# Patient Record
Sex: FEMALE | Race: WHITE | NOT HISPANIC OR LATINO | URBAN - METROPOLITAN AREA
[De-identification: names, ages, dates, MRNs, and addresses within clinical notes are randomized per-mention and may not be internally consistent; named-entity substitution may affect disease eponyms.]

---

## 2018-01-03 ENCOUNTER — EMERGENCY (EMERGENCY)
Facility: HOSPITAL | Age: 73
LOS: 0 days | Discharge: HOME | End: 2018-01-03
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
## 2018-01-03 DIAGNOSIS — Z87.891 PERSONAL HISTORY OF NICOTINE DEPENDENCE: ICD-10-CM
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
## 2018-01-03 DIAGNOSIS — S90.111A CONTUSION OF RIGHT GREAT TOE WITHOUT DAMAGE TO NAIL, INITIAL ENCOUNTER: ICD-10-CM
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
## 2018-01-03 DIAGNOSIS — I10 ESSENTIAL (PRIMARY) HYPERTENSION: ICD-10-CM

## 2018-01-03 DIAGNOSIS — M79.674 PAIN IN RIGHT TOE(S): ICD-10-CM
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
## 2018-09-06 ENCOUNTER — OUTPATIENT (OUTPATIENT)
Dept: OUTPATIENT SERVICES | Facility: HOSPITAL | Age: 73
LOS: 1 days | Discharge: HOME | End: 2018-09-06

## 2018-09-07 DIAGNOSIS — L97.512 NON-PRESSURE CHRONIC ULCER OF OTHER PART OF RIGHT FOOT WITH FAT LAYER EXPOSED: ICD-10-CM

## 2018-09-09 LAB
CULTURE RESULTS: SIGNIFICANT CHANGE UP
SPECIMEN SOURCE: SIGNIFICANT CHANGE UP

## 2018-09-28 ENCOUNTER — OUTPATIENT (OUTPATIENT)
Dept: OUTPATIENT SERVICES | Facility: HOSPITAL | Age: 73
LOS: 1 days | Discharge: HOME | End: 2018-09-28

## 2018-09-28 DIAGNOSIS — Z00.00 ENCOUNTER FOR GENERAL ADULT MEDICAL EXAMINATION WITHOUT ABNORMAL FINDINGS: ICD-10-CM

## 2018-09-28 DIAGNOSIS — I48.91 UNSPECIFIED ATRIAL FIBRILLATION: ICD-10-CM

## 2018-09-28 DIAGNOSIS — R68.89 OTHER GENERAL SYMPTOMS AND SIGNS: ICD-10-CM

## 2019-01-09 ENCOUNTER — EMERGENCY (EMERGENCY)
Facility: HOSPITAL | Age: 74
LOS: 0 days | Discharge: HOME | End: 2019-01-09
Attending: EMERGENCY MEDICINE | Admitting: EMERGENCY MEDICINE

## 2019-01-09 VITALS
OXYGEN SATURATION: 99 % | DIASTOLIC BLOOD PRESSURE: 83 MMHG | HEART RATE: 61 BPM | TEMPERATURE: 98 F | RESPIRATION RATE: 18 BRPM | SYSTOLIC BLOOD PRESSURE: 184 MMHG

## 2019-01-09 VITALS
DIASTOLIC BLOOD PRESSURE: 86 MMHG | RESPIRATION RATE: 19 BRPM | OXYGEN SATURATION: 99 % | SYSTOLIC BLOOD PRESSURE: 216 MMHG | TEMPERATURE: 98 F | HEART RATE: 75 BPM

## 2019-01-09 DIAGNOSIS — R51 HEADACHE: ICD-10-CM

## 2019-01-09 DIAGNOSIS — Z95.5 PRESENCE OF CORONARY ANGIOPLASTY IMPLANT AND GRAFT: ICD-10-CM

## 2019-01-09 DIAGNOSIS — I10 ESSENTIAL (PRIMARY) HYPERTENSION: ICD-10-CM

## 2019-01-09 RX ORDER — OXYCODONE AND ACETAMINOPHEN 5; 325 MG/1; MG/1
1 TABLET ORAL ONCE
Qty: 0 | Refills: 0 | Status: DISCONTINUED | OUTPATIENT
Start: 2019-01-09 | End: 2019-01-09

## 2019-01-09 RX ORDER — METOCLOPRAMIDE HCL 10 MG
10 TABLET ORAL ONCE
Qty: 0 | Refills: 0 | Status: COMPLETED | OUTPATIENT
Start: 2019-01-09 | End: 2019-01-09

## 2019-01-09 RX ADMIN — OXYCODONE AND ACETAMINOPHEN 1 TABLET(S): 5; 325 TABLET ORAL at 18:34

## 2019-01-09 NOTE — ED PROVIDER NOTE - OBJECTIVE STATEMENT
74 y/o female with hx Thyroid disease, HTN, L FSA presents to the ED c/o "I have a terrible right sided headache that started at 2 am with nausea." no hx trauma/ dizziness/ weakness/ slurred speech/ CP/ SOB

## 2019-01-09 NOTE — ED PROVIDER NOTE - CROS ED CARDIOVAS ALL NEG
Can we call Wilma Lorenzana's daughter and let her know that     I have had the opportunity to review your recent pulmonary function testing and it shows severe asthma, but also sever diffusion impairment - I would recommend we consider a referral to pulmonology    Lavelle Aj MD     negative...

## 2019-01-09 NOTE — ED ADULT NURSE NOTE - OBJECTIVE STATEMENT
pt complaining of headache since 2am. pt denies blurry vision, chest pain. pt hypertensive on arrival, states her Blood pressure meds were recently changed and since she had intermittent headaches. pt given percocet in er, pt reports full relief of symptoms after pain medications, pt a&o x3. gait steady.

## 2019-01-09 NOTE — ED PROVIDER NOTE - PROGRESS NOTE DETAILS
ATTENDING NOTE:   74 y/o F with PMH of HTN, Graves' Disease, blockage in b/l LE with recent RLE stent placement (2 stents placed), presents s/o right sided HA that started today. Pt woke up today with an intermittent HA, took a nap around 12pm and woke up around 2:30/3pm today and HA became more consistent and worsened so came to ED. Reports she had mild right sided neck pain last night that had resolved last night. No neck pain today. No fevers. No numbness/tingling. On exam: Pt AAOx3. EOMI. PERRLA. NCAT. S1S2. CTAB. Abdomen soft NTND. FROM x all extremities. No focal neuro deficits. pt taken to ct, percocet given prior Patient feeling much better, does not want to wait to get blood work. Will follow-up with Dr. Aaron. Explained reasons to return to ED. ATTENDING NOTE:   72 y/o F with PMH of HTN, Graves' Disease, blockage in b/l LE with recent RLE stent placement (2 stents placed), presents s/o right sided HA that started today. Pt woke up today with an intermittent HA, took a nap around 12pm and woke up around 2:30/3pm today and HA became more consistent and worsened so came to ED. Reports she had mild right sided neck pain last night that had resolved last night. No neck pain today. No fevers. No numbness/tingling. On exam: Pt AAOx3. EOMI. PERRLA. NCAT. S1S2. CTAB. Abdomen soft NTND. FROM x all extremities.  cn2-12 intact, motro 5/5 X4, sensation grossly intact, normal gait.

## 2019-01-09 NOTE — ED PROVIDER NOTE - MEDICAL DECISION MAKING DETAILS
ct head negative, Patient feeling much better, does not want to wait to get blood work. Will follow-up with Dr. Aaron.

## 2019-02-03 ENCOUNTER — INPATIENT (INPATIENT)
Facility: HOSPITAL | Age: 74
LOS: 2 days | Discharge: HOME | End: 2019-02-06
Attending: INTERNAL MEDICINE | Admitting: INTERNAL MEDICINE

## 2019-02-03 VITALS
OXYGEN SATURATION: 96 % | TEMPERATURE: 96 F | RESPIRATION RATE: 19 BRPM | HEART RATE: 65 BPM | DIASTOLIC BLOOD PRESSURE: 75 MMHG | SYSTOLIC BLOOD PRESSURE: 180 MMHG

## 2019-02-03 DIAGNOSIS — Z90.49 ACQUIRED ABSENCE OF OTHER SPECIFIED PARTS OF DIGESTIVE TRACT: Chronic | ICD-10-CM

## 2019-02-03 DIAGNOSIS — Z90.710 ACQUIRED ABSENCE OF BOTH CERVIX AND UTERUS: Chronic | ICD-10-CM

## 2019-02-03 LAB
ALBUMIN SERPL ELPH-MCNC: 3.8 G/DL — SIGNIFICANT CHANGE UP (ref 3.5–5.2)
ALBUMIN SERPL ELPH-MCNC: 3.8 G/DL — SIGNIFICANT CHANGE UP (ref 3.5–5.2)
ALP SERPL-CCNC: 399 U/L — HIGH (ref 30–115)
ALP SERPL-CCNC: 412 U/L — HIGH (ref 30–115)
ALT FLD-CCNC: 315 U/L — HIGH (ref 0–41)
ALT FLD-CCNC: 320 U/L — HIGH (ref 0–41)
ANION GAP SERPL CALC-SCNC: 17 MMOL/L — HIGH (ref 7–14)
APAP SERPL-MCNC: 7 UG/ML — LOW (ref 10–30)
APPEARANCE UR: ABNORMAL
APPEARANCE UR: ABNORMAL
APTT BLD: 33.6 SEC — SIGNIFICANT CHANGE UP (ref 27–39.2)
AST SERPL-CCNC: 159 U/L — HIGH (ref 0–41)
AST SERPL-CCNC: 165 U/L — HIGH (ref 0–41)
BACTERIA # UR AUTO: ABNORMAL /HPF
BACTERIA # UR AUTO: ABNORMAL /HPF
BASOPHILS # BLD AUTO: 0.02 K/UL — SIGNIFICANT CHANGE UP (ref 0–0.2)
BASOPHILS NFR BLD AUTO: 0.2 % — SIGNIFICANT CHANGE UP (ref 0–1)
BILIRUB DIRECT SERPL-MCNC: 2.9 MG/DL — HIGH (ref 0–0.2)
BILIRUB INDIRECT FLD-MCNC: 2.3 MG/DL — HIGH (ref 0.2–1.2)
BILIRUB SERPL-MCNC: 5.2 MG/DL — HIGH (ref 0.2–1.2)
BILIRUB SERPL-MCNC: 5.2 MG/DL — HIGH (ref 0.2–1.2)
BILIRUB UR-MCNC: ABNORMAL
BILIRUB UR-MCNC: ABNORMAL
BUN SERPL-MCNC: 16 MG/DL — SIGNIFICANT CHANGE UP (ref 10–20)
CA-I SERPL-SCNC: 1.16 MMOL/L — SIGNIFICANT CHANGE UP (ref 1.12–1.3)
CALCIUM SERPL-MCNC: 8.9 MG/DL — SIGNIFICANT CHANGE UP (ref 8.5–10.1)
CHLORIDE SERPL-SCNC: 95 MMOL/L — LOW (ref 98–110)
CO2 SERPL-SCNC: 22 MMOL/L — SIGNIFICANT CHANGE UP (ref 17–32)
COLOR SPEC: ABNORMAL
COLOR SPEC: ABNORMAL
CREAT SERPL-MCNC: 0.8 MG/DL — SIGNIFICANT CHANGE UP (ref 0.7–1.5)
DIFF PNL FLD: NEGATIVE — SIGNIFICANT CHANGE UP
DIFF PNL FLD: NEGATIVE — SIGNIFICANT CHANGE UP
EOSINOPHIL # BLD AUTO: 0.01 K/UL — SIGNIFICANT CHANGE UP (ref 0–0.7)
EOSINOPHIL NFR BLD AUTO: 0.1 % — SIGNIFICANT CHANGE UP (ref 0–8)
EPI CELLS # UR: ABNORMAL /HPF
EPI CELLS # UR: ABNORMAL /HPF
GAS PNL BLDV: 137 MMOL/L — SIGNIFICANT CHANGE UP (ref 136–145)
GAS PNL BLDV: SIGNIFICANT CHANGE UP
GLUCOSE SERPL-MCNC: 163 MG/DL — HIGH (ref 70–99)
GLUCOSE UR QL: NEGATIVE MG/DL — SIGNIFICANT CHANGE UP
GLUCOSE UR QL: NEGATIVE MG/DL — SIGNIFICANT CHANGE UP
HCO3 BLDV-SCNC: 24 MMOL/L — SIGNIFICANT CHANGE UP (ref 22–29)
HCT VFR BLD CALC: 35.9 % — LOW (ref 37–47)
HCT VFR BLDA CALC: 11.3 % — LOW (ref 34–44)
HGB BLD CALC-MCNC: 3.7 G/DL — CRITICAL LOW (ref 14–18)
HGB BLD-MCNC: 11.6 G/DL — LOW (ref 12–16)
IMM GRANULOCYTES NFR BLD AUTO: 0.6 % — HIGH (ref 0.1–0.3)
INR BLD: 1.11 RATIO — SIGNIFICANT CHANGE UP (ref 0.65–1.3)
KETONES UR-MCNC: ABNORMAL
KETONES UR-MCNC: NEGATIVE — SIGNIFICANT CHANGE UP
LACTATE BLDV-MCNC: 1.1 MMOL/L — SIGNIFICANT CHANGE UP (ref 0.5–1.6)
LEUKOCYTE ESTERASE UR-ACNC: ABNORMAL
LEUKOCYTE ESTERASE UR-ACNC: NEGATIVE — SIGNIFICANT CHANGE UP
LYMPHOCYTES # BLD AUTO: 0.7 K/UL — LOW (ref 1.2–3.4)
LYMPHOCYTES # BLD AUTO: 7 % — LOW (ref 20.5–51.1)
MAGNESIUM SERPL-MCNC: 2.2 MG/DL — SIGNIFICANT CHANGE UP (ref 1.8–2.4)
MCHC RBC-ENTMCNC: 28.2 PG — SIGNIFICANT CHANGE UP (ref 27–31)
MCHC RBC-ENTMCNC: 32.3 G/DL — SIGNIFICANT CHANGE UP (ref 32–37)
MCV RBC AUTO: 87.3 FL — SIGNIFICANT CHANGE UP (ref 81–99)
MONOCYTES # BLD AUTO: 0.62 K/UL — HIGH (ref 0.1–0.6)
MONOCYTES NFR BLD AUTO: 6.2 % — SIGNIFICANT CHANGE UP (ref 1.7–9.3)
NEUTROPHILS # BLD AUTO: 8.59 K/UL — HIGH (ref 1.4–6.5)
NEUTROPHILS NFR BLD AUTO: 85.9 % — HIGH (ref 42.2–75.2)
NITRITE UR-MCNC: NEGATIVE — SIGNIFICANT CHANGE UP
NITRITE UR-MCNC: POSITIVE
NRBC # BLD: 0 /100 WBCS — SIGNIFICANT CHANGE UP (ref 0–0)
PCO2 BLDV: 37 MMHG — LOW (ref 41–51)
PH BLDV: 7.43 — SIGNIFICANT CHANGE UP (ref 7.26–7.43)
PH UR: 6 — SIGNIFICANT CHANGE UP (ref 5–8)
PH UR: 6 — SIGNIFICANT CHANGE UP (ref 5–8)
PLATELET # BLD AUTO: 191 K/UL — SIGNIFICANT CHANGE UP (ref 130–400)
PO2 BLDV: 52 MMHG — HIGH (ref 20–40)
POTASSIUM BLDV-SCNC: 4 MMOL/L — SIGNIFICANT CHANGE UP (ref 3.3–5.6)
POTASSIUM SERPL-MCNC: 4.8 MMOL/L — SIGNIFICANT CHANGE UP (ref 3.5–5)
POTASSIUM SERPL-SCNC: 4.8 MMOL/L — SIGNIFICANT CHANGE UP (ref 3.5–5)
PROT SERPL-MCNC: 6.6 G/DL — SIGNIFICANT CHANGE UP (ref 6–8)
PROT SERPL-MCNC: 6.7 G/DL — SIGNIFICANT CHANGE UP (ref 6–8)
PROT UR-MCNC: 30 MG/DL
PROT UR-MCNC: NEGATIVE MG/DL — SIGNIFICANT CHANGE UP
PROTHROM AB SERPL-ACNC: 12.7 SEC — SIGNIFICANT CHANGE UP (ref 9.95–12.87)
RBC # BLD: 4.11 M/UL — LOW (ref 4.2–5.4)
RBC # FLD: 14.9 % — HIGH (ref 11.5–14.5)
SALICYLATES SERPL-MCNC: <0.3 MG/DL — LOW (ref 4–30)
SAO2 % BLDV: 91 % — SIGNIFICANT CHANGE UP
SODIUM SERPL-SCNC: 134 MMOL/L — LOW (ref 135–146)
SP GR SPEC: 1.01 — SIGNIFICANT CHANGE UP (ref 1.01–1.03)
SP GR SPEC: 1.02 — SIGNIFICANT CHANGE UP (ref 1.01–1.03)
TROPONIN T SERPL-MCNC: <0.01 NG/ML — SIGNIFICANT CHANGE UP
UROBILINOGEN FLD QL: 1 MG/DL (ref 0.2–0.2)
UROBILINOGEN FLD QL: 2 MG/DL (ref 0.2–0.2)
WBC # BLD: 10 K/UL — SIGNIFICANT CHANGE UP (ref 4.8–10.8)
WBC # FLD AUTO: 10 K/UL — SIGNIFICANT CHANGE UP (ref 4.8–10.8)
WBC UR QL: ABNORMAL /HPF

## 2019-02-03 RX ORDER — LEVOTHYROXINE SODIUM 125 MCG
125 TABLET ORAL DAILY
Qty: 0 | Refills: 0 | Status: DISCONTINUED | OUTPATIENT
Start: 2019-02-03 | End: 2019-02-06

## 2019-02-03 RX ORDER — SODIUM CHLORIDE 9 MG/ML
1000 INJECTION INTRAMUSCULAR; INTRAVENOUS; SUBCUTANEOUS
Qty: 0 | Refills: 0 | Status: DISCONTINUED | OUTPATIENT
Start: 2019-02-03 | End: 2019-02-04

## 2019-02-03 RX ORDER — ONDANSETRON 8 MG/1
4 TABLET, FILM COATED ORAL ONCE
Qty: 0 | Refills: 0 | Status: COMPLETED | OUTPATIENT
Start: 2019-02-03 | End: 2019-02-03

## 2019-02-03 RX ORDER — AMLODIPINE BESYLATE 2.5 MG/1
5 TABLET ORAL ONCE
Qty: 0 | Refills: 0 | Status: DISCONTINUED | OUTPATIENT
Start: 2019-02-03 | End: 2019-02-03

## 2019-02-03 RX ORDER — ACETYLCYSTEINE 200 MG/ML
16 VIAL (ML) MISCELLANEOUS ONCE
Qty: 0 | Refills: 0 | Status: DISCONTINUED | OUTPATIENT
Start: 2019-02-03 | End: 2019-02-04

## 2019-02-03 RX ORDER — SODIUM CHLORIDE 9 MG/ML
1000 INJECTION, SOLUTION INTRAVENOUS ONCE
Qty: 0 | Refills: 0 | Status: COMPLETED | OUTPATIENT
Start: 2019-02-03 | End: 2019-02-03

## 2019-02-03 RX ORDER — CLOPIDOGREL BISULFATE 75 MG/1
75 TABLET, FILM COATED ORAL DAILY
Qty: 0 | Refills: 0 | Status: DISCONTINUED | OUTPATIENT
Start: 2019-02-03 | End: 2019-02-04

## 2019-02-03 RX ORDER — CHLORHEXIDINE GLUCONATE 213 G/1000ML
1 SOLUTION TOPICAL
Qty: 0 | Refills: 0 | Status: DISCONTINUED | OUTPATIENT
Start: 2019-02-03 | End: 2019-02-06

## 2019-02-03 RX ORDER — ACETYLCYSTEINE 200 MG/ML
5 VIAL (ML) MISCELLANEOUS ONCE
Qty: 0 | Refills: 0 | Status: COMPLETED | OUTPATIENT
Start: 2019-02-03 | End: 2019-02-04

## 2019-02-03 RX ORDER — ATENOLOL 25 MG/1
1 TABLET ORAL
Qty: 0 | Refills: 0 | COMMUNITY

## 2019-02-03 RX ORDER — ENOXAPARIN SODIUM 100 MG/ML
40 INJECTION SUBCUTANEOUS DAILY
Qty: 0 | Refills: 0 | Status: DISCONTINUED | OUTPATIENT
Start: 2019-02-03 | End: 2019-02-06

## 2019-02-03 RX ORDER — INFLUENZA VIRUS VACCINE 15; 15; 15; 15 UG/.5ML; UG/.5ML; UG/.5ML; UG/.5ML
0.5 SUSPENSION INTRAMUSCULAR ONCE
Qty: 0 | Refills: 0 | Status: DISCONTINUED | OUTPATIENT
Start: 2019-02-03 | End: 2019-02-06

## 2019-02-03 RX ORDER — IBUPROFEN 200 MG
400 TABLET ORAL ONCE
Qty: 0 | Refills: 0 | Status: COMPLETED | OUTPATIENT
Start: 2019-02-03 | End: 2019-02-03

## 2019-02-03 RX ORDER — ACETYLCYSTEINE 200 MG/ML
11 VIAL (ML) MISCELLANEOUS ONCE
Qty: 0 | Refills: 0 | Status: DISCONTINUED | OUTPATIENT
Start: 2019-02-03 | End: 2019-02-04

## 2019-02-03 RX ORDER — METOPROLOL TARTRATE 50 MG
25 TABLET ORAL DAILY
Qty: 0 | Refills: 0 | Status: DISCONTINUED | OUTPATIENT
Start: 2019-02-03 | End: 2019-02-06

## 2019-02-03 RX ORDER — PIPERACILLIN AND TAZOBACTAM 4; .5 G/20ML; G/20ML
4.5 INJECTION, POWDER, LYOPHILIZED, FOR SOLUTION INTRAVENOUS ONCE
Qty: 0 | Refills: 0 | Status: COMPLETED | OUTPATIENT
Start: 2019-02-03 | End: 2019-02-03

## 2019-02-03 RX ORDER — FUROSEMIDE 40 MG
0 TABLET ORAL
Qty: 0 | Refills: 0 | COMMUNITY

## 2019-02-03 RX ADMIN — ONDANSETRON 4 MILLIGRAM(S): 8 TABLET, FILM COATED ORAL at 18:00

## 2019-02-03 RX ADMIN — Medication 400 MILLIGRAM(S): at 19:47

## 2019-02-03 RX ADMIN — PIPERACILLIN AND TAZOBACTAM 200 GRAM(S): 4; .5 INJECTION, POWDER, LYOPHILIZED, FOR SOLUTION INTRAVENOUS at 20:46

## 2019-02-03 RX ADMIN — SODIUM CHLORIDE 2000 MILLILITER(S): 9 INJECTION, SOLUTION INTRAVENOUS at 18:00

## 2019-02-03 NOTE — H&P ADULT - PMH
Arterial insufficiency of lower extremity  left leg stent placed  High cholesterol    HTN (hypertension)    Hypothyroid    Leg swelling Arterial insufficiency of lower extremity  left leg stent placed  High cholesterol    HTN (hypertension)    Hypothyroid    Leg swelling    Peripheral arterial disease

## 2019-02-03 NOTE — H&P ADULT - NSHPPHYSICALEXAM_GEN_ALL_CORE
T(F): 97.4 (02-03-19 @ 19:35), Max: 97.4 (02-03-19 @ 19:35)  HR: 62 (02-03-19 @ 19:35) (62 - 65)  BP: 163/72 (02-03-19 @ 19:35) (161/71 - 180/75)  RR: 18 (02-03-19 @ 19:35) (18 - 19)  SpO2: 95% (02-03-19 @ 19:35) (95% - 96%)    PHYSICAL EXAM:  GEN: No acute distress  HEENT:   LUNGS: Clear to auscultation bilaterally   HEART: S1/S2 present. RRR.   ABD: Soft, non-tender, non-distended. Bowel sounds present  EXT:  NEURO: AAOX3 T(F): 97.4 (02-03-19 @ 19:35), Max: 97.4 (02-03-19 @ 19:35)  HR: 62 (02-03-19 @ 19:35) (62 - 65)  BP: 163/72 (02-03-19 @ 19:35) (161/71 - 180/75)  RR: 18 (02-03-19 @ 19:35) (18 - 19)  SpO2: 95% (02-03-19 @ 19:35) (95% - 96%)    PHYSICAL EXAM:  GEN: No acute distress  HEENT: wnl  LUNGS: Clear to auscultation bilaterally   HEART: S1/S2 present. RRR.   ABD: Soft,  non-distended. mild right upper quadrant tenderness to palpation  EXT: no edema  NEURO: AAOX3

## 2019-02-03 NOTE — ED PROVIDER NOTE - MEDICAL DECISION MAKING DETAILS
73yF p/w 2d of flu-like illness, with myalgias, generalized weakness, chest/abd pain, n/v x 2, poor PO intake. Pt dehydrated on initial assessment and w/o abd tenderness.  Pt afebrile x2 during ED stay and hemodynamically stable, mildly pale and dehydrated but otherwise well appearing. Labs w/o leukocytosis but with hyperbilirubinemia to 5 and transaminitis  .  RUQ US ordered, pt given zosyn for ?cholangitis and admitted to medicine for further evaluation. 73yF p/w 2d of flu-like illness, with myalgias, generalized weakness, chest/abd pain, n/v x 2, poor PO intake. Pt dehydrated on initial assessment and w/o abd tenderness.  Pt afebrile x2 during ED stay and hemodynamically stable, mildly pale and dehydrated but otherwise well appearing. Labs w/o leukocytosis but with hyperbilirubinemia to 5 and transaminitis  .  RUQ US ordered, pt given zosyn for ?cholangitis (though no RUQ tenderness on repeat exams, afebrile here in the ED, no leukocytosis or altered mental status) and admitted to medicine for further evaluation.

## 2019-02-03 NOTE — H&P ADULT - ATTENDING COMMENTS
73 F with PMH listed, comes with 1 week h/o feeling weak, tired, headaches, hot/cold, symptoms gradually progressive for last 3 days has been taking 8-10 tabs of tylenol daily for her vague symptoms, yesterday, felt nauseous  , dry heaves, chills, felt cold, did not check temp, a/w right upper quadrant mild dull pain, no CP/SOB/AMS/palpitations, travel/sick contact, denies alcohol intake in 41 yrs, s/p cholecystectomy at age of 21 yrs,  feels tired. no other symptoms, no dysuria,     Denies CP, SOB, N/V/D/C/AP, cough, F, chills, dizziness, new focal weakness, HA, vision changes, dysuria, or urinary symptoms, blood in stool.    ROS: all systems unremarkable except as above.     Gen: NAD, AA0x3  HEENT: PERRLA, EOM, no LAD  CV: nl S1 S2  Resp: decreased BS b/l  GI: NT/ND/S +BS, obese  MS: no c/c/e  Neuro: nonfocal      1- deranged liver function   elevated ALP -399, alt>ast, TB 5.1  CBD dilatation- 1.7 cm, hepatocellular disease vs steatosis on US abdomen  INR normal  findings consistent with mixed pattern hepatocellular plus obstructive   r/o cbd obstruction/stone-   Cont NAC  MRCP  Might need ERCP   check hepatitis panel  no fever or leucocytosis at this time- s/p zosyn one dose in ED,   if febrile , start abx      2- hypothyroidism  h/o graves s/p radiation therapy  currently on synthroid    3- HTN- Uncontrolled  added Procardia    4- DLD- hold statin for now    5- PAD s/p stent in [place] oct 2018  on statin , plavix at home, not compliant with baby aspirin as it makes her nauseus , not taking it for many months  awaiting call back from pt's vascular ?Dr. Stanley      dvt ppx- lovenox 40 q24  dash diet  oob-chair  dispo- home 73 F with PMH listed, comes with 1 week h/o feeling weak, tired, headaches, hot/cold, symptoms gradually progressive for last 3 days has been taking 8-10 tabs of tylenol daily for her vague symptoms, yesterday, felt nauseous  , dry heaves, chills, felt cold, did not check temp, a/w right upper quadrant mild dull pain, no CP/SOB/AMS/palpitations, travel/sick contact, denies alcohol intake in 40 yrs, s/p cholecystectomy at age of 21 yrs,  feels tired. no other symptoms, no dysuria,     Denies CP, SOB, D/C, cough, dizziness, new focal weakness, HA, vision changes, dysuria, or urinary symptoms, blood in stool.    ROS: all systems unremarkable except as above.     Gen: NAD, AA0x3, mild scleral icterus  HEENT: PERRLA, EOM, no LAD  CV: nl S1 S2  Resp: decreased BS b/l  GI: NT/ND/S +BS, obese  MS: no c/c/e  Neuro: nonfocal      1- deranged liver function   elevated ALP -399, alt>ast, TB 5.1  CBD dilatation- 1.7 cm, hepatocellular disease vs steatosis on US abdomen  INR normal  findings consistent with mixed pattern hepatocellular plus obstructive   r/o cbd obstruction/stone-   Cont NAC  MRCP  Might need ERCP   check hepatitis panel  no fever or leucocytosis at this time- s/p zosyn one dose in ED,   if febrile , start abx      2- hypothyroidism  h/o graves s/p radiation therapy  currently on synthroid    3- HTN- Uncontrolled  added Procardia    4- DLD- hold statin for now    5- PAD s/p stent in [place] oct 2018  on statin , plavix at home, not compliant with baby aspirin as it makes her nauseus , not taking it for many months  awaiting call back from pt's vascular ?Dr. Stanley      dvt ppx- lovenox 40 q24  dash diet  oob-chair  dispo- home

## 2019-02-03 NOTE — H&P ADULT - NSHPLABSRESULTS_GEN_ALL_CORE
11.6   10. )-----------( 191      ( 2019 17:30 )             35.9       -    134<L>  |  95<L>  |  16  ----------------------------<  163<H>  4.8   |  22  |  0.8    Ca    8.9      2019 17:30  Mg     2.2         TPro  6.7  /  Alb  3.8  /  TBili  5.2<H>  /  DBili  x   /  AST  159<H>  /  ALT  315<H>  /  AlkPhos  399<H>                Urinalysis Basic - ( 2019 21:25 )    Color: Orange / Appearance: Cloudy / S.015 / pH: x  Gluc: x / Ketone: Negative  / Bili: Moderate / Urobili: 1.0 mg/dL   Blood: x / Protein: Negative mg/dL / Nitrite: Negative   Leuk Esterase: Negative / RBC: x / WBC x   Sq Epi: x / Non Sq Epi: x / Bacteria: x        PT/INR - ( 2019 18:25 )   PT: 12.70 sec;   INR: 1.11 ratio         PTT - ( 2019 18:25 )  PTT:33.6 sec    Lactate Trend      CARDIAC MARKERS ( 2019 17:30 )  x     / <0.01 ng/mL / x     / x     / x 11.6   10. )-----------( 191      ( 2019 17:30 )             35.9       -    134<L>  |  95<L>  |  16  ----------------------------<  163<H>  4.8   |  22  |  0.8    Ca    8.9      2019 17:30  Mg     2.2         TPro  6.7  /  Alb  3.8  /  TBili  5.2<H>  /  DBili  x   /  AST  159<H>  /  ALT  315<H>  /  AlkPhos  399<H>                Urinalysis Basic - ( 2019 21:25 )    Color: Orange / Appearance: Cloudy / S.015 / pH: x  Gluc: x / Ketone: Negative  / Bili: Moderate / Urobili: 1.0 mg/dL   Blood: x / Protein: Negative mg/dL / Nitrite: Negative   Leuk Esterase: Negative / RBC: x / WBC x   Sq Epi: x / Non Sq Epi: x / Bacteria: x        PT/INR - ( 2019 18:25 )   PT: 12.70 sec;   INR: 1.11 ratio         PTT - ( 2019 18:25 )  PTT:33.6 sec    Lactate Trend      CARDIAC MARKERS ( 2019 17:30 )  x     / <0.01 ng/mL / x     / x     / x    US abdomen- Intra and extrahepatic biliary ductal dilatation with CBD measuring up to   1.7 cm. Further evaluation with MRCP can be obtained.    Increased hepatic echogenicity and heterogeneous echotexture compatible   with hepatic steatosis versus underlying hepatocellular disease

## 2019-02-03 NOTE — H&P ADULT - HISTORY OF PRESENT ILLNESS
73 F with PMH listed, comes with 1 week h/o feeling weak, tired, headaches, symptoms gradually progressive for last 3 days has been taking 8-10 tabs of tylenol daily for her vague symptoms, yesterday, felt nauseous  , dry heaves, chills, felt cold, did not check temp, a/w right upper quadrant mild dull pain, no CP/SOB/AMS/palpitations, travel/sick contact, denies alcohol intake in 40 yrs, s/p cholecystectomy at age of 21 yrs,  feels tired. no other symptoms, no dysuria,   in ED, US bad- cbd 1.7 cm- dilated, mild intrahepatic dilatation , elevated ast alt alp, received antibiotics, tox screen was not sent.

## 2019-02-03 NOTE — ED ADULT NURSE NOTE - PMH
Arterial insufficiency of lower extremity  left leg stent placed  High cholesterol    HTN (hypertension)    Hypothyroid    Leg swelling

## 2019-02-03 NOTE — H&P ADULT - ASSESSMENT
73 F with PMH listed, comes with 1 week h/o feeling weak, tired, headaches, symptoms gradually progressive for last 3 days has been taking 8-10 tabs of tylenol daily for her vague symptoms, yesterday, felt nauseous  , dry heaves, chills, felt cold, did not check temp, a/w right upper quadrant mild dull pain, no CP/SOB/AMS/palpitations, travel/sick contact, denies alcohol intake in 40 yrs, s/p cholecystectomy at age of 21 yrs,  feels tired. no other symptoms, no dysuria,       1- deranged liver function   elevated ALP -399, alt>ast, TB 5.1  CBD dilatation- 1.7 cm  INR normal  findings consistent with mixed pattern hepatocellular plus obstructive   r/o cbd obstruction/stone- GI follow up for mrcp?     h/o tylenol 8-10 tabs a day for 3 days- tox consult  tylenol level added on to previous bmp  will d/w toxicology if she needs NAC  avoid statin/tylenol or hepatotoxic meds    2- hypothyroidism  h/o graves s/p radiation therapy  currently on synthroid    3- HTN- on toprol xl  bp elevated- 160 sbp  will give one dose amlodipine 5 mg    4- DLD- hold statin for now    5- PAD s/p stent in [place] oct 2018  on statin , plavix at home, not compliant with baby aspirin as it makes her nauseus , not taking it for many months  for now- c/w plavix , hold statin  dvt ppx- lovenox 40 q24  dash diet  oob-chair  dispo- home 73 F with PMH listed, comes with 1 week h/o feeling weak, tired, headaches, symptoms gradually progressive for last 3 days has been taking 8-10 tabs of tylenol daily for her vague symptoms, yesterday, felt nauseous  , dry heaves, chills, felt cold, did not check temp, a/w right upper quadrant mild dull pain, no CP/SOB/AMS/palpitations, travel/sick contact, denies alcohol intake in 40 yrs, s/p cholecystectomy at age of 21 yrs,  feels tired. no other symptoms, no dysuria,       1- deranged liver function   elevated ALP -399, alt>ast, TB 5.1  CBD dilatation- 1.7 cm, hepatocellular disease vs steatosis on US abdomen  INR normal  findings consistent with mixed pattern hepatocellular plus obstructive   r/o cbd obstruction/stone- GI follow up for mrcp?     h/o tylenol 8-10 tabs a day for 3 days- tox consult  tylenol level added on to previous bmp  will d/w toxicology if she needs NAC  avoid statin/tylenol or hepatotoxic meds  check hepatitis panel  no fever or leucocytosis at this time- s/p zosyn one dose in ED,   if febrile , start abx    2- hypothyroidism  h/o graves s/p radiation therapy  currently on synthroid    3- HTN- on toprol xl  bp elevated- 160 sbp  will give one dose amlodipine 5 mg    4- DLD- hold statin for now    5- PAD s/p stent in [place] oct 2018  on statin , plavix at home, not compliant with baby aspirin as it makes her nauseus , not taking it for many months  for now- c/w plavix , hold statin  takes lasix (clarify dose in am ) for LE edema, hold it for now, poor po intake and ni edema currently  dvt ppx- lovenox 40 q24  dash diet  oob-chair  dispo- home 73 F with PMH listed, comes with 1 week h/o feeling weak, tired, headaches, symptoms gradually progressive for last 3 days has been taking 8-10 tabs of tylenol daily for her vague symptoms, yesterday, felt nauseous  , dry heaves, chills, felt cold, did not check temp, a/w right upper quadrant mild dull pain, no CP/SOB/AMS/palpitations, travel/sick contact, denies alcohol intake in 40 yrs, s/p cholecystectomy at age of 21 yrs,  feels tired. no other symptoms, no dysuria,       1- deranged liver function   elevated ALP -399, alt>ast, TB 5.1  CBD dilatation- 1.7 cm, hepatocellular disease vs steatosis on US abdomen  INR normal  findings consistent with mixed pattern hepatocellular plus obstructive   r/o cbd obstruction/stone- GI follow up for mrcp?     h/o tylenol 8-10 tabs a day for 3 days- tox consult  tylenol level added on to previous bmp  will d/w toxicology if she needs NAC  avoid statin/tylenol or hepatotoxic meds  check hepatitis panel  no fever or leucocytosis at this time- s/p zosyn one dose in ED,   if febrile , start abx  NS ay 75 for now    2- hypothyroidism  h/o graves s/p radiation therapy  currently on synthroid    3- HTN- on toprol xl  bp elevated- 160 sbp  will give one dose amlodipine 5 mg    4- DLD- hold statin for now    5- PAD s/p stent in [place] oct 2018  on statin , plavix at home, not compliant with baby aspirin as it makes her nauseus , not taking it for many months  for now- c/w plavix , hold statin  takes lasix (clarify dose in am ) for LE edema, hold it for now, poor po intake and ni edema currently  dvt ppx- lovenox 40 q24  dash diet  oob-chair  dispo- home 73 F with PMH listed, comes with 1 week h/o feeling weak, tired, headaches, symptoms gradually progressive for last 3 days has been taking 8-10 tabs of tylenol daily for her vague symptoms, yesterday, felt nauseous  , dry heaves, chills, felt cold, did not check temp, a/w right upper quadrant mild dull pain, no CP/SOB/AMS/palpitations, travel/sick contact, denies alcohol intake in 40 yrs, s/p cholecystectomy at age of 21 yrs,  feels tired. no other symptoms, no dysuria,       1- deranged liver function   elevated ALP -399, alt>ast, TB 5.1  CBD dilatation- 1.7 cm, hepatocellular disease vs steatosis on US abdomen  INR normal  findings consistent with mixed pattern hepatocellular plus obstructive   r/o cbd obstruction/stone- GI follow up for mrcp?     h/o tylenol 8-10 tabs a day for 3 days, took 4-5 tabs before coming to ED today  - tox consult  tylenol level added on to previous bmp  will d/w toxicology if she needs NAC  avoid statin/tylenol or hepatotoxic meds  check hepatitis panel  no fever or leucocytosis at this time- s/p zosyn one dose in ED,   if febrile , start abx  NS ay 75 for now    2- hypothyroidism  h/o graves s/p radiation therapy  currently on synthroid    3- HTN- on toprol xl  bp elevated- 160 sbp  will give one dose amlodipine 5 mg    4- DLD- hold statin for now    5- PAD s/p stent in [place] oct 2018  on statin , plavix at home, not compliant with baby aspirin as it makes her nauseus , not taking it for many months  for now- c/w plavix , hold statin  takes lasix (clarify dose in am ) for LE edema, hold it for now, poor po intake and ni edema currently  dvt ppx- lovenox 40 q24  dash diet  oob-chair  dispo- home 73 F with PMH listed, comes with 1 week h/o feeling weak, tired, headaches, symptoms gradually progressive for last 3 days has been taking 8-10 tabs of tylenol daily for her vague symptoms, yesterday, felt nauseous  , dry heaves, chills, felt cold, did not check temp, a/w right upper quadrant mild dull pain, no CP/SOB/AMS/palpitations, travel/sick contact, denies alcohol intake in 40 yrs, s/p cholecystectomy at age of 21 yrs,  feels tired. no other symptoms, no dysuria,       1- deranged liver function   elevated ALP -399, alt>ast, TB 5.1  CBD dilatation- 1.7 cm, hepatocellular disease vs steatosis on US abdomen  INR normal  findings consistent with mixed pattern hepatocellular plus obstructive   r/o cbd obstruction/stone- GI follow up for mrcp?     h/o tylenol 8-10 tabs a day for 3 days, took 4-5 tabs before coming to ED today  - tox consult  tylenol level added on to previous bmp  will d/w toxicology if she needs NAC  avoid statin/tylenol or hepatotoxic meds  check hepatitis panel  no fever or leucocytosis at this time- s/p zosyn one dose in ED,   if febrile , start abx  NS ay 75 for now    2- hypothyroidism  h/o graves s/p radiation therapy  currently on synthroid    3- HTN- on toprol xl  bp elevated- 160 sbp  will give one dose amlodipine 5 mg    4- DLD- hold statin for now    5- PAD s/p stent in [place] oct 2018  on statin , plavix at home, not compliant with baby aspirin as it makes her nauseus , not taking it for many months  for now- c/w plavix , hold statin  takes lasix for LE edema, hold it for now, poor po intake and ni edema currently  dvt ppx- lovenox 40 q24  dash diet  oob-chair  dispo- home 73 F with PMH listed, comes with 1 week h/o feeling weak, tired, headaches, symptoms gradually progressive for last 3 days has been taking 8-10 tabs of tylenol daily for her vague symptoms, yesterday, felt nauseous  , dry heaves, chills, felt cold, did not check temp, a/w right upper quadrant mild dull pain, no CP/SOB/AMS/palpitations, travel/sick contact, denies alcohol intake in 40 yrs, s/p cholecystectomy at age of 21 yrs,  feels tired. no other symptoms, no dysuria,       1- deranged liver function   elevated ALP -399, alt>ast, TB 5.1  CBD dilatation- 1.7 cm, hepatocellular disease vs steatosis on US abdomen  INR normal  findings consistent with mixed pattern hepatocellular plus obstructive   r/o cbd obstruction/stone- GI follow up for mrcp?     h/o tylenol 8-10 tabs a day for 3 days, took 4-5 tabs before coming to ED today  - tox consult  tylenol level added on to previous bmp  will d/w toxicology if she needs NAC  avoid statin/tylenol or hepatotoxic meds  check hepatitis panel  no fever or leucocytosis at this time- s/p zosyn one dose in ED,   if febrile , start abx  NS ay 75 for now    2- hypothyroidism  h/o graves s/p radiation therapy  currently on synthroid    3- HTN- on toprol xl  bp - 130s sbp  avoid hypotension    4- DLD- hold statin for now    5- PAD s/p stent in [place] oct 2018  on statin , plavix at home, not compliant with baby aspirin as it makes her nauseus , not taking it for many months  for now- c/w plavix , hold statin  takes lasix for LE edema, hold it for now, poor po intake and ni edema currently  dvt ppx- lovenox 40 q24  dash diet  oob-chair  dispo- home 73 F with PMH listed, comes with 1 week h/o feeling weak, tired, headaches, symptoms gradually progressive for last 3 days has been taking 8-10 tabs of tylenol daily for her vague symptoms, yesterday, felt nauseous  , dry heaves, chills, felt cold, did not check temp, a/w right upper quadrant mild dull pain, no CP/SOB/AMS/palpitations, travel/sick contact, denies alcohol intake in 40 yrs, s/p cholecystectomy at age of 21 yrs,  feels tired. no other symptoms, no dysuria,       1- deranged liver function   elevated ALP -399, alt>ast, TB 5.1  CBD dilatation- 1.7 cm, hepatocellular disease vs steatosis on US abdomen  INR normal  findings consistent with mixed pattern hepatocellular plus obstructive   r/o cbd obstruction/stone- GI follow up for mrcp?     h/o tylenol 8-10 tabs a day for 3 days, took 4-5 tabs before coming to ED today  - tox consult  tylenol level added on to previous bmp  will d/w toxicology if she needs NAC  avoid statin/tylenol or hepatotoxic meds  check hepatitis panel  no fever or leucocytosis at this time- s/p zosyn one dose in ED,   if febrile , start abx  NS ay 75 for now    2- hypothyroidism  h/o graves s/p radiation therapy  currently on synthroid    3- HTN- on toprol xl  bp - 130s sbp  avoid hypotension    4- DLD- hold statin for now    5- PAD s/p stent in [place] oct 2018  on statin , plavix at home, not compliant with baby aspirin as it makes her nauseus , not taking it for many months  for now- c/w plavix , hold statin  takes lasix for LE edema, hold it for now, poor po intake and ni edema currently  dvt ppx- lovenox 40 q24  dash diet  oob-chair  dispo- home      addendum  waiting for toxicology call back   will start NAC to cover for any possibility of tylenol toxicity although cbd dilatation is against it

## 2019-02-03 NOTE — ED ADULT NURSE NOTE - NSIMPLEMENTINTERV_GEN_ALL_ED
Implemented All Universal Safety Interventions:  [place] to call system. Call bell, personal items and telephone within reach. Instruct patient to call for assistance. Room bathroom lighting operational. Non-slip footwear when patient is off stretcher. Physically safe environment: no spills, clutter or unnecessary equipment. Stretcher in lowest position, wheels locked, appropriate side rails in place.

## 2019-02-04 LAB
ALBUMIN SERPL ELPH-MCNC: 3.4 G/DL — LOW (ref 3.5–5.2)
ALP SERPL-CCNC: 322 U/L — HIGH (ref 30–115)
ALT FLD-CCNC: 240 U/L — HIGH (ref 0–41)
ANION GAP SERPL CALC-SCNC: 18 MMOL/L — HIGH (ref 7–14)
AST SERPL-CCNC: 80 U/L — HIGH (ref 0–41)
BILIRUB DIRECT SERPL-MCNC: 4 MG/DL — HIGH (ref 0–0.2)
BILIRUB INDIRECT FLD-MCNC: 0.3 MG/DL — SIGNIFICANT CHANGE UP (ref 0.2–1.2)
BILIRUB SERPL-MCNC: 4.3 MG/DL — HIGH (ref 0.2–1.2)
BUN SERPL-MCNC: 17 MG/DL — SIGNIFICANT CHANGE UP (ref 10–20)
CALCIUM SERPL-MCNC: 8.7 MG/DL — SIGNIFICANT CHANGE UP (ref 8.5–10.1)
CHLORIDE SERPL-SCNC: 99 MMOL/L — SIGNIFICANT CHANGE UP (ref 98–110)
CO2 SERPL-SCNC: 24 MMOL/L — SIGNIFICANT CHANGE UP (ref 17–32)
CREAT SERPL-MCNC: 0.9 MG/DL — SIGNIFICANT CHANGE UP (ref 0.7–1.5)
GLUCOSE SERPL-MCNC: 191 MG/DL — HIGH (ref 70–99)
HAV IGM SER-ACNC: SIGNIFICANT CHANGE UP
HBV CORE IGM SER-ACNC: SIGNIFICANT CHANGE UP
HBV SURFACE AG SER-ACNC: SIGNIFICANT CHANGE UP
HCT VFR BLD CALC: 32.9 % — LOW (ref 37–47)
HCV AB S/CO SERPL IA: 0.07 S/CO — SIGNIFICANT CHANGE UP
HCV AB S/CO SERPL IA: 0.08 S/CO — SIGNIFICANT CHANGE UP
HCV AB SERPL-IMP: SIGNIFICANT CHANGE UP
HCV AB SERPL-IMP: SIGNIFICANT CHANGE UP
HGB BLD-MCNC: 10.7 G/DL — LOW (ref 12–16)
MCHC RBC-ENTMCNC: 28.5 PG — SIGNIFICANT CHANGE UP (ref 27–31)
MCHC RBC-ENTMCNC: 32.5 G/DL — SIGNIFICANT CHANGE UP (ref 32–37)
MCV RBC AUTO: 87.5 FL — SIGNIFICANT CHANGE UP (ref 81–99)
NRBC # BLD: 0 /100 WBCS — SIGNIFICANT CHANGE UP (ref 0–0)
PLATELET # BLD AUTO: 151 K/UL — SIGNIFICANT CHANGE UP (ref 130–400)
POTASSIUM SERPL-MCNC: 3.9 MMOL/L — SIGNIFICANT CHANGE UP (ref 3.5–5)
POTASSIUM SERPL-SCNC: 3.9 MMOL/L — SIGNIFICANT CHANGE UP (ref 3.5–5)
PROT SERPL-MCNC: 5.8 G/DL — LOW (ref 6–8)
RBC # BLD: 3.76 M/UL — LOW (ref 4.2–5.4)
RBC # FLD: 14.7 % — HIGH (ref 11.5–14.5)
SODIUM SERPL-SCNC: 141 MMOL/L — SIGNIFICANT CHANGE UP (ref 135–146)
WBC # BLD: 6.67 K/UL — SIGNIFICANT CHANGE UP (ref 4.8–10.8)
WBC # FLD AUTO: 6.67 K/UL — SIGNIFICANT CHANGE UP (ref 4.8–10.8)

## 2019-02-04 RX ORDER — ASPIRIN/CALCIUM CARB/MAGNESIUM 324 MG
325 TABLET ORAL ONCE
Qty: 0 | Refills: 0 | Status: COMPLETED | OUTPATIENT
Start: 2019-02-04 | End: 2019-02-04

## 2019-02-04 RX ORDER — IBUPROFEN 200 MG
400 TABLET ORAL ONCE
Qty: 0 | Refills: 0 | Status: COMPLETED | OUTPATIENT
Start: 2019-02-04 | End: 2019-02-04

## 2019-02-04 RX ORDER — ACETYLCYSTEINE 200 MG/ML
10 VIAL (ML) MISCELLANEOUS ONCE
Qty: 0 | Refills: 0 | Status: COMPLETED | OUTPATIENT
Start: 2019-02-04 | End: 2019-02-04

## 2019-02-04 RX ORDER — NIFEDIPINE 30 MG
30 TABLET, EXTENDED RELEASE 24 HR ORAL DAILY
Qty: 0 | Refills: 0 | Status: DISCONTINUED | OUTPATIENT
Start: 2019-02-04 | End: 2019-02-06

## 2019-02-04 RX ORDER — ASPIRIN/CALCIUM CARB/MAGNESIUM 324 MG
325 TABLET ORAL DAILY
Qty: 0 | Refills: 0 | Status: DISCONTINUED | OUTPATIENT
Start: 2019-02-05 | End: 2019-02-06

## 2019-02-04 RX ORDER — ASPIRIN/CALCIUM CARB/MAGNESIUM 324 MG
325 TABLET ORAL DAILY
Qty: 0 | Refills: 0 | Status: DISCONTINUED | OUTPATIENT
Start: 2019-02-04 | End: 2019-02-04

## 2019-02-04 RX ORDER — ACETYLCYSTEINE 200 MG/ML
15 VIAL (ML) MISCELLANEOUS ONCE
Qty: 0 | Refills: 0 | Status: COMPLETED | OUTPATIENT
Start: 2019-02-04 | End: 2019-02-04

## 2019-02-04 RX ADMIN — ENOXAPARIN SODIUM 40 MILLIGRAM(S): 100 INJECTION SUBCUTANEOUS at 11:18

## 2019-02-04 RX ADMIN — Medication 1 MILLIGRAM(S): at 18:45

## 2019-02-04 RX ADMIN — Medication 325 GRAM(S): at 01:35

## 2019-02-04 RX ADMIN — Medication 125 MICROGRAM(S): at 05:23

## 2019-02-04 RX ADMIN — Medication 131.25 GRAM(S): at 02:41

## 2019-02-04 RX ADMIN — Medication 30 MILLIGRAM(S): at 13:56

## 2019-02-04 RX ADMIN — Medication 25 MILLIGRAM(S): at 05:23

## 2019-02-04 RX ADMIN — Medication 1 MILLIGRAM(S): at 21:08

## 2019-02-04 RX ADMIN — Medication 65.63 GRAM(S): at 13:58

## 2019-02-04 RX ADMIN — Medication 400 MILLIGRAM(S): at 11:37

## 2019-02-04 RX ADMIN — Medication 325 MILLIGRAM(S): at 20:00

## 2019-02-04 NOTE — CONSULT NOTE ADULT - ASSESSMENT
Patient is a pleasant 74 y/o female with PMHx of PAD ( has stents in LE placed in October) , HTN, Hypothyroid, HLD, that presented to [facility] with weakness and fatigue. She notes for the last two weeks she hasn't been feeling herself. She notes generally feeling as though she was coming down with a virus. She than notes ear pain which she started her husbands Antibiotic drops ( not sure exactly what name, is a prescription). Patient notes some abdominal pain. Pain located in RUQ as well as LUQ dull, achy, associated with nausea and non bloody emesis. Patient had initial elevations in LFT and Toxicology was called when presented in the ER as she had significant Tylenol use. She was started on NAC given her clinical picture  Reassuring is that she has preserved synthetic function of the liver. For CBD dilation she is without clinical picture of Cholangitis.     Abnormal Liver function studies ( Mixed picture)/ CBD dilation/ Tylenol use ( on NAC as advised by Toxicology )  - Clinically well appearing,  Mentating well, and with preserved synthetic function as well as renal function ( no need to contact transplant center)  - Continue IV hydration, finish NAC as per protcol when window is Met, Acetaminophen Level 7  - Daily INR, CBC, CMP- If INR goes up, poor mentation, or worsening function notify GI immediately as well as transplant center- I doubt this will occur)  - No need for ABX does not appear infectious  - MRCP MRI of abdomen for CBD dilation evaluation ( on Plavix)  - Would order Hepatology serologies, CLD workup ( AMA, ESR, TIBC, Ferritin, Fe , Anti smooth muscle AB, Ceruloplasmin, EBC, VZV, etc )  - Will follow closely

## 2019-02-04 NOTE — PROGRESS NOTE ADULT - ASSESSMENT
73 yoF presents with generalized weakness and RUQ abd pain 2/2 tylenol overdose vs. choledocholithiasis from 1.7 cm CBD stone    # Abdominal pain  -patient refusing nac because it "feels poisonous" she has been advised extensively to continue it and aware of the threat of irreversible liver damage  -seen by GI, LFTs elevated, MRI/MRCP ordered / possible ERCP thurs/fri  -switched plavix to full dose aspirin in anticipation for possible ERCP after speaking with Dr. Stanley / on plavix for LE stents for PVD    # HTN  -BP x 24 hours: BP:  (138/62 - 189/79)  - on metoprolol succinate ER 25 qd / started nifedipine XL 30 mg today    # Hypothyroid  -synthroid 125    # DVT PPX lovenox 40

## 2019-02-04 NOTE — PROGRESS NOTE ADULT - SUBJECTIVE AND OBJECTIVE BOX
SUBJECTIVE:    Patient is a 73y old Female who presents with a chief complaint of generalized weakness, deranged liver function tests (2019 09:11)      HPI:  73 F with PMH listed, comes with 1 week h/o feeling weak, tired, headaches, symptoms gradually progressive for last 3 days has been taking 8-10 tabs of tylenol daily for her vague symptoms, yesterday, felt nauseous  , dry heaves, chills, felt cold, did not check temp, a/w right upper quadrant mild dull pain, no CP/SOB/AMS/palpitations, travel/sick contact, denies alcohol intake in 40 yrs, s/p cholecystectomy at age of 21 yrs,  feels tired. no other symptoms, no dysuria,   in ED, US bad- cbd 1.7 cm- dilated, mild intrahepatic dilatation , elevated ast alt alp, received antibiotics, tox screen was not sent. (2019 22:22)      Currently admitted to medicine with the primary diagnosis of Hyperbilirubinemia     Patient has still been having some upper abdominal pain bilaterally.  She is alert and oriented and feels the pain is improving.  She has mild scleral icterus.    Besides the pertinent positives and negatives described above, the ROS was within normal limits.    PAST MEDICAL & SURGICAL HISTORY  Peripheral arterial disease  High cholesterol  HTN (hypertension)  Hypothyroid  Leg swelling  Arterial insufficiency of lower extremity: left leg stent placed  H/O: hysterectomy  History of cholecystectomy    SOCIAL HISTORY:    ALLERGIES:  No Known Allergies    MEDICATIONS:  STANDING MEDICATIONS  aspirin enteric coated 325 milliGRAM(s) Oral once  chlorhexidine 4% Liquid 1 Application(s) Topical <User Schedule>  enoxaparin Injectable 40 milliGRAM(s) SubCutaneous daily  influenza   Vaccine 0.5 milliLiter(s) IntraMuscular once  levothyroxine 125 MICROGram(s) Oral daily  metoprolol succinate ER 25 milliGRAM(s) Oral daily  NIFEdipine XL 30 milliGRAM(s) Oral daily    PRN MEDICATIONS    VITALS:   T(F): 96.8  HR: 60  BP: 153/63  RR: 18  SpO2: 96%    LABS:                        10.7   6.67  )-----------( 151      ( 2019 07:03 )             32.9     02-04    141  |  99  |  17  ----------------------------<  191<H>  3.9   |  24  |  0.9    Ca    8.7      2019 07:03  Mg     2.2         TPro  5.8<L>  /  Alb  3.4<L>  /  TBili  4.3<H>  /  DBili  4.0<H>  /  AST  80<H>  /  ALT  240<H>  /  AlkPhos  322<H>      PT/INR - ( 2019 18:25 )   PT: 12.70 sec;   INR: 1.11 ratio         PTT - ( 2019 18:25 )  PTT:33.6 sec  Urinalysis Basic - ( 2019 21:25 )    Color: Orange / Appearance: Cloudy / S.015 / pH: x  Gluc: x / Ketone: Negative  / Bili: Moderate / Urobili: 1.0 mg/dL   Blood: x / Protein: Negative mg/dL / Nitrite: Negative   Leuk Esterase: Negative / RBC: x / WBC x   Sq Epi: x / Non Sq Epi: Few /HPF / Bacteria: Few /HPF        Troponin T, Serum: <0.01 ng/mL (19 @ 17:30)      CARDIAC MARKERS ( 2019 17:30 )  x     / <0.01 ng/mL / x     / x     / x          RADIOLOGY:    PHYSICAL EXAM:  GEN: No acute distress  LUNGS: Clear to auscultation bilaterally   HEART: Regular  ABD: mild tenderness bilaterally in both upper quadrants  EXT: NC/NC/NE/2+PP/CORTEZ/Skin Intact.   NEURO: AAOX3    Intravenous access:   NG tube:   Babcock Catheter: SUBJECTIVE:    Patient is a 73y old Female who presents with a chief complaint of generalized weakness, deranged liver function tests (2019 09:11)      HPI:  73 F with PMH listed, comes with 1 week h/o feeling weak, tired, headaches, symptoms gradually progressive for last 3 days has been taking 8-10 tabs of tylenol daily for her vague symptoms, yesterday, felt nauseous  , dry heaves, chills, felt cold, did not check temp, a/w right upper quadrant mild dull pain, no CP/SOB/AMS/palpitations, travel/sick contact, denies alcohol intake in 40 yrs, s/p cholecystectomy at age of 21 yrs,  feels tired. no other symptoms, no dysuria,   in ED, US bad- cbd 1.7 cm- dilated, mild intrahepatic dilatation , elevated ast alt alp, received antibiotics, tox screen was not sent. (2019 22:22)      Currently admitted to medicine with the primary diagnosis of Hyperbilirubinemia     Patient has still been having some upper abdominal pain bilaterally.  She is alert and oriented and feels the pain is improving.  She has mild scleral icterus.    Besides the pertinent positives and negatives described above, the ROS was within normal limits.    PAST MEDICAL & SURGICAL HISTORY  Peripheral arterial disease  High cholesterol  HTN (hypertension)  Hypothyroid  Leg swelling  Arterial insufficiency of lower extremity: left leg stent placed  H/O: hysterectomy  History of cholecystectomy    SOCIAL HISTORY:    ALLERGIES:  No Known Allergies    MEDICATIONS:  STANDING MEDICATIONS  aspirin enteric coated 325 milliGRAM(s) Oral once  chlorhexidine 4% Liquid 1 Application(s) Topical <User Schedule>  enoxaparin Injectable 40 milliGRAM(s) SubCutaneous daily  influenza   Vaccine 0.5 milliLiter(s) IntraMuscular once  levothyroxine 125 MICROGram(s) Oral daily  metoprolol succinate ER 25 milliGRAM(s) Oral daily  NIFEdipine XL 30 milliGRAM(s) Oral daily    PRN MEDICATIONS    VITALS:   T(F): 96.8  HR: 60  BP: 153/63  RR: 18  SpO2: 96%    LABS:                        10.7   6.67  )-----------( 151      ( 2019 07:03 )             32.9     02-04    141  |  99  |  17  ----------------------------<  191<H>  3.9   |  24  |  0.9    Ca    8.7      2019 07:03  Mg     2.2         TPro  5.8<L>  /  Alb  3.4<L>  /  TBili  4.3<H>  /  DBili  4.0<H>  /  AST  80<H>  /  ALT  240<H>  /  AlkPhos  322<H>      PT/INR - ( 2019 18:25 )   PT: 12.70 sec;   INR: 1.11 ratio         PTT - ( 2019 18:25 )  PTT:33.6 sec  Urinalysis Basic - ( 2019 21:25 )    Color: Orange / Appearance: Cloudy / S.015 / pH: x  Gluc: x / Ketone: Negative  / Bili: Moderate / Urobili: 1.0 mg/dL   Blood: x / Protein: Negative mg/dL / Nitrite: Negative   Leuk Esterase: Negative / RBC: x / WBC x   Sq Epi: x / Non Sq Epi: Few /HPF / Bacteria: Few /HPF        Troponin T, Serum: <0.01 ng/mL (19 @ 17:30)      CARDIAC MARKERS ( 2019 17:30 )  x     / <0.01 ng/mL / x     / x     / x          RADIOLOGY:    PHYSICAL EXAM:  GEN: No acute distress  LUNGS: Clear to auscultation bilaterally   HEART: Regular  ABD: mild tenderness bilaterally in both upper quadrants  EXT: NC/NC/NE/2+PP/CORTEZ/Skin Intact.   NEURO: AAOX3    Intravenous access: yes  NG tube: no  Babcock Catheter: no

## 2019-02-04 NOTE — CONSULT NOTE ADULT - SUBJECTIVE AND OBJECTIVE BOX
Patient is a pleasant 74 y/o female with PMHx of PAD ( has stents in LE placed in October) , HTN, Hypothyroid, HLD, that presented to [facility] with weakness and fatigue. She notes for the last two weeks she hasn't been feeling herself. She notes generally feeling as though she was coming down with a virus. She than notes ear pain which she started her husbands Antibiotic drops ( not sure exactly what name, is a prescription). Patient notes some abdominal pain. Pain located in RUQ as well as LUQ dull, achy, associated with nausea and non bloody emesis. She also notes discoloration of skin as well as generalized itching.  Patient denies any current fever, chills, headache, dizziness, change in vision/hearing/speech, cough, SOB, chest pain, palpitations,  swelling of lower extremities, itchiness, or change in skin color.    GI History :  Cholecystectomy 20 years prior  Denies ETOH use even social drinking  Denies new medications, new supplements, or herbal use  - Did take ABX ear drops-  - Did take significant Tylenol dosages for a few days-   Denies History of Hepatitis, Fatty Liver, or Pancreatic issues  -Routine Scopes done >10 years ago with Dr Hagan - has not followed since  Denies weight loss, change in appetite, or change in bowel habits  -Patient independent at home      Vital Signs:  T(F): 96.9  HR: 60  BP: 177/76  RR: 18  SpO2: 96%  Physical Exam  Gen: NAD  HEENT: NC/AT, Mucosal Membranes, Sclera Icteric   Cardio: S1/S2 No S3/S4, Regular  Resp: CTA B/L  Abdomen: Soft, ND/NT  Neuro: AAOx3, negative asterixis   Extremities: FROM x 4                          10.7   6.67  )-----------( 151      ( 04 Feb 2019 07:03 )             32.9        PT/INR - ( 03 Feb 2019 18:25 )   PT: 12.70 sec;   INR: 1.11 ratio         PTT - ( 03 Feb 2019 18:25 )  PTT:33.6 sec     04 Feb 2019 07:03    141    |  99     |  17     ----------------------------<  191    3.9     |  24     |  0.9      Ca    8.7        04 Feb 2019 07:03  Mg     2.2       03 Feb 2019 17:30    TPro  5.8    /  Alb  3.4    /  TBili  4.3    /  DBili  4.0    /  AST  80     /  ALT  240    /  AlkPhos  322    / Amylase x      /Lipase x      04 Feb 2019 07:03    Abdominal U/S:  IMPRESSION:    Intra and extrahepatic biliary ductal dilatation with CBD measuring up to   1.7 cm. Further evaluation with MRCP can be obtained.    Increased hepatic echogenicity and heterogeneous echotexture compatible   with hepatic steatosis versus underlying hepatocellular disease.

## 2019-02-04 NOTE — CHART NOTE - NSCHARTNOTEFT_GEN_A_CORE
Received call back from , Dr. Mcnair, patient's case reviewed; advised to proceed with NAC administration in light of presumed Tylenol toxicity, 2 hours prior to completion of NAC administration, at hour 19, will need to repeat CMP, coags, VBG with lactate, and tylenol level.

## 2019-02-04 NOTE — CHART NOTE - NSCHARTNOTEFT_GEN_A_CORE
Pt on Plavix for hx of peripheral stents placed in October 2018 (unknown vessels and what kind of stents). Asked to evaluate if Plavix can be discontinued  Pt did not have peripheral stents placed in this institution and determination to discontinue Plavix is difficult to make for us here. Please, refer to pt's Vascular Surgeon or a hospital she had stents placed in    SPECTRA [id number]

## 2019-02-04 NOTE — PHARMACOTHERAPY INTERVENTION NOTE - COMMENTS
Spoke with Dr. Singh (ext: [id number]) regarding acetylcysteine order. Recommended changing dose of bag one of acetylcysteine to 15 grams (instead of 16 grams as originally ordered), since max 15 grams is recommended by Lexicomp. And additionally, recommended a change of bag three of acetylcysteine to 10 grams (instead of 11 grams as originally ordered), since max 10 grams is recommended by Lexicomp. Prescriber and pharmacist aware of weight.

## 2019-02-05 LAB
ALBUMIN SERPL ELPH-MCNC: 3.2 G/DL — LOW (ref 3.5–5.2)
ALP SERPL-CCNC: 293 U/L — HIGH (ref 30–115)
ALT FLD-CCNC: 160 U/L — HIGH (ref 0–41)
ANION GAP SERPL CALC-SCNC: 14 MMOL/L — SIGNIFICANT CHANGE UP (ref 7–14)
AST SERPL-CCNC: 48 U/L — HIGH (ref 0–41)
BASOPHILS # BLD AUTO: 0.02 K/UL — SIGNIFICANT CHANGE UP (ref 0–0.2)
BASOPHILS NFR BLD AUTO: 0.3 % — SIGNIFICANT CHANGE UP (ref 0–1)
BILIRUB SERPL-MCNC: 1.5 MG/DL — HIGH (ref 0.2–1.2)
BUN SERPL-MCNC: 13 MG/DL — SIGNIFICANT CHANGE UP (ref 10–20)
CALCIUM SERPL-MCNC: 8.5 MG/DL — SIGNIFICANT CHANGE UP (ref 8.5–10.1)
CHLORIDE SERPL-SCNC: 101 MMOL/L — SIGNIFICANT CHANGE UP (ref 98–110)
CO2 SERPL-SCNC: 26 MMOL/L — SIGNIFICANT CHANGE UP (ref 17–32)
CREAT SERPL-MCNC: 0.8 MG/DL — SIGNIFICANT CHANGE UP (ref 0.7–1.5)
EOSINOPHIL # BLD AUTO: 0.09 K/UL — SIGNIFICANT CHANGE UP (ref 0–0.7)
EOSINOPHIL NFR BLD AUTO: 1.5 % — SIGNIFICANT CHANGE UP (ref 0–8)
ERYTHROCYTE [SEDIMENTATION RATE] IN BLOOD: 106 MM/HR — HIGH (ref 0–20)
GLUCOSE SERPL-MCNC: 138 MG/DL — HIGH (ref 70–99)
HCT VFR BLD CALC: 32.7 % — LOW (ref 37–47)
HGB BLD-MCNC: 10.3 G/DL — LOW (ref 12–16)
IMM GRANULOCYTES NFR BLD AUTO: 1.3 % — HIGH (ref 0.1–0.3)
IRON SATN MFR SERPL: 20 % — SIGNIFICANT CHANGE UP (ref 15–50)
IRON SATN MFR SERPL: 43 UG/DL — SIGNIFICANT CHANGE UP (ref 35–150)
LYMPHOCYTES # BLD AUTO: 1 K/UL — LOW (ref 1.2–3.4)
LYMPHOCYTES # BLD AUTO: 16.2 % — LOW (ref 20.5–51.1)
MAGNESIUM SERPL-MCNC: 2.2 MG/DL — SIGNIFICANT CHANGE UP (ref 1.8–2.4)
MCHC RBC-ENTMCNC: 28 PG — SIGNIFICANT CHANGE UP (ref 27–31)
MCHC RBC-ENTMCNC: 31.5 G/DL — LOW (ref 32–37)
MCV RBC AUTO: 88.9 FL — SIGNIFICANT CHANGE UP (ref 81–99)
MONOCYTES # BLD AUTO: 0.47 K/UL — SIGNIFICANT CHANGE UP (ref 0.1–0.6)
MONOCYTES NFR BLD AUTO: 7.6 % — SIGNIFICANT CHANGE UP (ref 1.7–9.3)
NEUTROPHILS # BLD AUTO: 4.5 K/UL — SIGNIFICANT CHANGE UP (ref 1.4–6.5)
NEUTROPHILS NFR BLD AUTO: 73.1 % — SIGNIFICANT CHANGE UP (ref 42.2–75.2)
NRBC # BLD: 0 /100 WBCS — SIGNIFICANT CHANGE UP (ref 0–0)
PLATELET # BLD AUTO: 166 K/UL — SIGNIFICANT CHANGE UP (ref 130–400)
POTASSIUM SERPL-MCNC: 4.3 MMOL/L — SIGNIFICANT CHANGE UP (ref 3.5–5)
POTASSIUM SERPL-SCNC: 4.3 MMOL/L — SIGNIFICANT CHANGE UP (ref 3.5–5)
PROT SERPL-MCNC: 5.6 G/DL — LOW (ref 6–8)
RBC # BLD: 3.68 M/UL — LOW (ref 4.2–5.4)
RBC # FLD: 14.5 % — SIGNIFICANT CHANGE UP (ref 11.5–14.5)
SODIUM SERPL-SCNC: 141 MMOL/L — SIGNIFICANT CHANGE UP (ref 135–146)
TIBC SERPL-MCNC: 213 UG/DL — LOW (ref 220–430)
UIBC SERPL-MCNC: 170 UG/DL — SIGNIFICANT CHANGE UP (ref 110–370)
WBC # BLD: 6.16 K/UL — SIGNIFICANT CHANGE UP (ref 4.8–10.8)
WBC # FLD AUTO: 6.16 K/UL — SIGNIFICANT CHANGE UP (ref 4.8–10.8)

## 2019-02-05 RX ORDER — FUROSEMIDE 40 MG
40 TABLET ORAL DAILY
Qty: 0 | Refills: 0 | Status: DISCONTINUED | OUTPATIENT
Start: 2019-02-05 | End: 2019-02-06

## 2019-02-05 RX ADMIN — Medication 30 MILLIGRAM(S): at 05:46

## 2019-02-05 RX ADMIN — Medication 25 MILLIGRAM(S): at 05:46

## 2019-02-05 RX ADMIN — Medication 325 MILLIGRAM(S): at 11:18

## 2019-02-05 RX ADMIN — Medication 40 MILLIGRAM(S): at 11:18

## 2019-02-05 RX ADMIN — ENOXAPARIN SODIUM 40 MILLIGRAM(S): 100 INJECTION SUBCUTANEOUS at 11:18

## 2019-02-05 RX ADMIN — Medication 125 MICROGRAM(S): at 05:46

## 2019-02-05 NOTE — PROGRESS NOTE ADULT - SUBJECTIVE AND OBJECTIVE BOX
SUBJECTIVE:    Patient is a 73y old Female who presents with a chief complaint of generalized weakness, deranged liver function tests (2019 13:43)      HPI:  73 F with PMH listed, comes with 1 week h/o feeling weak, tired, headaches, symptoms gradually progressive for last 3 days has been taking 8-10 tabs of tylenol daily for her vague symptoms, yesterday, felt nauseous  , dry heaves, chills, felt cold, did not check temp, a/w right upper quadrant mild dull pain, no CP/SOB/AMS/palpitations, travel/sick contact, denies alcohol intake in 40 yrs, s/p cholecystectomy at age of 21 yrs,  feels tired. no other symptoms, no dysuria,   in ED, US bad- cbd 1.7 cm- dilated, mild intrahepatic dilatation , elevated ast alt alp, received antibiotics, tox screen was not sent. (2019 22:22)      Currently admitted to medicine with the primary diagnosis of Hyperbilirubinemia     Patient felt that abdominal pain has improved.  Nontender on exam.    Besides the pertinent positives and negatives described above, the ROS was within normal limits.    PAST MEDICAL & SURGICAL HISTORY  Peripheral arterial disease  High cholesterol  HTN (hypertension)  Hypothyroid  Leg swelling  Arterial insufficiency of lower extremity: left leg stent placed  H/O: hysterectomy  History of cholecystectomy    SOCIAL HISTORY:    ALLERGIES:  No Known Allergies    MEDICATIONS:  STANDING MEDICATIONS  aspirin enteric coated 325 milliGRAM(s) Oral daily  chlorhexidine 4% Liquid 1 Application(s) Topical <User Schedule>  enoxaparin Injectable 40 milliGRAM(s) SubCutaneous daily  furosemide    Tablet 40 milliGRAM(s) Oral daily  influenza   Vaccine 0.5 milliLiter(s) IntraMuscular once  levothyroxine 125 MICROGram(s) Oral daily  metoprolol succinate ER 25 milliGRAM(s) Oral daily  NIFEdipine XL 30 milliGRAM(s) Oral daily    PRN MEDICATIONS    VITALS:   T(F): 98.8  HR: 69  BP: 134/62  RR: 18  SpO2: 93%    LABS:                        10.3   6.16  )-----------( 166      ( 2019 06:47 )             32.7     02-05    141  |  101  |  13  ----------------------------<  138<H>  4.3   |  26  |  0.8    Ca    8.5      2019 06:47  Mg     2.2         TPro  5.6<L>  /  Alb  3.2<L>  /  TBili  1.5<H>  /  DBili  x   /  AST  48<H>  /  ALT  160<H>  /  AlkPhos  293<H>      PT/INR - ( 2019 18:25 )   PT: 12.70 sec;   INR: 1.11 ratio         PTT - ( 2019 18:25 )  PTT:33.6 sec  Urinalysis Basic - ( 2019 21:25 )    Color: Orange / Appearance: Cloudy / S.015 / pH: x  Gluc: x / Ketone: Negative  / Bili: Moderate / Urobili: 1.0 mg/dL   Blood: x / Protein: Negative mg/dL / Nitrite: Negative   Leuk Esterase: Negative / RBC: x / WBC x   Sq Epi: x / Non Sq Epi: Few /HPF / Bacteria: Few /HPF            CARDIAC MARKERS ( 2019 17:30 )  x     / <0.01 ng/mL / x     / x     / x          RADIOLOGY:    PHYSICAL EXAM:  GEN: No acute distress  LUNGS: Clear to auscultation bilaterally   HEART: Regular  ABD: Soft, non-tender, non-distended.  EXT: NC/NC/NE/2+PP/CORTEZ/Skin Intact.   NEURO: AAOX3    Intravenous access: yes  NG tube: no  Babcock Catheter: no

## 2019-02-05 NOTE — PROGRESS NOTE ADULT - ASSESSMENT
73 yoF presents with generalized weakness and RUQ abd pain 2/2 tylenol overdose vs. choledocholithiasis from 1.7 cm CBD stone    # Abdominal pain/Transaminitis/Choledocholiathiasis  -refused NAC yesterday despite councelling  -d/w Dr. Stanley - ok to hold Plavix  -doing much better  -d/w GI PA - attending to f/u later today and will let us know about inpt vs outpt ERCP    # HTN  -BP x 24 hours: BP:  (138/62 - 189/79)  - on metoprolol succinate ER 25 qd / started nifedipine XL 30 mg   -restart Lasix    # Hypothyroid  -synthroid 125    # DVT PPX lovenox 40  Dispo - depends on GI recs

## 2019-02-05 NOTE — PROGRESS NOTE ADULT - ASSESSMENT
73 yoF presents with generalized weakness and RUQ abd pain 2/2 tylenol overdose vs. choledocholithiasis from 1.7 cm CBD stone    # Abdominal pain  -abdominal MRI done, shows ductal dilatation intrahepatic and extrahepatic as well as 0.7 cm polyploid filling defect / possible ERCP thurs/fri  -on full dose aspirin in anticipation for possible ERCP / on plavix for LE stents for PVD    # HTN  -BP x 24 hours: BP:  (134/62 - 175/77)  - on metoprolol succinate ER 25 qd / nifedipine XL 30 mg    # Hypothyroid  -synthroid 125    # DVT PPX lovenox 40

## 2019-02-05 NOTE — PROGRESS NOTE ADULT - SUBJECTIVE AND OBJECTIVE BOX
Patient is a pleasant 74 y/o female with PMHx of PAD ( has stents in LE placed in October) , HTN, Hypothyroid, HLD, that presented to [facility] with weakness and fatigue. She notes for the last two weeks she hasn't been feeling herself. She notes generally feeling as though she was coming down with a virus. Patient was admitted and NAC was started. Acetaminophen level was noted to be 7 and NAC refused by patient. Patient with clinical improvement in LFT. Hepatology serologies thus far unrevealing. MRI done which noted a nonobstructive Choledocholith.       Vital Signs:  T(F): 97.2   HR: 67   BP: 151/69   RR: 18   SpO2: 93%  Physical Exam  Gen: NAD  HEENT: NC/AT, Mucosal Membranes, Sclera Icteric   Cardio: S1/S2 No S3/S4, Regular  Resp: CTA B/L  Abdomen: Soft, ND/NT  Neuro: AAOx3, negative asterixis   Extremities: FROM x 4                                     10.4   6.05  )-----------( 196      ( 06 Feb 2019 04:24 )             33.1   02-06    140  |  97<L>  |  14  ----------------------------<  139<H>  3.9   |  26  |  0.7    Ca    8.3<L>      06 Feb 2019 04:24  Mg     2.1     02-06    TPro  5.8<L>  /  Alb  3.2<L>  /  TBili  1.1  /  DBili  x   /  AST  31  /  ALT  119<H>  /  AlkPhos  269<H>  02-06      Abdominal U/S:  IMPRESSION:    Intra and extrahepatic biliary ductal dilatation with CBD measuring up to   1.7 cm. Further evaluation with MRCP can be obtained.    Increased hepatic echogenicity and heterogeneous echotexture compatible   with hepatic steatosis versus underlying hepatocellular disease.

## 2019-02-05 NOTE — PROGRESS NOTE ADULT - ASSESSMENT
Patient is a pleasant 74 y/o female with PMHx of PAD ( has stents in LE placed in October) , HTN, Hypothyroid, HLD, that presented to [facility] with weakness and fatigue. She notes for the last two weeks she hasn't been feeling herself. She notes generally feeling as though she was coming down with a virus. She than notes ear pain which she started her husbands Antibiotic drops ( not sure exactly what name, is a prescription). Patient notes some abdominal pain. Pain located in RUQ as well as LUQ dull, achy, associated with nausea and non bloody emesis. Patient had initial elevations in LFT and Toxicology was called when presented in the ER as she had significant Tylenol use. She was started on NAC given her clinical picture but NAC stopped.   Reassuring is that she has preserved synthetic function of the liver. For CBD dilation she is without clinical picture of Cholangitis. MRI noted non obstructive Choledocholith. Discussed with Dr. Leo and patient if stable 2/6 can be discharged home and ERCP can be done as an outpatient at a latter time.    Abnormal Liver function studies ( Mixed picture)/ CBD dilation/ Tylenol use ( on NAC as advised by Toxicology )/ CBD filling defect  - Clinically well appearing,  Mentating well, and with preserved synthetic function as well as renal function ( no need to contact transplant center)  - Daily INR, CBC, CMP  - No need for ABX does not appear infectious  - Can restart Plavix for LE stent  - will follow up on 2/6 to give a date for follow up as outpatient

## 2019-02-05 NOTE — PROGRESS NOTE ADULT - SUBJECTIVE AND OBJECTIVE BOX
SUBJECTIVE:    Patient is a 73y old Female who presents with a chief complaint of generalized weakness, deranged liver function tests (2019 09:11)      HPI:  73 F with PMH listed, comes with 1 week h/o feeling weak, tired, headaches, symptoms gradually progressive for last 3 days has been taking 8-10 tabs of tylenol daily for her vague symptoms, yesterday, felt nauseous  , dry heaves, chills, felt cold, did not check temp, a/w right upper quadrant mild dull pain, no CP/SOB/AMS/palpitations, travel/sick contact, denies alcohol intake in 40 yrs, s/p cholecystectomy at age of 21 yrs,  feels tired. no other symptoms, no dysuria,   in ED, US bad- cbd 1.7 cm- dilated, mild intrahepatic dilatation , elevated ast alt alp, received antibiotics, tox screen was not sent. (2019 22:22)      Currently admitted to medicine with the primary diagnosis of Hyperbilirubinemia     S: today feels much better, no AP, good appetite    Besides the pertinent positives and negatives described above, the ROS was within normal limits.    PAST MEDICAL & SURGICAL HISTORY  Peripheral arterial disease  High cholesterol  HTN (hypertension)  Hypothyroid  Leg swelling  Arterial insufficiency of lower extremity: left leg stent placed  H/O: hysterectomy  History of cholecystectomy    SOCIAL HISTORY:    ALLERGIES:  No Known Allergies      PHYSICAL EXAM:  GEN: No acute distress  LUNGS: Clear to auscultation bilaterally   HEART: Regular  ABD: mild tenderness bilaterally in both upper quadrants  EXT: NC/NC/NE/2+PP/CORTEZ/Skin Intact.   NEURO: AAOX3      ROS: Denies CP, SOB, AP  All other systems reviewed and are within normal limits except for the complaints above.    MEDICATIONS  (STANDING):  aspirin enteric coated 325 milliGRAM(s) Oral daily  chlorhexidine 4% Liquid 1 Application(s) Topical <User Schedule>  enoxaparin Injectable 40 milliGRAM(s) SubCutaneous daily  furosemide    Tablet 40 milliGRAM(s) Oral daily  influenza   Vaccine 0.5 milliLiter(s) IntraMuscular once  levothyroxine 125 MICROGram(s) Oral daily  metoprolol succinate ER 25 milliGRAM(s) Oral daily  NIFEdipine XL 30 milliGRAM(s) Oral daily    MEDICATIONS  (PRN):    Home Medications:  Aspirin Enteric Coated 81 mg oral delayed release tablet: 1 tab(s) orally once a day (2019 23:30)  clopidogrel 75 mg oral tablet: 1 tab(s) orally once a day (2019 23:30)  furosemide 40 mg oral tablet: 1 tab(s) orally once a day (2019 23:30)  levothyroxine 125 mcg (0.125 mg) oral capsule: 1 cap(s) orally once a day (2019 23:30)  Metoprolol Succinate ER 25 mg oral tablet, extended release: 1 tab(s) orally once a day (2019 23:30)  pravastatin 40 mg oral tablet: 1 tab(s) orally once a day (2019 23:30)    Vital Signs Last 24 Hrs  T(C): 37.5 (2019 06:15), Max: 37.5 (2019 06:15)  T(F): 99.5 (2019 06:15), Max: 99.5 (2019 06:15)  HR: 71 (2019 11:21) (60 - 74)  BP: 135/60 (2019 11:21) (135/60 - 175/77)  BP(mean): --  RR: 18 (2019 06:15) (18 - 18)  SpO2: 93% (2019 11:21) (93% - 93%)  CAPILLARY BLOOD GLUCOSE        LABS:                        10.3   6.16  )-----------( 166      ( 2019 06:47 )             32.7     02-05    141  |  101  |  13  ----------------------------<  138<H>  4.3   |  26  |  0.8    Ca    8.5      2019 06:47  Mg     2.2     02-05    TPro  5.6<L>  /  Alb  3.2<L>  /  TBili  1.5<H>  /  DBili  x   /  AST  48<H>  /  ALT  160<H>  /  AlkPhos  293<H>  02-05    LIVER FUNCTIONS - ( 2019 06:47 )  Alb: 3.2 g/dL / Pro: 5.6 g/dL / ALK PHOS: 293 U/L / ALT: 160 U/L / AST: 48 U/L / GGT: x           CARDIAC MARKERS ( 2019 17:30 )  x     / <0.01 ng/mL / x     / x     / x          PT/INR - ( 2019 18:25 )   PT: 12.70 sec;   INR: 1.11 ratio         PTT - ( 2019 18:25 )  PTT:33.6 sec  Urinalysis Basic - ( 2019 21:25 )    Color: Orange / Appearance: Cloudy / S.015 / pH: x  Gluc: x / Ketone: Negative  / Bili: Moderate / Urobili: 1.0 mg/dL   Blood: x / Protein: Negative mg/dL / Nitrite: Negative   Leuk Esterase: Negative / RBC: x / WBC x   Sq Epi: x / Non Sq Epi: Few /HPF / Bacteria: Few /HPF              Consultant(s) Notes Reviewed:  [x ] YES  [ ] NO  Care Discussed with Consultants/Other Providers/ Housestaff [ x] YES  [ ] NO  Radiology personally reviewed.

## 2019-02-06 ENCOUNTER — TRANSCRIPTION ENCOUNTER (OUTPATIENT)
Age: 74
End: 2019-02-06

## 2019-02-06 VITALS — WEIGHT: 240.08 LBS | HEIGHT: 66 IN

## 2019-02-06 LAB
ALBUMIN SERPL ELPH-MCNC: 3.2 G/DL — LOW (ref 3.5–5.2)
ALP SERPL-CCNC: 269 U/L — HIGH (ref 30–115)
ALT FLD-CCNC: 119 U/L — HIGH (ref 0–41)
ANION GAP SERPL CALC-SCNC: 17 MMOL/L — HIGH (ref 7–14)
AST SERPL-CCNC: 31 U/L — SIGNIFICANT CHANGE UP (ref 0–41)
BASOPHILS # BLD AUTO: 0.04 K/UL — SIGNIFICANT CHANGE UP (ref 0–0.2)
BASOPHILS NFR BLD AUTO: 0.7 % — SIGNIFICANT CHANGE UP (ref 0–1)
BILIRUB SERPL-MCNC: 1.1 MG/DL — SIGNIFICANT CHANGE UP (ref 0.2–1.2)
BUN SERPL-MCNC: 14 MG/DL — SIGNIFICANT CHANGE UP (ref 10–20)
CALCIUM SERPL-MCNC: 8.3 MG/DL — LOW (ref 8.5–10.1)
CERULOPLASMIN SERPL-MCNC: 40 MG/DL — SIGNIFICANT CHANGE UP (ref 20–60)
CHLORIDE SERPL-SCNC: 97 MMOL/L — LOW (ref 98–110)
CO2 SERPL-SCNC: 26 MMOL/L — SIGNIFICANT CHANGE UP (ref 17–32)
CREAT SERPL-MCNC: 0.7 MG/DL — SIGNIFICANT CHANGE UP (ref 0.7–1.5)
EBV EA AB SER IA-ACNC: <5 U/ML — SIGNIFICANT CHANGE UP
EBV EA AB TITR SER IF: POSITIVE
EBV EA IGG SER-ACNC: NEGATIVE — SIGNIFICANT CHANGE UP
EBV NA IGG SER IA-ACNC: 537 U/ML — HIGH
EBV PATRN SPEC IB-IMP: SIGNIFICANT CHANGE UP
EBV VCA IGG AVIDITY SER QL IA: POSITIVE
EBV VCA IGM SER IA-ACNC: 278 U/ML — HIGH
EBV VCA IGM SER IA-ACNC: <10 U/ML — SIGNIFICANT CHANGE UP
EBV VCA IGM TITR FLD: NEGATIVE — SIGNIFICANT CHANGE UP
EOSINOPHIL # BLD AUTO: 0.2 K/UL — SIGNIFICANT CHANGE UP (ref 0–0.7)
EOSINOPHIL NFR BLD AUTO: 3.3 % — SIGNIFICANT CHANGE UP (ref 0–8)
FERRITIN SERPL-MCNC: 376 NG/ML — HIGH (ref 15–150)
GLUCOSE SERPL-MCNC: 139 MG/DL — HIGH (ref 70–99)
HCT VFR BLD CALC: 33.1 % — LOW (ref 37–47)
HGB BLD-MCNC: 10.4 G/DL — LOW (ref 12–16)
IMM GRANULOCYTES NFR BLD AUTO: 2 % — HIGH (ref 0.1–0.3)
LYMPHOCYTES # BLD AUTO: 1.43 K/UL — SIGNIFICANT CHANGE UP (ref 1.2–3.4)
LYMPHOCYTES # BLD AUTO: 23.6 % — SIGNIFICANT CHANGE UP (ref 20.5–51.1)
MAGNESIUM SERPL-MCNC: 2.1 MG/DL — SIGNIFICANT CHANGE UP (ref 1.8–2.4)
MCHC RBC-ENTMCNC: 27.9 PG — SIGNIFICANT CHANGE UP (ref 27–31)
MCHC RBC-ENTMCNC: 31.4 G/DL — LOW (ref 32–37)
MCV RBC AUTO: 88.7 FL — SIGNIFICANT CHANGE UP (ref 81–99)
MITOCHONDRIA AB SER-ACNC: SIGNIFICANT CHANGE UP
MONOCYTES # BLD AUTO: 0.61 K/UL — HIGH (ref 0.1–0.6)
MONOCYTES NFR BLD AUTO: 10.1 % — HIGH (ref 1.7–9.3)
NEUTROPHILS # BLD AUTO: 3.65 K/UL — SIGNIFICANT CHANGE UP (ref 1.4–6.5)
NEUTROPHILS NFR BLD AUTO: 60.3 % — SIGNIFICANT CHANGE UP (ref 42.2–75.2)
NRBC # BLD: 0 /100 WBCS — SIGNIFICANT CHANGE UP (ref 0–0)
PLATELET # BLD AUTO: 196 K/UL — SIGNIFICANT CHANGE UP (ref 130–400)
POTASSIUM SERPL-MCNC: 3.9 MMOL/L — SIGNIFICANT CHANGE UP (ref 3.5–5)
POTASSIUM SERPL-SCNC: 3.9 MMOL/L — SIGNIFICANT CHANGE UP (ref 3.5–5)
PROT SERPL-MCNC: 5.8 G/DL — LOW (ref 6–8)
RBC # BLD: 3.73 M/UL — LOW (ref 4.2–5.4)
RBC # FLD: 14.2 % — SIGNIFICANT CHANGE UP (ref 11.5–14.5)
SMOOTH MUSCLE AB SER-ACNC: SIGNIFICANT CHANGE UP
SODIUM SERPL-SCNC: 140 MMOL/L — SIGNIFICANT CHANGE UP (ref 135–146)
WBC # BLD: 6.05 K/UL — SIGNIFICANT CHANGE UP (ref 4.8–10.8)
WBC # FLD AUTO: 6.05 K/UL — SIGNIFICANT CHANGE UP (ref 4.8–10.8)

## 2019-02-06 RX ORDER — NIFEDIPINE 30 MG
1 TABLET, EXTENDED RELEASE 24 HR ORAL
Qty: 30 | Refills: 0
Start: 2019-02-06 | End: 2019-03-07

## 2019-02-06 RX ORDER — METOPROLOL TARTRATE 50 MG
1 TABLET ORAL
Qty: 0 | Refills: 0 | DISCHARGE
Start: 2019-02-06

## 2019-02-06 RX ORDER — METOPROLOL TARTRATE 50 MG
1 TABLET ORAL
Qty: 0 | Refills: 0 | COMMUNITY

## 2019-02-06 RX ADMIN — Medication 30 MILLIGRAM(S): at 06:02

## 2019-02-06 RX ADMIN — Medication 125 MICROGRAM(S): at 06:02

## 2019-02-06 RX ADMIN — Medication 40 MILLIGRAM(S): at 06:02

## 2019-02-06 RX ADMIN — Medication 25 MILLIGRAM(S): at 06:02

## 2019-02-06 RX ADMIN — ENOXAPARIN SODIUM 40 MILLIGRAM(S): 100 INJECTION SUBCUTANEOUS at 11:49

## 2019-02-06 RX ADMIN — Medication 325 MILLIGRAM(S): at 11:49

## 2019-02-06 NOTE — DISCHARGE NOTE ADULT - CARE PLAN
Principal Discharge DX:	Choledocholithiasis with obstruction  Goal:	prevent complications  Assessment and plan of treatment:	You've been diagnosed with choledocholithiasis which is responsible for your elevated liver enzymes and bilirubin, as well as your pain.  Please follow up with GI for your ERCP  Secondary Diagnosis:	HTN (hypertension)  Goal:	prevent complications  Assessment and plan of treatment:	Please continue on the nifedipine as well as your home medications.  Secondary Diagnosis:	Acetaminophen overdose  Goal:	prevent complications  Assessment and plan of treatment:	You were treated with NAC in case liver damage occurred because of tylenol.  Your liver enzymes normalized, so no further treatment is necessary. Principal Discharge DX:	Choledocholithiasis  Goal:	prevent complications  Assessment and plan of treatment:	You've been diagnosed with non-obstructing choledocholithiasis which is responsible for your elevated liver enzymes and bilirubin, as well as your pain.  Please follow up with GI for your ERCP  Secondary Diagnosis:	HTN (hypertension)  Goal:	prevent complications  Assessment and plan of treatment:	Please continue on the nifedipine as well as your home medications.  Secondary Diagnosis:	Acetaminophen overdose  Goal:	prevent complications  Assessment and plan of treatment:	You were treated with NAC in case liver damage occurred because of tylenol.  Your liver enzymes normalized, so no further treatment is necessary.

## 2019-02-06 NOTE — PROGRESS NOTE ADULT - SUBJECTIVE AND OBJECTIVE BOX
Chief Complaint:  Patient is a 73y old  Female who presents with a chief complaint of generalized weakness, deranged liver function tests (05 Feb 2019 15:47)      Interval Events:   no acute events overnight, patient denies any abdominal pain or fever and is tolerating diet.    Allergies:  No Known Allergies    Hospital Medications:  aspirin enteric coated 325 milliGRAM(s) Oral daily  chlorhexidine 4% Liquid 1 Application(s) Topical <User Schedule>  enoxaparin Injectable 40 milliGRAM(s) SubCutaneous daily  furosemide    Tablet 40 milliGRAM(s) Oral daily  influenza   Vaccine 0.5 milliLiter(s) IntraMuscular once  levothyroxine 125 MICROGram(s) Oral daily  metoprolol succinate ER 25 milliGRAM(s) Oral daily  NIFEdipine XL 30 milliGRAM(s) Oral daily      PMHX/PSHX:  Dyslipidemia  Peripheral arterial disease  High cholesterol  HTN (hypertension)  Hypothyroid  Leg swelling  Arterial insufficiency of lower extremity  H/O: hysterectomy  History of cholecystectomy      Family history:  Family history of breast cancer (Sibling)      ROS:   Eyes:  Good vision, no reported pain  ENT:  No sore throat, pain, runny nose, dysphagia  CV:  No pain, palpitations, hypo/hypertension  Resp:  No dyspnea, cough, tachypnea, wheezing  GI:  No pain, No nausea, No vomiting, No diarrhea, No constipation, No weight loss, No fever, No pruritis, No rectal bleeding, No tarry stools, No dysphagia,  :  No pain, bleeding, incontinence, nocturia  Muscle:  No pain, weakness  Neuro:  No weakness, tingling, memory problems  Psych:  No fatigue, insomnia, mood problems, depression  Endocrine:  No polyuria, polydipsia, cold/heat intolerance  Heme:  No petechiae, ecchymosis, easy bruisability  Skin:  No rash, tattoos, scars, edema      PHYSICAL EXAM:   Vital Signs:  Vital Signs Last 24 Hrs  T(C): 36.2 (06 Feb 2019 06:13), Max: 37.1 (05 Feb 2019 14:03)  T(F): 97.2 (06 Feb 2019 06:13), Max: 98.8 (05 Feb 2019 14:03)  HR: 67 (06 Feb 2019 06:13) (67 - 73)  BP: 151/69 (06 Feb 2019 06:13) (134/62 - 151/69)  BP(mean): --  RR: 18 (06 Feb 2019 06:13) (18 - 18)  SpO2: --  Daily Height in cm: 167.64 (05 Feb 2019 13:43)    Daily     GENERAL:  Appears stated age, well-groomed, well-nourished, no distress  HEENT:  NC/AT,  conjunctivae clear and pink, no thyromegaly, nodules, adenopathy, no JVD, sclera -anicteric  CHEST:  Full & symmetric excursion, no increased effort, breath sounds clear  HEART:  Regular rhythm, S1, S2, no murmur/rub/S3/S4, no abdominal bruit, no edema  ABDOMEN:  Soft, non-tender, non-distended, normoactive bowel sounds,  no masses ,no hepato-splenomegaly,   EXTEREMITIES:  no cyanosis,clubbing or edema  SKIN:  No rash/erythema/ecchymoses/petechiae/wounds/abscess/warm/dry  NEURO:  Alert, oriented, no asterixis, no tremor, no encephalopathy    LABS:                        10.4   6.05  )-----------( 196      ( 06 Feb 2019 04:24 )             33.1     02-06    140  |  97<L>  |  14  ----------------------------<  139<H>  3.9   |  26  |  0.7    Ca    8.3<L>      06 Feb 2019 04:24  Mg     2.1     02-06    TPro  5.8<L>  /  Alb  3.2<L>  /  TBili  1.1  /  DBili  x   /  AST  31  /  ALT  119<H>  /  AlkPhos  269<H>  02-06    LIVER FUNCTIONS - ( 06 Feb 2019 04:24 )  Alb: 3.2 g/dL / Pro: 5.8 g/dL / ALK PHOS: 269 U/L / ALT: 119 U/L / AST: 31 U/L / GGT: x                   Imaging:

## 2019-02-06 NOTE — DISCHARGE NOTE ADULT - HOSPITAL COURSE
73 F with PMH listed, comes with 1 week h/o feeling weak, tired, headaches, symptoms gradually progressive for last 3 days has been taking 8-10 tabs of tylenol daily for her vague symptoms, yesterday, felt nauseous  , dry heaves, chills, felt cold, did not check temp, a/w right upper quadrant mild dull pain, no CP/SOB/AMS/palpitations, travel/sick contact, denies alcohol intake in 40 yrs, s/p cholecystectomy at age of 21 yrs,  feels tired. no other symptoms, no dysuria,   in ED, US bad- cbd 1.7 cm- dilated, mild intrahepatic dilatation , elevated ast alt alp, received antibiotics, tox screen was not sent.    During her course here she had an an MRCP done showing obstruction of the CBD and intrahepatic as well as extrahepatic dilatation.  Her LFTs and bilirubin have been trending down, and her abdominal pain is gone.  She has been treated with NAC for possible tylenol overdose which could have been causing the pain, but she later on refused to take it. 73 F with PMH listed, comes with 1 week h/o feeling weak, tired, headaches, symptoms gradually progressive for last 3 days has been taking 8-10 tabs of tylenol daily for her vague symptoms, yesterday, felt nauseous  , dry heaves, chills, felt cold, did not check temp, a/w right upper quadrant mild dull pain, no CP/SOB/AMS/palpitations, travel/sick contact, denies alcohol intake in 40 yrs, s/p cholecystectomy at age of 21 yrs,  feels tired. no other symptoms, no dysuria,   in ED, US bad- cbd 1.7 cm- dilated, mild intrahepatic dilatation , elevated ast alt alp, received antibiotics, tox screen was not sent.    During her course here she had an an MRCP done showing obstruction of the CBD and intrahepatic as well as extrahepatic dilatation.  Her LFTs and bilirubin have been trending down, and her abdominal pain is gone.  She has been treated with NAC for possible tylenol overdose which could have been causing the pain, but she later on refused to take it.  She will be following up with Dr. Torres for an ERCP.

## 2019-02-06 NOTE — DISCHARGE NOTE ADULT - PATIENT PORTAL LINK FT
You can access the [facility] Patient Portal, offered by [facility], by registering with the following website: [email or web address]

## 2019-02-06 NOTE — PROGRESS NOTE ADULT - ASSESSMENT
72 y/o female with PMHx of PAD ( has stents in LE placed in October) , HTN, Hypothyroid, HLD, that presented to [facility] with weakness and fatigue. Patient had initial elevations in LFT and Toxicology was called when presented in the ER as she had significant Tylenol use. She was started on NAC given her clinical picture . Reassuring is that she had preserved synthetic function of the liver. For CBD dilation , there was no evidence of obstruction or Cholangitis. MRCP noted non obstructive Choledocholith.   Non obstructing choledocholithiasis:  advance diet as tolerated to low fat diet  monitor LFTs  will arrange for ERCP as outpatient  patient is scheduled for follow up with Dr Torres in the office on [address] [facility], phone no [phone] on Feb 13 th at 8:45 am.  patient can be discharged from GI point of view. 74 y/o female with PMHx of PAD ( has stents in LE placed in October) , HTN, Hypothyroid, HLD, that presented to [facility] with weakness and fatigue. Patient had initial elevations in LFT and Toxicology was called when presented in the ER as she had significant Tylenol use. She was started on NAC given her clinical picture . Reassuring is that she had preserved synthetic function of the liver. For CBD dilation , there was no evidence of obstruction or Cholangitis. MRCP noted non obstructive Choledocholith.   Non obstructing choledocholithiasis:  advance diet as tolerated to low fat diet  monitor LFTs  will arrange for ERCP as outpatient  patient is scheduled for follow up with Dr Torres in the office on [address] [facility], phone no [phone] on Feb 13 th at 8:45 am.  patient can be discharged from GI point of view.    Patient left without being seen by attending

## 2019-02-06 NOTE — DISCHARGE NOTE ADULT - CARE PROVIDER_API CALL
Franklin Torres)  Gastroenterology; Internal Medicine  [address] [place] [address]  Phone: [phone]  Fax: [phone]  Follow Up Time:

## 2019-02-06 NOTE — PROGRESS NOTE ADULT - SUBJECTIVE AND OBJECTIVE BOX
Pt seen and examined independently. No new complaints. Feeling much better. Cleared by GI for outpt follow up.    Gen:NAD, AAOx3  CV: S1 S2  Resp: Decreased BS b/l  GI: NT/ND/S +BS  MS: neg c/c/e  Neuro: nonfocal    Discharge instructions discussed and patient knows when to seek immediate medical attention.  Patient has proper follow up.  All results discussed and patient aware they may require further work up.  Stressed importance of proper follow up.  Medications prescribed and changes discussed.  All questions and concerns from patient and family addressed. Understanding of instructions verbalized.    Time spent in completing discharge process and coordinating care 45 minutes.

## 2019-02-06 NOTE — PROGRESS NOTE ADULT - REASON FOR ADMISSION
generalized weakness, deranged liver function tests

## 2019-02-06 NOTE — DISCHARGE NOTE ADULT - ADDITIONAL INSTRUCTIONS
Please follow up with Dr. Torres for your appointment on Feb 13th at 8:45 am, the contact information is below. Please follow up with Dr Torres in the office on [address] [facility], phone no [phone] on Feb 13 th at 8:45 am.  Please continue taking the nifedipine XL 30mg once a day at home.

## 2019-02-06 NOTE — DISCHARGE NOTE ADULT - PLAN OF CARE
prevent complications You've been diagnosed with choledocholithiasis which is responsible for your elevated liver enzymes and bilirubin, as well as your pain.  Please follow up with GI for your ERCP Please continue on the nifedipine as well as your home medications. You were treated with NAC in case liver damage occurred because of tylenol.  Your liver enzymes normalized, so no further treatment is necessary. You've been diagnosed with non-obstructing choledocholithiasis which is responsible for your elevated liver enzymes and bilirubin, as well as your pain.  Please follow up with GI for your ERCP

## 2019-02-06 NOTE — DISCHARGE NOTE ADULT - MEDICATION SUMMARY - MEDICATIONS TO TAKE
I will START or STAY ON the medications listed below when I get home from the hospital:    Aspirin Enteric Coated 81 mg oral delayed release tablet  -- 1 tab(s) by mouth once a day  -- Indication: For PVD    pravastatin 40 mg oral tablet  -- 1 tab(s) by mouth once a day  -- Indication: For HLD    clopidogrel 75 mg oral tablet  -- 1 tab(s) by mouth once a day  -- Indication: For PVD    metoprolol succinate 25 mg oral tablet, extended release  -- 1 tab(s) by mouth once a day  -- Indication: For HTN (hypertension)    furosemide 40 mg oral tablet  -- 1 tab(s) by mouth once a day  -- Indication: For swelling    levothyroxine 125 mcg (0.125 mg) oral capsule  -- 1 cap(s) by mouth once a day  -- Indication: For Hypothyroid I will START or STAY ON the medications listed below when I get home from the hospital:    Aspirin Enteric Coated 81 mg oral delayed release tablet  -- 1 tab(s) by mouth once a day  -- Indication: For PVD    pravastatin 40 mg oral tablet  -- 1 tab(s) by mouth once a day  -- Indication: For HLD    clopidogrel 75 mg oral tablet  -- 1 tab(s) by mouth once a day  -- Indication: For PVD    metoprolol succinate 25 mg oral tablet, extended release  -- 1 tab(s) by mouth once a day  -- Indication: For HTN (hypertension)    furosemide 40 mg oral tablet  -- 1 tab(s) by mouth once a day  -- Indication: For edema    levothyroxine 125 mcg (0.125 mg) oral capsule  -- 1 cap(s) by mouth once a day  -- Indication: For Hypothyroid

## 2019-02-10 LAB
CULTURE RESULTS: SIGNIFICANT CHANGE UP
SPECIMEN SOURCE: SIGNIFICANT CHANGE UP

## 2019-02-14 DIAGNOSIS — Z90.49 ACQUIRED ABSENCE OF OTHER SPECIFIED PARTS OF DIGESTIVE TRACT: ICD-10-CM

## 2019-02-14 DIAGNOSIS — I10 ESSENTIAL (PRIMARY) HYPERTENSION: ICD-10-CM

## 2019-02-14 DIAGNOSIS — Z87.891 PERSONAL HISTORY OF NICOTINE DEPENDENCE: ICD-10-CM

## 2019-02-14 DIAGNOSIS — T39.1X1A POISONING BY 4-AMINOPHENOL DERIVATIVES, ACCIDENTAL (UNINTENTIONAL), INITIAL ENCOUNTER: ICD-10-CM

## 2019-02-14 DIAGNOSIS — R74.0 NONSPECIFIC ELEVATION OF LEVELS OF TRANSAMINASE AND LACTIC ACID DEHYDROGENASE [LDH]: ICD-10-CM

## 2019-02-14 DIAGNOSIS — Z90.710 ACQUIRED ABSENCE OF BOTH CERVIX AND UTERUS: ICD-10-CM

## 2019-02-14 DIAGNOSIS — Z95.820 PERIPHERAL VASCULAR ANGIOPLASTY STATUS WITH IMPLANTS AND GRAFTS: ICD-10-CM

## 2019-02-14 DIAGNOSIS — K80.50 CALCULUS OF BILE DUCT WITHOUT CHOLANGITIS OR CHOLECYSTITIS WITHOUT OBSTRUCTION: ICD-10-CM

## 2019-02-14 DIAGNOSIS — E78.5 HYPERLIPIDEMIA, UNSPECIFIED: ICD-10-CM

## 2019-02-14 DIAGNOSIS — E03.9 HYPOTHYROIDISM, UNSPECIFIED: ICD-10-CM

## 2019-02-14 DIAGNOSIS — I73.9 PERIPHERAL VASCULAR DISEASE, UNSPECIFIED: ICD-10-CM

## 2019-02-14 DIAGNOSIS — R17 UNSPECIFIED JAUNDICE: ICD-10-CM

## 2019-02-14 DIAGNOSIS — E80.7 DISORDER OF BILIRUBIN METABOLISM, UNSPECIFIED: ICD-10-CM

## 2019-03-25 PROBLEM — I73.9 PERIPHERAL VASCULAR DISEASE, UNSPECIFIED: Chronic | Status: ACTIVE | Noted: 2019-02-03

## 2019-03-25 PROBLEM — E78.00 PURE HYPERCHOLESTEROLEMIA, UNSPECIFIED: Chronic | Status: ACTIVE | Noted: 2019-02-03

## 2019-03-25 PROBLEM — M79.89 OTHER SPECIFIED SOFT TISSUE DISORDERS: Chronic | Status: ACTIVE | Noted: 2019-02-03

## 2019-03-25 PROBLEM — I10 ESSENTIAL (PRIMARY) HYPERTENSION: Chronic | Status: ACTIVE | Noted: 2019-02-03

## 2019-03-25 PROBLEM — E03.9 HYPOTHYROIDISM, UNSPECIFIED: Chronic | Status: ACTIVE | Noted: 2019-02-03

## 2019-03-29 ENCOUNTER — OUTPATIENT (OUTPATIENT)
Dept: OUTPATIENT SERVICES | Facility: HOSPITAL | Age: 74
LOS: 1 days | Discharge: HOME | End: 2019-03-29

## 2019-03-29 DIAGNOSIS — Z90.49 ACQUIRED ABSENCE OF OTHER SPECIFIED PARTS OF DIGESTIVE TRACT: Chronic | ICD-10-CM

## 2019-03-29 DIAGNOSIS — R94.5 ABNORMAL RESULTS OF LIVER FUNCTION STUDIES: ICD-10-CM

## 2019-03-29 DIAGNOSIS — R93.2 ABNORMAL FINDINGS ON DIAGNOSTIC IMAGING OF LIVER AND BILIARY TRACT: ICD-10-CM

## 2019-03-29 DIAGNOSIS — Z90.710 ACQUIRED ABSENCE OF BOTH CERVIX AND UTERUS: Chronic | ICD-10-CM

## 2019-04-12 ENCOUNTER — TRANSCRIPTION ENCOUNTER (OUTPATIENT)
Age: 74
End: 2019-04-12

## 2019-04-12 ENCOUNTER — OUTPATIENT (OUTPATIENT)
Dept: OUTPATIENT SERVICES | Facility: HOSPITAL | Age: 74
LOS: 1 days | Discharge: HOME | End: 2019-04-12
Payer: MEDICARE

## 2019-04-12 ENCOUNTER — RESULT REVIEW (OUTPATIENT)
Age: 74
End: 2019-04-12

## 2019-04-12 VITALS
DIASTOLIC BLOOD PRESSURE: 74 MMHG | OXYGEN SATURATION: 97 % | HEART RATE: 74 BPM | RESPIRATION RATE: 18 BRPM | SYSTOLIC BLOOD PRESSURE: 168 MMHG

## 2019-04-12 VITALS
SYSTOLIC BLOOD PRESSURE: 183 MMHG | HEART RATE: 73 BPM | RESPIRATION RATE: 18 BRPM | TEMPERATURE: 98 F | WEIGHT: 240.08 LBS | DIASTOLIC BLOOD PRESSURE: 74 MMHG | HEIGHT: 66 IN

## 2019-04-12 DIAGNOSIS — Z90.710 ACQUIRED ABSENCE OF BOTH CERVIX AND UTERUS: Chronic | ICD-10-CM

## 2019-04-12 DIAGNOSIS — Z90.49 ACQUIRED ABSENCE OF OTHER SPECIFIED PARTS OF DIGESTIVE TRACT: Chronic | ICD-10-CM

## 2019-04-12 PROCEDURE — 88305 TISSUE EXAM BY PATHOLOGIST: CPT | Mod: 26

## 2019-04-12 RX ORDER — INDOMETHACIN 50 MG
100 CAPSULE ORAL ONCE
Qty: 0 | Refills: 0 | Status: COMPLETED | OUTPATIENT
Start: 2019-04-12 | End: 2019-04-12

## 2019-04-12 RX ADMIN — Medication 100 MILLIGRAM(S): at 11:35

## 2019-04-12 RX ADMIN — Medication 100 MILLIGRAM(S): at 11:36

## 2019-04-12 NOTE — ASU DISCHARGE PLAN (ADULT/PEDIATRIC) - CALL YOUR DOCTOR IF YOU HAVE ANY OF THE FOLLOWING:
Inability to tolerate liquids or foods/Fever greater than (need to indicate Fahrenheit or Celsius)/Bleeding that does not stop/Nausea and vomiting that does not stop

## 2019-04-12 NOTE — H&P PST ADULT - ASSESSMENT
72 yo F with PMH mentioned here for EGD, ERCP and EUS for pancreatic and CBD dilation and CBD stone.   Pt off ASA and plavix for 7 days.  Risks and benefits discussed with the patient.   Will proceed with the scheduled cases.

## 2019-04-12 NOTE — H&P PST ADULT - HISTORY OF PRESENT ILLNESS
74 yo F with PMH mentioned here for EGD, ERCP and EUS for pancreatic and CBD dilation and CBD stone.   Pt off ASA and plavix for 7 days.

## 2019-04-12 NOTE — CHART NOTE - NSCHARTNOTEFT_GEN_A_CORE
PACU ANESTHESIA ADMISSION NOTE      Procedure: ERCP  Post op diagnosis:      ____  Intubated  TV:______       Rate: ______      FiO2: ______    _x___  Patent Airway    _x___  Full return of protective reflexes    _x___  Full recovery from anesthesia / back to baseline status    Vitals:            T:                BP :  168/71              R:   18           Sat: 95              P:91      Mental Status:  _x___ Awake   _____ Alert   _____ Drowsy   _____ Sedated    Nausea/Vomiting:  _x___  NO       ______Yes,   See Post - Op Orders         Pain Scale (0-10):  __0___    Treatment: _x___ None    ____ See Post - Op/PCA Orders    Post - Operative Fluids:   __x__ Oral   ____ See Post - Op Orders    Plan: Discharge:   _x___Home       _____Floor     _____Critical Care    _____  Other:_________________    Comments:  No anesthesia issues or complications noted.  Discharge when criteria met.

## 2019-04-12 NOTE — ASU PATIENT PROFILE, ADULT - PMH
Arterial insufficiency of lower extremity  left leg stent placed  High cholesterol    HTN (hypertension)    Hypothyroid    Leg swelling    Peripheral arterial disease

## 2019-04-15 LAB — SURGICAL PATHOLOGY STUDY: SIGNIFICANT CHANGE UP

## 2019-04-21 DIAGNOSIS — I10 ESSENTIAL (PRIMARY) HYPERTENSION: ICD-10-CM

## 2019-04-21 DIAGNOSIS — Z90.49 ACQUIRED ABSENCE OF OTHER SPECIFIED PARTS OF DIGESTIVE TRACT: ICD-10-CM

## 2019-04-21 DIAGNOSIS — I73.9 PERIPHERAL VASCULAR DISEASE, UNSPECIFIED: ICD-10-CM

## 2019-04-21 DIAGNOSIS — Z90.710 ACQUIRED ABSENCE OF BOTH CERVIX AND UTERUS: ICD-10-CM

## 2019-04-21 DIAGNOSIS — M79.89 OTHER SPECIFIED SOFT TISSUE DISORDERS: ICD-10-CM

## 2019-04-21 DIAGNOSIS — K26.7 CHRONIC DUODENAL ULCER WITHOUT HEMORRHAGE OR PERFORATION: ICD-10-CM

## 2019-04-21 DIAGNOSIS — D13.2 BENIGN NEOPLASM OF DUODENUM: ICD-10-CM

## 2019-04-21 DIAGNOSIS — E78.00 PURE HYPERCHOLESTEROLEMIA, UNSPECIFIED: ICD-10-CM

## 2019-04-21 DIAGNOSIS — K80.51 CALCULUS OF BILE DUCT WITHOUT CHOLANGITIS OR CHOLECYSTITIS WITH OBSTRUCTION: ICD-10-CM

## 2019-04-21 DIAGNOSIS — E03.9 HYPOTHYROIDISM, UNSPECIFIED: ICD-10-CM

## 2019-05-02 PROBLEM — Z00.00 ENCOUNTER FOR PREVENTIVE HEALTH EXAMINATION: Status: ACTIVE | Noted: 2019-05-02

## 2019-05-06 ENCOUNTER — APPOINTMENT (OUTPATIENT)
Dept: SURGERY | Facility: CLINIC | Age: 74
End: 2019-05-06
Payer: MEDICARE

## 2019-05-06 VITALS
TEMPERATURE: 98.6 F | HEART RATE: 60 BPM | DIASTOLIC BLOOD PRESSURE: 78 MMHG | SYSTOLIC BLOOD PRESSURE: 140 MMHG | BODY MASS INDEX: 38.73 KG/M2 | HEIGHT: 66 IN | WEIGHT: 241 LBS

## 2019-05-06 DIAGNOSIS — Z86.39 PERSONAL HISTORY OF OTHER ENDOCRINE, NUTRITIONAL AND METABOLIC DISEASE: ICD-10-CM

## 2019-05-06 PROCEDURE — 99204 OFFICE O/P NEW MOD 45 MIN: CPT

## 2019-05-09 NOTE — HISTORY OF PRESENT ILLNESS
[de-identified] : 72 yo female patient with multiple medical problem. Referred to us by Dr. Torres with TVA of the major papilla, no invasive component and multiple small false adenomas in the duodenum. denies any history of polyposis of FAP. she developed obstructive jaundice , ERCP / sphincterotomy and stone extraction. she is here with her family for recommendations.

## 2019-05-09 NOTE — ASSESSMENT
[FreeTextEntry1] : 72 yo female patient with multiple medical problem. Referred to us by Dr. Torres with TVA of the major papilla, no invasive component and multiple small false adenomas in the duodenum. denies any history of polyposis of FAP. she developed obstructive jaundice , ERCP / sphincterotomy and stone extraction. she is here with her family for recommendations.\par \par Assessment and Plan:\par \par Adenomatous polyp of the major papilla. Dr. Torres to attempt to resect endoscopically.\par Small flat adenomas of the duodenum to undergo endoscopic resection.\par \par No surgical intervention at this point. If endoscopic resection is not possible, incomplete resection or invasive component is found, pancreaticoduodenectomy or pancreas preserving duodenectomy can be performed as a definitive procedure. She agreed with the plan. \par \par All the questions were answered to their satisfaction and I encouraged the patient to call my office at anytime if they had any questions. Plan of care fully discussed with the patient.

## 2019-05-09 NOTE — REASON FOR VISIT
[Initial Consultation] : an initial consultation for [FreeTextEntry2] : adenomatous polyps of duodenum, ampullary [Family Member] : family member

## 2019-09-05 ENCOUNTER — OUTPATIENT (OUTPATIENT)
Dept: OUTPATIENT SERVICES | Facility: HOSPITAL | Age: 74
LOS: 1 days | Discharge: HOME | End: 2019-09-05

## 2019-09-05 DIAGNOSIS — I10 ESSENTIAL (PRIMARY) HYPERTENSION: ICD-10-CM

## 2019-09-05 DIAGNOSIS — Z01.810 ENCOUNTER FOR PREPROCEDURAL CARDIOVASCULAR EXAMINATION: ICD-10-CM

## 2019-09-05 DIAGNOSIS — Z90.710 ACQUIRED ABSENCE OF BOTH CERVIX AND UTERUS: Chronic | ICD-10-CM

## 2019-09-05 DIAGNOSIS — Z90.49 ACQUIRED ABSENCE OF OTHER SPECIFIED PARTS OF DIGESTIVE TRACT: Chronic | ICD-10-CM

## 2019-09-18 ENCOUNTER — APPOINTMENT (OUTPATIENT)
Dept: GASTROENTEROLOGY | Facility: CLINIC | Age: 74
End: 2019-09-18
Payer: MEDICARE

## 2019-09-18 VITALS
WEIGHT: 231 LBS | DIASTOLIC BLOOD PRESSURE: 90 MMHG | SYSTOLIC BLOOD PRESSURE: 160 MMHG | HEART RATE: 80 BPM | BODY MASS INDEX: 37.12 KG/M2 | HEIGHT: 66 IN

## 2019-09-18 PROCEDURE — 99214 OFFICE O/P EST MOD 30 MIN: CPT

## 2019-09-18 NOTE — HISTORY OF PRESENT ILLNESS
[de-identified] : Patient is a 73-year-old female with past medical history of hypothyroid and was initially seen by the advanced GI team in the hospital for abnormal liver function studies. Patient had ERCP done with stone extraction and procedure was notable for a circumferential adenoma around the papilla. Patient had seen surgery for evaluation who recommended endoscopic attempt at removal first. Patient missed followup as  became ill and she could no longer keep followup this with his current state. Patient presents today to restart process and evaluate the area. Patient overall has been feeling well with no complaints of abdominal pain, jaundice, nausea, vomiting, weight loss, or change in bowel habits. Patient presents with family today.

## 2019-09-18 NOTE — ASSESSMENT
[FreeTextEntry1] : Patient is a 73-year-old female with past medical history of hypothyroid and was initially seen by the advanced GI team in the hospital for abnormal liver function studies. Patient had ERCP done with stone extraction and procedure was notable for a circumferential adenoma around the papilla. Patient had seen surgery for evaluation who recommended endoscopic attempt at removal first. Patient missed followup as  became ill and she could no longer keep followup this with his current state. Patient presents today to restart process and evaluate the area. Patient overall has been feeling well with no complaints of abdominal pain, jaundice, nausea, vomiting, weight loss, or change in bowel habits. Patient presents with family today.\par \par Discussed importance of follow up as has tubular adenoma with high grade dysplasia. Concern exists that if not removed can lead to spread and conversion to malignancy. \par \par Ampullary Adenoma\par - Patient seen by Dr. Marcus 5/19\par - Plan ERCP with ampullectomy 3 hours, will need hospital stay\par - Pre admission testing and clearance\par - Vascular clearance as will need to hold Plavix \par - Discussed procedure in length with patient and family present . Explained planned endoscopic resection at length, all risk and benefits and alternatives were discussed. Opportunities were given for all questions to be answered.

## 2019-09-18 NOTE — PHYSICAL EXAM
[General Appearance - Alert] : alert [General Appearance - In No Acute Distress] : in no acute distress [Sclera] : the sclera and conjunctiva were normal [PERRL With Normal Accommodation] : pupils were equal in size, round, and reactive to light [Outer Ear] : the ears and nose were normal in appearance [Extraocular Movements] : extraocular movements were intact [Oropharynx] : the oropharynx was normal [Neck Appearance] : the appearance of the neck was normal [Neck Cervical Mass (___cm)] : no neck mass was observed [Jugular Venous Distention Increased] : there was no jugular-venous distention [Thyroid Diffuse Enlargement] : the thyroid was not enlarged [Thyroid Nodule] : there were no palpable thyroid nodules [Auscultation Breath Sounds / Voice Sounds] : lungs were clear to auscultation bilaterally [Heart Rate And Rhythm] : heart rate was normal and rhythm regular [Heart Sounds] : normal S1 and S2 [Heart Sounds Gallop] : no gallops [Murmurs] : no murmurs [Heart Sounds Pericardial Friction Rub] : no pericardial rub [Bowel Sounds] : normal bowel sounds [Abdomen Soft] : soft [Abdomen Tenderness] : non-tender [] : no hepato-splenomegaly [Abdomen Mass (___ Cm)] : no abdominal mass palpated [Involuntary Movements] : no involuntary movements were seen [Nail Clubbing] : no clubbing  or cyanosis of the fingernails [Oriented To Time, Place, And Person] : oriented to person, place, and time [No Focal Deficits] : no focal deficits [Affect] : the affect was normal [Mood] : the mood was normal

## 2019-09-18 NOTE — REASON FOR VISIT
[Follow-Up: _____] : a [unfilled] follow-up visit [FreeTextEntry1] : patient hx of TA polyps c/o abdominal pain

## 2019-10-31 ENCOUNTER — OUTPATIENT (OUTPATIENT)
Dept: OUTPATIENT SERVICES | Facility: HOSPITAL | Age: 74
LOS: 1 days | Discharge: HOME | End: 2019-10-31
Payer: MEDICARE

## 2019-10-31 DIAGNOSIS — Z90.49 ACQUIRED ABSENCE OF OTHER SPECIFIED PARTS OF DIGESTIVE TRACT: Chronic | ICD-10-CM

## 2019-10-31 DIAGNOSIS — Z90.710 ACQUIRED ABSENCE OF BOTH CERVIX AND UTERUS: Chronic | ICD-10-CM

## 2019-10-31 DIAGNOSIS — R06.9 UNSPECIFIED ABNORMALITIES OF BREATHING: ICD-10-CM

## 2019-10-31 DIAGNOSIS — Z01.810 ENCOUNTER FOR PREPROCEDURAL CARDIOVASCULAR EXAMINATION: ICD-10-CM

## 2019-10-31 PROCEDURE — 78452 HT MUSCLE IMAGE SPECT MULT: CPT | Mod: 26

## 2019-11-06 ENCOUNTER — FORM ENCOUNTER (OUTPATIENT)
Age: 74
End: 2019-11-06

## 2019-11-07 ENCOUNTER — OUTPATIENT (OUTPATIENT)
Dept: OUTPATIENT SERVICES | Facility: HOSPITAL | Age: 74
LOS: 1 days | Discharge: HOME | End: 2019-11-07
Payer: MEDICARE

## 2019-11-07 VITALS
SYSTOLIC BLOOD PRESSURE: 122 MMHG | DIASTOLIC BLOOD PRESSURE: 78 MMHG | HEIGHT: 65 IN | RESPIRATION RATE: 16 BRPM | TEMPERATURE: 97 F | WEIGHT: 240.08 LBS | OXYGEN SATURATION: 98 % | HEART RATE: 56 BPM

## 2019-11-07 DIAGNOSIS — Z90.710 ACQUIRED ABSENCE OF BOTH CERVIX AND UTERUS: Chronic | ICD-10-CM

## 2019-11-07 DIAGNOSIS — D13.5 BENIGN NEOPLASM OF EXTRAHEPATIC BILE DUCTS: ICD-10-CM

## 2019-11-07 DIAGNOSIS — Z90.49 ACQUIRED ABSENCE OF OTHER SPECIFIED PARTS OF DIGESTIVE TRACT: Chronic | ICD-10-CM

## 2019-11-07 DIAGNOSIS — D13.2 BENIGN NEOPLASM OF DUODENUM: ICD-10-CM

## 2019-11-07 DIAGNOSIS — Z01.818 ENCOUNTER FOR OTHER PREPROCEDURAL EXAMINATION: ICD-10-CM

## 2019-11-07 DIAGNOSIS — Z98.890 OTHER SPECIFIED POSTPROCEDURAL STATES: Chronic | ICD-10-CM

## 2019-11-07 DIAGNOSIS — K80.10 CALCULUS OF GALLBLADDER WITH CHRONIC CHOLECYSTITIS WITHOUT OBSTRUCTION: ICD-10-CM

## 2019-11-07 LAB
ALBUMIN SERPL ELPH-MCNC: 4.3 G/DL — SIGNIFICANT CHANGE UP (ref 3.5–5.2)
ALP SERPL-CCNC: 110 U/L — SIGNIFICANT CHANGE UP (ref 30–115)
ALT FLD-CCNC: 14 U/L — SIGNIFICANT CHANGE UP (ref 0–41)
ANION GAP SERPL CALC-SCNC: 14 MMOL/L — SIGNIFICANT CHANGE UP (ref 7–14)
APTT BLD: 32.5 SEC — SIGNIFICANT CHANGE UP (ref 27–39.2)
AST SERPL-CCNC: 14 U/L — SIGNIFICANT CHANGE UP (ref 0–41)
BASOPHILS # BLD AUTO: 0.06 K/UL — SIGNIFICANT CHANGE UP (ref 0–0.2)
BASOPHILS NFR BLD AUTO: 0.6 % — SIGNIFICANT CHANGE UP (ref 0–1)
BILIRUB SERPL-MCNC: 0.2 MG/DL — SIGNIFICANT CHANGE UP (ref 0.2–1.2)
BUN SERPL-MCNC: 25 MG/DL — HIGH (ref 10–20)
CALCIUM SERPL-MCNC: 8.7 MG/DL — SIGNIFICANT CHANGE UP (ref 8.5–10.1)
CHLORIDE SERPL-SCNC: 102 MMOL/L — SIGNIFICANT CHANGE UP (ref 98–110)
CO2 SERPL-SCNC: 23 MMOL/L — SIGNIFICANT CHANGE UP (ref 17–32)
CREAT SERPL-MCNC: 1 MG/DL — SIGNIFICANT CHANGE UP (ref 0.7–1.5)
EOSINOPHIL # BLD AUTO: 0.33 K/UL — SIGNIFICANT CHANGE UP (ref 0–0.7)
EOSINOPHIL NFR BLD AUTO: 3.1 % — SIGNIFICANT CHANGE UP (ref 0–8)
GLUCOSE SERPL-MCNC: 160 MG/DL — HIGH (ref 70–99)
HCT VFR BLD CALC: 32 % — LOW (ref 37–47)
HGB BLD-MCNC: 9.7 G/DL — LOW (ref 12–16)
IMM GRANULOCYTES NFR BLD AUTO: 0.8 % — HIGH (ref 0.1–0.3)
INR BLD: 0.98 RATIO — SIGNIFICANT CHANGE UP (ref 0.65–1.3)
LYMPHOCYTES # BLD AUTO: 2.19 K/UL — SIGNIFICANT CHANGE UP (ref 1.2–3.4)
LYMPHOCYTES # BLD AUTO: 20.8 % — SIGNIFICANT CHANGE UP (ref 20.5–51.1)
MCHC RBC-ENTMCNC: 26.8 PG — LOW (ref 27–31)
MCHC RBC-ENTMCNC: 30.3 G/DL — LOW (ref 32–37)
MCV RBC AUTO: 88.4 FL — SIGNIFICANT CHANGE UP (ref 81–99)
MONOCYTES # BLD AUTO: 0.69 K/UL — HIGH (ref 0.1–0.6)
MONOCYTES NFR BLD AUTO: 6.6 % — SIGNIFICANT CHANGE UP (ref 1.7–9.3)
NEUTROPHILS # BLD AUTO: 7.17 K/UL — HIGH (ref 1.4–6.5)
NEUTROPHILS NFR BLD AUTO: 68.1 % — SIGNIFICANT CHANGE UP (ref 42.2–75.2)
NRBC # BLD: 0 /100 WBCS — SIGNIFICANT CHANGE UP (ref 0–0)
PLATELET # BLD AUTO: 320 K/UL — SIGNIFICANT CHANGE UP (ref 130–400)
POTASSIUM SERPL-MCNC: 4.8 MMOL/L — SIGNIFICANT CHANGE UP (ref 3.5–5)
POTASSIUM SERPL-SCNC: 4.8 MMOL/L — SIGNIFICANT CHANGE UP (ref 3.5–5)
PROT SERPL-MCNC: 6.7 G/DL — SIGNIFICANT CHANGE UP (ref 6–8)
PROTHROM AB SERPL-ACNC: 11.3 SEC — SIGNIFICANT CHANGE UP (ref 9.95–12.87)
RBC # BLD: 3.62 M/UL — LOW (ref 4.2–5.4)
RBC # FLD: 14.8 % — HIGH (ref 11.5–14.5)
SODIUM SERPL-SCNC: 139 MMOL/L — SIGNIFICANT CHANGE UP (ref 135–146)
WBC # BLD: 10.52 K/UL — SIGNIFICANT CHANGE UP (ref 4.8–10.8)
WBC # FLD AUTO: 10.52 K/UL — SIGNIFICANT CHANGE UP (ref 4.8–10.8)

## 2019-11-07 PROCEDURE — 71046 X-RAY EXAM CHEST 2 VIEWS: CPT | Mod: 26

## 2019-11-07 PROCEDURE — 93010 ELECTROCARDIOGRAM REPORT: CPT

## 2019-11-07 NOTE — H&P PST ADULT - NSICDXFAMILYHX_GEN_ALL_CORE_FT
FAMILY HISTORY:  FH: lung cancer, FATHER    Sibling  Still living? Yes, Estimated age: Age Unknown  Family history of breast cancer, Age at diagnosis: Age Unknown

## 2019-11-07 NOTE — H&P PST ADULT - NSICDXPASTSURGICALHX_GEN_ALL_CORE_FT
PAST SURGICAL HISTORY:  H/O: hysterectomy     History of cholecystectomy PAST SURGICAL HISTORY:  H/O: hysterectomy     History of cholecystectomy     S/P angiogram of extremity

## 2019-11-07 NOTE — H&P PST ADULT - NSICDXPASTMEDICALHX_GEN_ALL_CORE_FT
PAST MEDICAL HISTORY:  Arterial insufficiency of lower extremity left leg stent placed    High cholesterol     HTN (hypertension)     Hypothyroid     Leg swelling     Peripheral arterial disease PAST MEDICAL HISTORY:  Arterial insufficiency of lower extremity left leg stent placed    H/O Graves' disease     High cholesterol     HTN (hypertension)     Hypothyroid     Leg swelling     Peripheral arterial disease

## 2019-11-07 NOTE — H&P PST ADULT - HISTORY OF PRESENT ILLNESS
73 Y/O FEMALE PRESENTS TO PAST WITH C/O               PT NOW FOR SCHEDULED PROCEDURE. PT DENIES ANY CP SOB PALP COUGH DYSURIA FEVER URI. PT ABLE TO LIZA 1-2 FOS W/O SOB 73 Y/O FEMALE PRESENTS TO PAST WITH C/O LIVER MASS   PT NOW FOR SCHEDULED PROCEDURE. PT DENIES ANY CP SOB PALP COUGH DYSURIA FEVER URI. PT ABLE TO LIZA 1-2 FOS W/O SOB

## 2019-11-15 ENCOUNTER — RESULT REVIEW (OUTPATIENT)
Age: 74
End: 2019-11-15

## 2019-11-15 ENCOUNTER — TRANSCRIPTION ENCOUNTER (OUTPATIENT)
Age: 74
End: 2019-11-15

## 2019-11-15 ENCOUNTER — INPATIENT (INPATIENT)
Facility: HOSPITAL | Age: 74
LOS: 2 days | Discharge: HOME | End: 2019-11-18
Attending: INTERNAL MEDICINE | Admitting: INTERNAL MEDICINE
Payer: MEDICARE

## 2019-11-15 VITALS
SYSTOLIC BLOOD PRESSURE: 162 MMHG | HEIGHT: 65 IN | RESPIRATION RATE: 18 BRPM | DIASTOLIC BLOOD PRESSURE: 71 MMHG | TEMPERATURE: 98 F | WEIGHT: 240.08 LBS | HEART RATE: 48 BPM

## 2019-11-15 DIAGNOSIS — K83.1 OBSTRUCTION OF BILE DUCT: ICD-10-CM

## 2019-11-15 DIAGNOSIS — E03.9 HYPOTHYROIDISM, UNSPECIFIED: ICD-10-CM

## 2019-11-15 DIAGNOSIS — F17.210 NICOTINE DEPENDENCE, CIGARETTES, UNCOMPLICATED: ICD-10-CM

## 2019-11-15 DIAGNOSIS — D13.5 BENIGN NEOPLASM OF EXTRAHEPATIC BILE DUCTS: ICD-10-CM

## 2019-11-15 DIAGNOSIS — I73.9 PERIPHERAL VASCULAR DISEASE, UNSPECIFIED: ICD-10-CM

## 2019-11-15 DIAGNOSIS — E05.00 THYROTOXICOSIS WITH DIFFUSE GOITER WITHOUT THYROTOXIC CRISIS OR STORM: ICD-10-CM

## 2019-11-15 DIAGNOSIS — Z95.828 PRESENCE OF OTHER VASCULAR IMPLANTS AND GRAFTS: ICD-10-CM

## 2019-11-15 DIAGNOSIS — D62 ACUTE POSTHEMORRHAGIC ANEMIA: ICD-10-CM

## 2019-11-15 DIAGNOSIS — Z90.49 ACQUIRED ABSENCE OF OTHER SPECIFIED PARTS OF DIGESTIVE TRACT: Chronic | ICD-10-CM

## 2019-11-15 DIAGNOSIS — Z79.02 LONG TERM (CURRENT) USE OF ANTITHROMBOTICS/ANTIPLATELETS: ICD-10-CM

## 2019-11-15 DIAGNOSIS — S61.412A LACERATION WITHOUT FOREIGN BODY OF LEFT HAND, INITIAL ENCOUNTER: ICD-10-CM
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
## 2019-11-15 DIAGNOSIS — Z90.710 ACQUIRED ABSENCE OF BOTH CERVIX AND UTERUS: Chronic | ICD-10-CM

## 2019-11-15 DIAGNOSIS — Z90.49 ACQUIRED ABSENCE OF OTHER SPECIFIED PARTS OF DIGESTIVE TRACT: ICD-10-CM

## 2019-11-15 DIAGNOSIS — Z98.890 OTHER SPECIFIED POSTPROCEDURAL STATES: Chronic | ICD-10-CM
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
## 2019-11-15 DIAGNOSIS — I10 ESSENTIAL (PRIMARY) HYPERTENSION: ICD-10-CM

## 2019-11-15 DIAGNOSIS — Z79.82 LONG TERM (CURRENT) USE OF ASPIRIN: ICD-10-CM
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
## 2019-11-15 DIAGNOSIS — E66.01 MORBID (SEVERE) OBESITY DUE TO EXCESS CALORIES: ICD-10-CM

## 2019-11-15 PROCEDURE — 12002 RPR S/N/AX/GEN/TRNK2.6-7.5CM: CPT

## 2019-11-15 PROCEDURE — 43273 ENDOSCOPIC PANCREATOSCOPY: CPT | Mod: XU

## 2019-11-15 PROCEDURE — 43250 EGD CAUTERY TUMOR POLYP: CPT | Mod: XU

## 2019-11-15 PROCEDURE — 88305 TISSUE EXAM BY PATHOLOGIST: CPT | Mod: 26

## 2019-11-15 PROCEDURE — 88309 TISSUE EXAM BY PATHOLOGIST: CPT | Mod: 26

## 2019-11-15 PROCEDURE — 74328 X-RAY BILE DUCT ENDOSCOPY: CPT | Mod: 26

## 2019-11-15 PROCEDURE — 43262 ENDO CHOLANGIOPANCREATOGRAPH: CPT | Mod: XU

## 2019-11-15 PROCEDURE — 43254 EGD ENDO MUCOSAL RESECTION: CPT | Mod: XU

## 2019-11-15 PROCEDURE — 43274 ERCP DUCT STENT PLACEMENT: CPT | Mod: XU

## 2019-11-15 PROCEDURE — 88112 CYTOPATH CELL ENHANCE TECH: CPT | Mod: 26

## 2019-11-15 PROCEDURE — 99222 1ST HOSP IP/OBS MODERATE 55: CPT | Mod: 25

## 2019-11-15 RX ORDER — CEFAZOLIN SODIUM 1 G
1000 VIAL (EA) INJECTION ONCE
Refills: 0 | Status: COMPLETED | OUTPATIENT
Start: 2019-11-15 | End: 2019-11-15

## 2019-11-15 RX ORDER — ASPIRIN/CALCIUM CARB/MAGNESIUM 324 MG
1 TABLET ORAL
Qty: 0 | Refills: 0 | DISCHARGE

## 2019-11-15 RX ORDER — CEFAZOLIN SODIUM 1 G
1000 VIAL (EA) INJECTION EVERY 8 HOURS
Refills: 0 | Status: DISCONTINUED | OUTPATIENT
Start: 2019-11-16 | End: 2019-11-18

## 2019-11-15 RX ORDER — FUROSEMIDE 40 MG
40 TABLET ORAL DAILY
Refills: 0 | Status: DISCONTINUED | OUTPATIENT
Start: 2019-11-15 | End: 2019-11-18

## 2019-11-15 RX ORDER — ATORVASTATIN CALCIUM 80 MG/1
40 TABLET, FILM COATED ORAL AT BEDTIME
Refills: 0 | Status: DISCONTINUED | OUTPATIENT
Start: 2019-11-15 | End: 2019-11-18

## 2019-11-15 RX ORDER — LIDOCAINE HCL 20 MG/ML
20 VIAL (ML) INJECTION ONCE
Refills: 0 | Status: DISCONTINUED | OUTPATIENT
Start: 2019-11-15 | End: 2019-11-18

## 2019-11-15 RX ORDER — ONDANSETRON 8 MG/1
4 TABLET, FILM COATED ORAL EVERY 6 HOURS
Refills: 0 | Status: DISCONTINUED | OUTPATIENT
Start: 2019-11-15 | End: 2019-11-18

## 2019-11-15 RX ORDER — SODIUM CHLORIDE 9 MG/ML
1000 INJECTION INTRAMUSCULAR; INTRAVENOUS; SUBCUTANEOUS
Refills: 0 | Status: DISCONTINUED | OUTPATIENT
Start: 2019-11-15 | End: 2019-11-17

## 2019-11-15 RX ORDER — METOPROLOL TARTRATE 50 MG
50 TABLET ORAL DAILY
Refills: 0 | Status: DISCONTINUED | OUTPATIENT
Start: 2019-11-15 | End: 2019-11-18

## 2019-11-15 RX ORDER — PANTOPRAZOLE SODIUM 20 MG/1
8 TABLET, DELAYED RELEASE ORAL
Qty: 80 | Refills: 0 | Status: DISCONTINUED | OUTPATIENT
Start: 2019-11-15 | End: 2019-11-18

## 2019-11-15 RX ORDER — HETASTARCH 6 G/100ML
500 INJECTION, SOLUTION INTRAVENOUS ONCE
Refills: 0 | Status: COMPLETED | OUTPATIENT
Start: 2019-11-15 | End: 2019-11-15

## 2019-11-15 RX ORDER — CEFAZOLIN SODIUM 1 G
VIAL (EA) INJECTION
Refills: 0 | Status: DISCONTINUED | OUTPATIENT
Start: 2019-11-15 | End: 2019-11-18

## 2019-11-15 RX ORDER — INDOMETHACIN 50 MG
100 CAPSULE ORAL ONCE
Refills: 0 | Status: COMPLETED | OUTPATIENT
Start: 2019-11-15 | End: 2019-11-15

## 2019-11-15 RX ORDER — LEVOTHYROXINE SODIUM 125 MCG
125 TABLET ORAL DAILY
Refills: 0 | Status: DISCONTINUED | OUTPATIENT
Start: 2019-11-15 | End: 2019-11-18

## 2019-11-15 RX ADMIN — Medication 100 MILLIGRAM(S): at 22:23

## 2019-11-15 RX ADMIN — Medication 100 MILLIGRAM(S): at 20:26

## 2019-11-15 RX ADMIN — PANTOPRAZOLE SODIUM 10 MG/HR: 20 TABLET, DELAYED RELEASE ORAL at 22:23

## 2019-11-15 RX ADMIN — HETASTARCH 1000 MILLILITER(S): 6 INJECTION, SOLUTION INTRAVENOUS at 16:38

## 2019-11-15 RX ADMIN — ATORVASTATIN CALCIUM 40 MILLIGRAM(S): 80 TABLET, FILM COATED ORAL at 22:23

## 2019-11-15 NOTE — PROCEDURE NOTE - GENERAL PROCEDURE DETAILS
Pt SP EMR of an NG-LST type of the duodenum involving the MP. ERCP with stenting of the CBD was done.

## 2019-11-15 NOTE — CONSULT NOTE ADULT - ATTENDING COMMENTS
Agree with above.    DW resident  Irrigation and repair of laceration at bedside  Hand elevation for edema control  NSAID PRN pain, if not otherwise contraindicated  Follow up as outpatient for suture removal

## 2019-11-15 NOTE — CONSULT NOTE ADULT - ASSESSMENT
Patient is a 73 y/o with PMHx of Hypothyroid, HTN, PAD, Graves disease that presented for ambulatory ampullectomy as found on past ERCP to have Adenoma around the papilla ( Pathology in our system ). Patient is a 73 y/o with PMHx of Hypothyroid, HTN, PAD, Graves disease that presented for ambulatory ampullectomy as found on past ERCP to have Adenoma around the papilla ( Pathology in our system ). Patient tolerated procedure well. Adnitted for post- op care.    ERCP with ampullectomy of Adenoma  - Pantoprazole GTT  - Labs ordered for tonight  - NPO  - Ancef ordered Q8 hours  - Avoid AC- compression stocking preferred   - Zofran prn Nausea  - GI will follow

## 2019-11-15 NOTE — CONSULT NOTE ADULT - SUBJECTIVE AND OBJECTIVE BOX
JAKY RODRIGUEZ [id number]  74y Female    HPI:  75 Yo F PAD, HTN, papillary adenoma with HD, D3 adenoma here for ampullectomy. Patient was prone during the case, when patient was made supine laceration to left hand was appreciated, hand surgery consult placed for repair      PAST MEDICAL & SURGICAL HISTORY:  H/O Graves' disease  Peripheral arterial disease  High cholesterol  HTN (hypertension)  Hypothyroid  Leg swelling  Arterial insufficiency of lower extremity: left leg stent placed  S/P angiogram of extremity  H/O: hysterectomy  History of cholecystectomy        MEDICATIONS  (STANDING):  ceFAZolin   IVPB      ceFAZolin   IVPB 1000 milliGRAM(s) IV Intermittent once  indomethacin Suppository 100 milliGRAM(s) Rectal once  lidocaine 1% (Preservative-free) Injectable 20 milliLiter(s) Local Injection once  pantoprazole Infusion 8 mG/Hr (10 mL/Hr) IV Continuous <Continuous>    MEDICATIONS  (PRN):  ondansetron Injectable 4 milliGRAM(s) IV Push every 6 hours PRN Nausea and/or Vomiting      Allergies    codeine (Stomach Upset; Vomiting; Nausea)    Intolerances        REVIEW OF SYSTEMS    [X] A ten-point review of systems was otherwise negative except as noted.  [ ] Due to altered mental status/intubation, subjective information were not able to be obtained from the patient. History was obtained, to the extent possible, from review of the chart and collateral sources of information.      Vital Signs Last 24 Hrs  T(C): 36.6 (15 Nov 2019 15:03), Max: 36.6 (15 Nov 2019 11:48)  T(F): 97.9 (15 Nov 2019 11:52), Max: 97.9 (15 Nov 2019 11:48)  HR: 62 (15 Nov 2019 19:56) (48 - 66)  BP: 165/69 (15 Nov 2019 19:56) (144/78 - 168/72)  BP(mean): --  RR: 17 (15 Nov 2019 19:56) (16 - 18)  SpO2: 94% (15 Nov 2019 19:56) (94% - 98%)    PHYSICAL EXAM:  GENERAL: NAD, well-appearing  CHEST/LUNG: Clear to auscultation bilaterally  HEART: Regular rate and rhythm  ABDOMEN: Soft, Nontender, Nondistended;   EXTREMITIES:  No clubbing, cyanosis, or edema, left hand laceration, approximately 4cm in diameter with subq exposed, no bone/muscle/tendons visible      LABS:  None            RADIOLOGY & ADDITIONAL STUDIES:  None

## 2019-11-15 NOTE — PROCEDURE NOTE - ADDITIONAL PROCEDURE DETAILS
Rec:  NPO  IV PPI  IV Abx  CBC QD  Monitor for bleeding  Call GI on call for emergencies or questions

## 2019-11-15 NOTE — CONSULT NOTE ADULT - SUBJECTIVE AND OBJECTIVE BOX
Patient is a 73 y/o with PMHx of Hypothyroid, HTN, PAD, Graves disease that presented for ambulatory ampullectomy as found on past ERCP to have Adenoma around the papilla ( Pathology in our system ).         PAST MEDICAL & SURGICAL HISTORY:  H/O Graves' disease  Peripheral arterial disease  High cholesterol  HTN (hypertension)  Hypothyroid  Leg swelling  Arterial insufficiency of lower extremity: left leg stent placed  S/P angiogram of extremity  H/O: hysterectomy  History of cholecystectomy      Family Hx:  Father: Non Contributory   Mother: Non Contributory      MEDICATIONS  (STANDING):  pantoprazole Infusion 8 mG/Hr (10 mL/Hr) IV Continuous <Continuous>    MEDICATIONS  (PRN):  ondansetron Injectable 4 milliGRAM(s) IV Push every 6 hours PRN Nausea and/or Vomiting      Allergies  codeine (Stomach Upset; Vomiting; Nausea) Patient is a 75 y/o with PMHx of Hypothyroid, HTN, PAD, Graves disease that presented for ambulatory ampullectomy as found on past ERCP to have Adenoma around the papilla ( Pathology in our system ). Patient tolerated procedure well with no complaints after.         PAST MEDICAL & SURGICAL HISTORY:  H/O Graves' disease  Peripheral arterial disease  High cholesterol  HTN (hypertension)  Hypothyroid  Leg swelling  Arterial insufficiency of lower extremity: left leg stent placed  S/P angiogram of extremity  H/O: hysterectomy  History of cholecystectomy      Family Hx:  Father: Non Contributory   Mother: Non Contributory      MEDICATIONS  (STANDING):  pantoprazole Infusion 8 mG/Hr (10 mL/Hr) IV Continuous <Continuous>    MEDICATIONS  (PRN):  ondansetron Injectable 4 milliGRAM(s) IV Push every 6 hours PRN Nausea and/or Vomiting      Allergies  codeine (Stomach Upset; Vomiting; Nausea)      Physical Exam  Gen: NAD  HEENT: NC/AT, Mucosal Membranes  Cardio: S1/S2 No S3/S4, Regular  Resp: CTA B/L  Abdomen: Soft, ND/NT  Neuro: AAOx3, Cranial Nerve II-XII intact   Extremities: FROM x 4

## 2019-11-15 NOTE — H&P ADULT - ASSESSMENT
Patient is a 75 y/o with PMHx of Hypothyroid, HTN, PAD, Graves disease that presented for ambulatory ampullectomy as found on past ERCP to have Adenoma around the papilla ( Pathology in our system ). Patient tolerated procedure well. Adnitted for post- op care.    ERCP with ampullectomy of Adenoma  - Pantoprazole GTT  - Labs ordered for tonight  - NPO  - Ancef ordered Q8 hours  - Avoid AC- compression stocking preferred   - Zofran prn Nausea  - GI will follow Patient is a 73 y/o with PMHx of Hypothyroid, HTN, PAD, Graves disease that presented for ambulatory ampullectomy as found on past ERCP to have Adenoma around the papilla ( Pathology in our system ). Patient tolerated procedure well. Adnitted for post- op care.    ERCP with ampullectomy of Adenoma  -F/U with the biopsy results  - Will start Pantoprazole Drip  - Keep NPO for 24 hours  - Ancef ordered Q8 hours (Post op Prophylaxis)  - Zofran prn Nausea  - GI consult    Hypothyroidism  -On levothyroxine    HTN  -On atenolol    PAD  -Clopidogrel was held for the procedure  -Vascular surgery consult   -Vascular procedure done just a month ago    Diet - NPO  Activity - OOB To chair  DVT - Sequential  GI - PPI Drip  FULL CODE Patient is a 73 y/o with PMHx of Hypothyroid, HTN, PAD, Graves disease that presented for ambulatory ampullectomy as found on past ERCP to have Adenoma around the papilla ( Pathology in our system ). Patient tolerated procedure well. Adnitted for post- op care.    ERCP with ampullectomy of Adenoma  -F/U with the biopsy results  - Will start Pantoprazole Drip  - Keep NPO for 24 hours  - Ancef ordered Q8 hours (Post op Prophylaxis)  - Zofran prn Nausea  - GI consult    Hypothyroidism  -On levothyroxine    HTN  -On Metoprolol    PAD  -Clopidogrel was held for the procedure  -Vascular surgery consult   -Vascular procedure done just a month ago    Diet - NPO  Activity - OOB To chair  DVT - Sequential  GI - PPI Drip  FULL CODE

## 2019-11-15 NOTE — H&P ADULT - NSICDXPASTMEDICALHX_GEN_ALL_CORE_FT
PAST MEDICAL HISTORY:  Arterial insufficiency of lower extremity left leg stent placed    H/O Graves' disease     High cholesterol     HTN (hypertension)     Hypothyroid     Leg swelling     Peripheral arterial disease

## 2019-11-15 NOTE — H&P ADULT - HISTORY OF PRESENT ILLNESS
75 Y/O with PMH of Hypothyroid, HTN, PAD, Graves disease that presented for ambulatory ampullectomy as found on past ERCP to have Adenoma around the papilla ( Pathology in our system ). Patient tolerated procedure well. Admitted for post- op care.    ERCP with ampullectomy of Adenoma  - Pantoprazole GTT  - Labs ordered for tonight  - NPO  - Ancef ordered Q8 hours  - Avoid AC- compression stocking preferred   - Zofran prn Nausea  - GI will follow 75 Y/O with PMH of Hypothyroid (After surgery for the Grave's disease), HTN, PAD that presented for ambulatory ampullectomy as found on past ERCP to have Adenoma around the papilla ( Pathology in our system ). Patient tolerated procedure well. Admitted for post- op care.    ERCP with ampullectomy of Adenoma  - Pantoprazole GTT  - Labs ordered for tonight  - NPO  - Ancef ordered Q8 hours  - Avoid AC- compression stocking preferred   - Zofran prn Nausea  - GI will follow 73 Y/O with PMH of Hypothyroid (After surgery for the Grave's disease), HTN, PAD that presented for ambulatory ampullectomy as found on past ERCP to have Adenoma around the papilla ( Pathology in our system ). Patient tolerated procedure well. Admitted for post- op care.    GI is following the case. On my examination she is resting comfortably with no active complains.

## 2019-11-15 NOTE — H&P ADULT - NSHPPHYSICALEXAM_GEN_ALL_CORE
Gen: NAD  HEENT: NC/AT, Mucosal Membranes  Cardio: S1/S2 No S3/S4, Regular  Resp: CTA B/L  Abdomen: Soft, ND/NT  Neuro: AAOx3, Cranial Nerve II-XII intact   Extremities: 1+ B/L Lower extremity edema

## 2019-11-15 NOTE — CONSULT NOTE ADULT - ASSESSMENT
74F with laceration to left hand s/p ercp in prone position, occured during turning    Plan:  Washout and repair of hand laceration  No need for admission for hand surgery point of view - patient may be discharged when clear from medical standpoint  Must call Dr. Ndiaye office to f/u in outpatient setting for suture removal    Patient is s/p washout and repair, dressed with bacitracin, xeroform and gauze, repaired with 4-0 prolene sutures

## 2019-11-15 NOTE — H&P ADULT - NSICDXPASTSURGICALHX_GEN_ALL_CORE_FT
PAST SURGICAL HISTORY:  H/O: hysterectomy     History of cholecystectomy     S/P angiogram of extremity

## 2019-11-16 LAB
ANION GAP SERPL CALC-SCNC: 14 MMOL/L — SIGNIFICANT CHANGE UP (ref 7–14)
APTT BLD: 26.3 SEC — LOW (ref 27–39.2)
BASOPHILS # BLD AUTO: 0.02 K/UL — SIGNIFICANT CHANGE UP (ref 0–0.2)
BASOPHILS NFR BLD AUTO: 0.2 % — SIGNIFICANT CHANGE UP (ref 0–1)
BLD GP AB SCN SERPL QL: SIGNIFICANT CHANGE UP
BUN SERPL-MCNC: 18 MG/DL — SIGNIFICANT CHANGE UP (ref 10–20)
CALCIUM SERPL-MCNC: 7.7 MG/DL — LOW (ref 8.5–10.1)
CHLORIDE SERPL-SCNC: 106 MMOL/L — SIGNIFICANT CHANGE UP (ref 98–110)
CO2 SERPL-SCNC: 20 MMOL/L — SIGNIFICANT CHANGE UP (ref 17–32)
CREAT SERPL-MCNC: 0.8 MG/DL — SIGNIFICANT CHANGE UP (ref 0.7–1.5)
EOSINOPHIL # BLD AUTO: 0.09 K/UL — SIGNIFICANT CHANGE UP (ref 0–0.7)
EOSINOPHIL NFR BLD AUTO: 0.9 % — SIGNIFICANT CHANGE UP (ref 0–8)
GLUCOSE SERPL-MCNC: 175 MG/DL — HIGH (ref 70–99)
HCT VFR BLD CALC: 27 % — LOW (ref 37–47)
HCT VFR BLD CALC: 27.2 % — LOW (ref 37–47)
HGB BLD-MCNC: 8.1 G/DL — LOW (ref 12–16)
HGB BLD-MCNC: 8.2 G/DL — LOW (ref 12–16)
IMM GRANULOCYTES NFR BLD AUTO: 0.4 % — HIGH (ref 0.1–0.3)
INR BLD: 1.17 RATIO — SIGNIFICANT CHANGE UP (ref 0.65–1.3)
LYMPHOCYTES # BLD AUTO: 0.84 K/UL — LOW (ref 1.2–3.4)
LYMPHOCYTES # BLD AUTO: 8.4 % — LOW (ref 20.5–51.1)
MAGNESIUM SERPL-MCNC: 1.9 MG/DL — SIGNIFICANT CHANGE UP (ref 1.8–2.4)
MCHC RBC-ENTMCNC: 26.3 PG — LOW (ref 27–31)
MCHC RBC-ENTMCNC: 26.5 PG — LOW (ref 27–31)
MCHC RBC-ENTMCNC: 29.8 G/DL — LOW (ref 32–37)
MCHC RBC-ENTMCNC: 30.4 G/DL — LOW (ref 32–37)
MCV RBC AUTO: 87.1 FL — SIGNIFICANT CHANGE UP (ref 81–99)
MCV RBC AUTO: 88.3 FL — SIGNIFICANT CHANGE UP (ref 81–99)
MONOCYTES # BLD AUTO: 0.52 K/UL — SIGNIFICANT CHANGE UP (ref 0.1–0.6)
MONOCYTES NFR BLD AUTO: 5.2 % — SIGNIFICANT CHANGE UP (ref 1.7–9.3)
NEUTROPHILS # BLD AUTO: 8.44 K/UL — HIGH (ref 1.4–6.5)
NEUTROPHILS NFR BLD AUTO: 84.9 % — HIGH (ref 42.2–75.2)
NRBC # BLD: 0 /100 WBCS — SIGNIFICANT CHANGE UP (ref 0–0)
NRBC # BLD: 0 /100 WBCS — SIGNIFICANT CHANGE UP (ref 0–0)
PLATELET # BLD AUTO: 225 K/UL — SIGNIFICANT CHANGE UP (ref 130–400)
PLATELET # BLD AUTO: 249 K/UL — SIGNIFICANT CHANGE UP (ref 130–400)
POTASSIUM SERPL-MCNC: 4.3 MMOL/L — SIGNIFICANT CHANGE UP (ref 3.5–5)
POTASSIUM SERPL-SCNC: 4.3 MMOL/L — SIGNIFICANT CHANGE UP (ref 3.5–5)
PROTHROM AB SERPL-ACNC: 13.4 SEC — HIGH (ref 9.95–12.87)
RBC # BLD: 3.08 M/UL — LOW (ref 4.2–5.4)
RBC # BLD: 3.1 M/UL — LOW (ref 4.2–5.4)
RBC # FLD: 14.8 % — HIGH (ref 11.5–14.5)
RBC # FLD: 14.9 % — HIGH (ref 11.5–14.5)
SODIUM SERPL-SCNC: 140 MMOL/L — SIGNIFICANT CHANGE UP (ref 135–146)
WBC # BLD: 10.5 K/UL — SIGNIFICANT CHANGE UP (ref 4.8–10.8)
WBC # BLD: 9.95 K/UL — SIGNIFICANT CHANGE UP (ref 4.8–10.8)
WBC # FLD AUTO: 10.5 K/UL — SIGNIFICANT CHANGE UP (ref 4.8–10.8)
WBC # FLD AUTO: 9.95 K/UL — SIGNIFICANT CHANGE UP (ref 4.8–10.8)

## 2019-11-16 PROCEDURE — 99233 SBSQ HOSP IP/OBS HIGH 50: CPT

## 2019-11-16 RX ORDER — NICOTINE POLACRILEX 2 MG
1 GUM BUCCAL DAILY
Refills: 0 | Status: DISCONTINUED | OUTPATIENT
Start: 2019-11-16 | End: 2019-11-18

## 2019-11-16 RX ORDER — NICOTINE POLACRILEX 2 MG
1 GUM BUCCAL DAILY
Refills: 0 | Status: DISCONTINUED | OUTPATIENT
Start: 2019-11-16 | End: 2019-11-16

## 2019-11-16 RX ORDER — LANOLIN ALCOHOL/MO/W.PET/CERES
5 CREAM (GRAM) TOPICAL AT BEDTIME
Refills: 0 | Status: DISCONTINUED | OUTPATIENT
Start: 2019-11-16 | End: 2019-11-18

## 2019-11-16 RX ADMIN — Medication 1 PATCH: at 11:16

## 2019-11-16 RX ADMIN — ATORVASTATIN CALCIUM 40 MILLIGRAM(S): 80 TABLET, FILM COATED ORAL at 21:21

## 2019-11-16 RX ADMIN — Medication 40 MILLIGRAM(S): at 06:01

## 2019-11-16 RX ADMIN — PANTOPRAZOLE SODIUM 10 MG/HR: 20 TABLET, DELAYED RELEASE ORAL at 08:54

## 2019-11-16 RX ADMIN — Medication 5 MILLIGRAM(S): at 21:21

## 2019-11-16 RX ADMIN — Medication 100 MILLIGRAM(S): at 21:21

## 2019-11-16 RX ADMIN — Medication 100 MILLIGRAM(S): at 06:01

## 2019-11-16 RX ADMIN — SODIUM CHLORIDE 60 MILLILITER(S): 9 INJECTION INTRAMUSCULAR; INTRAVENOUS; SUBCUTANEOUS at 08:55

## 2019-11-16 RX ADMIN — Medication 1 PATCH: at 21:15

## 2019-11-16 RX ADMIN — Medication 100 MILLIGRAM(S): at 14:13

## 2019-11-16 RX ADMIN — Medication 50 MILLIGRAM(S): at 06:01

## 2019-11-16 RX ADMIN — PANTOPRAZOLE SODIUM 10 MG/HR: 20 TABLET, DELAYED RELEASE ORAL at 23:02

## 2019-11-16 RX ADMIN — Medication 125 MICROGRAM(S): at 06:01

## 2019-11-16 NOTE — PROVIDER CONTACT NOTE (OTHER) - SITUATION
spoke with MD to inform that pt stated she tastes blood. After inspection, there appears to be blood patches on roof of mouth and by patients right back of mouth

## 2019-11-16 NOTE — CONSULT NOTE ADULT - ASSESSMENT
75 Y/O with PMH of Hypothyroidism, HTN, PAD s/p stent 1 month ago admitted status post elective ERCP with ampullectomy of Adenoma.     -f/u CBC  -f/u with GI  -please restart ASA and Plavix (if not, at least restart ASA) as soon as possible after pt is cleared by GI team  -d/w Dr. Huynh 73 Y/O with PMH of Hypothyroidism, HTN, PAD s/p stent 1 month ago admitted status post elective ERCP with ampullectomy of Adenoma.     -f/u CBC  -f/u with GI  -please restart ASA and Plavix (if not, at least restart ASA) as soon as possible after pt is cleared by GI team

## 2019-11-16 NOTE — PROGRESS NOTE ADULT - SUBJECTIVE AND OBJECTIVE BOX
SUBJECTIVE:    Patient is a 74y old Female who presents with a chief complaint of Ampullectomy (15 Nov 2019 20:46)    Overnight Events: Patient is stable, no acute events overnight.  She has done ERCP with EMR of the adenoma in duodenum yesterday, During the procedure she had laceration of the left hand s/p suture by hand surgery.  She is complaining of dry mouth, no abdominal pain, nausea or vomiting.  VS are stable.    PAST MEDICAL & SURGICAL HISTORY  H/O Graves' disease  Peripheral arterial disease  High cholesterol  HTN (hypertension)  Hypothyroid  Leg swelling  Arterial insufficiency of lower extremity: left leg stent placed  S/P angiogram of extremity  H/O: hysterectomy  History of cholecystectomy    SOCIAL HISTORY:  Negative for smoking/alcohol/drug use.     ALLERGIES:  codeine (Stomach Upset; Vomiting; Nausea)    MEDICATIONS:  STANDING MEDICATIONS  atorvastatin 40 milliGRAM(s) Oral at bedtime  ceFAZolin   IVPB      ceFAZolin   IVPB 1000 milliGRAM(s) IV Intermittent every 8 hours  furosemide    Tablet 40 milliGRAM(s) Oral daily  levothyroxine 125 MICROGram(s) Oral daily  lidocaine 1% (Preservative-free) Injectable 20 milliLiter(s) Local Injection once  melatonin 5 milliGRAM(s) Oral at bedtime  metoprolol succinate ER 50 milliGRAM(s) Oral daily  nicotine -  14 mG/24Hr(s) Patch 1 patch Transdermal daily  nicotine - 21 mG/24Hr(s) Patch 1 patch Transdermal daily  pantoprazole Infusion 8 mG/Hr IV Continuous <Continuous>  sodium chloride 0.9%. 1000 milliLiter(s) IV Continuous <Continuous>    PRN MEDICATIONS  ondansetron Injectable 4 milliGRAM(s) IV Push every 6 hours PRN    VITALS:   T(F): 96.5, Max: 98.3 (11-15-19 @ 20:26)  HR: 54 (48 - 69)  BP: 145/65 (143/70 - 183/74)  RR: 18 (16 - 19)  SpO2: 94% (93% - 98%)    LABS:                        8.2    10.50 )-----------( 225      ( 16 Nov 2019 00:10 )             27.0     11-16    140  |  106  |  18  ----------------------------<  175<H>  4.3   |  20  |  0.8    Ca    7.7<L>      16 Nov 2019 00:10  Mg     1.9     11-16      PT/INR - ( 16 Nov 2019 00:10 )   PT: 13.40 sec;   INR: 1.17 ratio         PTT - ( 16 Nov 2019 00:10 )  PTT:26.3 sec                  11-15-19 @ 07:01  -  11-16-19 @ 07:00  --------------------------------------------------------  IN: 350 mL / OUT: 0 mL / NET: 350 mL        PHYSICAL EXAM:  GEN: NAD, comfortable  LUNGS: CTAB, no w/r/r  HEART: RRR, s1 and s2 appreciated, no m/r/g  ABD: soft, NT/ND, +BS  EXT: no edema, PP b/l  NEURO: AAOX3

## 2019-11-16 NOTE — PROGRESS NOTE ADULT - ASSESSMENT
Patient is a 75 y/o with PMHx of Hypothyroid, HTN, PAD, Graves disease that presented for ambulatory ampullectomy as found on past ERCP to have Adenoma around the papilla ( Pathology in our system ). Patient tolerated procedure well.     ERCP with ampullectomy of Adenoma 11/15  -patient is asymptomatic this am   REC:   - Pantoprazole GTT  -mild drop in hgb , no evidence of active bleed , recheck CBC , if hgb is stable , start on clear liquid diet   - Ancef ordered Q8 hours  - Avoid AC- compression stocking preferred   - Zofran prn Nausea  - GI will follow

## 2019-11-16 NOTE — PROGRESS NOTE ADULT - SUBJECTIVE AND OBJECTIVE BOX
Gastroenterology progress note:     Patient is a 74y old  Female who presents with a chief complaint of Ampullectomy (16 Nov 2019 10:48)       Admitted on: 11-15-19    We are following the patient for: post ampullectomy      Interval History: patient denies any abdominal pain, no nausea or vomiting, no bowel movement since yesterday , no fresh blood per rectum , no melena , no hematemesis     Patient's medical problems are stable    Prior records reviewed (Y/N):  History obtained from someone other than patient (Y/N):      PAST MEDICAL & SURGICAL HISTORY:  H/O Graves' disease  Peripheral arterial disease  High cholesterol  HTN (hypertension)  Hypothyroid  Leg swelling  Arterial insufficiency of lower extremity: left leg stent placed  S/P angiogram of extremity  H/O: hysterectomy  History of cholecystectomy      MEDICATIONS  (STANDING):  atorvastatin 40 milliGRAM(s) Oral at bedtime  ceFAZolin   IVPB      ceFAZolin   IVPB 1000 milliGRAM(s) IV Intermittent every 8 hours  furosemide    Tablet 40 milliGRAM(s) Oral daily  levothyroxine 125 MICROGram(s) Oral daily  lidocaine 1% (Preservative-free) Injectable 20 milliLiter(s) Local Injection once  melatonin 5 milliGRAM(s) Oral at bedtime  metoprolol succinate ER 50 milliGRAM(s) Oral daily  nicotine - 21 mG/24Hr(s) Patch 1 patch Transdermal daily  pantoprazole Infusion 8 mG/Hr (10 mL/Hr) IV Continuous <Continuous>  sodium chloride 0.9%. 1000 milliLiter(s) (60 mL/Hr) IV Continuous <Continuous>    MEDICATIONS  (PRN):  ondansetron Injectable 4 milliGRAM(s) IV Push every 6 hours PRN Nausea and/or Vomiting      Allergies  codeine (Stomach Upset; Vomiting; Nausea)      Review of Systems:   Cardiovascular:  No Chest Pain, No Palpitations  Respiratory:  No Cough, No Dyspnea  Gastrointestinal:  As described in HPI    Physical Examination:  T(C): 35.8 (11-16-19 @ 07:30), Max: 36.8 (11-15-19 @ 20:26)  HR: 54 (11-16-19 @ 07:30) (48 - 69)  BP: 145/65 (11-16-19 @ 07:30) (143/70 - 183/74)  RR: 18 (11-16-19 @ 07:30) (16 - 19)  SpO2: 94% (11-15-19 @ 20:26) (93% - 98%)  Weight (kg): 108.9 (11-15-19 @ 15:03)    11-15-19 @ 07:01  -  11-16-19 @ 07:00  --------------------------------------------------------  IN: 350 mL / OUT: 0 mL / NET: 350 mL      Constitutional: No acute distress.  Respiratory:  No signs of respiratory distress. Lung sounds are clear bilaterally.  Cardiovascular:  S1 S2, Regular rate and rhythm.  Abdominal: Abdomen is soft, symmetric, and non-tender without distention. There are no visible lesions or scars. Bowel sounds are present and normoactive in all four quadrants. No masses, hepatomegaly, or splenomegaly are noted.   Skin: No rashes, No Jaundice.        Data: (reviewed by attending)                        8.2    10.50 )-----------( 225      ( 16 Nov 2019 00:10 )             27.0     Hgb trend:  8.2  11-16-19 @ 00:10        11-16    140  |  106  |  18  ----------------------------<  175<H>  4.3   |  20  |  0.8    Ca    7.7<L>      16 Nov 2019 00:10  Mg     1.9     11-16      Liver panel trend:  TBili 0.2   /   AST 14   /   ALT 14   /   AlkP 110   /   Tptn 6.7   /   Alb 4.3    /   DBili --      11-07      PT/INR - ( 16 Nov 2019 00:10 )   PT: 13.40 sec;   INR: 1.17 ratio         PTT - ( 16 Nov 2019 00:10 )  PTT:26.3 sec       Radiology: (reviewed by attending)

## 2019-11-16 NOTE — PROVIDER CONTACT NOTE (MEDICATION) - SITUATION
spoke with MD to inform that pt is requesting something to help her sleep since she hasn't slept all night.

## 2019-11-16 NOTE — PROGRESS NOTE ADULT - ASSESSMENT
Patient is a 75 y/o with PMHx of Hypothyroid, HTN, PAD, Graves disease that presented for ambulatory ampullectomy. Patient has done ERCP in 4/2019 that showed tubular adenoma around the papilla and adenoma of D3 part of duodenum. She was admitted for post procedure monitoring and care.    ERCP with ampullectomy of Adenoma  - s/p EMR of an NG-LST type of the duodenum involving the MP  - f/u biopsy results  - c/w ppi drip  - NPO 24 hrs post procedure  - Zofran prn Nausea  - GI is following    # Laceration of left hand  - s/p suture by hand surgery  -Must call Dr. Ndiaye office to f/u in outpatient setting for suture removal    Hypothyroidism  -On levothyroxine    HTN  -On Metoprolol    PAD  -Clopidogrel was held for the procedure  -Vascular surgery consult     Diet - NPO  Activity - OOB To chair  DVT - Sequential  GI - PPI Drip  FULL CODE

## 2019-11-16 NOTE — PROVIDER CONTACT NOTE (OTHER) - BACKGROUND
Family stated that pt had issues taking dentures out yesterday and this taste of blood started yesterday

## 2019-11-16 NOTE — CONSULT NOTE ADULT - SUBJECTIVE AND OBJECTIVE BOX
Patient is a 74y old  Female who presents with a chief complaint of Ampullectomy (16 Nov 2019 11:46)    HPI:  75 Y/O with PMH of Hypothyroid (After surgery for the Grave's disease), HTN, PAD that presented for ambulatory ampullectomy as found on past ERCP to have Adenoma around the papilla ( Pathology in our system ). Patient tolerated procedure well. Admitted for post- op care.    GI is following the case. On my examination she is resting comfortably with no active complains. (15 Nov 2019 20:46)        ROS:  All other systems reviewed and are negative    PAST MEDICAL & SURGICAL HISTORY  H/O Graves' disease  Peripheral arterial disease  High cholesterol  HTN (hypertension)  Hypothyroid  Leg swelling  Arterial insufficiency of lower extremity: left leg stent placed  S/P angiogram of extremity  H/O: hysterectomy  History of cholecystectomy      FAMILY HISTORY:  FAMILY HISTORY:  FH: lung cancer: FATHER  Family history of breast cancer (Sibling)      SOCIAL HISTORY:  []smoker  []Alcohol  []Drug    ALLERGIES:  codeine (Stomach Upset; Vomiting; Nausea)      MEDICATIONS:  MEDICATIONS  (STANDING):  atorvastatin 40 milliGRAM(s) Oral at bedtime  ceFAZolin   IVPB      ceFAZolin   IVPB 1000 milliGRAM(s) IV Intermittent every 8 hours  furosemide    Tablet 40 milliGRAM(s) Oral daily  levothyroxine 125 MICROGram(s) Oral daily  lidocaine 1% (Preservative-free) Injectable 20 milliLiter(s) Local Injection once  melatonin 5 milliGRAM(s) Oral at bedtime  metoprolol succinate ER 50 milliGRAM(s) Oral daily  nicotine - 21 mG/24Hr(s) Patch 1 patch Transdermal daily  pantoprazole Infusion 8 mG/Hr (10 mL/Hr) IV Continuous <Continuous>  sodium chloride 0.9%. 1000 milliLiter(s) (60 mL/Hr) IV Continuous <Continuous>    MEDICATIONS  (PRN):  ondansetron Injectable 4 milliGRAM(s) IV Push every 6 hours PRN Nausea and/or Vomiting      HOME MEDICATIONS:  Home Medications:  clopidogrel 75 mg oral tablet: 1 tab(s) orally once a day (15 Nov 2019 11:46)  furosemide 40 mg oral tablet: 1 tab(s) orally once a day (15 Nov 2019 11:46)  levothyroxine 125 mcg (0.125 mg) oral capsule: 1 cap(s) orally once a day (15 Nov 2019 11:46)  Metoprolol Succinate ER 50 mg oral tablet, extended release: 1 tab(s) orally once a day (15 Nov 2019 11:46)  pravastatin 40 mg oral tablet: 1 tab(s) orally once a day (15 Nov 2019 11:46)      VITALS:   T(F): 96.5 (11-16 @ 07:30), Max: 98.3 (11-15 @ 20:26)  HR: 54 (11-16 @ 07:30) (48 - 69)  BP: 145/65 (11-16 @ 07:30) (143/70 - 183/74)  BP(mean): --  RR: 18 (11-16 @ 07:30) (16 - 19)  SpO2: 94% (11-15 @ 20:26) (93% - 98%)    I&O's Summary    15 Nov 2019 07:01  -  16 Nov 2019 07:00  --------------------------------------------------------  IN: 350 mL / OUT: 0 mL / NET: 350 mL        PHYSICAL EXAM:  GEN: Alert and oriented X 3, No acute distress  HEENT: no pallor  NECK: Supple, no JVD  LUNGS: Clear to auscultation bilaterally  CARDIOVASCULAR: S1/S2 regular, no murmurs or rubs  ABD: Soft, BS+  EXT: No Lower extremity edema/cyanosis  NEURO: AAOx3    LABS:                        8.1    9.95  )-----------( 249      ( 16 Nov 2019 12:44 )             27.2     11-16    140  |  106  |  18  ----------------------------<  175<H>  4.3   |  20  |  0.8    Ca    7.7<L>      16 Nov 2019 00:10  Mg     1.9     11-16      PT/INR - ( 16 Nov 2019 00:10 )   PT: 13.40 sec;   INR: 1.17 ratio         PTT - ( 16 Nov 2019 00:10 )  PTT:26.3 sec          Troponin trend:        Hemoglobin A1C   Thyroid      RADIOLOGY:      -STRESS TEST:  < from: NM Nuclear Stress Pharmacologic Multiple (10.31.19 @ 10:56) >  Impression:  1. IV Adenosine Dual Isotope Study which was negative with respect to   symptoms and EKG changes.  2. Myocardial perfusion imaging reveals no fixed no reperfusion defects  3. Gated imaging reveals normal wall motion thickening and ejection   fraction  Recommendation:  Medical therapy.    Risk factor modification    < end of copied text > Patient is a 74y old  Female who presents with a chief complaint of Ampullectomy (16 Nov 2019 11:46)    HPI:  73 Y/O with PMH of Hypothyroid (After surgery for the Grave's disease), HTN, PAD that presented for ambulatory ampullectomy as found on past ERCP to have Adenoma around the papilla ( Pathology in our system ). Patient tolerated procedure well. Admitted for post- op care.    GI is following the case. On my examination she is resting comfortably with no active complains. (15 Nov 2019 20:46)    Cardiology fellow addendum:   pt has h/o LE angiogram and stent in [facility] on month ago and is on ASA, plavix. Pt is currently s/p ERCP with ampullectomy of Adenoma.     ROS:  All other systems reviewed and are negative    PAST MEDICAL & SURGICAL HISTORY  H/O Graves' disease  Peripheral arterial disease  High cholesterol  HTN (hypertension)  Hypothyroid  Leg swelling  Arterial insufficiency of lower extremity: left leg stent placed  S/P angiogram of extremity  H/O: hysterectomy  History of cholecystectomy      FAMILY HISTORY:  FAMILY HISTORY:  FH: lung cancer: FATHER  Family history of breast cancer (Sibling)      SOCIAL HISTORY:  []smoker  []Alcohol  []Drug    ALLERGIES:  codeine (Stomach Upset; Vomiting; Nausea)      MEDICATIONS:  MEDICATIONS  (STANDING):  atorvastatin 40 milliGRAM(s) Oral at bedtime  ceFAZolin   IVPB      ceFAZolin   IVPB 1000 milliGRAM(s) IV Intermittent every 8 hours  furosemide    Tablet 40 milliGRAM(s) Oral daily  levothyroxine 125 MICROGram(s) Oral daily  lidocaine 1% (Preservative-free) Injectable 20 milliLiter(s) Local Injection once  melatonin 5 milliGRAM(s) Oral at bedtime  metoprolol succinate ER 50 milliGRAM(s) Oral daily  nicotine - 21 mG/24Hr(s) Patch 1 patch Transdermal daily  pantoprazole Infusion 8 mG/Hr (10 mL/Hr) IV Continuous <Continuous>  sodium chloride 0.9%. 1000 milliLiter(s) (60 mL/Hr) IV Continuous <Continuous>    MEDICATIONS  (PRN):  ondansetron Injectable 4 milliGRAM(s) IV Push every 6 hours PRN Nausea and/or Vomiting      HOME MEDICATIONS:  Home Medications:  clopidogrel 75 mg oral tablet: 1 tab(s) orally once a day (15 Nov 2019 11:46)  furosemide 40 mg oral tablet: 1 tab(s) orally once a day (15 Nov 2019 11:46)  levothyroxine 125 mcg (0.125 mg) oral capsule: 1 cap(s) orally once a day (15 Nov 2019 11:46)  Metoprolol Succinate ER 50 mg oral tablet, extended release: 1 tab(s) orally once a day (15 Nov 2019 11:46)  pravastatin 40 mg oral tablet: 1 tab(s) orally once a day (15 Nov 2019 11:46)      VITALS:   T(F): 96.5 (11-16 @ 07:30), Max: 98.3 (11-15 @ 20:26)  HR: 54 (11-16 @ 07:30) (48 - 69)  BP: 145/65 (11-16 @ 07:30) (143/70 - 183/74)  BP(mean): --  RR: 18 (11-16 @ 07:30) (16 - 19)  SpO2: 94% (11-15 @ 20:26) (93% - 98%)    I&O's Summary    15 Nov 2019 07:01  -  16 Nov 2019 07:00  --------------------------------------------------------  IN: 350 mL / OUT: 0 mL / NET: 350 mL        PHYSICAL EXAM:  GEN: Alert and oriented X 3, No acute distress  HEENT: no pallor  NECK: Supple, no JVD  LUNGS: Clear to auscultation bilaterally  CARDIOVASCULAR: S1/S2 regular, no murmurs or rubs  ABD: Soft, BS+  EXT: No Lower extremity edema/cyanosis  NEURO: AAOx3    LABS:                        8.1    9.95  )-----------( 249      ( 16 Nov 2019 12:44 )             27.2     11-16    140  |  106  |  18  ----------------------------<  175<H>  4.3   |  20  |  0.8    Ca    7.7<L>      16 Nov 2019 00:10  Mg     1.9     11-16      PT/INR - ( 16 Nov 2019 00:10 )   PT: 13.40 sec;   INR: 1.17 ratio         PTT - ( 16 Nov 2019 00:10 )  PTT:26.3 sec          Troponin trend:        Hemoglobin A1C   Thyroid      RADIOLOGY:      -STRESS TEST:  < from: NM Nuclear Stress Pharmacologic Multiple (10.31.19 @ 10:56) >  Impression:  1. IV Adenosine Dual Isotope Study which was negative with respect to   symptoms and EKG changes.  2. Myocardial perfusion imaging reveals no fixed no reperfusion defects  3. Gated imaging reveals normal wall motion thickening and ejection   fraction  Recommendation:  Medical therapy.    Risk factor modification    < end of copied text > Patient is a 74y old  Female who presents with a chief complaint of Ampullectomy (16 Nov 2019 11:46)    HPI:  73 Y/O with PMH of Hypothyroid (After surgery for the Grave's disease), HTN, PAD that presented for ambulatory ampullectomy as found on past ERCP to have Adenoma around the papilla ( Pathology in our system ). Patient tolerated procedure well. Admitted for post- op care.    GI is following the case. On my examination she is resting comfortably with no active complains. (15 Nov 2019 20:46)    Cardiology fellow addendum:   pt has h/o LE angiogram and stent in [facility] one month ago and is on ASA, plavix. Pt is currently s/p ERCP with ampullectomy of Adenoma.     ROS:  All other systems reviewed and are negative    PAST MEDICAL & SURGICAL HISTORY  H/O Graves' disease  Peripheral arterial disease  High cholesterol  HTN (hypertension)  Hypothyroid  Leg swelling  Arterial insufficiency of lower extremity: left leg stent placed  S/P angiogram of extremity  H/O: hysterectomy  History of cholecystectomy      FAMILY HISTORY:  FAMILY HISTORY:  FH: lung cancer: FATHER  Family history of breast cancer (Sibling)      SOCIAL HISTORY:  []smoker  []Alcohol  []Drug    ALLERGIES:  codeine (Stomach Upset; Vomiting; Nausea)      MEDICATIONS:  MEDICATIONS  (STANDING):  atorvastatin 40 milliGRAM(s) Oral at bedtime  ceFAZolin   IVPB      ceFAZolin   IVPB 1000 milliGRAM(s) IV Intermittent every 8 hours  furosemide    Tablet 40 milliGRAM(s) Oral daily  levothyroxine 125 MICROGram(s) Oral daily  lidocaine 1% (Preservative-free) Injectable 20 milliLiter(s) Local Injection once  melatonin 5 milliGRAM(s) Oral at bedtime  metoprolol succinate ER 50 milliGRAM(s) Oral daily  nicotine - 21 mG/24Hr(s) Patch 1 patch Transdermal daily  pantoprazole Infusion 8 mG/Hr (10 mL/Hr) IV Continuous <Continuous>  sodium chloride 0.9%. 1000 milliLiter(s) (60 mL/Hr) IV Continuous <Continuous>    MEDICATIONS  (PRN):  ondansetron Injectable 4 milliGRAM(s) IV Push every 6 hours PRN Nausea and/or Vomiting      HOME MEDICATIONS:  Home Medications:  clopidogrel 75 mg oral tablet: 1 tab(s) orally once a day (15 Nov 2019 11:46)  furosemide 40 mg oral tablet: 1 tab(s) orally once a day (15 Nov 2019 11:46)  levothyroxine 125 mcg (0.125 mg) oral capsule: 1 cap(s) orally once a day (15 Nov 2019 11:46)  Metoprolol Succinate ER 50 mg oral tablet, extended release: 1 tab(s) orally once a day (15 Nov 2019 11:46)  pravastatin 40 mg oral tablet: 1 tab(s) orally once a day (15 Nov 2019 11:46)      VITALS:   T(F): 96.5 (11-16 @ 07:30), Max: 98.3 (11-15 @ 20:26)  HR: 54 (11-16 @ 07:30) (48 - 69)  BP: 145/65 (11-16 @ 07:30) (143/70 - 183/74)  BP(mean): --  RR: 18 (11-16 @ 07:30) (16 - 19)  SpO2: 94% (11-15 @ 20:26) (93% - 98%)    I&O's Summary    15 Nov 2019 07:01  -  16 Nov 2019 07:00  --------------------------------------------------------  IN: 350 mL / OUT: 0 mL / NET: 350 mL        PHYSICAL EXAM:  GEN: Alert and oriented X 3, No acute distress  HEENT: no pallor  NECK: Supple, no JVD  LUNGS: Clear to auscultation bilaterally  CARDIOVASCULAR: S1/S2 regular, no murmurs or rubs  ABD: Soft, BS+  EXT: No Lower extremity edema/cyanosis  NEURO: AAOx3    LABS:                        8.1    9.95  )-----------( 249      ( 16 Nov 2019 12:44 )             27.2     11-16    140  |  106  |  18  ----------------------------<  175<H>  4.3   |  20  |  0.8    Ca    7.7<L>      16 Nov 2019 00:10  Mg     1.9     11-16      PT/INR - ( 16 Nov 2019 00:10 )   PT: 13.40 sec;   INR: 1.17 ratio         PTT - ( 16 Nov 2019 00:10 )  PTT:26.3 sec          Troponin trend:        Hemoglobin A1C   Thyroid      RADIOLOGY:      -STRESS TEST:  < from: NM Nuclear Stress Pharmacologic Multiple (10.31.19 @ 10:56) >  Impression:  1. IV Adenosine Dual Isotope Study which was negative with respect to   symptoms and EKG changes.  2. Myocardial perfusion imaging reveals no fixed no reperfusion defects  3. Gated imaging reveals normal wall motion thickening and ejection   fraction  Recommendation:  Medical therapy.    Risk factor modification    < end of copied text >

## 2019-11-16 NOTE — PROVIDER CONTACT NOTE (MEDICATION) - ACTION/TREATMENT ORDERED:
MD stated that he isnt going to order something since it will be ineffective due to rounds approaching and the environment will be loud. MD will place order for tonight.

## 2019-11-16 NOTE — PROGRESS NOTE ADULT - ASSESSMENT
75yo with Past Medical History Hypothyroidism, HTN, and peripheral arterial disease admitted status post elective ERCP with endoscopic mucosal resection.    Status post ERCP with EMR: patient was admitted to the medical service as per GI recommendation.  GI was agreeable to restarting a clear liquid diet this morning.  Awaiting input from attending  Her hemoglobin decreased to 8.2 post-procedure.  Please monitor for further bleeding and repeat CBC in AM.  Continue Protonix gtt.  Hypothroidism: continue Synthroid 125mcg PO q24  Hypertension: BP remains stable on Toprol XL 50mg PO q24  Smoking cessation: pt was started on Nicotine Patch 21mg q24  DVT prophylaxis    #Progress Note Handoff    Pending:  Consults: GI follow-up  Tests: CBC in AM    Future Disposition: Home

## 2019-11-16 NOTE — PROGRESS NOTE ADULT - SUBJECTIVE AND OBJECTIVE BOX
JAKY RODRIGUEZ [id number]    Hospitalist Note  73yo with Past Medical History Hypothyroidism, HTN, and peripheral arterial disease admitted status post elective ERCP with endoscopic mucosal resection.  GI recommended close observation post procedure.  The patient underwent previous ERCP in April 2019 and was found to have an adenoma adjacent to the papilla.    Overnight events/Updates: Her procedural course was complicated by a 4cm laceration to the left hand.  No hematochezia or abdominal pain reported by the patient.    Vital Signs Last 24 Hrs  T(C): 35.8 (16 Nov 2019 07:30), Max: 36.8 (15 Nov 2019 20:26)  T(F): 96.5 (16 Nov 2019 07:30), Max: 98.3 (15 Nov 2019 20:26)  HR: 54 (16 Nov 2019 07:30) (48 - 69)  BP: 145/65 (16 Nov 2019 07:30) (143/70 - 183/74)  BP(mean): --  RR: 18 (16 Nov 2019 07:30) (16 - 19)  SpO2: 94% (15 Nov 2019 20:26) (93% - 98%)    Physical Examination:  General: AAO x 3  HEENT: PERRLA, EOMI  CV= S1 & S2 appreciated  Lungs= CTA BL  Abdominal Examination= + BS, Soft, NT. mild distension  Extremity Examination= No C/C/E; dressing to the left hand    ROS: No chest pain, no shortness of breath.  All other systems reviewed and are within normal limits except for the complaints in the HPI.    MEDICATIONS  (STANDING):  atorvastatin 40 milliGRAM(s) Oral at bedtime  ceFAZolin   IVPB      ceFAZolin   IVPB 1000 milliGRAM(s) IV Intermittent every 8 hours  furosemide    Tablet 40 milliGRAM(s) Oral daily  levothyroxine 125 MICROGram(s) Oral daily  lidocaine 1% (Preservative-free) Injectable 20 milliLiter(s) Local Injection once  melatonin 5 milliGRAM(s) Oral at bedtime  metoprolol succinate ER 50 milliGRAM(s) Oral daily  nicotine - 21 mG/24Hr(s) Patch 1 patch Transdermal daily  pantoprazole Infusion 8 mG/Hr (10 mL/Hr) IV Continuous <Continuous>  sodium chloride 0.9%. 1000 milliLiter(s) (60 mL/Hr) IV Continuous <Continuous>    MEDICATIONS  (PRN):  ondansetron Injectable 4 milliGRAM(s) IV Push every 6 hours PRN Nausea and/or Vomiting                            8.2    10.50 )-----------( 225      ( 16 Nov 2019 00:10 )             27.0     11-16    140  |  106  |  18  ----------------------------<  175<H>  4.3   |  20  |  0.8    Ca    7.7<L>      16 Nov 2019 00:10  Mg     1.9     11-16        Case discussed with housestaff & family  SURAJ Cantrell [id number]

## 2019-11-17 LAB
ALBUMIN SERPL ELPH-MCNC: 3.3 G/DL — LOW (ref 3.5–5.2)
ALP SERPL-CCNC: 154 U/L — HIGH (ref 30–115)
ALT FLD-CCNC: 72 U/L — HIGH (ref 0–41)
ANION GAP SERPL CALC-SCNC: 13 MMOL/L — SIGNIFICANT CHANGE UP (ref 7–14)
AST SERPL-CCNC: 62 U/L — HIGH (ref 0–41)
BASOPHILS # BLD AUTO: 0.01 K/UL — SIGNIFICANT CHANGE UP (ref 0–0.2)
BASOPHILS NFR BLD AUTO: 0.1 % — SIGNIFICANT CHANGE UP (ref 0–1)
BILIRUB SERPL-MCNC: 1 MG/DL — SIGNIFICANT CHANGE UP (ref 0.2–1.2)
BUN SERPL-MCNC: 19 MG/DL — SIGNIFICANT CHANGE UP (ref 10–20)
CALCIUM SERPL-MCNC: 7.5 MG/DL — LOW (ref 8.5–10.1)
CHLORIDE SERPL-SCNC: 104 MMOL/L — SIGNIFICANT CHANGE UP (ref 98–110)
CO2 SERPL-SCNC: 22 MMOL/L — SIGNIFICANT CHANGE UP (ref 17–32)
CREAT SERPL-MCNC: 0.9 MG/DL — SIGNIFICANT CHANGE UP (ref 0.7–1.5)
EOSINOPHIL # BLD AUTO: 0.18 K/UL — SIGNIFICANT CHANGE UP (ref 0–0.7)
EOSINOPHIL NFR BLD AUTO: 2.3 % — SIGNIFICANT CHANGE UP (ref 0–8)
GLUCOSE SERPL-MCNC: 124 MG/DL — HIGH (ref 70–99)
HCT VFR BLD CALC: 25.6 % — LOW (ref 37–47)
HGB BLD-MCNC: 7.7 G/DL — LOW (ref 12–16)
IMM GRANULOCYTES NFR BLD AUTO: 0.6 % — HIGH (ref 0.1–0.3)
LYMPHOCYTES # BLD AUTO: 1.02 K/UL — LOW (ref 1.2–3.4)
LYMPHOCYTES # BLD AUTO: 12.8 % — LOW (ref 20.5–51.1)
MCHC RBC-ENTMCNC: 26.7 PG — LOW (ref 27–31)
MCHC RBC-ENTMCNC: 30.1 G/DL — LOW (ref 32–37)
MCV RBC AUTO: 88.9 FL — SIGNIFICANT CHANGE UP (ref 81–99)
MONOCYTES # BLD AUTO: 0.54 K/UL — SIGNIFICANT CHANGE UP (ref 0.1–0.6)
MONOCYTES NFR BLD AUTO: 6.8 % — SIGNIFICANT CHANGE UP (ref 1.7–9.3)
NEUTROPHILS # BLD AUTO: 6.15 K/UL — SIGNIFICANT CHANGE UP (ref 1.4–6.5)
NEUTROPHILS NFR BLD AUTO: 77.4 % — HIGH (ref 42.2–75.2)
NRBC # BLD: 0 /100 WBCS — SIGNIFICANT CHANGE UP (ref 0–0)
PLATELET # BLD AUTO: 211 K/UL — SIGNIFICANT CHANGE UP (ref 130–400)
POTASSIUM SERPL-MCNC: 4.2 MMOL/L — SIGNIFICANT CHANGE UP (ref 3.5–5)
POTASSIUM SERPL-SCNC: 4.2 MMOL/L — SIGNIFICANT CHANGE UP (ref 3.5–5)
PROT SERPL-MCNC: 5.2 G/DL — LOW (ref 6–8)
RBC # BLD: 2.88 M/UL — LOW (ref 4.2–5.4)
RBC # FLD: 15 % — HIGH (ref 11.5–14.5)
SODIUM SERPL-SCNC: 139 MMOL/L — SIGNIFICANT CHANGE UP (ref 135–146)
WBC # BLD: 7.95 K/UL — SIGNIFICANT CHANGE UP (ref 4.8–10.8)
WBC # FLD AUTO: 7.95 K/UL — SIGNIFICANT CHANGE UP (ref 4.8–10.8)

## 2019-11-17 PROCEDURE — 99233 SBSQ HOSP IP/OBS HIGH 50: CPT

## 2019-11-17 RX ORDER — TRAMADOL HYDROCHLORIDE 50 MG/1
25 TABLET ORAL ONCE
Refills: 0 | Status: DISCONTINUED | OUTPATIENT
Start: 2019-11-17 | End: 2019-11-17

## 2019-11-17 RX ADMIN — PANTOPRAZOLE SODIUM 10 MG/HR: 20 TABLET, DELAYED RELEASE ORAL at 11:42

## 2019-11-17 RX ADMIN — Medication 100 MILLIGRAM(S): at 13:53

## 2019-11-17 RX ADMIN — TRAMADOL HYDROCHLORIDE 25 MILLIGRAM(S): 50 TABLET ORAL at 10:35

## 2019-11-17 RX ADMIN — Medication 50 MILLIGRAM(S): at 05:59

## 2019-11-17 RX ADMIN — Medication 40 MILLIGRAM(S): at 05:59

## 2019-11-17 RX ADMIN — Medication 1 PATCH: at 19:41

## 2019-11-17 RX ADMIN — Medication 100 MILLIGRAM(S): at 05:59

## 2019-11-17 RX ADMIN — ATORVASTATIN CALCIUM 40 MILLIGRAM(S): 80 TABLET, FILM COATED ORAL at 21:19

## 2019-11-17 RX ADMIN — Medication 1 PATCH: at 11:42

## 2019-11-17 RX ADMIN — Medication 1 PATCH: at 11:44

## 2019-11-17 RX ADMIN — Medication 125 MICROGRAM(S): at 05:59

## 2019-11-17 RX ADMIN — Medication 5 MILLIGRAM(S): at 21:19

## 2019-11-17 RX ADMIN — Medication 100 MILLIGRAM(S): at 21:15

## 2019-11-17 RX ADMIN — PANTOPRAZOLE SODIUM 10 MG/HR: 20 TABLET, DELAYED RELEASE ORAL at 21:45

## 2019-11-17 RX ADMIN — TRAMADOL HYDROCHLORIDE 25 MILLIGRAM(S): 50 TABLET ORAL at 10:19

## 2019-11-17 NOTE — PROGRESS NOTE ADULT - SUBJECTIVE AND OBJECTIVE BOX
Gastroenterology progress note:     Patient is a 74y old  Female who presents with a chief complaint of Ampullectomy (17 Nov 2019 11:05)       Admitted on: 11-15-19    We are following the patient for: ampullectomy      Interval History: no acute event overnight , patient is tolerating clear liquid diet , had mild abdominal pain this morning that improved with passing gas, no nausea  or vomiting     Patient's medical problems are stable    Prior records reviewed (Y/N):  History obtained from someone other than patient (Y/N):      PAST MEDICAL & SURGICAL HISTORY:  H/O Graves' disease  Peripheral arterial disease  High cholesterol  HTN (hypertension)  Hypothyroid  Leg swelling  Arterial insufficiency of lower extremity: left leg stent placed  S/P angiogram of extremity  H/O: hysterectomy  History of cholecystectomy      MEDICATIONS  (STANDING):  atorvastatin 40 milliGRAM(s) Oral at bedtime  ceFAZolin   IVPB      ceFAZolin   IVPB 1000 milliGRAM(s) IV Intermittent every 8 hours  furosemide    Tablet 40 milliGRAM(s) Oral daily  levothyroxine 125 MICROGram(s) Oral daily  lidocaine 1% (Preservative-free) Injectable 20 milliLiter(s) Local Injection once  melatonin 5 milliGRAM(s) Oral at bedtime  metoprolol succinate ER 50 milliGRAM(s) Oral daily  nicotine - 21 mG/24Hr(s) Patch 1 patch Transdermal daily  pantoprazole Infusion 8 mG/Hr (10 mL/Hr) IV Continuous <Continuous>    MEDICATIONS  (PRN):  ondansetron Injectable 4 milliGRAM(s) IV Push every 6 hours PRN Nausea and/or Vomiting      Allergies  codeine (Stomach Upset; Vomiting; Nausea)      Review of Systems:   Cardiovascular:  No Chest Pain, No Palpitations  Respiratory:  No Cough, No Dyspnea  Gastrointestinal:  As described in HPI    Physical Examination:  T(C): 36.8 (11-17-19 @ 07:30), Max: 36.8 (11-17-19 @ 07:30)  HR: 62 (11-17-19 @ 07:30) (56 - 62)  BP: 138/92 (11-17-19 @ 07:30) (121/56 - 138/92)  RR: 18 (11-17-19 @ 07:30) (15 - 19)  SpO2: --      11-16-19 @ 07:01  -  11-17-19 @ 07:00  --------------------------------------------------------  IN: 0 mL / OUT: 250 mL / NET: -250 mL      Constitutional: No acute distress.  Respiratory:  No signs of respiratory distress. Lung sounds are clear bilaterally.  Cardiovascular:  S1 S2, Regular rate and rhythm.  Abdominal: Abdomen is soft, symmetric, and non-tender without distention.  Bowel sounds are present and normoactive in all four quadrants. No masses, hepatomegaly, or splenomegaly are noted.   Skin: No rashes, No Jaundice.        Data: (reviewed by attending)                        7.7    7.95  )-----------( 211      ( 17 Nov 2019 06:51 )             25.6     Hgb trend:  7.7  11-17-19 @ 06:51  8.1  11-16-19 @ 12:44  8.2  11-16-19 @ 00:10        11-17    139  |  104  |  19  ----------------------------<  124<H>  4.2   |  22  |  0.9    Ca    7.5<L>      17 Nov 2019 06:51  Mg     1.9     11-16    TPro  5.2<L>  /  Alb  3.3<L>  /  TBili  1.0  /  DBili  x   /  AST  62<H>  /  ALT  72<H>  /  AlkPhos  154<H>  11-17    Liver panel trend:  TBili 1.0   /   AST 62   /   ALT 72   /   AlkP 154   /   Tptn 5.2   /   Alb 3.3    /   DBili --      11-17      PT/INR - ( 16 Nov 2019 00:10 )   PT: 13.40 sec;   INR: 1.17 ratio         PTT - ( 16 Nov 2019 00:10 )  PTT:26.3 sec       Radiology: (reviewed by attending)

## 2019-11-17 NOTE — PROGRESS NOTE ADULT - ASSESSMENT
Patient is a 75 y/o with PMHx of Hypothyroid, HTN, PAD, Graves disease that presented for ambulatory ampullectomy as found on past ERCP to have Adenoma around the papilla ( Pathology in our system ). Patient tolerated procedure well.     ERCP with ampullectomy of Adenoma 11/15  -patient is asymptomatic now  , resting comfortable   -hgb stable , no melena, no fresh blood   REC:   - Pantoprazole BID   - Ancef ordered Q8 hours  -on clear liquid diet for now   -if recurrent abdominal pain , please obtain KUB   - GI will follow

## 2019-11-17 NOTE — PROGRESS NOTE ADULT - ASSESSMENT
73yo with Past Medical History Hypothyroidism, HTN, and peripheral arterial disease admitted status post elective ERCP with endoscopic mucosal resection.    Status post ERCP with EMR: GI recommendations from 11/16/19 were reviewed.  Cardiology input appreciated; please restart Plavix once cleared by gastroenterology.  The patient has been tolerating a clear liquid diet.  Discontinue IV fluids.  Continue IV Ancef for post-procedural prophylactic antibiotics.  Acute blood loss anemia was noted post-procedure.  Hemoglobin decreased to 7.7; please order type & screen and repeat CBC in AM.  Continue Protonix gtt.  Hypothroidism: continue Synthroid 125mcg PO q24  Hypertension: BP remains stable on Toprol XL 50mg PO q24  Smoking cessation: continue Nicotine Patch 21mg q24  DVT prophylaxis    #Progress Note Handoff    Pending:  Consults: GI follow-up  Tests: CBC in AM    Future Disposition: Home

## 2019-11-17 NOTE — PROGRESS NOTE ADULT - SUBJECTIVE AND OBJECTIVE BOX
JAKY RODRIGUEZ [id number]    Hospitalist Note  73yo with Past Medical History Hypothyroidism, HTN, and peripheral arterial disease admitted status post elective ERCP with endoscopic mucosal resection.  GI recommended close observation post procedure.  The patient underwent previous ERCP in April 2019 and was found to have an adenoma adjacent to the papilla.    Overnight events/Updates: No acute events overnight.  The patient has been tolerating a clear liquid diet though she complains of mild upper abdominal discomfort.    Vital Signs Last 24 Hrs  T(C): 36.8 (17 Nov 2019 07:30), Max: 36.8 (17 Nov 2019 07:30)  T(F): 98.3 (17 Nov 2019 07:30), Max: 98.3 (17 Nov 2019 07:30)  HR: 62 (17 Nov 2019 07:30) (56 - 62)  BP: 138/92 (17 Nov 2019 07:30) (110/53 - 138/92)  BP(mean): --  RR: 18 (17 Nov 2019 07:30) (15 - 19)  SpO2: --    Physical Examination:  General: AAO x 3   HEENT: PERRLA, EOMI  CV= S1 & S2 appreciated  Lungs=CTA BL  Abdominal Examination= + BS, mild abdominal discomfort with deep palpation, non-distended  Extremity Examination= Trace lower extremity edema    ROS: No chest pain, no shortness of breath.  All other systems reviewed and are within normal limits except for the complaints in the HPI.    MEDICATIONS  (STANDING):  atorvastatin 40 milliGRAM(s) Oral at bedtime  ceFAZolin   IVPB      ceFAZolin   IVPB 1000 milliGRAM(s) IV Intermittent every 8 hours  furosemide    Tablet 40 milliGRAM(s) Oral daily  levothyroxine 125 MICROGram(s) Oral daily  lidocaine 1% (Preservative-free) Injectable 20 milliLiter(s) Local Injection once  melatonin 5 milliGRAM(s) Oral at bedtime  metoprolol succinate ER 50 milliGRAM(s) Oral daily  nicotine - 21 mG/24Hr(s) Patch 1 patch Transdermal daily  pantoprazole Infusion 8 mG/Hr (10 mL/Hr) IV Continuous <Continuous>    MEDICATIONS  (PRN):  ondansetron Injectable 4 milliGRAM(s) IV Push every 6 hours PRN Nausea and/or Vomiting                            7.7    7.95  )-----------( 211      ( 17 Nov 2019 06:51 )             25.6     11-17    139  |  104  |  19  ----------------------------<  124<H>  4.2   |  22  |  0.9    Ca    7.5<L>      17 Nov 2019 06:51  Mg     1.9     11-16    TPro  5.2<L>  /  Alb  3.3<L>  /  TBili  1.0  /  DBili  x   /  AST  62<H>  /  ALT  72<H>  /  AlkPhos  154<H>  11-17      Case discussed with housestaff & family  SURAJ Cantrell [id number]

## 2019-11-18 ENCOUNTER — TRANSCRIPTION ENCOUNTER (OUTPATIENT)
Age: 74
End: 2019-11-18

## 2019-11-18 VITALS
RESPIRATION RATE: 18 BRPM | HEART RATE: 61 BPM | SYSTOLIC BLOOD PRESSURE: 133 MMHG | TEMPERATURE: 97 F | DIASTOLIC BLOOD PRESSURE: 60 MMHG

## 2019-11-18 PROBLEM — Z86.39 PERSONAL HISTORY OF OTHER ENDOCRINE, NUTRITIONAL AND METABOLIC DISEASE: Chronic | Status: ACTIVE | Noted: 2019-11-07

## 2019-11-18 LAB
ALBUMIN SERPL ELPH-MCNC: 3.4 G/DL — LOW (ref 3.5–5.2)
ALP SERPL-CCNC: 157 U/L — HIGH (ref 30–115)
ALT FLD-CCNC: 46 U/L — HIGH (ref 0–41)
ANION GAP SERPL CALC-SCNC: 16 MMOL/L — HIGH (ref 7–14)
AST SERPL-CCNC: 33 U/L — SIGNIFICANT CHANGE UP (ref 0–41)
BASOPHILS # BLD AUTO: 0.03 K/UL — SIGNIFICANT CHANGE UP (ref 0–0.2)
BASOPHILS NFR BLD AUTO: 0.4 % — SIGNIFICANT CHANGE UP (ref 0–1)
BILIRUB DIRECT SERPL-MCNC: 0.4 MG/DL — HIGH (ref 0–0.2)
BILIRUB INDIRECT FLD-MCNC: 0.3 MG/DL — SIGNIFICANT CHANGE UP (ref 0.2–1.2)
BILIRUB SERPL-MCNC: 0.7 MG/DL — SIGNIFICANT CHANGE UP (ref 0.2–1.2)
BUN SERPL-MCNC: 14 MG/DL — SIGNIFICANT CHANGE UP (ref 10–20)
CALCIUM SERPL-MCNC: 7.8 MG/DL — LOW (ref 8.5–10.1)
CHLORIDE SERPL-SCNC: 102 MMOL/L — SIGNIFICANT CHANGE UP (ref 98–110)
CO2 SERPL-SCNC: 21 MMOL/L — SIGNIFICANT CHANGE UP (ref 17–32)
CREAT SERPL-MCNC: 0.8 MG/DL — SIGNIFICANT CHANGE UP (ref 0.7–1.5)
EOSINOPHIL # BLD AUTO: 0.18 K/UL — SIGNIFICANT CHANGE UP (ref 0–0.7)
EOSINOPHIL NFR BLD AUTO: 2.7 % — SIGNIFICANT CHANGE UP (ref 0–8)
GLUCOSE SERPL-MCNC: 126 MG/DL — HIGH (ref 70–99)
HCT VFR BLD CALC: 28 % — LOW (ref 37–47)
HGB BLD-MCNC: 8.4 G/DL — LOW (ref 12–16)
IMM GRANULOCYTES NFR BLD AUTO: 0.7 % — HIGH (ref 0.1–0.3)
LYMPHOCYTES # BLD AUTO: 0.93 K/UL — LOW (ref 1.2–3.4)
LYMPHOCYTES # BLD AUTO: 13.8 % — LOW (ref 20.5–51.1)
MAGNESIUM SERPL-MCNC: 2 MG/DL — SIGNIFICANT CHANGE UP (ref 1.8–2.4)
MCHC RBC-ENTMCNC: 26.1 PG — LOW (ref 27–31)
MCHC RBC-ENTMCNC: 30 G/DL — LOW (ref 32–37)
MCV RBC AUTO: 87 FL — SIGNIFICANT CHANGE UP (ref 81–99)
MONOCYTES # BLD AUTO: 0.52 K/UL — SIGNIFICANT CHANGE UP (ref 0.1–0.6)
MONOCYTES NFR BLD AUTO: 7.7 % — SIGNIFICANT CHANGE UP (ref 1.7–9.3)
NEUTROPHILS # BLD AUTO: 5.01 K/UL — SIGNIFICANT CHANGE UP (ref 1.4–6.5)
NEUTROPHILS NFR BLD AUTO: 74.7 % — SIGNIFICANT CHANGE UP (ref 42.2–75.2)
NON-GYNECOLOGICAL CYTOLOGY STUDY: SIGNIFICANT CHANGE UP
NRBC # BLD: 0 /100 WBCS — SIGNIFICANT CHANGE UP (ref 0–0)
PLATELET # BLD AUTO: 232 K/UL — SIGNIFICANT CHANGE UP (ref 130–400)
POTASSIUM SERPL-MCNC: 3.7 MMOL/L — SIGNIFICANT CHANGE UP (ref 3.5–5)
POTASSIUM SERPL-SCNC: 3.7 MMOL/L — SIGNIFICANT CHANGE UP (ref 3.5–5)
PROT SERPL-MCNC: 5.6 G/DL — LOW (ref 6–8)
RBC # BLD: 3.22 M/UL — LOW (ref 4.2–5.4)
RBC # FLD: 14.7 % — HIGH (ref 11.5–14.5)
SODIUM SERPL-SCNC: 139 MMOL/L — SIGNIFICANT CHANGE UP (ref 135–146)
WBC # BLD: 6.72 K/UL — SIGNIFICANT CHANGE UP (ref 4.8–10.8)
WBC # FLD AUTO: 6.72 K/UL — SIGNIFICANT CHANGE UP (ref 4.8–10.8)

## 2019-11-18 PROCEDURE — 99239 HOSP IP/OBS DSCHRG MGMT >30: CPT

## 2019-11-18 RX ORDER — PANTOPRAZOLE SODIUM 20 MG/1
1 TABLET, DELAYED RELEASE ORAL
Qty: 28 | Refills: 0
Start: 2019-11-18 | End: 2019-12-01

## 2019-11-18 RX ORDER — PANTOPRAZOLE SODIUM 20 MG/1
40 TABLET, DELAYED RELEASE ORAL
Refills: 0 | Status: DISCONTINUED | OUTPATIENT
Start: 2019-11-18 | End: 2019-11-18

## 2019-11-18 RX ORDER — CLOPIDOGREL BISULFATE 75 MG/1
1 TABLET, FILM COATED ORAL
Qty: 0 | Refills: 0 | DISCHARGE

## 2019-11-18 RX ORDER — CEPHALEXIN 500 MG
1 CAPSULE ORAL
Qty: 6 | Refills: 0
Start: 2019-11-18 | End: 2019-11-20

## 2019-11-18 RX ADMIN — Medication 1 PATCH: at 12:42

## 2019-11-18 RX ADMIN — Medication 125 MICROGRAM(S): at 05:30

## 2019-11-18 RX ADMIN — Medication 1 PATCH: at 12:43

## 2019-11-18 RX ADMIN — Medication 100 MILLIGRAM(S): at 13:13

## 2019-11-18 RX ADMIN — Medication 50 MILLIGRAM(S): at 05:30

## 2019-11-18 RX ADMIN — Medication 100 MILLIGRAM(S): at 05:29

## 2019-11-18 RX ADMIN — Medication 40 MILLIGRAM(S): at 05:30

## 2019-11-18 NOTE — PROGRESS NOTE ADULT - SUBJECTIVE AND OBJECTIVE BOX
Patient is a 73 y/o with PMHx of Hypothyroid, HTN, PAD, Graves disease that presented for ambulatory ampullectomy as found on past ERCP to have Adenoma around the papilla ( Pathology in our system ). Patient tolerated procedure well. She was hemodynamically stable over the weekend had some brief abdominal pain but has been doing well since. Patient without bloody or black stools, abdominal pain, fever, chills, or evidence of infection. Tolerated clear diet well.         PAST MEDICAL & SURGICAL HISTORY:  H/O Graves' disease  Peripheral arterial disease  High cholesterol  HTN (hypertension)  Hypothyroid  Leg swelling  Arterial insufficiency of lower extremity: left leg stent placed  S/P angiogram of extremity  H/O: hysterectomy  History of cholecystectomy      Family Hx:  Father: Non Contributory   Mother: Non Contributory      MEDICATIONS  (STANDING):  pantoprazole Infusion 8 mG/Hr (10 mL/Hr) IV Continuous <Continuous>    MEDICATIONS  (PRN):  ondansetron Injectable 4 milliGRAM(s) IV Push every 6 hours PRN Nausea and/or Vomiting      Allergies  codeine (Stomach Upset; Vomiting; Nausea)      Physical Exam  Gen: NAD  HEENT: NC/AT, Mucosal Membranes  Cardio: S1/S2 No S3/S4, Regular  Resp: CTA B/L  Abdomen: Soft, ND/NT  Neuro: AAOx3, Cranial Nerve II-XII intact   Extremities: FROM x 4

## 2019-11-18 NOTE — DISCHARGE NOTE PROVIDER - CARE PROVIDERS DIRECT ADDRESSES
[email or web address]

## 2019-11-18 NOTE — DISCHARGE NOTE NURSING/CASE MANAGEMENT/SOCIAL WORK - PATIENT PORTAL LINK FT
You can access the FollowMyHealth Patient Portal offered by [facility] by registering at the following website: [email or web address]. By joining [facility]’s FollowMyHealth portal, you will also be able to view your health information using other applications (apps) compatible with our system.

## 2019-11-18 NOTE — PROGRESS NOTE ADULT - SUBJECTIVE AND OBJECTIVE BOX
SUBJECTIVE:    Patient is a 74y old Female who presents with a chief complaint of Ampullectomy (18 Nov 2019 12:44)      HPI:  73 Y/O with PMH of Hypothyroid (After surgery for the Grave's disease), HTN, PAD that presented for ambulatory ampullectomy as found on past ERCP to have Adenoma around the papilla ( Pathology in our system ). Patient tolerated procedure well. Admitted for post- op care.    GI is following the case. On my examination she is resting comfortably with no active complains. (15 Nov 2019 20:46)    no complaints today, no abdominal pain    Besides the pertinent positives and negatives described above, the ROS was within normal limits.    PAST MEDICAL & SURGICAL HISTORY  H/O Graves' disease  Peripheral arterial disease  High cholesterol  HTN (hypertension)  Hypothyroid  Leg swelling  Arterial insufficiency of lower extremity: left leg stent placed  S/P angiogram of extremity  H/O: hysterectomy  History of cholecystectomy    SOCIAL HISTORY:    ALLERGIES:  codeine (Stomach Upset; Vomiting; Nausea)    MEDICATIONS:  STANDING MEDICATIONS  atorvastatin 40 milliGRAM(s) Oral at bedtime  ceFAZolin   IVPB      ceFAZolin   IVPB 1000 milliGRAM(s) IV Intermittent every 8 hours  furosemide    Tablet 40 milliGRAM(s) Oral daily  levothyroxine 125 MICROGram(s) Oral daily  lidocaine 1% (Preservative-free) Injectable 20 milliLiter(s) Local Injection once  melatonin 5 milliGRAM(s) Oral at bedtime  metoprolol succinate ER 50 milliGRAM(s) Oral daily  nicotine - 21 mG/24Hr(s) Patch 1 patch Transdermal daily  pantoprazole    Tablet 40 milliGRAM(s) Oral two times a day    PRN MEDICATIONS  ondansetron Injectable 4 milliGRAM(s) IV Push every 6 hours PRN    VITALS:   T(F): 97.2  HR: 81  BP: 137/67  RR: 18  SpO2: --    LABS:                        8.4    6.72  )-----------( 232      ( 18 Nov 2019 05:29 )             28.0     11-18    139  |  102  |  14  ----------------------------<  126<H>  3.7   |  21  |  0.8    Ca    7.8<L>      18 Nov 2019 05:29  Mg     2.0     11-18    TPro  5.6<L>  /  Alb  3.4<L>  /  TBili  0.7  /  DBili  0.4<H>  /  AST  33  /  ALT  46<H>  /  AlkPhos  157<H>  11-18                  RADIOLOGY:    PHYSICAL EXAM:  GEN: No acute distress  LUNGS: Clear to auscultation bilaterally   HEART: Regular  ABD: Soft, non-tender, non-distended.  EXT: NC/NC/right lower leg larger than left, stable as per patient from bypass, nontender/CORTEZ/Skin Intact.   NEURO: AAOX3    Intravenous access: yes  NG tube: no  Babcock Catheter: no

## 2019-11-18 NOTE — PROGRESS NOTE ADULT - SUBJECTIVE AND OBJECTIVE BOX
JAKY RODRIGUEZ [id number]    Hospitalist Note  73yo with Past Medical History Hypothyroidism, HTN, and peripheral arterial disease admitted status post elective ERCP with endoscopic mucosal resection.  GI recommended close observation post procedure.  The patient underwent previous ERCP in April 2019 and was found to have an adenoma adjacent to the papilla.    Overnight events/Updates: no new complaints.  She has been tolerating a diet without discomfort.    Vital Signs Last 24 Hrs  T(C): 36.2 (18 Nov 2019 07:46), Max: 36.4 (18 Nov 2019 00:00)  T(F): 97.2 (18 Nov 2019 07:46), Max: 97.5 (18 Nov 2019 00:00)  HR: 81 (18 Nov 2019 07:46) (58 - 81)  BP: 137/67 (18 Nov 2019 07:46) (132/68 - 150/62)  BP(mean): --  RR: 18 (18 Nov 2019 07:46) (17 - 18)  SpO2: --    Physical Examination:  General: AAO x 3  HEENT: PERRLA, EOMI  CV= S1 & S2 appreciated  Lungs= CTA BL  Abdominal Examination= + BS, Obese, Soft, NT/ND  Extremity Examination= No C/C/E    ROS: No chest pain, no shortness of breath.  All other systems reviewed and are within normal limits except for the complaints in the HPI.    MEDICATIONS  (STANDING):  atorvastatin 40 milliGRAM(s) Oral at bedtime  ceFAZolin   IVPB      ceFAZolin   IVPB 1000 milliGRAM(s) IV Intermittent every 8 hours  furosemide    Tablet 40 milliGRAM(s) Oral daily  levothyroxine 125 MICROGram(s) Oral daily  lidocaine 1% (Preservative-free) Injectable 20 milliLiter(s) Local Injection once  melatonin 5 milliGRAM(s) Oral at bedtime  metoprolol succinate ER 50 milliGRAM(s) Oral daily  nicotine - 21 mG/24Hr(s) Patch 1 patch Transdermal daily  pantoprazole    Tablet 40 milliGRAM(s) Oral two times a day    MEDICATIONS  (PRN):  ondansetron Injectable 4 milliGRAM(s) IV Push every 6 hours PRN Nausea and/or Vomiting                            8.4    6.72  )-----------( 232      ( 18 Nov 2019 05:29 )             28.0     11-18    139  |  102  |  14  ----------------------------<  126<H>  3.7   |  21  |  0.8    Ca    7.8<L>      18 Nov 2019 05:29  Mg     2.0     11-18    TPro  5.6<L>  /  Alb  3.4<L>  /  TBili  0.7  /  DBili  0.4<H>  /  AST  33  /  ALT  46<H>  /  AlkPhos  157<H>  11-18      Case discussed with housestaff & family  SURAJ Cantrell [id number]

## 2019-11-18 NOTE — PROGRESS NOTE ADULT - ASSESSMENT
73yo with Past Medical History Hypothyroidism, HTN, and peripheral arterial disease admitted status post elective ERCP with endoscopic mucosal resection.    Status post ERCP with EMR: Please restart Plavix once cleared by gastroenterology.  Continue clear liquid diet.  Off IV fluids.  Continue IV Ancef for post-procedural prophylactic antibiotics.  Acute blood loss anemia has stabilized without the need for transfusion.  Her hemoglobin improved to 8.4.  Follow-up with gastroenterology team to assist with further management  Hypothroidism: continue Synthroid 125mcg PO q24  Hypertension: BP remains stable on Toprol XL 50mg PO q24  Smoking cessation: continue Nicotine Patch 21mg q24  DVT prophylaxis    #Progress Note Handoff    Pending:  Consults: GI follow-up  Tests: CBC in AM    Future Disposition: Home

## 2019-11-18 NOTE — DISCHARGE NOTE PROVIDER - CARE PROVIDER_API CALL
Franklin Torres)  Gastroenterology; Internal Medicine  [address] [place] [address]  Phone: [phone]  Fax: [phone]  Established Patient  Follow Up Time: 1-3 days Franklin Torres)  Gastroenterology; Internal Medicine  [address] [place] [address]  Phone: [phone]  Fax: [phone]  Established Patient  Follow Up Time: 1-3 days    Lucie Chou)  [facility]  [address] [place] [address], [address]  [place] [address]  Phone: [phone]  Fax: [phone]  Follow Up Time: 1-3 days

## 2019-11-18 NOTE — PROGRESS NOTE ADULT - ASSESSMENT
75 y/o male with PMHx of Hypothyroid, HTN, PAD, Graves disease that presented for ambulatory ampullectomy. Patient had done ERCP in 4/2019 that showed tubular adenoma around the papilla and adenoma of D3 part of duodenum. She was admitted for post procedure monitoring and care due to the extent of the surgery.    ERCP with ampullectomy of Adenoma  - f/u biopsy results  - protonix 40 bid as per GI  - Zofran prn Nausea  - GI note mentioning possible discharge today, advanced diet to full liquids, d/w GI outpatient abx in place of the Ancef, as well as whether she is cleared for antiplatelet as she takes aspirin and plavix at home, GI will f/u after attending sees patient    # Laceration of left hand  - s/p suture by hand surgery 11/15/19  -Must call Dr. Ndiaye office to f/u in outpatient setting for suture removal    Hypothyroidism  -On levothyroxine    HTN  -On Metoprolol    PAD  -aspirin and plavix was held for the procedure  -Dr. Stanley recommending restarting once cleared by GI, awaiting GI recs    Diet - NPO  Activity - OOB To chair  DVT - Sequential  GI - PPI Drip  FULL CODE 73 y/o male with PMHx of Hypothyroid, HTN, PAD, Graves disease that presented for ambulatory ampullectomy. Patient had done ERCP in 4/2019 that showed tubular adenoma around the papilla and adenoma of D3 part of duodenum. She was admitted for post procedure monitoring and care due to the extent of the surgery.    ERCP with ampullectomy of Adenoma  - f/u biopsy results  - protonix 40 bid as per GI  - Zofran prn Nausea  - GI note mentioning possible discharge today, advanced diet to full liquids, will go home on Keflex 500 bid for 3 days, holding aspirin and plavix until she sees GI as outpatient    # Laceration of left hand  - s/p suture by hand surgery 11/15/19  -Must call Dr. Ndiaye office to f/u in outpatient setting for suture removal    Hypothyroidism  -On levothyroxine    HTN  -On Metoprolol    PAD  -aspirin and plavix was held for the procedure can restart once cleared by GI as outpatient    Diet - full liquids  Activity - OOB To chair  DVT - Sequential  GI - protonix 40 bid  FULL CODE

## 2019-11-18 NOTE — DISCHARGE NOTE PROVIDER - NSDCMRMEDTOKEN_GEN_ALL_CORE_FT
clopidogrel 75 mg oral tablet: 1 tab(s) orally once a day  furosemide 40 mg oral tablet: 1 tab(s) orally once a day  levothyroxine 125 mcg (0.125 mg) oral capsule: 1 cap(s) orally once a day  Metoprolol Succinate ER 50 mg oral tablet, extended release: 1 tab(s) orally once a day  pravastatin 40 mg oral tablet: 1 tab(s) orally once a day furosemide 40 mg oral tablet: 1 tab(s) orally once a day  Keflex 500 mg oral capsule: 1 cap(s) orally 2 times a day   levothyroxine 125 mcg (0.125 mg) oral capsule: 1 cap(s) orally once a day  Metoprolol Succinate ER 50 mg oral tablet, extended release: 1 tab(s) orally once a day  pantoprazole 40 mg oral delayed release tablet: 1 tab(s) orally 2 times a day  pravastatin 40 mg oral tablet: 1 tab(s) orally once a day

## 2019-11-18 NOTE — DISCHARGE NOTE PROVIDER - NSDCCPCAREPLAN_GEN_ALL_CORE_FT
PRINCIPAL DISCHARGE DIAGNOSIS  Diagnosis: Ampullary adenoma  Assessment and Plan of Treatment: You had a procedure done to remove an ampullary adenoma and the gastroenterologist recommended a few days of observation afterward.  Your post op course went well, please continue on your full liquid diet and continue to hold your aspirin and plavix until you see Dr. Torres.  Please continue the three days of Keflex that has been sent to your pharmacy for prophylaxis. PRINCIPAL DISCHARGE DIAGNOSIS  Diagnosis: Ampullary adenoma  Assessment and Plan of Treatment: You had a procedure done to remove an ampullary adenoma and the gastroenterologist recommended a few days of observation afterward.  Your post op course went well, please continue on your full liquid diet and continue to hold your aspirin and plavix until you see Dr. Torres.  Please continue the three days of Keflex that has been sent to your pharmacy for prophylaxis.  Please also continue with the pantoprazole twice a day. PRINCIPAL DISCHARGE DIAGNOSIS  Diagnosis: Ampullary adenoma  Assessment and Plan of Treatment: You had a procedure done to remove an ampullary adenoma and the gastroenterologist recommended a few days of observation afterward.  Your post op course went well, please continue on your full liquid diet and continue to hold your aspirin and plavix until you see Dr. Torres.  Please continue the three days of Keflex that has been sent to your pharmacy for prophylaxis.  Please also continue with the pantoprazole twice a day.      SECONDARY DISCHARGE DIAGNOSES  Diagnosis: Hand laceration  Assessment and Plan of Treatment: Please follow up with Dr. Chou as an outpatient for removal of your sutures.

## 2019-11-18 NOTE — PROGRESS NOTE ADULT - ASSESSMENT
Patient is a 75 y/o with PMHx of Hypothyroid, HTN, PAD, Graves disease that presented for ambulatory ampullectomy as found on past ERCP to have Adenoma around the papilla ( Pathology in our system ). Patient tolerated procedure well. She was hemodynamically stable over the weekend had some brief abdominal pain but has been doing well since. Patient without bloody or black stools, abdominal pain, fever, chills, or evidence of infection. Tolerated clear diet well.     ERCP with ampullectomy of Adenoma  - Pantoprazole BID  - Ancef ordered Q8 hours  - Avoid AC- compression stocking preferred   - Tolerating clears   - Anticipate discharge today   - GI will follow

## 2019-11-18 NOTE — DISCHARGE NOTE PROVIDER - NSDCFUSCHEDAPPT_GEN_ALL_CORE_FT
JAKY RODRIGUEZ ; 11/25/2019 ; [facility] [address] [facility] JAKY RODRIGUEZ ; 11/25/2019 ; [facility] JAKY RODRIGUEZ ; 11/25/2019 ; [facility] JAKY RODRIGUEZ ; 11/25/2019 ; [facility] [address] [facility]

## 2019-11-18 NOTE — DISCHARGE NOTE PROVIDER - PROVIDER TOKENS
PROVIDER:[TOKEN:[[id number]],FOLLOWUP:[1-3 days],ESTABLISHEDPATIENT:[T]] PROVIDER:[TOKEN:[[id number]],FOLLOWUP:[1-3 days],ESTABLISHEDPATIENT:[T]],PROVIDER:[TOKEN:[[id number]],FOLLOWUP:[1-3 days]]

## 2019-11-18 NOTE — DISCHARGE NOTE PROVIDER - HOSPITAL COURSE
75 Y/O with PMH of Hypothyroid (After surgery for the Grave's disease), HTN, PAD that presented for ambulatory ampullectomy as found on past ERCP to have Adenoma around the papilla ( Pathology in our system ). Patient tolerated procedure well. Admitted for post- op care due to extent of procedure.  Post op was uneventful, patient not complaining of pain and had minimal bleeding.  Aspirin and plavix were recommended to be discontinued        GI is following the case. On my examination she is resting comfortably with no active complains. 75 Y/O with PMH of Hypothyroid (After surgery for the Grave's disease), HTN, PAD that presented for ambulatory ampullectomy as found on past ERCP to have Adenoma around the papilla ( Pathology in our system ). Patient tolerated procedure well. Admitted for post- op care due to extent of procedure.  Post op was uneventful, patient not complaining of pain and had minimal bleeding.  Aspirin and plavix were discontinued during her stay here due to potential for bleeding.  She will be discharged on Keflex for ppx, will be on full liquid diet for about three days and will restart aspirin and plavix after seeing GI in their office.  She has been cleared for discharge by general medicine and GI.

## 2019-11-19 LAB — SURGICAL PATHOLOGY STUDY: SIGNIFICANT CHANGE UP

## 2019-11-22 ENCOUNTER — APPOINTMENT (OUTPATIENT)
Dept: GASTROENTEROLOGY | Facility: CLINIC | Age: 74
End: 2019-11-22
Payer: MEDICARE

## 2019-11-22 VITALS
BODY MASS INDEX: 37.77 KG/M2 | DIASTOLIC BLOOD PRESSURE: 70 MMHG | HEART RATE: 80 BPM | SYSTOLIC BLOOD PRESSURE: 170 MMHG | HEIGHT: 66 IN | WEIGHT: 235 LBS

## 2019-11-22 PROCEDURE — 99214 OFFICE O/P EST MOD 30 MIN: CPT

## 2019-11-22 NOTE — PHYSICAL EXAM
[General Appearance - Alert] : alert [General Appearance - In No Acute Distress] : in no acute distress [Sclera] : the sclera and conjunctiva were normal [PERRL With Normal Accommodation] : pupils were equal in size, round, and reactive to light [Extraocular Movements] : extraocular movements were intact [Outer Ear] : the ears and nose were normal in appearance [Oropharynx] : the oropharynx was normal [Neck Appearance] : the appearance of the neck was normal [Neck Cervical Mass (___cm)] : no neck mass was observed [Jugular Venous Distention Increased] : there was no jugular-venous distention [Thyroid Diffuse Enlargement] : the thyroid was not enlarged [Thyroid Nodule] : there were no palpable thyroid nodules [Auscultation Breath Sounds / Voice Sounds] : lungs were clear to auscultation bilaterally [Heart Rate And Rhythm] : heart rate was normal and rhythm regular [Heart Sounds] : normal S1 and S2 [Heart Sounds Gallop] : no gallops [Murmurs] : no murmurs [Heart Sounds Pericardial Friction Rub] : no pericardial rub [Bowel Sounds] : normal bowel sounds [Abdomen Soft] : soft [Abdomen Tenderness] : non-tender [] : no hepato-splenomegaly [Abdomen Mass (___ Cm)] : no abdominal mass palpated [Nail Clubbing] : no clubbing  or cyanosis of the fingernails [Involuntary Movements] : no involuntary movements were seen [No Focal Deficits] : no focal deficits [Oriented To Time, Place, And Person] : oriented to person, place, and time [Affect] : the affect was normal [Mood] : the mood was normal

## 2019-11-25 ENCOUNTER — APPOINTMENT (OUTPATIENT)
Dept: PLASTIC SURGERY | Facility: CLINIC | Age: 74
End: 2019-11-25
Payer: MEDICARE

## 2019-11-25 VITALS — HEIGHT: 65 IN | BODY MASS INDEX: 39.99 KG/M2 | WEIGHT: 240 LBS

## 2019-11-25 DIAGNOSIS — S61.412A LACERATION W/OUT FOREIGN BODY OF LEFT HAND, INITIAL ENCOUNTER: ICD-10-CM

## 2019-11-25 DIAGNOSIS — F17.210 NICOTINE DEPENDENCE, CIGARETTES, UNCOMPLICATED: ICD-10-CM

## 2019-11-25 PROCEDURE — 99202 OFFICE O/P NEW SF 15 MIN: CPT

## 2019-11-25 NOTE — ASSESSMENT
[FreeTextEntry1] : 73 yo RHD F with left dorsal hand laceration s/p repair. Healing well.\par No signs of infection\par \par -sutures removeed without issue\par -steristrips applied\par -keep clean and dry\par -hand elevation for edema control\par -continue smoking cessation\par -resume plavix per GI & vascular\par -call for fevers, chills, new redness\par -follow up in 2 weeks for laceration check

## 2019-11-25 NOTE — HISTORY OF PRESENT ILLNESS
[FreeTextEntry1] : 73 yo RHD F with PMHx HTN, HLD,  hypothyroidism, PAD, PVD s/p lower extremity stents on Plavix, who is here for post-hospitalization follow up after left  dorsal hand laceration repair.  She was undergoing endoscoopy/ERCP with Dr. Torres for papillary adenoma and stone extraction on 11/15.  Plavix was held 1 week prior to ERCP.  Upon transfer from procedure bed to recovery room bed, her hand suffered a laceration.  hand surgery consultation was requested for laceration evaluation.\par \par The superficial laceration was irrigated and repaired with simple sutures.  Sent home on PO antibiotics; completed as instructed. Denies fevers, chills, redness, significant pain\par \par She quit smoking 1 month ago.  On nicotine patch.\par \par 1PPD x 60 year

## 2019-11-25 NOTE — PHYSICAL EXAM
[de-identified] : EOMI [de-identified] : good inspiratory effort [de-identified] : well-appearing, NAD [de-identified] : multiple patches of ecchymosis in various stages of healing s/p IV placement attempts [de-identified] : right hand: normal finger cascade, atraumatic\par left hand: distal dorsal hand transverse laceration (5 cm) well-approximate, appropriate incisional tenderness; minimal edema; ROM intact extension/flexion each digit, no collection or drainage\par

## 2019-12-04 NOTE — HISTORY OF PRESENT ILLNESS
[de-identified] : Patient is a 73-year-old female with past medical history of hypothyroid and was initially seen by the advanced GI team in the hospital for abnormal liver function studies. Patient had ERCP done with stone extraction and procedure was notable for a circumferential adenoma around the papilla. Patient had seen surgery for evaluation who recommended endoscopic attempt at removal first. Patient missed followup as  became ill and she could no longer keep followup this with his current state. Patient presents today as she is status post ERCP with extensive ampullectomy and stent placement. Patient tolerated the procedure well and currently feels well without any evidence of bloody stools, nausea, vomiting, abdominal pain, fever or chills. Pathology was reassuring has a margins appeared clear.

## 2019-12-04 NOTE — ASSESSMENT
[FreeTextEntry1] : Patient is a 73-year-old female with past medical history of hypothyroid and was initially seen by the advanced GI team in the hospital for abnormal liver function studies. Patient had ERCP done with stone extraction and procedure was notable for a circumferential adenoma around the papilla. Patient had seen surgery for evaluation who recommended endoscopic attempt at removal first. Patient missed followup as  became ill and she could no longer keep followup this with his current state. Patient presents today as she is status post ERCP with extensive ampullectomy and stent placement. Patient tolerated the procedure well and currently feels well without any evidence of bloody stools, nausea, vomiting, abdominal pain, fever or chills. Pathology was reassuring has a margins appeared clear.\par \par Ampullary Adenoma\par - Follow up with Dr. Marcus\par - Hold DAPT until this Monday\par - Setup for ERCP in 2-3 months\par - Procedure and pathology discussed with patient and daughter

## 2019-12-09 ENCOUNTER — APPOINTMENT (OUTPATIENT)
Dept: PLASTIC SURGERY | Facility: CLINIC | Age: 74
End: 2019-12-09

## 2020-01-13 ENCOUNTER — APPOINTMENT (OUTPATIENT)
Dept: SURGERY | Facility: CLINIC | Age: 75
End: 2020-01-13
Payer: MEDICARE

## 2020-01-13 VITALS
WEIGHT: 239 LBS | SYSTOLIC BLOOD PRESSURE: 130 MMHG | TEMPERATURE: 98.3 F | DIASTOLIC BLOOD PRESSURE: 85 MMHG | HEART RATE: 73 BPM | HEIGHT: 65 IN | BODY MASS INDEX: 39.82 KG/M2

## 2020-01-13 PROCEDURE — 99214 OFFICE O/P EST MOD 30 MIN: CPT

## 2020-01-20 NOTE — ASSESSMENT
[FreeTextEntry1] : 74 yo female patient with multiple medical problem. Referred to us by Dr. Torres with TVA of the major papilla, no invasive component and multiple small false adenomas in the duodenum. denies any history of polyposis of FAP. \par s/p ERCP and stone extraction in April 2019.\par \par She underwent endoscopic ampullectomy and cholangioscopy in December 2019. No complication. High grade dysplasia. She is here today for followup visit\par \par Assessment and Plan:\par \par Adenomatous polyp of the major papilla. Dr. Torres performed endoscopic ampullectomy.\par No evidence of invasive carcinoma, high-grade dysplasia, focal, superficial.TVA.\par \par Close surveillance recommended and repeat endoscopy in 2-3 months as per Dr. Torres.\par \par All the questions were answered to their satisfaction and I encouraged the patient to call my office at anytime if she had any questions. Plan of care fully discussed with the patient.

## 2020-01-20 NOTE — HISTORY OF PRESENT ILLNESS
[de-identified] : 74 yo female patient with multiple medical problem. Referred to us by Dr. Torres with TVA of the major papilla, no invasive component and multiple small false adenomas in the duodenum. denies any history of polyposis of FAP. \par s/p ERCP and stone extraction in April 2019.\par \par She underwent endoscopic ampullectomy and cholangioscopy in December 2019. No complication. High grade dysplasia. She is here today for followup visit

## 2020-01-20 NOTE — PHYSICAL EXAM
[Normal] : supple, no neck mass and thyroid not enlarged [Normal] : oriented to person, place and time, with appropriate affect [Normal Neck Lymph Nodes] : normal neck lymph nodes  [Normal Supraclavicular Lymph Nodes] : normal supraclavicular lymph nodes

## 2020-02-13 ENCOUNTER — OUTPATIENT (OUTPATIENT)
Dept: OUTPATIENT SERVICES | Facility: HOSPITAL | Age: 75
LOS: 1 days | Discharge: HOME | End: 2020-02-13
Payer: MEDICARE

## 2020-02-13 ENCOUNTER — TRANSCRIPTION ENCOUNTER (OUTPATIENT)
Age: 75
End: 2020-02-13

## 2020-02-13 ENCOUNTER — RESULT REVIEW (OUTPATIENT)
Age: 75
End: 2020-02-13

## 2020-02-13 VITALS — HEART RATE: 56 BPM | SYSTOLIC BLOOD PRESSURE: 178 MMHG | RESPIRATION RATE: 18 BRPM | DIASTOLIC BLOOD PRESSURE: 82 MMHG

## 2020-02-13 VITALS
HEIGHT: 65 IN | RESPIRATION RATE: 18 BRPM | TEMPERATURE: 98 F | HEART RATE: 62 BPM | DIASTOLIC BLOOD PRESSURE: 71 MMHG | WEIGHT: 235.01 LBS | SYSTOLIC BLOOD PRESSURE: 187 MMHG

## 2020-02-13 DIAGNOSIS — Z98.890 OTHER SPECIFIED POSTPROCEDURAL STATES: Chronic | ICD-10-CM

## 2020-02-13 DIAGNOSIS — Z90.49 ACQUIRED ABSENCE OF OTHER SPECIFIED PARTS OF DIGESTIVE TRACT: Chronic | ICD-10-CM

## 2020-02-13 DIAGNOSIS — Z90.710 ACQUIRED ABSENCE OF BOTH CERVIX AND UTERUS: Chronic | ICD-10-CM

## 2020-02-13 PROCEDURE — 43247 EGD REMOVE FOREIGN BODY: CPT

## 2020-02-13 PROCEDURE — 43239 EGD BIOPSY SINGLE/MULTIPLE: CPT | Mod: 59

## 2020-02-13 PROCEDURE — 88305 TISSUE EXAM BY PATHOLOGIST: CPT | Mod: 26

## 2020-02-13 RX ORDER — FENTANYL CITRATE 50 UG/ML
25 INJECTION INTRAVENOUS
Refills: 0 | Status: DISCONTINUED | OUTPATIENT
Start: 2020-02-13 | End: 2020-02-13

## 2020-02-13 RX ORDER — SODIUM CHLORIDE 9 MG/ML
1000 INJECTION INTRAMUSCULAR; INTRAVENOUS; SUBCUTANEOUS
Refills: 0 | Status: DISCONTINUED | OUTPATIENT
Start: 2020-02-13 | End: 2020-03-03

## 2020-02-13 RX ORDER — ONDANSETRON 8 MG/1
4 TABLET, FILM COATED ORAL ONCE
Refills: 0 | Status: DISCONTINUED | OUTPATIENT
Start: 2020-02-13 | End: 2020-03-03

## 2020-02-13 NOTE — H&P ADULT - HISTORY OF PRESENT ILLNESS
Patient presents for ERCP with stent removal and biopsies. Patient had prior ERCP with ampullectomy in the past.

## 2020-02-13 NOTE — ASU PATIENT PROFILE, ADULT - TEACHING/LEARNING LEARNING PREFERENCES
Concern for delay in diagnosis and treatment/Concern for worsening infectious process
individual instruction

## 2020-02-13 NOTE — PRE-ANESTHESIA EVALUATION ADULT - NSANTHOSAYNRD_GEN_A_CORE
suspect ROSA/No. ROSA screening performed.  STOP BANG Legend: 0-2 = LOW Risk; 3-4 = INTERMEDIATE Risk; 5-8 = HIGH Risk

## 2020-02-19 LAB — SURGICAL PATHOLOGY STUDY: SIGNIFICANT CHANGE UP

## 2020-02-21 ENCOUNTER — APPOINTMENT (OUTPATIENT)
Dept: GASTROENTEROLOGY | Facility: CLINIC | Age: 75
End: 2020-02-21
Payer: MEDICARE

## 2020-02-21 VITALS — DIASTOLIC BLOOD PRESSURE: 94 MMHG | SYSTOLIC BLOOD PRESSURE: 160 MMHG

## 2020-02-21 VITALS — WEIGHT: 234 LBS | BODY MASS INDEX: 38.99 KG/M2 | HEIGHT: 65 IN

## 2020-02-21 DIAGNOSIS — K83.8 OTHER SPECIFIED DISEASES OF BILIARY TRACT: ICD-10-CM

## 2020-02-21 DIAGNOSIS — D13.2 BENIGN NEOPLASM OF DUODENUM: ICD-10-CM

## 2020-02-21 DIAGNOSIS — Z88.5 ALLERGY STATUS TO NARCOTIC AGENT: ICD-10-CM

## 2020-02-21 DIAGNOSIS — K29.80 DUODENITIS WITHOUT BLEEDING: ICD-10-CM

## 2020-02-21 DIAGNOSIS — Z79.82 LONG TERM (CURRENT) USE OF ASPIRIN: ICD-10-CM

## 2020-02-21 PROCEDURE — 99214 OFFICE O/P EST MOD 30 MIN: CPT

## 2020-02-21 NOTE — PHYSICAL EXAM
[General Appearance - Alert] : alert [General Appearance - In No Acute Distress] : in no acute distress [Sclera] : the sclera and conjunctiva were normal [PERRL With Normal Accommodation] : pupils were equal in size, round, and reactive to light [Extraocular Movements] : extraocular movements were intact [Outer Ear] : the ears and nose were normal in appearance [Oropharynx] : the oropharynx was normal [Neck Appearance] : the appearance of the neck was normal [Neck Cervical Mass (___cm)] : no neck mass was observed [Jugular Venous Distention Increased] : there was no jugular-venous distention [Thyroid Diffuse Enlargement] : the thyroid was not enlarged [Thyroid Nodule] : there were no palpable thyroid nodules [Auscultation Breath Sounds / Voice Sounds] : lungs were clear to auscultation bilaterally [Murmurs] : no murmurs [Heart Sounds Gallop] : no gallops [Heart Rate And Rhythm] : heart rate was normal and rhythm regular [Heart Sounds] : normal S1 and S2 [Bowel Sounds] : normal bowel sounds [Heart Sounds Pericardial Friction Rub] : no pericardial rub [Abdomen Soft] : soft [Abdomen Tenderness] : non-tender [Abdomen Mass (___ Cm)] : no abdominal mass palpated [] : no hepato-splenomegaly [Involuntary Movements] : no involuntary movements were seen [Nail Clubbing] : no clubbing  or cyanosis of the fingernails [No Focal Deficits] : no focal deficits [Oriented To Time, Place, And Person] : oriented to person, place, and time [Affect] : the affect was normal [Mood] : the mood was normal

## 2020-02-23 DIAGNOSIS — K80.51 CALCULUS OF BILE DUCT W/OUT CHOLANGITIS OR CHOLECYSTITIS WITH OBSTRUCTION: ICD-10-CM

## 2020-02-24 ENCOUNTER — APPOINTMENT (OUTPATIENT)
Dept: SURGERY | Facility: CLINIC | Age: 75
End: 2020-02-24

## 2020-02-26 ENCOUNTER — APPOINTMENT (OUTPATIENT)
Dept: SURGERY | Facility: CLINIC | Age: 75
End: 2020-02-26
Payer: MEDICARE

## 2020-02-26 VITALS
TEMPERATURE: 97.6 F | SYSTOLIC BLOOD PRESSURE: 134 MMHG | WEIGHT: 234 LBS | BODY MASS INDEX: 38.99 KG/M2 | HEIGHT: 65 IN | HEART RATE: 66 BPM | DIASTOLIC BLOOD PRESSURE: 82 MMHG

## 2020-02-26 DIAGNOSIS — K80.10 CALCULUS OF GALLBLADDER WITH CHRONIC CHOLECYSTITIS W/OUT OBSTRUCTION: ICD-10-CM

## 2020-02-26 PROCEDURE — 99214 OFFICE O/P EST MOD 30 MIN: CPT

## 2020-02-27 NOTE — HISTORY OF PRESENT ILLNESS
[de-identified] : Patient is a 74-year-old female with past medical history of hypothyroid and was initially seen by the advanced GI team in the hospital for abnormal liver function studies. Patient had ERCP done with stone extraction and procedure was notable for a circumferential adenoma around the papilla. Patient had ampullectomy done and repat ERCP Feb 13th 2020. 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

## 2020-02-27 NOTE — ASSESSMENT
[FreeTextEntry1] : Patient is a 74-year-old female with past medical history of hypothyroid and was initially seen by the advanced GI team in the hospital for abnormal liver function studies. Patient had ERCP done with stone extraction and procedure was notable for a circumferential adenoma around the papilla. Patient had ampullectomy done and repeat ERCP Feb 13th 2020. 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 Will have her seen with Dr. Marcus and case to be reviewed to see if we should pursue ampullectomy versus surgical resection given pathology. No evidence of bleeding so Plavix can be resumed . \par \par Ampullary Adenoma\par - Follow up with Dr. Marcus\par - Resume DAPT for now \par - Will review case to see if surgical resection versus additional endoscopic intervention more optimal

## 2020-02-29 NOTE — ASSESSMENT
[FreeTextEntry1] : 74 yo female patient with multiple medical problem. Referred to us by Dr. Torres with TVA of the major papilla, no invasive component and multiple small false adenomas in the duodenum. denies any history of polyposis of FAP.  s/p ERCP and stone extraction in April 2019.\par \par She underwent endoscopic ampullectomy and cholangioscopy in December 2019. No complication. High grade dysplasia. She underwent repeat endoscopy in February 2020, biopsy showed high-grade dysplasia with no invasive component. She is here today for followup visit\par \par Assessment and Plan:\par \par Adenomatous polyp of the major papilla. Dr. Torres performed endoscopic ampullectomy.\par No evidence of invasive carcinoma, high-grade dysplasia, focal, superficial.TVA.\par \par Close surveillance recommended and repeat endoscopy in 2-3 months as per Dr. Torres.\par Explained to the patient and family different surgical options including pancreas-preserving duodenectomy and pancreaticoduodenectomy.\par \par They prefer the option of observation and close surveillance. They understand the risks of developing invasive carcinoma and metastatic disease from it.\par \par All the questions were answered to their satisfaction and I encouraged the patient to call my office at anytime if she had any questions. Plan of care fully discussed with the patient.

## 2020-02-29 NOTE — HISTORY OF PRESENT ILLNESS
[de-identified] : 72 yo female patient with multiple medical problem. Referred to us by Dr. Torres with TVA of the major papilla, no invasive component and multiple small false adenomas in the duodenum. denies any history of polyposis of FAP.  s/p ERCP and stone extraction in April 2019.\par \par She underwent endoscopic ampullectomy and cholangioscopy in December 2019. No complication. High grade dysplasia. She underwent repeat endoscopy in February 2020, biopsy showed high-grade dysplasia with no invasive component. She is here today for followup visit

## 2020-07-15 ENCOUNTER — APPOINTMENT (OUTPATIENT)
Dept: GASTROENTEROLOGY | Facility: CLINIC | Age: 75
End: 2020-07-15
Payer: MEDICARE

## 2020-07-15 DIAGNOSIS — D13.5 BENIGN NEOPLASM OF EXTRAHEPATIC BILE DUCTS: ICD-10-CM

## 2020-07-15 PROCEDURE — 99214 OFFICE O/P EST MOD 30 MIN: CPT

## 2020-07-15 NOTE — ASSESSMENT
[FreeTextEntry1] : Patient is a 74-year-old female with past medical history of hypothyroid and was initially seen by the advanced GI team in the hospital for abnormal liver function studies. Patient had ERCP done with stone extraction and procedure was notable for a circumferential adenoma around the papilla. Patient had ampullectomy done and repeat ERCP Feb 13th 2020. 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 Patient seen by Dr Marcus and Endoscopic management preferred by patient despite that it might not be definitive. Plan Endoscopic resetcion. \par \par Ampullary Adenoma\par - Plan ERCP with ampullectomy and stent placement- September \par - Tumor markers\par - Hold any blood thinners prior

## 2020-07-15 NOTE — HISTORY OF PRESENT ILLNESS
[Medical Office: ([facility])___] : at the medical office located in  [Verbal consent obtained from patient] : the patient, [unfilled] [Home] : at home, [unfilled] , at the time of the visit. [de-identified] : Patient is a 74-year-old female with past medical history of hypothyroid and was initially seen by the advanced GI team in the hospital for abnormal liver function studies. Patient had ERCP done with stone extraction and procedure was notable for a circumferential adenoma around the papilla. Patient had ampullectomy done and repeat ERCP Feb 13th 2020. 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 Patient seen by Dr Marcus and Endoscopic management preferred by patient despite that it might not be definitive.

## 2020-07-15 NOTE — PHYSICAL EXAM
[General Appearance - Alert] : alert [General Appearance - In No Acute Distress] : in no acute distress [Sclera] : the sclera and conjunctiva were normal [PERRL With Normal Accommodation] : pupils were equal in size, round, and reactive to light [Extraocular Movements] : extraocular movements were intact [Outer Ear] : the ears and nose were normal in appearance [Oropharynx] : the oropharynx was normal [Neck Appearance] : the appearance of the neck was normal [Jugular Venous Distention Increased] : there was no jugular-venous distention [Thyroid Diffuse Enlargement] : the thyroid was not enlarged [Neck Cervical Mass (___cm)] : no neck mass was observed [Thyroid Nodule] : there were no palpable thyroid nodules [Auscultation Breath Sounds / Voice Sounds] : lungs were clear to auscultation bilaterally [Heart Sounds] : normal S1 and S2 [Heart Rate And Rhythm] : heart rate was normal and rhythm regular [Heart Sounds Pericardial Friction Rub] : no pericardial rub [Murmurs] : no murmurs [Heart Sounds Gallop] : no gallops [Bowel Sounds] : normal bowel sounds [Abdomen Tenderness] : non-tender [Abdomen Soft] : soft [] : no hepato-splenomegaly [Abdomen Mass (___ Cm)] : no abdominal mass palpated [Nail Clubbing] : no clubbing  or cyanosis of the fingernails [Involuntary Movements] : no involuntary movements were seen [No Focal Deficits] : no focal deficits [Mood] : the mood was normal [Affect] : the affect was normal [Oriented To Time, Place, And Person] : oriented to person, place, and time

## 2021-06-17 ENCOUNTER — OUTPATIENT (OUTPATIENT)
Dept: OUTPATIENT SERVICES | Facility: HOSPITAL | Age: 76
LOS: 1 days | Discharge: HOME | End: 2021-06-17
Payer: MEDICARE

## 2021-06-17 DIAGNOSIS — Z98.890 OTHER SPECIFIED POSTPROCEDURAL STATES: Chronic | ICD-10-CM

## 2021-06-17 DIAGNOSIS — Z90.49 ACQUIRED ABSENCE OF OTHER SPECIFIED PARTS OF DIGESTIVE TRACT: Chronic | ICD-10-CM

## 2021-06-17 DIAGNOSIS — R52 PAIN, UNSPECIFIED: ICD-10-CM

## 2021-06-17 DIAGNOSIS — Z90.710 ACQUIRED ABSENCE OF BOTH CERVIX AND UTERUS: Chronic | ICD-10-CM

## 2021-06-17 PROCEDURE — 71046 X-RAY EXAM CHEST 2 VIEWS: CPT | Mod: 26

## 2021-06-17 PROCEDURE — 72072 X-RAY EXAM THORAC SPINE 3VWS: CPT | Mod: 26

## 2021-09-23 ENCOUNTER — EMERGENCY (EMERGENCY)
Facility: HOSPITAL | Age: 76
LOS: 0 days | Discharge: HOME | End: 2021-09-23
Admitting: EMERGENCY MEDICINE

## 2021-09-23 ENCOUNTER — INPATIENT (INPATIENT)
Facility: HOSPITAL | Age: 76
LOS: 0 days | Discharge: HOME | End: 2021-09-24
Attending: NUCLEAR MEDICINE | Admitting: NUCLEAR MEDICINE
Payer: MEDICARE

## 2021-09-23 VITALS
HEIGHT: 65 IN | OXYGEN SATURATION: 99 % | TEMPERATURE: 98 F | HEART RATE: 95 BPM | DIASTOLIC BLOOD PRESSURE: 60 MMHG | SYSTOLIC BLOOD PRESSURE: 134 MMHG | RESPIRATION RATE: 18 BRPM

## 2021-09-23 DIAGNOSIS — Z90.49 ACQUIRED ABSENCE OF OTHER SPECIFIED PARTS OF DIGESTIVE TRACT: Chronic | ICD-10-CM

## 2021-09-23 DIAGNOSIS — Z79.82 LONG TERM (CURRENT) USE OF ASPIRIN: ICD-10-CM

## 2021-09-23 DIAGNOSIS — Z98.890 OTHER SPECIFIED POSTPROCEDURAL STATES: Chronic | ICD-10-CM

## 2021-09-23 DIAGNOSIS — Z20.822 CONTACT WITH AND (SUSPECTED) EXPOSURE TO COVID-19: ICD-10-CM

## 2021-09-23 DIAGNOSIS — I10 ESSENTIAL (PRIMARY) HYPERTENSION: ICD-10-CM

## 2021-09-23 DIAGNOSIS — E05.00 THYROTOXICOSIS WITH DIFFUSE GOITER WITHOUT THYROTOXIC CRISIS OR STORM: ICD-10-CM

## 2021-09-23 DIAGNOSIS — T82.838A HEMORRHAGE DUE TO VASCULAR PROSTHETIC DEVICES, IMPLANTS AND GRAFTS, INITIAL ENCOUNTER: ICD-10-CM

## 2021-09-23 DIAGNOSIS — E03.9 HYPOTHYROIDISM, UNSPECIFIED: ICD-10-CM
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
## 2021-09-23 DIAGNOSIS — Z90.710 ACQUIRED ABSENCE OF BOTH CERVIX AND UTERUS: ICD-10-CM

## 2021-09-23 DIAGNOSIS — I73.9 PERIPHERAL VASCULAR DISEASE, UNSPECIFIED: ICD-10-CM

## 2021-09-23 DIAGNOSIS — Z90.710 ACQUIRED ABSENCE OF BOTH CERVIX AND UTERUS: Chronic | ICD-10-CM

## 2021-09-23 DIAGNOSIS — Z90.49 ACQUIRED ABSENCE OF OTHER SPECIFIED PARTS OF DIGESTIVE TRACT: ICD-10-CM

## 2021-09-23 DIAGNOSIS — R42 DIZZINESS AND GIDDINESS: ICD-10-CM

## 2021-09-23 DIAGNOSIS — Z79.02 LONG TERM (CURRENT) USE OF ANTITHROMBOTICS/ANTIPLATELETS: ICD-10-CM

## 2021-09-23 DIAGNOSIS — E78.00 PURE HYPERCHOLESTEROLEMIA, UNSPECIFIED: ICD-10-CM

## 2021-09-23 DIAGNOSIS — R00.1 BRADYCARDIA, UNSPECIFIED: ICD-10-CM
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
## 2021-09-23 DIAGNOSIS — Z88.6 ALLERGY STATUS TO ANALGESIC AGENT: ICD-10-CM

## 2021-09-23 DIAGNOSIS — I95.9 HYPOTENSION, UNSPECIFIED: ICD-10-CM

## 2021-09-23 LAB
ALBUMIN SERPL ELPH-MCNC: 3.4 G/DL — LOW (ref 3.5–5.2)
ALP SERPL-CCNC: 63 U/L — SIGNIFICANT CHANGE UP (ref 30–115)
ALT FLD-CCNC: 7 U/L — SIGNIFICANT CHANGE UP (ref 0–41)
ANION GAP SERPL CALC-SCNC: 12 MMOL/L — SIGNIFICANT CHANGE UP (ref 7–14)
APTT BLD: 47.5 SEC — HIGH (ref 27–39.2)
AST SERPL-CCNC: 10 U/L — SIGNIFICANT CHANGE UP (ref 0–41)
BASOPHILS # BLD AUTO: 0.04 K/UL — SIGNIFICANT CHANGE UP (ref 0–0.2)
BASOPHILS # BLD AUTO: 0.07 K/UL — SIGNIFICANT CHANGE UP (ref 0–0.2)
BASOPHILS NFR BLD AUTO: 0.3 % — SIGNIFICANT CHANGE UP (ref 0–1)
BASOPHILS NFR BLD AUTO: 0.6 % — SIGNIFICANT CHANGE UP (ref 0–1)
BILIRUB SERPL-MCNC: <0.2 MG/DL — SIGNIFICANT CHANGE UP (ref 0.2–1.2)
BLD GP AB SCN SERPL QL: SIGNIFICANT CHANGE UP
BUN SERPL-MCNC: 24 MG/DL — HIGH (ref 10–20)
CALCIUM SERPL-MCNC: 7.8 MG/DL — LOW (ref 8.5–10.1)
CHLORIDE SERPL-SCNC: 107 MMOL/L — SIGNIFICANT CHANGE UP (ref 98–110)
CO2 SERPL-SCNC: 20 MMOL/L — SIGNIFICANT CHANGE UP (ref 17–32)
CREAT SERPL-MCNC: 1 MG/DL — SIGNIFICANT CHANGE UP (ref 0.7–1.5)
EOSINOPHIL # BLD AUTO: 0.03 K/UL — SIGNIFICANT CHANGE UP (ref 0–0.7)
EOSINOPHIL # BLD AUTO: 0.3 K/UL — SIGNIFICANT CHANGE UP (ref 0–0.7)
EOSINOPHIL NFR BLD AUTO: 0.2 % — SIGNIFICANT CHANGE UP (ref 0–8)
EOSINOPHIL NFR BLD AUTO: 2.5 % — SIGNIFICANT CHANGE UP (ref 0–8)
GLUCOSE SERPL-MCNC: 187 MG/DL — HIGH (ref 70–99)
HCT VFR BLD CALC: 29.7 % — LOW (ref 37–47)
HCT VFR BLD CALC: 35.4 % — LOW (ref 37–47)
HGB BLD-MCNC: 11.1 G/DL — LOW (ref 12–16)
HGB BLD-MCNC: 9.2 G/DL — LOW (ref 12–16)
IMM GRANULOCYTES NFR BLD AUTO: 0.8 % — HIGH (ref 0.1–0.3)
IMM GRANULOCYTES NFR BLD AUTO: 0.9 % — HIGH (ref 0.1–0.3)
INR BLD: 1.07 RATIO — SIGNIFICANT CHANGE UP (ref 0.65–1.3)
LYMPHOCYTES # BLD AUTO: 1.15 K/UL — LOW (ref 1.2–3.4)
LYMPHOCYTES # BLD AUTO: 26.4 % — SIGNIFICANT CHANGE UP (ref 20.5–51.1)
LYMPHOCYTES # BLD AUTO: 3.21 K/UL — SIGNIFICANT CHANGE UP (ref 1.2–3.4)
LYMPHOCYTES # BLD AUTO: 8.6 % — LOW (ref 20.5–51.1)
MCHC RBC-ENTMCNC: 27.7 PG — SIGNIFICANT CHANGE UP (ref 27–31)
MCHC RBC-ENTMCNC: 28.1 PG — SIGNIFICANT CHANGE UP (ref 27–31)
MCHC RBC-ENTMCNC: 31 G/DL — LOW (ref 32–37)
MCHC RBC-ENTMCNC: 31.4 G/DL — LOW (ref 32–37)
MCV RBC AUTO: 89.5 FL — SIGNIFICANT CHANGE UP (ref 81–99)
MCV RBC AUTO: 89.6 FL — SIGNIFICANT CHANGE UP (ref 81–99)
MONOCYTES # BLD AUTO: 0.47 K/UL — SIGNIFICANT CHANGE UP (ref 0.1–0.6)
MONOCYTES # BLD AUTO: 0.75 K/UL — HIGH (ref 0.1–0.6)
MONOCYTES NFR BLD AUTO: 3.5 % — SIGNIFICANT CHANGE UP (ref 1.7–9.3)
MONOCYTES NFR BLD AUTO: 6.2 % — SIGNIFICANT CHANGE UP (ref 1.7–9.3)
NEUTROPHILS # BLD AUTO: 11.51 K/UL — HIGH (ref 1.4–6.5)
NEUTROPHILS # BLD AUTO: 7.71 K/UL — HIGH (ref 1.4–6.5)
NEUTROPHILS NFR BLD AUTO: 63.4 % — SIGNIFICANT CHANGE UP (ref 42.2–75.2)
NEUTROPHILS NFR BLD AUTO: 86.6 % — HIGH (ref 42.2–75.2)
NRBC # BLD: 0 /100 WBCS — SIGNIFICANT CHANGE UP (ref 0–0)
NRBC # BLD: 0 /100 WBCS — SIGNIFICANT CHANGE UP (ref 0–0)
PLATELET # BLD AUTO: 195 K/UL — SIGNIFICANT CHANGE UP (ref 130–400)
PLATELET # BLD AUTO: 261 K/UL — SIGNIFICANT CHANGE UP (ref 130–400)
POTASSIUM SERPL-MCNC: 4.2 MMOL/L — SIGNIFICANT CHANGE UP (ref 3.5–5)
POTASSIUM SERPL-SCNC: 4.2 MMOL/L — SIGNIFICANT CHANGE UP (ref 3.5–5)
PROT SERPL-MCNC: 5.3 G/DL — LOW (ref 6–8)
PROTHROM AB SERPL-ACNC: 12.3 SEC — SIGNIFICANT CHANGE UP (ref 9.95–12.87)
RBC # BLD: 3.32 M/UL — LOW (ref 4.2–5.4)
RBC # BLD: 3.95 M/UL — LOW (ref 4.2–5.4)
RBC # FLD: 14.2 % — SIGNIFICANT CHANGE UP (ref 11.5–14.5)
RBC # FLD: 14.4 % — SIGNIFICANT CHANGE UP (ref 11.5–14.5)
SARS-COV-2 RNA SPEC QL NAA+PROBE: SIGNIFICANT CHANGE UP
SODIUM SERPL-SCNC: 139 MMOL/L — SIGNIFICANT CHANGE UP (ref 135–146)
TROPONIN T SERPL-MCNC: <0.01 NG/ML — SIGNIFICANT CHANGE UP
WBC # BLD: 12.15 K/UL — HIGH (ref 4.8–10.8)
WBC # BLD: 13.3 K/UL — HIGH (ref 4.8–10.8)
WBC # FLD AUTO: 12.15 K/UL — HIGH (ref 4.8–10.8)
WBC # FLD AUTO: 13.3 K/UL — HIGH (ref 4.8–10.8)

## 2021-09-23 PROCEDURE — 93010 ELECTROCARDIOGRAM REPORT: CPT

## 2021-09-23 PROCEDURE — 99285 EMERGENCY DEPT VISIT HI MDM: CPT | Mod: GC

## 2021-09-23 RX ORDER — IPRATROPIUM/ALBUTEROL SULFATE 18-103MCG
3 AEROSOL WITH ADAPTER (GRAM) INHALATION EVERY 6 HOURS
Refills: 0 | Status: DISCONTINUED | OUTPATIENT
Start: 2021-09-23 | End: 2021-09-24

## 2021-09-23 RX ORDER — FUROSEMIDE 40 MG
1 TABLET ORAL
Qty: 0 | Refills: 0 | DISCHARGE

## 2021-09-23 RX ORDER — ACETAMINOPHEN 500 MG
650 TABLET ORAL EVERY 6 HOURS
Refills: 0 | Status: DISCONTINUED | OUTPATIENT
Start: 2021-09-23 | End: 2021-09-24

## 2021-09-23 RX ORDER — INFLUENZA VIRUS VACCINE 15; 15; 15; 15 UG/.5ML; UG/.5ML; UG/.5ML; UG/.5ML
0.5 SUSPENSION INTRAMUSCULAR ONCE
Refills: 0 | Status: DISCONTINUED | OUTPATIENT
Start: 2021-09-23 | End: 2021-09-24

## 2021-09-23 RX ORDER — CEPHALEXIN 500 MG
500 CAPSULE ORAL THREE TIMES A DAY
Refills: 0 | Status: DISCONTINUED | OUTPATIENT
Start: 2021-09-23 | End: 2021-09-24

## 2021-09-23 RX ORDER — LEVOTHYROXINE SODIUM 125 MCG
125 TABLET ORAL DAILY
Refills: 0 | Status: DISCONTINUED | OUTPATIENT
Start: 2021-09-23 | End: 2021-09-24

## 2021-09-23 RX ORDER — LANOLIN ALCOHOL/MO/W.PET/CERES
3 CREAM (GRAM) TOPICAL AT BEDTIME
Refills: 0 | Status: DISCONTINUED | OUTPATIENT
Start: 2021-09-23 | End: 2021-09-24

## 2021-09-23 RX ORDER — SODIUM CHLORIDE 9 MG/ML
1000 INJECTION, SOLUTION INTRAVENOUS ONCE
Refills: 0 | Status: DISCONTINUED | OUTPATIENT
Start: 2021-09-23 | End: 2021-09-23

## 2021-09-23 RX ORDER — ATORVASTATIN CALCIUM 80 MG/1
10 TABLET, FILM COATED ORAL AT BEDTIME
Refills: 0 | Status: DISCONTINUED | OUTPATIENT
Start: 2021-09-23 | End: 2021-09-24

## 2021-09-23 RX ORDER — HYDRALAZINE HCL 50 MG
25 TABLET ORAL ONCE
Refills: 0 | Status: DISCONTINUED | OUTPATIENT
Start: 2021-09-23 | End: 2021-09-24

## 2021-09-23 RX ORDER — CEPHALEXIN 500 MG
1 CAPSULE ORAL
Qty: 0 | Refills: 0 | DISCHARGE
Start: 2021-09-23 | End: 2021-09-25

## 2021-09-23 RX ORDER — ONDANSETRON 8 MG/1
4 TABLET, FILM COATED ORAL EVERY 8 HOURS
Refills: 0 | Status: DISCONTINUED | OUTPATIENT
Start: 2021-09-23 | End: 2021-09-24

## 2021-09-23 RX ORDER — CHLORHEXIDINE GLUCONATE 213 G/1000ML
1 SOLUTION TOPICAL
Refills: 0 | Status: DISCONTINUED | OUTPATIENT
Start: 2021-09-23 | End: 2021-09-24

## 2021-09-23 RX ADMIN — ATORVASTATIN CALCIUM 10 MILLIGRAM(S): 80 TABLET, FILM COATED ORAL at 21:51

## 2021-09-23 RX ADMIN — Medication 125 MILLIGRAM(S): at 21:51

## 2021-09-23 NOTE — ED PROVIDER NOTE - NS ED ROS FT
Constitutional: No fevers, chills, or malaise.  HEENT: No headache, visual changes  Cardiac:  No chest pain, SOB  Respiratory:  No cough, respiratory distress, or hemoptysis.  GI:  No nausea, vomiting, diarrhea, or abdominal pain.  :  No dysuria, frequency, or urgency.  MS:  No myalgia, muscle weakness, joint pain or back pain.  Neuro: Reports light-headedness. No LOC, paralysis, or N/T.  Skin:  No skin rash.   Endocrine: No polyuria, polyphagia, or polydipsia.

## 2021-09-23 NOTE — H&P ADULT - NSHPLABSRESULTS_GEN_ALL_CORE
9.2    12.15 )-----------( 261      ( 23 Sep 2021 15:20 )             29.7       09-23    139  |  107  |  24<H>  ----------------------------<  187<H>  4.2   |  20  |  1.0    Ca    7.8<L>      23 Sep 2021 15:20    TPro  5.3<L>  /  Alb  3.4<L>  /  TBili  <0.2  /  DBili  x   /  AST  10  /  ALT  7   /  AlkPhos  63  09-23        PT/INR - ( 23 Sep 2021 15:20 )   PT: 12.30 sec;   INR: 1.07 ratio         PTT - ( 23 Sep 2021 15:20 )  PTT:47.5 sec    Lactate Trend    CARDIAC MARKERS ( 23 Sep 2021 16:01 )  x     / <0.01 ng/mL / x     / x     / x        CAPILLARY BLOOD GLUCOSE

## 2021-09-23 NOTE — H&P ADULT - NSHPSOURCEINFORD_GEN_ALL_CORE
Chart(s)/Patient return to ED if symptoms worsen, persist or questions arise/need for outpatient follow-up

## 2021-09-23 NOTE — ED PROVIDER NOTE - PHYSICAL EXAMINATION
GENERAL: NAD. LLE dressing noted to be soaked in blood  SKIN: warm, dry  HEAD: Normocephalic; atraumatic.  EYES: PERRLA, EOMI  CARD: S1, S2 normal; no murmurs, gallops, or rubs. Regular rate and rhythm. DP pulses 2+/4. Cap refill <2 seconds.   RESP: LCTAB; No wheezes, rales, rhonchi, or stridor.  EXT: Pinpoint area of bleeding noted in posterior aspect of left medial malleolus.   NEURO: Alert, oriented, grossly unremarkable  PSYCH: Cooperative, appropriate. GENERAL: NAD. LLE dressing noted to be soaked in blood  SKIN: warm, dry  HEAD: Normocephalic; atraumatic.  EYES: PERRLA, EOMI  CARD: S1, S2 normal; no murmurs, gallops, or rubs. Regular rate and rhythm. DP pulses 2+/4. Cap refill <2 seconds.   RESP: LCTAB; No wheezes, rales, rhonchi, or stridor.  EXT: Pinpoint area of pulstatile bleeding noted in posterior aspect of left medial malleolus.   NEURO: Alert, oriented, grossly unremarkable  PSYCH: Cooperative, appropriate.

## 2021-09-23 NOTE — ED ADULT NURSE NOTE - NSICDXPASTSURGICALHX_GEN_ALL_CORE_FT
PAST SURGICAL HISTORY:  H/O: hysterectomy     History of cholecystectomy     S/P angiogram of extremity     S/P ERCP

## 2021-09-23 NOTE — ED ADULT NURSE NOTE - NSFALLRSKASSESSDT_ED_ALL_ED
Last office visit: 07/20/20(if over 1 year, schedule appointment)    Appointment scheduled with:  Dr. Ashleigh Cisse on 3/22/21    Requested medication:   escitalopram 10 MG Oral Tab    Pharmacy:    [facility] [address] 23-Sep-2021 15:23

## 2021-09-23 NOTE — CONSULT NOTE ADULT - SUBJECTIVE AND OBJECTIVE BOX
HISTORY OF PRESENT ILLNESS:   76 Y/O with PMH of Hypothyroid, HTN, PAD presented for RLE bleeding. Patient underwent RLE stenting this afternoon for PAD. Afterwards, patient noted significant bleeding from access site as she put weight on her foot shortly after. Patient reports feeling weak and lightheaded during this eval in the am. Otherwise, denied sob, cp or leg pain/numbness.    PAST MEDICAL & SURGICAL HISTORY  Arterial insufficiency of lower extremity  left leg stent placed    Leg swelling    Hypothyroid    HTN (hypertension)    High cholesterol    Peripheral arterial disease    H/O Graves&#x27; disease    History of cholecystectomy    H/O: hysterectomy    S/P angiogram of extremity    S/P ERCP        FAMILY HISTORY:  FAMILY HISTORY:  Family history of breast cancer (Sibling)    FH: lung cancer  FATHER        SOCIAL HISTORY:  []smoker  []Alcohol  []Drug    ROS:  Negative except as mentioned in HPI    ALLERGIES:  codeine (Stomach Upset; Vomiting; Nausea)      MEDICATIONS:  MEDICATIONS  (STANDING):    MEDICATIONS  (PRN):      HOME MEDICATIONS:  Home Medications:  aspirin 81 mg oral tablet, chewable: 1 tab(s) orally once a day (13 Feb 2020 13:11)  furosemide 40 mg oral tablet: 1 tab(s) orally once a day (13 Feb 2020 13:11)  levothyroxine 125 mcg (0.125 mg) oral capsule: 1 cap(s) orally once a day (13 Feb 2020 13:11)  Metoprolol Succinate ER 50 mg oral tablet, extended release: 1 tab(s) orally once a day (13 Feb 2020 13:11)  Plavix 75 mg oral tablet: 1 tab(s) orally once a day (13 Feb 2020 13:11)  pravastatin 40 mg oral tablet: 1 tab(s) orally once a day (13 Feb 2020 13:11)      VITALS:   T(F): 97.6 (09-23 @ 14:59), Max: 97.6 (09-23 @ 14:59)  HR: 45 (09-23 @ 16:42) (45 - 95)  BP: 92/52 (09-23 @ 16:42) (92/52 - 134/60)  BP(mean): 60 (09-23 @ 15:17) (60 - 60)  RR: 18 (09-23 @ 16:42) (17 - 18)  SpO2: 100% (09-23 @ 16:42) (99% - 100%)    I&O's Summary      PHYSICAL EXAM:  GEN: Not in acute distress, appears pale  HEENT: NCAT, PERRL, EOMI  LUNGS: Clear to auscultation bilaterally   CARDIOVASCULAR: RRR, S1/S2 present  ABD: Soft, non-tender, non-distended  EXT: bilateral LE swelling, RLE ankle wrapped in gauze CDI.  SKIN: Intact  NEURO: AAOx3    LABS:                        9.2    12.15 )-----------( 261      ( 23 Sep 2021 15:20 )             29.7     09-23    139  |  107  |  24<H>  ----------------------------<  187<H>  4.2   |  20  |  1.0    Ca    7.8<L>      23 Sep 2021 15:20    TPro  5.3<L>  /  Alb  3.4<L>  /  TBili  <0.2  /  DBili  x   /  AST  10  /  ALT  7   /  AlkPhos  63  09-23    PT/INR - ( 23 Sep 2021 15:20 )   PT: 12.30 sec;   INR: 1.07 ratio         PTT - ( 23 Sep 2021 15:20 )  PTT:47.5 sec  Troponin T, Serum: <0.01 ng/mL (09-23-21 @ 16:01)    Troponin <0.01, CKMB --, CK --/ 09-23-21 @ 16:01    EKG: sinus bob

## 2021-09-23 NOTE — ED ADULT TRIAGE NOTE - CHIEF COMPLAINT QUOTE
pt had stent placement on lower left leg today at 1030am, pt having uncontrolled bleeding on plavix, pt was bob was on scene administering NS 20g r hand

## 2021-09-23 NOTE — H&P ADULT - DOES THIS PATIENT HAVE A HISTORY OF OR HAS BEEN DX WITH HEART FAILURE?
NABIL MEDICATION REFILL REQUEST FOR NORCO 10/325 MG.    DIRECTIONS: TAKE 1 TAB PO Q 8 HRS SCHEDULED.   unknown

## 2021-09-23 NOTE — ED PROVIDER NOTE - PROGRESS NOTE DETAILS
Nathan: Bleeding controlled with epi and V pad, pt hypotensive and bradycardic, will transfuse 2 units, Dr Stanley aware and would like patient admitted to CCU. Nathan: Bleeding controlled with epi and V pad, pt hypotensive and bradycardic, will transfuse 2 units, Dr Stanley aware and would like patient admitted to CCU..

## 2021-09-23 NOTE — ED ADULT NURSE REASSESSMENT NOTE - NS ED NURSE REASSESS COMMENT FT1
Pt received from previous shift RN. Assessed alert & oriented x4. First unit of blood completed & 2nd unit started. O2 & cardiac monitoring maintained. Safety & comfort measures maintained.

## 2021-09-23 NOTE — H&P ADULT - ASSESSMENT
76 yo female w/ PMH of hypothyroidism, HTN, PAD s/p stent placed this morning with Dr. Stanley presents to ED for LLE bleeding.    IMPRESSION   Symptomatic Anemia 2/2 bleeding from stent access site   Sinus Bradycardia   h/o HTN, HLD, PAD  h/o Graves     PLAN:    CNS: Avoid sedatives     HEENT: Oral care    PULMONARY:  HOB @ 45 degrees. Aspiration Precautions     CARDIOVASCULAR: holding atenolol for bradycardia, hold asa and plavix for now     GI: GI prophylaxis.  Feeding.  Bowel regimen     RENAL:  Follow up lytes.  Correct as needed    INFECTIOUS DISEASE: Get cultures if spikes fever     HEMATOLOGICAL:  DVT prophylaxis on hold. 2 units pRBC ordered -> f/u repeat CBC, active T+S, 2 large bore IVs     ENDOCRINE:  Follow up FS.  Insulin protocol if needed. cont synthroid 125 mcg     MUSCULOSKELETAL: Activity as tolerated          76 yo female w/ PMH of hypothyroidism, HTN, PAD s/p stent placed this morning with Dr. Stanley presents to ED for LLE bleeding.    IMPRESSION   Symptomatic Anemia 2/2 bleeding from Left SFA stent access site   Sinus Bradycardia   Current smoker - COPD   h/o HTN, HLD, PAD  h/o Graves s/p ablation   h/o bilateral DVTs     PLAN:    CNS: Avoid sedatives     HEENT: Oral care    PULMONARY:  HOB @ 45 degrees. Aspiration Precautions, duonebs prn, solumedrol 125mg IV push x1    CARDIOVASCULAR: holding atenolol for bradycardia, hold asa and plavix for now     GI: GI prophylaxis.  Feeding.  Bowel regimen     RENAL:  Follow up lytes.  Correct as needed    INFECTIOUS DISEASE: Get cultures if spikes fever, cephalexin 500mg po TID for 3 days ppx post stent placement     HEMATOLOGICAL:  DVT prophylaxis on hold. 2 units pRBC ordered -> f/u repeat CBC, active T+S, 2 large bore IVs     ENDOCRINE:  Follow up FS.  Insulin protocol if needed. cont synthroid 125 mcg     MUSCULOSKELETAL: Activity as tolerated          74 yo female w/ PMH of hypothyroidism, HTN, PAD s/p stent placed this morning with Dr. Stanley presents to ED for LLE bleeding.    IMPRESSION   Symptomatic Anemia 2/2 bleeding from access site   Sinus Bradycardia   Current smoker - COPD   h/o HTN, HLD, PAD  h/o Graves s/p ablation   h/o bilateral DVTs     PLAN:    CNS: Avoid sedatives     HEENT: Oral care    PULMONARY:  HOB @ 45 degrees. Aspiration Precautions, duonebs prn, solumedrol 125mg IV push x1    CARDIOVASCULAR: holding atenolol for bradycardia, hold asa and plavix for now     GI: GI prophylaxis.  Feeding.  Bowel regimen     RENAL:  Follow up lytes.  Correct as needed    INFECTIOUS DISEASE: Get cultures if spikes fever, cephalexin 500mg po TID for 3 days ppx post stent placement     HEMATOLOGICAL:  DVT prophylaxis on hold. 2 units pRBC ordered -> f/u repeat CBC, active T+S, 2 large bore IVs     ENDOCRINE:  Follow up FS.  Insulin protocol if needed. cont synthroid 125 mcg     MUSCULOSKELETAL: Activity as tolerated

## 2021-09-23 NOTE — ED PROVIDER NOTE - ATTENDING CONTRIBUTION TO CARE
75 F to ED with bleeding wound from L ankle cath site  pt had procedure by Dr. Stanley at 1p today and since then persistent bleeding from puncture site.   significant bleeding at home as per daughter and pt with syncope x 2.  Exam as noted, pale conj, ctab, rrr, abd soft, arterial bleeding from puncture site to medial L ankle

## 2021-09-23 NOTE — CONSULT NOTE ADULT - ASSESSMENT
74 Y/O with PMH of Hypothyroid, HTN, PAD presented for RLE bleeding.    LE bleeding  - Will Monitor CBC q8h.  - transfuse 2u stat.  - Monitor on tele.  - Pain control prn with tylenol.  - Hold all AVNB.  - Restart DAPT in the am after discussion with attending cardiologist.

## 2021-09-23 NOTE — H&P ADULT - NSHPPHYSICALEXAM_GEN_ALL_CORE
Vital Signs Last 24 Hrs  T(C): 36.4 (23 Sep 2021 14:59), Max: 36.4 (23 Sep 2021 14:59)  T(F): 97.6 (23 Sep 2021 14:59), Max: 97.6 (23 Sep 2021 14:59)  HR: 45 (23 Sep 2021 16:42) (45 - 95)  BP: 92/52 (23 Sep 2021 16:42) (92/52 - 134/60)  BP(mean): 60 (23 Sep 2021 15:17) (60 - 60)  RR: 18 (23 Sep 2021 16:42) (17 - 18)  SpO2: 100% (23 Sep 2021 16:42) (99% - 100%)    GENERAL: NAD, lying in bed comfortably  HEAD:  Atraumatic, Normocephalic  EYES: EOMI, conjunctiva and sclera clear  ENT: Moist mucous membranes  NECK: Supple, No JVD  CHEST/LUNG: Clear to auscultation bilaterally; Unlabored respirations  HEART: bradycardic rate and regular rhythm; No murmurs, rubs, or gallops  ABDOMEN: Bowel sounds present; Soft, Nontender, Nondistended.   EXTREMITIES: No clubbing, cyanosis, or edema  NERVOUS SYSTEM:  Alert & Oriented X3, speech clear. No deficits   SKIN: pale skin, No rashes or lesions Vital Signs Last 24 Hrs  T(C): 36.4 (23 Sep 2021 14:59), Max: 36.4 (23 Sep 2021 14:59)  T(F): 97.6 (23 Sep 2021 14:59), Max: 97.6 (23 Sep 2021 14:59)  HR: 45 (23 Sep 2021 16:42) (45 - 95)  BP: 92/52 (23 Sep 2021 16:42) (92/52 - 134/60)  BP(mean): 60 (23 Sep 2021 15:17) (60 - 60)  RR: 18 (23 Sep 2021 16:42) (17 - 18)  SpO2: 100% (23 Sep 2021 16:42) (99% - 100%)    GENERAL: NAD, lying in bed comfortably  HEAD:  Atraumatic, Normocephalic  EYES: EOMI, conjunctiva and sclera clear  ENT: Moist mucous membranes  NECK: Supple, No JVD  CHEST/LUNG: Bilateral expiratory wheeze; Unlabored respirations  HEART: bradycardic rate and regular rhythm; No murmurs, rubs, or gallops  ABDOMEN: Bowel sounds present; Soft, Nontender, Nondistended.   EXTREMITIES: No clubbing, cyanosis, or edema; 1+ peripheral LE pulses   NERVOUS SYSTEM:  Alert & Oriented X3, speech clear. No deficits   SKIN: bilateral lower extremity lymphedema

## 2021-09-23 NOTE — ED PROVIDER NOTE - CARE PLAN
1 Principal Discharge DX:	Bleeding from wound   Principal Discharge DX:	Bleeding from wound  Secondary Diagnosis:	Hypotension  Secondary Diagnosis:	Bradycardia  Secondary Diagnosis:	Light-headed

## 2021-09-23 NOTE — ED PROVIDER NOTE - OBJECTIVE STATEMENT
76 yo female w/ PMH of hypothyroidism, HTN, PAD s/p stent this morning presents for LLE bleeding.  Finished procedure at 13:15, went home and at 13:30 noticed bleeding from leg. Associated light-headedness. Denies chest pain, SOB. 74 yo female w/ PMH of hypothyroidism, HTN, PAD s/p stent this morning presents for LLE bleeding.  Finished procedure at 13:15, went home and at 13:30 noticed bleeding from leg, "buckets of blood" from site at medial malleolus. Associated light-headedness. Denies chest pain, SOB.

## 2021-09-23 NOTE — CONSULT NOTE ADULT - ATTENDING COMMENTS
Patient seen in ED on Sep 23 at 1730. S/P PTA earlier today presenting with bleeding from left pedal access site  Stable will monitor overnight

## 2021-09-23 NOTE — H&P ADULT - NSHPSOCIALHISTORY_GEN_ALL_CORE
former smoker  denies alcohol and drug use current smoker 1 ppd for 50 years   denies alcohol and drug use

## 2021-09-23 NOTE — H&P ADULT - HISTORY OF PRESENT ILLNESS
76 yo female w/ PMH of hypothyroidism, HTN, PAD s/p stent placed this morning with Dr. Stanley presents to ED for LLE bleeding.  Patient finished procedure at 13:15, went home and at 13:30 noticed bleeding from leg, "buckets of blood" from site at medial malleolus. Associated light-headedness. Denies chest pain, SOB.  74 yo female w/ PMH of hypothyroidism, HTN, PAD s/p stent placed this morning with Dr. Stanley presents to ED for LLE bleeding. Patient finished procedure at 13:15, went home and at 13:30 noticed bleeding from her leg at the medial malleolus as she put weight on her foot. Patient described the bleeding as "buckets of blood" and had associated light-headedness. Denies fevers, chest pain, SOB. nausea, vomiting, diarrhea, dysuria.     In ED vitals: /60, HR 95, RR 18, SpO2 99% on RA. T 97.6. Patient got 1L IVF in ED and 2 units of RBC ordered     76 yo female w/ PMH of hypothyroidism, HTN, PAD s/p stent placed this morning with Dr. Stanley presents to ED for LLE bleeding. Patient finished procedure at 13:15, went home and at 13:30 noticed bleeding from her leg at the medial malleolus as she put weight on her foot. Patient described the bleeding as "buckets of blood" and had associated light-headedness and nausea. Patient put manual pressure on access site but no improvement with bleeding. Of note, patient took 4 ASA 81 and extra dose of plavix today. Denies fevers, chest pain, SOB, vomiting, diarrhea, dysuria.     In ED vitals: /60, HR 48, RR 18, SpO2 99% on RA. T 97.6. Patient got 1L IVF in ED and 2 units of RBC ordered

## 2021-09-24 ENCOUNTER — TRANSCRIPTION ENCOUNTER (OUTPATIENT)
Age: 76
End: 2021-09-24

## 2021-09-24 VITALS — DIASTOLIC BLOOD PRESSURE: 78 MMHG | SYSTOLIC BLOOD PRESSURE: 155 MMHG | HEART RATE: 64 BPM

## 2021-09-24 LAB
A1C WITH ESTIMATED AVERAGE GLUCOSE RESULT: 6.4 % — HIGH (ref 4–5.6)
ALBUMIN SERPL ELPH-MCNC: 3.6 G/DL — SIGNIFICANT CHANGE UP (ref 3.5–5.2)
ALP SERPL-CCNC: 66 U/L — SIGNIFICANT CHANGE UP (ref 30–115)
ALT FLD-CCNC: 7 U/L — SIGNIFICANT CHANGE UP (ref 0–41)
ANION GAP SERPL CALC-SCNC: 11 MMOL/L — SIGNIFICANT CHANGE UP (ref 7–14)
AST SERPL-CCNC: 11 U/L — SIGNIFICANT CHANGE UP (ref 0–41)
BASOPHILS # BLD AUTO: 0.04 K/UL — SIGNIFICANT CHANGE UP (ref 0–0.2)
BASOPHILS NFR BLD AUTO: 0.3 % — SIGNIFICANT CHANGE UP (ref 0–1)
BILIRUB SERPL-MCNC: 0.5 MG/DL — SIGNIFICANT CHANGE UP (ref 0.2–1.2)
BUN SERPL-MCNC: 22 MG/DL — HIGH (ref 10–20)
CALCIUM SERPL-MCNC: 8 MG/DL — LOW (ref 8.5–10.1)
CHLORIDE SERPL-SCNC: 107 MMOL/L — SIGNIFICANT CHANGE UP (ref 98–110)
CHOLEST SERPL-MCNC: 140 MG/DL — SIGNIFICANT CHANGE UP
CO2 SERPL-SCNC: 20 MMOL/L — SIGNIFICANT CHANGE UP (ref 17–32)
COVID-19 SPIKE DOMAIN AB INTERP: POSITIVE
COVID-19 SPIKE DOMAIN ANTIBODY RESULT: >250 U/ML — HIGH
CREAT SERPL-MCNC: 0.8 MG/DL — SIGNIFICANT CHANGE UP (ref 0.7–1.5)
EOSINOPHIL # BLD AUTO: 0.08 K/UL — SIGNIFICANT CHANGE UP (ref 0–0.7)
EOSINOPHIL NFR BLD AUTO: 0.7 % — SIGNIFICANT CHANGE UP (ref 0–8)
ESTIMATED AVERAGE GLUCOSE: 137 MG/DL — HIGH (ref 68–114)
GLUCOSE SERPL-MCNC: 138 MG/DL — HIGH (ref 70–99)
HCT VFR BLD CALC: 30.7 % — LOW (ref 37–47)
HDLC SERPL-MCNC: 34 MG/DL — LOW
HGB BLD-MCNC: 9.9 G/DL — LOW (ref 12–16)
IMM GRANULOCYTES NFR BLD AUTO: 0.7 % — HIGH (ref 0.1–0.3)
LIPID PNL WITH DIRECT LDL SERPL: 63 MG/DL — SIGNIFICANT CHANGE UP
LYMPHOCYTES # BLD AUTO: 1.38 K/UL — SIGNIFICANT CHANGE UP (ref 1.2–3.4)
LYMPHOCYTES # BLD AUTO: 12 % — LOW (ref 20.5–51.1)
MAGNESIUM SERPL-MCNC: 2.2 MG/DL — SIGNIFICANT CHANGE UP (ref 1.8–2.4)
MCHC RBC-ENTMCNC: 28.4 PG — SIGNIFICANT CHANGE UP (ref 27–31)
MCHC RBC-ENTMCNC: 32.2 G/DL — SIGNIFICANT CHANGE UP (ref 32–37)
MCV RBC AUTO: 88.2 FL — SIGNIFICANT CHANGE UP (ref 81–99)
MONOCYTES # BLD AUTO: 0.83 K/UL — HIGH (ref 0.1–0.6)
MONOCYTES NFR BLD AUTO: 7.2 % — SIGNIFICANT CHANGE UP (ref 1.7–9.3)
NEUTROPHILS # BLD AUTO: 9.1 K/UL — HIGH (ref 1.4–6.5)
NEUTROPHILS NFR BLD AUTO: 79.1 % — HIGH (ref 42.2–75.2)
NON HDL CHOLESTEROL: 106 MG/DL — SIGNIFICANT CHANGE UP
NRBC # BLD: 0 /100 WBCS — SIGNIFICANT CHANGE UP (ref 0–0)
PLATELET # BLD AUTO: 191 K/UL — SIGNIFICANT CHANGE UP (ref 130–400)
POTASSIUM SERPL-MCNC: 4.7 MMOL/L — SIGNIFICANT CHANGE UP (ref 3.5–5)
POTASSIUM SERPL-SCNC: 4.7 MMOL/L — SIGNIFICANT CHANGE UP (ref 3.5–5)
PROT SERPL-MCNC: 5.3 G/DL — LOW (ref 6–8)
RBC # BLD: 3.48 M/UL — LOW (ref 4.2–5.4)
RBC # FLD: 14.9 % — HIGH (ref 11.5–14.5)
SARS-COV-2 IGG+IGM SERPL QL IA: >250 U/ML — HIGH
SARS-COV-2 IGG+IGM SERPL QL IA: POSITIVE
SODIUM SERPL-SCNC: 138 MMOL/L — SIGNIFICANT CHANGE UP (ref 135–146)
TRIGL SERPL-MCNC: 228 MG/DL — HIGH
TSH SERPL-MCNC: 4.03 UIU/ML — SIGNIFICANT CHANGE UP (ref 0.27–4.2)
WBC # BLD: 11.51 K/UL — HIGH (ref 4.8–10.8)
WBC # FLD AUTO: 11.51 K/UL — HIGH (ref 4.8–10.8)

## 2021-09-24 PROCEDURE — 99222 1ST HOSP IP/OBS MODERATE 55: CPT

## 2021-09-24 PROCEDURE — 93010 ELECTROCARDIOGRAM REPORT: CPT

## 2021-09-24 RX ORDER — ASPIRIN/CALCIUM CARB/MAGNESIUM 324 MG
81 TABLET ORAL DAILY
Refills: 0 | Status: DISCONTINUED | OUTPATIENT
Start: 2021-09-24 | End: 2021-09-24

## 2021-09-24 RX ORDER — ATENOLOL 25 MG/1
1 TABLET ORAL
Qty: 0 | Refills: 0 | DISCHARGE

## 2021-09-24 RX ORDER — ATENOLOL 25 MG/1
1 TABLET ORAL
Qty: 30 | Refills: 0
Start: 2021-09-24 | End: 2021-10-23

## 2021-09-24 RX ORDER — AMLODIPINE BESYLATE 2.5 MG/1
5 TABLET ORAL ONCE
Refills: 0 | Status: COMPLETED | OUTPATIENT
Start: 2021-09-24 | End: 2021-09-24

## 2021-09-24 RX ORDER — LISINOPRIL 2.5 MG/1
1 TABLET ORAL
Qty: 30 | Refills: 0
Start: 2021-09-24 | End: 2021-10-23

## 2021-09-24 RX ORDER — CLOPIDOGREL BISULFATE 75 MG/1
75 TABLET, FILM COATED ORAL DAILY
Refills: 0 | Status: DISCONTINUED | OUTPATIENT
Start: 2021-09-24 | End: 2021-09-24

## 2021-09-24 RX ADMIN — Medication 500 MILLIGRAM(S): at 05:35

## 2021-09-24 RX ADMIN — AMLODIPINE BESYLATE 5 MILLIGRAM(S): 2.5 TABLET ORAL at 08:39

## 2021-09-24 RX ADMIN — Medication 125 MICROGRAM(S): at 05:35

## 2021-09-24 RX ADMIN — CLOPIDOGREL BISULFATE 75 MILLIGRAM(S): 75 TABLET, FILM COATED ORAL at 11:04

## 2021-09-24 RX ADMIN — Medication 81 MILLIGRAM(S): at 11:04

## 2021-09-24 RX ADMIN — Medication 650 MILLIGRAM(S): at 07:35

## 2021-09-24 RX ADMIN — AMLODIPINE BESYLATE 5 MILLIGRAM(S): 2.5 TABLET ORAL at 12:45

## 2021-09-24 RX ADMIN — Medication 650 MILLIGRAM(S): at 09:39

## 2021-09-24 RX ADMIN — CHLORHEXIDINE GLUCONATE 1 APPLICATION(S): 213 SOLUTION TOPICAL at 05:34

## 2021-09-24 NOTE — PROGRESS NOTE ADULT - SUBJECTIVE AND OBJECTIVE BOX
SUBJECTIVE:    Patient is a 75y old Female who presents with a chief complaint of Symptomatic Anemia (23 Sep 2021 17:15)    Currently admitted to medicine with the primary diagnosis of Bleeding from wound       Today is hospital day 1d. This morning she is resting comfortably in bed and reports no new issues or overnight events.     PAST MEDICAL & SURGICAL HISTORY  Arterial insufficiency of lower extremity  left leg stent placed    Leg swelling    Hypothyroid    HTN (hypertension)    High cholesterol    Peripheral arterial disease    H/O Graves&#x27; disease    History of cholecystectomy    H/O: hysterectomy    S/P angiogram of extremity    S/P ERCP      SOCIAL HISTORY:  Negative for smoking/alcohol/drug use.     ALLERGIES:  codeine (Stomach Upset; Vomiting; Nausea)    MEDICATIONS:  STANDING MEDICATIONS  atorvastatin 10 milliGRAM(s) Oral at bedtime  cephalexin 500 milliGRAM(s) Oral three times a day  chlorhexidine 4% Liquid 1 Application(s) Topical <User Schedule>  influenza   Vaccine 0.5 milliLiter(s) IntraMuscular once  levothyroxine 125 MICROGram(s) Oral daily    PRN MEDICATIONS  acetaminophen   Tablet .. 650 milliGRAM(s) Oral every 6 hours PRN  albuterol/ipratropium for Nebulization 3 milliLiter(s) Nebulizer every 6 hours PRN  aluminum hydroxide/magnesium hydroxide/simethicone Suspension 30 milliLiter(s) Oral every 4 hours PRN  melatonin 3 milliGRAM(s) Oral at bedtime PRN  ondansetron Injectable 4 milliGRAM(s) IV Push every 8 hours PRN    VITALS:   T(F): 97.1  HR: 64  BP: 173/65  RR: 20  SpO2: 99%    LABS:                        11.1   13.30 )-----------( 195      ( 23 Sep 2021 21:09 )             35.4     09-24    138  |  107  |  22<H>  ----------------------------<  138<H>  4.7   |  20  |  0.8    Ca    8.0<L>      24 Sep 2021 04:30  Mg     2.2     09-24    TPro  5.3<L>  /  Alb  3.6  /  TBili  0.5  /  DBili  x   /  AST  11  /  ALT  7   /  AlkPhos  66  09-24    PT/INR - ( 23 Sep 2021 15:20 )   PT: 12.30 sec;   INR: 1.07 ratio         PTT - ( 23 Sep 2021 15:20 )  PTT:47.5 sec      Troponin T, Serum: <0.01 ng/mL (09-23-21 @ 16:01)      CARDIAC MARKERS ( 23 Sep 2021 16:01 )  x     / <0.01 ng/mL / x     / x     / x          RADIOLOGY:    PHYSICAL EXAM:  GEN: No acute distress  LUNGS: Clear to auscultation bilaterally   HEART: S1/S2 present. RRR.   ABD: Soft, non-tender, non-distended. Bowel sounds present  EXT: NC/NC/NE/2+PP/CORTEZ  NEURO: AAOX3     SUBJECTIVE:    Patient is a 75y old Female who presents with a chief complaint of Symptomatic Anemia (23 Sep 2021 17:15)    Currently admitted to medicine with the primary diagnosis of Bleeding from access site       Today is hospital day 1d. This morning she is resting comfortably in bed and reports no new issues or overnight events.     PAST MEDICAL & SURGICAL HISTORY  Arterial insufficiency of lower extremity  left leg stent placed    Leg swelling    Hypothyroid    HTN (hypertension)    High cholesterol    Peripheral arterial disease    H/O Graves&#x27; disease    History of cholecystectomy    H/O: hysterectomy    S/P angiogram of extremity    S/P ERCP      SOCIAL HISTORY:  Negative for smoking/alcohol/drug use.     ALLERGIES:  codeine (Stomach Upset; Vomiting; Nausea)    MEDICATIONS:  STANDING MEDICATIONS  atorvastatin 10 milliGRAM(s) Oral at bedtime  cephalexin 500 milliGRAM(s) Oral three times a day  chlorhexidine 4% Liquid 1 Application(s) Topical <User Schedule>  influenza   Vaccine 0.5 milliLiter(s) IntraMuscular once  levothyroxine 125 MICROGram(s) Oral daily    PRN MEDICATIONS  acetaminophen   Tablet .. 650 milliGRAM(s) Oral every 6 hours PRN  albuterol/ipratropium for Nebulization 3 milliLiter(s) Nebulizer every 6 hours PRN  aluminum hydroxide/magnesium hydroxide/simethicone Suspension 30 milliLiter(s) Oral every 4 hours PRN  melatonin 3 milliGRAM(s) Oral at bedtime PRN  ondansetron Injectable 4 milliGRAM(s) IV Push every 8 hours PRN    VITALS:   T(F): 97.1  HR: 64  BP: 173/65  RR: 20  SpO2: 99%    LABS:                        11.1   13.30 )-----------( 195      ( 23 Sep 2021 21:09 )             35.4     09-24    138  |  107  |  22<H>  ----------------------------<  138<H>  4.7   |  20  |  0.8    Ca    8.0<L>      24 Sep 2021 04:30  Mg     2.2     09-24    TPro  5.3<L>  /  Alb  3.6  /  TBili  0.5  /  DBili  x   /  AST  11  /  ALT  7   /  AlkPhos  66  09-24    PT/INR - ( 23 Sep 2021 15:20 )   PT: 12.30 sec;   INR: 1.07 ratio         PTT - ( 23 Sep 2021 15:20 )  PTT:47.5 sec      Troponin T, Serum: <0.01 ng/mL (09-23-21 @ 16:01)      CARDIAC MARKERS ( 23 Sep 2021 16:01 )  x     / <0.01 ng/mL / x     / x     / x        PHYSICAL EXAM:  GEN: No acute distress  LUNGS: Clear to auscultation bilaterally   HEART: S1/S2 present. RRR.   ABD: Soft, non-tender, non-distended. Bowel sounds present  EXT: No edema  NEURO: AAOX3     SUBJECTIVE:    Patient is a 75y old Female who presents with a chief complaint of Symptomatic Anemia (23 Sep 2021 17:15)    Currently admitted to medicine with the primary diagnosis of Bleeding from access site       Today is hospital day 1d. This morning she is resting comfortably in bed and reports no new issues or overnight events.     PAST MEDICAL & SURGICAL HISTORY  Arterial insufficiency of lower extremity    left leg stent placed    Leg swelling    Hypothyroid    HTN (hypertension)    High cholesterol    Peripheral arterial disease    H/O Graves&#x27; disease    History of cholecystectomy    H/O: hysterectomy    S/P angiogram of extremity    S/P ERCP      SOCIAL HISTORY:  Negative for smoking/alcohol/drug use.     ALLERGIES:  codeine (Stomach Upset; Vomiting; Nausea)    MEDICATIONS:  STANDING MEDICATIONS  atorvastatin 10 milliGRAM(s) Oral at bedtime  cephalexin 500 milliGRAM(s) Oral three times a day  chlorhexidine 4% Liquid 1 Application(s) Topical <User Schedule>  influenza   Vaccine 0.5 milliLiter(s) IntraMuscular once  levothyroxine 125 MICROGram(s) Oral daily    PRN MEDICATIONS  acetaminophen   Tablet .. 650 milliGRAM(s) Oral every 6 hours PRN  albuterol/ipratropium for Nebulization 3 milliLiter(s) Nebulizer every 6 hours PRN  aluminum hydroxide/magnesium hydroxide/simethicone Suspension 30 milliLiter(s) Oral every 4 hours PRN  melatonin 3 milliGRAM(s) Oral at bedtime PRN  ondansetron Injectable 4 milliGRAM(s) IV Push every 8 hours PRN    VITALS:   T(F): 97.1  HR: 64  BP: 173/65  RR: 20  SpO2: 99%    LABS:                        11.1   13.30 )-----------( 195      ( 23 Sep 2021 21:09 )             35.4     09-24    138  |  107  |  22<H>  ----------------------------<  138<H>  4.7   |  20  |  0.8    Ca    8.0<L>      24 Sep 2021 04:30  Mg     2.2     09-24    TPro  5.3<L>  /  Alb  3.6  /  TBili  0.5  /  DBili  x   /  AST  11  /  ALT  7   /  AlkPhos  66  09-24    PT/INR - ( 23 Sep 2021 15:20 )   PT: 12.30 sec;   INR: 1.07 ratio         PTT - ( 23 Sep 2021 15:20 )  PTT:47.5 sec      Troponin T, Serum: <0.01 ng/mL (09-23-21 @ 16:01)      CARDIAC MARKERS ( 23 Sep 2021 16:01 )  x     / <0.01 ng/mL / x     / x     / x        PHYSICAL EXAM:  GEN: No acute distress  LUNGS: Clear to auscultation bilaterally   HEART: S1/S2 present. RRR.   ABD: Soft, non-tender, non-distended. Bowel sounds present  EXT: No edema  NEURO: AAOX3

## 2021-09-24 NOTE — DISCHARGE NOTE PROVIDER - NSDCCPCAREPLAN_GEN_ALL_CORE_FT
PRINCIPAL DISCHARGE DIAGNOSIS  Diagnosis: PAD (peripheral artery disease)  Assessment and Plan of Treatment:       SECONDARY DISCHARGE DIAGNOSES  Diagnosis: Hypertension  Assessment and Plan of Treatment:       Diagnosis: Hypothyroid  Assessment and Plan of Treatment:

## 2021-09-24 NOTE — PROGRESS NOTE ADULT - ASSESSMENT
IMPRESSION:  - PAD s/p PTA to lower extremity - complicated by bleeding from pedal access site  - Symptomatic Anemia/acute blood loss s/p 2PRBC 9/23/21  - HTN, DL  - Hypothyroidism    PLAN:    CNS:  - Avoid sedatives    HEENT:   - Oral care    PULMONARY:    - HOB @ 45 degrees    CARDIOVASCULAR:  - CCU monitoring  - s/p PTA lower extremity 9/23/21  - s/p 2PRBC 9/23/21  - Resume aspirin and plavix if Hg stable  - continue statin therapy    GI:  - GI prophylaxis  - DASH diet     RENAL:    - Accurate I&O  - Monitor BUN/Cr  - Follow-up lytes.  Correct as needed to keep Mg>2.2 and K>4    INFECTIOUS DISEASE:   - Monitor vital signs off ABx  - Follow up cultures    HEMATOLOGICAL:    - DVT prophylaxis  - Monitor CBC, transfuse if needed to keep Hg>8    ENDOCRINE:    - Follow up FS.  Insulin protocol if needed    MUSCULOSKELETAL:  - Increase activity as tolerated    DISPOSITION:  - Monitor in CCU         IMPRESSION:  - PAD s/p PTA to lower extremity - complicated by bleeding from pedal access site  - Symptomatic Anemia/acute blood loss s/p 2PRBC 9/23/21  - HTN, DL  - Hypothyroidism    PLAN:    CNS:  - Avoid sedatives    HEENT:   - Oral care    PULMONARY:    - HOB @ 45 degrees    CARDIOVASCULAR:  - CCU monitoring  - s/p PTA lower extremity 9/23/21  - s/p 2PRBC 9/23/21  - Resume aspirin and plavix if Hg stable  - continue statin therapy  - Hold atenolol for now  - Start amlodipine 5mg daily  - f/u interventional cardiology     GI:  - GI prophylaxis  - DASH diet     RENAL:    - Accurate I&O  - Monitor BUN/Cr  - Follow-up lytes.  Correct as needed to keep Mg>2.2 and K>4    INFECTIOUS DISEASE:   - Continue cephalexin ppx    HEMATOLOGICAL:    - DVT prophylaxis  - Monitor CBC, transfuse if needed to keep Hg>8    ENDOCRINE:    - Follow up FS.  Insulin protocol if needed    MUSCULOSKELETAL:  - Increase activity as tolerated    DISPOSITION:  - Monitor in CCU  - Possible discharge to follow-up with Dr. Stanley/Marlo in the office in 2 weeks         IMPRESSION:  - PAD s/p PTA to lower extremity - complicated by bleeding from pedal access site  - Symptomatic Anemia/acute blood loss s/p 2PRBC 9/23/21  - HTN, DL  - Hypothyroidism    PLAN:    CNS:  - Avoid sedatives    HEENT:   - Oral care    PULMONARY:    - HOB @ 45 degrees    CARDIOVASCULAR:  - CCU monitoring  - s/p PTA lower extremity 9/23/21  - s/p 2PRBC 9/23/21  - Resume aspirin and plavix  - continue statin therapy  - Hold atenolol for now - resume at 25mg daily upon discharge  - Start lisinopril 10mg daily  - f/u interventional cardiology     GI:  - GI prophylaxis  - DASH diet     RENAL:    - Accurate I&O  - Monitor BUN/Cr  - Follow-up lytes.  Correct as needed to keep Mg>2.2 and K>4    INFECTIOUS DISEASE:   - Continue cephalexin ppx    HEMATOLOGICAL:    - DVT prophylaxis  - Monitor CBC, transfuse if needed to keep Hg>8    ENDOCRINE:    - Follow up FS.  Insulin protocol if needed    MUSCULOSKELETAL:  - Increase activity as tolerated    DISPOSITION:  - Monitor in CCU  - Discharge home today on atenolol 25mg daily and lisinopril 10mg daily and the rest of home meds  - F/U with Dr. Stanley and Marlo in the office on monday 9/27/21

## 2021-09-24 NOTE — PROGRESS NOTE ADULT - ATTENDING COMMENTS
- PAD s/p PTA to lower extremity - complicated by bleeding from pedal access site  - Symptomatic Anemia/acute blood loss s/p 2PRBC 9/23/21  - HTN, DL  - Hypothyroidism      Bleeding controlled   DC home   continue same home meds   follow up with outpatient cardiologist.

## 2021-09-24 NOTE — DISCHARGE NOTE PROVIDER - HOSPITAL COURSE
76 yo female w/ PMH of hypothyroidism, HTN, PAD s/p stent placed yesterday (09/23) with Dr. Stanley presents to ED for LLE bleeding. Patient finished procedure at 13:15, went home and at 13:30 noticed bleeding from her left leg at the medial malleolus as she put weight on her foot. Patient described the bleeding as "buckets of blood" and had associated light-headedness and nausea. Patient put manual pressure on access site but no improvement with bleeding. Of note, patient took 4 ASA 81 and extra dose of plavix.    In ED vitals: /60, HR 48, RR 18, SpO2 99% on RA. T 97.6. Patient got 1L IVF in ED and 2 units of RBC were given.  Bleeding stopped from the LLE and pt was admitted in CCU for monitoring. Pt had an uneventful night and Hb was stable overnight with no more active bleeding from the site. Pt is now alberto stable and is ready to go home.   74 yo female w/ PMH of hypothyroidism, HTN, PAD s/p stent placed yesterday (09/23) with Dr. Stanley presents to ED for LLE bleeding. Patient finished procedure at 13:15, went home and at 13:30 noticed bleeding from her left leg at the medial malleolus as she put weight on her foot. Patient described the bleeding as "buckets of blood" and had associated light-headedness and nausea. Patient put manual pressure on access site but no improvement with bleeding. Of note, patient took 4 ASA 81 and extra dose of plavix.    In ED vitals: /60, HR 48, RR 18, SpO2 99% on RA. T 97.6. Patient got 1L IVF in ED and 2 units of RBC were given.  Bleeding stopped from the LLE and pt was admitted in CCU for monitoring. Pt had an uneventful night and Hb was stable overnight with no more active bleeding from the site. Pt is now alberto stable and is ready to go home.

## 2021-09-24 NOTE — DISCHARGE NOTE NURSING/CASE MANAGEMENT/SOCIAL WORK - PATIENT PORTAL LINK FT
You can access the FollowMyHealth Patient Portal offered by [facility] by registering at the following website: [email or web address]. By joining [facility]’s FollowMyHealth portal, you will also be able to view your health information using other applications (apps) compatible with our system.

## 2021-09-24 NOTE — DISCHARGE NOTE PROVIDER - CARE PROVIDER_API CALL
Reinaldo Stanley)  Cardiovascular Disease; Interventional Cardiology  [address] [place] [address]  Phone: [phone]  Fax: [phone]  Follow Up Time: 1 week    [facility] [place] [address]  Phone: [phone]  Fax: [phone]  Follow Up Time:

## 2021-09-24 NOTE — PROGRESS NOTE ADULT - SUBJECTIVE AND OBJECTIVE BOX
SUBJ: Patient seen and examined. No events overnight.       MEDICATIONS  (STANDING):  aspirin  chewable 81 milliGRAM(s) Oral daily  atorvastatin 10 milliGRAM(s) Oral at bedtime  cephalexin 500 milliGRAM(s) Oral three times a day  chlorhexidine 4% Liquid 1 Application(s) Topical <User Schedule>  clopidogrel Tablet 75 milliGRAM(s) Oral daily  influenza   Vaccine 0.5 milliLiter(s) IntraMuscular once  levothyroxine 125 MICROGram(s) Oral daily    MEDICATIONS  (PRN):  acetaminophen   Tablet .. 650 milliGRAM(s) Oral every 6 hours PRN Temp greater or equal to 38.5C (101.3F), Mild Pain (1 - 3)  albuterol/ipratropium for Nebulization 3 milliLiter(s) Nebulizer every 6 hours PRN Shortness of Breath and/or Wheezing  aluminum hydroxide/magnesium hydroxide/simethicone Suspension 30 milliLiter(s) Oral every 4 hours PRN Dyspepsia  melatonin 3 milliGRAM(s) Oral at bedtime PRN Insomnia  ondansetron Injectable 4 milliGRAM(s) IV Push every 8 hours PRN Nausea and/or Vomiting            Vital Signs Last 24 Hrs  T(C): 36.7 (24 Sep 2021 08:00), Max: 36.7 (24 Sep 2021 08:00)  T(F): 98.1 (24 Sep 2021 08:00), Max: 98.1 (24 Sep 2021 08:00)  HR: 60 (24 Sep 2021 09:00) (45 - 95)  BP: 158/62 (24 Sep 2021 09:00) (92/52 - 195/64)  BP(mean): 97 (24 Sep 2021 09:00) (60 - 125)  RR: 19 (24 Sep 2021 09:00) (16 - 24)  SpO2: 98% (24 Sep 2021 09:00) (96% - 100%)     REVIEW OF SYSTEMS:  CONSTITUTIONAL: No fever  NECK: No pain or stiffness  CARDIOVASCULAR: patient denies chest pain, shortness of breath or palpitations .  Repiratory: No cough or wheezing.  NEUROLOGICAL: No focal deficits to report.  GI: no BRBPR, no N,V,diarrhea.    PSYCHIATRY: normal mood and affect  HEENT: no nasal discharge, no ecchymosis        PHYSICAL EXAM:  GENERAL: NAD  HEAD:  Atraumatic, Normocephalic  NECK: Supple, No JVD  NERVOUS SYSTEM:  Alert & Oriented X3, Good concentration  CHEST/LUNG: Clear to anterior auscultation bilaterally  HEART: Regular rate and rhythm  ABDOMEN: Soft, Nontender, Nondistended;   EXTREMITIES:  No  edema      LABS:                        9.9    11.51 )-----------( 191      ( 24 Sep 2021 04:30 )             30.7     09-24    138  |  107  |  22<H>  ----------------------------<  138<H>  4.7   |  20  |  0.8    Ca    8.0<L>      24 Sep 2021 04:30  Mg     2.2     09-24    TPro  5.3<L>  /  Alb  3.6  /  TBili  0.5  /  DBili  x   /  AST  11  /  ALT  7   /  AlkPhos  66  09-24    CARDIAC MARKERS ( 23 Sep 2021 16:01 )  x     / <0.01 ng/mL / x     / x     / x          PT/INR - ( 23 Sep 2021 15:20 )   PT: 12.30 sec;   INR: 1.07 ratio         PTT - ( 23 Sep 2021 15:20 )  PTT:47.5 sec    I&O's Summary    23 Sep 2021 07:01  -  24 Sep 2021 07:00  --------------------------------------------------------  IN: 200 mL / OUT: 500 mL / NET: -300 mL      BNP  RADIOLOGY & ADDITIONAL STUDIES:    IMPRESSION AND PLAN:    PAD s/p PTA with acute blood loss from pedal access   - Stable  - Resume DAPT  - May D/C home today  - Will f/u outpt

## 2021-09-24 NOTE — DISCHARGE NOTE PROVIDER - NSDCMRMEDTOKEN_GEN_ALL_CORE_FT
aspirin 81 mg oral tablet, chewable: 1 tab(s) orally once a day  atenolol 25 mg oral tablet: 1 tab(s) orally once a day   cephalexin 500 mg oral tablet: 1 tab(s) orally 3 times a day  furosemide 20 mg oral tablet: 1 tab(s) orally once a day  levothyroxine 125 mcg (0.125 mg) oral capsule: 1 cap(s) orally once a day  lisinopril 10 mg oral tablet: 1 tab(s) orally once a day   Plavix 75 mg oral tablet: 1 tab(s) orally once a day  pravastatin 40 mg oral tablet: 1 tab(s) orally once a day

## 2021-09-28 NOTE — PATIENT PROFILE ADULT - NSTRANSFERBELONGINGSDISPO_GEN_A_NUR
Phone lore returned , it was in April of 2021 and is due for cortisone injections of her knees. gabby't given for Tuesday 10-5-21 at 11:30am.   not applicable

## 2021-10-07 DIAGNOSIS — Z79.890 HORMONE REPLACEMENT THERAPY: ICD-10-CM

## 2021-10-07 DIAGNOSIS — D62 ACUTE POSTHEMORRHAGIC ANEMIA: ICD-10-CM

## 2021-10-07 DIAGNOSIS — I10 ESSENTIAL (PRIMARY) HYPERTENSION: ICD-10-CM

## 2021-10-07 DIAGNOSIS — I73.9 PERIPHERAL VASCULAR DISEASE, UNSPECIFIED: ICD-10-CM

## 2021-10-07 DIAGNOSIS — Z79.82 LONG TERM (CURRENT) USE OF ASPIRIN: ICD-10-CM

## 2021-10-07 DIAGNOSIS — Z88.5 ALLERGY STATUS TO NARCOTIC AGENT: ICD-10-CM

## 2021-10-07 DIAGNOSIS — Z79.02 LONG TERM (CURRENT) USE OF ANTITHROMBOTICS/ANTIPLATELETS: ICD-10-CM

## 2021-10-07 DIAGNOSIS — Z95.828 PRESENCE OF OTHER VASCULAR IMPLANTS AND GRAFTS: ICD-10-CM

## 2021-10-07 DIAGNOSIS — Z90.49 ACQUIRED ABSENCE OF OTHER SPECIFIED PARTS OF DIGESTIVE TRACT: ICD-10-CM
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
## 2021-10-07 DIAGNOSIS — I97.620 POSTPROCEDURAL HEMORRHAGE OF A CIRCULATORY SYSTEM ORGAN OR STRUCTURE FOLLOWING OTHER PROCEDURE: ICD-10-CM

## 2021-10-07 DIAGNOSIS — E05.00 THYROTOXICOSIS WITH DIFFUSE GOITER WITHOUT THYROTOXIC CRISIS OR STORM: ICD-10-CM

## 2022-05-20 NOTE — PATIENT PROFILE ADULT - HARM RISK FACTORS
Samples Given: 3 boxes of Cosentyx samples provided- 6 auto injector. Recommended a modified loading dose since she is having trouble recapturing clearance. Recommended injection today, then every 2 weeks for 2 more 300mg doses, then q 4 weeks thereafter. Patient expressed understanding. Patient was very appreciative of the office visit today and expressed her gratitude and satisfaction. Detail Level: Zone yes

## 2022-07-07 NOTE — ED ADULT NURSE NOTE - NS ED NURSE REPORT GIVEN DT
Implemented All Universal Safety Interventions:  [place] to call system. Call bell, personal items and telephone within reach. Instruct patient to call for assistance. Room bathroom lighting operational. Non-slip footwear when patient is off stretcher. Physically safe environment: no spills, clutter or unnecessary equipment. Stretcher in lowest position, wheels locked, appropriate side rails in place. 23-Sep-2021 21:43

## 2022-08-08 NOTE — H&P PST ADULT - WEIGHT IN LBS
Depression Screening Follow-up Plan: Patient's depression screening was positive with a PHQ-2 score of   Their PHQ-9 score was 0  Patient assessed for underlying major depression  They have no active suicidal ideations  Brief counseling provided and recommend additional follow-up/re-evaluation next office visit  Continue present medications   Recheck 6m - earlier if worse 240

## 2023-01-17 ENCOUNTER — INPATIENT (INPATIENT)
Facility: HOSPITAL | Age: 78
LOS: 6 days | Discharge: HOME | End: 2023-01-24
Attending: INTERNAL MEDICINE | Admitting: INTERNAL MEDICINE
Payer: MEDICARE

## 2023-01-17 VITALS
HEART RATE: 82 BPM | SYSTOLIC BLOOD PRESSURE: 209 MMHG | RESPIRATION RATE: 74 BRPM | TEMPERATURE: 98 F | OXYGEN SATURATION: 98 % | DIASTOLIC BLOOD PRESSURE: 91 MMHG | WEIGHT: 240.08 LBS

## 2023-01-17 DIAGNOSIS — Z90.710 ACQUIRED ABSENCE OF BOTH CERVIX AND UTERUS: Chronic | ICD-10-CM

## 2023-01-17 DIAGNOSIS — Z98.890 OTHER SPECIFIED POSTPROCEDURAL STATES: Chronic | ICD-10-CM

## 2023-01-17 DIAGNOSIS — Z90.49 ACQUIRED ABSENCE OF OTHER SPECIFIED PARTS OF DIGESTIVE TRACT: Chronic | ICD-10-CM

## 2023-01-17 LAB
ALBUMIN SERPL ELPH-MCNC: 4.2 G/DL — SIGNIFICANT CHANGE UP (ref 3.5–5.2)
ALBUMIN SERPL ELPH-MCNC: 4.4 G/DL — SIGNIFICANT CHANGE UP (ref 3.5–5.2)
ALP SERPL-CCNC: 288 U/L — HIGH (ref 30–115)
ALP SERPL-CCNC: 315 U/L — HIGH (ref 30–115)
ALT FLD-CCNC: 266 U/L — HIGH (ref 0–41)
ALT FLD-CCNC: 292 U/L — HIGH (ref 0–41)
ANION GAP SERPL CALC-SCNC: 11 MMOL/L — SIGNIFICANT CHANGE UP (ref 7–14)
AST SERPL-CCNC: 181 U/L — HIGH (ref 0–41)
AST SERPL-CCNC: 277 U/L — HIGH (ref 0–41)
BASE EXCESS BLDV CALC-SCNC: -1.9 MMOL/L — SIGNIFICANT CHANGE UP (ref -2–3)
BASOPHILS # BLD AUTO: 0.05 K/UL — SIGNIFICANT CHANGE UP (ref 0–0.2)
BASOPHILS NFR BLD AUTO: 0.5 % — SIGNIFICANT CHANGE UP (ref 0–1)
BILIRUB DIRECT SERPL-MCNC: 0.4 MG/DL — HIGH (ref 0–0.3)
BILIRUB INDIRECT FLD-MCNC: 0.6 MG/DL — SIGNIFICANT CHANGE UP (ref 0.2–1.2)
BILIRUB SERPL-MCNC: 1 MG/DL — SIGNIFICANT CHANGE UP (ref 0.2–1.2)
BILIRUB SERPL-MCNC: 1.7 MG/DL — HIGH (ref 0.2–1.2)
BUN SERPL-MCNC: 18 MG/DL — SIGNIFICANT CHANGE UP (ref 10–20)
CA-I SERPL-SCNC: 1.21 MMOL/L — SIGNIFICANT CHANGE UP (ref 1.15–1.33)
CALCIUM SERPL-MCNC: 9 MG/DL — SIGNIFICANT CHANGE UP (ref 8.4–10.5)
CHLORIDE SERPL-SCNC: 102 MMOL/L — SIGNIFICANT CHANGE UP (ref 98–110)
CO2 SERPL-SCNC: 22 MMOL/L — SIGNIFICANT CHANGE UP (ref 17–32)
CREAT SERPL-MCNC: 0.9 MG/DL — SIGNIFICANT CHANGE UP (ref 0.7–1.5)
EGFR: 66 ML/MIN/1.73M2 — SIGNIFICANT CHANGE UP
EOSINOPHIL # BLD AUTO: 0.17 K/UL — SIGNIFICANT CHANGE UP (ref 0–0.7)
EOSINOPHIL NFR BLD AUTO: 1.6 % — SIGNIFICANT CHANGE UP (ref 0–8)
FLUAV AG NPH QL: SIGNIFICANT CHANGE UP
FLUBV AG NPH QL: SIGNIFICANT CHANGE UP
GAS PNL BLDV: 135 MMOL/L — LOW (ref 136–145)
GAS PNL BLDV: SIGNIFICANT CHANGE UP
GAS PNL BLDV: SIGNIFICANT CHANGE UP
GLUCOSE BLDC GLUCOMTR-MCNC: 256 MG/DL — HIGH (ref 70–99)
GLUCOSE SERPL-MCNC: 152 MG/DL — HIGH (ref 70–99)
HCO3 BLDV-SCNC: 25 MMOL/L — SIGNIFICANT CHANGE UP (ref 22–29)
HCT VFR BLD CALC: 35.2 % — LOW (ref 37–47)
HCT VFR BLDA CALC: 34 % — LOW (ref 39–51)
HGB BLD CALC-MCNC: 11.4 G/DL — LOW (ref 12.6–17.4)
HGB BLD-MCNC: 11.1 G/DL — LOW (ref 12–16)
IMM GRANULOCYTES NFR BLD AUTO: 0.7 % — HIGH (ref 0.1–0.3)
LACTATE BLDV-MCNC: 1.3 MMOL/L — SIGNIFICANT CHANGE UP (ref 0.5–2)
LIDOCAIN IGE QN: 19 U/L — SIGNIFICANT CHANGE UP (ref 7–60)
LYMPHOCYTES # BLD AUTO: 0.64 K/UL — LOW (ref 1.2–3.4)
LYMPHOCYTES # BLD AUTO: 6.2 % — LOW (ref 20.5–51.1)
MCHC RBC-ENTMCNC: 27.8 PG — SIGNIFICANT CHANGE UP (ref 27–31)
MCHC RBC-ENTMCNC: 31.5 G/DL — LOW (ref 32–37)
MCV RBC AUTO: 88.2 FL — SIGNIFICANT CHANGE UP (ref 81–99)
MONOCYTES # BLD AUTO: 0.68 K/UL — HIGH (ref 0.1–0.6)
MONOCYTES NFR BLD AUTO: 6.5 % — SIGNIFICANT CHANGE UP (ref 1.7–9.3)
NEUTROPHILS # BLD AUTO: 8.78 K/UL — HIGH (ref 1.4–6.5)
NEUTROPHILS NFR BLD AUTO: 84.5 % — HIGH (ref 42.2–75.2)
NRBC # BLD: 0 /100 WBCS — SIGNIFICANT CHANGE UP (ref 0–0)
NT-PROBNP SERPL-SCNC: 871 PG/ML — HIGH (ref 0–300)
PCO2 BLDV: 49 MMHG — HIGH (ref 39–42)
PH BLDV: 7.31 — LOW (ref 7.32–7.43)
PLATELET # BLD AUTO: 274 K/UL — SIGNIFICANT CHANGE UP (ref 130–400)
PO2 BLDV: 35 MMHG — SIGNIFICANT CHANGE UP
POTASSIUM BLDV-SCNC: 4.7 MMOL/L — SIGNIFICANT CHANGE UP (ref 3.5–5.1)
POTASSIUM SERPL-MCNC: 5 MMOL/L — SIGNIFICANT CHANGE UP (ref 3.5–5)
POTASSIUM SERPL-SCNC: 5 MMOL/L — SIGNIFICANT CHANGE UP (ref 3.5–5)
PROT SERPL-MCNC: 6.9 G/DL — SIGNIFICANT CHANGE UP (ref 6–8)
PROT SERPL-MCNC: 6.9 G/DL — SIGNIFICANT CHANGE UP (ref 6–8)
RBC # BLD: 3.99 M/UL — LOW (ref 4.2–5.4)
RBC # FLD: 14.2 % — SIGNIFICANT CHANGE UP (ref 11.5–14.5)
RSV RNA NPH QL NAA+NON-PROBE: SIGNIFICANT CHANGE UP
SAO2 % BLDV: 53.9 % — SIGNIFICANT CHANGE UP
SARS-COV-2 RNA SPEC QL NAA+PROBE: SIGNIFICANT CHANGE UP
SODIUM SERPL-SCNC: 135 MMOL/L — SIGNIFICANT CHANGE UP (ref 135–146)
TROPONIN T SERPL-MCNC: <0.01 NG/ML — SIGNIFICANT CHANGE UP
TROPONIN T SERPL-MCNC: <0.01 NG/ML — SIGNIFICANT CHANGE UP
WBC # BLD: 10.39 K/UL — SIGNIFICANT CHANGE UP (ref 4.8–10.8)
WBC # FLD AUTO: 10.39 K/UL — SIGNIFICANT CHANGE UP (ref 4.8–10.8)

## 2023-01-17 PROCEDURE — 71045 X-RAY EXAM CHEST 1 VIEW: CPT | Mod: 26

## 2023-01-17 PROCEDURE — 99223 1ST HOSP IP/OBS HIGH 75: CPT

## 2023-01-17 PROCEDURE — 93010 ELECTROCARDIOGRAM REPORT: CPT

## 2023-01-17 PROCEDURE — 99285 EMERGENCY DEPT VISIT HI MDM: CPT | Mod: GC

## 2023-01-17 PROCEDURE — 76705 ECHO EXAM OF ABDOMEN: CPT | Mod: 26

## 2023-01-17 RX ORDER — IPRATROPIUM/ALBUTEROL SULFATE 18-103MCG
3 AEROSOL WITH ADAPTER (GRAM) INHALATION
Refills: 0 | Status: COMPLETED | OUTPATIENT
Start: 2023-01-17 | End: 2023-01-17

## 2023-01-17 RX ORDER — CHLORHEXIDINE GLUCONATE 213 G/1000ML
1 SOLUTION TOPICAL
Refills: 0 | Status: DISCONTINUED | OUTPATIENT
Start: 2023-01-17 | End: 2023-01-24

## 2023-01-17 RX ORDER — ATENOLOL 25 MG/1
25 TABLET ORAL DAILY
Refills: 0 | Status: DISCONTINUED | OUTPATIENT
Start: 2023-01-18 | End: 2023-01-24

## 2023-01-17 RX ORDER — LISINOPRIL 2.5 MG/1
20 TABLET ORAL DAILY
Refills: 0 | Status: DISCONTINUED | OUTPATIENT
Start: 2023-01-17 | End: 2023-01-24

## 2023-01-17 RX ORDER — ENOXAPARIN SODIUM 100 MG/ML
40 INJECTION SUBCUTANEOUS EVERY 24 HOURS
Refills: 0 | Status: DISCONTINUED | OUTPATIENT
Start: 2023-01-17 | End: 2023-01-24

## 2023-01-17 RX ORDER — LISINOPRIL 2.5 MG/1
10 TABLET ORAL ONCE
Refills: 0 | Status: COMPLETED | OUTPATIENT
Start: 2023-01-17 | End: 2023-01-17

## 2023-01-17 RX ORDER — CLOPIDOGREL BISULFATE 75 MG/1
75 TABLET, FILM COATED ORAL DAILY
Refills: 0 | Status: DISCONTINUED | OUTPATIENT
Start: 2023-01-18 | End: 2023-01-18

## 2023-01-17 RX ORDER — LEVOTHYROXINE SODIUM 125 MCG
150 TABLET ORAL DAILY
Refills: 0 | Status: DISCONTINUED | OUTPATIENT
Start: 2023-01-18 | End: 2023-01-24

## 2023-01-17 RX ORDER — METHOCARBAMOL 500 MG/1
500 TABLET, FILM COATED ORAL ONCE
Refills: 0 | Status: COMPLETED | OUTPATIENT
Start: 2023-01-17 | End: 2023-01-17

## 2023-01-17 RX ORDER — FUROSEMIDE 40 MG
60 TABLET ORAL DAILY
Refills: 0 | Status: DISCONTINUED | OUTPATIENT
Start: 2023-01-17 | End: 2023-01-19

## 2023-01-17 RX ORDER — ASPIRIN/CALCIUM CARB/MAGNESIUM 324 MG
81 TABLET ORAL DAILY
Refills: 0 | Status: DISCONTINUED | OUTPATIENT
Start: 2023-01-17 | End: 2023-01-24

## 2023-01-17 RX ORDER — LEVOTHYROXINE SODIUM 125 MCG
1 TABLET ORAL
Qty: 0 | Refills: 0 | DISCHARGE

## 2023-01-17 RX ORDER — PANTOPRAZOLE SODIUM 20 MG/1
40 TABLET, DELAYED RELEASE ORAL
Refills: 0 | Status: DISCONTINUED | OUTPATIENT
Start: 2023-01-17 | End: 2023-01-24

## 2023-01-17 RX ADMIN — Medication 60 MILLIGRAM(S): at 15:15

## 2023-01-17 RX ADMIN — Medication 125 MILLIGRAM(S): at 13:45

## 2023-01-17 RX ADMIN — Medication 3 MILLILITER(S): at 14:00

## 2023-01-17 RX ADMIN — METHOCARBAMOL 500 MILLIGRAM(S): 500 TABLET, FILM COATED ORAL at 22:13

## 2023-01-17 RX ADMIN — LISINOPRIL 10 MILLIGRAM(S): 2.5 TABLET ORAL at 20:34

## 2023-01-17 RX ADMIN — Medication 3 MILLILITER(S): at 13:40

## 2023-01-17 RX ADMIN — Medication 3 MILLILITER(S): at 13:50

## 2023-01-17 NOTE — ED ADULT TRIAGE NOTE - NS ED TRIAGE EKG FT
Emergency Department TeleTriage Encounter Note      CHIEF COMPLAINT    Chief Complaint   Patient presents with    Fall     Pt states she has hx of seizures and had one Sunday and Monday. Monday she fell with seizure and dr wanted pt checked at er       VITAL SIGNS   Initial Vitals [08/17/22 1403]   BP Pulse Resp Temp SpO2   (!) 215/105 88 18 99.1 °F (37.3 °C) 97 %      MAP       --            ALLERGIES    Review of patient's allergies indicates:   Allergen Reactions    Dilantin [phenytoin sodium extended]        PROVIDER TRIAGE NOTE  Takes tegretol, reports taking as ordered.  Cozaar and hctz for bp.  Neg for cp, sob, bv, leg swelling.        ORDERS  Labs Reviewed - No data to display    ED Orders (720h ago, onward)    None            Virtual Visit Note: The provider triage portion of this emergency department evaluation and documentation was performed via [employer], a HIPAA-compliant telemedicine application, in concert with a tele-presenter in the room. A face to face patient evaluation with one of my colleagues will occur once the patient is placed in an emergency department room.      DISCLAIMER: This note was prepared with [employer] voice recognition transcription software. Garbled syntax, mangled pronouns, and other bizarre constructions may be attributed to that software system.   dr Slater

## 2023-01-17 NOTE — H&P ADULT - NSHPPHYSICALEXAM_GEN_ALL_CORE
GENERAL: NAD, well-developed, AAOx3  HEENT:  Atraumatic, Normocephalic., conjunctiva and sclera clear, No JVD  PULMONARY: Bilateral crackles, decreased bibasilar breath sounds, no wheezing.   CARDIOVASCULAR: Regular rate and rhythm; No murmurs, rubs, or gallops  GASTROINTESTINAL: Soft, Nontender, Nondistended; Bowel sounds present  MUSCULOSKELETAL:  2+ Peripheral Pulses, No clubbing, cyanosis. 2+ pitting edema bilateral LE.   NEUROLOGY: non-focal  SKIN: No rashes or lesions

## 2023-01-17 NOTE — H&P ADULT - NSHPREVIEWOFSYSTEMS_GEN_ALL_CORE
Review of Systems:  •	CONSTITUTIONAL - No fever  •	SKIN - No rash  •	HEMATOLOGIC - No abnormal bleeding or bruising  •	RESPIRATORY - improved shortness of breath,+ cough  •	CARDIAC -No chest pain  •	GI - No abdominal pain  •                 - No dysuria   •	ENDO - No polydypsia, No polyuria, No heat/cold intolerance  •	MUSCULOSKELETAL - No joint paint, + swelling, No back pain  All other systems negative, unless specified in HPI

## 2023-01-17 NOTE — H&P ADULT - ASSESSMENT
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      #Acute Hypoxemic Respiratory Failure 2/2 Suspected New onset CHF exacerbation/ URI   - Afebrile, no leukocytosis, 2-3L NC 98%, reports cough  - Chest Xray with bilateral opacities/ congestion, LE edema   - Covid/ flu/ rsv negative   -   - EKG NSR   - trop x1 neg   - Previous Echo: None  - Last NM stress test 2019: EF55, no deflects identified   - Follows with Dr Stanley. Patient reports prior normal echos  Plan  - c/w lasix 60mg IV qd  - trend trop x3  - Echo  - Strict I+Os, fluid restrict, keep K >4, Mg >2   - Cardiology consult Dr. Stanley   - Monitor on telemetry     #Hypertensive Urgency   #Hx of HTN  - /91 on admission repeat 180/89 without intervention  - compliant with home lisinopril 10mg and atenolol 25mg.   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      #Acute Hypoxemic Respiratory Failure 2/2 Suspected New onset CHF exacerbation/ URI   - Afebrile, no leukocytosis, 2-3L NC 98%, reports cough  - Chest Xray with bilateral opacities/ congestion, LE edema   - Covid/ flu/ rsv negative   -   - EKG NSR   - trop x1 neg   - Previous Echo: None  - Last NM stress test 2019: EF55, no deflects identified   - Follows with Dr Stanley. Patient reports prior normal echos  Plan  - c/w lasix 60mg IV qd  - trend trop x3  - Echo  - Strict I+Os, fluid restrict, keep K >4, Mg >2   - Cardiology consult Dr. Stanley   - Monitor on telemetry     #Hypertensive Urgency   #Hx of HTN  - /91 on admission repeat 180/89 without intervention in ED  - compliant with home lisinopril 10mg and atenolol 25mg.   - increase lisinopril to 20mg qd, c/w atenolol 25mg.   - lisinopril 10mg dose stat now  - monitor BP, Keep BP above 160/80 first 24hrs.     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  - hold starting statin due to transaminitis     #Misc:  - GI ppx: PPI   - DVT ppx: lovenox  - Diet: dash  - Dispo: admit to telemetry

## 2023-01-17 NOTE — ED PROVIDER NOTE - OBJECTIVE STATEMENT
77-year-old female with PMH of HTN, peripheral artery disease s/p stent, hypothyroidism who presents with shortness of breath and cough x1 to 2 weeks.  Worsening shortness of breath.  Took Augmentin for 6 days with mild improvement now worsening.  Worse with exertion.  Patient is a former smoker but has not been diagnosed with COPD. Per EMS, pt hypertensive.  Patient denies fevers, chills, chest pain, nausea, vomiting, diaphoresis, abdominal pain, diarrhea, headache, focal weakness, hx of DVT or PE.

## 2023-01-17 NOTE — ED PROVIDER NOTE - ATTENDING CONTRIBUTION TO CARE
I personally evaluated the patient. I reviewed the Resident’s or Physician Assistant’s note (as assigned above), and agree with the findings and plan except as documented in my note. 77-year-old female past medical history significant for hypertension, hypothyroidism, PAD, duodenal adenoma, complaining of shortness of breath and cough for 1-2 weeks.  No improvement with oral antibiotics at home.  Patient hypertensive on arrival.  Lungs with rales bilaterally.  POCUS with bilateral B-lines.  Chest x-ray with pulmonary vascular congestion.  Patient with CHF exacerbation.  Given Lasix IV.  EKG with no ischemia, normal sinus rhythm.  Patient states hypoxemia improved with 2 L nasal cannula.  Patient admitted to medicine service.

## 2023-01-17 NOTE — H&P ADULT - ATTENDING COMMENTS
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 Few stones noted within the common bile duct, largest measuring 2 cm.  > F/u GI   >Please  consider  transaminitis w/u if initial w/u is unremarkable and transaminitis worsens   >  Please send Hepatitis A IgM, Hepatitis B core IgM, core Ab total, surface Ag, surface Ab, HCV antibody, HCV RNA, Anti HEV, DONNY, AMA, anti-Smooth Muscle Ab type 1, Anti liver-kidney microsomal Ab, Anti-soluble liver Ag, immunoglobulin panel  >Please send CMV PCR, EBV PCR, HSV IgM  >Check serum + urine drug screen   >Daily LFTs, INR, daily MELD score  Hypertensive Urgency Improved  > continue lisinopril 20mg  and atenolol   > monitor BP consider adding  HCTZ  if BP not at goal   Hx PAD s/p Stents  - asa and Plavix   Hx HLD - f/u lipid Panel 77yr old with PMH of HTN, HLD, PAD s/p stents in LLE on aspirin and Plavix, 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      VITAL SIGNS: AFebrile, vital signs stable  CONSTITUTIONAL: Well-developed; well-nourished; in no acute distress. Obese   SKIN: Skin exam is warm and dry, no acute rash.  HEAD: Normocephalic; atraumatic.  EYES: Pupils  reactive to light,   ENT: No nasal discharge; airway clear. Moist mucus membranes.  NECK: Supple; non tender. No rigidity  CARD: Rregular rate and rhythm. Normal S1, S2; no murmurs, gallops, or rubs.  RESP: mild wheezing and fine crackle in lower lobes   auscultation bilaterally.   ABD: Abdomen soft; non-tender; non-distended  EXT: Normal ROM. No clubbing, 2 +edema.   NEURO: Alert and oriented x 3. No focal deficits.  PSYCH: cooperative, appropriate.     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 Few stones noted within the common bile duct, largest measuring 2 cm.  >Patient  has Hx of billary stents that were  removed by Dr Ohara   >S/p cholecystectomy   > F/u GI   >Please  consider  transaminitis w/u if initial w/u is unremarkable and transaminitis worsens   >Daily LFTs, INR, daily MELD score  Hypertensive Urgency Improved  > continue lisinopril 20mg  and atenolol   > monitor BP consider adding  HCTZ  if BP not at goal   Hx PAD s/p Stents  - asa and Plavix   Hx HLD - f/u lipid Panel    Seen on 01/18/23

## 2023-01-17 NOTE — ED PROVIDER NOTE - CARE PLAN
1 Principal Discharge DX:	Acute exacerbation of CHF (congestive heart failure)  Secondary Diagnosis:	Elevated LFTs

## 2023-01-17 NOTE — ED PROVIDER NOTE - PHYSICAL EXAMINATION
CONSTITUTIONAL: in mild acute distress, afebrile  SKIN: Warm, dry  EYES: No conjunctival injection. EOMI.  ENT: No nasal discharge; oropharynx nonerythematous; airway clear  NECK: Supple; non tender, No JVD  CARD:  Regular rate and rhythm  RESP: cardiac wheezing, bilateral B lines; mild SOB, not severely tachypneic  ABD: Soft NTND; No guarding or rebound tenderness  EXT: Normal ROM.  No clubbing or cyanosis.  2+ pitting edema bilaterally No calf tenderness  NEURO: A&O x3, grossly unremarkable, no focal deficits  PSYCH: Cooperative, appropriate  *Chaperone was used during the encounter

## 2023-01-17 NOTE — H&P ADULT - HISTORY OF PRESENT ILLNESS
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 Patient reports that family members at home were ill with URI and strep throat, denies covid/ flu/rsv. Also reports associated shortness of breath, denies pain with deep breaths, denies chest pain/ palpitations, She was prescribed Augmentin and took for 6 days with no improvement. Ambulates with cane, sleeps in chair with elevated HOB normally. LE edema she reports is at her baseline due to her PAD. Follows with Dr. Stanley and reports prior normal echo's and stress test. Denies fevers, weight loss, diaphoresis, sore throat N/V/D, abdominal pain. Currently reports back pain resolved.     ED vitals: T98.3, /91, HR 82, RR 28, Placed on 2L NC 98%. CBC with no leukocytosis, Hgb 11.1 (bl 10), CMP with T-bili 1.7, Alk phos 357, , , trop x1 neg, . VBG wnl  - EKG: NSR  - Chest Xray with bilateral opacities/ congestion  - Covid/ flu/ rsv negative   - s/p solumedrol 125mg IV push and duoneb in the ED     Admit to telemetry for URI/ suspected New onset CHF/ Hypertensive Urgency

## 2023-01-17 NOTE — ED PROVIDER NOTE - CLINICAL SUMMARY MEDICAL DECISION MAKING FREE TEXT BOX
77-year-old female past medical history significant for hypertension, hypothyroidism, PAD, duodenal adenoma, complaining of shortness of breath and cough for 1-2 weeks.  No improvement with oral antibiotics at home.  Patient hypertensive on arrival.  Lungs with rales bilaterally.  POCUS with bilateral B-lines.  Chest x-ray with pulmonary vascular congestion.  Patient with CHF exacerbation.  Given Lasix IV.  EKG with no ischemia, normal sinus rhythm.  Patient states hypoxemia improved with 2 L nasal cannula.  Patient admitted to medicine service.

## 2023-01-18 LAB
A1C WITH ESTIMATED AVERAGE GLUCOSE RESULT: 6.5 % — HIGH (ref 4–5.6)
ALBUMIN SERPL ELPH-MCNC: 4.3 G/DL — SIGNIFICANT CHANGE UP (ref 3.5–5.2)
ALP SERPL-CCNC: 251 U/L — HIGH (ref 30–115)
ALT FLD-CCNC: 214 U/L — HIGH (ref 0–41)
ANION GAP SERPL CALC-SCNC: 11 MMOL/L — SIGNIFICANT CHANGE UP (ref 7–14)
AST SERPL-CCNC: 98 U/L — HIGH (ref 0–41)
BASOPHILS # BLD AUTO: 0.03 K/UL — SIGNIFICANT CHANGE UP (ref 0–0.2)
BASOPHILS NFR BLD AUTO: 0.3 % — SIGNIFICANT CHANGE UP (ref 0–1)
BILIRUB SERPL-MCNC: 0.7 MG/DL — SIGNIFICANT CHANGE UP (ref 0.2–1.2)
BUN SERPL-MCNC: 30 MG/DL — HIGH (ref 10–20)
CALCIUM SERPL-MCNC: 8.7 MG/DL — SIGNIFICANT CHANGE UP (ref 8.4–10.4)
CHLORIDE SERPL-SCNC: 98 MMOL/L — SIGNIFICANT CHANGE UP (ref 98–110)
CHOLEST SERPL-MCNC: 210 MG/DL — HIGH
CO2 SERPL-SCNC: 27 MMOL/L — SIGNIFICANT CHANGE UP (ref 17–32)
CREAT SERPL-MCNC: 1.1 MG/DL — SIGNIFICANT CHANGE UP (ref 0.7–1.5)
EGFR: 52 ML/MIN/1.73M2 — LOW
EOSINOPHIL # BLD AUTO: 0 K/UL — SIGNIFICANT CHANGE UP (ref 0–0.7)
EOSINOPHIL NFR BLD AUTO: 0 % — SIGNIFICANT CHANGE UP (ref 0–8)
ESTIMATED AVERAGE GLUCOSE: 140 MG/DL — HIGH (ref 68–114)
FERRITIN SERPL-MCNC: 103 NG/ML — SIGNIFICANT CHANGE UP (ref 15–150)
FOLATE SERPL-MCNC: 6 NG/ML — SIGNIFICANT CHANGE UP
GLUCOSE SERPL-MCNC: 180 MG/DL — HIGH (ref 70–99)
HCT VFR BLD CALC: 31.4 % — LOW (ref 37–47)
HDLC SERPL-MCNC: 62 MG/DL — SIGNIFICANT CHANGE UP
HGB BLD-MCNC: 9.9 G/DL — LOW (ref 12–16)
IMM GRANULOCYTES NFR BLD AUTO: 0.8 % — HIGH (ref 0.1–0.3)
IRON SATN MFR SERPL: 18 % — SIGNIFICANT CHANGE UP (ref 15–50)
IRON SATN MFR SERPL: 56 UG/DL — SIGNIFICANT CHANGE UP (ref 35–150)
LIPID PNL WITH DIRECT LDL SERPL: 126 MG/DL — HIGH
LYMPHOCYTES # BLD AUTO: 1.02 K/UL — LOW (ref 1.2–3.4)
LYMPHOCYTES # BLD AUTO: 9.3 % — LOW (ref 20.5–51.1)
MAGNESIUM SERPL-MCNC: 2.1 MG/DL — SIGNIFICANT CHANGE UP (ref 1.8–2.4)
MCHC RBC-ENTMCNC: 27.2 PG — SIGNIFICANT CHANGE UP (ref 27–31)
MCHC RBC-ENTMCNC: 31.5 G/DL — LOW (ref 32–37)
MCV RBC AUTO: 86.3 FL — SIGNIFICANT CHANGE UP (ref 81–99)
MONOCYTES # BLD AUTO: 0.83 K/UL — HIGH (ref 0.1–0.6)
MONOCYTES NFR BLD AUTO: 7.6 % — SIGNIFICANT CHANGE UP (ref 1.7–9.3)
NEUTROPHILS # BLD AUTO: 8.94 K/UL — HIGH (ref 1.4–6.5)
NEUTROPHILS NFR BLD AUTO: 82 % — HIGH (ref 42.2–75.2)
NON HDL CHOLESTEROL: 148 MG/DL — HIGH
NRBC # BLD: 0 /100 WBCS — SIGNIFICANT CHANGE UP (ref 0–0)
PLATELET # BLD AUTO: 291 K/UL — SIGNIFICANT CHANGE UP (ref 130–400)
POTASSIUM SERPL-MCNC: 4.7 MMOL/L — SIGNIFICANT CHANGE UP (ref 3.5–5)
POTASSIUM SERPL-SCNC: 4.7 MMOL/L — SIGNIFICANT CHANGE UP (ref 3.5–5)
PROT SERPL-MCNC: 6.8 G/DL — SIGNIFICANT CHANGE UP (ref 6–8)
RBC # BLD: 3.64 M/UL — LOW (ref 4.2–5.4)
RBC # FLD: 14.1 % — SIGNIFICANT CHANGE UP (ref 11.5–14.5)
SODIUM SERPL-SCNC: 136 MMOL/L — SIGNIFICANT CHANGE UP (ref 135–146)
TIBC SERPL-MCNC: 311 UG/DL — SIGNIFICANT CHANGE UP (ref 220–430)
TRIGL SERPL-MCNC: 110 MG/DL — SIGNIFICANT CHANGE UP
TROPONIN T SERPL-MCNC: <0.01 NG/ML — SIGNIFICANT CHANGE UP
TSH SERPL-MCNC: 0.46 UIU/ML — SIGNIFICANT CHANGE UP (ref 0.27–4.2)
UIBC SERPL-MCNC: 255 UG/DL — SIGNIFICANT CHANGE UP (ref 110–370)
VIT B12 SERPL-MCNC: 549 PG/ML — SIGNIFICANT CHANGE UP (ref 232–1245)
WBC # BLD: 10.91 K/UL — HIGH (ref 4.8–10.8)
WBC # FLD AUTO: 10.91 K/UL — HIGH (ref 4.8–10.8)

## 2023-01-18 PROCEDURE — 99232 SBSQ HOSP IP/OBS MODERATE 35: CPT

## 2023-01-18 PROCEDURE — 74177 CT ABD & PELVIS W/CONTRAST: CPT | Mod: 26

## 2023-01-18 PROCEDURE — 99223 1ST HOSP IP/OBS HIGH 75: CPT

## 2023-01-18 RX ORDER — NIFEDIPINE 30 MG
60 TABLET, EXTENDED RELEASE 24 HR ORAL DAILY
Refills: 0 | Status: DISCONTINUED | OUTPATIENT
Start: 2023-01-18 | End: 2023-01-24

## 2023-01-18 RX ORDER — ALBUTEROL 90 UG/1
2.5 AEROSOL, METERED ORAL ONCE
Refills: 0 | Status: COMPLETED | OUTPATIENT
Start: 2023-01-18 | End: 2023-01-18

## 2023-01-18 RX ORDER — DIATRIZOATE MEGLUMINE 180 MG/ML
30 INJECTION, SOLUTION INTRAVESICAL ONCE
Refills: 0 | Status: COMPLETED | OUTPATIENT
Start: 2023-01-18 | End: 2023-01-18

## 2023-01-18 RX ORDER — METHOCARBAMOL 500 MG/1
500 TABLET, FILM COATED ORAL DAILY
Refills: 0 | Status: DISCONTINUED | OUTPATIENT
Start: 2023-01-18 | End: 2023-01-24

## 2023-01-18 RX ADMIN — DIATRIZOATE MEGLUMINE 30 MILLILITER(S): 180 INJECTION, SOLUTION INTRAVESICAL at 20:13

## 2023-01-18 RX ADMIN — ENOXAPARIN SODIUM 40 MILLIGRAM(S): 100 INJECTION SUBCUTANEOUS at 05:34

## 2023-01-18 RX ADMIN — Medication 150 MICROGRAM(S): at 05:35

## 2023-01-18 RX ADMIN — ATENOLOL 25 MILLIGRAM(S): 25 TABLET ORAL at 05:35

## 2023-01-18 RX ADMIN — METHOCARBAMOL 500 MILLIGRAM(S): 500 TABLET, FILM COATED ORAL at 11:17

## 2023-01-18 RX ADMIN — Medication 81 MILLIGRAM(S): at 11:16

## 2023-01-18 RX ADMIN — LISINOPRIL 20 MILLIGRAM(S): 2.5 TABLET ORAL at 05:34

## 2023-01-18 RX ADMIN — PANTOPRAZOLE SODIUM 40 MILLIGRAM(S): 20 TABLET, DELAYED RELEASE ORAL at 05:34

## 2023-01-18 RX ADMIN — Medication 60 MILLIGRAM(S): at 05:34

## 2023-01-18 RX ADMIN — CHLORHEXIDINE GLUCONATE 1 APPLICATION(S): 213 SOLUTION TOPICAL at 05:40

## 2023-01-18 RX ADMIN — CLOPIDOGREL BISULFATE 75 MILLIGRAM(S): 75 TABLET, FILM COATED ORAL at 11:16

## 2023-01-18 RX ADMIN — ALBUTEROL 2.5 MILLIGRAM(S): 90 AEROSOL, METERED ORAL at 11:37

## 2023-01-18 RX ADMIN — Medication 60 MILLIGRAM(S): at 12:01

## 2023-01-18 NOTE — PROGRESS NOTE ADULT - ASSESSMENT
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      #Acute Hypoxemic Respiratory Failure 2/2 Suspected New onset CHF exacerbation  - c/w lasix 60mg IV qd  - Echo pending  - if truly new hf, would need ischemic w/u     #ampullary adenoma  - ct abd/ pelvis  - then possible MRCP vs ERCP    #Hypertensive Urgency   #Hx of HTN  - better controlled    #Transaminitis   - improving    #PAD s/p multiple stents   - c/w aspirin and plavix     #HLD  - not on statin ?  - hold starting statin due to transaminitis     pending echo and ct abd

## 2023-01-18 NOTE — PROGRESS NOTE ADULT - SUBJECTIVE AND OBJECTIVE BOX
Pt seen and examined. Pt feels much better, some abd pain but breathing resolved    T(F): , Max: 98.4 (01-18-23 @ 15:24)  HR: 75 (01-18-23 @ 15:24) (47 - 75)  BP: 180/79 (01-18-23 @ 15:24)  RR: 18 (01-18-23 @ 15:24)  SpO2: 97% (01-18-23 @ 15:24)  IN: 0 mL / OUT: 1300 mL / NET: -1300 mL      General: No apparent distress  Cardiovascular: S1, S2  Gastrointestinal: Soft, Non-tender, Non-distended  Respiratory: Good air entry bilaterally  Musculoskeletal: Moves all extremities  Lymphatic: No edema  Neurologic: No gross motor deficit  Dermatologic: Skin dry                          9.9    10.91 )-----------( 291      ( 18 Jan 2023 08:01 )             31.4     01-18    136  |  98  |  30<H>  ----------------------------<  180<H>  4.7   |  27  |  1.1    Ca    8.7      18 Jan 2023 08:01  Mg     2.1     01-18    TPro  6.8  /  Alb  4.3  /  TBili  0.7  /  DBili  x   /  AST  98<H>  /  ALT  214<H>  /  AlkPhos  251<H>  01-18

## 2023-01-18 NOTE — CONSULT NOTE ADULT - ASSESSMENT
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 Patient presented to the ER with complain of SOB presents for x1 week history of chills, worsening cough x1 day, low back pain, fatigue. Patient noted to have elevated LFTs, US showed dilated duct with choledocholithiasis.         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

## 2023-01-19 LAB
ALBUMIN SERPL ELPH-MCNC: 3.8 G/DL — SIGNIFICANT CHANGE UP (ref 3.5–5.2)
ALP SERPL-CCNC: 255 U/L — HIGH (ref 30–115)
ALT FLD-CCNC: 198 U/L — HIGH (ref 0–41)
ANION GAP SERPL CALC-SCNC: 12 MMOL/L — SIGNIFICANT CHANGE UP (ref 7–14)
AST SERPL-CCNC: 102 U/L — HIGH (ref 0–41)
BASOPHILS # BLD AUTO: 0.04 K/UL — SIGNIFICANT CHANGE UP (ref 0–0.2)
BASOPHILS NFR BLD AUTO: 0.4 % — SIGNIFICANT CHANGE UP (ref 0–1)
BILIRUB SERPL-MCNC: 0.7 MG/DL — SIGNIFICANT CHANGE UP (ref 0.2–1.2)
BUN SERPL-MCNC: 35 MG/DL — HIGH (ref 10–20)
CALCIUM SERPL-MCNC: 8.5 MG/DL — SIGNIFICANT CHANGE UP (ref 8.4–10.5)
CHLORIDE SERPL-SCNC: 103 MMOL/L — SIGNIFICANT CHANGE UP (ref 98–110)
CO2 SERPL-SCNC: 25 MMOL/L — SIGNIFICANT CHANGE UP (ref 17–32)
CREAT SERPL-MCNC: 1.1 MG/DL — SIGNIFICANT CHANGE UP (ref 0.7–1.5)
EGFR: 52 ML/MIN/1.73M2 — LOW
EOSINOPHIL # BLD AUTO: 0.08 K/UL — SIGNIFICANT CHANGE UP (ref 0–0.7)
EOSINOPHIL NFR BLD AUTO: 0.8 % — SIGNIFICANT CHANGE UP (ref 0–8)
GLUCOSE BLDC GLUCOMTR-MCNC: 146 MG/DL — HIGH (ref 70–99)
GLUCOSE BLDC GLUCOMTR-MCNC: 157 MG/DL — HIGH (ref 70–99)
GLUCOSE BLDC GLUCOMTR-MCNC: 174 MG/DL — HIGH (ref 70–99)
GLUCOSE BLDC GLUCOMTR-MCNC: 203 MG/DL — HIGH (ref 70–99)
GLUCOSE SERPL-MCNC: 165 MG/DL — HIGH (ref 70–99)
HCT VFR BLD CALC: 32.2 % — LOW (ref 37–47)
HGB BLD-MCNC: 10.1 G/DL — LOW (ref 12–16)
IMM GRANULOCYTES NFR BLD AUTO: 0.4 % — HIGH (ref 0.1–0.3)
LYMPHOCYTES # BLD AUTO: 0.92 K/UL — LOW (ref 1.2–3.4)
LYMPHOCYTES # BLD AUTO: 9.4 % — LOW (ref 20.5–51.1)
MAGNESIUM SERPL-MCNC: 2.2 MG/DL — SIGNIFICANT CHANGE UP (ref 1.8–2.4)
MCHC RBC-ENTMCNC: 27.2 PG — SIGNIFICANT CHANGE UP (ref 27–31)
MCHC RBC-ENTMCNC: 31.4 G/DL — LOW (ref 32–37)
MCV RBC AUTO: 86.8 FL — SIGNIFICANT CHANGE UP (ref 81–99)
MONOCYTES # BLD AUTO: 0.75 K/UL — HIGH (ref 0.1–0.6)
MONOCYTES NFR BLD AUTO: 7.7 % — SIGNIFICANT CHANGE UP (ref 1.7–9.3)
NEUTROPHILS # BLD AUTO: 7.95 K/UL — HIGH (ref 1.4–6.5)
NEUTROPHILS NFR BLD AUTO: 81.3 % — HIGH (ref 42.2–75.2)
NRBC # BLD: 0 /100 WBCS — SIGNIFICANT CHANGE UP (ref 0–0)
PLATELET # BLD AUTO: 290 K/UL — SIGNIFICANT CHANGE UP (ref 130–400)
POTASSIUM SERPL-MCNC: 4.8 MMOL/L — SIGNIFICANT CHANGE UP (ref 3.5–5)
POTASSIUM SERPL-SCNC: 4.8 MMOL/L — SIGNIFICANT CHANGE UP (ref 3.5–5)
PROT SERPL-MCNC: 6.1 G/DL — SIGNIFICANT CHANGE UP (ref 6–8)
RBC # BLD: 3.71 M/UL — LOW (ref 4.2–5.4)
RBC # FLD: 14.2 % — SIGNIFICANT CHANGE UP (ref 11.5–14.5)
SODIUM SERPL-SCNC: 140 MMOL/L — SIGNIFICANT CHANGE UP (ref 135–146)
WBC # BLD: 9.78 K/UL — SIGNIFICANT CHANGE UP (ref 4.8–10.8)
WBC # FLD AUTO: 9.78 K/UL — SIGNIFICANT CHANGE UP (ref 4.8–10.8)

## 2023-01-19 PROCEDURE — 99233 SBSQ HOSP IP/OBS HIGH 50: CPT

## 2023-01-19 PROCEDURE — 99232 SBSQ HOSP IP/OBS MODERATE 35: CPT

## 2023-01-19 RX ORDER — FUROSEMIDE 40 MG
40 TABLET ORAL DAILY
Refills: 0 | Status: DISCONTINUED | OUTPATIENT
Start: 2023-01-19 | End: 2023-01-20

## 2023-01-19 RX ADMIN — METHOCARBAMOL 500 MILLIGRAM(S): 500 TABLET, FILM COATED ORAL at 11:07

## 2023-01-19 RX ADMIN — Medication 81 MILLIGRAM(S): at 11:07

## 2023-01-19 RX ADMIN — LISINOPRIL 20 MILLIGRAM(S): 2.5 TABLET ORAL at 06:12

## 2023-01-19 RX ADMIN — Medication 60 MILLIGRAM(S): at 06:12

## 2023-01-19 RX ADMIN — PANTOPRAZOLE SODIUM 40 MILLIGRAM(S): 20 TABLET, DELAYED RELEASE ORAL at 06:12

## 2023-01-19 RX ADMIN — ENOXAPARIN SODIUM 40 MILLIGRAM(S): 100 INJECTION SUBCUTANEOUS at 06:12

## 2023-01-19 RX ADMIN — Medication 150 MICROGRAM(S): at 06:11

## 2023-01-19 RX ADMIN — ATENOLOL 25 MILLIGRAM(S): 25 TABLET ORAL at 06:11

## 2023-01-19 NOTE — PROGRESS NOTE ADULT - SUBJECTIVE AND OBJECTIVE BOX
Pt seen and examined. Pt feels much better. Anxious to get her echo done    T(F): , Max: 98.2 (01-18-23 @ 23:40)  HR: 62 (01-19-23 @ 16:10) (60 - 68)  BP: 156/67 (01-19-23 @ 16:10)  RR: 18 (01-19-23 @ 16:10)  SpO2: 98% (01-19-23 @ 16:10)  IN: 0 mL / OUT: 3900 mL / NET: -3900 mL      General: No apparent distress  Cardiovascular: S1, S2  Gastrointestinal: Soft, Non-tender, Non-distended  Respiratory: Good air entry bilaterally  Musculoskeletal: Moves all extremities  Lymphatic: No edema  Neurologic: No gross motor deficit  Dermatologic: Skin dry                          10.1   9.78  )-----------( 290      ( 19 Jan 2023 05:34 )             32.2     01-19    140  |  103  |  35<H>  ----------------------------<  165<H>  4.8   |  25  |  1.1    Ca    8.5      19 Jan 2023 05:34  Mg     2.2     01-19    TPro  6.1  /  Alb  3.8  /  TBili  0.7  /  DBili  x   /  AST  102<H>  /  ALT  198<H>  /  AlkPhos  255<H>  01-19

## 2023-01-19 NOTE — PROGRESS NOTE ADULT - SUBJECTIVE AND OBJECTIVE BOX
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 Patient presented to the ER with complain of SOB presents for x1 week history of chills, worsening cough x1 day, low back pain, fatigue. Patient noted to have elevated LFTs, US showed dilated duct with choledocholithiasis.       Gastroenterology progress note:     Patient is a 77y old  Female who presents with a chief complaint of shortness of breath (18 Jan 2023 23:25)       Admitted on: 01-17-23    We are following the patient for: Abnormal LFTs, Choledocholithiasis, Hx of large ampullary and duodenal adenoma s/p EMR and Ampullectomy       Interval History: Patient breathing is improving. Denied any nausea or vomiting.       PAST MEDICAL & SURGICAL HISTORY:  Arterial insufficiency of lower extremity  left leg stent placed      Leg swelling      Hypothyroid      HTN (hypertension)      High cholesterol      Peripheral arterial disease      H/O Graves&#x27; disease      History of cholecystectomy      H/O: hysterectomy      S/P angiogram of extremity      S/P ERCP          MEDICATIONS  (STANDING):  aspirin  chewable 81 milliGRAM(s) Oral daily  atenolol  Tablet 25 milliGRAM(s) Oral daily  chlorhexidine 2% Cloths 1 Application(s) Topical <User Schedule>  enoxaparin Injectable 40 milliGRAM(s) SubCutaneous every 24 hours  furosemide   Injectable 60 milliGRAM(s) IV Push daily  levothyroxine 150 MICROGram(s) Oral daily  lisinopril 20 milliGRAM(s) Oral daily  methocarbamol 500 milliGRAM(s) Oral daily  NIFEdipine XL 60 milliGRAM(s) Oral daily  pantoprazole    Tablet 40 milliGRAM(s) Oral before breakfast    MEDICATIONS  (PRN):      Allergies  codeine (Stomach Upset; Vomiting; Nausea)      Review of Systems:   Cardiovascular:  No Chest Pain, No Palpitations  Respiratory:  No Cough, No Dyspnea  Gastrointestinal:  As described in HPI  Skin:  No Skin Lesions, No Jaundice  Neuro:  No Syncope, No Dizziness    Physical Examination:  T(C): 36.3 (01-19-23 @ 08:23), Max: 36.9 (01-18-23 @ 15:24)  HR: 60 (01-19-23 @ 08:23) (60 - 75)  BP: 152/65 (01-19-23 @ 08:23) (152/65 - 190/79)  RR: 18 (01-19-23 @ 08:23) (18 - 18)  SpO2: 95% (01-19-23 @ 08:23) (94% - 97%)      01-18-23 @ 07:01  -  01-19-23 @ 07:00  --------------------------------------------------------  IN: 0 mL / OUT: 3900 mL / NET: -3900 mL        GENERAL: AAOx3, no acute distress.  HEAD:  Atraumatic, Normocephalic  EYES: conjunctiva and sclera clear  NECK: Supple, no JVD or thyromegaly  CHEST/LUNG: Clear to auscultation bilaterally; No wheeze, rhonchi, or rales  HEART: Regular rate and rhythm; normal S1, S2, No murmurs.  ABDOMEN: Soft, nontender, nondistended; Bowel sounds present  NEUROLOGY: No asterixis or tremor.   SKIN: Intact, no jaundice     Data:                        10.1   9.78  )-----------( 290      ( 19 Jan 2023 05:34 )             32.2     Hgb trend:  10.1  01-19-23 @ 05:34  9.9  01-18-23 @ 08:01  11.1  01-17-23 @ 13:45        01-19    140  |  103  |  35<H>  ----------------------------<  165<H>  4.8   |  25  |  1.1    Ca    8.5      19 Jan 2023 05:34  Mg     2.2     01-19    TPro  6.1  /  Alb  3.8  /  TBili  0.7  /  DBili  x   /  AST  102<H>  /  ALT  198<H>  /  AlkPhos  255<H>  01-19    Liver panel trend:  TBili 0.7   /      /      /   AlkP 255   /   Tptn 6.1   /   Alb 3.8    /   DBili --      01-19  TBili 0.7   /   AST 98   /      /   AlkP 251   /   Tptn 6.8   /   Alb 4.3    /   DBili --      01-18  TBili 1.0   /      /      /   AlkP 288   /   Tptn 6.9   /   Alb 4.4    /   DBili 0.4      01-17  TBili 1.7   /      /      /   AlkP 315   /   Tptn 6.9   /   Alb 4.2    /   DBili --      01-17             Radiology:  CT Abdomen and Pelvis w/ Oral Cont and w/ IV Cont:   ACC: [id number] EXAM:  CT ABDOMEN AND PELVIS OC IC                          PROCEDURE DATE:  01/18/2023          INTERPRETATION:  CLINICAL STATEMENT: Transaminitis with dilated common   bile duct    TECHNIQUE: Contiguous axial CT images were obtainedfrom the lower chest   to the pubic symphysis following administration of 100cc Optiray 320   intravenous contrast.  Oral contrast was administered.  Reformatted   images in the coronal and sagittal planes were acquired.    COMPARISON CT: None.    OTHER STUDIES USED FOR CORRELATION: None.      FINDINGS:    LOWER CHEST: Trace bilateral effusions with bibasilar atelectasis.    HEPATOBILIARY: Postcholecystectomy. Diffuse biliary ductal dilatation   with common bile duct measuring up to 2.3 cm. Multiple filling defects   within common bile duct, likely reflecting choledocholithiasis.    SPLEEN: Unremarkable.    PANCREAS: Unremarkable.    ADRENAL GLANDS: Unremarkable.    KIDNEYS: Symmetric renal enhancement without hydronephrosis bilaterally.   Bilateral subcentimeter hypodensities too small to further characterize.    ABDOMINOPELVIC NODES: Nonspecific bilateral inguinal lymph nodes.    PELVIC ORGANS: Post hysterectomy.    PERITONEUM/MESENTERY/BOWEL: No bowel obstruction, pneumoperitoneum or   ascites.    BONES/SOFT TISSUES: No acute osseous abnormality. Mild degenerative   changes of the spine.    OTHER: Atherosclerosis abdominal aorta and branches      IMPRESSION:    Diffuse biliary ductal dilatation with common bile duct measuring up to  2.3 cm. Multiple filling defects within common bile duct, likely   reflecting choledocholithiasis.    Trace bilateral effusions.    --- End of Report ---            ELLIOT LANDAU MD; Attending Radiologist  This document has been electronically signed. Jan 19 2023  8:54AM (01-18-23 @ 22:56)    US Abdomen Upper Quadrant Right:   ACC: [id number] EXAM:  US ABDOMEN RT UPR QUADRANT                          PROCEDURE DATE:  01/17/2023          INTERPRETATION:  CLINICAL INFORMATION: Elevated liver function enzymes.    COMPARISON: Prior right upper quadrant ultrasound 2/3/2019.    TECHNIQUE: Sonography of the right upper quadrant.    FINDINGS:  Liver: Incompletely imaged; imaged portions are within normal limits.  Bile ducts: Normal caliber. Common bile duct measures 17 mm, no   significant change since prior ultrasound. Few stones are noted within   the common bile duct with the largest measuring 2 cm.  Gallbladder: Status post cholecystectomy.  Pancreas: Obscured.  Right kidney: Measures 9.4 cm. No hydronephrosis. A 1.3 cm right renal   lower pole cyst is noted.  Ascites:None.  IVC: Visualized portions are within normal limits.      IMPRESSION:  1. Status post prior cholecystectomy with dilated common bile duct   measuring 17 mm, overall unchanged since reference exam of February 2019.  2. Few stones noted within thecommon bile duct, largest measuring 2 cm.    --- End of Report ---          KAYLENE HE MD; Resident Radiologist  This document has been electronically signed.  LINDSEY WAKEFIELD MD; Attending Radiologist  This document has been electronically signed. Jan 17 2023  7:14PM (01-17-23 @ 17:50)

## 2023-01-19 NOTE — PROGRESS NOTE ADULT - ASSESSMENT
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      #Acute Hypoxemic Respiratory Failure 2/2 Suspected New onset CHF exacerbation  - lasix switched to PO  - Echo significantly delayed and still pending  - if truly new hf, would need ischemic w/u, cardio still not seen, recalled    #ampullary adenoma  - ct abd/ pelvis  - then possible MRCP vs ERCP    #Hypertensive Urgency   #Hx of HTN  - controlled    #Transaminitis   - improving    #PAD s/p multiple stents   - c/w aspirin and plavix     #HLD  - not on statin ?  - hold statin due to transaminitis     pending echo since the 17th and cardio c/s

## 2023-01-19 NOTE — PATIENT PROFILE ADULT - PUBLIC BENEFITS
Break coverage RN: Pt A&Ox4, respirations even and unlabored, sating 100% on RA. Pt offers no complaints at this time, NAD noted. Medication given per eMAR. Pending bed assignment. bed in lowest position, side rails up, call bell in hand, safety maintained. no

## 2023-01-19 NOTE — PATIENT PROFILE ADULT - FALL HARM RISK - HARM RISK INTERVENTIONS

## 2023-01-19 NOTE — PATIENT PROFILE ADULT - FUNCTIONAL ASSESSMENT - BASIC MOBILITY 6.
2-calculated by average/Not able to assess (calculate score using [facility] averaging method)

## 2023-01-19 NOTE — PROGRESS NOTE ADULT - ASSESSMENT
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 Patient presented to the ER with complain of SOB presents for x1 week history of chills, worsening cough x1 day, low back pain, fatigue. Patient noted to have elevated LFTs, US showed dilated duct with choledocholithiasis.       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 Patient presented to the ER with complain of SOB presents for x1 week history of chills, worsening cough x1 day, low back pain, fatigue. Patient noted to have elevated LFTs, US showed dilated duct with choledocholithiasis.       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

## 2023-01-19 NOTE — PROGRESS NOTE ADULT - ATTENDING COMMENTS
Patient seen and examined during the GI advanced endoscopy rounds with GI PA and Fellow, case discussed and reviewed during rounds and with primary team, assessment and plan above. Patient was advanced cardiac disease, CHF, not a candidate for the procedure, need thorough cardiac evaluation to assist risk for endoscopic intervention, may benefit from ERCP with ablation of ampullary tumor to minimize the burden of disease however not completely curative, gold standard is Whipple surgery which the patient clearly would not tolerate due to her advanced comorbidities, will discuss with primary team, no evidence of biliary obstruction or cholangitis at this time

## 2023-01-20 LAB
ALBUMIN SERPL ELPH-MCNC: 4.1 G/DL — SIGNIFICANT CHANGE UP (ref 3.5–5.2)
ALP SERPL-CCNC: 348 U/L — HIGH (ref 30–115)
ALT FLD-CCNC: 291 U/L — HIGH (ref 0–41)
ANION GAP SERPL CALC-SCNC: 14 MMOL/L — SIGNIFICANT CHANGE UP (ref 7–14)
AST SERPL-CCNC: 171 U/L — HIGH (ref 0–41)
BASOPHILS # BLD AUTO: 0.04 K/UL — SIGNIFICANT CHANGE UP (ref 0–0.2)
BASOPHILS NFR BLD AUTO: 0.5 % — SIGNIFICANT CHANGE UP (ref 0–1)
BILIRUB SERPL-MCNC: 2.3 MG/DL — HIGH (ref 0.2–1.2)
BUN SERPL-MCNC: 32 MG/DL — HIGH (ref 10–20)
CALCIUM SERPL-MCNC: 8.7 MG/DL — SIGNIFICANT CHANGE UP (ref 8.4–10.5)
CHLORIDE SERPL-SCNC: 96 MMOL/L — LOW (ref 98–110)
CO2 SERPL-SCNC: 24 MMOL/L — SIGNIFICANT CHANGE UP (ref 17–32)
CREAT SERPL-MCNC: 1.1 MG/DL — SIGNIFICANT CHANGE UP (ref 0.7–1.5)
EGFR: 52 ML/MIN/1.73M2 — LOW
EOSINOPHIL # BLD AUTO: 0.06 K/UL — SIGNIFICANT CHANGE UP (ref 0–0.7)
EOSINOPHIL NFR BLD AUTO: 0.7 % — SIGNIFICANT CHANGE UP (ref 0–8)
GLUCOSE BLDC GLUCOMTR-MCNC: 153 MG/DL — HIGH (ref 70–99)
GLUCOSE BLDC GLUCOMTR-MCNC: 160 MG/DL — HIGH (ref 70–99)
GLUCOSE BLDC GLUCOMTR-MCNC: 191 MG/DL — HIGH (ref 70–99)
GLUCOSE SERPL-MCNC: 139 MG/DL — HIGH (ref 70–99)
HCT VFR BLD CALC: 34.6 % — LOW (ref 37–47)
HGB BLD-MCNC: 11.2 G/DL — LOW (ref 12–16)
IMM GRANULOCYTES NFR BLD AUTO: 0.6 % — HIGH (ref 0.1–0.3)
LYMPHOCYTES # BLD AUTO: 1.06 K/UL — LOW (ref 1.2–3.4)
LYMPHOCYTES # BLD AUTO: 12.9 % — LOW (ref 20.5–51.1)
MAGNESIUM SERPL-MCNC: 2.3 MG/DL — SIGNIFICANT CHANGE UP (ref 1.8–2.4)
MCHC RBC-ENTMCNC: 27.3 PG — SIGNIFICANT CHANGE UP (ref 27–31)
MCHC RBC-ENTMCNC: 32.4 G/DL — SIGNIFICANT CHANGE UP (ref 32–37)
MCV RBC AUTO: 84.2 FL — SIGNIFICANT CHANGE UP (ref 81–99)
MONOCYTES # BLD AUTO: 0.87 K/UL — HIGH (ref 0.1–0.6)
MONOCYTES NFR BLD AUTO: 10.6 % — HIGH (ref 1.7–9.3)
NEUTROPHILS # BLD AUTO: 6.11 K/UL — SIGNIFICANT CHANGE UP (ref 1.4–6.5)
NEUTROPHILS NFR BLD AUTO: 74.7 % — SIGNIFICANT CHANGE UP (ref 42.2–75.2)
NRBC # BLD: 0 /100 WBCS — SIGNIFICANT CHANGE UP (ref 0–0)
PLATELET # BLD AUTO: 261 K/UL — SIGNIFICANT CHANGE UP (ref 130–400)
POTASSIUM SERPL-MCNC: 4.3 MMOL/L — SIGNIFICANT CHANGE UP (ref 3.5–5)
POTASSIUM SERPL-SCNC: 4.3 MMOL/L — SIGNIFICANT CHANGE UP (ref 3.5–5)
PROT SERPL-MCNC: 6.8 G/DL — SIGNIFICANT CHANGE UP (ref 6–8)
RBC # BLD: 4.11 M/UL — LOW (ref 4.2–5.4)
RBC # FLD: 14.6 % — HIGH (ref 11.5–14.5)
SODIUM SERPL-SCNC: 134 MMOL/L — LOW (ref 135–146)
WBC # BLD: 8.19 K/UL — SIGNIFICANT CHANGE UP (ref 4.8–10.8)
WBC # FLD AUTO: 8.19 K/UL — SIGNIFICANT CHANGE UP (ref 4.8–10.8)

## 2023-01-20 PROCEDURE — 71045 X-RAY EXAM CHEST 1 VIEW: CPT | Mod: 26

## 2023-01-20 PROCEDURE — 99233 SBSQ HOSP IP/OBS HIGH 50: CPT

## 2023-01-20 RX ORDER — FUROSEMIDE 40 MG
20 TABLET ORAL
Refills: 0 | Status: DISCONTINUED | OUTPATIENT
Start: 2023-01-20 | End: 2023-01-23

## 2023-01-20 RX ORDER — FUROSEMIDE 40 MG
40 TABLET ORAL
Refills: 0 | Status: DISCONTINUED | OUTPATIENT
Start: 2023-01-20 | End: 2023-01-23

## 2023-01-20 RX ADMIN — Medication 81 MILLIGRAM(S): at 12:34

## 2023-01-20 RX ADMIN — Medication 40 MILLIGRAM(S): at 13:07

## 2023-01-20 RX ADMIN — Medication 20 MILLIGRAM(S): at 18:05

## 2023-01-20 RX ADMIN — PANTOPRAZOLE SODIUM 40 MILLIGRAM(S): 20 TABLET, DELAYED RELEASE ORAL at 06:05

## 2023-01-20 RX ADMIN — ATENOLOL 25 MILLIGRAM(S): 25 TABLET ORAL at 05:37

## 2023-01-20 RX ADMIN — METHOCARBAMOL 500 MILLIGRAM(S): 500 TABLET, FILM COATED ORAL at 12:34

## 2023-01-20 RX ADMIN — CHLORHEXIDINE GLUCONATE 1 APPLICATION(S): 213 SOLUTION TOPICAL at 05:41

## 2023-01-20 RX ADMIN — Medication 150 MICROGRAM(S): at 05:36

## 2023-01-20 RX ADMIN — Medication 40 MILLIGRAM(S): at 05:37

## 2023-01-20 RX ADMIN — Medication 60 MILLIGRAM(S): at 05:36

## 2023-01-20 RX ADMIN — LISINOPRIL 20 MILLIGRAM(S): 2.5 TABLET ORAL at 05:36

## 2023-01-20 NOTE — PROGRESS NOTE ADULT - SUBJECTIVE AND OBJECTIVE BOX
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 Patient reports that family members at home were ill with URI and strep throat, denies covid/ flu/rsv. Also reports associated shortness of breath, denies pain with deep breaths, denies chest pain/ palpitations, She was prescribed Augmentin and took for 6 days with no improvement. Ambulates with cane, sleeps in chair with elevated HOB normally. LE edema she reports is at her baseline due to her PAD. Follows with Dr. Stanley and reports prior normal echo's and stress test. Denies fevers, weight loss, diaphoresis, sore throat N/V/D, abdominal pain. Currently reports back pain resolved.     ED vitals: T98.3, /91, HR 82, RR 28, Placed on 2L NC 98%. CBC with no leukocytosis, Hgb 11.1 (bl 10), CMP with T-bili 1.7, Alk phos 357, , , trop x1 neg, . VBG wnl  - EKG: NSR  - Chest Xray with bilateral opacities/ congestion  - Covid/ flu/ rsv negative   - s/p solumedrol 125mg IV push and duoneb in the ED     Admit to telemetry for URI/ suspected New onset CHF/ Hypertensive Urgency  (17 Jan 2023 17:55)      SUBJECTIVE  Patient is a 77y old Female who presents with a chief complaint of shortness of breath (20 Jan 2023 08:05)  Currently admitted to medicine with the primary diagnosis of Acute exacerbation of CHF (congestive heart failure)        INTERVAL HPI AND OVERNIGHT EVENTS:  Patient was examined and seen at bedside. This morning she is resting comfortably in bed and reports no issues or overnight events.    OBJECTIVE  PAST MEDICAL & SURGICAL HISTORY  Arterial insufficiency of lower extremity  left leg stent placed    Leg swelling    Hypothyroid    HTN (hypertension)    High cholesterol    Peripheral arterial disease    H/O Graves&#x27; disease    History of cholecystectomy    H/O: hysterectomy    S/P angiogram of extremity    S/P ERCP      ALLERGIES:  codeine (Stomach Upset; Vomiting; Nausea)    MEDICATIONS:  STANDING MEDICATIONS  aspirin  chewable 81 milliGRAM(s) Oral daily  atenolol  Tablet 25 milliGRAM(s) Oral daily  chlorhexidine 2% Cloths 1 Application(s) Topical <User Schedule>  enoxaparin Injectable 40 milliGRAM(s) SubCutaneous every 24 hours  furosemide    Tablet 40 milliGRAM(s) Oral daily  levothyroxine 150 MICROGram(s) Oral daily  lisinopril 20 milliGRAM(s) Oral daily  methocarbamol 500 milliGRAM(s) Oral daily  NIFEdipine XL 60 milliGRAM(s) Oral daily  pantoprazole    Tablet 40 milliGRAM(s) Oral before breakfast    PRN MEDICATIONS      VITAL SIGNS: Last 24 Hours  T(C): 35.9 (20 Jan 2023 05:00), Max: 37.2 (19 Jan 2023 23:00)  T(F): 96.6 (20 Jan 2023 05:00), Max: 99 (19 Jan 2023 23:00)  HR: 63 (20 Jan 2023 05:00) (62 - 68)  BP: 121/74 (20 Jan 2023 05:00) (121/74 - 167/78)  BP(mean): --  RR: 18 (20 Jan 2023 05:00) (17 - 18)  SpO2: 95% (19 Jan 2023 23:00) (95% - 98%)    LABS:                        11.2   8.19  )-----------( 261      ( 20 Jan 2023 08:10 )             34.6     01-20    134<L>  |  96<L>  |  32<H>  ----------------------------<  139<H>  4.3   |  24  |  1.1    Ca    8.7      20 Jan 2023 08:10  Mg     2.3     01-20    TPro  6.8  /  Alb  4.1  /  TBili  2.3<H>  /  DBili  x   /  AST  171<H>  /  ALT  291<H>  /  AlkPhos  348<H>  01-20    PHYSICAL EXAM:  General: No apparent distress  Cardiovascular: S1, S2  Gastrointestinal: Soft, Non-tender, Non-distended  Respiratory: Good air entry bilaterally  Musculoskeletal: Moves all extremities  Lymphatic: No edema  Neurologic: No gross motor deficit  Dermatologic: Skin dry      ASSESSMENT & PLAN  77yr old with PMH of HTN, HLD, PAD s/p stents in LLE on aspirin and plavix, 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      #Acute Hypoxemic Respiratory Failure 2/2 Suspected New onset CHF exacerbation  - Afebrile, no leukocytosis, 2-3L NC 98%, reports cough  - Chest Xray with bilateral opacities/ congestion, LE edema   - Covid/ flu/ rsv negative   - , trop x1 neg   - EKG NSR   - Last NM stress test 2019: EF55, no deflects identified   - Follows with Dr Stanley. Patient reports prior normal echos    Plan  - c/w lasix  - f/u TTE (will need ischemic w/u to define cardiomyopathy if EF low)  - Strict I+Os, fluid restrict, keep K >4, Mg >2   - Cardiology following    #Hypertensive Urgency   *compliant with home lisinopril 10mg and atenolol 25mg.   - /91 on admission repeat 180/89 without intervention in ED  - increased lisinopril to 20mg qd, c/w atenolol 25mg.   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 Few stones noted within the common bile duct, largest measuring 2 cm.  - per GI: Due to new onset heart failure patient will need cardiac clearance for ERCP ; For recurrent of the adenoma will plan for hybrid Argon plasma coagulation to reduce the burden of the dysplasia and the tumor  - avoid hepatoxic agents   - Cardio clearance for ERCP; needs to be off plavix    #PAD s/p multiple stents   - c/w aspirin ; plavix currently held      #HLD  - not on statin currently?  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 Patient reports that family members at home were ill with URI and strep throat, denies covid/ flu/rsv. Also reports associated shortness of breath, denies pain with deep breaths, denies chest pain/ palpitations, She was prescribed Augmentin and took for 6 days with no improvement. Ambulates with cane, sleeps in chair with elevated HOB normally. LE edema she reports is at her baseline due to her PAD. Follows with Dr. Stanley and reports prior normal echo's and stress test. Denies fevers, weight loss, diaphoresis, sore throat N/V/D, abdominal pain. Currently reports back pain resolved.     ED vitals: T98.3, /91, HR 82, RR 28, Placed on 2L NC 98%. CBC with no leukocytosis, Hgb 11.1 (bl 10), CMP with T-bili 1.7, Alk phos 357, , , trop x1 neg, . VBG wnl  - EKG: NSR  - Chest Xray with bilateral opacities/ congestion  - Covid/ flu/ rsv negative   - s/p solumedrol 125mg IV push and duoneb in the ED     Admit to telemetry for URI/ suspected New onset CHF/ Hypertensive Urgency  (17 Jan 2023 17:55)      SUBJECTIVE  Patient is a 77y old Female who presents with a chief complaint of shortness of breath (20 Jan 2023 08:05)  Currently admitted to medicine with the primary diagnosis of Acute exacerbation of CHF (congestive heart failure)        INTERVAL HPI AND OVERNIGHT EVENTS:  Patient was examined and seen at bedside. This morning she is resting comfortably in bed and reports no issues or overnight events.    OBJECTIVE  PAST MEDICAL & SURGICAL HISTORY  Arterial insufficiency of lower extremity  left leg stent placed    Leg swelling    Hypothyroid    HTN (hypertension)    High cholesterol    Peripheral arterial disease    H/O Graves&#x27; disease    History of cholecystectomy    H/O: hysterectomy    S/P angiogram of extremity    S/P ERCP      ALLERGIES:  codeine (Stomach Upset; Vomiting; Nausea)    MEDICATIONS:  STANDING MEDICATIONS  aspirin  chewable 81 milliGRAM(s) Oral daily  atenolol  Tablet 25 milliGRAM(s) Oral daily  chlorhexidine 2% Cloths 1 Application(s) Topical <User Schedule>  enoxaparin Injectable 40 milliGRAM(s) SubCutaneous every 24 hours  furosemide    Tablet 40 milliGRAM(s) Oral daily  levothyroxine 150 MICROGram(s) Oral daily  lisinopril 20 milliGRAM(s) Oral daily  methocarbamol 500 milliGRAM(s) Oral daily  NIFEdipine XL 60 milliGRAM(s) Oral daily  pantoprazole    Tablet 40 milliGRAM(s) Oral before breakfast    PRN MEDICATIONS      VITAL SIGNS: Last 24 Hours  T(C): 35.9 (20 Jan 2023 05:00), Max: 37.2 (19 Jan 2023 23:00)  T(F): 96.6 (20 Jan 2023 05:00), Max: 99 (19 Jan 2023 23:00)  HR: 63 (20 Jan 2023 05:00) (62 - 68)  BP: 121/74 (20 Jan 2023 05:00) (121/74 - 167/78)  BP(mean): --  RR: 18 (20 Jan 2023 05:00) (17 - 18)  SpO2: 95% (19 Jan 2023 23:00) (95% - 98%)    LABS:                        11.2   8.19  )-----------( 261      ( 20 Jan 2023 08:10 )             34.6     01-20    134<L>  |  96<L>  |  32<H>  ----------------------------<  139<H>  4.3   |  24  |  1.1    Ca    8.7      20 Jan 2023 08:10  Mg     2.3     01-20    TPro  6.8  /  Alb  4.1  /  TBili  2.3<H>  /  DBili  x   /  AST  171<H>  /  ALT  291<H>  /  AlkPhos  348<H>  01-20    PHYSICAL EXAM:  General: No apparent distress  Cardiovascular: S1, S2  Gastrointestinal: Soft, Non-tender, Non-distended  Respiratory: Good air entry bilaterally  Musculoskeletal: Moves all extremities  Lymphatic: No edema  Neurologic: No gross motor deficit  Dermatologic: Skin dry      ASSESSMENT & PLAN  77yr old with PMH of HTN, HLD, PAD s/p stents in LLE on aspirin and plavix, 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      #Acute Hypoxemic Respiratory Failure 2/2 Suspected New onset CHF exacerbation  - Afebrile, no leukocytosis, 2-3L NC 98%, reports cough  - Chest Xray with bilateral opacities/ congestion, LE edema   - Covid/ flu/ rsv negative   - , trop x1 neg   - EKG NSR   - Last NM stress test 2019: EF55, no deflects identified   - Follows with Dr Stanley. Patient reports prior normal echos    Plan  - c/w lasix  - f/u TTE (will need ischemic w/u to define cardiomyopathy if EF low)  - Strict I+Os, fluid restrict, keep K >4, Mg >2   - Cardiology following    #Hypertensive Urgency   *compliant with home lisinopril 10mg and atenolol 25mg.   - /91 on admission repeat 180/89 without intervention in ED  - increased lisinopril to 20mg qd, c/w atenolol 25mg.   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 Few stones noted within the common bile duct, largest measuring 2 cm.  - per GI: Due to new onset heart failure patient will need cardiac clearance for ERCP ; For recurrent of the adenoma will plan for hybrid Argon plasma coagulation to reduce the burden of the dysplasia and the tumor  - avoid hepatoxic agents   - Cardio clearance for ERCP; needs to be off plavix. Monitor LFTS    #PAD s/p multiple stents   - c/w aspirin ; plavix currently held      #HLD  - not on statin currently?  - hold starting statin due to transaminitis     #Misc:  - GI ppx: PPI   - DVT ppx: lovenox  - Diet: dash  - Dispo: admit to telemetry , cardioclearance , ERCP

## 2023-01-20 NOTE — CONSULT NOTE ADULT - ASSESSMENT
HIGGINS  PAD s/p LLE stent  HTN  HLD    -TTE  -will need ischemic w/u to define cardiomyopathy if EF low  -c/e atenolol, lisinopril, nifedipine  -c/w lasix  -c/w ASA, plavix   HIGGINS  PAD s/p LLE stent  HTN  HLD    -TTE  -will need ischemic w/u to define cardiomyopathy if EF low  -c/e atenolol, lisinopril, nifedipine  -c/w lasix  -c/w ASA, plavix    * Patient-based characteristics (Functional capacity)  Patient is able to achieve more than 4 MET (walk 4 blocks, climb 2 flights of stairs, etc...)          Y [X] / N []    High-risk patient features:  - Recent (<30 days) or active MI          Y [] / N [X]  - Unstable or severe angina          Y [] / N [X]  - Decompensated heart failure, or worsening or new-onset heart failure          Y [x] / N []  - Severe valvular disease          Y [] / N [X]  - Significant arrhythmia (Tachy- or Bradyarrhythmia)          Y [] / N [X]    * Surgery/Procedure-based characteristics (Type of surgery)  - Low-risk procedure (outpatient procedure, elective, endoscopy, etc...)          Y [x] / N []  - Elevated or Moderate-risk procedure (Inpatient)          Y [] / N []  - High-risk procedure (urgent/emergent procedure, Intrathoracic, vascular, etc...)          Y [] / N []    * Revised Cardiac Risk Index (RCRI)  1- History of ischemic heart disease          Y [] / N [X]  2- History of congestive heart failure          Y [x] / N []  3- History of stroke/TIA          Y [] / N [X]  4- History of insulin-dependent diabetes          Y [] / N [X]  5- Chronic kidney disease (Cr >2mg/dL)          Y [] / N [X]  6- Undergoing suprainguinal vascular, intraperitoneal, or intrathoracic surgery          Y [] / N [X]    Class II risk (One factor) --> 6% risk (30-day risk of death, MI, or cardiac arrest)    * IMPRESSION & RECOMMENDATIONS:  -Moderate-risk patient for a Low -risk surgery/procedure  - This consult serves only as a ivett-operative cardiac risk stratification and evaluation to predict 30-days cardiac complications risk and mortality. The decision to proceed with the surgery/procedure is made by the performing physician and the patient    HIGGINS  PAD s/p LLE stent  HTN  HLD    -TTE  -will need ischemic w/u to define cardiomyopathy if EF low  -c/w atenolol, lisinopril, nifedipine  -c/w lasix  -holding plavix, for ERCP    * Patient-based characteristics (Functional capacity)  Patient is able to achieve more than 4 MET (walk 4 blocks, climb 2 flights of stairs, etc...)          Y [X] / N []    High-risk patient features:  - Recent (<30 days) or active MI          Y [] / N [X]  - Unstable or severe angina          Y [] / N [X]  - Decompensated heart failure, or worsening or new-onset heart failure          Y [x] / N []  - Severe valvular disease          Y [] / N [X]  - Significant arrhythmia (Tachy- or Bradyarrhythmia)          Y [] / N [X]    * Surgery/Procedure-based characteristics (Type of surgery)  - Low-risk procedure (outpatient procedure, elective, endoscopy, etc...)          Y [x] / N []  - Elevated or Moderate-risk procedure (Inpatient)          Y [] / N []  - High-risk procedure (urgent/emergent procedure, Intrathoracic, vascular, etc...)          Y [] / N []    * Revised Cardiac Risk Index (RCRI)  1- History of ischemic heart disease          Y [] / N [X]  2- History of congestive heart failure          Y [x] / N []  3- History of stroke/TIA          Y [] / N [X]  4- History of insulin-dependent diabetes          Y [] / N [X]  5- Chronic kidney disease (Cr >2mg/dL)          Y [] / N [X]  6- Undergoing suprainguinal vascular, intraperitoneal, or intrathoracic surgery          Y [] / N [X]    Class II risk (One factor) --> 6% risk (30-day risk of death, MI, or cardiac arrest)    * IMPRESSION & RECOMMENDATIONS:  -Moderate-risk patient for a Low -risk surgery/procedure  - This consult serves only as a ivett-operative cardiac risk stratification and evaluation to predict 30-days cardiac complications risk and mortality. The decision to proceed with the surgery/procedure is made by the performing physician and the patient    HIGGINS  PAD s/p LLE stent  HTN  HLD    -TTE  -will need ischemic w/u to define cardiomyopathy if EF low  -c/w atenolol, lisinopril, nifedipine  -c/w lasix and keep negative balance   -holding plavix, for ERCP but continue ASA   -DVT prophylaxis     * Patient-based characteristics (Functional capacity)  Patient is able to achieve more than 4 MET (walk 4 blocks, climb 2 flights of stairs, etc...)          Y [X] / N []    High-risk patient features:  - Recent (<30 days) or active MI          Y [] / N [X]  - Unstable or severe angina          Y [] / N [X]  - Decompensated heart failure, or worsening or new-onset heart failure          Y [x] / N []  - Severe valvular disease          Y [] / N [X]  - Significant arrhythmia (Tachy- or Bradyarrhythmia)          Y [] / N [X]    * Surgery/Procedure-based characteristics (Type of surgery)  - Low-risk procedure (outpatient procedure, elective, endoscopy, etc...)          Y [x] / N []  - Elevated or Moderate-risk procedure (Inpatient)          Y [] / N []  - High-risk procedure (urgent/emergent procedure, Intrathoracic, vascular, etc...)          Y [] / N []    * Revised Cardiac Risk Index (RCRI)  1- History of ischemic heart disease          Y [] / N [X]  2- History of congestive heart failure          Y [x] / N []  3- History of stroke/TIA          Y [] / N [X]  4- History of insulin-dependent diabetes          Y [] / N [X]  5- Chronic kidney disease (Cr >2mg/dL)          Y [] / N [X]  6- Undergoing suprainguinal vascular, intraperitoneal, or intrathoracic surgery          Y [] / N [X]    Class II risk (One factor) --> 6% risk (30-day risk of death, MI, or cardiac arrest)    * IMPRESSION & RECOMMENDATIONS:  -Moderate-risk patient for a Low -risk surgery/procedure  - This consult serves only as a ivett-operative cardiac risk stratification and evaluation to predict 30-days cardiac complications risk and mortality. The decision to proceed with the surgery/procedure is made by the performing physician and the patient

## 2023-01-20 NOTE — CONSULT NOTE ADULT - SUBJECTIVE AND OBJECTIVE BOX
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 Patient reports that family members at home were ill with URI and strep throat, denies covid/ flu/rsv. Also reports associated shortness of breath, denies pain with deep breaths, denies chest pain/ palpitations, She was prescribed Augmentin and took for 6 days with no improvement. Ambulates with cane, sleeps in chair with elevated HOB normally. LE edema she reports is at her baseline due to her PAD. Follows with Dr. Stanley and reports prior normal echo's and stress test. Denies fevers, weight loss, diaphoresis, sore throat N/V/D, abdominal pain. Currently reports back pain resolved.     ED vitals: T98.3, /91, HR 82, RR 28, Placed on 2L NC 98%. CBC with no leukocytosis, Hgb 11.1 (bl 10), CMP with T-bili 1.7, Alk phos 357, , , trop x1 neg, . VBG wnl  - EKG: NSR  - Chest Xray with bilateral opacities/ congestion  - Covid/ flu/ rsv negative   - s/p solumedrol 125mg IV push and duoneb in the ED     Admit to telemetry for URI/ suspected New onset CHF/ Hypertensive Urgency  (17 Jan 2023 17:55)      PAST MEDICAL & SURGICAL HISTORY  Arterial insufficiency of lower extremity  left leg stent placed    Leg swelling    Hypothyroid    HTN (hypertension)    High cholesterol    Peripheral arterial disease    H/O Graves&#x27; disease    History of cholecystectomy    H/O: hysterectomy    S/P angiogram of extremity    S/P ERCP      FAMILY HISTORY:  FAMILY HISTORY:  Family history of breast cancer (Sibling)    FH: lung cancer  FATHER    [ ] no pertinent family history of premature cardiovascular disease in first degree relatives.  Mother:   Father:   Siblings:     SOCIAL HISTORY:  []smoker  []Alcohol  []Drug    ALLERGIES:  codeine (Stomach Upset; Vomiting; Nausea)    MEDICATIONS:  MEDICATIONS  (STANDING):  aspirin  chewable 81 milliGRAM(s) Oral daily  atenolol  Tablet 25 milliGRAM(s) Oral daily  chlorhexidine 2% Cloths 1 Application(s) Topical <User Schedule>  enoxaparin Injectable 40 milliGRAM(s) SubCutaneous every 24 hours  furosemide    Tablet 40 milliGRAM(s) Oral daily  levothyroxine 150 MICROGram(s) Oral daily  lisinopril 20 milliGRAM(s) Oral daily  methocarbamol 500 milliGRAM(s) Oral daily  NIFEdipine XL 60 milliGRAM(s) Oral daily  pantoprazole    Tablet 40 milliGRAM(s) Oral before breakfast    HOME MEDICATIONS:  Home Medications:  aspirin 81 mg oral tablet, chewable: 1 tab(s) orally once a day (23 Sep 2021 18:34)  furosemide 20 mg oral tablet: 1 tab(s) orally once a day (23 Sep 2021 18:34)  levothyroxine 150 mcg (0.15 mg) oral tablet: 1 tab(s) orally once a day (17 Jan 2023 18:53)  Plavix 75 mg oral tablet: 1 tab(s) orally once a day (23 Sep 2021 18:34)    VITALS:   T(F): 96.6 (01-20 @ 05:00), Max: 99 (01-19 @ 23:00)  HR: 63 (01-20 @ 05:00) (47 - 82)  BP: 121/74 (01-20 @ 05:00) (121/74 - 209/91)  BP(mean): --  RR: 18 (01-20 @ 05:00) (17 - 28)  SpO2: 95% (01-19 @ 23:00) (92% - 98%)    I&O's Summary      REVIEW OF SYSTEMS:    Negative except as mentioned in HPI    CONSTITUTIONAL: No weakness, fevers or chills  EYES: No visual changes  ENT: No vertigo or throat pain   NECK: No pain or stiffness  RESPIRATORY: No cough, wheezing, hemoptysis; No shortness of breath  CARDIOVASCULAR: No chest pain or palpitations  GASTROINTESTINAL: No abdominal or epigastric pain. No nausea, vomiting, or hematemesis; No diarrhea or constipation. No melena or hematochezia.  GENITOURINARY: No dysuria, frequency or hematuria  NEUROLOGICAL: No numbness or weakness  SKIN: No itching, no rashes  MSK: FROM x4    PHYSICAL EXAM:  NEURO: Awake , alert and oriented  GEN: Not in acute distress  NECK: No JVD  LUNGS: course bilaterally   CARDIOVASCULAR: S1/S2 present, RRR , no murmurs or rubs, no carotid bruits,  + PP bilaterally  ABD: Soft, non-tender, non-distended, +BS  EXT: No YANN  SKIN: Intact    LABS:                        10.1   9.78  )-----------( 290      ( 19 Jan 2023 05:34 )             32.2     01-19    140  |  103  |  35<H>  ----------------------------<  165<H>  4.8   |  25  |  1.1    Ca    8.5      19 Jan 2023 05:34  Mg     2.2     01-19    TPro  6.1  /  Alb  3.8  /  TBili  0.7  /  DBili  x   /  AST  102<H>  /  ALT  198<H>  /  AlkPhos  255<H>  01-19 01-18 Chol 210<H> LDL -- HDL 62 Trig 110    RADIOLOGY:  -CXR:  < from: Xray Chest 1 View- PORTABLE-Urgent (01.17.23 @ 14:44) >  Impression:    Bilateral opacifications,worsening. Follow-up as needed.    < end of copied text >    -TTE:  -CCTA:  -STRESS TEST:  -CATHETERIZATION:    ECG:  < from: 12 Lead ECG (01.17.23 @ 13:13) >  Ventricular Rate 76 BPM    Atrial Rate 76 BPM    P-R Interval 198 ms    QRS Duration 78 ms    Q-T Interval 368 ms    QTC Calculation(Bazett) 414 ms    P Axis 75 degrees    R Axis 48 degrees    T Axis 82 degrees    Diagnosis Line Normal sinus rhythm  Normal ECG      < end of copied text >       TELEMETRY EVENTS: sinus  
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 Patient reports that family members at home were ill with URI and strep throat, denies covid/ flu/rsv. Also reports associated shortness of breath, denies pain with deep breaths, denies chest pain/ palpitations, She was prescribed Augmentin and took for 6 days with no improvement. Ambulates with cane, sleeps in chair with elevated HOB normally. LE edema she reports is at her baseline due to her PAD. Follows with Dr. Stanley and reports prior normal echo's and stress test. Denies fevers, weight loss, diaphoresis, sore throat N/V/D, abdominal pain. Currently reports back pain resolved.     ED vitals: T98.3, /91, HR 82, RR 28, Placed on 2L NC 98%. CBC with no leukocytosis, Hgb 11.1 (bl 10), CMP with T-bili 1.7, Alk phos 357, , , trop x1 neg, . VBG wnl  - EKG: NSR  - Chest Xray with bilateral opacities/ congestion  - Covid/ flu/ rsv negative   - s/p solumedrol 125mg IV push and duoneb in the ED     Admit to telemetry for URI/ suspected New onset CHF/ Hypertensive Urgency  (17 Jan 2023 17:55)      PAST MEDICAL & SURGICAL HISTORY:  Arterial insufficiency of lower extremity  left leg stent placed      Leg swelling      Hypothyroid      HTN (hypertension)      High cholesterol      Peripheral arterial disease      H/O Graves&#x27; disease      History of cholecystectomy      H/O: hysterectomy      S/P angiogram of extremity      S/P ERCP        FAMILY HISTORY:  Family history of breast cancer (Sibling)    FH: lung cancer  FATHER        Social History:  Tobacco:  Alcohol:  Drugs:    Home Medications:  aspirin 81 mg oral tablet, chewable: 1 tab(s) orally once a day (23 Sep 2021 18:34)  furosemide 20 mg oral tablet: 1 tab(s) orally once a day (23 Sep 2021 18:34)  levothyroxine 150 mcg (0.15 mg) oral tablet: 1 tab(s) orally once a day (17 Jan 2023 18:53)  Plavix 75 mg oral tablet: 1 tab(s) orally once a day (23 Sep 2021 18:34)        MEDICATIONS  (STANDING):  aspirin  chewable 81 milliGRAM(s) Oral daily  atenolol  Tablet 25 milliGRAM(s) Oral daily  chlorhexidine 2% Cloths 1 Application(s) Topical <User Schedule>  clopidogrel Tablet 75 milliGRAM(s) Oral daily  enoxaparin Injectable 40 milliGRAM(s) SubCutaneous every 24 hours  furosemide   Injectable 60 milliGRAM(s) IV Push daily  levothyroxine 150 MICROGram(s) Oral daily  lisinopril 20 milliGRAM(s) Oral daily  pantoprazole    Tablet 40 milliGRAM(s) Oral before breakfast    MEDICATIONS  (PRN):      Allergies  codeine (Stomach Upset; Vomiting; Nausea)      Review of Systems:   Constitutional:  No Fever, No Chills  ENT/Mouth:  No Hearing Changes,  No Difficulty Swallowing  Eyes:  No Eye Pain, No Vision Changes  Cardiovascular:  No Chest Pain, No Palpitations  Respiratory:  No Cough, No Dyspnea  Gastrointestinal:  As described in HPI  Musculoskeletal:  No Joint Swelling, No Back Pain  Skin:  No Skin Lesions, No Jaundice  Neuro:  No Syncope, No Dizziness  Heme/Lymph:  No Bruising, No Bleeding.          Physical Examination:  T(C): 36.4 (01-18-23 @ 07:59), Max: 36.8 (01-17-23 @ 13:10)  HR: 47 (01-18-23 @ 07:59) (47 - 82)  BP: 194/76 (01-18-23 @ 07:59) (180/89 - 209/91)  RR: 18 (01-18-23 @ 07:59) (18 - 28)  SpO2: 98% (01-18-23 @ 07:59) (92% - 98%)    Weight (kg): 108.9 (01-17-23 @ 13:10)    01-18-23 @ 07:01 - 01-18-23 @ 09:28  --------------------------------------------------------  IN: 0 mL / OUT: 1300 mL / NET: -1300 mL          GENERAL: AAOx3, no acute distress.  HEAD:  Atraumatic, Normocephalic  EYES: conjunctiva and sclera clear  NECK: Supple, no JVD or thyromegaly  CHEST/LUNG: Clear to auscultation bilaterally; No wheeze, rhonchi, or rales  HEART: Regular rate and rhythm; normal S1, S2, No murmurs.  ABDOMEN: Soft, nontender, nondistended; Bowel sounds present  NEUROLOGY: No asterixis or tremor.   SKIN: Intact, no jaundice        Data:                        9.9    10.91 )-----------( 291      ( 18 Jan 2023 08:01 )             31.4     Hgb Trend:  9.9  01-18-23 @ 08:01  11.1  01-17-23 @ 13:45        01-17    135  |  102  |  18  ----------------------------<  152<H>  5.0   |  22  |  0.9    Ca    9.0      17 Jan 2023 13:45    TPro  6.9  /  Alb  4.4  /  TBili  1.0  /  DBili  0.4<H>  /  AST  181<H>  /  ALT  266<H>  /  AlkPhos  288<H>  01-17    Liver panel trend:  TBili 1.0   /      /      /   AlkP 288   /   Tptn 6.9   /   Alb 4.4    /   DBili 0.4      01-17  TBili 1.7   /      /      /   AlkP 315   /   Tptn 6.9   /   Alb 4.2    /   DBili --      01-17              Radiology:    US Abdomen Upper Quadrant Right:   ACC: [id number] EXAM:  US ABDOMEN RT UPR QUADRANT                          PROCEDURE DATE:  01/17/2023          INTERPRETATION:  CLINICAL INFORMATION: Elevated liver function enzymes.    COMPARISON: Prior right upper quadrant ultrasound 2/3/2019.    TECHNIQUE: Sonography of the right upper quadrant.    FINDINGS:  Liver: Incompletely imaged; imaged portions are within normal limits.  Bile ducts: Normal caliber. Common bile duct measures 17 mm, no   significant change since prior ultrasound. Few stones are noted within   the common bile duct with the largest measuring 2 cm.  Gallbladder: Status post cholecystectomy.  Pancreas: Obscured.  Right kidney: Measures 9.4 cm. No hydronephrosis. A 1.3 cm right renal   lower pole cyst is noted.  Ascites:None.  IVC: Visualized portions are within normal limits.      IMPRESSION:  1. Status post prior cholecystectomy with dilated common bile duct   measuring 17 mm, overall unchanged since reference exam of February 2019.  2. Few stones noted within thecommon bile duct, largest measuring 2 cm.    --- End of Report ---          KAYLENE HE MD; Resident Radiologist  This document has been electronically signed.  LINDSEY WAKEFIELD MD; Attending Radiologist  This document has been electronically signed. Jan 17 2023  7:14PM (01-17-23 @ 17:50)

## 2023-01-20 NOTE — PROGRESS NOTE ADULT - SUBJECTIVE AND OBJECTIVE BOX
JAKY RODRIGUEZ  77y  [facility]      Patient is a 77y old  Female who presents with a chief complaint of shortness of breath (20 Jan 2023 10:20)      INTERVAL HPI/OVERNIGHT EVENTS:        REVIEW OF SYSTEMS:        FAMILY HISTORY:  Family history of breast cancer (Sibling)    FH: lung cancer  FATHER      T(C): 36.1 (01-20-23 @ 14:04), Max: 37.2 (01-19-23 @ 23:00)  HR: 56 (01-20-23 @ 14:04) (56 - 68)  BP: 136/64 (01-20-23 @ 14:04) (121/74 - 169/72)  RR: 18 (01-20-23 @ 14:04) (17 - 18)  SpO2: 95% (01-19-23 @ 23:00) (95% - 98%)  Wt(kg): --Vital Signs Last 24 Hrs  T(C): 36.1 (20 Jan 2023 14:04), Max: 37.2 (19 Jan 2023 23:00)  T(F): 97 (20 Jan 2023 14:04), Max: 99 (19 Jan 2023 23:00)  HR: 56 (20 Jan 2023 14:04) (56 - 68)  BP: 136/64 (20 Jan 2023 14:04) (121/74 - 169/72)  BP(mean): --  RR: 18 (20 Jan 2023 14:04) (17 - 18)  SpO2: 95% (19 Jan 2023 23:00) (95% - 98%)    Parameters below as of 19 Jan 2023 23:00  Patient On (Oxygen Delivery Method): nasal cannula  O2 Flow (L/min): 2      PHYSICAL EXAM:  GENERAL: NAD, well-groomed, well-developed  HEAD:  Atraumatic, Normocephalic  EYES: EOMI, PERRLA, conjunctiva and sclera clear  ENMT: No tonsillar erythema, exudates, or enlargement; Moist mucous membranes, Good dentition, No lesions  NECK: Supple, No JVD, Normal thyroid  NERVOUS SYSTEM:  Alert & Oriented X3, Good concentration; Motor Strength 5/5 B/L upper and lower extremities; DTRs 2+ intact and symmetric  PULM: Clear to auscultation bilaterally  CARDIAC: Regular rate and rhythm; No murmurs, rubs, or gallops  GI: Soft, Nontender, Nondistended; Bowel sounds present  EXTREMITIES:  2+ Peripheral Pulses, No clubbing, cyanosis, or edema  LYMPH: No lymphadenopathy noted  SKIN: No rashes or lesions    Consultant(s) Notes Reviewed:  [x ] YES  [ ] NO  Care Discussed with Consultants/Other Providers [ x] YES  [ ] NO    LABS:                            11.2   8.19  )-----------( 261      ( 20 Jan 2023 08:10 )             34.6   01-20    134<L>  |  96<L>  |  32<H>  ----------------------------<  139<H>  4.3   |  24  |  1.1    Ca    8.7      20 Jan 2023 08:10  Mg     2.3     01-20    TPro  6.8  /  Alb  4.1  /  TBili  2.3<H>  /  DBili  x   /  AST  171<H>  /  ALT  291<H>  /  AlkPhos  348<H>  01-20            aspirin  chewable 81 milliGRAM(s) Oral daily  atenolol  Tablet 25 milliGRAM(s) Oral daily  chlorhexidine 2% Cloths 1 Application(s) Topical <User Schedule>  enoxaparin Injectable 40 milliGRAM(s) SubCutaneous every 24 hours  furosemide   Injectable 40 milliGRAM(s) IV Push two times a day  levothyroxine 150 MICROGram(s) Oral daily  lisinopril 20 milliGRAM(s) Oral daily  methocarbamol 500 milliGRAM(s) Oral daily  NIFEdipine XL 60 milliGRAM(s) Oral daily  pantoprazole    Tablet 40 milliGRAM(s) Oral before breakfast      77yr old with PMH of HTN, HLD, PAD s/p stents in LLE on aspirin and plavix, 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      1/ Acute Hypoxemic Respiratory Failure secondary to acute unspecified CHF still on oxygen   - Telemetry  - Cont lasix 20mg IV bid (keep on IV lasix until pt is off oxygen)   - Cyclic cardiac enzymes negative*3   - Echocardio:pending     #ampullary adenoma  - ct abd/ pelvis  - then possible MRCP vs ERCP    #Hypertensive Urgency   #Hx of HTN  - controlled    #Transaminitis   - improving    #PAD s/p multiple stents   - c/w aspirin and plavix     #HLD  - not on statin ?  - hold statin due to transaminitis     pending echo since the 17th and cardio c/s          Case Discussed with House Staff   39  minutes spent on total encounter; more than 50% of the visit was spent counseling and/or coordinating care by the attending physician.   Spectra [phone]     JAKY RODRIGUEZ  77y  [facility]      Patient is a 77y old  Female who presents with a chief complaint of shortness of breath (20 Jan 2023 10:20)      INTERVAL HPI/OVERNIGHT EVENTS:  Patient looks comfortable but still sob and on oxygen   cxr still showing chf but improving  agreeable with diuresis until she is off oxygen     :        FAMILY HISTORY:  Family history of breast cancer (Sibling)    FH: lung cancer  FATHER      T(C): 36.1 (01-20-23 @ 14:04), Max: 37.2 (01-19-23 @ 23:00)  HR: 56 (01-20-23 @ 14:04) (56 - 68)  BP: 136/64 (01-20-23 @ 14:04) (121/74 - 169/72)  RR: 18 (01-20-23 @ 14:04) (17 - 18)  SpO2: 95% (01-19-23 @ 23:00) (95% - 98%)  Wt(kg): --Vital Signs Last 24 Hrs  T(C): 36.1 (20 Jan 2023 14:04), Max: 37.2 (19 Jan 2023 23:00)  T(F): 97 (20 Jan 2023 14:04), Max: 99 (19 Jan 2023 23:00)  HR: 56 (20 Jan 2023 14:04) (56 - 68)  BP: 136/64 (20 Jan 2023 14:04) (121/74 - 169/72)  BP(mean): --  RR: 18 (20 Jan 2023 14:04) (17 - 18)  SpO2: 95% (19 Jan 2023 23:00) (95% - 98%)    Parameters below as of 19 Jan 2023 23:00  Patient On (Oxygen Delivery Method): nasal cannula  O2 Flow (L/min): 2      PHYSICAL EXAM:  GENERAL: NAD, well-groomed, well-developed  NECK: Supple, No JVD, Normal thyroid  NERVOUS SYSTEM:  Alert & Oriented X3, Good concentration; Motor Strength 5/5 B/L upper and lower extremities; DTRs 2+ intact and symmetric  PULM:Diffuse crackles   CARDIAC: Regular rate and rhythm;   GI: Soft, Nontender, Nondistended; Bowel sounds present  EXTREMITIES: chronic changes, edema     Consultant(s) Notes Reviewed:  [x ] YES  [ ] NO  Care Discussed with Consultants/Other Providers [ x] YES  [ ] NO    LABS:                            11.2   8.19  )-----------( 261      ( 20 Jan 2023 08:10 )             34.6   01-20    134<L>  |  96<L>  |  32<H>  ----------------------------<  139<H>  4.3   |  24  |  1.1    Ca    8.7      20 Jan 2023 08:10  Mg     2.3     01-20    TPro  6.8  /  Alb  4.1  /  TBili  2.3<H>  /  DBili  x   /  AST  171<H>  /  ALT  291<H>  /  AlkPhos  348<H>  01-20            aspirin  chewable 81 milliGRAM(s) Oral daily  atenolol  Tablet 25 milliGRAM(s) Oral daily  chlorhexidine 2% Cloths 1 Application(s) Topical <User Schedule>  enoxaparin Injectable 40 milliGRAM(s) SubCutaneous every 24 hours  furosemide   Injectable 40 milliGRAM(s) IV Push two times a day  levothyroxine 150 MICROGram(s) Oral daily  lisinopril 20 milliGRAM(s) Oral daily  methocarbamol 500 milliGRAM(s) Oral daily  NIFEdipine XL 60 milliGRAM(s) Oral daily  pantoprazole    Tablet 40 milliGRAM(s) Oral before breakfast      77yr old with PMH of HTN, HLD, PAD s/p stents in LLE on aspirin and plavix, 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      1. Acute Hypoxemic Respiratory Failure secondary to acute unspecified CHF still on oxygen   - Telemetry  - Cont lasix 20mg IV bid (keep on IV lasix until pt is off oxygen)   - Cyclic cardiac enzymes negative*3   - Echocardio:pending   - Cardio consult:  a) Moderate-risk patient for a Low -risk surgery/procedure  b) This consult serves only as a ivett-operative cardiac risk stratification and evaluation to predict 30-days cardiac complications risk and mortality. The decision to proceed with the surgery/procedure is made by the performing physician and the patient       2. Biliary duct dilatation secondary to stone and ampullary adenoma. Likely choledocholithiasis  - Hold plavix   - ct abd/ pelvis:  a) Diffuse biliary ductal dilatation with common bile duct measuring up to 2.3 cm. Multiple filling defects within common bile duct, likely reflecting choledocholithiasis.Trace bilateral effusions.  - GI consult:  a)  CT showed CBD stone in addition to pleural effusion, no mets   b)  Plavix stopped today  c)  Due to new onset heart failure patient will need cardiac clearance for ERCP   d)  Will plan for ERCP for stone extraction   e) For recurrent of the adenoma will plan for hybrid Argon plasma coagulation to reduce the burden of the dysplasia and the tumor   f)  Patient will need to be off plavix for the procedure     3. Hypertensive Urgency likely due to congestion   #Hx of HTN  - controlled    4. Transaminitis   - improving    5. PAD s/p multiple stents   - c/w aspirin and plavix     6. HLD  - not on statin ?  - hold statin due to transaminitis           Case Discussed with House Staff   39  minutes spent on total encounter; more than 50% of the visit was spent counseling and/or coordinating care by the attending physician.   Spectra [phone]

## 2023-01-21 LAB
ALBUMIN SERPL ELPH-MCNC: 3.5 G/DL — SIGNIFICANT CHANGE UP (ref 3.5–5.2)
ALP SERPL-CCNC: 281 U/L — HIGH (ref 30–115)
ALT FLD-CCNC: 196 U/L — HIGH (ref 0–41)
ANION GAP SERPL CALC-SCNC: 13 MMOL/L — SIGNIFICANT CHANGE UP (ref 7–14)
APTT BLD: 31.9 SEC — SIGNIFICANT CHANGE UP (ref 27–39.2)
AST SERPL-CCNC: 82 U/L — HIGH (ref 0–41)
BASOPHILS # BLD AUTO: 0.03 K/UL — SIGNIFICANT CHANGE UP (ref 0–0.2)
BASOPHILS NFR BLD AUTO: 0.4 % — SIGNIFICANT CHANGE UP (ref 0–1)
BILIRUB SERPL-MCNC: 1.1 MG/DL — SIGNIFICANT CHANGE UP (ref 0.2–1.2)
BUN SERPL-MCNC: 42 MG/DL — HIGH (ref 10–20)
CALCIUM SERPL-MCNC: 8.1 MG/DL — LOW (ref 8.4–10.5)
CHLORIDE SERPL-SCNC: 100 MMOL/L — SIGNIFICANT CHANGE UP (ref 98–110)
CO2 SERPL-SCNC: 24 MMOL/L — SIGNIFICANT CHANGE UP (ref 17–32)
CREAT SERPL-MCNC: 1.2 MG/DL — SIGNIFICANT CHANGE UP (ref 0.7–1.5)
EGFR: 47 ML/MIN/1.73M2 — LOW
EOSINOPHIL # BLD AUTO: 0.17 K/UL — SIGNIFICANT CHANGE UP (ref 0–0.7)
EOSINOPHIL NFR BLD AUTO: 2.5 % — SIGNIFICANT CHANGE UP (ref 0–8)
GLUCOSE BLDC GLUCOMTR-MCNC: 153 MG/DL — HIGH (ref 70–99)
GLUCOSE BLDC GLUCOMTR-MCNC: 179 MG/DL — HIGH (ref 70–99)
GLUCOSE BLDC GLUCOMTR-MCNC: 179 MG/DL — HIGH (ref 70–99)
GLUCOSE BLDC GLUCOMTR-MCNC: 259 MG/DL — HIGH (ref 70–99)
GLUCOSE SERPL-MCNC: 141 MG/DL — HIGH (ref 70–99)
HCT VFR BLD CALC: 31.5 % — LOW (ref 37–47)
HGB BLD-MCNC: 10.1 G/DL — LOW (ref 12–16)
IMM GRANULOCYTES NFR BLD AUTO: 0.6 % — HIGH (ref 0.1–0.3)
INR BLD: 1.07 RATIO — SIGNIFICANT CHANGE UP (ref 0.65–1.3)
LYMPHOCYTES # BLD AUTO: 1.33 K/UL — SIGNIFICANT CHANGE UP (ref 1.2–3.4)
LYMPHOCYTES # BLD AUTO: 19.3 % — LOW (ref 20.5–51.1)
MAGNESIUM SERPL-MCNC: 2.4 MG/DL — SIGNIFICANT CHANGE UP (ref 1.8–2.4)
MCHC RBC-ENTMCNC: 26.9 PG — LOW (ref 27–31)
MCHC RBC-ENTMCNC: 32.1 G/DL — SIGNIFICANT CHANGE UP (ref 32–37)
MCV RBC AUTO: 84 FL — SIGNIFICANT CHANGE UP (ref 81–99)
MONOCYTES # BLD AUTO: 0.79 K/UL — HIGH (ref 0.1–0.6)
MONOCYTES NFR BLD AUTO: 11.5 % — HIGH (ref 1.7–9.3)
NEUTROPHILS # BLD AUTO: 4.53 K/UL — SIGNIFICANT CHANGE UP (ref 1.4–6.5)
NEUTROPHILS NFR BLD AUTO: 65.7 % — SIGNIFICANT CHANGE UP (ref 42.2–75.2)
NRBC # BLD: 0 /100 WBCS — SIGNIFICANT CHANGE UP (ref 0–0)
PLATELET # BLD AUTO: 225 K/UL — SIGNIFICANT CHANGE UP (ref 130–400)
POTASSIUM SERPL-MCNC: 3.7 MMOL/L — SIGNIFICANT CHANGE UP (ref 3.5–5)
POTASSIUM SERPL-SCNC: 3.7 MMOL/L — SIGNIFICANT CHANGE UP (ref 3.5–5)
PROT SERPL-MCNC: 5.9 G/DL — LOW (ref 6–8)
PROTHROM AB SERPL-ACNC: 12.2 SEC — SIGNIFICANT CHANGE UP (ref 9.95–12.87)
RBC # BLD: 3.75 M/UL — LOW (ref 4.2–5.4)
RBC # FLD: 14.6 % — HIGH (ref 11.5–14.5)
SODIUM SERPL-SCNC: 137 MMOL/L — SIGNIFICANT CHANGE UP (ref 135–146)
WBC # BLD: 6.89 K/UL — SIGNIFICANT CHANGE UP (ref 4.8–10.8)
WBC # FLD AUTO: 6.89 K/UL — SIGNIFICANT CHANGE UP (ref 4.8–10.8)

## 2023-01-21 PROCEDURE — 99233 SBSQ HOSP IP/OBS HIGH 50: CPT

## 2023-01-21 RX ADMIN — Medication 81 MILLIGRAM(S): at 11:17

## 2023-01-21 RX ADMIN — ENOXAPARIN SODIUM 40 MILLIGRAM(S): 100 INJECTION SUBCUTANEOUS at 06:34

## 2023-01-21 RX ADMIN — CHLORHEXIDINE GLUCONATE 1 APPLICATION(S): 213 SOLUTION TOPICAL at 06:41

## 2023-01-21 RX ADMIN — Medication 20 MILLIGRAM(S): at 06:35

## 2023-01-21 RX ADMIN — Medication 150 MICROGRAM(S): at 06:35

## 2023-01-21 RX ADMIN — METHOCARBAMOL 500 MILLIGRAM(S): 500 TABLET, FILM COATED ORAL at 11:17

## 2023-01-21 RX ADMIN — LISINOPRIL 20 MILLIGRAM(S): 2.5 TABLET ORAL at 06:34

## 2023-01-21 RX ADMIN — PANTOPRAZOLE SODIUM 40 MILLIGRAM(S): 20 TABLET, DELAYED RELEASE ORAL at 06:41

## 2023-01-21 RX ADMIN — ATENOLOL 25 MILLIGRAM(S): 25 TABLET ORAL at 07:36

## 2023-01-21 RX ADMIN — Medication 20 MILLIGRAM(S): at 13:49

## 2023-01-21 RX ADMIN — Medication 60 MILLIGRAM(S): at 06:35

## 2023-01-21 NOTE — PROGRESS NOTE ADULT - SUBJECTIVE AND OBJECTIVE BOX
Progress Note:  Provider Speciality                            Hospitalist      JAKY RODRIGUEZ MRN-[id number] 77y Female     CHIEF PRESENTING COMPLAINT:  Patient is a 77y old  Female who presents with a chief complaint of shortness of breath (20 Jan 2023 14:06)        SUBJECTIVE:  Patient was seen and examined at bedside. Reports improvement in  presenting complaint.   No significant overnight events reported.     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 Patient reports that family members at home were ill with URI and strep throat, denies covid/ flu/rsv. Also reports associated shortness of breath, denies pain with deep breaths, denies chest pain/ palpitations, She was prescribed Augmentin and took for 6 days with no improvement. Ambulates with cane, sleeps in chair with elevated HOB normally. LE edema she reports is at her baseline due to her PAD. Follows with Dr. Stanley and reports prior normal echo's and stress test. Denies fevers, weight loss, diaphoresis, sore throat N/V/D, abdominal pain. Currently reports back pain resolved.     ED vitals: T98.3, /91, HR 82, RR 28, Placed on 2L NC 98%. CBC with no leukocytosis, Hgb 11.1 (bl 10), CMP with T-bili 1.7, Alk phos 357, , , trop x1 neg, . VBG wnl  - EKG: NSR  - Chest Xray with bilateral opacities/ congestion  - Covid/ flu/ rsv negative   - s/p solumedrol 125mg IV push and duoneb in the ED     Admit to telemetry for URI/ suspected New onset CHF/ Hypertensive Urgency  (17 Jan 2023 17:55)        REVIEW OF SYSTEMS:  Patient denies  headache, fever, chills. Denies chest pain, shortness of breath, palpitation. Denies nausea, vomiting, abdominal pain or diarrhoea, Denies dysuria.   At least 10 systems were reviewed in ROS. All systems reviewed  are within normal limits except for the complaints as described in Subjective.    PAST MEDICAL & SURGICAL HISTORY:  PAST MEDICAL & SURGICAL HISTORY:  Arterial insufficiency of lower extremity  left leg stent placed      Leg swelling      Hypothyroid      HTN (hypertension)      High cholesterol      Peripheral arterial disease      H/O Graves&#x27; disease      History of cholecystectomy      H/O: hysterectomy      S/P angiogram of extremity      S/P ERCP              VITAL SIGNS:  Vital Signs Last 24 Hrs  T(C): 36.4 (21 Jan 2023 05:00), Max: 36.6 (20 Jan 2023 20:21)  T(F): 97.6 (21 Jan 2023 05:00), Max: 97.9 (20 Jan 2023 20:21)  HR: 58 (21 Jan 2023 05:00) (55 - 58)  BP: 128/71 (21 Jan 2023 05:00) (128/71 - 167/72)  BP(mean): --  RR: 18 (21 Jan 2023 05:00) (18 - 18)  SpO2: 94% (20 Jan 2023 18:13) (94% - 94%)    Parameters below as of 20 Jan 2023 18:13  Patient On (Oxygen Delivery Method): nasal cannula  O2 Flow (L/min): 1            PHYSICAL EXAMINATION:  Not in acute distress  General: No icterus  HEENT:   no JVD.  Heart: S1+S2 audible  Lungs: bilateral  moderate air entry, no wheezing, no crepitations.  Abdomen: Soft, non-tender, non-distended , no  rigidity or guarding.  CNS: Awake alert, CN  grossly intact.  Extremities:  No edema            CONSULTS:  Consultant(s) Notes Reviewed by me.   Care Discussed with Consultants/Other Providers where required.        MEDICATIONS:  MEDICATIONS  (STANDING):  aspirin  chewable 81 milliGRAM(s) Oral daily  atenolol  Tablet 25 milliGRAM(s) Oral daily  chlorhexidine 2% Cloths 1 Application(s) Topical <User Schedule>  enoxaparin Injectable 40 milliGRAM(s) SubCutaneous every 24 hours  furosemide   Injectable 40 milliGRAM(s) IV Push two times a day  furosemide   Injectable 20 milliGRAM(s) IV Push two times a day  levothyroxine 150 MICROGram(s) Oral daily  lisinopril 20 milliGRAM(s) Oral daily  methocarbamol 500 milliGRAM(s) Oral daily  NIFEdipine XL 60 milliGRAM(s) Oral daily  pantoprazole    Tablet 40 milliGRAM(s) Oral before breakfast    MEDICATIONS  (PRN):            ASSESSMENT:  77yr old with PMH of HTN, HLD, PAD s/p stents in LLE on aspirin and plavix, 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      Acute Hypoxemic Respiratory Failure , unclear etiology , possible volume overload  Possible biliary colic due to choledocholithiasis  PAD s/p LLE stent  HTN  HLD      TTE pennding  Continue lasix and keep negative balance   Continue atenolol, lisinopril, nifedipine  Holding plavix, for ERCP but continue ASA   Cardio consult-Moderate-risk patient for a Low -risk surgery/procedure  CT showed CBD stone in addition to pleural effusion, no mets   Plan for ERCP for stone extraction next week  Hypertensive Urgency -resolved  Transaminitis - improving  PAD s/p multiple stents - c/w aspirin. Holding plavix   Handoff: Pending ERCP next week  Total time spent to complete patient's bedside assessment, physical examination, review medical chart including labs & imaging, discuss medical plan of care with housestaff was more than 35 minutes

## 2023-01-21 NOTE — PROGRESS NOTE ADULT - SUBJECTIVE AND OBJECTIVE BOX
The patient is feeling better. Denies any chest pain, SOB or palpitations. CXR improved. She is restricting her fluids intake. She is still awaiting 2Decho (already ordered)       MEDICATIONS  (STANDING):  aspirin  chewable 81 milliGRAM(s) Oral daily  atenolol  Tablet 25 milliGRAM(s) Oral daily  chlorhexidine 2% Cloths 1 Application(s) Topical <User Schedule>  enoxaparin Injectable 40 milliGRAM(s) SubCutaneous every 24 hours  furosemide   Injectable 40 milliGRAM(s) IV Push two times a day  furosemide   Injectable 20 milliGRAM(s) IV Push two times a day  levothyroxine 150 MICROGram(s) Oral daily  lisinopril 20 milliGRAM(s) Oral daily  methocarbamol 500 milliGRAM(s) Oral daily  NIFEdipine XL 60 milliGRAM(s) Oral daily  pantoprazole    Tablet 40 milliGRAM(s) Oral before breakfast    MEDICATIONS  (PRN):            Vital Signs Last 24 Hrs  T(C): 36.4 (21 Jan 2023 14:36), Max: 36.6 (20 Jan 2023 20:21)  T(F): 97.6 (21 Jan 2023 14:36), Max: 97.9 (20 Jan 2023 20:21)  HR: 58 (21 Jan 2023 14:36) (55 - 58)  BP: 168/73 (21 Jan 2023 14:36) (128/71 - 168/73)  BP(mean): --  RR: 18 (21 Jan 2023 14:36) (18 - 18)  SpO2: 94% (20 Jan 2023 18:13) (94% - 94%)    Parameters below as of 20 Jan 2023 18:13  Patient On (Oxygen Delivery Method): nasal cannula  O2 Flow (L/min): 1       REVIEW OF SYSTEMS:  CONSTITUTIONAL: No fever, weight loss, or fatigue  CARDIOLOGY: PAtient denies chest pain, shortness of breath or syncopal episodes.   RESPIRATORY: denies shortness of breath, wheezeing.   NEUROLOGICAL: NO weakness, no focal deficits to report.  GI: no BRBPR, no N,V,diarrhea.    PSYCHIATRY: normal mood and affect  HEENT: no nasal discharge, no ecchymosis  SKIN: no ecchymosis, no breakdown  MUSCULOSKELETAL: Full range of motion x4.        PHYSICAL EXAM:  · CONSTITUTIONAL:	Well-developed, well nourished      ·RESPIRATORY:   airway patent; breath sounds equal; good air movement; respirations non-labored; clear to auscultation bilaterally  · CARDIOVASCULAR	regular rate and rhythm  + KRISHNA   · EXTREMITIES: No cyanosis, clubbing or edema  · VASCULAR: Absent AT/PT bilaterally   	  TELEMETRY: Sinus     TTE: Pending     LABS:                        10.1   6.89  )-----------( 225      ( 21 Jan 2023 06:28 )             31.5     01-21    137  |  100  |  42<H>  ----------------------------<  141<H>  3.7   |  24  |  1.2    Ca    8.1<L>      21 Jan 2023 06:28  Mg     2.4     01-21    TPro  5.9<L>  /  Alb  3.5  /  TBili  1.1  /  DBili  x   /  AST  82<H>  /  ALT  196<H>  /  AlkPhos  281<H>  01-21        PT/INR - ( 21 Jan 2023 06:28 )   PT: 12.20 sec;   INR: 1.07 ratio         PTT - ( 21 Jan 2023 06:28 )  PTT:31.9 sec    I&O's Summary    20 Jan 2023 07:01  -  21 Jan 2023 07:00  --------------------------------------------------------  IN: 960 mL / OUT: 300 mL / NET: 660 mL    21 Jan 2023 07:01  -  21 Jan 2023 15:20  --------------------------------------------------------  IN: 572 mL / OUT: 450 mL / NET: 122 mL      BNP  RADIOLOGY & ADDITIONAL STUDIES:    IMPRESSION AND PLAN:      Keep negative balance/Daily weight   IV diuretics  Monitor electrolytes and serum creatinine  GI and DVT prophylaxis   Will F/U

## 2023-01-22 LAB
ALBUMIN SERPL ELPH-MCNC: 4 G/DL — SIGNIFICANT CHANGE UP (ref 3.5–5.2)
ALP SERPL-CCNC: 347 U/L — HIGH (ref 30–115)
ALT FLD-CCNC: 197 U/L — HIGH (ref 0–41)
ANION GAP SERPL CALC-SCNC: 13 MMOL/L — SIGNIFICANT CHANGE UP (ref 7–14)
APTT BLD: 37.5 SEC — SIGNIFICANT CHANGE UP (ref 27–39.2)
AST SERPL-CCNC: 71 U/L — HIGH (ref 0–41)
BASOPHILS # BLD AUTO: 0.05 K/UL — SIGNIFICANT CHANGE UP (ref 0–0.2)
BASOPHILS NFR BLD AUTO: 0.5 % — SIGNIFICANT CHANGE UP (ref 0–1)
BILIRUB SERPL-MCNC: 1 MG/DL — SIGNIFICANT CHANGE UP (ref 0.2–1.2)
BUN SERPL-MCNC: 43 MG/DL — HIGH (ref 10–20)
CALCIUM SERPL-MCNC: 8.6 MG/DL — SIGNIFICANT CHANGE UP (ref 8.4–10.4)
CHLORIDE SERPL-SCNC: 97 MMOL/L — LOW (ref 98–110)
CO2 SERPL-SCNC: 25 MMOL/L — SIGNIFICANT CHANGE UP (ref 17–32)
CREAT SERPL-MCNC: 1.2 MG/DL — SIGNIFICANT CHANGE UP (ref 0.7–1.5)
EGFR: 47 ML/MIN/1.73M2 — LOW
EOSINOPHIL # BLD AUTO: 0.38 K/UL — SIGNIFICANT CHANGE UP (ref 0–0.7)
EOSINOPHIL NFR BLD AUTO: 3.9 % — SIGNIFICANT CHANGE UP (ref 0–8)
GLUCOSE BLDC GLUCOMTR-MCNC: 144 MG/DL — HIGH (ref 70–99)
GLUCOSE BLDC GLUCOMTR-MCNC: 155 MG/DL — HIGH (ref 70–99)
GLUCOSE BLDC GLUCOMTR-MCNC: 161 MG/DL — HIGH (ref 70–99)
GLUCOSE BLDC GLUCOMTR-MCNC: 164 MG/DL — HIGH (ref 70–99)
GLUCOSE SERPL-MCNC: 143 MG/DL — HIGH (ref 70–99)
HCT VFR BLD CALC: 36.9 % — LOW (ref 37–47)
HGB BLD-MCNC: 11.4 G/DL — LOW (ref 12–16)
IMM GRANULOCYTES NFR BLD AUTO: 0.6 % — HIGH (ref 0.1–0.3)
INR BLD: 0.93 RATIO — SIGNIFICANT CHANGE UP (ref 0.65–1.3)
LYMPHOCYTES # BLD AUTO: 1.66 K/UL — SIGNIFICANT CHANGE UP (ref 1.2–3.4)
LYMPHOCYTES # BLD AUTO: 17.2 % — LOW (ref 20.5–51.1)
MAGNESIUM SERPL-MCNC: 2.6 MG/DL — HIGH (ref 1.8–2.4)
MCHC RBC-ENTMCNC: 26.9 PG — LOW (ref 27–31)
MCHC RBC-ENTMCNC: 30.9 G/DL — LOW (ref 32–37)
MCV RBC AUTO: 87 FL — SIGNIFICANT CHANGE UP (ref 81–99)
MONOCYTES # BLD AUTO: 0.72 K/UL — HIGH (ref 0.1–0.6)
MONOCYTES NFR BLD AUTO: 7.5 % — SIGNIFICANT CHANGE UP (ref 1.7–9.3)
NEUTROPHILS # BLD AUTO: 6.79 K/UL — HIGH (ref 1.4–6.5)
NEUTROPHILS NFR BLD AUTO: 70.3 % — SIGNIFICANT CHANGE UP (ref 42.2–75.2)
NRBC # BLD: 0 /100 WBCS — SIGNIFICANT CHANGE UP (ref 0–0)
PLATELET # BLD AUTO: 259 K/UL — SIGNIFICANT CHANGE UP (ref 130–400)
POTASSIUM SERPL-MCNC: 4.4 MMOL/L — SIGNIFICANT CHANGE UP (ref 3.5–5)
POTASSIUM SERPL-SCNC: 4.4 MMOL/L — SIGNIFICANT CHANGE UP (ref 3.5–5)
PROT SERPL-MCNC: 7.1 G/DL — SIGNIFICANT CHANGE UP (ref 6–8)
PROTHROM AB SERPL-ACNC: 10.6 SEC — SIGNIFICANT CHANGE UP (ref 9.95–12.87)
RBC # BLD: 4.24 M/UL — SIGNIFICANT CHANGE UP (ref 4.2–5.4)
RBC # FLD: 14.5 % — SIGNIFICANT CHANGE UP (ref 11.5–14.5)
SODIUM SERPL-SCNC: 135 MMOL/L — SIGNIFICANT CHANGE UP (ref 135–146)
WBC # BLD: 9.66 K/UL — SIGNIFICANT CHANGE UP (ref 4.8–10.8)
WBC # FLD AUTO: 9.66 K/UL — SIGNIFICANT CHANGE UP (ref 4.8–10.8)

## 2023-01-22 PROCEDURE — 99233 SBSQ HOSP IP/OBS HIGH 50: CPT

## 2023-01-22 RX ADMIN — ENOXAPARIN SODIUM 40 MILLIGRAM(S): 100 INJECTION SUBCUTANEOUS at 05:27

## 2023-01-22 RX ADMIN — Medication 150 MICROGRAM(S): at 05:28

## 2023-01-22 RX ADMIN — LISINOPRIL 20 MILLIGRAM(S): 2.5 TABLET ORAL at 05:28

## 2023-01-22 RX ADMIN — PANTOPRAZOLE SODIUM 40 MILLIGRAM(S): 20 TABLET, DELAYED RELEASE ORAL at 05:28

## 2023-01-22 RX ADMIN — Medication 81 MILLIGRAM(S): at 11:16

## 2023-01-22 RX ADMIN — Medication 20 MILLIGRAM(S): at 05:27

## 2023-01-22 RX ADMIN — CHLORHEXIDINE GLUCONATE 1 APPLICATION(S): 213 SOLUTION TOPICAL at 05:28

## 2023-01-22 RX ADMIN — Medication 20 MILLIGRAM(S): at 16:41

## 2023-01-22 RX ADMIN — METHOCARBAMOL 500 MILLIGRAM(S): 500 TABLET, FILM COATED ORAL at 11:16

## 2023-01-22 RX ADMIN — Medication 60 MILLIGRAM(S): at 05:28

## 2023-01-22 RX ADMIN — ATENOLOL 25 MILLIGRAM(S): 25 TABLET ORAL at 05:27

## 2023-01-22 NOTE — PROGRESS NOTE ADULT - SUBJECTIVE AND OBJECTIVE BOX
Progress Note:  Provider Speciality                            Hospitalist      JAKY RODRIGUEZ [id number] 77y Female     CHIEF PRESENTING COMPLAINT:  Patient is a 77y old  Female who presents with a chief complaint of shortness of breath (20 Jan 2023 14:06)        SUBJECTIVE:  Patient was seen and examined at bedside. No new complaints described by patient in morning rounds.   No significant overnight events reported.     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 Patient reports that family members at home were ill with URI and strep throat, denies covid/ flu/rsv. Also reports associated shortness of breath, denies pain with deep breaths, denies chest pain/ palpitations, She was prescribed Augmentin and took for 6 days with no improvement. Ambulates with cane, sleeps in chair with elevated HOB normally. LE edema she reports is at her baseline due to her PAD. Follows with Dr. Stanley and reports prior normal echo's and stress test. Denies fevers, weight loss, diaphoresis, sore throat N/V/D, abdominal pain. Currently reports back pain resolved.     ED vitals: T98.3, /91, HR 82, RR 28, Placed on 2L NC 98%. CBC with no leukocytosis, Hgb 11.1 (bl 10), CMP with T-bili 1.7, Alk phos 357, , , trop x1 neg, . VBG wnl  - EKG: NSR  - Chest Xray with bilateral opacities/ congestion  - Covid/ flu/ rsv negative   - s/p solumedrol 125mg IV push and duoneb in the ED     Admit to telemetry for URI/ suspected New onset CHF/ Hypertensive Urgency  (17 Jan 2023 17:55)        REVIEW OF SYSTEMS:  Patient denies  headache, fever, chills. Denies chest pain, shortness of breath, palpitation. Denies nausea, vomiting, abdominal pain or diarrhoea, Denies dysuria.   At least 10 systems were reviewed in ROS. All systems reviewed  are within normal limits except for the complaints as described in Subjective.    PAST MEDICAL & SURGICAL HISTORY:  PAST MEDICAL & SURGICAL HISTORY:  Arterial insufficiency of lower extremity  left leg stent placed      Leg swelling      Hypothyroid      HTN (hypertension)      High cholesterol      Peripheral arterial disease      H/O Graves&#x27; disease      History of cholecystectomy      H/O: hysterectomy      S/P angiogram of extremity      S/P ERCP              VITAL SIGNS:  Vital Signs Last 24 Hrs  T(C): 36.7 (22 Jan 2023 05:24), Max: 36.7 (22 Jan 2023 05:24)  T(F): 98 (22 Jan 2023 05:24), Max: 98 (22 Jan 2023 05:24)  HR: 60 (22 Jan 2023 05:24) (55 - 60)  BP: 158/70 (22 Jan 2023 05:24) (154/68 - 168/73)  BP(mean): --  RR: 18 (22 Jan 2023 05:24) (18 - 18)  SpO2: 98% (22 Jan 2023 05:24) (96% - 98%)    Parameters below as of 22 Jan 2023 05:24  Patient On (Oxygen Delivery Method): nasal cannula,1L            PHYSICAL EXAMINATION:  Not in acute distress, obese  General: No icterus  HEENT:   no JVD.  Heart: S1+S2 audible  Lungs: bilateral  moderate air entry, no wheezing, no crepitations.  Abdomen: Soft, non-tender, non-distended , no  rigidity or guarding.  CNS: Awake alert, CN  grossly intact.  Extremities:  No edema            CONSULTS:  Consultant(s) Notes Reviewed by me.   Care Discussed with Consultants/Other Providers where required.        MEDICATIONS:  MEDICATIONS  (STANDING):  aspirin  chewable 81 milliGRAM(s) Oral daily  atenolol  Tablet 25 milliGRAM(s) Oral daily  chlorhexidine 2% Cloths 1 Application(s) Topical <User Schedule>  enoxaparin Injectable 40 milliGRAM(s) SubCutaneous every 24 hours  furosemide   Injectable 40 milliGRAM(s) IV Push two times a day  furosemide   Injectable 20 milliGRAM(s) IV Push two times a day  levothyroxine 150 MICROGram(s) Oral daily  lisinopril 20 milliGRAM(s) Oral daily  methocarbamol 500 milliGRAM(s) Oral daily  NIFEdipine XL 60 milliGRAM(s) Oral daily  pantoprazole    Tablet 40 milliGRAM(s) Oral before breakfast    MEDICATIONS  (PRN):            ASSESSMENT:  77yr old with PMH of HTN, HLD, PAD s/p stents in LLE on aspirin and plavix, 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      Acute Hypoxemic Respiratory Failure , unclear etiology , possible volume overload  Possible biliary colic due to choledocholithiasis  PAD s/p LLE stent  HTN  HLD      CT showed CBD stone in addition to pleural effusion, no mets   TTE pending -will need ischemic w/u to define cardiomyopathy if EF low  Continue lasix and keep negative balance   Continue atenolol, lisinopril, nifedipine  Holding plavix, for ERCP but continue ASA   Cardio consult-Moderate-risk patient for a Low -risk surgery/procedure pending TTE  Plan for ERCP for stone extraction early next week  Hypertensive Urgency -resolved  Transaminitis - improving  PAD s/p multiple stents - c/w aspirin. Holding Plavix   Handoff: Pending ERCP next week  Total time spent to complete patient's bedside assessment, physical examination, review medical chart including labs & imaging, discuss medical plan of care with housestaff was more than 35 minutes         Progress Note:  Provider Speciality                            Hospitalist      JAKY RODRIGUEZ MRN-[id number] 77y Female     CHIEF PRESENTING COMPLAINT:  Patient is a 77y old  Female who presents with a chief complaint of shortness of breath (20 Jan 2023 14:06)        SUBJECTIVE:  Patient was seen and examined at bedside. No new complaints described by patient in morning rounds.   No significant overnight events reported.     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 Patient reports that family members at home were ill with URI and strep throat, denies covid/ flu/rsv. Also reports associated shortness of breath, denies pain with deep breaths, denies chest pain/ palpitations, She was prescribed Augmentin and took for 6 days with no improvement. Ambulates with cane, sleeps in chair with elevated HOB normally. LE edema she reports is at her baseline due to her PAD. Follows with Dr. Stanley and reports prior normal echo's and stress test. Denies fevers, weight loss, diaphoresis, sore throat N/V/D, abdominal pain. Currently reports back pain resolved.     ED vitals: T98.3, /91, HR 82, RR 28, Placed on 2L NC 98%. CBC with no leukocytosis, Hgb 11.1 (bl 10), CMP with T-bili 1.7, Alk phos 357, , , trop x1 neg, . VBG wnl  - EKG: NSR  - Chest Xray with bilateral opacities/ congestion  - Covid/ flu/ rsv negative   - s/p solumedrol 125mg IV push and duoneb in the ED     Admit to telemetry for URI/ suspected New onset CHF/ Hypertensive Urgency  (17 Jan 2023 17:55)        REVIEW OF SYSTEMS:  Patient denies  headache, fever, chills. Denies chest pain, shortness of breath, palpitation. Denies nausea, vomiting, abdominal pain or diarrhoea, Denies dysuria.   At least 10 systems were reviewed in ROS. All systems reviewed  are within normal limits except for the complaints as described in Subjective.    PAST MEDICAL & SURGICAL HISTORY:  PAST MEDICAL & SURGICAL HISTORY:  Arterial insufficiency of lower extremity  left leg stent placed      Leg swelling      Hypothyroid      HTN (hypertension)      High cholesterol      Peripheral arterial disease      H/O Graves&#x27; disease      History of cholecystectomy      H/O: hysterectomy      S/P angiogram of extremity      S/P ERCP              VITAL SIGNS:  Vital Signs Last 24 Hrs  T(C): 36.7 (22 Jan 2023 05:24), Max: 36.7 (22 Jan 2023 05:24)  T(F): 98 (22 Jan 2023 05:24), Max: 98 (22 Jan 2023 05:24)  HR: 60 (22 Jan 2023 05:24) (55 - 60)  BP: 158/70 (22 Jan 2023 05:24) (154/68 - 168/73)  BP(mean): --  RR: 18 (22 Jan 2023 05:24) (18 - 18)  SpO2: 98% (22 Jan 2023 05:24) (96% - 98%)    Parameters below as of 22 Jan 2023 05:24  Patient On (Oxygen Delivery Method): nasal cannula,1L            PHYSICAL EXAMINATION:  Not in acute distress, obese  General: No icterus  HEENT:   no JVD.  Heart: S1+S2 audible  Lungs: bilateral  moderate air entry, no wheezing, no crepitations.  Abdomen: Soft, non-tender, non-distended , no  rigidity or guarding.  CNS: Awake alert, CN  grossly intact.  Extremities:  No edema            CONSULTS:  Consultant(s) Notes Reviewed by me.   Care Discussed with Consultants/Other Providers where required.        MEDICATIONS:  MEDICATIONS  (STANDING):  aspirin  chewable 81 milliGRAM(s) Oral daily  atenolol  Tablet 25 milliGRAM(s) Oral daily  chlorhexidine 2% Cloths 1 Application(s) Topical <User Schedule>  enoxaparin Injectable 40 milliGRAM(s) SubCutaneous every 24 hours  furosemide   Injectable 40 milliGRAM(s) IV Push two times a day  furosemide   Injectable 20 milliGRAM(s) IV Push two times a day  levothyroxine 150 MICROGram(s) Oral daily  lisinopril 20 milliGRAM(s) Oral daily  methocarbamol 500 milliGRAM(s) Oral daily  NIFEdipine XL 60 milliGRAM(s) Oral daily  pantoprazole    Tablet 40 milliGRAM(s) Oral before breakfast    MEDICATIONS  (PRN):            ASSESSMENT:  77yr old with PMH of HTN, HLD, PAD s/p stents in LLE on aspirin and plavix, 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      Acute Hypoxemic Respiratory Failure , unclear etiology , possible volume overload  Possible biliary colic due to choledocholithiasis  PAD s/p LLE stent  HTN  HLD      CT showed Diffuse biliary ductal dilatation with CBD 2.3 cm. Multiple filling defects within common bile duct, likely reflecting choledocholithiasis.  TTE pending -will need ischemic w/u to define cardiomyopathy if EF low  Continue lasix and keep negative balance   Continue atenolol, lisinopril, nifedipine  Holding plavix, for ERCP but continue ASA   Cardio consult-Moderate-risk patient for a Low -risk surgery/procedure pending TTE  Plan for ERCP for stone extraction early next week  Hypertensive Urgency -resolved  Transaminitis - improving  PAD s/p multiple stents - c/w aspirin. Holding Plavix   Handoff: Pending ERCP next week  Total time spent to complete patient's bedside assessment, physical examination, review medical chart including labs & imaging, discuss medical plan of care with housestaff was more than 35 minutes

## 2023-01-23 LAB
ALBUMIN SERPL ELPH-MCNC: 3.7 G/DL — SIGNIFICANT CHANGE UP (ref 3.5–5.2)
ALP SERPL-CCNC: 309 U/L — HIGH (ref 30–115)
ALT FLD-CCNC: 162 U/L — HIGH (ref 0–41)
ANION GAP SERPL CALC-SCNC: 15 MMOL/L — HIGH (ref 7–14)
APTT BLD: 32.6 SEC — SIGNIFICANT CHANGE UP (ref 27–39.2)
AST SERPL-CCNC: 55 U/L — HIGH (ref 0–41)
BASOPHILS # BLD AUTO: 0.04 K/UL — SIGNIFICANT CHANGE UP (ref 0–0.2)
BASOPHILS NFR BLD AUTO: 0.5 % — SIGNIFICANT CHANGE UP (ref 0–1)
BILIRUB SERPL-MCNC: 0.8 MG/DL — SIGNIFICANT CHANGE UP (ref 0.2–1.2)
BUN SERPL-MCNC: 43 MG/DL — HIGH (ref 10–20)
CALCIUM SERPL-MCNC: 8.9 MG/DL — SIGNIFICANT CHANGE UP (ref 8.4–10.5)
CHLORIDE SERPL-SCNC: 102 MMOL/L — SIGNIFICANT CHANGE UP (ref 98–110)
CO2 SERPL-SCNC: 23 MMOL/L — SIGNIFICANT CHANGE UP (ref 17–32)
CREAT SERPL-MCNC: 1.1 MG/DL — SIGNIFICANT CHANGE UP (ref 0.7–1.5)
EGFR: 52 ML/MIN/1.73M2 — LOW
EOSINOPHIL # BLD AUTO: 0.39 K/UL — SIGNIFICANT CHANGE UP (ref 0–0.7)
EOSINOPHIL NFR BLD AUTO: 5.2 % — SIGNIFICANT CHANGE UP (ref 0–8)
GLUCOSE BLDC GLUCOMTR-MCNC: 140 MG/DL — HIGH (ref 70–99)
GLUCOSE BLDC GLUCOMTR-MCNC: 151 MG/DL — HIGH (ref 70–99)
GLUCOSE BLDC GLUCOMTR-MCNC: 171 MG/DL — HIGH (ref 70–99)
GLUCOSE BLDC GLUCOMTR-MCNC: 177 MG/DL — HIGH (ref 70–99)
GLUCOSE SERPL-MCNC: 143 MG/DL — HIGH (ref 70–99)
HCT VFR BLD CALC: 31.8 % — LOW (ref 37–47)
HGB BLD-MCNC: 10.2 G/DL — LOW (ref 12–16)
IMM GRANULOCYTES NFR BLD AUTO: 0.5 % — HIGH (ref 0.1–0.3)
INR BLD: 0.92 RATIO — SIGNIFICANT CHANGE UP (ref 0.65–1.3)
LYMPHOCYTES # BLD AUTO: 1.72 K/UL — SIGNIFICANT CHANGE UP (ref 1.2–3.4)
LYMPHOCYTES # BLD AUTO: 23.1 % — SIGNIFICANT CHANGE UP (ref 20.5–51.1)
MAGNESIUM SERPL-MCNC: 2.5 MG/DL — HIGH (ref 1.8–2.4)
MCHC RBC-ENTMCNC: 27.6 PG — SIGNIFICANT CHANGE UP (ref 27–31)
MCHC RBC-ENTMCNC: 32.1 G/DL — SIGNIFICANT CHANGE UP (ref 32–37)
MCV RBC AUTO: 85.9 FL — SIGNIFICANT CHANGE UP (ref 81–99)
MONOCYTES # BLD AUTO: 0.55 K/UL — SIGNIFICANT CHANGE UP (ref 0.1–0.6)
MONOCYTES NFR BLD AUTO: 7.4 % — SIGNIFICANT CHANGE UP (ref 1.7–9.3)
NEUTROPHILS # BLD AUTO: 4.7 K/UL — SIGNIFICANT CHANGE UP (ref 1.4–6.5)
NEUTROPHILS NFR BLD AUTO: 63.3 % — SIGNIFICANT CHANGE UP (ref 42.2–75.2)
NRBC # BLD: 0 /100 WBCS — SIGNIFICANT CHANGE UP (ref 0–0)
PLATELET # BLD AUTO: 274 K/UL — SIGNIFICANT CHANGE UP (ref 130–400)
POTASSIUM SERPL-MCNC: 4.5 MMOL/L — SIGNIFICANT CHANGE UP (ref 3.5–5)
POTASSIUM SERPL-SCNC: 4.5 MMOL/L — SIGNIFICANT CHANGE UP (ref 3.5–5)
PROT SERPL-MCNC: 6.3 G/DL — SIGNIFICANT CHANGE UP (ref 6–8)
PROTHROM AB SERPL-ACNC: 10.5 SEC — SIGNIFICANT CHANGE UP (ref 9.95–12.87)
RBC # BLD: 3.7 M/UL — LOW (ref 4.2–5.4)
RBC # FLD: 14.4 % — SIGNIFICANT CHANGE UP (ref 11.5–14.5)
SODIUM SERPL-SCNC: 140 MMOL/L — SIGNIFICANT CHANGE UP (ref 135–146)
WBC # BLD: 7.44 K/UL — SIGNIFICANT CHANGE UP (ref 4.8–10.8)
WBC # FLD AUTO: 7.44 K/UL — SIGNIFICANT CHANGE UP (ref 4.8–10.8)

## 2023-01-23 PROCEDURE — 99233 SBSQ HOSP IP/OBS HIGH 50: CPT

## 2023-01-23 PROCEDURE — 93306 TTE W/DOPPLER COMPLETE: CPT | Mod: 26

## 2023-01-23 RX ORDER — FUROSEMIDE 40 MG
20 TABLET ORAL ONCE
Refills: 0 | Status: COMPLETED | OUTPATIENT
Start: 2023-01-23 | End: 2023-01-23

## 2023-01-23 RX ORDER — FUROSEMIDE 40 MG
40 TABLET ORAL DAILY
Refills: 0 | Status: DISCONTINUED | OUTPATIENT
Start: 2023-01-24 | End: 2023-01-24

## 2023-01-23 RX ADMIN — METHOCARBAMOL 500 MILLIGRAM(S): 500 TABLET, FILM COATED ORAL at 11:06

## 2023-01-23 RX ADMIN — ATENOLOL 25 MILLIGRAM(S): 25 TABLET ORAL at 05:43

## 2023-01-23 RX ADMIN — Medication 150 MICROGRAM(S): at 05:43

## 2023-01-23 RX ADMIN — Medication 20 MILLIGRAM(S): at 05:43

## 2023-01-23 RX ADMIN — Medication 60 MILLIGRAM(S): at 05:43

## 2023-01-23 RX ADMIN — CHLORHEXIDINE GLUCONATE 1 APPLICATION(S): 213 SOLUTION TOPICAL at 05:55

## 2023-01-23 RX ADMIN — Medication 20 MILLIGRAM(S): at 17:33

## 2023-01-23 RX ADMIN — PANTOPRAZOLE SODIUM 40 MILLIGRAM(S): 20 TABLET, DELAYED RELEASE ORAL at 05:43

## 2023-01-23 RX ADMIN — Medication 81 MILLIGRAM(S): at 11:06

## 2023-01-23 RX ADMIN — LISINOPRIL 20 MILLIGRAM(S): 2.5 TABLET ORAL at 05:43

## 2023-01-23 RX ADMIN — Medication 20 MILLIGRAM(S): at 13:02

## 2023-01-23 NOTE — PROGRESS NOTE ADULT - ASSESSMENT
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      # Acute Hypoxemic Respiratory Failure / acute on chronic diastolic CHF/ hypertensive urgency   - resolved now   - check pulse Ox on RA at rest and ambulation   - fluid restriction 1200 ml in 24 hours   - intake and output monitoring daily weight   - may change Lasix to po and monitoring   - pt was consulted by cardiology, recommendation noted   - pt needs OP follow up with pulmonary for sleep study   - maintain fluid balance  - pt was educated about fluid restriction and low sodium diet   - may d/c telemetry monitoring   - BP is better controlled now , c/w current medications     # Transaminitis/ choledocholithiasis  - LFTs are trending down   - pt was consulted by advanced GI, OP ERCP recommended   - continue  to hold Plavix  - avoid hepatotoxic medications     # PAD s/p LLE stent/ HTN/ HLD  - DASH Diet   - c/w ASA, continue to hold Plavix  - c/w BB, Nifedipine, ACE and Lasix     # Hypothyroid   - c/w Levothyroxine     # GI/DVT prophylaxis     #Progress Note Handoff  Pending (specify): start CHF protocol, change Lasix to po, continue to hold Plavix for ERCP as an OP,  check pulse Ox on RA at rest and ambulation, may d/c telemetry monitoring, anticipate discharge in 24 - 48 hours   Family discussion: I spoke with pt, she agreed with a plan of care   Disposition: Home__x _/SNF___/Other________/Unknown at this time________

## 2023-01-23 NOTE — CHART NOTE - NSCHARTNOTEFT_GEN_A_CORE
Patient seen by GI, ERCP w/ ampullary resection to be done outpatient. Patient is stable for d/c from GI's perspective.

## 2023-01-23 NOTE — PROGRESS NOTE ADULT - SUBJECTIVE AND OBJECTIVE BOX
Gastroenterology progress note:     Patient is a 77y old  Female who presents with a chief complaint of shortness of breath (22 Jan 2023 10:35)       Admitted on: 01-17-23    We are following the patient for choledocholithiasis + ampullary adenoma / TVA     Interval History: patient feels better. no pain, nausea or vomiting  tolerating diet   no fever     PAST MEDICAL & SURGICAL HISTORY:  Arterial insufficiency of lower extremity  left leg stent placed   Leg swelling  Hypothyroid  HTN (hypertension)  High cholesterol  Peripheral arterial disease  H/O Graves disease  History of cholecystectomy  H/O: hysterectomy  S/P angiogram of extremity  S/P ERCP    MEDICATIONS  (STANDING):  aspirin  chewable 81 milliGRAM(s) Oral daily  atenolol  Tablet 25 milliGRAM(s) Oral daily  chlorhexidine 2% Cloths 1 Application(s) Topical <User Schedule>  enoxaparin Injectable 40 milliGRAM(s) SubCutaneous every 24 hours  furosemide   Injectable 20 milliGRAM(s) IV Push two times a day  levothyroxine 150 MICROGram(s) Oral daily  lisinopril 20 milliGRAM(s) Oral daily  methocarbamol 500 milliGRAM(s) Oral daily  NIFEdipine XL 60 milliGRAM(s) Oral daily  pantoprazole    Tablet 40 milliGRAM(s) Oral before breakfast    MEDICATIONS  (PRN):      Allergies  codeine (Stomach Upset; Vomiting; Nausea)      Review of Systems:   General: no fever  HEENT: no hemoptysis  Cardiovascular:  No Chest Pain, No Palpitations  Respiratory:  No Cough, No Dyspnea  Gastrointestinal:  As described in HPI  Hematology: no bruising or hematoma   Neurology: no new motor deficit  Skin: no new rash    Physical Examination:  T(C): 36.7 (01-23-23 @ 07:00), Max: 36.7 (01-23-23 @ 07:00)  HR: 61 (01-23-23 @ 07:00) (56 - 61)  BP: 158/78 (01-23-23 @ 07:00) (143/65 - 158/78)  RR: 18 (01-23-23 @ 07:00) (18 - 19)  SpO2: 94% (01-23-23 @ 07:00) (94% - 94%)    Constitutional: No acute distress.  Head: normocephalic  Neck: supple   Eyes: EOMI  Respiratory:  No signs of respiratory distress. Lung sounds are clear bilaterally.  Cardiovascular:  S1 S2, Regular rate and rhythm.  Abdominal: Abdomen is soft, symmetric, and non-tender without distention.    Extremities: no pitting edema  Neurology: alert oriented *3, no asterixis   Skin: No rashes, No Jaundice.      Data: (reviewed by attending)                        10.2   7.44  )-----------( 274      ( 23 Jan 2023 06:17 )             31.8     Hgb trend:  10.2  01-23-23 @ 06:17  11.4  01-22-23 @ 06:38  10.1  01-21-23 @ 06:28        01-23    140  |  102  |  43<H>  ----------------------------<  143<H>  4.5   |  23  |  1.1    Ca    8.9      23 Jan 2023 06:17  Mg     2.5     01-23    TPro  6.3  /  Alb  3.7  /  TBili  0.8  /  DBili  x   /  AST  55<H>  /  ALT  162<H>  /  AlkPhos  309<H>  01-23    Liver panel trend:  TBili 0.8   /   AST 55   /      /   AlkP 309   /   Tptn 6.3   /   Alb 3.7    /   DBili --      01-23  TBili 1.0   /   AST 71   /      /   AlkP 347   /   Tptn 7.1   /   Alb 4.0    /   DBili --      01-22  TBili 1.1   /   AST 82   /      /   AlkP 281   /   Tptn 5.9   /   Alb 3.5    /   DBili --      01-21  TBili 2.3   /      /      /   AlkP 348   /   Tptn 6.8   /   Alb 4.1    /   DBili --      01-20    PT/INR - ( 23 Jan 2023 06:17 )   PT: 10.50 sec;   INR: 0.92 ratio         PTT - ( 23 Jan 2023 06:17 )  PTT:32.6 sec

## 2023-01-23 NOTE — PROGRESS NOTE ADULT - ASSESSMENT
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    #Acute Hypoxemic Respiratory Failure 2/2 Acute CHF Exacerbation  #Newly Diagnosed HFpEF  - ECHO 1/23: EF 60-65%, G1DD  - Cardio consult-Moderate-risk patient for a Low -risk surgery/procedure  - was on Lasix 20mg IV BID  - switching to Lasix 40mg PO starting tomorrow AM  - strict I&Os  - daily weight  - keep negative balance  - educated patient on fluid restriction, patient was drinking 1L of water daily at home  - f/u AM CXR  - f/u w/ Dr. Stanley outpatient (cardiology)    #Possible biliary colic due to choledocholithiasis  #Transaminitis - improving  #H/O Large ampullary and duodenal adenoma s/p endoscopic mucosal resection and ampullectomy s/p CBD stent removal (2020)  - CT showed Diffuse biliary ductal dilatation with CBD 2.3 cm. Multiple filling defects within common bile duct, likely reflecting choledocholithiasis  - LFT consistent w/ cholestatic pattern  - LFTs currently downtrending  - Abdominal exam is benign  - Tolerating regular diet, DASH/TLC  - f/u w/ advanced GI outpatient  - ERCP/Ampullary resection to be done outpatient as per advanced GI    #PAD s/p LLE stent  - on ASA and Plavix o/p  - Plavix held from 1/19 for possible ERCP  - continue to hold Plavix, continue w/ ASA, ERCP outpatient  - currently on Lovenox SubQ    #Hypertensive Urgency - resolved  #Suspected ROSA  #HTN  #HLD  - /91 on admission  - Continue atenolol, lisinopril, nifedipine  - High BMI, likely has ROSA  - follow up with Pulmonary o/p for eval of ROSA    #Hypothyroidism  - does not exhibit signs of hypothyroidism  - TSH (1/18) - 0.46  - currently on Levothyroxine 150mcg daily    #Misc:  - DVT ppx: Lovenox SubQ  - GI ppx: PPI  - Diet: DASH/TLC  - Activity: IAT  - Code Status: Full Code  - Dispo: home

## 2023-01-23 NOTE — PROGRESS NOTE ADULT - SUBJECTIVE AND OBJECTIVE BOX
SUBJECTIVE:    Patient is a 77y old Female who presents with a chief complaint of shortness of breath (23 Jan 2023 13:50)    Currently admitted to medicine with the primary diagnosis of: Acute CHF Exacerbation    Today is hospital day 6d.     PAST MEDICAL & SURGICAL HISTORY  Arterial insufficiency of lower extremity  left leg stent placed    Leg swelling    Hypothyroid    HTN (hypertension)    High cholesterol    Peripheral arterial disease    H/O Graves&#x27; disease    History of cholecystectomy    H/O: hysterectomy    S/P angiogram of extremity    S/P ERCP    ALLERGIES:  codeine (Stomach Upset; Vomiting; Nausea)    MEDICATIONS:  STANDING MEDICATIONS  aspirin  chewable 81 milliGRAM(s) Oral daily  atenolol  Tablet 25 milliGRAM(s) Oral daily  chlorhexidine 2% Cloths 1 Application(s) Topical <User Schedule>  enoxaparin Injectable 40 milliGRAM(s) SubCutaneous every 24 hours  furosemide   Injectable 20 milliGRAM(s) IV Push once  levothyroxine 150 MICROGram(s) Oral daily  lisinopril 20 milliGRAM(s) Oral daily  methocarbamol 500 milliGRAM(s) Oral daily  NIFEdipine XL 60 milliGRAM(s) Oral daily  pantoprazole    Tablet 40 milliGRAM(s) Oral before breakfast    PRN MEDICATIONS    VITALS:   ICU Vital Signs Last 24 Hrs  T(C): 36.2 (23 Jan 2023 14:00), Max: 36.7 (23 Jan 2023 07:00)  T(F): 97.1 (23 Jan 2023 14:00), Max: 98 (23 Jan 2023 07:00)  HR: 50 (23 Jan 2023 14:00) (50 - 61)  BP: 129/59 (23 Jan 2023 14:00) (129/59 - 158/78)  RR: 18 (23 Jan 2023 14:00) (18 - 19)  SpO2: 94% (23 Jan 2023 07:00) (94% - 94%)    O2 Parameters below as of 23 Jan 2023 07:00  Patient On (Oxygen Delivery Method): room air      LABS:                        10.2   7.44  )-----------( 274      ( 23 Jan 2023 06:17 )             31.8     01-23    140  |  102  |  43<H>  ----------------------------<  143<H>  4.5   |  23  |  1.1    Ca    8.9      23 Jan 2023 06:17  Mg     2.5     01-23    TPro  6.3  /  Alb  3.7  /  TBili  0.8  /  DBili  x   /  AST  55<H>  /  ALT  162<H>  /  AlkPhos  309<H>  01-23    PT/INR - ( 23 Jan 2023 06:17 )   PT: 10.50 sec;   INR: 0.92 ratio         PTT - ( 23 Jan 2023 06:17 )  PTT:32.6 sec      RADIOLOGY:    < from: Xray Chest 1 View- PORTABLE-Urgent (Xray Chest 1 View- PORTABLE-Urgent .) (01.20.23 @ 12:43) >    ACC: [id number] EXAM:  XR CHEST PORTABLE URGENT 1V   ORDERED BY: OMARI MONROE     PROCEDURE DATE:  01/20/2023      INTERPRETATION:  Clinical History / Reason for exam: Hypoxia    Comparison : Chest radiograph 1/17/2023.    Technique/Positioning: Frontal chest radiograph.    Findings:    Support devices: None.    Cardiac/mediastinum/hilum: Unchanged    Lung parenchyma/Pleura: Decreased, nearly resolved bilateral opacities.   No pneumothorax.    Skeleton/soft tissues: Unchanged    Impression:    Decreased, nearly resolved bilateral opacities.    --- End of Report ---      PHYSICAL EXAM:  GEN: ill appearing, in NAD, exhibiting body habitus  LUNGS: clear to ascultation b/l, no active wheezing, no rhonchi  HEART: regular rate and rhythm  ABD: soft, nontender, nondistended, +BS, no guarding  EXT: +2 LE edema b/l, chronic stasis dermatitis  NEURO: A&Ox3, no focal deficits

## 2023-01-23 NOTE — PROGRESS NOTE ADULT - SUBJECTIVE AND OBJECTIVE BOX
SUBJ: Patient seen and examined. No events overnight.       MEDICATIONS  (STANDING):  aspirin  chewable 81 milliGRAM(s) Oral daily  atenolol  Tablet 25 milliGRAM(s) Oral daily  chlorhexidine 2% Cloths 1 Application(s) Topical <User Schedule>  enoxaparin Injectable 40 milliGRAM(s) SubCutaneous every 24 hours  furosemide   Injectable 20 milliGRAM(s) IV Push once  levothyroxine 150 MICROGram(s) Oral daily  lisinopril 20 milliGRAM(s) Oral daily  methocarbamol 500 milliGRAM(s) Oral daily  NIFEdipine XL 60 milliGRAM(s) Oral daily  pantoprazole    Tablet 40 milliGRAM(s) Oral before breakfast    MEDICATIONS  (PRN):            Vital Signs Last 24 Hrs  T(C): 36.2 (23 Jan 2023 14:00), Max: 36.7 (23 Jan 2023 07:00)  T(F): 97.1 (23 Jan 2023 14:00), Max: 98 (23 Jan 2023 07:00)  HR: 50 (23 Jan 2023 14:00) (50 - 61)  BP: 129/59 (23 Jan 2023 14:00) (129/59 - 158/78)  BP(mean): --  RR: 18 (23 Jan 2023 14:00) (18 - 19)  SpO2: 94% (23 Jan 2023 07:00) (94% - 94%)    Parameters below as of 23 Jan 2023 07:00  Patient On (Oxygen Delivery Method): room air         REVIEW OF SYSTEMS:  CONSTITUTIONAL: No fever  NECK: No pain or stiffness  CARDIOVASCULAR: patient denies chest pain, shortness of breath or palpitations .  Repiratory: No cough or wheezing.  NEUROLOGICAL: No focal deficits to report.  GI: no BRBPR, no N,V,diarrhea.    PSYCHIATRY: normal mood and affect  HEENT: no nasal discharge, no ecchymosis  SKIN: no ecchymosis, no breakdown  MUSCULOSKELETAL: Full range of motion x4.      PHYSICAL EXAM:  GENERAL: NAD  HEAD:  Atraumatic, Normocephalic  NECK: Supple, No JVD  NERVOUS SYSTEM:  Alert & Oriented X3, Good concentration  CHEST/LUNG: Clear to anterior auscultation bilaterally  HEART: Regular rate and rhythm  ABDOMEN: Soft, Nontender, Nondistended;   EXTREMITIES:  No  edema      	  TELEMETRY:      LABS:                        10.2   7.44  )-----------( 274      ( 23 Jan 2023 06:17 )             31.8     01-23    140  |  102  |  43<H>  ----------------------------<  143<H>  4.5   |  23  |  1.1    Ca    8.9      23 Jan 2023 06:17  Mg     2.5     01-23    TPro  6.3  /  Alb  3.7  /  TBili  0.8  /  DBili  x   /  AST  55<H>  /  ALT  162<H>  /  AlkPhos  309<H>  01-23        PT/INR - ( 23 Jan 2023 06:17 )   PT: 10.50 sec;   INR: 0.92 ratio         PTT - ( 23 Jan 2023 06:17 )  PTT:32.6 sec    I&O's Summary    BNP  RADIOLOGY & ADDITIONAL STUDIES:    IMPRESSION AND PLAN:    PAD s/p LLE stent  HTN  HLD    -TTE reviewed showed normal LV function  -Patient at moderate risk of MACE for a low risk procedure   -c/w atenolol, lisinopril, nifedipine  -c/w lasix and keep negative balance   -holding plavix, for ERCP but continue ASA   -DVT prophylaxis

## 2023-01-23 NOTE — PROGRESS NOTE ADULT - ASSESSMENT
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 Patient presented to the ER with complain of SOB presents for x1 week history of chills, worsening cough x1 day, low back pain, fatigue. Patient noted to have elevated LFTs, US showed dilated duct with choledocholithiasis.       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  - Dilated CBD due to CBD stones and concern of obstruction   - Abnormal LFTs due to above mention    Off Plavix since 1/18  moderate risk per cardiology risk stratification     rec    - will need ERCP for stone extraction   - need also ampullary resection (?hybrid Argon plasma coagulation) to reduce the burden of the dysplasia and the tumor   - will discuss with attending timing of the procedures  - continue regular diet     -for questions text me on  teams, call [phone] on weekdays before 5pm or call GI service at [phone]  after 5 pm and on weekends  77yr old with PMH of HTN, HLD, PAD s/p stents in LLE on aspirin and plavix, 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 Patient presented to the ER with complain of SOB presents for x1 week history of chills, worsening cough x1 day, low back pain, fatigue. Patient noted to have elevated LFTs, US showed dilated duct with choledocholithiasis.       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  - Dilated CBD due to CBD stones and concern of obstruction   - Abnormal LFTs due to above mention    Off Plavix since 1/18  moderate risk per cardiology risk stratification     rec    - will need ERCP for stone extraction   - need also ampullary resection (?hybrid Argon plasma coagulation) to reduce the burden of the dysplasia and the tumor    - continue regular diet   -our GI team will arrange for OP follow for procedure schedule  -discussed with patient and medical team, patient agreeable to go home     -for questions text me on  teams, call [phone] on weekdays before 5pm or call GI service at [phone]  after 5 pm and on weekends

## 2023-01-23 NOTE — PROGRESS NOTE ADULT - SUBJECTIVE AND OBJECTIVE BOX
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 Patient reports that family members at home were ill with URI and strep throat, denies covid/ flu/rsv. Also reports associated shortness of breath, denies pain with deep breaths, denies chest pain/ palpitations, She was prescribed Augmentin and took for 6 days with no improvement. Ambulates with cane, sleeps in chair with elevated HOB normally. LE edema she reports is at her baseline due to her PAD. Follows with Dr. Stanley and reports prior normal echo's and stress test. Denies fevers, weight loss, diaphoresis, sore throat N/V/D, abdominal pain. Currently reports back pain resolved.  Admit to telemetry for URI/ suspected New onset CHF/ Hypertensive Urgency, was found to have transaminitis.   Today pt feels OK and denies any complaints except LE swelling which is chronic but got worse prir admission to the hospital.       PAST MEDICAL & SURGICAL HISTORY:  PAST MEDICAL & SURGICAL HISTORY:  Arterial insufficiency of lower extremity  left leg stent placed      Leg swelling      Hypothyroid      HTN (hypertension)      High cholesterol      Peripheral arterial disease      H/O Graves&#x27; disease      History of cholecystectomy      H/O: hysterectomy      S/P angiogram of extremity      S/P ERCP    VITAL SIGNS:  T(C): 36.2 (23 Jan 2023 14:00), Max: 36.7 (23 Jan 2023 07:00)  T(F): 97.1 (23 Jan 2023 14:00), Max: 98 (23 Jan 2023 07:00)  HR: 50 (23 Jan 2023 14:00) (50 - 61)  BP: 129/59 (23 Jan 2023 14:00) (129/59 - 158/78)  BP(mean): --  RR: 18 (23 Jan 2023 14:00) (18 - 19)  SpO2: 94% (23 Jan 2023 07:00) (94% - 94%)    Parameters below as of 23 Jan 2023 07:00  Patient On (Oxygen Delivery Method): room air    PHYSICAL EXAMINATION:  Not in acute distress, obese  General: No icterus  HEENT:   no JVD.  Heart: S1+S2 audible  Lungs: decreased BS at bases   Abdomen: Soft, non-tender, non-distended , no  rigidity or guarding.  CNS: Awake alert, CN  grossly intact.  Extremities: pitting edema on LE 2+, erythema noted on RLE     LABS:                           10.2   7.44  )-----------( 274      ( 23 Jan 2023 06:17 )             31.8   01-23    140  |  102  |  43<H>  ----------------------------<  143<H>  4.5   |  23  |  1.1    Ca    8.9      23 Jan 2023 06:17  Mg     2.5     01-23    TPro  6.3  /  Alb  3.7  /  TBili  0.8  /  DBili  x   /  AST  55<H>  /  ALT  162<H>  /  AlkPhos  309<H>  01-23    RADIOLOGY:  < from: Xray Chest 1 View- PORTABLE-Urgent (Xray Chest 1 View- PORTABLE-Urgent .) (01.20.23 @ 12:43) >    Impression:    Decreased, nearly resolved bilateral opacities.    < end of copied text >  < from: CT Abdomen and Pelvis w/ Oral Cont and w/ IV Cont (01.18.23 @ 22:56) >  IMPRESSION:    Diffuse biliary ductal dilatation with common bile duct measuring up to  2.3 cm. Multiple filling defects within common bile duct, likely   reflecting choledocholithiasis.    Trace bilateral effusions.    < end of copied text >  < from: US Abdomen Upper Quadrant Right (01.17.23 @ 17:50) >  IMPRESSION:  1. Status post prior cholecystectomy with dilated common bile duct   measuring 17 mm, overall unchanged since reference exam of February 2019.  2. Few stones noted within thecommon bile duct, largest measuring 2 cm.    < end of copied text >  < from: TTE Echo Complete w/o Contrast w/ Doppler (01.23.23 @ 08:46) >  Summary:   1. Left ventricular ejection fraction, by visual estimation, is 60 to   65%.   2. Normal global left ventricular systolic function.   3. Spectral Doppler shows impaired relaxation pattern of left   ventricular myocardial filling (Grade I diastolic dysfunction).   4. Mildly enlarged left atrium.        MEDICATIONS  (STANDING):  aspirin  chewable 81 milliGRAM(s) Oral daily  atenolol  Tablet 25 milliGRAM(s) Oral daily  chlorhexidine 2% Cloths 1 Application(s) Topical <User Schedule>  enoxaparin Injectable 40 milliGRAM(s) SubCutaneous every 24 hours  furosemide   Injectable 20 milliGRAM(s) IV Push once  levothyroxine 150 MICROGram(s) Oral daily  lisinopril 20 milliGRAM(s) Oral daily  methocarbamol 500 milliGRAM(s) Oral daily  NIFEdipine XL 60 milliGRAM(s) Oral daily  pantoprazole    Tablet 40 milliGRAM(s) Oral before breakfast

## 2023-01-24 ENCOUNTER — TRANSCRIPTION ENCOUNTER (OUTPATIENT)
Age: 78
End: 2023-01-24

## 2023-01-24 VITALS
SYSTOLIC BLOOD PRESSURE: 167 MMHG | DIASTOLIC BLOOD PRESSURE: 70 MMHG | TEMPERATURE: 98 F | HEART RATE: 582 BPM | RESPIRATION RATE: 18 BRPM

## 2023-01-24 LAB
ALBUMIN SERPL ELPH-MCNC: 3.5 G/DL — SIGNIFICANT CHANGE UP (ref 3.5–5.2)
ALP SERPL-CCNC: 317 U/L — HIGH (ref 30–115)
ALT FLD-CCNC: 134 U/L — HIGH (ref 0–41)
ANION GAP SERPL CALC-SCNC: 10 MMOL/L — SIGNIFICANT CHANGE UP (ref 7–14)
AST SERPL-CCNC: 55 U/L — HIGH (ref 0–41)
BASOPHILS # BLD AUTO: 0.04 K/UL — SIGNIFICANT CHANGE UP (ref 0–0.2)
BASOPHILS NFR BLD AUTO: 0.5 % — SIGNIFICANT CHANGE UP (ref 0–1)
BILIRUB SERPL-MCNC: 0.6 MG/DL — SIGNIFICANT CHANGE UP (ref 0.2–1.2)
BUN SERPL-MCNC: 40 MG/DL — HIGH (ref 10–20)
CALCIUM SERPL-MCNC: 8.3 MG/DL — LOW (ref 8.4–10.4)
CHLORIDE SERPL-SCNC: 105 MMOL/L — SIGNIFICANT CHANGE UP (ref 98–110)
CO2 SERPL-SCNC: 25 MMOL/L — SIGNIFICANT CHANGE UP (ref 17–32)
CREAT SERPL-MCNC: 1.1 MG/DL — SIGNIFICANT CHANGE UP (ref 0.7–1.5)
EGFR: 52 ML/MIN/1.73M2 — LOW
EOSINOPHIL # BLD AUTO: 0.38 K/UL — SIGNIFICANT CHANGE UP (ref 0–0.7)
EOSINOPHIL NFR BLD AUTO: 4.8 % — SIGNIFICANT CHANGE UP (ref 0–8)
GLUCOSE BLDC GLUCOMTR-MCNC: 143 MG/DL — HIGH (ref 70–99)
GLUCOSE BLDC GLUCOMTR-MCNC: 164 MG/DL — HIGH (ref 70–99)
GLUCOSE SERPL-MCNC: 147 MG/DL — HIGH (ref 70–99)
HCT VFR BLD CALC: 32.4 % — LOW (ref 37–47)
HGB BLD-MCNC: 10.1 G/DL — LOW (ref 12–16)
IMM GRANULOCYTES NFR BLD AUTO: 1.1 % — HIGH (ref 0.1–0.3)
LYMPHOCYTES # BLD AUTO: 1.72 K/UL — SIGNIFICANT CHANGE UP (ref 1.2–3.4)
LYMPHOCYTES # BLD AUTO: 21.5 % — SIGNIFICANT CHANGE UP (ref 20.5–51.1)
MAGNESIUM SERPL-MCNC: 2.3 MG/DL — SIGNIFICANT CHANGE UP (ref 1.8–2.4)
MCHC RBC-ENTMCNC: 27 PG — SIGNIFICANT CHANGE UP (ref 27–31)
MCHC RBC-ENTMCNC: 31.2 G/DL — LOW (ref 32–37)
MCV RBC AUTO: 86.6 FL — SIGNIFICANT CHANGE UP (ref 81–99)
MONOCYTES # BLD AUTO: 0.57 K/UL — SIGNIFICANT CHANGE UP (ref 0.1–0.6)
MONOCYTES NFR BLD AUTO: 7.1 % — SIGNIFICANT CHANGE UP (ref 1.7–9.3)
NEUTROPHILS # BLD AUTO: 5.19 K/UL — SIGNIFICANT CHANGE UP (ref 1.4–6.5)
NEUTROPHILS NFR BLD AUTO: 65 % — SIGNIFICANT CHANGE UP (ref 42.2–75.2)
NRBC # BLD: 0 /100 WBCS — SIGNIFICANT CHANGE UP (ref 0–0)
PLATELET # BLD AUTO: 279 K/UL — SIGNIFICANT CHANGE UP (ref 130–400)
POTASSIUM SERPL-MCNC: 4.4 MMOL/L — SIGNIFICANT CHANGE UP (ref 3.5–5)
POTASSIUM SERPL-SCNC: 4.4 MMOL/L — SIGNIFICANT CHANGE UP (ref 3.5–5)
PROT SERPL-MCNC: 6 G/DL — SIGNIFICANT CHANGE UP (ref 6–8)
RBC # BLD: 3.74 M/UL — LOW (ref 4.2–5.4)
RBC # FLD: 14.4 % — SIGNIFICANT CHANGE UP (ref 11.5–14.5)
SODIUM SERPL-SCNC: 140 MMOL/L — SIGNIFICANT CHANGE UP (ref 135–146)
WBC # BLD: 7.99 K/UL — SIGNIFICANT CHANGE UP (ref 4.8–10.8)
WBC # FLD AUTO: 7.99 K/UL — SIGNIFICANT CHANGE UP (ref 4.8–10.8)

## 2023-01-24 PROCEDURE — 99239 HOSP IP/OBS DSCHRG MGMT >30: CPT

## 2023-01-24 PROCEDURE — 71045 X-RAY EXAM CHEST 1 VIEW: CPT | Mod: 26

## 2023-01-24 RX ORDER — LISINOPRIL 2.5 MG/1
1 TABLET ORAL
Qty: 30 | Refills: 2
Start: 2023-01-24 | End: 2023-04-23

## 2023-01-24 RX ORDER — FUROSEMIDE 40 MG
1 TABLET ORAL
Qty: 0 | Refills: 0 | DISCHARGE

## 2023-01-24 RX ORDER — NIFEDIPINE 30 MG
1 TABLET, EXTENDED RELEASE 24 HR ORAL
Qty: 30 | Refills: 1
Start: 2023-01-24 | End: 2023-03-24

## 2023-01-24 RX ORDER — CLOPIDOGREL BISULFATE 75 MG/1
1 TABLET, FILM COATED ORAL
Qty: 0 | Refills: 0 | DISCHARGE

## 2023-01-24 RX ORDER — METHOCARBAMOL 500 MG/1
1 TABLET, FILM COATED ORAL
Qty: 10 | Refills: 0
Start: 2023-01-24 | End: 2023-02-02

## 2023-01-24 RX ORDER — FUROSEMIDE 40 MG
1 TABLET ORAL
Qty: 30 | Refills: 1
Start: 2023-01-24 | End: 2023-03-24

## 2023-01-24 RX ADMIN — METHOCARBAMOL 500 MILLIGRAM(S): 500 TABLET, FILM COATED ORAL at 11:58

## 2023-01-24 RX ADMIN — CHLORHEXIDINE GLUCONATE 1 APPLICATION(S): 213 SOLUTION TOPICAL at 05:25

## 2023-01-24 RX ADMIN — Medication 40 MILLIGRAM(S): at 05:25

## 2023-01-24 RX ADMIN — ENOXAPARIN SODIUM 40 MILLIGRAM(S): 100 INJECTION SUBCUTANEOUS at 05:25

## 2023-01-24 RX ADMIN — LISINOPRIL 20 MILLIGRAM(S): 2.5 TABLET ORAL at 05:26

## 2023-01-24 RX ADMIN — Medication 150 MICROGRAM(S): at 05:26

## 2023-01-24 RX ADMIN — Medication 81 MILLIGRAM(S): at 11:57

## 2023-01-24 RX ADMIN — Medication 60 MILLIGRAM(S): at 05:26

## 2023-01-24 RX ADMIN — PANTOPRAZOLE SODIUM 40 MILLIGRAM(S): 20 TABLET, DELAYED RELEASE ORAL at 05:26

## 2023-01-24 RX ADMIN — ATENOLOL 25 MILLIGRAM(S): 25 TABLET ORAL at 05:25

## 2023-01-24 NOTE — DISCHARGE NOTE PROVIDER - CARE PROVIDER_API CALL
Reinaldo Stanley)  Cardiovascular Disease; Interventional Cardiology  [address] [place] [address]  Phone: [phone]  Fax: [phone]  Established Patient  Follow Up Time: 1 week    Franklin Torres)  Gastroenterology; Internal Medicine  [address] [place] [id number]  Phone: [phone]  Fax: [phone]  Established Patient  Follow Up Time: 1 week    Sandeep Barahona)  Critical Care Medicine; Pulmonary Disease; Sleep Medicine  [address] [place] [address]  Phone: [phone]  Fax: [phone]  Established Patient  Follow Up Time: Routine

## 2023-01-24 NOTE — PROGRESS NOTE ADULT - SUBJECTIVE AND OBJECTIVE BOX
JAKY RODRIGUEZ  77y  Female      Patient is a 77y old  Female who presents with a chief complaint of shortness of breath (23 Jan 2023 15:13)      INTERVAL HPI/OVERNIGHT EVENTS:  No SOB, no abdominal pain.   Vital Signs Last 24 Hrs  T(C): 35.6 (24 Jan 2023 04:39), Max: 36.2 (23 Jan 2023 14:00)  T(F): 96.1 (24 Jan 2023 04:39), Max: 97.1 (23 Jan 2023 14:00)  HR: 58 (24 Jan 2023 04:39) (50 - 58)  BP: 164/67 (24 Jan 2023 04:39) (129/59 - 164/67)  BP(mean): --  RR: 18 (24 Jan 2023 04:39) (18 - 18)  SpO2: --          01-24-23 @ 07:01  -  01-24-23 @ 12:07  --------------------------------------------------------  IN: 299 mL / OUT: 0 mL / NET: 299 mL            Consultant(s) Notes Reviewed:  [x ] YES  [ ] NO          MEDICATIONS  (STANDING):  aspirin  chewable 81 milliGRAM(s) Oral daily  atenolol  Tablet 25 milliGRAM(s) Oral daily  chlorhexidine 2% Cloths 1 Application(s) Topical <User Schedule>  enoxaparin Injectable 40 milliGRAM(s) SubCutaneous every 24 hours  furosemide    Tablet 40 milliGRAM(s) Oral daily  levothyroxine 150 MICROGram(s) Oral daily  lisinopril 20 milliGRAM(s) Oral daily  methocarbamol 500 milliGRAM(s) Oral daily  NIFEdipine XL 60 milliGRAM(s) Oral daily  pantoprazole    Tablet 40 milliGRAM(s) Oral before breakfast    MEDICATIONS  (PRN):      LABS                          10.1   7.99  )-----------( 279      ( 24 Jan 2023 05:40 )             32.4     01-24    140  |  105  |  40<H>  ----------------------------<  147<H>  4.4   |  25  |  1.1    Ca    8.3<L>      24 Jan 2023 05:40  Mg     2.3     01-24    TPro  6.0  /  Alb  3.5  /  TBili  0.6  /  DBili  x   /  AST  55<H>  /  ALT  134<H>  /  AlkPhos  317<H>  01-24        PT/INR - ( 23 Jan 2023 06:17 )   PT: 10.50 sec;   INR: 0.92 ratio         PTT - ( 23 Jan 2023 06:17 )  PTT:32.6 sec  Lactate Trend        CAPILLARY BLOOD GLUCOSE      POCT Blood Glucose.: 164 mg/dL (24 Jan 2023 11:24)        RADIOLOGY & ADDITIONAL TESTS:    Imaging Personally Reviewed:  [ ] YES  [ ] NO    HEALTH ISSUES - PROBLEM Dx:          PHYSICAL EXAM:  GENERAL: NAD, well-developed.  HEAD:  Atraumatic, Normocephalic.  EYES: EOMI, PERRLA, conjunctiva and sclera clear.  NECK: Supple, No JVD.  CHEST/LUNG: Clear to auscultation bilaterally; No wheeze.  HEART: Regular rate and rhythm; S1 S2.   ABDOMEN: Soft, Nontender, Nondistended; Bowel sounds present.  EXTREMITIES:  leg edema 1+, mild chronic erythema and venous stasis changes.   PSYCH: AAOx3.  NEUROLOGY: non-focal.  SKIN: No rashes or lesions.

## 2023-01-24 NOTE — DISCHARGE NOTE PROVIDER - NSDCCPCAREPLAN_GEN_ALL_CORE_FT
PRINCIPAL DISCHARGE DIAGNOSIS  Diagnosis: Acute exacerbation of CHF (congestive heart failure)  Assessment and Plan of Treatment: Congestive heart failure (CHF) is a chronic condition in which the heart has trouble pumping blood. In some cases of heart failure, fluid may back up into your lungs or you may have swelling (edema) in your lower legs. There are many causes of heart failure including high blood pressure, coronary artery disease, abnormal heart valves, heart muscle disease, lung disease, diabetes, etc. Symptoms include shortness of breath with activity or when lying flat, cough, swelling of the legs, fatigue, or increased urination during the night.   Treatment is aimed at managing the symptoms of heart failure and may include lifestyle changes, medications, or surgical procedures. Take medicines only as directed by your health care provider and do not stop unless instructed to do so. Eat heart-healthy foods with low or no trans/saturated fats, cholesterol and salt. Weigh yourself every day for early recognition of fluid accumulation.  SEEK IMMEDIATE MEDICAL CARE IF YOU HAVE ANY OF THE FOLLOWING SYMPTOMS: shortness of breath, change in mental status, chest pain, lightheadedness/dizziness/fainting, or worsening of symptoms including not being able to conduct normal physical activity.        SECONDARY DISCHARGE DIAGNOSES  Diagnosis: Elevated LFTs  Assessment and Plan of Treatment:

## 2023-01-24 NOTE — DISCHARGE NOTE PROVIDER - REASON FOR ADMISSION
Patient called stating she is experiencing a rash on her face, she is questioning if this has to do with the facial she had on Monday.    Please call to discuss   shortness of breath

## 2023-01-24 NOTE — DISCHARGE NOTE PROVIDER - CARE PROVIDERS DIRECT ADDRESSES
,DirectAddress_Unknown,[email or web address],DirectAddress_Unknown

## 2023-01-24 NOTE — PROGRESS NOTE ADULT - PROVIDER SPECIALTY LIST ADULT
Cardiology
Hospitalist
Hospitalist
Internal Medicine
Cardiology
Hospitalist
Gastroenterology
Gastroenterology
Internal Medicine

## 2023-01-24 NOTE — DISCHARGE NOTE NURSING/CASE MANAGEMENT/SOCIAL WORK - NSDCPEFALRISK_GEN_ALL_CORE
For information on Fall & Injury Prevention, visit: [email or web address] OR  [email or web address] OR  https://www.cdc.gov/steadi/patient.html

## 2023-01-24 NOTE — DISCHARGE NOTE PROVIDER - NSDCCPGOAL_GEN_ALL_CORE_FT
To get better and follow your care plan as instructed. Please follow up with your cardiologist in regards to your hospital admission for CHF exacerbation.    Please follow up with the advance gastroenterology team regarding scheduling surgery outpatient.    Please schedule an appointment with the pulmonologist to evaluate for obstructive sleep apnea.

## 2023-01-24 NOTE — DISCHARGE NOTE NURSING/CASE MANAGEMENT/SOCIAL WORK - PATIENT PORTAL LINK FT
You can access the FollowMyHealth Patient Portal offered by [facility] by registering at the following website: [email or web address]. By joining [facility]’s FollowMyHealth portal, you will also be able to view your health information using other applications (apps) compatible with our system.

## 2023-01-24 NOTE — DISCHARGE NOTE PROVIDER - NSDCMRMEDTOKEN_GEN_ALL_CORE_FT
aspirin 81 mg oral tablet, chewable: 1 tab(s) orally once a day  atenolol 25 mg oral tablet: 1 tab(s) orally once a day   furosemide 40 mg oral tablet: 1 tab(s) orally once a day  levothyroxine 150 mcg (0.15 mg) oral tablet: 1 tab(s) orally once a day  lisinopril 20 mg oral tablet: 1 tab(s) orally once a day  methocarbamol 500 mg oral tablet: 1 tab(s) orally once a day  NIFEdipine 60 mg oral tablet, extended release: 1 tab(s) orally once a day  Plavix 75 mg oral tablet: 1 tab(s) orally once a day, to be held five days before ERCP)

## 2023-01-24 NOTE — PROGRESS NOTE ADULT - REASON FOR ADMISSION
shortness of breath
CHF Exacerbation
shortness of breath

## 2023-01-24 NOTE — DISCHARGE NOTE PROVIDER - PROVIDER TOKENS
PROVIDER:[TOKEN:[[id number]],FOLLOWUP:[1 week],ESTABLISHEDPATIENT:[T]],PROVIDER:[TOKEN:[[id number]],FOLLOWUP:[1 week],ESTABLISHEDPATIENT:[T]],PROVIDER:[TOKEN:[[id number]],FOLLOWUP:[Routine],ESTABLISHEDPATIENT:[T]]

## 2023-01-24 NOTE — PROGRESS NOTE ADULT - ASSESSMENT
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      Acute Hypoxemic Respiratory Failure: resolved.   Acute HFpEF:   Hypertensive Urgency:   SOB, leg edema. Pro-.  CXR showed mild bibasilar infiltrates.   Echo showed normal LVEF 60-65%, Grade I diastolic dysfunction.   s/p Lasx, IV, started on Lasix 40mg po daily.   Low sodium diet, fluid restriction 1.5 L/d    Transaminitis/ choledocholithiasis  History of ampullary adenoma:   - LFTs are trending down   GI recommended  ERCP outpatient. Hold Plavix before the procedure.     HTN:   Continue Nifedipine, Atenolol and Lisinopril.     PAD s/p LLE stent  ASA, continue to hold Plavix       Hypothyroid: Continue Levothyroxine       #Progress Note Handoff  Pending (specify):  Family discussion:   Disposition: Home today.

## 2023-01-25 ENCOUNTER — TRANSCRIPTION ENCOUNTER (OUTPATIENT)
Age: 78
End: 2023-01-25

## 2023-01-26 ENCOUNTER — APPOINTMENT (OUTPATIENT)
Age: 78
End: 2023-01-26
Payer: MEDICARE

## 2023-01-26 VITALS
SYSTOLIC BLOOD PRESSURE: 130 MMHG | DIASTOLIC BLOOD PRESSURE: 70 MMHG | HEART RATE: 58 BPM | OXYGEN SATURATION: 98 % | RESPIRATION RATE: 18 BRPM

## 2023-01-26 DIAGNOSIS — I10 ESSENTIAL (PRIMARY) HYPERTENSION: ICD-10-CM

## 2023-01-26 DIAGNOSIS — I73.9 PERIPHERAL VASCULAR DISEASE, UNSPECIFIED: ICD-10-CM

## 2023-01-26 DIAGNOSIS — I50.9 HEART FAILURE, UNSPECIFIED: ICD-10-CM

## 2023-01-26 PROCEDURE — 99495 TRANSJ CARE MGMT MOD F2F 14D: CPT

## 2023-01-26 RX ORDER — LEVOTHYROXINE SODIUM 100 UG/1
100 TABLET ORAL
Refills: 0 | Status: DISCONTINUED | COMMUNITY
End: 2023-01-26

## 2023-01-26 RX ORDER — LEVOTHYROXINE SODIUM 0.12 MG/1
125 TABLET ORAL
Refills: 0 | Status: DISCONTINUED | COMMUNITY
End: 2023-01-26

## 2023-01-26 RX ORDER — CEPHALEXIN 500 MG/1
500 CAPSULE ORAL
Refills: 0 | Status: DISCONTINUED | COMMUNITY
End: 2023-01-26

## 2023-01-26 RX ORDER — PRAVASTATIN SODIUM 40 MG/1
40 TABLET ORAL
Refills: 0 | Status: DISCONTINUED | COMMUNITY
End: 2023-01-26

## 2023-01-26 RX ORDER — METOPROLOL TARTRATE 50 MG/1
50 TABLET, FILM COATED ORAL
Refills: 0 | Status: DISCONTINUED | COMMUNITY
End: 2023-01-26

## 2023-01-26 NOTE — HISTORY OF PRESENT ILLNESS
[Family Member] : family member [FreeTextEntry1] : F/U hospital discharge for CHF. [de-identified] : Patient is a 77 year old female enrolled in the [facility] TCM program s/p a recent discharge from [facility] 1/17-1/24 for CHF exacerbation. PMH HTN, HLD, PAD, adenoma. Patient was diuresed and sent home without HCS. Upon arrival patient alert in nad, denies sob, cp, cough, fever, chills, n/v/d. Patient reports b/l le edema with discoloration of lower extremities history of pad with stents. Cardio f/u 2/1, gi 3/1 for ERCP scheduling. Patient to schedule pcp and pulmonary f/u for ? mary. 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 Patient reports that family members at home were ill with URI and strep throat, denies covid/ flu/rsv. Also reports associated shortness of breath, denies pain with deep breaths, denies chest pain/ palpitations, She was prescribed Augmentin and took for 6 days with no improvement. Ambulates with cane, sleeps in chair with elevated HOB normally. LE edema she reports is at her baseline due to her PAD. Follows with Dr. Stanley and reports prior normal echo's and stress test. Denies fevers, weight loss, diaphoresis, sore throat N/V/D, abdominal pain. Currently reports back pain resolved. \par ED vitals: T98.3, /91, HR 82, RR 28, Placed on 2L NC 98%. CBC with no leukocytosis, Hgb 11.1 (bl 10), CMP with T-bili 1.7, Alk phos 357, , , trop x1 neg, . 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\par #Acute Hypoxemic Respiratory Failure 2/2 Acute CHF Exacerbation\par #Newly Diagnosed HFpEF\par - ECHO 1/23: EF 60-65%, G1DD\par - Cardio consult-Moderate-risk patient for a Low -risk surgery/procedure\par - was on Lasix 20mg IV BID\par - switching to Lasix 40mg PO starting tomorrow AM\par - strict I&Os\par - daily weight\par - keep negative balance\par - educated patient on fluid restriction, patient was drinking 1L of water daily at home\par - f/u AM CXR\par - f/u w/ Dr. Stanley outpatient (cardiology)\par #Possible biliary colic due to choledocholithiasis\par #Transaminitis - improving\par #H/O Large ampullary and duodenal adenoma s/p endoscopic mucosal resection and ampullectomy s/p CBD stent removal (2020)\par - CT showed Diffuse biliary ductal dilatation with CBD 2.3 cm. Multiple filling defects within common bile duct, likely reflecting choledocholithiasis\par - LFT consistent w/ cholestatic pattern\par - LFTs currently downtrending\par - Abdominal exam is benign\par - Tolerating regular diet, DASH/TLC\par - f/u w/ advanced GI outpatient\par - ERCP/Ampullary resection to be done outpatient as per advanced GI\par #PAD s/p LLE stent\par - on ASA and Plavix o/p\par - Plavix held from 1/19 for possible ERCP\par - continue to hold Plavix, continue w/ ASA, ERCP outpatient\par - currently on Lovenox SubQ\par #Hypertensive Urgency - resolved\par #Suspected MARY\par #HTN\par #HLD\par - /91 on admission\par - Continue atenolol, lisinopril, nifedipine\par - High BMI, likely has MARY\par - follow up with Pulmonary o/p for eval of MARY\par #Hypothyroidism\par - does not exhibit signs of hypothyroidism\par - TSH (1/18) - 0.46\par - currently on Levothyroxine 150mcg daily\par #Misc:\par - DVT ppx: Lovenox SubQ\par - GI ppx: PPI\par - Diet: DASH/TLC\par - Activity: IAT\par - Code Status: Full Code\par - Dispo: home

## 2023-01-26 NOTE — COUNSELING
[de-identified] : Pt was informed about CN’s role/ STARS program and overview of transitional care reviewed with patient. In addition, yellow contact card was provided. Pt educated on topics of importance such as compliance with all provider visits, prescribed medication regimen, and low salt diet. Pt encouraged calling CN with any issues, concerns or questions, also educated to notify CN if experiencing CP, SOB , cough, increased mucus/phlegm production, abdominal discomfort/swelling, difficulty sleeping or lying flat, fever, chills, fatigue, weight gain of 2-3lbs in 24 hours or 5lbs in one week,  dizziness, lightheadedness, n/v/d/c, swelling to extremities and/or any signs of CHF exacerbation as reviewed. Reassurance provided. Will continue to monitor\par \par

## 2023-01-26 NOTE — PLAN
[FreeTextEntry1] : PAD:f/u vascular. c/w asa. plavix was on hold for possible procedure. patient restarted and will f/u md when to stop again for procedure.\par \par HTN:c/w lisinopril/nifedipine/atenolol. f/u cardio. \par \par CHF:c/w lasix/lisinopril. i/o's, dw. low salt/sodium diet. fluid restriction. f/u cardio.

## 2023-01-26 NOTE — ASSESSMENT
[FreeTextEntry1] : Patient is a 77 year old female enrolled in the \[facility]\"" TCM program s/p a recent discharge from [facility] 1/17-1/24 for CHF exacerbation. PMH HTN, HLD, PAD, adenoma. Patient was diuresed and sent home without HCS. Upon arrival patient alert in nad, denies sob, cp, cough, fever, chills, n/v/d. Patient reports b/l le edema with discoloration of lower extremities history of pad with stents. Cardio f/u 2/1, gi 3/1 for ERCP scheduling. Patient to schedule pcp and pulmonary f/u for ? mary. Patient was contacted by RN from TCM and medication reconciliation was done with in 48 hours of discharge 1/25.

## 2023-01-26 NOTE — PHYSICAL EXAM
[No Acute Distress] : no acute distress [Well Developed] : well developed [Well-Appearing] : well-appearing [Normal Sclera/Conjunctiva] : normal sclera/conjunctiva [No Respiratory Distress] : no respiratory distress  [Normal Rate] : normal rate  [Normal S1, S2] : normal S1 and S2 [Soft] : abdomen soft [Non Tender] : non-tender [Normal] : no joint swelling and grossly normal strength and tone [Normal Affect] : the affect was normal [Alert and Oriented x3] : oriented to person, place, and time [Normal Mood] : the mood was normal [de-identified] : decreased BS at bases [de-identified] : + murmur [de-identified] : B/L LE edema with discoloration secondary to PAD [de-identified] : obese abdomen

## 2023-01-27 DIAGNOSIS — I50.33 ACUTE ON CHRONIC DIASTOLIC (CONGESTIVE) HEART FAILURE: ICD-10-CM

## 2023-01-27 DIAGNOSIS — I16.0 HYPERTENSIVE URGENCY: ICD-10-CM

## 2023-01-27 DIAGNOSIS — Z20.822 CONTACT WITH AND (SUSPECTED) EXPOSURE TO COVID-19: ICD-10-CM

## 2023-01-27 DIAGNOSIS — Z90.710 ACQUIRED ABSENCE OF BOTH CERVIX AND UTERUS: ICD-10-CM

## 2023-01-27 DIAGNOSIS — G47.33 OBSTRUCTIVE SLEEP APNEA (ADULT) (PEDIATRIC): ICD-10-CM

## 2023-01-27 DIAGNOSIS — E03.9 HYPOTHYROIDISM, UNSPECIFIED: ICD-10-CM

## 2023-01-27 DIAGNOSIS — D13.5 BENIGN NEOPLASM OF EXTRAHEPATIC BILE DUCTS: ICD-10-CM

## 2023-01-27 DIAGNOSIS — Z79.82 LONG TERM (CURRENT) USE OF ASPIRIN: ICD-10-CM

## 2023-01-27 DIAGNOSIS — E78.00 PURE HYPERCHOLESTEROLEMIA, UNSPECIFIED: ICD-10-CM

## 2023-01-27 DIAGNOSIS — Z95.820 PERIPHERAL VASCULAR ANGIOPLASTY STATUS WITH IMPLANTS AND GRAFTS: ICD-10-CM

## 2023-01-27 DIAGNOSIS — J96.01 ACUTE RESPIRATORY FAILURE WITH HYPOXIA: ICD-10-CM

## 2023-01-27 DIAGNOSIS — Z79.02 LONG TERM (CURRENT) USE OF ANTITHROMBOTICS/ANTIPLATELETS: ICD-10-CM

## 2023-01-27 DIAGNOSIS — Z87.891 PERSONAL HISTORY OF NICOTINE DEPENDENCE: ICD-10-CM

## 2023-01-27 DIAGNOSIS — I73.9 PERIPHERAL VASCULAR DISEASE, UNSPECIFIED: ICD-10-CM

## 2023-01-27 DIAGNOSIS — Z79.890 HORMONE REPLACEMENT THERAPY: ICD-10-CM

## 2023-01-27 DIAGNOSIS — Z88.6 ALLERGY STATUS TO ANALGESIC AGENT: ICD-10-CM

## 2023-01-27 DIAGNOSIS — K80.50 CALCULUS OF BILE DUCT WITHOUT CHOLANGITIS OR CHOLECYSTITIS WITHOUT OBSTRUCTION: ICD-10-CM

## 2023-01-27 DIAGNOSIS — Z79.899 OTHER LONG TERM (CURRENT) DRUG THERAPY: ICD-10-CM

## 2023-01-27 DIAGNOSIS — I11.0 HYPERTENSIVE HEART DISEASE WITH HEART FAILURE: ICD-10-CM

## 2023-02-06 ENCOUNTER — APPOINTMENT (OUTPATIENT)
Dept: CARDIOLOGY | Facility: CLINIC | Age: 78
End: 2023-02-06

## 2023-02-15 ENCOUNTER — TRANSCRIPTION ENCOUNTER (OUTPATIENT)
Age: 78
End: 2023-02-15

## 2023-02-16 ENCOUNTER — NON-APPOINTMENT (OUTPATIENT)
Age: 78
End: 2023-02-16

## 2023-02-28 RX ORDER — PANTOPRAZOLE 40 MG/1
40 TABLET, DELAYED RELEASE ORAL
Refills: 0 | Status: DISCONTINUED | COMMUNITY
End: 2023-02-28

## 2023-03-01 ENCOUNTER — APPOINTMENT (OUTPATIENT)
Dept: GASTROENTEROLOGY | Facility: CLINIC | Age: 78
End: 2023-03-01
Payer: MEDICARE

## 2023-03-01 DIAGNOSIS — Z80.1 FAMILY HISTORY OF MALIGNANT NEOPLASM OF TRACHEA, BRONCHUS AND LUNG: ICD-10-CM

## 2023-03-01 PROCEDURE — 99443: CPT | Mod: 95

## 2023-03-02 NOTE — ED ADULT NURSE NOTE - EXTENSIONS OF SELF_ADULT
Behavioral Health Supervisor met with patient to discuss discharge plan. Patient was alert/oriented to self, place and time, very hard of hearing. He stated several times that he has been in this hospital for 7 months due to his \"fluid around the heart\". Patient stated that he plans on living with his aunt and uncle at discharge. When BASSEM Supervisor asked about pt's house, he yelled \"My stupid step son took everything I have\". Pt then proceeded to state that he is \"going to bury him\" when he returns to [place]. SW Supervisor continued to ask patient about his homicidal thoughts towards his step son, patient became more agitated and stated that his step son Ashley Waddell better move back to [place] because if not he is going to be dead\". Patient proceeded talking about his family dynamic, including his other step son Varinder Ocampo. He reported that he \"loves him so much and wants to see him soon\". Pt became tearful and started talking about his wife who passed away 2 years ago. He stated that April 2nd is their wedding anniversary.  Supervisor provided active listening and emotional support and asked if she could call Varinder Ocampo, patient replied \"no, he thinks I'm dead\". SW Supervisor offered to call Varinder Ocampo to update him on his location and to notify him that he is doing well. Pt became agitated and started talking about his other step son Ailyn Bridges again, and how he is \"going to give it to him\" when he discharges from the hospital.     Supervisor called Ailyn Bridges ([phone]) to complete duty to warn, no answer- left voicemail.     JALEESA Alcantara, [facility] 19 Work Supervisor None

## 2023-03-08 NOTE — REVIEW OF SYSTEMS
[As Noted in HPI] : as noted in HPI [Abdominal Pain] : abdominal pain [Constipation] : constipation [Negative] : Heme/Lymph [Vomiting] : no vomiting [Diarrhea] : no diarrhea [Heartburn] : no heartburn [Melena (black stool)] : no melena [Bleeding] : no bleeding [Fecal Incontinence (soiling)] : no fecal incontinence [Bloating (gassiness)] : no bloating

## 2023-03-08 NOTE — ASSESSMENT
[FreeTextEntry1] : 77 yr old female with H/O ampullary adenoma with high grade dysplasia and choledocholithiasis with recent hospitalization in January of 2023. She C/O RUQ pain but not as severe as previously but she denied fevers, vomiting, jaundice, melena, blood in the stools, diarrhea, heartburn, dysphagia, or weight loss. She C/O constipation for the past few months.\par \par H/O ampullary Adenoma with high grade dysplasia and suspected Choledocholithiasis\par \par - Schedule her for an ERCP with biopsy and further evaluation of the CBC, may need stent placement; procedure explained to patient with the risks and benefits\par - F/U after the procedure is done\par \par Constipation\par \par - Miralax prn\par \par \par \par \par

## 2023-03-08 NOTE — HISTORY OF PRESENT ILLNESS
[Home] : at home, [unfilled] , at the time of the visit. [Medical Office: ([facility])___] : at the medical office located in  [Verbal consent obtained from patient] : the patient, [unfilled] [FreeTextEntry4] : MARY RUELAS [FreeTextEntry1] : LAST VISIT - 7/15/20\par \par 77 yr old female with H/O ampullary adenoma with high grade dysplasia and choledocholithiasis with recent hospitalization in January of 2023. She C/O RUQ pain but not as severe as previously but she denied fevers, vomiting, jaundice, melena, blood in the stools, diarrhea, heartburn, dysphagia, or weight loss. She C/O constipation for the past few months. An ERCP was never done whil inpatient due to Cardiac issues and she is on Plavix. She has seen her Cardiologist recently and he cleared her for an ERCP and she will be able to stop the Plavix x 5 days before procedure. \par \par

## 2023-03-28 ENCOUNTER — LABORATORY RESULT (OUTPATIENT)
Age: 78
End: 2023-03-28

## 2023-03-31 ENCOUNTER — TRANSCRIPTION ENCOUNTER (OUTPATIENT)
Age: 78
End: 2023-03-31

## 2023-03-31 ENCOUNTER — OUTPATIENT (OUTPATIENT)
Dept: INPATIENT UNIT | Facility: HOSPITAL | Age: 78
LOS: 1 days | Discharge: HOME | End: 2023-03-31
Payer: MEDICARE

## 2023-03-31 ENCOUNTER — RESULT REVIEW (OUTPATIENT)
Age: 78
End: 2023-03-31

## 2023-03-31 VITALS
DIASTOLIC BLOOD PRESSURE: 59 MMHG | SYSTOLIC BLOOD PRESSURE: 130 MMHG | RESPIRATION RATE: 18 BRPM | TEMPERATURE: 97 F | HEART RATE: 61 BPM | WEIGHT: 225.09 LBS | HEIGHT: 65 IN

## 2023-03-31 VITALS
SYSTOLIC BLOOD PRESSURE: 151 MMHG | OXYGEN SATURATION: 97 % | RESPIRATION RATE: 15 BRPM | DIASTOLIC BLOOD PRESSURE: 63 MMHG | HEART RATE: 62 BPM

## 2023-03-31 DIAGNOSIS — Z98.890 OTHER SPECIFIED POSTPROCEDURAL STATES: Chronic | ICD-10-CM

## 2023-03-31 DIAGNOSIS — K80.50 CALCULUS OF BILE DUCT WITHOUT CHOLANGITIS OR CHOLECYSTITIS WITHOUT OBSTRUCTION: ICD-10-CM

## 2023-03-31 DIAGNOSIS — Z90.49 ACQUIRED ABSENCE OF OTHER SPECIFIED PARTS OF DIGESTIVE TRACT: Chronic | ICD-10-CM

## 2023-03-31 DIAGNOSIS — D13.5 BENIGN NEOPLASM OF EXTRAHEPATIC BILE DUCTS: ICD-10-CM

## 2023-03-31 DIAGNOSIS — Z90.710 ACQUIRED ABSENCE OF BOTH CERVIX AND UTERUS: Chronic | ICD-10-CM

## 2023-03-31 PROCEDURE — 88305 TISSUE EXAM BY PATHOLOGIST: CPT | Mod: 26

## 2023-03-31 PROCEDURE — C1769: CPT

## 2023-03-31 PROCEDURE — C1889: CPT

## 2023-03-31 PROCEDURE — 88305 TISSUE EXAM BY PATHOLOGIST: CPT

## 2023-03-31 PROCEDURE — C9399: CPT

## 2023-03-31 PROCEDURE — 43274 ERCP DUCT STENT PLACEMENT: CPT

## 2023-03-31 PROCEDURE — 74018 RADEX ABDOMEN 1 VIEW: CPT

## 2023-03-31 PROCEDURE — 74328 X-RAY BILE DUCT ENDOSCOPY: CPT | Mod: 26

## 2023-03-31 PROCEDURE — C2625: CPT

## 2023-03-31 PROCEDURE — 43261 ENDO CHOLANGIOPANCREATOGRAPH: CPT | Mod: XU

## 2023-03-31 PROCEDURE — 43264 ERCP REMOVE DUCT CALCULI: CPT | Mod: XU

## 2023-03-31 NOTE — ASU DISCHARGE PLAN (ADULT/PEDIATRIC) - CARE PROVIDER_API CALL
Franklin Torres)  Gastroenterology  [address] [place] [address]  Phone: [phone]  Fax: [phone]  Follow Up Time:

## 2023-03-31 NOTE — H&P PST ADULT - HISTORY OF PRESENT ILLNESS
77 yr old female with H/O ampullary adenoma with high grade dysplasia and choledocholithiasis with recent hospitalization in January of 2023 here for ERCP with stent placement

## 2023-04-04 LAB — SURGICAL PATHOLOGY STUDY: SIGNIFICANT CHANGE UP

## 2023-04-06 DIAGNOSIS — Z90.710 ACQUIRED ABSENCE OF BOTH CERVIX AND UTERUS: ICD-10-CM

## 2023-04-06 DIAGNOSIS — E03.9 HYPOTHYROIDISM, UNSPECIFIED: ICD-10-CM

## 2023-04-06 DIAGNOSIS — Z79.82 LONG TERM (CURRENT) USE OF ASPIRIN: ICD-10-CM

## 2023-04-06 DIAGNOSIS — E78.00 PURE HYPERCHOLESTEROLEMIA, UNSPECIFIED: ICD-10-CM

## 2023-04-06 DIAGNOSIS — E05.00 THYROTOXICOSIS WITH DIFFUSE GOITER WITHOUT THYROTOXIC CRISIS OR STORM: ICD-10-CM

## 2023-04-06 DIAGNOSIS — I73.9 PERIPHERAL VASCULAR DISEASE, UNSPECIFIED: ICD-10-CM

## 2023-04-06 DIAGNOSIS — K80.50 CALCULUS OF BILE DUCT WITHOUT CHOLANGITIS OR CHOLECYSTITIS WITHOUT OBSTRUCTION: ICD-10-CM

## 2023-04-06 DIAGNOSIS — Z88.5 ALLERGY STATUS TO NARCOTIC AGENT: ICD-10-CM

## 2023-04-06 DIAGNOSIS — D13.2 BENIGN NEOPLASM OF DUODENUM: ICD-10-CM

## 2023-04-06 DIAGNOSIS — Z90.49 ACQUIRED ABSENCE OF OTHER SPECIFIED PARTS OF DIGESTIVE TRACT: ICD-10-CM

## 2023-04-06 DIAGNOSIS — Z79.02 LONG TERM (CURRENT) USE OF ANTITHROMBOTICS/ANTIPLATELETS: ICD-10-CM

## 2023-04-06 DIAGNOSIS — I10 ESSENTIAL (PRIMARY) HYPERTENSION: ICD-10-CM

## 2023-04-19 ENCOUNTER — APPOINTMENT (OUTPATIENT)
Dept: GASTROENTEROLOGY | Facility: CLINIC | Age: 78
End: 2023-04-19
Payer: MEDICARE

## 2023-04-19 DIAGNOSIS — D13.5 BENIGN NEOPLASM OF EXTRAHEPATIC BILE DUCTS: ICD-10-CM

## 2023-04-19 DIAGNOSIS — K80.50 CALCULUS OF BILE DUCT W/OUT CHOLANGITIS OR CHOLECYSTITIS W/OUT OBSTRUCTION: ICD-10-CM

## 2023-04-19 PROCEDURE — 99443: CPT | Mod: 95

## 2023-04-19 NOTE — ASSESSMENT
[FreeTextEntry1] : Patient is a 77-year-old female with past medical history of hypothyroid and was initially seen by the advanced GI team in the hospital for abnormal liver function studies. Patient had ERCP done with stone extraction and procedure was notable for a circumferential adenoma around the papilla. Patient had ampullectomy done and repeat ERCP Feb 13th 2020. 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 Patient seen by Dr Marcus and Endoscopic management preferred by patient despite that it might not be definitive.  Patient had repeat ERCP done March 2023 which was notable for Tubular adenoma without dysplasia. Patient tolerated procedure well.  \par \par Ampullary Adenoma\par - Discussed ERCP with her from 3/31/2023\par - She had stones seen so additional ERCP needed \par - Will plan additional ERCP to remove stones\par - Hold Plavix 5 days before\par - ERCP in End of May

## 2023-04-19 NOTE — HISTORY OF PRESENT ILLNESS
[de-identified] : Patient is a 77-year-old female with past medical history of hypothyroid and was initially seen by the advanced GI team in the hospital for abnormal liver function studies. Patient had ERCP done with stone extraction and procedure was notable for a circumferential adenoma around the papilla. Patient had ampullectomy done and repeat ERCP Feb 13th 2020. 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 Patient seen by Dr Marcus and Endoscopic management preferred by patient despite that it might not be definitive.  Patient had repeat ERCP done March 2023 which was notable for Tubular adenoma without dysplasia. Patient tolerated procedure well.

## 2023-05-04 NOTE — PATIENT PROFILE ADULT - NSPROGENPREVTRANSF_GEN_A_NUR
PCP:  Dr. Call has reviewed CT CAC Heart Scan results of 0 and recommendations given.    Pt has viewed results in pt portal.  Results letter and risk factor educational material sent to patient.     Cardiac care coordinator will not call patient at this time.   
no

## 2023-05-23 ENCOUNTER — NON-APPOINTMENT (OUTPATIENT)
Age: 78
End: 2023-05-23

## 2023-06-15 NOTE — PROGRESS NOTE ADULT - REASON FOR ADMISSION
Ampullectomy Cryotherapy Text: The wound bed was treated with cryotherapy after the biopsy was performed.

## 2023-07-21 ENCOUNTER — TRANSCRIPTION ENCOUNTER (OUTPATIENT)
Age: 78
End: 2023-07-21

## 2023-07-21 ENCOUNTER — OUTPATIENT (OUTPATIENT)
Dept: INPATIENT UNIT | Facility: HOSPITAL | Age: 78
LOS: 1 days | Discharge: HOME | End: 2023-07-21
Payer: MEDICARE

## 2023-07-21 VITALS
DIASTOLIC BLOOD PRESSURE: 79 MMHG | HEART RATE: 65 BPM | RESPIRATION RATE: 18 BRPM | SYSTOLIC BLOOD PRESSURE: 152 MMHG | WEIGHT: 229.94 LBS | TEMPERATURE: 96 F | HEIGHT: 65 IN

## 2023-07-21 VITALS
HEART RATE: 66 BPM | OXYGEN SATURATION: 93 % | RESPIRATION RATE: 18 BRPM | SYSTOLIC BLOOD PRESSURE: 142 MMHG | DIASTOLIC BLOOD PRESSURE: 75 MMHG

## 2023-07-21 DIAGNOSIS — Z90.49 ACQUIRED ABSENCE OF OTHER SPECIFIED PARTS OF DIGESTIVE TRACT: Chronic | ICD-10-CM

## 2023-07-21 DIAGNOSIS — D13.5 BENIGN NEOPLASM OF EXTRAHEPATIC BILE DUCTS: ICD-10-CM

## 2023-07-21 DIAGNOSIS — Z98.890 OTHER SPECIFIED POSTPROCEDURAL STATES: Chronic | ICD-10-CM

## 2023-07-21 DIAGNOSIS — Z90.710 ACQUIRED ABSENCE OF BOTH CERVIX AND UTERUS: Chronic | ICD-10-CM

## 2023-07-21 DIAGNOSIS — K80.50 CALCULUS OF BILE DUCT WITHOUT CHOLANGITIS OR CHOLECYSTITIS WITHOUT OBSTRUCTION: ICD-10-CM

## 2023-07-21 PROCEDURE — C1889: CPT

## 2023-07-21 PROCEDURE — 43277 ERCP EA DUCT/AMPULLA DILATE: CPT | Mod: XU

## 2023-07-21 PROCEDURE — 43264 ERCP REMOVE DUCT CALCULI: CPT | Mod: XU

## 2023-07-21 PROCEDURE — 74018 RADEX ABDOMEN 1 VIEW: CPT

## 2023-07-21 PROCEDURE — 74328 X-RAY BILE DUCT ENDOSCOPY: CPT | Mod: 26

## 2023-07-21 PROCEDURE — 43274 ERCP DUCT STENT PLACEMENT: CPT

## 2023-07-21 PROCEDURE — C1769: CPT

## 2023-07-21 PROCEDURE — C2625: CPT

## 2023-07-21 RX ORDER — SODIUM CHLORIDE 9 MG/ML
1000 INJECTION, SOLUTION INTRAVENOUS
Refills: 0 | Status: DISCONTINUED | OUTPATIENT
Start: 2023-07-21 | End: 2023-07-21

## 2023-07-21 NOTE — ASU DISCHARGE PLAN (ADULT/PEDIATRIC) - NS MD DC FALL RISK RISK
For information on Fall & Injury Prevention, visit: [email or web address] OR  [email or web address] OR  https://www.cdc.gov/steadi/patient.html

## 2023-07-21 NOTE — CHART NOTE - NSCHARTNOTEFT_GEN_A_CORE
PACU ANESTHESIA ADMISSION NOTE      Procedure:   Post op diagnosis:      ____  Intubated  TV:______       Rate: ______      FiO2: ______    ___x_  Patent Airway    __x__  Full return of protective reflexes    ____  Full recovery from anesthesia / back to baseline     Vitals:   T: 97.1          R:   14               BP:   142/61               Sat:   98                P: 63      Mental Status:  __x__ Awake   ___x__ Alert   _____ Drowsy   _____ Sedated    Nausea/Vomiting:  _x___ NO  ______Yes,   See Post - Op Orders          Pain Scale (0-10):  __0___    Treatment: ____ None    ____ See Post - Op/PCA Orders    Post - Operative Fluids:   ____ Oral   __x__ See Post - Op Orders    Plan: Discharge:   ___x_Home       _____Floor     _____Critical Care    _____  Other:_________________    Comments:

## 2023-07-21 NOTE — ASU PATIENT PROFILE, ADULT - FALL HARM RISK - HARM RISK INTERVENTIONS

## 2023-07-25 DIAGNOSIS — K80.50 CALCULUS OF BILE DUCT WITHOUT CHOLANGITIS OR CHOLECYSTITIS WITHOUT OBSTRUCTION: ICD-10-CM

## 2023-07-25 DIAGNOSIS — Z79.82 LONG TERM (CURRENT) USE OF ASPIRIN: ICD-10-CM

## 2023-07-25 DIAGNOSIS — E03.9 HYPOTHYROIDISM, UNSPECIFIED: ICD-10-CM

## 2023-07-25 DIAGNOSIS — I10 ESSENTIAL (PRIMARY) HYPERTENSION: ICD-10-CM

## 2023-07-25 DIAGNOSIS — E78.00 PURE HYPERCHOLESTEROLEMIA, UNSPECIFIED: ICD-10-CM

## 2023-08-09 ENCOUNTER — NON-APPOINTMENT (OUTPATIENT)
Age: 78
End: 2023-08-09

## 2023-08-10 ENCOUNTER — INPATIENT (INPATIENT)
Facility: HOSPITAL | Age: 78
LOS: 3 days | Discharge: HOME | DRG: 308 | End: 2023-08-14
Attending: INTERNAL MEDICINE | Admitting: INTERNAL MEDICINE
Payer: MEDICARE

## 2023-08-10 VITALS
DIASTOLIC BLOOD PRESSURE: 71 MMHG | OXYGEN SATURATION: 99 % | SYSTOLIC BLOOD PRESSURE: 139 MMHG | RESPIRATION RATE: 19 BRPM | TEMPERATURE: 97 F | WEIGHT: 220.02 LBS | HEIGHT: 65 IN | HEART RATE: 65 BPM

## 2023-08-10 DIAGNOSIS — Z98.890 OTHER SPECIFIED POSTPROCEDURAL STATES: Chronic | ICD-10-CM

## 2023-08-10 DIAGNOSIS — Z90.49 ACQUIRED ABSENCE OF OTHER SPECIFIED PARTS OF DIGESTIVE TRACT: Chronic | ICD-10-CM

## 2023-08-10 DIAGNOSIS — Z90.710 ACQUIRED ABSENCE OF BOTH CERVIX AND UTERUS: Chronic | ICD-10-CM

## 2023-08-10 PROCEDURE — 93010 ELECTROCARDIOGRAM REPORT: CPT

## 2023-08-10 PROCEDURE — 99291 CRITICAL CARE FIRST HOUR: CPT

## 2023-08-10 NOTE — ED ADULT TRIAGE NOTE - CHIEF COMPLAINT QUOTE
Pt bibems from urgent care, pt went to  for SOB, did an ekg and found pt was in afib. No known hx of afib.

## 2023-08-10 NOTE — ED ADULT TRIAGE NOTE - HEIGHT IN INCHES
October 20, 2022        Vida Mccray MD  [id number]   [facility]  [facility] [address]             [facility] Atrium 4th Fl  [address]  [place] [address]  Phone: [phone]   Patient: Shen Kowalski   MR Number: [id number]   YOB: 1987   Date of Visit: 10/20/2022       Dear Dr. Mccray:    Thank you for referring Shen Kowalski to me for evaluation. Attached you will find relevant portions of my assessment and plan of care.    If you have questions, please do not hesitate to call me. I look forward to following Sehn Kowalski along with you.    Sincerely,      Judd Shelton MD            CC  No Recipients    Enclosure          5

## 2023-08-11 DIAGNOSIS — R00.1 BRADYCARDIA, UNSPECIFIED: ICD-10-CM

## 2023-08-11 LAB
A1C WITH ESTIMATED AVERAGE GLUCOSE RESULT: 6.2 % — HIGH (ref 4–5.6)
ALBUMIN SERPL ELPH-MCNC: 4 G/DL — SIGNIFICANT CHANGE UP (ref 3.5–5.2)
ALBUMIN SERPL ELPH-MCNC: 4.1 G/DL — SIGNIFICANT CHANGE UP (ref 3.5–5.2)
ALP SERPL-CCNC: 107 U/L — SIGNIFICANT CHANGE UP (ref 30–115)
ALP SERPL-CCNC: 110 U/L — SIGNIFICANT CHANGE UP (ref 30–115)
ALT FLD-CCNC: 10 U/L — SIGNIFICANT CHANGE UP (ref 0–41)
ALT FLD-CCNC: 10 U/L — SIGNIFICANT CHANGE UP (ref 0–41)
ANION GAP SERPL CALC-SCNC: 13 MMOL/L — SIGNIFICANT CHANGE UP (ref 7–14)
ANION GAP SERPL CALC-SCNC: 16 MMOL/L — HIGH (ref 7–14)
ANION GAP SERPL CALC-SCNC: 17 MMOL/L — HIGH (ref 7–14)
ANION GAP SERPL CALC-SCNC: 19 MMOL/L — HIGH (ref 7–14)
APTT BLD: 35.2 SEC — SIGNIFICANT CHANGE UP (ref 27–39.2)
AST SERPL-CCNC: 12 U/L — SIGNIFICANT CHANGE UP (ref 0–41)
AST SERPL-CCNC: 12 U/L — SIGNIFICANT CHANGE UP (ref 0–41)
BASOPHILS # BLD AUTO: 0.06 K/UL — SIGNIFICANT CHANGE UP (ref 0–0.2)
BASOPHILS # BLD AUTO: 0.07 K/UL — SIGNIFICANT CHANGE UP (ref 0–0.2)
BASOPHILS NFR BLD AUTO: 0.6 % — SIGNIFICANT CHANGE UP (ref 0–1)
BASOPHILS NFR BLD AUTO: 0.7 % — SIGNIFICANT CHANGE UP (ref 0–1)
BILIRUB SERPL-MCNC: <0.2 MG/DL — SIGNIFICANT CHANGE UP (ref 0.2–1.2)
BILIRUB SERPL-MCNC: <0.2 MG/DL — SIGNIFICANT CHANGE UP (ref 0.2–1.2)
BLD GP AB SCN SERPL QL: SIGNIFICANT CHANGE UP
BUN SERPL-MCNC: 54 MG/DL — HIGH (ref 10–20)
BUN SERPL-MCNC: 54 MG/DL — HIGH (ref 10–20)
BUN SERPL-MCNC: 57 MG/DL — HIGH (ref 10–20)
BUN SERPL-MCNC: 61 MG/DL — CRITICAL HIGH (ref 10–20)
CALCIUM SERPL-MCNC: 8.4 MG/DL — SIGNIFICANT CHANGE UP (ref 8.4–10.5)
CALCIUM SERPL-MCNC: 8.8 MG/DL — SIGNIFICANT CHANGE UP (ref 8.4–10.5)
CALCIUM SERPL-MCNC: 9 MG/DL — SIGNIFICANT CHANGE UP (ref 8.4–10.5)
CALCIUM SERPL-MCNC: 9.1 MG/DL — SIGNIFICANT CHANGE UP (ref 8.4–10.5)
CHLORIDE SERPL-SCNC: 101 MMOL/L — SIGNIFICANT CHANGE UP (ref 98–110)
CHLORIDE SERPL-SCNC: 103 MMOL/L — SIGNIFICANT CHANGE UP (ref 98–110)
CHLORIDE SERPL-SCNC: 104 MMOL/L — SIGNIFICANT CHANGE UP (ref 98–110)
CHLORIDE SERPL-SCNC: 107 MMOL/L — SIGNIFICANT CHANGE UP (ref 98–110)
CHOLEST SERPL-MCNC: 228 MG/DL — HIGH
CO2 SERPL-SCNC: 12 MMOL/L — LOW (ref 17–32)
CO2 SERPL-SCNC: 13 MMOL/L — LOW (ref 17–32)
CO2 SERPL-SCNC: 17 MMOL/L — SIGNIFICANT CHANGE UP (ref 17–32)
CO2 SERPL-SCNC: 18 MMOL/L — SIGNIFICANT CHANGE UP (ref 17–32)
CREAT SERPL-MCNC: 1.5 MG/DL — SIGNIFICANT CHANGE UP (ref 0.7–1.5)
CREAT SERPL-MCNC: 1.6 MG/DL — HIGH (ref 0.7–1.5)
CREAT SERPL-MCNC: 1.6 MG/DL — HIGH (ref 0.7–1.5)
CREAT SERPL-MCNC: 1.8 MG/DL — HIGH (ref 0.7–1.5)
CRP SERPL-MCNC: 31.4 MG/L — HIGH
EGFR: 29 ML/MIN/1.73M2 — LOW
EGFR: 33 ML/MIN/1.73M2 — LOW
EGFR: 33 ML/MIN/1.73M2 — LOW
EGFR: 36 ML/MIN/1.73M2 — LOW
EOSINOPHIL # BLD AUTO: 0.21 K/UL — SIGNIFICANT CHANGE UP (ref 0–0.7)
EOSINOPHIL # BLD AUTO: 0.24 K/UL — SIGNIFICANT CHANGE UP (ref 0–0.7)
EOSINOPHIL NFR BLD AUTO: 2.1 % — SIGNIFICANT CHANGE UP (ref 0–8)
EOSINOPHIL NFR BLD AUTO: 2.4 % — SIGNIFICANT CHANGE UP (ref 0–8)
ERYTHROCYTE [SEDIMENTATION RATE] IN BLOOD: 10 MM/HR — SIGNIFICANT CHANGE UP (ref 0–20)
ESTIMATED AVERAGE GLUCOSE: 131 MG/DL — HIGH (ref 68–114)
FERRITIN SERPL-MCNC: 55 NG/ML — SIGNIFICANT CHANGE UP (ref 13–330)
FOLATE SERPL-MCNC: 7.5 NG/ML — SIGNIFICANT CHANGE UP
GLUCOSE BLDC GLUCOMTR-MCNC: 188 MG/DL — HIGH (ref 70–99)
GLUCOSE BLDC GLUCOMTR-MCNC: 292 MG/DL — HIGH (ref 70–99)
GLUCOSE SERPL-MCNC: 151 MG/DL — HIGH (ref 70–99)
GLUCOSE SERPL-MCNC: 156 MG/DL — HIGH (ref 70–99)
GLUCOSE SERPL-MCNC: 171 MG/DL — HIGH (ref 70–99)
GLUCOSE SERPL-MCNC: 178 MG/DL — HIGH (ref 70–99)
HCT VFR BLD CALC: 27.4 % — LOW (ref 37–47)
HCT VFR BLD CALC: 27.5 % — LOW (ref 37–47)
HDLC SERPL-MCNC: 47 MG/DL — LOW
HGB BLD-MCNC: 8.3 G/DL — LOW (ref 12–16)
HGB BLD-MCNC: 8.5 G/DL — LOW (ref 12–16)
IMM GRANULOCYTES NFR BLD AUTO: 0.7 % — HIGH (ref 0.1–0.3)
IMM GRANULOCYTES NFR BLD AUTO: 1.3 % — HIGH (ref 0.1–0.3)
INR BLD: 1.03 RATIO — SIGNIFICANT CHANGE UP (ref 0.65–1.3)
IRON SATN MFR SERPL: 11 % — LOW (ref 15–50)
IRON SATN MFR SERPL: 37 UG/DL — SIGNIFICANT CHANGE UP (ref 35–150)
LACTATE SERPL-SCNC: 1.4 MMOL/L — SIGNIFICANT CHANGE UP (ref 0.7–2)
LACTATE SERPL-SCNC: 2.9 MMOL/L — HIGH (ref 0.7–2)
LIPID PNL WITH DIRECT LDL SERPL: 135 MG/DL — HIGH
LYMPHOCYTES # BLD AUTO: 1.56 K/UL — SIGNIFICANT CHANGE UP (ref 1.2–3.4)
LYMPHOCYTES # BLD AUTO: 15.9 % — LOW (ref 20.5–51.1)
LYMPHOCYTES # BLD AUTO: 2.34 K/UL — SIGNIFICANT CHANGE UP (ref 1.2–3.4)
LYMPHOCYTES # BLD AUTO: 23.8 % — SIGNIFICANT CHANGE UP (ref 20.5–51.1)
MAGNESIUM SERPL-MCNC: 2.6 MG/DL — HIGH (ref 1.8–2.4)
MCHC RBC-ENTMCNC: 26.4 PG — LOW (ref 27–31)
MCHC RBC-ENTMCNC: 26.6 PG — LOW (ref 27–31)
MCHC RBC-ENTMCNC: 30.3 G/DL — LOW (ref 32–37)
MCHC RBC-ENTMCNC: 30.9 G/DL — LOW (ref 32–37)
MCV RBC AUTO: 85.9 FL — SIGNIFICANT CHANGE UP (ref 81–99)
MCV RBC AUTO: 87.3 FL — SIGNIFICANT CHANGE UP (ref 81–99)
MONOCYTES # BLD AUTO: 0.57 K/UL — SIGNIFICANT CHANGE UP (ref 0.1–0.6)
MONOCYTES # BLD AUTO: 0.6 K/UL — SIGNIFICANT CHANGE UP (ref 0.1–0.6)
MONOCYTES NFR BLD AUTO: 5.8 % — SIGNIFICANT CHANGE UP (ref 1.7–9.3)
MONOCYTES NFR BLD AUTO: 6.1 % — SIGNIFICANT CHANGE UP (ref 1.7–9.3)
NEUTROPHILS # BLD AUTO: 6.57 K/UL — HIGH (ref 1.4–6.5)
NEUTROPHILS # BLD AUTO: 7.25 K/UL — HIGH (ref 1.4–6.5)
NEUTROPHILS NFR BLD AUTO: 66.7 % — SIGNIFICANT CHANGE UP (ref 42.2–75.2)
NEUTROPHILS NFR BLD AUTO: 73.9 % — SIGNIFICANT CHANGE UP (ref 42.2–75.2)
NON HDL CHOLESTEROL: 181 MG/DL — HIGH
NRBC # BLD: 0 /100 WBCS — SIGNIFICANT CHANGE UP (ref 0–0)
NRBC # BLD: 0 /100 WBCS — SIGNIFICANT CHANGE UP (ref 0–0)
NT-PROBNP SERPL-SCNC: 2159 PG/ML — HIGH (ref 0–300)
PLATELET # BLD AUTO: 365 K/UL — SIGNIFICANT CHANGE UP (ref 130–400)
PLATELET # BLD AUTO: 383 K/UL — SIGNIFICANT CHANGE UP (ref 130–400)
PMV BLD: 9.5 FL — SIGNIFICANT CHANGE UP (ref 7.4–10.4)
PMV BLD: 9.7 FL — SIGNIFICANT CHANGE UP (ref 7.4–10.4)
POTASSIUM SERPL-MCNC: 5.5 MMOL/L — HIGH (ref 3.5–5)
POTASSIUM SERPL-MCNC: 5.7 MMOL/L — HIGH (ref 3.5–5)
POTASSIUM SERPL-MCNC: 5.8 MMOL/L — HIGH (ref 3.5–5)
POTASSIUM SERPL-MCNC: 6.6 MMOL/L — CRITICAL HIGH (ref 3.5–5)
POTASSIUM SERPL-SCNC: 5.5 MMOL/L — HIGH (ref 3.5–5)
POTASSIUM SERPL-SCNC: 5.7 MMOL/L — HIGH (ref 3.5–5)
POTASSIUM SERPL-SCNC: 5.8 MMOL/L — HIGH (ref 3.5–5)
POTASSIUM SERPL-SCNC: 6.6 MMOL/L — CRITICAL HIGH (ref 3.5–5)
PROCALCITONIN SERPL-MCNC: 0.12 NG/ML — HIGH (ref 0.02–0.1)
PROT SERPL-MCNC: 6.4 G/DL — SIGNIFICANT CHANGE UP (ref 6–8)
PROT SERPL-MCNC: 6.4 G/DL — SIGNIFICANT CHANGE UP (ref 6–8)
PROTHROM AB SERPL-ACNC: 11.8 SEC — SIGNIFICANT CHANGE UP (ref 9.95–12.87)
RBC # BLD: 3.14 M/UL — LOW (ref 4.2–5.4)
RBC # BLD: 3.2 M/UL — LOW (ref 4.2–5.4)
RBC # FLD: 15.2 % — HIGH (ref 11.5–14.5)
RBC # FLD: 15.3 % — HIGH (ref 11.5–14.5)
SODIUM SERPL-SCNC: 132 MMOL/L — LOW (ref 135–146)
SODIUM SERPL-SCNC: 135 MMOL/L — SIGNIFICANT CHANGE UP (ref 135–146)
SODIUM SERPL-SCNC: 136 MMOL/L — SIGNIFICANT CHANGE UP (ref 135–146)
SODIUM SERPL-SCNC: 137 MMOL/L — SIGNIFICANT CHANGE UP (ref 135–146)
T4 AB SER-ACNC: 6.7 UG/DL — SIGNIFICANT CHANGE UP (ref 4.6–12)
TIBC SERPL-MCNC: 335 UG/DL — SIGNIFICANT CHANGE UP (ref 220–430)
TRIGL SERPL-MCNC: 232 MG/DL — HIGH
TROPONIN T SERPL-MCNC: <0.01 NG/ML — SIGNIFICANT CHANGE UP
TROPONIN T SERPL-MCNC: <0.01 NG/ML — SIGNIFICANT CHANGE UP
TSH SERPL-MCNC: 5.69 UIU/ML — HIGH (ref 0.27–4.2)
UIBC SERPL-MCNC: 298 UG/DL — SIGNIFICANT CHANGE UP (ref 110–370)
VIT B12 SERPL-MCNC: 346 PG/ML — SIGNIFICANT CHANGE UP (ref 232–1245)
WBC # BLD: 9.82 K/UL — SIGNIFICANT CHANGE UP (ref 4.8–10.8)
WBC # BLD: 9.85 K/UL — SIGNIFICANT CHANGE UP (ref 4.8–10.8)
WBC # FLD AUTO: 9.82 K/UL — SIGNIFICANT CHANGE UP (ref 4.8–10.8)
WBC # FLD AUTO: 9.85 K/UL — SIGNIFICANT CHANGE UP (ref 4.8–10.8)

## 2023-08-11 PROCEDURE — 82728 ASSAY OF FERRITIN: CPT

## 2023-08-11 PROCEDURE — 71045 X-RAY EXAM CHEST 1 VIEW: CPT | Mod: 26

## 2023-08-11 PROCEDURE — 86901 BLOOD TYPING SEROLOGIC RH(D): CPT

## 2023-08-11 PROCEDURE — 93010 ELECTROCARDIOGRAM REPORT: CPT | Mod: 77

## 2023-08-11 PROCEDURE — 93306 TTE W/DOPPLER COMPLETE: CPT | Mod: 26

## 2023-08-11 PROCEDURE — 83540 ASSAY OF IRON: CPT

## 2023-08-11 PROCEDURE — 83036 HEMOGLOBIN GLYCOSYLATED A1C: CPT

## 2023-08-11 PROCEDURE — 80053 COMPREHEN METABOLIC PANEL: CPT

## 2023-08-11 PROCEDURE — 99223 1ST HOSP IP/OBS HIGH 75: CPT

## 2023-08-11 PROCEDURE — 84300 ASSAY OF URINE SODIUM: CPT

## 2023-08-11 PROCEDURE — 82570 ASSAY OF URINE CREATININE: CPT

## 2023-08-11 PROCEDURE — 83935 ASSAY OF URINE OSMOLALITY: CPT

## 2023-08-11 PROCEDURE — 84484 ASSAY OF TROPONIN QUANT: CPT

## 2023-08-11 PROCEDURE — 36415 COLL VENOUS BLD VENIPUNCTURE: CPT

## 2023-08-11 PROCEDURE — 83550 IRON BINDING TEST: CPT

## 2023-08-11 PROCEDURE — 84540 ASSAY OF URINE/UREA-N: CPT

## 2023-08-11 PROCEDURE — 93306 TTE W/DOPPLER COMPLETE: CPT

## 2023-08-11 PROCEDURE — 83605 ASSAY OF LACTIC ACID: CPT

## 2023-08-11 PROCEDURE — 97162 PT EVAL MOD COMPLEX 30 MIN: CPT | Mod: GP

## 2023-08-11 PROCEDURE — 93010 ELECTROCARDIOGRAM REPORT: CPT

## 2023-08-11 PROCEDURE — 87040 BLOOD CULTURE FOR BACTERIA: CPT

## 2023-08-11 PROCEDURE — 71045 X-RAY EXAM CHEST 1 VIEW: CPT

## 2023-08-11 PROCEDURE — 86850 RBC ANTIBODY SCREEN: CPT

## 2023-08-11 PROCEDURE — 86900 BLOOD TYPING SEROLOGIC ABO: CPT

## 2023-08-11 PROCEDURE — 84436 ASSAY OF TOTAL THYROXINE: CPT

## 2023-08-11 PROCEDURE — 99291 CRITICAL CARE FIRST HOUR: CPT

## 2023-08-11 PROCEDURE — 82746 ASSAY OF FOLIC ACID SERUM: CPT

## 2023-08-11 PROCEDURE — 80061 LIPID PANEL: CPT

## 2023-08-11 PROCEDURE — 80048 BASIC METABOLIC PNL TOTAL CA: CPT

## 2023-08-11 PROCEDURE — 82607 VITAMIN B-12: CPT

## 2023-08-11 PROCEDURE — 83735 ASSAY OF MAGNESIUM: CPT

## 2023-08-11 PROCEDURE — 93005 ELECTROCARDIOGRAM TRACING: CPT

## 2023-08-11 PROCEDURE — 82962 GLUCOSE BLOOD TEST: CPT

## 2023-08-11 PROCEDURE — 76770 US EXAM ABDO BACK WALL COMP: CPT

## 2023-08-11 PROCEDURE — 85025 COMPLETE CBC W/AUTO DIFF WBC: CPT

## 2023-08-11 PROCEDURE — 86140 C-REACTIVE PROTEIN: CPT

## 2023-08-11 PROCEDURE — 86618 LYME DISEASE ANTIBODY: CPT

## 2023-08-11 PROCEDURE — 84443 ASSAY THYROID STIM HORMONE: CPT

## 2023-08-11 PROCEDURE — 85652 RBC SED RATE AUTOMATED: CPT

## 2023-08-11 PROCEDURE — 84145 PROCALCITONIN (PCT): CPT

## 2023-08-11 RX ORDER — DEXTROSE 50 % IN WATER 50 %
50 SYRINGE (ML) INTRAVENOUS ONCE
Refills: 0 | Status: COMPLETED | OUTPATIENT
Start: 2023-08-11 | End: 2023-08-11

## 2023-08-11 RX ORDER — DOPAMINE HYDROCHLORIDE 40 MG/ML
5 INJECTION, SOLUTION, CONCENTRATE INTRAVENOUS
Qty: 400 | Refills: 0 | Status: DISCONTINUED | OUTPATIENT
Start: 2023-08-11 | End: 2023-08-11

## 2023-08-11 RX ORDER — IPRATROPIUM/ALBUTEROL SULFATE 18-103MCG
3 AEROSOL WITH ADAPTER (GRAM) INHALATION EVERY 6 HOURS
Refills: 0 | Status: DISCONTINUED | OUTPATIENT
Start: 2023-08-11 | End: 2023-08-14

## 2023-08-11 RX ORDER — FUROSEMIDE 40 MG
40 TABLET ORAL ONCE
Refills: 0 | Status: COMPLETED | OUTPATIENT
Start: 2023-08-11 | End: 2023-08-11

## 2023-08-11 RX ORDER — SODIUM ZIRCONIUM CYCLOSILICATE 10 G/10G
10 POWDER, FOR SUSPENSION ORAL ONCE
Refills: 0 | Status: COMPLETED | OUTPATIENT
Start: 2023-08-11 | End: 2023-08-11

## 2023-08-11 RX ORDER — INSULIN HUMAN 100 [IU]/ML
10 INJECTION, SOLUTION SUBCUTANEOUS ONCE
Refills: 0 | Status: COMPLETED | OUTPATIENT
Start: 2023-08-11 | End: 2023-08-11

## 2023-08-11 RX ORDER — CLOPIDOGREL BISULFATE 75 MG/1
75 TABLET, FILM COATED ORAL DAILY
Refills: 0 | Status: DISCONTINUED | OUTPATIENT
Start: 2023-08-11 | End: 2023-08-14

## 2023-08-11 RX ORDER — LEVOTHYROXINE SODIUM 125 MCG
150 TABLET ORAL DAILY
Refills: 0 | Status: DISCONTINUED | OUTPATIENT
Start: 2023-08-11 | End: 2023-08-12

## 2023-08-11 RX ORDER — HEPARIN SODIUM 5000 [USP'U]/ML
5000 INJECTION INTRAVENOUS; SUBCUTANEOUS EVERY 12 HOURS
Refills: 0 | Status: DISCONTINUED | OUTPATIENT
Start: 2023-08-11 | End: 2023-08-14

## 2023-08-11 RX ORDER — FUROSEMIDE 40 MG
40 TABLET ORAL DAILY
Refills: 0 | Status: DISCONTINUED | OUTPATIENT
Start: 2023-08-11 | End: 2023-08-12

## 2023-08-11 RX ORDER — FUROSEMIDE 40 MG
20 TABLET ORAL ONCE
Refills: 0 | Status: COMPLETED | OUTPATIENT
Start: 2023-08-11 | End: 2023-08-11

## 2023-08-11 RX ORDER — SODIUM ZIRCONIUM CYCLOSILICATE 10 G/10G
10 POWDER, FOR SUSPENSION ORAL EVERY 8 HOURS
Refills: 0 | Status: COMPLETED | OUTPATIENT
Start: 2023-08-11 | End: 2023-08-11

## 2023-08-11 RX ORDER — DOPAMINE HYDROCHLORIDE 40 MG/ML
5 INJECTION, SOLUTION, CONCENTRATE INTRAVENOUS
Qty: 400 | Refills: 0 | Status: DISCONTINUED | OUTPATIENT
Start: 2023-08-11 | End: 2023-08-12

## 2023-08-11 RX ORDER — ASPIRIN/CALCIUM CARB/MAGNESIUM 324 MG
81 TABLET ORAL DAILY
Refills: 0 | Status: DISCONTINUED | OUTPATIENT
Start: 2023-08-11 | End: 2023-08-14

## 2023-08-11 RX ORDER — CALCIUM GLUCONATE 100 MG/ML
1 VIAL (ML) INTRAVENOUS ONCE
Refills: 0 | Status: COMPLETED | OUTPATIENT
Start: 2023-08-11 | End: 2023-08-11

## 2023-08-11 RX ADMIN — Medication 50 MILLILITER(S): at 02:50

## 2023-08-11 RX ADMIN — Medication 100 GRAM(S): at 02:51

## 2023-08-11 RX ADMIN — SODIUM ZIRCONIUM CYCLOSILICATE 10 GRAM(S): 10 POWDER, FOR SUSPENSION ORAL at 14:29

## 2023-08-11 RX ADMIN — DOPAMINE HYDROCHLORIDE 18.7 MICROGRAM(S)/KG/MIN: 40 INJECTION, SOLUTION, CONCENTRATE INTRAVENOUS at 13:41

## 2023-08-11 RX ADMIN — Medication 81 MILLIGRAM(S): at 11:14

## 2023-08-11 RX ADMIN — Medication 20 MILLIGRAM(S): at 11:50

## 2023-08-11 RX ADMIN — Medication 40 MILLIGRAM(S): at 06:41

## 2023-08-11 RX ADMIN — SODIUM ZIRCONIUM CYCLOSILICATE 10 GRAM(S): 10 POWDER, FOR SUSPENSION ORAL at 03:45

## 2023-08-11 RX ADMIN — Medication 150 MICROGRAM(S): at 06:40

## 2023-08-11 RX ADMIN — HEPARIN SODIUM 5000 UNIT(S): 5000 INJECTION INTRAVENOUS; SUBCUTANEOUS at 20:02

## 2023-08-11 RX ADMIN — CLOPIDOGREL BISULFATE 75 MILLIGRAM(S): 75 TABLET, FILM COATED ORAL at 11:14

## 2023-08-11 RX ADMIN — SODIUM ZIRCONIUM CYCLOSILICATE 10 GRAM(S): 10 POWDER, FOR SUSPENSION ORAL at 10:44

## 2023-08-11 RX ADMIN — Medication 40 MILLIGRAM(S): at 02:51

## 2023-08-11 RX ADMIN — DOPAMINE HYDROCHLORIDE 18.7 MICROGRAM(S)/KG/MIN: 40 INJECTION, SOLUTION, CONCENTRATE INTRAVENOUS at 02:47

## 2023-08-11 RX ADMIN — INSULIN HUMAN 10 UNIT(S): 100 INJECTION, SOLUTION SUBCUTANEOUS at 03:37

## 2023-08-11 NOTE — ED PROVIDER NOTE - OBJECTIVE STATEMENT
77 year old F with hx of htn, hypothyroid, PAD, mult b/l LE stents on asa/plavix, sts was recently started on xarelto  x 1 week ago after RLE stent placement presenting to er for eval. Pt sts for the last few days has noted mild HIGGINS and lightheadedness. Sts symptoms improve at rest. + cough She denies any assoc palpitations, chest pain, leg pain, fever/chills, syncope, dizziness, visual changes, weakness.

## 2023-08-11 NOTE — H&P ADULT - HISTORY OF PRESENT ILLNESS
77 year old F with hx of htn, hypothyroid, PAD, mult b/l LE stents on asa/plavix, sts was recently started on xarelto  x 1 week ago after RLE stent placement presenting to er for eval. Pt sts for the last few days has noted mild HIGGINS and lightheadedness. Sts symptoms improve at rest. + cough She denies any assoc palpitations, chest pain, leg pain, fever/chills, syncope, dizziness, visual changes, weakness.    Vitals:    Labs: K 6.6    Imaging:    In the ED:  - s/p Insulin/D50  - Lokelma 10 x1  - Calcium Gluconate 1mg x1  - starting Dopamine gtt @ 5mcg    Admitted to CCU 77F PMHx HTN, Hypothyroidism, PAD s/p multiple b/l LE stents (on ASA/Plavix and was recently started on Xarelto  x1 week ago after RLE stent placement) presenting to ED for eval. Pt sts for the last few days has noted mild HIGGINS and lightheadedness. Sts symptoms improve at rest. + cough She denies any assoc palpitations, chest pain, leg pain, fever/chills, syncope, dizziness, visual changes, weakness.    Vitals: Temp 97.3F, /71, HR 65>36, RR 19, SpO2 99% on RA    EKG showed Junctional Bradycardia    Labs: Hgb 8.3 (previously 10.1), K 6.6, Cr 1.8 (previously 1.1), Lactate 2.9    Imaging: CXR shows mild vascular congestion b/l, no acute cardiopulmonary pathology    In the ED:  - s/p Insulin/D50  - Lokelma 10 x1  - Calcium Gluconate 1mg x1  - starting Dopamine gtt @ 5mcg    Admitted to CCU 77F ex-smoker PMHx HTN, HLD, h/o Graves s/p Irradiation now Hypothyroid on synthroid, h/o gallstones s/p ERCP 1 month ago, PAD s/p multiple b/l LE stents on ASA/Plavix and was recently started on Xarelto x1 week ago after RLE stent placement presenting to ED generalized fatigue and malaise for the last few days. Patient also admit to having mild dyspnea on exertion, productive cough, lightheadedness, chest heaviness and pins and needles sensation of LUE. Denies any headaches, syncopal episode or LOC, n/v/d, dysuria or weakness. Of note, patient has been feeling     Vitals: Temp 97.3F, /71, HR 65>36, RR 19, SpO2 99% on RA    EKG showed Junctional Bradycardia    Labs: Hgb 8.3 (previously 10.1), K 6.6, Cr 1.8 (previously 1.1), Lactate 2.9    Imaging: CXR shows mild vascular congestion b/l, no acute cardiopulmonary pathology    In the ED:  - s/p Insulin/D50  - Lokelma 10 x1  - Calcium Gluconate 1mg x1  - starting Dopamine gtt @ 5mcg    Admitted to CCU 77F ex-smoker PMHx HTN, HLD, h/o Graves s/p Irradiation now Hypothyroid on synthroid, h/o gallstones s/p ERCP 1 month ago, PAD s/p multiple b/l LE stents on ASA/Plavix and was recently started on Xarelto x1 week ago after RLE stent placement presenting to ED generalized fatigue and malaise for the last few days. Patient also admit to having mild dyspnea on exertion, productive cough, lightheadedness, chest heaviness and pins and needles sensation of LUE. Denies any headaches, syncopal episode or LOC, n/v/d, dysuria or weakness. Of note, patient has been feeling     Vitals: Temp 97.3F, /71, HR 65>36, RR 19, SpO2 99% on RA    EKG showed Junctional Bradycardia    Labs: Hgb 8.3 (previously 10.1), K 6.6, Cr 1.8 (previously 1.1), Lactate 2.9, BNP 2159 (previously 871), Trops -ve x1    Imaging: CXR shows mild vascular congestion b/l, no acute cardiopulmonary pathology    In the ED:  - s/p Insulin/D50  - Lokelma 10 x1  - Calcium Gluconate 1mg x1  - starting Dopamine gtt @ 5mcg    Admitted to CCU 77F ex-smoker PMHx HTN, HLD, h/o Graves s/p Irradiation now Hypothyroid on synthroid, h/o gallstones s/p ERCP 1 month ago, PAD s/p multiple b/l LE stents on ASA/Plavix and was recently started on Xarelto x1 week ago after RLE stent placement presenting to ED from urgent care for symptomatic bradycardia. Patient admits to having generalized fatigue and malaise for the last few days. Also admits to having mild dyspnea on exertion, productive cough, lightheadedness, chest heaviness and pins and needles sensation of LUE. Denies any headaches, syncopal episode or LOC, n/v/d, dysuria or weakness. Of note, patient has been feeling     Vitals: Temp 97.3F, /71, HR 65>36, RR 19, SpO2 99% on RA    EKG showed Junctional Bradycardia    Labs: Hgb 8.3 (previously 10.1), K 6.6, Cr 1.8 (previously 1.1), Lactate 2.9, BNP 2159 (previously 871), Trops -ve x1    Imaging: CXR shows mild vascular congestion b/l, no acute cardiopulmonary pathology    In the ED:  - s/p Insulin/D50  - Lokelma 10 x1  - Calcium Gluconate 1mg x1  - starting Dopamine gtt @ 5mcg    Admitted to CCU

## 2023-08-11 NOTE — CONSULT NOTE ADULT - SUBJECTIVE AND OBJECTIVE BOX
Patient is a 77y old  Female who presents with a chief complaint of Dyspnea on Exertion and Lightheadedness (11 Aug 2023 07:17)    HPI: 77 year old female ex-smoker with the past medical history of Graves' disease s/p Irradiation, hypothyroidism, hypertension, hyperlipidemia, PAD s/p multiple b/l LE stents on ASA/Plavix and was recently started on Xarelto x1 week ago after RLE stent placement presented to ED from urgent care for symptomatic bradycardia. Patient admits to having generalized fatigue and malaise for the last few days. Also admits to having mild dyspnea on exertion, productive cough, lightheadedness, chest heaviness and pins and needles sensation of LUE. Denies any headaches, syncopal episode or LOC, n/v/d, dysuria or weakness. Electrophysiology was consulted for recommendations. The patient had bradycardia in the setting of hyperkalemia Potassium 6.6 in the setting of renal failure. The patient was also taking Atenolol at home. Presenting ECG reveals junctional bradycardia which resolved to sinus bradycardia on the following EKG. The patient was seen and evaluated this morning. Heart rates have recovered to 50's and the patient denies any symptoms currently.     PAST MEDICAL & SURGICAL HISTORY:  Arterial insufficiency of lower extremity left leg stent placed  Leg swelling  Hypothyroid  HTN (hypertension)  High cholesterol  Peripheral arterial disease  H/O Graves' disease  History of cholecystectomy  H/O: hysterectomy  S/P angiogram of extremity  S/P ERCP    PREVIOUS DIAGNOSTIC TESTING:      ECHO  FINDINGS: 1/23/23:  1. Left ventricular ejection fraction, by visual estimation, is 60 to 65%. 2. Normal global left ventricular systolic function. 3. Spectral Doppler shows impaired relaxation pattern of left ventricular myocardial filling (Grade I diastolic dysfunction). 4. Mildly enlarged left atrium.    STRESS  FINDINGS: 10/31/2019: The overall quality of the study is goodRotational cine display reveals no artifactSPECT images demonstrate no fixed no reperfusion defects.No pulmonary uptake no dilatation of ventricle.Bull's-eyeimaging similar findingsGated SPECT imaging demonstrates normal wall motion thickening and ejection fraction.  The left ventricular ejection fraction was calculated to be greater than 55  %.Impression:1. IV Adenosine Dual Isotope Study which was negative with respect to symptoms and EKG changes.2. Myocardial perfusion imaging reveals no fixed no reperfusion defects3. Gated imaging reveals normal wall motion thickening and ejection fractionRecommendation:Medical therapy.    CATHETERIZATION  FINDINGS: none    ELECTROPHYSIOLOGY STUDY  FINDINGS: none    MEDICATIONS  (STANDING):  aspirin  chewable 81 milliGRAM(s) Oral daily  clopidogrel Tablet 75 milliGRAM(s) Oral daily  DOPamine Infusion 5 MICROgram(s)/kG/Min (18.7 mL/Hr) IV Continuous <Continuous>  furosemide   Injectable 40 milliGRAM(s) IV Push daily  levothyroxine 150 MICROGram(s) Oral daily    MEDICATIONS  (PRN):  albuterol/ipratropium for Nebulization 3 milliLiter(s) Nebulizer every 6 hours PRN Bronchospasm      FAMILY HISTORY:  Family history of breast cancer (Sibling)    FH: lung cancer  FATHER    SOCIAL HISTORY: lives at home    CIGARETTES: yes    ALCOHOL: no     Past Surgical History: as above    Allergies: codeine (Stomach Upset; Vomiting; Nausea)      REVIEW OF SYSTEMS:  CONSTITUTIONAL: No fever, weight loss, chills, shakes, or fatigue  EYES: No eye pain, visual disturbances, or discharge  ENMT:  No difficulty hearing, tinnitus, vertigo; No sinus or throat pain  NECK: No pain or stiffness  BREASTS: No pain, masses, or nipple discharge  RESPIRATORY: No cough, wheezing, hemoptysis, or shortness of breath  CARDIOVASCULAR: No chest pain, dyspnea, palpitations, (+) dizziness, syncope, paroxysmal nocturnal dyspnea, orthopnea, or arm or leg swelling  GASTROINTESTINAL: No abdominal  or epigastric pain, nausea, vomiting, hematemesis, diarrhea, constipation, melena or bright red blood.  GENITOURINARY: No dysuria, nocturia, hematuria, or urinary incontinence  NEUROLOGICAL: No headaches, memory loss, slurred speech, limb weakness, loss of strength, numbness, or tremors  SKIN: No itching, burning, rashes, or lesions   LYMPH NODES: No enlarged glands  ENDOCRINE: No heat or cold intolerance, or hair loss  MUSCULOSKELETAL: No joint pain or swelling, muscle, back, or extremity pain  PSYCHIATRIC: No depression, anxiety, or difficulty sleeping  HEME/LYMPH: No easy bruising or bleeding gums  ALLERY AND IMMUNOLOGIC: No hives or rash.      Vital Signs Last 24 Hrs  T(C): 36.2 (11 Aug 2023 05:10), Max: 36.3 (10 Aug 2023 22:18)  T(F): 97.2 (11 Aug 2023 05:10), Max: 97.3 (10 Aug 2023 22:18)  HR: 44 (11 Aug 2023 05:10) (37 - 65)  BP: 143/59 (11 Aug 2023 05:10) (134/75 - 148/62)  BP(mean): 85 (11 Aug 2023 05:10) (85 - 85)  RR: 18 (11 Aug 2023 04:31) (18 - 19)  SpO2: 99% (11 Aug 2023 05:10) (98% - 99%)    Parameters below as of 11 Aug 2023 05:10  Patient On (Oxygen Delivery Method): room air    PHYSICAL EXAM:  GENERAL: In no apparent distress, well nourished, and hydrated.  HEAD:  Atraumatic, Normocephalic  EYES: EOMI, PERRLA, conjunctiva and sclera clear  ENMT: No tonsillar erythema, exudates, or enlargements; ist mucous membranes, Good dentition, No lesions  NECK: Supple and normal thyroid.  No JVD or carotid bruit.  Carotid pulse is 2+ bilaterally.  HEART: Regular rate and rhythm; No murmurs, rubs, or gallops.  PULMONARY: Clear to auscultation and perfusion.  No rales, wheezing, or rhonchi bilaterally.  ABDOMEN: Soft, Nontender, Nondistended; Bowel sounds present  EXTREMITIES:  2+ Peripheral Pulses, No clubbing, cyanosis, or edema  LYMPH: No lymphadenopathy noted  NEUROLOGICAL: Grossly nonfocal      INTERPRETATION OF TELEMETRY: sinus bradycardia    ECG: Rhythm - Sinus bradycardia, Rate - 41bpm, AV Conduction- WNL , Interventricular conduction - WNL, QRS duration - 80ms, Hypertrophy - Absent, Myocardial Infarction - Absent, QT interval - 460ms, WPW - Absent, Brugada pattern - Absent.     I&O's Detail    10 Aug 2023 07:01  -  11 Aug 2023 07:00  --------------------------------------------------------  IN:  Total IN: 0 mL    OUT:    Voided (mL): 900 mL  Total OUT: 900 mL    Total NET: -900 mL      LABS:                        8.5    9.82  )-----------( 383      ( 11 Aug 2023 06:35 )             27.5     08-11    137  |  107  |  61<HH>  ----------------------------<  151<H>  6.6<HH>   |  17  |  1.8<H>    Ca    8.4      11 Aug 2023 00:53  Mg     2.8     08-11    TPro  6.4  /  Alb  4.0  /  TBili  <0.2  /  DBili  x   /  AST  12  /  ALT  10  /  AlkPhos  107  08-11    CARDIAC MARKERS ( 11 Aug 2023 00:53 )  x     / <0.01 ng/mL / x     / x     / x          PT/INR - ( 11 Aug 2023 00:53 )   PT: 11.80 sec;   INR: 1.03 ratio         PTT - ( 11 Aug 2023 00:53 )  PTT:35.2 sec  Urinalysis Basic - ( 11 Aug 2023 00:53 )    Color: x / Appearance: x / SG: x / pH: x  Gluc: 151 mg/dL / Ketone: x  / Bili: x / Urobili: x   Blood: x / Protein: x / Nitrite: x   Leuk Esterase: x / RBC: x / WBC x   Sq Epi: x / Non Sq Epi: x / Bacteria: x      BNP  I&O's Detail    10 Aug 2023 07:01  -  11 Aug 2023 07:00  --------------------------------------------------------  IN:  Total IN: 0 mL    OUT:    Voided (mL): 900 mL  Total OUT: 900 mL    Total NET: -900 mL        Daily Height in cm: 165.1 (11 Aug 2023 05:10)    Daily     RADIOLOGY & ADDITIONAL STUDIES:

## 2023-08-11 NOTE — H&P ADULT - NSHPLANGLIMITEDENGLISH_GEN_A_CORE
"SPORTS CLEARANCE - [employer]    Woo Bradshaw    Telephone: [phone] (home)  [address]  [facility] [address]  YOB: 2005   17 year old female      I certify that the above student has been medically evaluated and is deemed to be physically fit to participate in school interscholastic activities as indicated below.    Participation Clearance For:   {participation clearance:240288::\"Collision Sports, YES\",\"Limited Contact Sports, YES\",\"Noncontact Sports, YES\"}      Immunizations up to date: {Yes/No:569931}    Date of physical exam: ***        _______________________________________________  Attending Provider Signature     1/18/2023      ARDEN Dominguez CNP      Valid for 3 years from above date with a normal Annual Health Questionnaire (all NO responses)     Year 2     Year 3      A sports clearance letter meets the [facility] requirements for sports participation.  If there are concerns about this policy please call [facility] administration office directly at [phone].    "
No

## 2023-08-11 NOTE — PROGRESS NOTE ADULT - ASSESSMENT
77F ex-smoker PMHx HTN, HLD, h/o Graves s/p Irradiation now Hypothyroid on synthroid, h/o gallstones s/p ERCP 1 month ago, PAD s/p multiple b/l LE stents on ASA/Plavix and was recently started on Xarelto x1 week ago after RLE stent placement presenting to ED from urgent care for symptomatic bradycardia.    ASSESSMENT:    Junctional Bradycardia  Acute on Chronic CHF  THEODORA on CKD  Hyperkalemia  LLE Cellulitis  HO PAD s/p multiple LE stents b/l (most recent 1x week ago)  HO Hypothyroidism 2/2 Graves s/p Irradiation  HO Gallstones s/p ERCP (1 month ago)  HO HTN  HO HLD    PLAN:    CNS: avoid sedatives    HEENT: oral care    PULMONARY:  - Lifetime smoker quit 9 months ago  - CXR shows mild vascular congestion  - stats she uses a nebulizing treatment that was recently start for her wheezing  - does not follow a pulmonologist  - Dounebs q6hrs PRN for wheezing  - aspiration precautions , HOB elevation     CARDIOVASCULAR:  - EKG junctional bob  - LE stents b/l on ASA/Plavix/Xarelto at home  - c/w ASA and Plavix, hold Xarelto  - Trops -ve x1  - BNP 2159 (previously 871)  - on Lasix 40mg IV x1  - started Dopamine @5mcg , HR improved in 50's now   - f/u ECHO  - 1.5L fluid restriction  - Strict I&Os, daily weight  - hold home  atenolol for bradycardia and lisinopril for hyperkalemia   - f/u EP cards c/s for possible pacemaker  - follows Dr. Stanley o/p    GI:  - h/o Gallstones s/p ERCP x1 month ago, benign abd exam  - NPO for now    RENAL:  - Hyperkalemia 6.6  - THEODORA on CKD  - s/p calcium gluconate x1  - s/p Insulin/D50 x1  - s/p Lasix 40mg IV x1  - c/w Lasix 40mg IV daily - takes Lasix 40mg PO daily at home  - holding home lisinopril  - trend Cr  - monitor lytes, correct PRN    INFECTIOUS DISEASE:  - LLE cellulitis  - recently finished 15x days of Bactrim DS PO o/p  - f/u ESR/CRP  - no fevers, wbc wnl  - monitor off abx for now    HEMATOLOGICAL:  - normocytic anemia, hgb 8.5  previously 10  - c/w home ASA and Plavix  - holding home Xarelto - patient unsure of dosage  - monitor H&H  - active type and screen  - f/u iron studies, b12, folate    ENDOCRINE:  - h/o graves s/p thyroid irradiation  - c/w home synthroid 150mcg daily  - f/u TSH/T4 and lipid profile    MUSCULOSKELETAL: Bedrest, fall precautions    GOC: Full Code    CCU   77F ex-smoker PMHx HTN, HLD, h/o Graves s/p Irradiation now Hypothyroid on synthroid, h/o gallstones s/p ERCP 1 month ago, PAD s/p multiple b/l LE stents on ASA/Plavix and was recently started on Xarelto x1 week ago after RLE stent placement presenting to ED from urgent care for symptomatic bradycardia.    ASSESSMENT:    Junctional Bradycardia  Acute on Chronic CHF  THEODORA on CKD  Hyperkalemia  LLE Cellulitis  HO PAD s/p multiple LE stents b/l (most recent 1x week ago)  HO Hypothyroidism 2/2 Graves s/p Irradiation  HO Gallstones s/p ERCP (1 month ago)  HO HTN  HO HLD    PLAN:    CNS: avoid sedatives    HEENT: oral care    PULMONARY:  - Lifetime smoker quit 9 months ago  - CXR shows mild vascular congestion  - stats she uses a nebulizing treatment that was recently start for her wheezing  - does not follow a pulmonologist  - Dounebs q6hrs PRN for wheezing  - aspiration precautions , HOB elevation     CARDIOVASCULAR:  - EKG junctional bob now sinus bradycardia   - LE stents b/l on ASA/Plavix/Xarelto at home  - c/w ASA and Plavix, hold Xarelto  - Trops -ve x1  - BNP 2159 (previously 871)  - on Lasix 40mg IV x1  - started Dopamine @5mcg , HR improved in 50's now   - f/u ECHO  - 1.5L fluid restriction  - Strict I&Os, daily weight  - hold home  atenolol for bradycardia and lisinopril for hyperkalemia   - EP cards consult appreciated   - follows Dr. Stanley o/p    GI:  - h/o Gallstones s/p ERCP x1 month ago, benign abd exam  - NPO for now    RENAL:  - Hyperkalemia 6.6 , now 5.8   - THEODORA on CKD  - s/p calcium gluconate x1  - s/p Insulin/D50 x1  - s/p Lasix 40mg IV x1, Lokelma  - c/w Lasix 40mg IV daily - takes Lasix 40mg PO daily at home  - holding home lisinopril  - trend Cr  - monitor lytes, correct PRN    INFECTIOUS DISEASE:  - LLE cellulitis  - recently finished 15x days of Bactrim DS PO o/p  - f/u ESR/CRP  - no fevers, wbc wnl  - monitor off abx for now    HEMATOLOGICAL:  - normocytic anemia, hgb 8.5  previously 10  - c/w home ASA and Plavix  - holding home Xarelto - patient unsure of dosage  - monitor H&H  - active type and screen  - f/u iron studies, b12, folate    ENDOCRINE:  - h/o graves s/p thyroid irradiation  - c/w home synthroid 150mcg daily  - f/u TSH/T4 and lipid profile    MUSCULOSKELETAL: Bedrest, fall precautions    GOC: Full Code    CCU   77F ex-smoker PMHx HTN, HLD, h/o Graves s/p Irradiation now Hypothyroid on synthroid, h/o gallstones s/p ERCP 1 month ago, PAD s/p multiple b/l LE stents on ASA/Plavix and was recently started on Xarelto x1 week ago after RLE stent placement presenting to ED from urgent care for symptomatic bradycardia.    ASSESSMENT:  Junctional Bradycardia with sinus arrest; now sinus rhythm with bradycardia  Acute on Chronic CHF  THEODORA on CKD; Hyperkalemia  LLE Cellulitis; received bactrim as outpatient   HO PAD s/p multiple LE stents b/l (most recent 1x week ago)  HO Hypothyroidism 2/2 Graves s/p Irradiation  HO Gallstones s/p ERCP (1 month ago)  HO HTN  HO HLD    PLAN:    CNS: avoid sedatives    HEENT: oral care    PULMONARY:  - Lifetime smoker quit 9 months ago  - CXR shows mild vascular congestion  - stats she uses a nebulizing treatment that was recently started for her wheezing  - does not follow a pulmonologist  - Dounebs q6hrs PRN for wheezing  - aspiration precautions , HOB elevation     CARDIOVASCULAR:  - EKG junctional bob now sinus bradycardia   - LE stents b/l on ASA/Plavix/Xarelto at home  - c/w ASA and Plavix, hold Xarelto  - Trops -ve x1  - BNP 2159 (previously 871)  - on Lasix 40mg IV x1  - started Dopamine @5mcg , HR improved in 50's now   - f/u ECHO  - 1.5L fluid restriction  - Strict I&Os, daily weight  - hold home  atenolol for bradycardia and lisinopril for hyperkalemia   - EP cards for PPM evaluation   - follows Dr. Stanley o/p    GI:  - h/o Gallstones s/p ERCP x1 month ago, benign abd exam  - NPO for now    RENAL:  - Hyperkalemia 6.6 , now 5.8. Follow repeat  - THEODORA on CKD  - holding home lisinopril  - trend Cr. monitor lytes, correct PRN    INFECTIOUS DISEASE:  - LLE cellulitis  - recently finished 15x days of Bactrim DS PO o/p  - f/u ESR/CRP  - no fevers, wbc wnl  - monitor off abx for now    HEMATOLOGICAL:  - normocytic anemia, hgb 8.5  previously 10  - c/w home ASA and Plavix  - holding home Xarelto - patient unsure of dosage  - monitor H&H  - active type and screen  - f/u iron studies, b12, folate    ENDOCRINE:  - h/o graves s/p thyroid irradiation  - c/w home synthroid 150mcg daily  - f/u TSH/T4 and lipid profile    MUSCULOSKELETAL: Bedrest, fall precautions    GOC: Full Code    CCU

## 2023-08-11 NOTE — ED PROVIDER NOTE - CLINICAL SUMMARY MEDICAL DECISION MAKING FREE TEXT BOX
Patient presented for evaluation of general malaise and shortness of breath.  EKG showed slow A-fib and repeat EKG showed a junctional rhythm with severe bradycardia of 36.  Pacer pads were placed on patient.  Cardiology consulted and they evaluated patient.  Patient to be admitted to CCU for further evaluation and management.

## 2023-08-11 NOTE — ED PROVIDER NOTE - ATTENDING CONTRIBUTION TO CARE
I personally evaluated the patient. I reviewed the Resident’s or Physician Assistant’s note (as assigned above), and agree with the findings and plan except as documented in my note.  77-year-old female past medical history of HTN, HLD, hypothyroidism, recent common bile stenting 1 month ago, stenting of bilateral lower extremities due to peripheral artery disease 1 week ago by cardiologist Adrianne Gonzalez, on Xarelto, aspirin, Plavix, presents for evaluation of dyspnea on exertion.  Patient reports symptom onset to be a few days ago.  Patient has been feeling generally weakness and became short short of breath and lightheaded and went to urgent care who referred patient to the ED after getting abnormal EKG that showed A-fib.  Patient currently has no complaints.  Denies chest pain, lower extremity swelling, LOC.  VSS, non toxic appearing, NAD, Head NCAT, MMM, neck supple, normal ROM, normal s1s2, lungs ctab, abd s/nt/nd, no guarding or rebound, extremities wnl, AAO x 3, GCS 15, neuro grossly normal. No acute skin lesions. Plan is EKG, labs, imaging, cardiac monitor and reassess.

## 2023-08-11 NOTE — ED ADULT NURSE NOTE - OBJECTIVE STATEMENT
Provider: ANGELINA ALFONSO MD   Caller: DAMON OSBORNE   Relationship to Patient: SELF   Phone Number: [phone]  Reason for Call: PT IS NEEDING A REFERRAL FOR [facility]  ENDOCRINOLOGY GROUP DUE TO HIM STARTING RESIDENCY AT [facility] PLEASE CALL PT AND ADVISE     Pt presents  from urgent care, pt went to  for SOB, did an ekg and found pt was in afib. No known hx of afib.

## 2023-08-11 NOTE — ED PROVIDER NOTE - CADM POA PRESS ULCER
Maternal Fetal Medicine Consultation    Primary OB:  Daniela Eason MD     Reason for Office Visit:  Urinary tract dilation    Ms. Sidra Mccarthy is a 29 year old       currently at 32w3d gestation. Dating is based off of LMP, which is consistent with a 8 week ultrasound    Her pregnancy is complicated by the following : Urinary tract dilation    Previous ultrasound reports and images reviewed.   20w3d: anatomic survey-pyelectasis L 4.6mm, R 4.0mm  30w1d: Left renal pelvis 10.8mm and right renal pelvis 7.4mm    CFDNA neg for selected aneuploidies    No past medical history on file.    OB History    Para Term  AB Living   2 0 0 0 1 0   SAB IAB Ectopic Molar Multiple Live Births   1 0 0 0 0 0      # Outcome Date GA Lbr Jerzy/2nd Weight Sex Delivery Anes PTL Lv   2 Current            1 SAB 21 11w4d    Miscar          REVIEW OF SYSTEMS:  At present, she reports no vaginal bleeding, uterine contractions or cramping, or leakage of amniotic fluid.  The patient reports good fetal movement.    PHYSICAL EXAM:  Visit Vitals  /76 (BP Location: RUE - Right upper extremity, Patient Position: Sitting, Cuff Size: Large Adult)   Pulse (!) 118   Wt 110.7 kg (244 lb 1.6 oz)   LMP 10/10/2022 (Exact Date)   BMI 40.00 kg/m²        GENERAL:  The patient is a pleasant, normal appearing gravid female with normal affect and in no distress.  PSYCH:  Alert and oriented × 3.  Understands the reason for today's study.   SKIN:  Warm and dry.  RESPIRATIONS:  Unlabored.  ABDOMEN:  Soft and nontender, gravid.  EXTREMITIES:  No edema.      I discussed the results of the ultrasound study today which were the following :   -Single live fetus with assigned gestational age of 32w 3d    -Composite fetal biometry today is consistent with assigned gestational age  -Estimated fetal weight is 2492 g, corresponding to the 94th percentile    -Fetal heart rate of 148 bpm and fetus is in Cephalic presentation  -Amniotic fluid volume  is normal   -Placenta is Posterior No Previa    -An anatomical survey was performed today. Unilateral UTDA2-3 is noted today. The left renal pelvis meaures 10.2mm. The contralateral kidney and bladder appear normal. No evidence of ureteral dilation. Suboptimal views as above due to fetal position and maternal body habitus    Counseling:  The patient was informed of today's ultrasound findings.  I attempted to reassure them that there are no gross anatomic defects noted on today's examination. Urinary tract dilation (UTD) is defined by at least one renal pelvis measuring 4mm or greater in the 2nd trimester.  As you know and as I explained to the patient, pyelectasis is a common finding on midtrimester ultrasound, occurring in 0.5% to 4.5% of fetuses.  Although renal pelvic dilatation is a transient, physiologic state in most cases, urinary tract obstruction and vesicoureteral reflux can occasionally be causal.  It can be unilateral or bilateral, and occurs approximately twice as often in males than in females, and is bilateral in 20-40% of cases.      Mild UTD (UTDA1) is defined by a renal pelvic Anterior-Posterior diameter (APD) of 4-7mm, and will usually resolve without clinical impact on  renal development. UTDA2-3 is defined by a renal pelvic APD >7mm. There are however, persistent cases that require  intervention (10%).  The risk increases to 70% when the APD is greater than 20mm.  It is important to note that isolated UTD has not been found to have an impact on obstetric outcomes.     A recent study reported the frequency of UTD in 62,103 women undergoing midtrimester ultrasound.  There were 1,248 cases of UTD (2% incidence) and in 84.5% of these cases (n=1,055) it was an isolated finding.  The most commonly used criteria for diagnosis of UTD are an anterior-posterior measurement in a transverse scanning plane of 4 mm or larger in the second trimester and/or 7 mm or larger in the third  trimester.  UTD in a midtrimester fetus is usually a normal anatomic or physiologic variant.  It may also be manifestation of vesico-ureteral reflux, UPJ or UVJ obstruction, a ureterocele or duplication of the collecting system.  UTD is considered by some authorities to be a soft sonographic marker for Trisomy 21.  Aneuploidy is present in 0.3% to 0.9% of fetuses with isolated UTD.  Although some studies have reported an association between isolated UTD and Trisomy 21, overall the data are mixed and there is no consensus as to whether this finding should be used to adjust aneuploidy risk.  I also explained that hydronephrosis is defined as a renal pelvis AP diameter of 10 mm or greater. Hydronephrosis is less likely to resolve in utero and is more likely to be a consequence of UPJ obstruction.  As noted in the ultrasound report, I requested that the patient return at 36 weeks for reassessment.          Recommendations :  -Follow-up is suggested at 36 weeks to re-evaluate the kidneys.    - pediatric urology consultation has been scheduled in 4 weeks. The patient however would likely pursue pediatric urology services in [place] if available since that is closer to her home. If services are available in [place], then follow up ultrasounds could be performed with [facility] in [place] for patient convenience. Patient to notify me once she's made a decision on where she will continue imaging  -This finding should not influence timing, location or mode of delivery.  delivery should be reserved for standard obstetric indications. Delivery no earlier than 39 weeks unless otherwise indicated.  -We reviewed the importance of follow-up and instructed the patient to call should she have any concerns.    Thank you for the opportunity to participate in Ms. Mccarthy's care. Please do not hesitate to call with any further questions or concerns.     Sharon Spann,DO  Maternal-Fetal Medicine      I spent 20 minutes with  this patient face-to-face with greater than 50% of time spent in counseling.     No

## 2023-08-11 NOTE — H&P ADULT - NSHPLABSRESULTS_GEN_ALL_CORE
LABS:  cret                        8.3    9.85  )-----------( 365      ( 11 Aug 2023 00:53 )             27.4     08-11    137  |  107  |  61<HH>  ----------------------------<  151<H>  6.6<HH>   |  17  |  1.8<H>    Ca    8.4      11 Aug 2023 00:53  Mg     2.8     08-11    TPro  6.4  /  Alb  4.0  /  TBili  <0.2  /  DBili  x   /  AST  12  /  ALT  10  /  AlkPhos  107  08-11    PT/INR - ( 11 Aug 2023 00:53 )   PT: 11.80 sec;   INR: 1.03 ratio         PTT - ( 11 Aug 2023 00:53 )  PTT:35.2 sec

## 2023-08-11 NOTE — H&P ADULT - NSHPPHYSICALEXAM_GEN_ALL_CORE
VITALS:   Vital Signs Last 24 Hrs  T(C): 36.3 (10 Aug 2023 22:18), Max: 36.3 (10 Aug 2023 22:18)  T(F): 97.3 (10 Aug 2023 22:18), Max: 97.3 (10 Aug 2023 22:18)  HR: 65 (10 Aug 2023 22:18) (65 - 65)  BP: 139/71 (10 Aug 2023 22:18) (139/71 - 139/71)  RR: 19 (10 Aug 2023 22:18) (19 - 19)  SpO2: 99% (10 Aug 2023 22:18) (99% - 99%)    Parameters below as of 10 Aug 2023 22:18  Patient On (Oxygen Delivery Method): room air      I&O's Summary    CAPILLARY BLOOD GLUCOSE          PHYSICAL EXAM:  General: WN/WD NAD  HEENT: PERRLA, EOMI, moist mucous membranes  Neurology: A&Ox3, nonfocal, CORTEZ x 4  Respiratory: CTA B/L, normal respiratory effort, no wheezes, crackles, rales  CV: RRR, S1S2, no murmurs, rubs or gallops  Abdominal: Soft, NT, ND +BS, Last BM  Extremities: No edema, + peripheral pulses  Incisions:   Tubes: VITALS:   Vital Signs Last 24 Hrs  T(C): 36.3 (10 Aug 2023 22:18), Max: 36.3 (10 Aug 2023 22:18)  T(F): 97.3 (10 Aug 2023 22:18), Max: 97.3 (10 Aug 2023 22:18)  HR: 65 (10 Aug 2023 22:18) (65 - 65)  BP: 139/71 (10 Aug 2023 22:18) (139/71 - 139/71)  RR: 19 (10 Aug 2023 22:18) (19 - 19)  SpO2: 99% (10 Aug 2023 22:18) (99% - 99%)    Parameters below as of 10 Aug 2023 22:18  Patient On (Oxygen Delivery Method): room air      I&O's Summary    CAPILLARY BLOOD GLUCOSE      PHYSICAL EXAM:  General: ill appearing, in NAD  HEENT: NCAT, moist mucous membranes, no scleral icterus  Neurology: A&Ox4  Respiratory: wheezing and crackles in right middle lung on ascultation, left lung clear, not in respiratory distress  CV: bradycardic, normal sinus, no murmurs appreciated  Abdominal: Soft, NT, ND, +BS, Last BM yesterday, no guarding, no rebound  Extremities: +1 pitting edema in b/l LEs, b/l lymphadenopathy in LEs, venous stasis dermatitis on b/l LEs, LLE is warmer and more erythematous in RLE, +1 peripheral pulses in b/l LEs,

## 2023-08-11 NOTE — PROGRESS NOTE ADULT - SUBJECTIVE AND OBJECTIVE BOX
Patient is a 77y old  Female who presents with a chief complaint of Dyspnea on Exertion and Lightheadedness (11 Aug 2023 02:36)    HPI:  77F ex-smoker PMHx HTN, HLD, h/o Graves s/p Irradiation now Hypothyroid on synthroid, h/o gallstones s/p ERCP 1 month ago, PAD s/p multiple b/l LE stents on ASA/Plavix and was recently started on Xarelto x1 week ago after RLE stent placement presenting to ED from urgent care for symptomatic bradycardia. Patient admits to having generalized fatigue and malaise for the last few days. Also admits to having mild dyspnea on exertion, productive cough, lightheadedness, chest heaviness and pins and needles sensation of LUE. Denies any headaches, syncopal episode or LOC, n/v/d, dysuria or weakness. Of note, patient has been feeling     Vitals: Temp 97.3F, /71, HR 65>36, RR 19, SpO2 99% on RA    EKG showed Junctional Bradycardia    Labs: Hgb 8.3 (previously 10.1), K 6.6, Cr 1.8 (previously 1.1), Lactate 2.9, BNP 2159 (previously 871), Trops -ve x1    Imaging: CXR shows mild vascular congestion b/l, no acute cardiopulmonary pathology    In the ED:  - s/p Insulin/D50  - Lokelma 10 x1  - Calcium Gluconate 1mg x1  - starting Dopamine gtt @ 5mcg    Admitted to CCU (11 Aug 2023 02:36)       INTERVAL HPI/OVERNIGHT EVENTS:   No overnight events   Afebrile, hemodynamically stable     Subjective:    ICU Vital Signs Last 24 Hrs  T(C): 36.2 (11 Aug 2023 05:10), Max: 36.3 (10 Aug 2023 22:18)  T(F): 97.2 (11 Aug 2023 05:10), Max: 97.3 (10 Aug 2023 22:18)  HR: 44 (11 Aug 2023 05:10) (37 - 65)  BP: 143/59 (11 Aug 2023 05:10) (134/75 - 148/62)  BP(mean): 85 (11 Aug 2023 05:10) (85 - 85)  ABP: --  ABP(mean): --  RR: 18 (11 Aug 2023 04:31) (18 - 19)  SpO2: 99% (11 Aug 2023 05:10) (98% - 99%)    O2 Parameters below as of 11 Aug 2023 05:10  Patient On (Oxygen Delivery Method): room air          I&O's Summary        Daily Height in cm: 165.1 (11 Aug 2023 05:10)    Daily       MEDICATIONS  (STANDING):  aspirin  chewable 81 milliGRAM(s) Oral daily  clopidogrel Tablet 75 milliGRAM(s) Oral daily  DOPamine Infusion 5 MICROgram(s)/kG/Min (18.7 mL/Hr) IV Continuous <Continuous>  furosemide   Injectable 40 milliGRAM(s) IV Push daily  levothyroxine 150 MICROGram(s) Oral daily    MEDICATIONS  (PRN):  albuterol/ipratropium for Nebulization 3 milliLiter(s) Nebulizer every 6 hours PRN Bronchospasm      PHYSICAL EXAM:   General: looking well , COA*3   HEENT:  moist mucous membranes, no scleral icterus  Neurology: A&Ox3  Respiratory: wheezing and crackles in right middle lung on ascultation, left lung clear, not in respiratory distress  CV: bradycardic, normal sinus, no murmurs appreciated  Abdominal: Soft, NT, ND, +BS, Last BM yesterday, no guarding, no rebound  Extremities: +1 pitting edema in b/l LEs, b/l lymphadenopathy in LEs, venous stasis dermatitis on b/l LEs, LLE is warmer and more erythematous in RLE, +1 peripheral pulses in b/l LEs      LABS:                        8.5    9.82  )-----------( 383      ( 11 Aug 2023 06:35 )             27.5     08-11    137  |  107  |  61<HH>  ----------------------------<  151<H>  6.6<HH>   |  17  |  1.8<H>    Ca    8.4      11 Aug 2023 00:53  Mg     2.8     08-11    TPro  6.4  /  Alb  4.0  /  TBili  <0.2  /  DBili  x   /  AST  12  /  ALT  10  /  AlkPhos  107  08-11    LIVER FUNCTIONS - ( 11 Aug 2023 00:53 )  Alb: 4.0 g/dL / Pro: 6.4 g/dL / ALK PHOS: 107 U/L / ALT: 10 U/L / AST: 12 U/L / GGT: x           PT/INR - ( 11 Aug 2023 00:53 )   PT: 11.80 sec;   INR: 1.03 ratio         PTT - ( 11 Aug 2023 00:53 )  PTT:35.2 sec  CAPILLARY BLOOD GLUCOSE      POCT Blood Glucose.: 188 mg/dL (11 Aug 2023 05:09)  POCT Blood Glucose.: 292 mg/dL (11 Aug 2023 03:35)      Troponin T, Serum: <0.01 ng/mL (08-11 @ 00:53)    CARDIAC MARKERS ( 11 Aug 2023 00:53 )  x     / <0.01 ng/mL / x     / x     / x          Urinalysis Basic - ( 11 Aug 2023 00:53 )    Color: x / Appearance: x / SG: x / pH: x  Gluc: 151 mg/dL / Ketone: x  / Bili: x / Urobili: x   Blood: x / Protein: x / Nitrite: x   Leuk Esterase: x / RBC: x / WBC x   Sq Epi: x / Non Sq Epi: x / Bacteria: x                  Care Discussed with Consultants/Other Providers [ x] YES  [ ] NO

## 2023-08-11 NOTE — H&P ADULT - ASSESSMENT
ASSESSMENT:        PLAN:    CNS: avoid sedatives    HEENT: oral care    PULMONARY: on BiPAP, aspiration precautions, CXR shows worsening vascular congestion, s/p Lasix 60mg IV x1, monitor O2    CARDIOVASCULAR: recurrent NSVT (monomorphic), s/p lidocaine bolus, s/p amiodarone bolus x2, c/w amiodarone drip, previous TTE 61% (2021), bedside ECHO shows anterior wall hypokinesis (EF ~30-35%), starting Nitroglycerin and Milrinone drip, levophed ordered incase hypotensive, possible add-on for cath - keep NPO, f/u ECHO, trend CE, trend lactate, strict I&Os, daily weight, f/u w/ Dr. Pack's office for records    GI: c/w home Creon 24000 x3, PPI PO daily, NPO for now    RENAL: dunn for I&Os, hypokalemia - repleting, monitor Cr and lytes, correct PRN    INFECTIOUS DISEASE: s/p Doxycyline 100mg x1, WBC elevated, f/u BCxs, UCx, f/u procal, f/u RVP, starting Cefepime and Vanc for now    HEMATOLOGICAL: monitor H&H, f/u coags, T&S, f/u oncology c/s, Subq heparin for DVT ppx    ENDOCRINE: on Metformin 500mg BID at home, monitor FS, insulin regimen    MUSCULOSKELETAL: Bedrest, fall precautions    PSYCHIATRIC: c/w home paroxetine 30mg daily, can resume Zolpidem 10mg at bedtime PRN    GOC: Full Code    CCU for monitoring 77F ex-smoker PMHx HTN, HLD, h/o Graves s/p Irradiation now Hypothyroid on synthroid, h/o gallstones s/p ERCP 1 month ago, PAD s/p multiple b/l LE stents on ASA/Plavix and was recently started on Xarelto x1 week ago after RLE stent placement presenting to ED generalized fatigue and malaise for the last few days    ASSESSMENT:    Junctional Bradycardia  Acute on Chronic CHF  THEODORA on CKD  Hyperkalemia  LLE Cellulitis  HO PAD s/p multiple LE stents b/l (most recent 1x week ago)  HO Hypothyroidism 2/2 Graves s/p Irradiation  HO Gallstones s/p ERCP (1 month ago)  HO HTN  HO HLD    PLAN:    CNS: avoid sedatives    HEENT: oral care    PULMONARY:  - Lifetime smoker quit 9 months ago  - CXR shows mild vascular congestion  - stats she uses a nebulizing treatment that was recently start for her wheezing  - does not follow a pulmonologist  - Dounebs q6hrs PRN for wheezing  - aspiration precautions    CARDIOVASCULAR:  - EKG junctional bob  - LE stents b/l on ASA/Plavix/Xarelto at home  - c/w ASA and Plavix, hold Xarelto  - Trops -ve x1  - BNP 2159 (previously 871)  - s/p Lasix 40mg IV x1  - start Dopamine @5mcg  - f/u ECHO  - 1.5L fluid restriction  - Strict I&Os, daily weight  - hold home anti-hypertensives and atenolol  - follows Dr. Stanley o/p    GI:  - h/o Gallstones s/p ERCP x1 month ago, benign abd exam  - DASH/TLC regular w/ 1.5L fluid restriction    RENAL:  - Hyperkalemia 6.6  - THEODORA on CKD  - s/p calcium gluconate x1  - s/p Insulin/D50 x1  - s/p Lasix 40mg IV x1  - holding home lisinopril  - low sodium, no concentrated K  - trend Cr  - monitor lytes, correct PRN    INFECTIOUS DISEASE:  - LLE cellulitis  - recently finished 15x days of Bactrim DS PO w/o significant improvement  - f/u ESR/CRP - order for 11AM if morning stat add-ons not received    HEMATOLOGICAL:  - normocytic anemia, hgb 8 previously 10  - c/w home ASA and Plavix  - holding home Xarelto  - monitor H&H  - active type and screen  - f/u iron studies, b12, folate    ENDOCRINE:  - h/o graves s/p thyroid irradiation  - c/w home synthroid 150mcg daily  - f/u TSH/T4 and lipid profile    MUSCULOSKELETAL: Bedrest, fall precautions    GOC: Full Code    CCU 77F ex-smoker PMHx HTN, HLD, h/o Graves s/p Irradiation now Hypothyroid on synthroid, h/o gallstones s/p ERCP 1 month ago, PAD s/p multiple b/l LE stents on ASA/Plavix and was recently started on Xarelto x1 week ago after RLE stent placement presenting to ED generalized fatigue and malaise for the last few days    ASSESSMENT:    Junctional Bradycardia  Acute on Chronic CHF  THEODORA on CKD  Hyperkalemia  LLE Cellulitis  HO PAD s/p multiple LE stents b/l (most recent 1x week ago)  HO Hypothyroidism 2/2 Graves s/p Irradiation  HO Gallstones s/p ERCP (1 month ago)  HO HTN  HO HLD    PLAN:    CNS: avoid sedatives    HEENT: oral care    PULMONARY:  - Lifetime smoker quit 9 months ago  - CXR shows mild vascular congestion  - stats she uses a nebulizing treatment that was recently start for her wheezing  - does not follow a pulmonologist  - Dounebs q6hrs PRN for wheezing  - aspiration precautions    CARDIOVASCULAR:  - EKG junctional bob  - LE stents b/l on ASA/Plavix/Xarelto at home  - c/w ASA and Plavix, hold Xarelto  - Trops -ve x1  - BNP 2159 (previously 871)  - s/p Lasix 40mg IV x1  - start Dopamine @5mcg  - f/u ECHO  - 1.5L fluid restriction  - Strict I&Os, daily weight  - hold home anti-hypertensives and atenolol  - f/u EP cards c/s for possible pacemaker  - follows Dr. Stanley o/p    GI:  - h/o Gallstones s/p ERCP x1 month ago, benign abd exam  - NPO for now for possible PPM    RENAL:  - Hyperkalemia 6.6  - THEODORA on CKD  - s/p calcium gluconate x1  - s/p Insulin/D50 x1  - s/p Lasix 40mg IV x1  - c/w Lasix 40mg IV daily - takes Lasix 40mg PO daily at home  - holding home lisinopril  - low sodium, no concentrated K  - trend Cr  - monitor lytes, correct PRN    INFECTIOUS DISEASE:  - LLE cellulitis  - recently finished 15x days of Bactrim DS PO w/o significant improvement  - f/u ESR/CRP - order for 11AM if morning stat add-ons not received    HEMATOLOGICAL:  - normocytic anemia, hgb 8 previously 10  - c/w home ASA and Plavix  - holding home Xarelto  - monitor H&H  - active type and screen  - f/u iron studies, b12, folate    ENDOCRINE:  - h/o graves s/p thyroid irradiation  - c/w home synthroid 150mcg daily  - f/u TSH/T4 and lipid profile    MUSCULOSKELETAL: Bedrest, fall precautions    GOC: Full Code    CCU 77F ex-smoker PMHx HTN, HLD, h/o Graves s/p Irradiation now Hypothyroid on synthroid, h/o gallstones s/p ERCP 1 month ago, PAD s/p multiple b/l LE stents on ASA/Plavix and was recently started on Xarelto x1 week ago after RLE stent placement presenting to ED from urgent care for symptomatic bradycardia.    ASSESSMENT:    Junctional Bradycardia  Acute on Chronic CHF  THEODORA on CKD  Hyperkalemia  LLE Cellulitis  HO PAD s/p multiple LE stents b/l (most recent 1x week ago)  HO Hypothyroidism 2/2 Graves s/p Irradiation  HO Gallstones s/p ERCP (1 month ago)  HO HTN  HO HLD    PLAN:    CNS: avoid sedatives    HEENT: oral care    PULMONARY:  - Lifetime smoker quit 9 months ago  - CXR shows mild vascular congestion  - stats she uses a nebulizing treatment that was recently start for her wheezing  - does not follow a pulmonologist  - Dounebs q6hrs PRN for wheezing  - aspiration precautions    CARDIOVASCULAR:  - EKG junctional bob  - LE stents b/l on ASA/Plavix/Xarelto at home  - c/w ASA and Plavix, hold Xarelto  - Trops -ve x1  - BNP 2159 (previously 871)  - s/p Lasix 40mg IV x1  - start Dopamine @5mcg  - f/u ECHO  - 1.5L fluid restriction  - Strict I&Os, daily weight  - hold home anti-hypertensives and atenolol  - f/u EP cards c/s for possible pacemaker  - follows Dr. Stanley o/p    GI:  - h/o Gallstones s/p ERCP x1 month ago, benign abd exam  - NPO for now for possible PPM    RENAL:  - Hyperkalemia 6.6  - THEODORA on CKD  - s/p calcium gluconate x1  - s/p Insulin/D50 x1  - s/p Lasix 40mg IV x1  - c/w Lasix 40mg IV daily - takes Lasix 40mg PO daily at home  - holding home lisinopril  - low sodium, no concentrated K  - trend Cr  - monitor lytes, correct PRN    INFECTIOUS DISEASE:  - LLE cellulitis  - recently finished 15x days of Bactrim DS PO o/p  - f/u ESR/CRP - order for 11AM if morning stat add-ons not received  - no fevers, wbc wnl  - monitor off abx for now    HEMATOLOGICAL:  - normocytic anemia, hgb 8 previously 10  - c/w home ASA and Plavix  - holding home Xarelto - patient unsure of dosage  - monitor H&H  - active type and screen  - f/u iron studies, b12, folate    ENDOCRINE:  - h/o graves s/p thyroid irradiation  - c/w home synthroid 150mcg daily  - f/u TSH/T4 and lipid profile    MUSCULOSKELETAL: Bedrest, fall precautions    GOC: Full Code    CCU

## 2023-08-12 LAB
ALBUMIN SERPL ELPH-MCNC: 4.1 G/DL — SIGNIFICANT CHANGE UP (ref 3.5–5.2)
ALP SERPL-CCNC: 118 U/L — HIGH (ref 30–115)
ALT FLD-CCNC: 12 U/L — SIGNIFICANT CHANGE UP (ref 0–41)
ANION GAP SERPL CALC-SCNC: 15 MMOL/L — HIGH (ref 7–14)
AST SERPL-CCNC: 16 U/L — SIGNIFICANT CHANGE UP (ref 0–41)
B BURGDOR C6 AB SER-ACNC: NEGATIVE — SIGNIFICANT CHANGE UP
B BURGDOR IGG+IGM SER-ACNC: 0.07 INDEX — SIGNIFICANT CHANGE UP (ref 0.01–0.89)
BASOPHILS # BLD AUTO: 0.08 K/UL — SIGNIFICANT CHANGE UP (ref 0–0.2)
BASOPHILS NFR BLD AUTO: 0.9 % — SIGNIFICANT CHANGE UP (ref 0–1)
BILIRUB SERPL-MCNC: <0.2 MG/DL — SIGNIFICANT CHANGE UP (ref 0.2–1.2)
BUN SERPL-MCNC: 49 MG/DL — HIGH (ref 10–20)
CALCIUM SERPL-MCNC: 8.6 MG/DL — SIGNIFICANT CHANGE UP (ref 8.4–10.5)
CHLORIDE SERPL-SCNC: 102 MMOL/L — SIGNIFICANT CHANGE UP (ref 98–110)
CO2 SERPL-SCNC: 18 MMOL/L — SIGNIFICANT CHANGE UP (ref 17–32)
CREAT SERPL-MCNC: 1.4 MG/DL — SIGNIFICANT CHANGE UP (ref 0.7–1.5)
EGFR: 39 ML/MIN/1.73M2 — LOW
EOSINOPHIL # BLD AUTO: 0.45 K/UL — SIGNIFICANT CHANGE UP (ref 0–0.7)
EOSINOPHIL NFR BLD AUTO: 5.2 % — SIGNIFICANT CHANGE UP (ref 0–8)
GLUCOSE SERPL-MCNC: 193 MG/DL — HIGH (ref 70–99)
HCT VFR BLD CALC: 30.2 % — LOW (ref 37–47)
HGB BLD-MCNC: 9.4 G/DL — LOW (ref 12–16)
IMM GRANULOCYTES NFR BLD AUTO: 0.6 % — HIGH (ref 0.1–0.3)
LYMPHOCYTES # BLD AUTO: 1.83 K/UL — SIGNIFICANT CHANGE UP (ref 1.2–3.4)
LYMPHOCYTES # BLD AUTO: 21.1 % — SIGNIFICANT CHANGE UP (ref 20.5–51.1)
MAGNESIUM SERPL-MCNC: 2.6 MG/DL — HIGH (ref 1.8–2.4)
MCHC RBC-ENTMCNC: 26.6 PG — LOW (ref 27–31)
MCHC RBC-ENTMCNC: 31.1 G/DL — LOW (ref 32–37)
MCV RBC AUTO: 85.3 FL — SIGNIFICANT CHANGE UP (ref 81–99)
MONOCYTES # BLD AUTO: 0.62 K/UL — HIGH (ref 0.1–0.6)
MONOCYTES NFR BLD AUTO: 7.2 % — SIGNIFICANT CHANGE UP (ref 1.7–9.3)
NEUTROPHILS # BLD AUTO: 5.63 K/UL — SIGNIFICANT CHANGE UP (ref 1.4–6.5)
NEUTROPHILS NFR BLD AUTO: 65 % — SIGNIFICANT CHANGE UP (ref 42.2–75.2)
NRBC # BLD: 0 /100 WBCS — SIGNIFICANT CHANGE UP (ref 0–0)
PLATELET # BLD AUTO: 409 K/UL — HIGH (ref 130–400)
PMV BLD: 9.4 FL — SIGNIFICANT CHANGE UP (ref 7.4–10.4)
POTASSIUM SERPL-MCNC: 5.3 MMOL/L — HIGH (ref 3.5–5)
POTASSIUM SERPL-SCNC: 5.3 MMOL/L — HIGH (ref 3.5–5)
PROT SERPL-MCNC: 6.5 G/DL — SIGNIFICANT CHANGE UP (ref 6–8)
RBC # BLD: 3.54 M/UL — LOW (ref 4.2–5.4)
RBC # FLD: 15 % — HIGH (ref 11.5–14.5)
SODIUM SERPL-SCNC: 135 MMOL/L — SIGNIFICANT CHANGE UP (ref 135–146)
WBC # BLD: 8.66 K/UL — SIGNIFICANT CHANGE UP (ref 4.8–10.8)
WBC # FLD AUTO: 8.66 K/UL — SIGNIFICANT CHANGE UP (ref 4.8–10.8)

## 2023-08-12 PROCEDURE — 99291 CRITICAL CARE FIRST HOUR: CPT

## 2023-08-12 PROCEDURE — 71045 X-RAY EXAM CHEST 1 VIEW: CPT | Mod: 26

## 2023-08-12 PROCEDURE — 99233 SBSQ HOSP IP/OBS HIGH 50: CPT

## 2023-08-12 PROCEDURE — 93010 ELECTROCARDIOGRAM REPORT: CPT

## 2023-08-12 RX ORDER — LEVOTHYROXINE SODIUM 125 MCG
175 TABLET ORAL DAILY
Refills: 0 | Status: DISCONTINUED | OUTPATIENT
Start: 2023-08-13 | End: 2023-08-14

## 2023-08-12 RX ORDER — LEVOTHYROXINE SODIUM 125 MCG
25 TABLET ORAL ONCE
Refills: 0 | Status: COMPLETED | OUTPATIENT
Start: 2023-08-12 | End: 2023-08-12

## 2023-08-12 RX ORDER — CHLORHEXIDINE GLUCONATE 213 G/1000ML
1 SOLUTION TOPICAL
Refills: 0 | Status: DISCONTINUED | OUTPATIENT
Start: 2023-08-12 | End: 2023-08-14

## 2023-08-12 RX ADMIN — Medication 40 MILLIGRAM(S): at 05:16

## 2023-08-12 RX ADMIN — Medication 81 MILLIGRAM(S): at 12:09

## 2023-08-12 RX ADMIN — CHLORHEXIDINE GLUCONATE 1 APPLICATION(S): 213 SOLUTION TOPICAL at 05:15

## 2023-08-12 RX ADMIN — HEPARIN SODIUM 5000 UNIT(S): 5000 INJECTION INTRAVENOUS; SUBCUTANEOUS at 07:00

## 2023-08-12 RX ADMIN — CLOPIDOGREL BISULFATE 75 MILLIGRAM(S): 75 TABLET, FILM COATED ORAL at 12:09

## 2023-08-12 RX ADMIN — DOPAMINE HYDROCHLORIDE 18.7 MICROGRAM(S)/KG/MIN: 40 INJECTION, SOLUTION, CONCENTRATE INTRAVENOUS at 00:29

## 2023-08-12 RX ADMIN — HEPARIN SODIUM 5000 UNIT(S): 5000 INJECTION INTRAVENOUS; SUBCUTANEOUS at 21:10

## 2023-08-12 RX ADMIN — Medication 25 MICROGRAM(S): at 11:06

## 2023-08-12 RX ADMIN — Medication 150 MICROGRAM(S): at 05:15

## 2023-08-12 NOTE — PROGRESS NOTE ADULT - SUBJECTIVE AND OBJECTIVE BOX
Patient is a 77y old  Female who presents with a chief complaint of Dyspnea on Exertion and Lightheadedness (11 Aug 2023 07:21)    HPI:  77F ex-smoker PMHx HTN, HLD, h/o Graves s/p Irradiation now Hypothyroid on synthroid, h/o gallstones s/p ERCP 1 month ago, PAD s/p multiple b/l LE stents on ASA/Plavix and was recently started on Xarelto x1 week ago after RLE stent placement presenting to ED from urgent care for symptomatic bradycardia. Patient admits to having generalized fatigue and malaise for the last few days. Also admits to having mild dyspnea on exertion, productive cough, lightheadedness, chest heaviness and pins and needles sensation of LUE. Denies any headaches, syncopal episode or LOC, n/v/d, dysuria or weakness. Of note, patient has been feeling     Vitals: Temp 97.3F, /71, HR 65>36, RR 19, SpO2 99% on RA    EKG showed Junctional Bradycardia    Labs: Hgb 8.3 (previously 10.1), K 6.6, Cr 1.8 (previously 1.1), Lactate 2.9, BNP 2159 (previously 871), Trops -ve x1    Imaging: CXR shows mild vascular congestion b/l, no acute cardiopulmonary pathology    In the ED:  - s/p Insulin/D50  - Lokelma 10 x1  - Calcium Gluconate 1mg x1  - starting Dopamine gtt @ 5mcg    Admitted to CCU (11 Aug 2023 02:36)       INTERVAL HPI/OVERNIGHT EVENTS:   No overnight events   Afebrile, hemodynamically stable     Subjective:    ICU Vital Signs Last 24 Hrs  T(C): 36.3 (12 Aug 2023 04:00), Max: 36.3 (12 Aug 2023 00:00)  T(F): 97.4 (12 Aug 2023 04:00), Max: 97.4 (12 Aug 2023 00:00)  HR: 51 (12 Aug 2023 05:00) (43 - 61)  BP: 118/57 (12 Aug 2023 05:00) (90/50 - 135/59)  BP(mean): 88 (12 Aug 2023 05:00) (65 - 90)  ABP: --  ABP(mean): --  RR: 31 (12 Aug 2023 05:00) (16 - 49)  SpO2: 95% (12 Aug 2023 05:00) (92% - 99%)    O2 Parameters below as of 12 Aug 2023 05:00  Patient On (Oxygen Delivery Method): room air          I&O's Summary    11 Aug 2023 07:01  -  12 Aug 2023 07:00  --------------------------------------------------------  IN: 1075.6 mL / OUT: 4000 mL / NET: -2924.4 mL          Daily     Daily Weight in k.5 (12 Aug 2023 05:00)          MEDICATIONS  (STANDING):  aspirin  chewable 81 milliGRAM(s) Oral daily  chlorhexidine 2% Cloths 1 Application(s) Topical <User Schedule>  clopidogrel Tablet 75 milliGRAM(s) Oral daily  DOPamine Infusion 5 MICROgram(s)/kG/Min (18.7 mL/Hr) IV Continuous <Continuous>  furosemide   Injectable 40 milliGRAM(s) IV Push daily  heparin   Injectable 5000 Unit(s) IV Push every 12 hours  levothyroxine 150 MICROGram(s) Oral daily    MEDICATIONS  (PRN):  albuterol/ipratropium for Nebulization 3 milliLiter(s) Nebulizer every 6 hours PRN Bronchospasm      PHYSICAL EXAM:  GENERAL:   HEAD:  Atraumatic, Normocephalic  EYES: EOMI, PERRLA, conjunctiva and sclera clear  NECK: Supple, No JVD, Normal thyroid, no enlarged nodes  NERVOUS SYSTEM:  Alert & Awake.   CHEST/LUNG: B/L good air entry; No rales, rhonchi, or wheezing  HEART: S1S2 normal, no S3, Regular rate and rhythm; No murmurs  ABDOMEN: Soft, Nontender, Nondistended; Bowel sounds present  EXTREMITIES:  2+ Peripheral Pulses, No clubbing, cyanosis, or edema  LYMPH: No lymphadenopathy noted  SKIN: No rashes or lesions    LABS:                        9.4    8.66  )-----------( 409      ( 12 Aug 2023 04:25 )             30.2     08-12    135  |  102  |  49<H>  ----------------------------<  193<H>  5.3<H>   |  18  |  1.4    Ca    8.6      12 Aug 2023 04:25  Mg     2.6     08-    TPro  6.5  /  Alb  4.1  /  TBili  <0.2  /  DBili  x   /  AST  16  /  ALT  12  /  AlkPhos  118<H>  08-12    LIVER FUNCTIONS - ( 12 Aug 2023 04:25 )  Alb: 4.1 g/dL / Pro: 6.5 g/dL / ALK PHOS: 118 U/L / ALT: 12 U/L / AST: 16 U/L / GGT: x           PT/INR - ( 11 Aug 2023 00:53 )   PT: 11.80 sec;   INR: 1.03 ratio         PTT - ( 11 Aug 2023 00:53 )  PTT:35.2 sec  CAPILLARY BLOOD GLUCOSE            CARDIAC MARKERS ( 11 Aug 2023 06:35 )  x     / <0.01 ng/mL / x     / x     / x      CARDIAC MARKERS ( 11 Aug 2023 00:53 )  x     / <0.01 ng/mL / x     / x     / x          Urinalysis Basic - ( 12 Aug 2023 04:25 )    Color: x / Appearance: x / SG: x / pH: x  Gluc: 193 mg/dL / Ketone: x  / Bili: x / Urobili: x   Blood: x / Protein: x / Nitrite: x   Leuk Esterase: x / RBC: x / WBC x   Sq Epi: x / Non Sq Epi: x / Bacteria: x          PHYSICAL EXAM:   General: looking well , COA*3   HEENT:  moist mucous membranes, no scleral icterus  Neurology: A&Ox3  Respiratory: wheezing and crackles in right middle lung on ascultation, left lung clear, not in respiratory distress  CV: bradycardic, normal sinus, no murmurs appreciated  Abdominal: Soft, NT, ND, +BS, Last BM yesterday, no guarding, no rebound  Extremities: +1 pitting edema in b/l LEs, b/l lymphadenopathy in LEs, venous stasis dermatitis on b/l LEs, LLE is warmer and more erythematous in RLE, +1 peripheral pulses in b/l LEs          Care Discussed with Consultants/Other Providers [ x] YES  [ ] NO           Patient is a 77y old  Female who presents with a chief complaint of Dyspnea on Exertion and Lightheadedness (11 Aug 2023 07:21)    HPI:  77F ex-smoker PMHx HTN, HLD, h/o Graves s/p Irradiation now Hypothyroid on synthroid, h/o gallstones s/p ERCP 1 month ago, PAD s/p multiple b/l LE stents on ASA/Plavix and was recently started on Xarelto x1 week ago after RLE stent placement presenting to ED from urgent care for symptomatic bradycardia. Patient admits to having generalized fatigue and malaise for the last few days. Also admits to having mild dyspnea on exertion, productive cough, lightheadedness, chest heaviness and pins and needles sensation of LUE. Denies any headaches, syncopal episode or LOC, n/v/d, dysuria or weakness. Of note, patient has been feeling     Vitals: Temp 97.3F, /71, HR 65>36, RR 19, SpO2 99% on RA    EKG showed Junctional Bradycardia    Labs: Hgb 8.3 (previously 10.1), K 6.6, Cr 1.8 (previously 1.1), Lactate 2.9, BNP 2159 (previously 871), Trops -ve x1    Imaging: CXR shows mild vascular congestion b/l, no acute cardiopulmonary pathology    In the ED:  - s/p Insulin/D50  - Lokelma 10 x1  - Calcium Gluconate 1mg x1  - starting Dopamine gtt @ 5mcg    Admitted to CCU (11 Aug 2023 02:36)       INTERVAL HPI/OVERNIGHT EVENTS:   No overnight events   Afebrile, hemodynamically stable, sinus bob @ 50s on dop @ 5    Subjective:    ICU Vital Signs Last 24 Hrs  T(C): 36.3 (12 Aug 2023 04:00), Max: 36.3 (12 Aug 2023 00:00)  T(F): 97.4 (12 Aug 2023 04:00), Max: 97.4 (12 Aug 2023 00:00)  HR: 51 (12 Aug 2023 05:00) (43 - 61)  BP: 118/57 (12 Aug 2023 05:00) (90/50 - 135/59)  BP(mean): 88 (12 Aug 2023 05:00) (65 - 90)  ABP: --  ABP(mean): --  RR: 31 (12 Aug 2023 05:00) (16 - 49)  SpO2: 95% (12 Aug 2023 05:00) (92% - 99%)    O2 Parameters below as of 12 Aug 2023 05:00  Patient On (Oxygen Delivery Method): room air          I&O's Summary    11 Aug 2023 07:01  -  12 Aug 2023 07:00  --------------------------------------------------------  IN: 1075.6 mL / OUT: 4000 mL / NET: -2924.4 mL          Daily     Daily Weight in k.5 (12 Aug 2023 05:00)          MEDICATIONS  (STANDING):  aspirin  chewable 81 milliGRAM(s) Oral daily  chlorhexidine 2% Cloths 1 Application(s) Topical <User Schedule>  clopidogrel Tablet 75 milliGRAM(s) Oral daily  DOPamine Infusion 5 MICROgram(s)/kG/Min (18.7 mL/Hr) IV Continuous <Continuous>  furosemide   Injectable 40 milliGRAM(s) IV Push daily  heparin   Injectable 5000 Unit(s) IV Push every 12 hours  levothyroxine 150 MICROGram(s) Oral daily    MEDICATIONS  (PRN):  albuterol/ipratropium for Nebulization 3 milliLiter(s) Nebulizer every 6 hours PRN Bronchospasm      PHYSICAL EXAM:  GENERAL:   HEAD:  Atraumatic, Normocephalic  EYES: EOMI, PERRLA, conjunctiva and sclera clear  NECK: Supple, No JVD, Normal thyroid, no enlarged nodes  NERVOUS SYSTEM:  Alert & Awake.   CHEST/LUNG: B/L good air entry; No rales, rhonchi, or wheezing  HEART: S1S2 normal, no S3, Regular rate and rhythm; No murmurs  ABDOMEN: Soft, Nontender, Nondistended; Bowel sounds present  EXTREMITIES:  2+ Peripheral Pulses, No clubbing, cyanosis, or edema  LYMPH: No lymphadenopathy noted  SKIN: No rashes or lesions    LABS:                        9.4    8.66  )-----------( 409      ( 12 Aug 2023 04:25 )             30.2     08-12    135  |  102  |  49<H>  ----------------------------<  193<H>  5.3<H>   |  18  |  1.4    Ca    8.6      12 Aug 2023 04:25  Mg     2.6     08-12    TPro  6.5  /  Alb  4.1  /  TBili  <0.2  /  DBili  x   /  AST  16  /  ALT  12  /  AlkPhos  118<H>  08-12    LIVER FUNCTIONS - ( 12 Aug 2023 04:25 )  Alb: 4.1 g/dL / Pro: 6.5 g/dL / ALK PHOS: 118 U/L / ALT: 12 U/L / AST: 16 U/L / GGT: x           PT/INR - ( 11 Aug 2023 00:53 )   PT: 11.80 sec;   INR: 1.03 ratio         PTT - ( 11 Aug 2023 00:53 )  PTT:35.2 sec  CAPILLARY BLOOD GLUCOSE            CARDIAC MARKERS ( 11 Aug 2023 06:35 )  x     / <0.01 ng/mL / x     / x     / x      CARDIAC MARKERS ( 11 Aug 2023 00:53 )  x     / <0.01 ng/mL / x     / x     / x          Urinalysis Basic - ( 12 Aug 2023 04:25 )    Color: x / Appearance: x / SG: x / pH: x  Gluc: 193 mg/dL / Ketone: x  / Bili: x / Urobili: x   Blood: x / Protein: x / Nitrite: x   Leuk Esterase: x / RBC: x / WBC x   Sq Epi: x / Non Sq Epi: x / Bacteria: x          PHYSICAL EXAM:   General: looking well , COA*3   HEENT:  moist mucous membranes, no scleral icterus  Neurology: A&Ox3  Respiratory: wheezing and crackles in right middle lung on ascultation, left lung clear, not in respiratory distress  CV: bradycardic, normal sinus, no murmurs appreciated  Abdominal: Soft, NT, ND, +BS, Last BM yesterday, no guarding, no rebound  Extremities: +1 pitting edema in b/l LEs, b/l lymphadenopathy in LEs, venous stasis dermatitis on b/l LEs, LLE is warmer and more erythematous in RLE, +1 peripheral pulses in b/l LEs          Care Discussed with Consultants/Other Providers [ x] YES  [ ] NO           Patient is a 77y old  Female who presents with a chief complaint of Dyspnea on Exertion and Lightheadedness (11 Aug 2023 07:21)    HPI:    77F ex-smoker PMHx HTN, HLD, h/o Graves s/p Irradiation now Hypothyroid on synthroid, h/o gallstones s/p ERCP 1 month ago, PAD s/p multiple b/l LE stents on ASA/Plavix and was recently started on Xarelto x1 week ago after RLE stent placement presenting to ED from urgent care for symptomatic bradycardia. Patient admits to having generalized fatigue and malaise for the last few days. Also admits to having mild dyspnea on exertion, productive cough, lightheadedness, chest heaviness and pins and needles sensation of LUE. Denies any headaches, syncopal episode or LOC, n/v/d, dysuria or weakness. Of note, patient has been feeling     Vitals: Temp 97.3F, /71, HR 65>36, RR 19, SpO2 99% on RA    EKG showed Junctional Bradycardia    Labs: Hgb 8.3 (previously 10.1), K 6.6, Cr 1.8 (previously 1.1), Lactate 2.9, BNP 2159 (previously 871), Trops -ve x1    Imaging: CXR shows mild vascular congestion b/l, no acute cardiopulmonary pathology    In the ED:  - s/p Insulin/D50  - Lokelma 10 x1  - Calcium Gluconate 1mg x1  - starting Dopamine gtt @ 5mcg    Admitted to CCU (11 Aug 2023 02:36)       INTERVAL HPI/OVERNIGHT EVENTS:   No overnight events   Afebrile, hemodynamically stable, sinus bob @ 50s on dop @ 5    Subjective:    ICU Vital Signs Last 24 Hrs  T(C): 36.3 (12 Aug 2023 04:00), Max: 36.3 (12 Aug 2023 00:00)  T(F): 97.4 (12 Aug 2023 04:00), Max: 97.4 (12 Aug 2023 00:00)  HR: 51 (12 Aug 2023 05:00) (43 - 61)  BP: 118/57 (12 Aug 2023 05:00) (90/50 - 135/59)  BP(mean): 88 (12 Aug 2023 05:00) (65 - 90)  ABP: --  ABP(mean): --  RR: 31 (12 Aug 2023 05:00) (16 - 49)  SpO2: 95% (12 Aug 2023 05:00) (92% - 99%)    O2 Parameters below as of 12 Aug 2023 05:00  Patient On (Oxygen Delivery Method): room air          I&O's Summary    11 Aug 2023 07:01  -  12 Aug 2023 07:00  --------------------------------------------------------  IN: 1075.6 mL / OUT: 4000 mL / NET: -2924.4 mL          Daily     Daily Weight in k.5 (12 Aug 2023 05:00)          MEDICATIONS  (STANDING):  aspirin  chewable 81 milliGRAM(s) Oral daily  chlorhexidine 2% Cloths 1 Application(s) Topical <User Schedule>  clopidogrel Tablet 75 milliGRAM(s) Oral daily  DOPamine Infusion 5 MICROgram(s)/kG/Min (18.7 mL/Hr) IV Continuous <Continuous>  furosemide   Injectable 40 milliGRAM(s) IV Push daily  heparin   Injectable 5000 Unit(s) IV Push every 12 hours  levothyroxine 150 MICROGram(s) Oral daily    MEDICATIONS  (PRN):  albuterol/ipratropium for Nebulization 3 milliLiter(s) Nebulizer every 6 hours PRN Bronchospasm        LABS:                        9.4    8.66  )-----------( 409      ( 12 Aug 2023 04:25 )             30.2     08-12    135  |  102  |  49<H>  ----------------------------<  193<H>  5.3<H>   |  18  |  1.4    Ca    8.6      12 Aug 2023 04:25  Mg     2.6     -12    TPro  6.5  /  Alb  4.1  /  TBili  <0.2  /  DBili  x   /  AST  16  /  ALT  12  /  AlkPhos  118<H>  08-12    LIVER FUNCTIONS - ( 12 Aug 2023 04:25 )  Alb: 4.1 g/dL / Pro: 6.5 g/dL / ALK PHOS: 118 U/L / ALT: 12 U/L / AST: 16 U/L / GGT: x           PT/INR - ( 11 Aug 2023 00:53 )   PT: 11.80 sec;   INR: 1.03 ratio         PTT - ( 11 Aug 2023 00:53 )  PTT:35.2 sec  CAPILLARY BLOOD GLUCOSE            CARDIAC MARKERS ( 11 Aug 2023 06:35 )  x     / <0.01 ng/mL / x     / x     / x      CARDIAC MARKERS ( 11 Aug 2023 00:53 )  x     / <0.01 ng/mL / x     / x     / x          Urinalysis Basic - ( 12 Aug 2023 04:25 )    Color: x / Appearance: x / SG: x / pH: x  Gluc: 193 mg/dL / Ketone: x  / Bili: x / Urobili: x   Blood: x / Protein: x / Nitrite: x   Leuk Esterase: x / RBC: x / WBC x   Sq Epi: x / Non Sq Epi: x / Bacteria: x          PHYSICAL EXAM:   General: AAO x 3, NAD  HEENT:  moist mucous membranes, no scleral icterus  Neurology: A&Ox3  Respiratory: wheezing and crackles in right middle lung on ascultation, left lung clear, not in respiratory distress  CV: bradycardic, normal sinus, no murmurs appreciated  Abdominal: Soft, NT, ND, +BS, Last BM yesterday, no guarding, no rebound  Extremities: +1 pitting edema in b/l LEs, b/l lymphadenopathy in LEs, venous stasis dermatitis on b/l LEs, LLE is warmer and more erythematous in RLE, +1 peripheral pulses in b/l LEs          Care Discussed with Consultants/Other Providers [ x] YES  [ ] NO

## 2023-08-12 NOTE — PROGRESS NOTE ADULT - SUBJECTIVE AND OBJECTIVE BOX
Patient is a 77y old  Female who presents with a chief complaint of Dyspnea on Exertion and Lightheadedness (12 Aug 2023 07:30)        HPI:  Off Dopamine since am. Feels better.        PAST MEDICAL & SURGICAL HISTORY:  Arterial insufficiency of lower extremity  left leg stent placed      Leg swelling      Hypothyroid      HTN (hypertension)      High cholesterol      Peripheral arterial disease      H/O Graves' disease      History of cholecystectomy      H/O: hysterectomy      S/P angiogram of extremity      S/P ERCP                      PREVIOUS DIAGNOSTIC TESTING:      ECHO  FINDINGS:    STRESS  FINDINGS:    CATHETERIZATION  FINDINGS:    ELECTROPHYSIOLOGY STUDY  FINDINGS:    CAROTID ULTRASOUND:  FINDINGS    VENOUS DUPLEX SCAN:  FINDINGS:    CHEST CT PULMONARY ANGIO with IV Contrast:  FINDINGS:    MEDICATIONS  (STANDING):  aspirin  chewable 81 milliGRAM(s) Oral daily  chlorhexidine 2% Cloths 1 Application(s) Topical <User Schedule>  clopidogrel Tablet 75 milliGRAM(s) Oral daily  heparin   Injectable 5000 Unit(s) IV Push every 12 hours    MEDICATIONS  (PRN):  albuterol/ipratropium for Nebulization 3 milliLiter(s) Nebulizer every 6 hours PRN Bronchospasm      FAMILY HISTORY:  Family history of breast cancer (Sibling)    FH: lung cancer  FATHER        SOCIAL HISTORY:    CIGARETTES:    ALCOHOL:    Past Surgical History:    Allergies:    codeine (Stomach Upset; Vomiting; Nausea)      REVIEW OF SYSTEMS:    As Above.    Vital Signs Last 24 Hrs  T(C): 36.4 (12 Aug 2023 08:00), Max: 36.4 (12 Aug 2023 08:00)  T(F): 97.6 (12 Aug 2023 08:00), Max: 97.6 (12 Aug 2023 08:00)  HR: 55 (12 Aug 2023 13:00) (51 - 71)  BP: 109/49 (12 Aug 2023 13:00) (105/57 - 136/60)  BP(mean): 71 (12 Aug 2023 13:00) (71 - 90)  RR: 28 (12 Aug 2023 13:00) (16 - 49)  SpO2: 97% (12 Aug 2023 13:00) (92% - 100%)    Parameters below as of 12 Aug 2023 13:00  Patient On (Oxygen Delivery Method): room air        PHYSICAL EXAM:        GENERAL: In no apparent distress, well nourished, and hydrated.  HEAD:  Atraumatic, Normocephalic  HEART: Regular rate and rhythm; No murmurs, rubs, or gallops.  PULMONARY: Clear to auscultation and perfusion.  No rales, wheezing, or rhonchi bilaterally.  EXTREMITIES:  2+ Peripheral Pulses, No clubbing, cyanosis, or edema  NEUROLOGICAL: Grossly nonfocal      INTERPRETATION OF TELEMETRY:    ECG:    I&O's Detail    11 Aug 2023 07:01  -  12 Aug 2023 07:00  --------------------------------------------------------  IN:    DOPamine Infusion: 345.6 mL    Oral Fluid: 730 mL  Total IN: 1075.6 mL    OUT:    Voided (mL): 4000 mL  Total OUT: 4000 mL    Total NET: -2924.4 mL          LABS:                        9.4    8.66  )-----------( 409      ( 12 Aug 2023 04:25 )             30.2     08-12    135  |  102  |  49<H>  ----------------------------<  193<H>  5.3<H>   |  18  |  1.4    Ca    8.6      12 Aug 2023 04:25  Mg     2.6     12    TPro  6.5  /  Alb  4.1  /  TBili  <0.2  /  DBili  x   /  AST  16  /  ALT  12  /  AlkPhos  118<H>  08-12    CARDIAC MARKERS ( 11 Aug 2023 06:35 )  x     / <0.01 ng/mL / x     / x     / x      CARDIAC MARKERS ( 11 Aug 2023 00:53 )  x     / <0.01 ng/mL / x     / x     / x          PT/INR - ( 11 Aug 2023 00:53 )   PT: 11.80 sec;   INR: 1.03 ratio         PTT - ( 11 Aug 2023 00:53 )  PTT:35.2 sec  Urinalysis Basic - ( 12 Aug 2023 04:25 )    Color: x / Appearance: x / SG: x / pH: x  Gluc: 193 mg/dL / Ketone: x  / Bili: x / Urobili: x   Blood: x / Protein: x / Nitrite: x   Leuk Esterase: x / RBC: x / WBC x   Sq Epi: x / Non Sq Epi: x / Bacteria: x      BNP  I&O's Detail    11 Aug 2023 07:01  -  12 Aug 2023 07:00  --------------------------------------------------------  IN:    DOPamine Infusion: 345.6 mL    Oral Fluid: 730 mL  Total IN: 1075.6 mL    OUT:    Voided (mL): 4000 mL  Total OUT: 4000 mL    Total NET: -2924.4 mL        Daily     Daily Weight in k.5 (12 Aug 2023 05:00)    RADIOLOGY & ADDITIONAL STUDIES:

## 2023-08-12 NOTE — PROGRESS NOTE ADULT - ASSESSMENT
· Assessment    77F ex-smoker PMHx HTN, HLD, h/o Graves s/p Irradiation now Hypothyroid on synthroid, h/o gallstones s/p ERCP 1 month ago, PAD s/p multiple b/l LE stents on ASA/Plavix and was recently started on Xarelto x1 week ago after RLE stent placement presenting to ED from urgent care for symptomatic bradycardia.    ASSESSMENT:  Junctional Bradycardia with sinus arrest; now sinus rhythm with bradycardia  Acute on Chronic CHF  THEODORA on CKD; Hyperkalemia  LLE Cellulitis; received bactrim as outpatient   HO PAD s/p multiple LE stents b/l (most recent 1x week ago)  HO Hypothyroidism 2/2 Graves s/p Irradiation  HO Gallstones s/p ERCP (1 month ago)  HO HTN  HO HLD    PLAN:    CNS: avoid sedatives    HEENT: oral care    PULMONARY:  - Lifetime smoker quit 9 months ago  - CXR shows mild vascular congestion  - stats she uses a nebulizing treatment that was recently started for her wheezing  - does not follow a pulmonologist  - Dounebs q6hrs PRN for wheezing  - aspiration precautions , HOB elevation     CARDIOVASCULAR:  - EKG junctional bob now sinus bradycardia   - LE stents b/l on ASA/Plavix/Xarelto at home  - c/w ASA and Plavix, hold Xarelto  - Trops -ve x1  - BNP 2159 (previously 871)  - on Lasix 40mg IV x1  - started Dopamine @5mcg , HR improved in 50's now   - ECHO showed EF OF 65% with G1 Diastolic dysfunction and sclerotic aortic v.   - 1.5L fluid restriction  - Strict I&Os, daily weight  - hold home  atenolol for bradycardia and lisinopril for hyperkalemia   - EP cards for PPM evaluation   - follows Dr. Stanley o/p    GI:  - h/o Gallstones s/p ERCP x1 month ago, benign abd exam  - NPO for now    RENAL:  - Hyperkalemia 6.6 on admission , now 5.3. Follow repeat  - THEODORA on CKD - improving   - holding home lisinopril  - trend Cr. monitor lytes, correct PRN    INFECTIOUS DISEASE:  - LLE cellulitis  - recently finished 15x days of Bactrim DS PO o/p  - f/u ESR/CRP  - no fevers, wbc wnl  - monitor off abx for now    HEMATOLOGICAL:  - normocytic anemia, hgb 8.5  previously 10  - c/w home ASA and Plavix  - holding home Xarelto - patient unsure of dosage  - monitor H&H  - active type and screen      ENDOCRINE:  - h/o graves s/p thyroid irradiation  - c/w home synthroid 150mcg daily  - TSH = 5.7 ( August 11 2023 ) , T4 = 6.7 ( August 11 2023 )    MUSCULOSKELETAL: Bedrest, fall precautions    GOC: Full Code   · Assessment    77F ex-smoker PMHx HTN, HLD, h/o Graves s/p Irradiation now Hypothyroid on synthroid, h/o gallstones s/p ERCP 1 month ago, PAD s/p multiple b/l LE stents on ASA/Plavix and was recently started on Xarelto x1 week ago after RLE stent placement presenting to ED from urgent care for symptomatic bradycardia.    ASSESSMENT:  Junctional Bradycardia with sinus arrest; now sinus rhythm with bradycardia  Acute on Chronic CHF  THEODORA on CKD; Hyperkalemia  LLE Cellulitis; received bactrim as outpatient   HO PAD s/p multiple LE stents b/l (most recent 1x week ago)  HO Hypothyroidism 2/2 Graves s/p Irradiation  HO Gallstones s/p ERCP (1 month ago)  HO HTN  HO HLD    PLAN:    CNS: avoid sedatives    HEENT: oral care    PULMONARY:  - Lifetime smoker quit 9 months ago  - CXR shows mild vascular congestion , hold lasix as no overload.  - stats she uses a nebulizing treatment that was recently started for her wheezing  - does not follow a pulmonologist  - Dounebs q6hrs PRN for wheezing  - aspiration precautions , HOB elevation     CARDIOVASCULAR:  - EKG junctional bob now sinus bradycardia   - LE stents b/l on ASA/Plavix/Xarelto at home  - c/w ASA and Plavix, hold Xarelto  - Trops -ve x1  - BNP 2159 (previously 871)  - D/C Dopamine today and monitor HR , if needed restart dopamine @5  - ECHO showed EF OF 65% with G1 Diastolic dysfunction and sclerotic aortic v.   - 1.5L fluid restriction  - Strict I&Os, daily weight  - hold home  atenolol for bradycardia and lisinopril for hyperkalemia   - EP cards for PPM evaluation   - follows Dr. Stanley o/p    GI:  - h/o Gallstones s/p ERCP x1 month ago, benign abd exam  - NPO for now    RENAL:  - Hyperkalemia 6.6 on admission , now 5.3. Follow repeat  - THEODORA on CKD - improving   - holding home lisinopril  - trend Cr. monitor lytes, correct PRN    INFECTIOUS DISEASE:  - LLE cellulitis  - recently finished 15x days of Bactrim DS PO o/p  - f/u ESR/CRP  - no fevers, wbc wnl  - monitor off abx for now    HEMATOLOGICAL:  - normocytic anemia, hgb 8.5  previously 10  - c/w home ASA and Plavix  - holding home Xarelto - patient unsure of dosage  - monitor H&H  - active type and screen      ENDOCRINE:  - h/o graves s/p thyroid irradiation  - increase home synthroid from 150mcg to 175 mcg daily  - TSH = 5.7 ( August 11 2023 ) , T4 = 6.7 ( August 11 2023 )    MUSCULOSKELETAL: Bedrest, fall precautions    GOC: Full Code   · Assessment    77F ex-smoker PMHx HTN, HLD, h/o Graves s/p Irradiation now Hypothyroid on synthroid, h/o gallstones s/p ERCP 1 month ago, PAD s/p multiple b/l LE stents on ASA/Plavix and was recently started on Xarelto x1 week ago after RLE stent placement presenting to ED from urgent care for symptomatic bradycardia.    ASSESSMENT:  Junctional Bradycardia with sinus arrest; now sinus rhythm with bradycardia  Acute on Chronic CHF  THEODORA on CKD; Hyperkalemia  LLE Cellulitis; received bactrim as outpatient   HO PAD s/p multiple LE stents b/l (most recent 1x week ago)  HO Hypothyroidism 2/2 Graves s/p Irradiation  HO Gallstones s/p ERCP (1 month ago)  HO HTN  HO HLD    PLAN:    CNS: avoid sedatives    HEENT: oral care    PULMONARY:  - Lifetime smoker quit 9 months ago  - CXR shows mild vascular congestion , hold lasix as no overload.  - stats she uses a nebulizing treatment that was recently started for her wheezing  - does not follow a pulmonologist  - Dounebs q6hrs PRN for wheezing  - aspiration precautions , HOB elevation     CARDIOVASCULAR:  - EKG junctional bob now sinus bradycardia , HR improved on dopamine, reversible causes addressed (hyperK improving, THEODORA resolved, atenolol on hold for 2 days), so - D/C Dopamine today and monitor HR , if needed restart dopamine, if dependent on chronotropic agents will need PPM  - LE stents b/l on ASA/Plavix/Xarelto at home  - c/w ASA and Plavix, hold Xarelto  - Trops -ve x1  - BNP 2159 (previously 871)  - ECHO showed EF OF 65% with G1 Diastolic dysfunction and sclerotic aortic v. , hold diuretics today  - 1.5L fluid restriction  - Strict I&Os, daily weight  - hold home  atenolol for bradycardia and lisinopril for hyperkalemia   - EP following, possible PPM   - follows Dr. Stanley o/p    GI:  - h/o Gallstones s/p ERCP x1 month ago, benign abd exam      RENAL:  - Hyperkalemia 6.6 on admission , now 5.3. Follow repeat  - THEODORA on CKD - improving   - holding home lisinopril  - trend Cr. monitor lytes, correct PRN    INFECTIOUS DISEASE:  - LLE cellulitis  - recently finished 15x days of Bactrim DS PO o/p  - no fevers, wbc wnl  - monitor off abx for now    HEMATOLOGICAL:  - normocytic anemia, hgb 8.5  previously 10  - c/w home ASA and Plavix  - holding home Xarelto - patient unsure of dosage  - monitor H&H  - active type and screen      ENDOCRINE:  - h/o graves s/p thyroid irradiation  - given elevated TSH 5.7, increase home synthroid from 150mcg to 175 mcg daily  - TSH = 5.7 ( August 11 2023 ) , T4 = 6.7 ( August 11 2023 )    MUSCULOSKELETAL: Bedrest, fall precautions    GOC: Full Code   Assessment:    77F ex-smoker PMHx HTN, HLD, h/o Graves s/p Irradiation now Hypothyroid on synthroid, h/o gallstones s/p ERCP 1 month ago, PAD s/p multiple b/l LE stents on ASA/Plavix and was recently started on Xarelto x1 week ago after RLE stent placement presenting to ED from urgent care for symptomatic bradycardia.    ASSESSMENT:  Junctional Bradycardia with sinus arrest; now sinus rhythm with bradycardia  Acute on Chronic CHF  THEODORA on CKD; Hyperkalemia  LLE Cellulitis; received bactrim as outpatient   HO PAD s/p multiple LE stents b/l (most recent 1x week ago)  HO Hypothyroidism 2/2 Graves s/p Irradiation  HO Gallstones s/p ERCP (1 month ago)  HO HTN  HO HLD    PLAN:    CNS: avoid sedatives    HEENT: oral care    PULMONARY:  - Lifetime smoker quit 9 months ago  - CXR shows mild vascular congestion , hold lasix as no overload.  - stats she uses a nebulizing treatment that was recently started for her wheezing  - does not follow a pulmonologist  - Dounebs q6hrs PRN for wheezing  - aspiration precautions , HOB elevation     CARDIOVASCULAR:  - EKG junctional bob now sinus bradycardia , HR improved on dopamine, reversible causes addressed (hyperK improving, THEODORA resolved, atenolol on hold for 2 days), so - D/C Dopamine today and monitor HR , if needed restart dopamine, if dependent on chronotropic agents will need PPM  - LE stents b/l on ASA/Plavix/Xarelto at home  - c/w ASA and Plavix, hold Xarelto  - Trops -ve x1  - BNP 2159 (previously 871)  - ECHO showed EF OF 65% with G1 Diastolic dysfunction and sclerotic aortic v. , hold diuretics today  - 1.5L fluid restriction  - Strict I&Os, daily weight  - hold home  atenolol for bradycardia and lisinopril for hyperkalemia   - EP following, possible PPM   - follows Dr. Stanley o/p    GI:  - h/o Gallstones s/p ERCP x1 month ago, benign abd exam      RENAL:  - Hyperkalemia 6.6 on admission , now 5.3. Follow repeat  - THEODORA on CKD - improving   - holding home lisinopril  - trend Cr. monitor lytes, correct PRN    INFECTIOUS DISEASE:  - LLE cellulitis  - recently finished 15x days of Bactrim DS PO o/p  - no fevers, wbc wnl  - monitor off abx for now    HEMATOLOGICAL:  - normocytic anemia, hgb 8.5  previously 10  - c/w home ASA and Plavix  - holding home Xarelto - patient unsure of dosage  - monitor H&H  - active type and screen      ENDOCRINE:  - h/o graves s/p thyroid irradiation  - given elevated TSH 5.7, increase home synthroid from 150mcg to 175 mcg daily  - TSH = 5.7 ( August 11 2023 ) , T4 = 6.7 ( August 11 2023 )    MUSCULOSKELETAL: Bedrest, fall precautions    GOC: Full Code

## 2023-08-12 NOTE — PATIENT PROFILE ADULT - FALL HARM RISK - RISK INTERVENTIONS
Assistance OOB with selected safe patient handling equipment/Assistance with ambulation/Communicate Fall Risk and Risk Factors to all staff, patient, and family/Discuss with provider need for PT consult/Monitor gait and stability/Provide patient with walking aids - walker, cane, crutches/Reinforce activity limits and safety measures with patient and family/Sit up slowly, dangle for a short time, stand at bedside before walking/Visual Cue: Yellow wristband/Bed in lowest position, wheels locked, appropriate side rails in place/Call bell, personal items and telephone in reach/Instruct patient to call for assistance before getting out of bed or chair/Non-slip footwear when patient is out of bed/[place] to call system/Physically safe environment - no spills, clutter or unnecessary equipment/Purposeful Proactive Rounding/Room/bathroom lighting operational, light cord in reach

## 2023-08-12 NOTE — PATIENT PROFILE ADULT - ..
Problem: At Risk for Falls  Goal: # Patient does not fall  Outcome: Outcome Met, Continue evaluating goal progress toward completion  VPM in place. Continue to monitor.   12-Aug-2023 18:00:23

## 2023-08-12 NOTE — PROGRESS NOTE ADULT - ASSESSMENT
Sinus Node dysfunction    Better  Off Dopamine  CCU/Tele  WIll hold off on PPM for now  If HR continues to be >50 bpm or more off dopamine, can be discharged with MCOT for 30-days.

## 2023-08-13 LAB
ALBUMIN SERPL ELPH-MCNC: 3.5 G/DL — SIGNIFICANT CHANGE UP (ref 3.5–5.2)
ALP SERPL-CCNC: 116 U/L — HIGH (ref 30–115)
ALT FLD-CCNC: 14 U/L — SIGNIFICANT CHANGE UP (ref 0–41)
ANION GAP SERPL CALC-SCNC: 15 MMOL/L — HIGH (ref 7–14)
AST SERPL-CCNC: 14 U/L — SIGNIFICANT CHANGE UP (ref 0–41)
BASOPHILS # BLD AUTO: 0.06 K/UL — SIGNIFICANT CHANGE UP (ref 0–0.2)
BASOPHILS NFR BLD AUTO: 0.7 % — SIGNIFICANT CHANGE UP (ref 0–1)
BILIRUB SERPL-MCNC: <0.2 MG/DL — SIGNIFICANT CHANGE UP (ref 0.2–1.2)
BUN SERPL-MCNC: 60 MG/DL — HIGH (ref 10–20)
CALCIUM SERPL-MCNC: 8.2 MG/DL — LOW (ref 8.4–10.4)
CHLORIDE SERPL-SCNC: 103 MMOL/L — SIGNIFICANT CHANGE UP (ref 98–110)
CO2 SERPL-SCNC: 18 MMOL/L — SIGNIFICANT CHANGE UP (ref 17–32)
CREAT ?TM UR-MCNC: 103 MG/DL — SIGNIFICANT CHANGE UP
CREAT SERPL-MCNC: 2 MG/DL — HIGH (ref 0.7–1.5)
EGFR: 25 ML/MIN/1.73M2 — LOW
EOSINOPHIL # BLD AUTO: 0.32 K/UL — SIGNIFICANT CHANGE UP (ref 0–0.7)
EOSINOPHIL NFR BLD AUTO: 3.8 % — SIGNIFICANT CHANGE UP (ref 0–8)
GLUCOSE SERPL-MCNC: 124 MG/DL — HIGH (ref 70–99)
HCT VFR BLD CALC: 27.6 % — LOW (ref 37–47)
HGB BLD-MCNC: 8.7 G/DL — LOW (ref 12–16)
IMM GRANULOCYTES NFR BLD AUTO: 0.6 % — HIGH (ref 0.1–0.3)
LYMPHOCYTES # BLD AUTO: 2.08 K/UL — SIGNIFICANT CHANGE UP (ref 1.2–3.4)
LYMPHOCYTES # BLD AUTO: 24.8 % — SIGNIFICANT CHANGE UP (ref 20.5–51.1)
MAGNESIUM SERPL-MCNC: 2.5 MG/DL — HIGH (ref 1.8–2.4)
MCHC RBC-ENTMCNC: 26.9 PG — LOW (ref 27–31)
MCHC RBC-ENTMCNC: 31.5 G/DL — LOW (ref 32–37)
MCV RBC AUTO: 85.2 FL — SIGNIFICANT CHANGE UP (ref 81–99)
MONOCYTES # BLD AUTO: 0.66 K/UL — HIGH (ref 0.1–0.6)
MONOCYTES NFR BLD AUTO: 7.9 % — SIGNIFICANT CHANGE UP (ref 1.7–9.3)
NEUTROPHILS # BLD AUTO: 5.21 K/UL — SIGNIFICANT CHANGE UP (ref 1.4–6.5)
NEUTROPHILS NFR BLD AUTO: 62.2 % — SIGNIFICANT CHANGE UP (ref 42.2–75.2)
NRBC # BLD: 0 /100 WBCS — SIGNIFICANT CHANGE UP (ref 0–0)
OSMOLALITY UR: 531 MOS/KG — SIGNIFICANT CHANGE UP (ref 50–1200)
PLATELET # BLD AUTO: 345 K/UL — SIGNIFICANT CHANGE UP (ref 130–400)
PMV BLD: 9.5 FL — SIGNIFICANT CHANGE UP (ref 7.4–10.4)
POTASSIUM SERPL-MCNC: 5.2 MMOL/L — HIGH (ref 3.5–5)
POTASSIUM SERPL-SCNC: 5.2 MMOL/L — HIGH (ref 3.5–5)
PROT SERPL-MCNC: 5.8 G/DL — LOW (ref 6–8)
RBC # BLD: 3.24 M/UL — LOW (ref 4.2–5.4)
RBC # FLD: 15.1 % — HIGH (ref 11.5–14.5)
SODIUM SERPL-SCNC: 136 MMOL/L — SIGNIFICANT CHANGE UP (ref 135–146)
SODIUM UR-SCNC: 27 MMOL/L — SIGNIFICANT CHANGE UP
UUN UR-MCNC: 1011 MG/DL — SIGNIFICANT CHANGE UP
WBC # BLD: 8.38 K/UL — SIGNIFICANT CHANGE UP (ref 4.8–10.8)
WBC # FLD AUTO: 8.38 K/UL — SIGNIFICANT CHANGE UP (ref 4.8–10.8)

## 2023-08-13 PROCEDURE — 93010 ELECTROCARDIOGRAM REPORT: CPT

## 2023-08-13 PROCEDURE — 71045 X-RAY EXAM CHEST 1 VIEW: CPT | Mod: 26

## 2023-08-13 PROCEDURE — 76770 US EXAM ABDO BACK WALL COMP: CPT | Mod: 26

## 2023-08-13 PROCEDURE — 99233 SBSQ HOSP IP/OBS HIGH 50: CPT

## 2023-08-13 RX ORDER — SODIUM CHLORIDE 9 MG/ML
1000 INJECTION, SOLUTION INTRAVENOUS
Refills: 0 | Status: DISCONTINUED | OUTPATIENT
Start: 2023-08-13 | End: 2023-08-13

## 2023-08-13 RX ORDER — SODIUM ZIRCONIUM CYCLOSILICATE 10 G/10G
10 POWDER, FOR SUSPENSION ORAL
Refills: 0 | Status: DISCONTINUED | OUTPATIENT
Start: 2023-08-13 | End: 2023-08-14

## 2023-08-13 RX ORDER — SODIUM CHLORIDE 9 MG/ML
1000 INJECTION INTRAMUSCULAR; INTRAVENOUS; SUBCUTANEOUS
Refills: 0 | Status: DISCONTINUED | OUTPATIENT
Start: 2023-08-13 | End: 2023-08-14

## 2023-08-13 RX ADMIN — SODIUM ZIRCONIUM CYCLOSILICATE 10 GRAM(S): 10 POWDER, FOR SUSPENSION ORAL at 17:29

## 2023-08-13 RX ADMIN — SODIUM ZIRCONIUM CYCLOSILICATE 10 GRAM(S): 10 POWDER, FOR SUSPENSION ORAL at 08:36

## 2023-08-13 RX ADMIN — HEPARIN SODIUM 5000 UNIT(S): 5000 INJECTION INTRAVENOUS; SUBCUTANEOUS at 18:42

## 2023-08-13 RX ADMIN — Medication 175 MICROGRAM(S): at 06:01

## 2023-08-13 RX ADMIN — CLOPIDOGREL BISULFATE 75 MILLIGRAM(S): 75 TABLET, FILM COATED ORAL at 11:34

## 2023-08-13 RX ADMIN — Medication 81 MILLIGRAM(S): at 11:34

## 2023-08-13 RX ADMIN — CHLORHEXIDINE GLUCONATE 1 APPLICATION(S): 213 SOLUTION TOPICAL at 06:03

## 2023-08-13 RX ADMIN — SODIUM CHLORIDE 75 MILLILITER(S): 9 INJECTION INTRAMUSCULAR; INTRAVENOUS; SUBCUTANEOUS at 10:26

## 2023-08-13 RX ADMIN — HEPARIN SODIUM 5000 UNIT(S): 5000 INJECTION INTRAVENOUS; SUBCUTANEOUS at 06:01

## 2023-08-13 NOTE — CONSULT NOTE ADULT - SUBJECTIVE AND OBJECTIVE BOX
INCOMPLETE NEPHROLOGY CONSULTATION NOTE    Patient is a 77y Female whom presented to the hospital with     PAST MEDICAL & SURGICAL HISTORY:  Arterial insufficiency of lower extremity  left leg stent placed      Leg swelling      Hypothyroid      HTN (hypertension)      High cholesterol      Peripheral arterial disease      H/O Graves' disease      History of cholecystectomy      H/O: hysterectomy      S/P angiogram of extremity      S/P ERCP        Allergies:  codeine (Stomach Upset; Vomiting; Nausea)    Home Medications Reviewed  Hospital Medications:   MEDICATIONS  (STANDING):  aspirin  chewable 81 milliGRAM(s) Oral daily  chlorhexidine 2% Cloths 1 Application(s) Topical <User Schedule>  clopidogrel Tablet 75 milliGRAM(s) Oral daily  heparin   Injectable 5000 Unit(s) IV Push every 12 hours  levothyroxine 175 MICROGram(s) Oral daily  sodium chloride 0.9%. 1000 milliLiter(s) (75 mL/Hr) IV Continuous <Continuous>  sodium zirconium cyclosilicate 10 Gram(s) Oral two times a day    SOCIAL HISTORY:  Denies ETOH,Smoking,   FAMILY HISTORY:  Family history of breast cancer (Sibling)    FH: lung cancer  FATHER        REVIEW OF SYSTEMS:  CONSTITUTIONAL: No weakness, fevers or chills  EYES/ENT: No visual changes;  No vertigo or throat pain   NECK: No pain or stiffness  RESPIRATORY: No cough, wheezing, hemoptysis; No shortness of breath  CARDIOVASCULAR: No chest pain or palpitations.  GASTROINTESTINAL: No abdominal or epigastric pain. No nausea, vomiting, or hematemesis; No diarrhea or constipation. No melena or hematochezia.  GENITOURINARY: No dysuria, frequency, foamy urine, urinary urgency, incontinence or hematuria  NEUROLOGICAL: No numbness or weakness  SKIN: No itching, burning, rashes, or lesions   VASCULAR: No bilateral lower extremity edema.   All other review of systems is negative unless indicated above.    VITALS:  Vital Signs Last 24 Hrs  T(C): 36.7 (13 Aug 2023 04:00), Max: 36.7 (12 Aug 2023 20:00)  T(F): 98 (13 Aug 2023 04:00), Max: 98 (12 Aug 2023 20:00)  HR: 56 (13 Aug 2023 07:00) (53 - 71)  BP: 108/46 (13 Aug 2023 07:00) (80/50 - 163/73)  BP(mean): 67 (13 Aug 2023 07:00) (62 - 105)  RR: 20 (13 Aug 2023 06:00) (20 - 37)  SpO2: 97% (13 Aug 2023 07:00) (92% - 98%)    Parameters below as of 13 Aug 2023 06:00  Patient On (Oxygen Delivery Method): room air        08-12 @ 07:01 - 08-13 @ 07:00  --------------------------------------------------------  IN: 150 mL / OUT: 1300 mL / NET: -1150 mL    08-13 @ 07:01 - 08-13 @ 10:40  --------------------------------------------------------  IN: 480 mL / OUT: 0 mL / NET: 480 mL        PHYSICAL EXAM:  Constitutional: NAD  HEENT: anicteric sclera, oropharynx clear, MMM  Neck: No JVD  Respiratory: CTAB, no wheezes, rales or rhonchi  Cardiovascular: S1, S2, RRR  Gastrointestinal: BS+, soft, NT/ND  Extremities: No cyanosis or clubbing. No peripheral edema  Neurological: A/O x 3, no focal deficits  Psychiatric: Normal mood, normal affect  : No CVA tenderness. No dunn.   Skin: No rashes  Vascular Access:    LABS:  08-13    136  |  103  |  60<H>  ----------------------------<  124<H>  5.2<H>   |  18  |  2.0<H>    Ca    8.2<L>      13 Aug 2023 04:25  Mg     2.5     08-13    TPro  5.8<L>  /  Alb  3.5  /  TBili  <0.2  /  DBili      /  AST  14  /  ALT  14  /  AlkPhos  116<H>  08-13    Creatinine Trend: 2.0 <--, 1.4 <--, 1.5 <--, 1.6 <--, 1.6 <--, 1.8 <--                        8.7    8.38  )-----------( 345      ( 13 Aug 2023 04:25 )             27.6     Urine Studies:  Urinalysis Basic - ( 13 Aug 2023 04:25 )    Color:  / Appearance:  / SG:  / pH:   Gluc: 124 mg/dL / Ketone:   / Bili:  / Urobili:    Blood:  / Protein:  / Nitrite:    Leuk Esterase:  / RBC:  / WBC    Sq Epi:  / Non Sq Epi:  / Bacteria:                 RADIOLOGY & ADDITIONAL STUDIES:                 NEPHROLOGY CONSULTATION NOTE    77F ex-smoker PMHx HTN, HLD, h/o Graves s/p Irradiation now Hypothyroid on synthroid, h/o gallstones s/p ERCP 1 month ago, PAD s/p multiple b/l LE stents on ASA/Plavix and was recently started on Xarelto x1 week ago after RLE stent placement presenting to ED from urgent care for symptomatic bradycardia. The patient stated that she had a course of 3 weeks of bactrim which she stopped 4 days back for cellulitis of the left lower leg.     PAST MEDICAL & SURGICAL HISTORY:  Arterial insufficiency of lower extremity  left leg stent placed  Leg swelling  Hypothyroid  HTN (hypertension)  High cholesterol  Peripheral arterial disease  H/O Graves' disease  History of cholecystectomy  H/O: hysterectomy  S/P angiogram of extremity  S/P ERCP    Allergies:  codeine (Stomach Upset; Vomiting; Nausea)    Home Medications Reviewed  Hospital Medications:   MEDICATIONS  (STANDING):  aspirin  chewable 81 milliGRAM(s) Oral daily  chlorhexidine 2% Cloths 1 Application(s) Topical <User Schedule>  clopidogrel Tablet 75 milliGRAM(s) Oral daily  heparin   Injectable 5000 Unit(s) IV Push every 12 hours  levothyroxine 175 MICROGram(s) Oral daily  sodium chloride 0.9%. 1000 milliLiter(s) (75 mL/Hr) IV Continuous <Continuous>  sodium zirconium cyclosilicate 10 Gram(s) Oral two times a day    SOCIAL HISTORY:  Denies ETOH,Smoking,   FAMILY HISTORY:  Family history of breast cancer (Sibling)    FH: lung cancer  FATHER    REVIEW OF SYSTEMS:  CONSTITUTIONAL: No weakness, fevers or chills  EYES/ENT: No visual changes;  No vertigo or throat pain   NECK: No pain or stiffness  RESPIRATORY: No cough, wheezing, hemoptysis; No shortness of breath  CARDIOVASCULAR: Pedal edema   GASTROINTESTINAL: No abdominal or epigastric pain. No nausea, vomiting, or hematemesis; No diarrhea or constipation. No melena or hematochezia.  GENITOURINARY: No dysuria, frequency, foamy urine, urinary urgency, incontinence or hematuria  NEUROLOGICAL: No numbness or weakness    VITALS:  Vital Signs Last 24 Hrs  T(C): 36.7 (13 Aug 2023 04:00), Max: 36.7 (12 Aug 2023 20:00)  T(F): 98 (13 Aug 2023 04:00), Max: 98 (12 Aug 2023 20:00)  HR: 56 (13 Aug 2023 07:00) (53 - 71)  BP: 108/46 (13 Aug 2023 07:00) (80/50 - 163/73)  BP(mean): 67 (13 Aug 2023 07:00) (62 - 105)  RR: 20 (13 Aug 2023 06:00) (20 - 37)  SpO2: 97% (13 Aug 2023 07:00) (92% - 98%)    Parameters below as of 13 Aug 2023 06:00  Patient On (Oxygen Delivery Method): room air        08-12 @ 07:01  -  08-13 @ 07:00  --------------------------------------------------------  IN: 150 mL / OUT: 1300 mL / NET: -1150 mL    08-13 @ 07:01 - 08-13 @ 10:40  --------------------------------------------------------  IN: 480 mL / OUT: 0 mL / NET: 480 mL        PHYSICAL EXAM:    Neck: No JVD  Respiratory: CTAB, no wheezes, rales or rhonchi  Cardiovascular: S1, S2, RRR  Gastrointestinal: BS+, soft, NT/ND  Extremities: Redness of the left foot and bilateral pedal edema   Neurological: A/O x 3, no focal deficits  Psychiatric: Normal mood, normal affect  : No CVA tenderness. No dunn.   Skin: No rashes      LABS:  08-13    136  |  103  |  60<H>  ----------------------------<  124<H>  5.2<H>   |  18  |  2.0<H>    Ca    8.2<L>      13 Aug 2023 04:25  Mg     2.5     08-13    TPro  5.8<L>  /  Alb  3.5  /  TBili  <0.2  /  DBili      /  AST  14  /  ALT  14  /  AlkPhos  116<H>  08-13    Creatinine Trend: 2.0 <--, 1.4 <--, 1.5 <--, 1.6 <--, 1.6 <--, 1.8 <--                        8.7    8.38  )-----------( 345      ( 13 Aug 2023 04:25 )             27.6     Urine Studies:  Urinalysis Basic - ( 13 Aug 2023 04:25 )    Color:  / Appearance:  / SG:  / pH:   Gluc: 124 mg/dL / Ketone:   / Bili:  / Urobili:    Blood:  / Protein:  / Nitrite:    Leuk Esterase:  / RBC:  / WBC    Sq Epi:  / Non Sq Epi:  / Bacteria:

## 2023-08-13 NOTE — CONSULT NOTE ADULT - ASSESSMENT
77 year old female ex-smoker with the past medical history of Graves' disease s/p Irradiation, hypothyroidism, hypertension, hyperlipidemia, PAD s/p multiple b/l LE stents on ASA/Plavix and was recently started on Xarelto x1 week ago after RLE stent placement presented to ED from urgent care for symptomatic bradycardia. Patient admits to having generalized fatigue and malaise for the last few days. Also admits to having mild dyspnea on exertion, productive cough, lightheadedness, chest heaviness and pins and needles sensation of LUE. Denies any headaches, syncopal episode or LOC, n/v/d, dysuria or weakness. Electrophysiology was consulted for recommendations. The patient had bradycardia in the setting of hyperkalemia Potassium 6.6 in the setting of renal failure. The patient was also taking Atenolol at home. Presenting ECG reveals junctional bradycardia which resolved to sinus bradycardia on the following EKG. The patient was seen and evaluated this morning. Heart rates have recovered to 50's and the patient denies any symptoms currently.     Hyperkalemia causing bradycardia    Plan:   - Correct the hyperkalemia  - Correct the renal failure  - Repeat the echocardiogram   - Obtain thyroid function studies
77F ex-smoker PMHx HTN, HLD, h/o Graves s/p Irradiation now Hypothyroid on synthroid, h/o gallstones s/p ERCP 1 month ago, PAD s/p multiple b/l LE stents on ASA/Plavix and was recently started on Xarelto x1 week ago after RLE stent placement presenting to ED from urgent care for symptomatic bradycardia. The patient stated that she had a course of 3 weeks of bactrim which she stopped 4 days back for cellulitis of the left lower leg.     THEODORA and hyperkalemia  from ATN from bactrim vs hypotensive ATN   - The patient was on bactrim for 2 weeks before admission   - On chart review she has 2 episodes of hypotension to 80/60  - Potassium downtrending 5.2 today  - Creatine upgrading 2 ( baseline as per patient normal)  - C/w Lokelma 10 mg twice daily    - Bicarbonate 18. Start patient on isotonic bicarbonate drip (150 mEq/L of sodium bicarbonate in 1 liter D5W)  - US kidney and bladder   - Urine electrolytes and urine osmolality   - Avoid nephrotoxic medications   - Nephrology will follow

## 2023-08-13 NOTE — PROGRESS NOTE ADULT - ATTENDING COMMENTS
Off Dopamine  HR50-60 at rest, increasing with ambulation  THEODORA    Labs reviewed    Keep off Atenolol  Weaned off the dopamine gtt  OOB, ambulate  EP evaluation appreciated - will hold off on PPM, [facility]OT on D/C    Renal function worsening - IV hydration today, repeat labs in the afternoon. If no improvement, nephrology consult. Check renal sono.  If stable, downgrade to telemetry (3C)

## 2023-08-13 NOTE — PROGRESS NOTE ADULT - SUBJECTIVE AND OBJECTIVE BOX
JAKY RODRIGUEZ 77y Female  MRN#: [id number]     Hospital Day: 2d    Pt is currently admitted with the primary diagnosis of  Bradycardia        SUBJECTIVE     Overnight events  off dopamine ,    Subjective complaints  Pt was evaluated this am. Patient denied any active complaints and per patient his symptoms are improving                                            ----------------------------------------------------------  OBJECTIVE  PAST MEDICAL & SURGICAL HISTORY  Arterial insufficiency of lower extremity  left leg stent placed    Leg swelling    Hypothyroid    HTN (hypertension)    High cholesterol    Peripheral arterial disease    H/O Graves' disease    History of cholecystectomy    H/O: hysterectomy    S/P angiogram of extremity    S/P ERCP                                              -----------------------------------------------------------  ALLERGIES:  codeine (Stomach Upset; Vomiting; Nausea)                                            ------------------------------------------------------------    HOME MEDICATIONS  Home Medications:  aspirin 81 mg oral tablet, chewable: 1 tab(s) orally once a day (21 Jul 2023 12:23)  levothyroxine 150 mcg (0.15 mg) oral tablet: 1 tab(s) orally once a day (21 Jul 2023 12:23)  Plavix 75 mg oral tablet: 1 tab(s) orally once a day, to be held five days before ERCP) (21 Jul 2023 12:23)                           MEDICATIONS:  STANDING MEDICATIONS  aspirin  chewable 81 milliGRAM(s) Oral daily  chlorhexidine 2% Cloths 1 Application(s) Topical <User Schedule>  clopidogrel Tablet 75 milliGRAM(s) Oral daily  heparin   Injectable 5000 Unit(s) IV Push every 12 hours  lactated ringers. 1000 milliLiter(s) IV Continuous <Continuous>  levothyroxine 175 MICROGram(s) Oral daily  sodium zirconium cyclosilicate 10 Gram(s) Oral two times a day    PRN MEDICATIONS  albuterol/ipratropium for Nebulization 3 milliLiter(s) Nebulizer every 6 hours PRN                                            ------------------------------------------------------------  VITAL SIGNS: Last 24 Hours  T(C): 36.7 (13 Aug 2023 04:00), Max: 36.7 (12 Aug 2023 20:00)  T(F): 98 (13 Aug 2023 04:00), Max: 98 (12 Aug 2023 20:00)  HR: 56 (13 Aug 2023 07:00) (53 - 71)  BP: 108/46 (13 Aug 2023 07:00) (80/50 - 163/73)  BP(mean): 67 (13 Aug 2023 07:00) (62 - 105)  RR: 20 (13 Aug 2023 06:00) (20 - 37)  SpO2: 97% (13 Aug 2023 07:00) (92% - 100%)      08-12-23 @ 07:01  -  08-13-23 @ 07:00  --------------------------------------------------------  IN: 150 mL / OUT: 1300 mL / NET: -1150 mL    08-13-23 @ 07:01 - 08-13-23 @ 08:58  --------------------------------------------------------  IN: 480 mL / OUT: 0 mL / NET: 480 mL                                             --------------------------------------------------------------  LABS:                        8.7    8.38  )-----------( 345      ( 13 Aug 2023 04:25 )             27.6     08-13    136  |  103  |  60<H>  ----------------------------<  124<H>  5.2<H>   |  18  |  2.0<H>    Ca    8.2<L>      13 Aug 2023 04:25  Mg     2.5     08-13    TPro  5.8<L>  /  Alb  3.5  /  TBili  <0.2  /  DBili  x   /  AST  14  /  ALT  14  /  AlkPhos  116<H>  08-13      Urinalysis Basic - ( 13 Aug 2023 04:25 )    Color: x / Appearance: x / SG: x / pH: x  Gluc: 124 mg/dL / Ketone: x  / Bili: x / Urobili: x   Blood: x / Protein: x / Nitrite: x   Leuk Esterase: x / RBC: x / WBC x   Sq Epi: x / Non Sq Epi: x / Bacteria: x              Culture - Blood (collected 11 Aug 2023 12:22)  Source: .Blood None  Preliminary Report (12 Aug 2023 23:01):    No growth at 24 hours    Culture - Blood (collected 11 Aug 2023 12:22)  Source: .Blood None  Preliminary Report (12 Aug 2023 23:01):    No growth at 24 hours                                                    -------------------------------------------------------------  RADIOLOGY:                                            --------------------------------------------------------------    PHYSICAL EXAM:  GENERAL: NAD, lying in bed comfortably  HEAD:  Atraumatic, Normocephalic  EYES: EOMI, conjunctiva and sclera clear  ENT: Moist mucous membranes  NECK: Supple, No JVD  CHEST/LUNG: Clear to auscultation bilaterally; No rales, rhonchi, wheezing, or rubs. Unlabored respirations  HEART: regular rate and rhythm; No murmurs, rubs, or gallops  ABDOMEN: Bowel sounds present; Soft, Nontender, Nondistended.    EXTREMITIES: Warm. No clubbing, cyanosis, or edema  NERVOUS SYSTEM:  Alert & Oriented X3. No focal deficits   SKIN: No rashes or lesions                                           --------------------------------------------------------------                 JAKY RODRIGUEZ 77y Female  MRN#: [id number]     Hospital Day: 2d    Pt is currently admitted with the primary diagnosis of  Bradycardia        SUBJECTIVE     Overnight events  off dopamine e HR in 50s     Subjective complaints  Pt was evaluated this am.                                           ----------------------------------------------------------  OBJECTIVE  PAST MEDICAL & SURGICAL HISTORY  Arterial insufficiency of lower extremity  left leg stent placed    Leg swelling    Hypothyroid    HTN (hypertension)    High cholesterol    Peripheral arterial disease    H/O Graves' disease    History of cholecystectomy    H/O: hysterectomy    S/P angiogram of extremity    S/P ERCP                                              -----------------------------------------------------------  ALLERGIES:  codeine (Stomach Upset; Vomiting; Nausea)                                            ------------------------------------------------------------    HOME MEDICATIONS  Home Medications:  aspirin 81 mg oral tablet, chewable: 1 tab(s) orally once a day (21 Jul 2023 12:23)  levothyroxine 150 mcg (0.15 mg) oral tablet: 1 tab(s) orally once a day (21 Jul 2023 12:23)  Plavix 75 mg oral tablet: 1 tab(s) orally once a day, to be held five days before ERCP) (21 Jul 2023 12:23)                           MEDICATIONS:  STANDING MEDICATIONS  aspirin  chewable 81 milliGRAM(s) Oral daily  chlorhexidine 2% Cloths 1 Application(s) Topical <User Schedule>  clopidogrel Tablet 75 milliGRAM(s) Oral daily  heparin   Injectable 5000 Unit(s) IV Push every 12 hours  lactated ringers. 1000 milliLiter(s) IV Continuous <Continuous>  levothyroxine 175 MICROGram(s) Oral daily  sodium zirconium cyclosilicate 10 Gram(s) Oral two times a day    PRN MEDICATIONS  albuterol/ipratropium for Nebulization 3 milliLiter(s) Nebulizer every 6 hours PRN                                            ------------------------------------------------------------  VITAL SIGNS: Last 24 Hours  T(C): 36.7 (13 Aug 2023 04:00), Max: 36.7 (12 Aug 2023 20:00)  T(F): 98 (13 Aug 2023 04:00), Max: 98 (12 Aug 2023 20:00)  HR: 56 (13 Aug 2023 07:00) (53 - 71)  BP: 108/46 (13 Aug 2023 07:00) (80/50 - 163/73)  BP(mean): 67 (13 Aug 2023 07:00) (62 - 105)  RR: 20 (13 Aug 2023 06:00) (20 - 37)  SpO2: 97% (13 Aug 2023 07:00) (92% - 100%)      08-12-23 @ 07:01  -  08-13-23 @ 07:00  --------------------------------------------------------  IN: 150 mL / OUT: 1300 mL / NET: -1150 mL    08-13-23 @ 07:01 - 08-13-23 @ 08:58  --------------------------------------------------------  IN: 480 mL / OUT: 0 mL / NET: 480 mL                                             --------------------------------------------------------------  LABS:                        8.7    8.38  )-----------( 345      ( 13 Aug 2023 04:25 )             27.6     08-13    136  |  103  |  60<H>  ----------------------------<  124<H>  5.2<H>   |  18  |  2.0<H>    Ca    8.2<L>      13 Aug 2023 04:25  Mg     2.5     08-13    TPro  5.8<L>  /  Alb  3.5  /  TBili  <0.2  /  DBili  x   /  AST  14  /  ALT  14  /  AlkPhos  116<H>  08-13      Urinalysis Basic - ( 13 Aug 2023 04:25 )    Color: x / Appearance: x / SG: x / pH: x  Gluc: 124 mg/dL / Ketone: x  / Bili: x / Urobili: x   Blood: x / Protein: x / Nitrite: x   Leuk Esterase: x / RBC: x / WBC x   Sq Epi: x / Non Sq Epi: x / Bacteria: x              Culture - Blood (collected 11 Aug 2023 12:22)  Source: .Blood None  Preliminary Report (12 Aug 2023 23:01):    No growth at 24 hours    Culture - Blood (collected 11 Aug 2023 12:22)  Source: .Blood None  Preliminary Report (12 Aug 2023 23:01):    No growth at 24 hours                PHYSICAL EXAM:   General: looking well ,  HEENT:  moist mucous membranes, no scleral icterus  Neurology: A&Ox3  Respiratory: wheezing and crackles in right middle lung on ascultation, left lung clear  CV: bradycardic, normal sinus, no murmurs appreciated  Abdominal: Soft,  Extremities: +1 pitting edema in b/l LEs, b/l lymphadenopathy in LEs, venous stasis dermatitis on b/l LEs, LLE is warmer and more erythematous in RLE, +1 peripheral pulses in b/l LEs             JAKY RODRIGUEZ 77y Female  MRN#: [id number]     Hospital Day: 2d    Pt is currently admitted with the primary diagnosis of  Bradycardia        SUBJECTIVE       Overnight events  off dopamine e HR in 50s     Subjective complaints  Pt was evaluated this am.                                           ----------------------------------------------------------  OBJECTIVE  PAST MEDICAL & SURGICAL HISTORY  Arterial insufficiency of lower extremity  left leg stent placed    Leg swelling    Hypothyroid    HTN (hypertension)    High cholesterol    Peripheral arterial disease    H/O Graves' disease    History of cholecystectomy    H/O: hysterectomy    S/P angiogram of extremity    S/P ERCP                                              -----------------------------------------------------------  ALLERGIES:  codeine (Stomach Upset; Vomiting; Nausea)                                            ------------------------------------------------------------    HOME MEDICATIONS  Home Medications:  aspirin 81 mg oral tablet, chewable: 1 tab(s) orally once a day (21 Jul 2023 12:23)  levothyroxine 150 mcg (0.15 mg) oral tablet: 1 tab(s) orally once a day (21 Jul 2023 12:23)  Plavix 75 mg oral tablet: 1 tab(s) orally once a day, to be held five days before ERCP) (21 Jul 2023 12:23)                           MEDICATIONS:  STANDING MEDICATIONS  aspirin  chewable 81 milliGRAM(s) Oral daily  chlorhexidine 2% Cloths 1 Application(s) Topical <User Schedule>  clopidogrel Tablet 75 milliGRAM(s) Oral daily  heparin   Injectable 5000 Unit(s) IV Push every 12 hours  lactated ringers. 1000 milliLiter(s) IV Continuous <Continuous>  levothyroxine 175 MICROGram(s) Oral daily  sodium zirconium cyclosilicate 10 Gram(s) Oral two times a day    PRN MEDICATIONS  albuterol/ipratropium for Nebulization 3 milliLiter(s) Nebulizer every 6 hours PRN                                            ------------------------------------------------------------  VITAL SIGNS: Last 24 Hours  T(C): 36.7 (13 Aug 2023 04:00), Max: 36.7 (12 Aug 2023 20:00)  T(F): 98 (13 Aug 2023 04:00), Max: 98 (12 Aug 2023 20:00)  HR: 56 (13 Aug 2023 07:00) (53 - 71)  BP: 108/46 (13 Aug 2023 07:00) (80/50 - 163/73)  BP(mean): 67 (13 Aug 2023 07:00) (62 - 105)  RR: 20 (13 Aug 2023 06:00) (20 - 37)  SpO2: 97% (13 Aug 2023 07:00) (92% - 100%)      08-12-23 @ 07:01  -  08-13-23 @ 07:00  --------------------------------------------------------  IN: 150 mL / OUT: 1300 mL / NET: -1150 mL    08-13-23 @ 07:01 - 08-13-23 @ 08:58  --------------------------------------------------------  IN: 480 mL / OUT: 0 mL / NET: 480 mL                                             --------------------------------------------------------------  LABS:                        8.7    8.38  )-----------( 345      ( 13 Aug 2023 04:25 )             27.6     08-13    136  |  103  |  60<H>  ----------------------------<  124<H>  5.2<H>   |  18  |  2.0<H>    Ca    8.2<L>      13 Aug 2023 04:25  Mg     2.5     08-13    TPro  5.8<L>  /  Alb  3.5  /  TBili  <0.2  /  DBili  x   /  AST  14  /  ALT  14  /  AlkPhos  116<H>  08-13      Urinalysis Basic - ( 13 Aug 2023 04:25 )    Color: x / Appearance: x / SG: x / pH: x  Gluc: 124 mg/dL / Ketone: x  / Bili: x / Urobili: x   Blood: x / Protein: x / Nitrite: x   Leuk Esterase: x / RBC: x / WBC x   Sq Epi: x / Non Sq Epi: x / Bacteria: x              Culture - Blood (collected 11 Aug 2023 12:22)  Source: .Blood None  Preliminary Report (12 Aug 2023 23:01):    No growth at 24 hours    Culture - Blood (collected 11 Aug 2023 12:22)  Source: .Blood None  Preliminary Report (12 Aug 2023 23:01):    No growth at 24 hours                PHYSICAL EXAM:   General: looking well ,  HEENT:  moist mucous membranes, no scleral icterus  Neurology: A&Ox3  Respiratory: wheezing and crackles in right middle lung on ascultation, left lung clear  CV: bradycardic, normal sinus, no murmurs appreciated  Abdominal: Soft,  Extremities: +1 pitting edema in b/l LEs, b/l lymphadenopathy in LEs, venous stasis dermatitis on b/l LEs, LLE is warmer and more erythematous in RLE, +1 peripheral pulses in b/l LEs

## 2023-08-13 NOTE — PROGRESS NOTE ADULT - ASSESSMENT
77F ex-smoker PMHx HTN, HLD, h/o Graves s/p Irradiation now Hypothyroid on synthroid, h/o gallstones s/p ERCP 1 month ago, PAD s/p multiple b/l LE stents on ASA/Plavix and was recently started on Xarelto x1 week ago after RLE stent placement presenting to ED from urgent care for symptomatic bradycardia.    ASSESSMENT:  Junctional Bradycardia with sinus arrest; now sinus rhythm with bradycardia  Acute on Chronic CHF  THEODORA on CKD; Hyperkalemia  LLE Cellulitis; received bactrim as outpatient   HO PAD s/p multiple LE stents b/l (most recent 1x week ago)  HO Hypothyroidism 2/2 Graves s/p Irradiation  HO Gallstones s/p ERCP (1 month ago)  HO HTN  HO HLD    PLAN:    CNS: avoid sedatives    HEENT: oral care    PULMONARY:  - Lifetime smoker quit 9 months ago  - CXR shows mild vascular congestion , hold lasix as no overload.  - stats she uses a nebulizing treatment that was recently started for her wheezing  - Dounebs q6hrs PRN for wheezing  - aspiration precautions , HOB elevation     CARDIOVASCULAR:  - EKG junctional bob now sinus bradycardia , HR improved on dopamine, reversible causes addressed (hyperK improving, THEODORA resolved, atenolol on hold for 2 days),   - held Dopamine and HR is stable    - LE stents b/l on ASA/Plavix/Xarelto at home  - c/w ASA and Plavix, hold Xarelto  - Trops -ve x1  - BNP 2159 (previously 871)  - ECHO showed EF OF 65% with G1 Diastolic dysfunction and sclerotic aortic v. , hold diuretics   - Strict I&Os, daily weight  - hold home  atenolol for bradycardia and lisinopril for hyperkalemia   - EP following, possible PPM   - follows Dr. Stanley o/p    GI:  - h/o Gallstones s/p ERCP x1 month ago, benign abd exam      RENAL:  - Hyperkalemia 6.6 on admission , now 5.3.   -Trending K and Cr   - THEODORA on CKD - improving   - holding home lisinopril  monitor lytes, correct PRN    INFECTIOUS DISEASE:  - LLE cellulitis  - recently finished 15x days of Bactrim DS PO o/p  - no fevers, wbc wnl  - off ABx    HEMATOLOGICAL:  - normocytic anemia, hgb 8.5  previously 10  - c/w home ASA and Plavix  - monitor H&H  - active type and screen      ENDOCRINE:  - h/o graves s/p thyroid irradiation  - given elevated TSH 5.7, increase home synthroid from 150mcg to 175 mcg daily  - TSH = 5.7 ( August 11 2023 ) , T4 = 6.7 ( August 11 2023 )    MUSCULOSKELETAL: Bedrest, fall precautions    GOC: Full Code   77F ex-smoker PMHx HTN, HLD, h/o Graves s/p Irradiation now Hypothyroid on synthroid, h/o gallstones s/p ERCP 1 month ago, PAD s/p multiple b/l LE stents on ASA/Plavix and was recently started on Xarelto x1 week ago after RLE stent placement presenting to ED from urgent care for symptomatic bradycardia.    ASSESSMENT:  Junctional Bradycardia with sinus arrest; now sinus rhythm with bradycardia  Acute on Chronic CHF  THEODORA on CKD; worsening  Hyperkalemia; improved   LLE Cellulitis; received bactrim as outpatient   HO PAD s/p multiple LE stents b/l (most recent 1x week ago)  HO Hypothyroidism 2/2 Graves s/p Irradiation  HO Gallstones s/p ERCP (1 month ago)  HO HTN  HO HLD    PLAN:    CNS: avoid sedatives    HEENT: oral care    PULMONARY:  - Lifetime smoker quit 9 months ago  - CXR shows mild vascular congestion, hold lasix  - stats she uses a nebulizing treatment that was recently started for her wheezing  - Dounebs q6hrs PRN for wheezing  - aspiration precautions , HOB elevation     CARDIOVASCULAR:  - EKG junctional bob now sinus bradycardia, HR improved on dopamine  - Continue to hold atenolol  - Continue to hold dopamine; monitor HR  - LE stents b/l on ASA/Plavix/Xarelto at home. c/w ASA and Plavix, hold Xarelto  - BNP 2159 (previously 871)  - ECHO showed EF OF 65% with G1 Diastolic dysfunction and sclerotic aortic v., hold diuretics   - Strict I&Os, daily weight  - hold home atenolol for bradycardia and lisinopril for hyperkalemia   - EP recommendations appreciated   - follows Dr. Stanley o/p    GI:  - h/o Gallstones s/p ERCP x1 month ago, benign abd exam      RENAL:  - Hyperkalemia improving. Continue to monitor.   - Trending K and Cr     INFECTIOUS DISEASE:  - LLE cellulitis  - recently finished 15x days of Bactrim DS PO o/p  - no fevers, wbc wnl  - off ABx    HEMATOLOGICAL:  - normocytic anemia, hgb 8.5  previously 10  - c/w home ASA and Plavix  - monitor H&H  - active type and screen      ENDOCRINE:  - h/o graves s/p thyroid irradiation  - given elevated TSH 5.7, increase home synthroid from 150mcg to 175 mcg daily  - TSH = 5.7 ( August 11 2023 ) , T4 = 6.7 ( August 11 2023 )    MUSCULOSKELETAL: Bedrest, fall precautions    GOC: Full Code   77F ex-smoker PMHx HTN, HLD, h/o Graves s/p Irradiation now Hypothyroid on synthroid, h/o gallstones s/p ERCP 1 month ago, PAD s/p multiple b/l LE stents on ASA/Plavix and was recently started on Xarelto x1 week ago after RLE stent placement presenting to ED from urgent care for symptomatic bradycardia.    ASSESSMENT:  Junctional Bradycardia with sinus arrest; now sinus rhythm with bradycardia  Acute on Chronic CHF  THEODORA on CKD; worsening  Hyperkalemia; improved   LLE Cellulitis; received bactrim as outpatient   HO PAD s/p multiple LE stents b/l (most recent 1x week ago)  HO Hypothyroidism 2/2 Graves s/p Irradiation  HO Gallstones s/p ERCP (1 month ago)  HO HTN  HO HLD    PLAN:    CNS: avoid sedatives    HEENT: oral care    PULMONARY:  - Lifetime smoker quit 9 months ago  - CXR shows mild vascular congestion, hold lasix  - stats she uses a nebulizing treatment that was recently started for her wheezing  - Dounebs q6hrs PRN for wheezing  - aspiration precautions , HOB elevation     CARDIOVASCULAR:  - EKG junctional bob now sinus bradycardia, HR improved on dopamine  - Continue to hold atenolol  - Continue to hold dopamine; monitor HR  - LE stents b/l on ASA/Plavix/Xarelto at home. c/w ASA and Plavix, hold Xarelto  - BNP 2159 (previously 871)  - ECHO showed EF OF 65% with G1 Diastolic dysfunction and sclerotic aortic v., hold diuretics   - Strict I&Os, daily weight  - hold home atenolol for bradycardia and lisinopril for hyperkalemia   - EP recommendations appreciated   - follows Dr. Stanley o/p    GI:  - h/o Gallstones s/p ERCP x1 month ago, benign abd exam      RENAL:  - Hyperkalemia improving. Continue to monitor.   - Nephrology eval. NS @ 75 x 12 hrs  - Trending K and Cr     INFECTIOUS DISEASE:  - LLE cellulitis  - recently finished 15x days of Bactrim DS PO o/p  - no fevers, wbc wnl  - off ABx    HEMATOLOGICAL:  - normocytic anemia, hgb 8.5  previously 10  - c/w home ASA and Plavix  - monitor H&H  - active type and screen      ENDOCRINE:  - h/o graves s/p thyroid irradiation  - given elevated TSH 5.7, increase home synthroid from 150mcg to 175 mcg daily  - TSH = 5.7 ( August 11 2023 ) , T4 = 6.7 ( August 11 2023 )    MUSCULOSKELETAL: Bedrest, fall precautions    GOC: Full Code   77F ex-smoker PMHx HTN, HLD, h/o Graves s/p Irradiation now Hypothyroid on synthroid, h/o gallstones s/p ERCP 1 month ago, PAD s/p multiple b/l LE stents on ASA/Plavix and was recently started on Xarelto x1 week ago after RLE stent placement presenting to ED from urgent care for symptomatic bradycardia.    ASSESSMENT:    Junctional Bradycardia with sinus arrest; now sinus rhythm with bradycardia  Acute on Chronic CHF  THEODORA on CKD; worsening  Hyperkalemia; improved   LLE Cellulitis; received bactrim as outpatient   HO PAD s/p multiple LE stents b/l (most recent 1x week ago)  HO Hypothyroidism 2/2 Graves s/p Irradiation  HO Gallstones s/p ERCP (1 month ago)  HO HTN  HO HLD    PLAN:    CNS: avoid sedatives    HEENT: oral care    PULMONARY:  - Lifetime smoker quit 9 months ago  - CXR shows mild vascular congestion, hold lasix  - stats she uses a nebulizing treatment that was recently started for her wheezing  - Dounebs q6hrs PRN for wheezing  - aspiration precautions , HOB elevation     CARDIOVASCULAR:    - EKG junctional bob now sinus bradycardia, HR improved on dopamine  - Continue to hold atenolol  - Continue to hold dopamine; monitor HR  - LE stents b/l on ASA/Plavix/Xarelto at home. c/w ASA and Plavix, hold Xarelto  - BNP 2159 (previously 871)  - ECHO showed EF OF 65% with G1 Diastolic dysfunction and sclerotic aortic v., hold diuretics   - Strict I&Os, daily weight  - hold home atenolol for bradycardia and lisinopril for hyperkalemia   - EP recommendations appreciated   - follows Dr. Stanley o/p    GI:  - h/o Gallstones s/p ERCP x1 month ago, benign abd exam      RENAL:  - Hyperkalemia improving. Continue to monitor.   - Nephrology eval. NS @ 75 x 12 hrs  - Trending K and Cr     INFECTIOUS DISEASE:  - LLE cellulitis  - recently finished 15x days of Bactrim DS PO o/p  - no fevers, wbc wnl  - off ABx    HEMATOLOGICAL:  - normocytic anemia, hgb 8.5  previously 10  - c/w home ASA and Plavix  - monitor H&H  - active type and screen      ENDOCRINE:  - h/o graves s/p thyroid irradiation  - given elevated TSH 5.7, increase home synthroid from 150mcg to 175 mcg daily  - TSH = 5.7 ( August 11 2023 ) , T4 = 6.7 ( August 11 2023 )    MUSCULOSKELETAL: Bedrest, fall precautions    GOC: Full Code

## 2023-08-13 NOTE — CONSULT NOTE ADULT - ATTENDING COMMENTS
Sinus Node Dysfunction    Continues to be on Dopamine  Will continue to observe in Tele/CCU  Will DC dopamine if HR consistently > 60/min to assess response without dopamine  Rx of hypothyroidism as per primary team  If HR not better, will plan for PPM on Monday  If HR better without dopamine, will plan for [facility]
Pt seen and excamined  THEODORA/HyperK    K improved  Cr stable  plan as above

## 2023-08-13 NOTE — CHART NOTE - NSCHARTNOTEFT_GEN_A_CORE
Electrophysiology PA Note     tele reveiwe  [facility] on discharge  Please notify EP when discharge planning starts, morning of discharge day  [phone]

## 2023-08-14 ENCOUNTER — TRANSCRIPTION ENCOUNTER (OUTPATIENT)
Age: 78
End: 2023-08-14

## 2023-08-14 VITALS
DIASTOLIC BLOOD PRESSURE: 79 MMHG | SYSTOLIC BLOOD PRESSURE: 167 MMHG | OXYGEN SATURATION: 99 % | RESPIRATION RATE: 24 BRPM | TEMPERATURE: 98 F

## 2023-08-14 LAB
ALBUMIN SERPL ELPH-MCNC: 3.5 G/DL — SIGNIFICANT CHANGE UP (ref 3.5–5.2)
ALP SERPL-CCNC: 108 U/L — SIGNIFICANT CHANGE UP (ref 30–115)
ALT FLD-CCNC: 12 U/L — SIGNIFICANT CHANGE UP (ref 0–41)
ANION GAP SERPL CALC-SCNC: 11 MMOL/L — SIGNIFICANT CHANGE UP (ref 7–14)
AST SERPL-CCNC: 11 U/L — SIGNIFICANT CHANGE UP (ref 0–41)
BASOPHILS # BLD AUTO: 0.06 K/UL — SIGNIFICANT CHANGE UP (ref 0–0.2)
BASOPHILS NFR BLD AUTO: 0.8 % — SIGNIFICANT CHANGE UP (ref 0–1)
BILIRUB SERPL-MCNC: <0.2 MG/DL — SIGNIFICANT CHANGE UP (ref 0.2–1.2)
BUN SERPL-MCNC: 53 MG/DL — HIGH (ref 10–20)
CALCIUM SERPL-MCNC: 8.4 MG/DL — SIGNIFICANT CHANGE UP (ref 8.4–10.5)
CHLORIDE SERPL-SCNC: 103 MMOL/L — SIGNIFICANT CHANGE UP (ref 98–110)
CO2 SERPL-SCNC: 19 MMOL/L — SIGNIFICANT CHANGE UP (ref 17–32)
CREAT SERPL-MCNC: 1.3 MG/DL — SIGNIFICANT CHANGE UP (ref 0.7–1.5)
EGFR: 43 ML/MIN/1.73M2 — LOW
EOSINOPHIL # BLD AUTO: 0.37 K/UL — SIGNIFICANT CHANGE UP (ref 0–0.7)
EOSINOPHIL NFR BLD AUTO: 4.9 % — SIGNIFICANT CHANGE UP (ref 0–8)
GLUCOSE SERPL-MCNC: 122 MG/DL — HIGH (ref 70–99)
HCT VFR BLD CALC: 26.5 % — LOW (ref 37–47)
HGB BLD-MCNC: 8.3 G/DL — LOW (ref 12–16)
IMM GRANULOCYTES NFR BLD AUTO: 0.8 % — HIGH (ref 0.1–0.3)
LYMPHOCYTES # BLD AUTO: 2.16 K/UL — SIGNIFICANT CHANGE UP (ref 1.2–3.4)
LYMPHOCYTES # BLD AUTO: 28.6 % — SIGNIFICANT CHANGE UP (ref 20.5–51.1)
MAGNESIUM SERPL-MCNC: 2.5 MG/DL — HIGH (ref 1.8–2.4)
MCHC RBC-ENTMCNC: 26.4 PG — LOW (ref 27–31)
MCHC RBC-ENTMCNC: 31.3 G/DL — LOW (ref 32–37)
MCV RBC AUTO: 84.4 FL — SIGNIFICANT CHANGE UP (ref 81–99)
MONOCYTES # BLD AUTO: 0.63 K/UL — HIGH (ref 0.1–0.6)
MONOCYTES NFR BLD AUTO: 8.3 % — SIGNIFICANT CHANGE UP (ref 1.7–9.3)
NEUTROPHILS # BLD AUTO: 4.28 K/UL — SIGNIFICANT CHANGE UP (ref 1.4–6.5)
NEUTROPHILS NFR BLD AUTO: 56.6 % — SIGNIFICANT CHANGE UP (ref 42.2–75.2)
NRBC # BLD: 0 /100 WBCS — SIGNIFICANT CHANGE UP (ref 0–0)
PLATELET # BLD AUTO: 318 K/UL — SIGNIFICANT CHANGE UP (ref 130–400)
PMV BLD: 9.5 FL — SIGNIFICANT CHANGE UP (ref 7.4–10.4)
POTASSIUM SERPL-MCNC: 5 MMOL/L — SIGNIFICANT CHANGE UP (ref 3.5–5)
POTASSIUM SERPL-SCNC: 5 MMOL/L — SIGNIFICANT CHANGE UP (ref 3.5–5)
PROT SERPL-MCNC: 5.6 G/DL — LOW (ref 6–8)
RBC # BLD: 3.14 M/UL — LOW (ref 4.2–5.4)
RBC # FLD: 14.9 % — HIGH (ref 11.5–14.5)
SODIUM SERPL-SCNC: 133 MMOL/L — LOW (ref 135–146)
WBC # BLD: 7.56 K/UL — SIGNIFICANT CHANGE UP (ref 4.8–10.8)
WBC # FLD AUTO: 7.56 K/UL — SIGNIFICANT CHANGE UP (ref 4.8–10.8)

## 2023-08-14 PROCEDURE — 99233 SBSQ HOSP IP/OBS HIGH 50: CPT

## 2023-08-14 PROCEDURE — 93010 ELECTROCARDIOGRAM REPORT: CPT

## 2023-08-14 PROCEDURE — 71045 X-RAY EXAM CHEST 1 VIEW: CPT | Mod: 26

## 2023-08-14 RX ORDER — LEVOTHYROXINE SODIUM 125 MCG
1 TABLET ORAL
Qty: 30 | Refills: 0
Start: 2023-08-14 | End: 2023-09-12

## 2023-08-14 RX ORDER — RIVAROXABAN 15 MG-20MG
1 KIT ORAL
Qty: 30 | Refills: 0
Start: 2023-08-14

## 2023-08-14 RX ORDER — HEPARIN SODIUM 5000 [USP'U]/ML
5000 INJECTION INTRAVENOUS; SUBCUTANEOUS EVERY 12 HOURS
Refills: 0 | Status: DISCONTINUED | OUTPATIENT
Start: 2023-08-14 | End: 2023-08-14

## 2023-08-14 RX ORDER — NIFEDIPINE 30 MG
60 TABLET, EXTENDED RELEASE 24 HR ORAL DAILY
Refills: 0 | Status: DISCONTINUED | OUTPATIENT
Start: 2023-08-14 | End: 2023-08-14

## 2023-08-14 RX ORDER — FUROSEMIDE 40 MG
40 TABLET ORAL DAILY
Refills: 0 | Status: DISCONTINUED | OUTPATIENT
Start: 2023-08-14 | End: 2023-08-14

## 2023-08-14 RX ORDER — LEVOTHYROXINE SODIUM 125 MCG
1 TABLET ORAL
Qty: 0 | Refills: 0 | DISCHARGE

## 2023-08-14 RX ORDER — RIVAROXABAN 15 MG-20MG
2.5 KIT ORAL
Refills: 0 | Status: DISCONTINUED | OUTPATIENT
Start: 2023-08-14 | End: 2023-08-14

## 2023-08-14 RX ADMIN — SODIUM ZIRCONIUM CYCLOSILICATE 10 GRAM(S): 10 POWDER, FOR SUSPENSION ORAL at 05:09

## 2023-08-14 RX ADMIN — HEPARIN SODIUM 5000 UNIT(S): 5000 INJECTION INTRAVENOUS; SUBCUTANEOUS at 06:13

## 2023-08-14 RX ADMIN — Medication 60 MILLIGRAM(S): at 11:09

## 2023-08-14 RX ADMIN — CLOPIDOGREL BISULFATE 75 MILLIGRAM(S): 75 TABLET, FILM COATED ORAL at 11:09

## 2023-08-14 RX ADMIN — Medication 81 MILLIGRAM(S): at 11:09

## 2023-08-14 RX ADMIN — CHLORHEXIDINE GLUCONATE 1 APPLICATION(S): 213 SOLUTION TOPICAL at 05:12

## 2023-08-14 RX ADMIN — Medication 175 MICROGRAM(S): at 05:09

## 2023-08-14 RX ADMIN — Medication 40 MILLIGRAM(S): at 11:09

## 2023-08-14 NOTE — DIETITIAN INITIAL EVALUATION ADULT - NAME AND PHONE
Brandee [phone] or TEAMS    Nutrition Interventions: meals, snacks, coordination of care; Nutrition Monitoring: Diet order, PO intake, weights, labs, NFPF, body composition, BM and tolerance to diet order

## 2023-08-14 NOTE — DIETITIAN INITIAL EVALUATION ADULT - EDUCATION DIETARY MODIFICATIONS
Discussed importance of PO intake, current diet order and medical nutrition supplements if appetite declines./(1) partially meets; needs review/practice/verbalization

## 2023-08-14 NOTE — DIETITIAN INITIAL EVALUATION ADULT - PERTINENT MEDS FT
MEDICATIONS  (STANDING):  aspirin  chewable 81 milliGRAM(s) Oral daily  chlorhexidine 2% Cloths 1 Application(s) Topical <User Schedule>  clopidogrel Tablet 75 milliGRAM(s) Oral daily  furosemide    Tablet 40 milliGRAM(s) Oral daily  levothyroxine 175 MICROGram(s) Oral daily  NIFEdipine XL 60 milliGRAM(s) Oral daily  rivaroxaban 2.5 milliGRAM(s) Oral two times a day  sodium chloride 0.9%. 1000 milliLiter(s) (75 mL/Hr) IV Continuous <Continuous>  sodium zirconium cyclosilicate 10 Gram(s) Oral two times a day    MEDICATIONS  (PRN):  albuterol/ipratropium for Nebulization 3 milliLiter(s) Nebulizer every 6 hours PRN Bronchospasm

## 2023-08-14 NOTE — PHYSICAL THERAPY INITIAL EVALUATION ADULT - GAIT DEVIATIONS NOTED, PT EVAL
Normal rate, regular rhythm.  Heart sounds S1, S2.  No murmurs, rubs or gallops.
decreased malika/decreased step length/decreased stride length

## 2023-08-14 NOTE — PHYSICAL THERAPY INITIAL EVALUATION ADULT - GENERAL OBSERVATIONS, REHAB EVAL
11:20-11:55. chart reviewed. Pt received sitting at B/S chair, alert, oriented, able to follow multi-step instructions and agreeable to PT evaluation.  + IV, + monitoring, on RA, HR  t/o session + halter. NAD.

## 2023-08-14 NOTE — DISCHARGE NOTE PROVIDER - CARE PROVIDER_API CALL
Akash Aaron  Internal Medicine  [address] [place] [address]  Phone: [phone]  Fax: [phone]  Established Patient  Follow Up Time: 1 week    Reinaldo Stanley  Interventional Cardiology  [address] [place] [address]  Phone: [phone]  Fax: [phone]  Follow Up Time: 2 weeks    Soren Rodney)  Hematology; Internal Medicine; Medical Oncology  [address] [place] [address]  Phone: [phone]  Fax: [phone]  Follow Up Time: 2 weeks   Akash Aaron  Internal Medicine  [address] [place] [address]  Phone: [phone]  Fax: [phone]  Established Patient  Follow Up Time: 1 week    Reinaldo Stanley  Interventional Cardiology  [address] [place] [address]  Phone: [phone]  Fax: [phone]  Follow Up Time: 2 weeks    Soren Rodney)  Hematology; Internal Medicine; Medical Oncology  [address] [place] [address]  Phone: [phone]  Fax: [phone]  Follow Up Time: 2 weeks    Renny Aguilar  Cardiac Electrophysiology  [address] [place] [address]  Phone: [phone]  Fax: [phone]  Follow Up Time: 1 month

## 2023-08-14 NOTE — DISCHARGE NOTE PROVIDER - NSDCCPCAREPLAN_GEN_ALL_CORE_FT
PRINCIPAL DISCHARGE DIAGNOSIS  Diagnosis: Bradycardia  Assessment and Plan of Treatment:   Bradycardia is a slow heart rate. You were found to be bradycardic during this hospital admission. A slow heart rate can be normal and healthy. Or it could be a sign of a problem with the heart's electrical system. If your heart beats too slowly, it may not supply your body with enough blood. This can make you weak or dizzy. Or it may make you pass out.  We also needed to incrase your thyroid medication this visit. This could have contributed to your low heart rate and the feeling of being tired more than usual. Please continue to take your medication as prescribed.      SECONDARY DISCHARGE DIAGNOSES  Diagnosis: SOB (shortness of breath)  Assessment and Plan of Treatment:      PRINCIPAL DISCHARGE DIAGNOSIS  Diagnosis: Bradycardia  Assessment and Plan of Treatment: Bradycardia is a slow heart rate. You were found to be bradycardic during this hospital admission. A slow heart rate can be normal and healthy. Or it could be a sign of a problem with the heart's electrical system. If your heart beats too slowly, it may not supply your body with enough blood. This can make you weak or dizzy. Or it may make you pass out.  We also needed to incrase your thyroid medication this visit. This could have contributed to your low heart rate and the feeling of being tired more than usual. Please continue to take your medication as prescribed.  You will need to follow up with the electrophysiologist as OP in 1 month      SECONDARY DISCHARGE DIAGNOSES  Diagnosis: Anemia of chronic disease  Assessment and Plan of Treatment: You have anemia and a chronically low blood hemoglobin and you have to follow up with a hematologist.    Diagnosis: Hypothyroid  Assessment and Plan of Treatment: Your TSH was 5.7, we have increased your levothyroxine dose to 175 mcg  Please follow up with your primary care physician for repeat blood tests

## 2023-08-14 NOTE — PROGRESS NOTE ADULT - ASSESSMENT
77F ex-smoker PMHx HTN, HLD, h/o Graves s/p Irradiation now Hypothyroid on synthroid, h/o gallstones s/p ERCP 1 month ago, PAD s/p multiple b/l LE stents on ASA/Plavix and was recently started on Xarelto x1 week ago after RLE stent placement presenting to ED from urgent care for symptomatic bradycardia.    ASSESSMENT:    Junctional Bradycardia with sinus arrest; now sinus rhythm with bradycardia  Acute on Chronic CHF  THEODORA on CKD; worsening  Hyperkalemia; improved   LLE Cellulitis; received bactrim as outpatient   HO PAD s/p multiple LE stents b/l (most recent 1x week ago)  HO Hypothyroidism 2/2 Graves s/p Irradiation  HO Gallstones s/p ERCP (1 month ago)  HO HTN  HO HLD    PLAN:    CNS: avoid sedatives    HEENT: oral care    PULMONARY:  - Lifetime smoker quit 9 months ago  - CXR shows mild vascular congestion, hold lasix  - stats she uses a nebulizing treatment that was recently started for her wheezing  - Dounebs q6hrs PRN for wheezing  - aspiration precautions , HOB elevation     CARDIOVASCULAR:    - EKG junctional bob now sinus bradycardia, HR improved on dopamine  - Continue to hold atenolol  - Continue to hold dopamine; monitor HR  - LE stents b/l on ASA/Plavix/Xarelto at home. c/w ASA and Plavix, hold Xarelto  - BNP 2159 (previously 871)  - ECHO showed EF OF 65% with G1 Diastolic dysfunction and sclerotic aortic v., hold diuretics   - Strict I&Os, daily weight  - hold home atenolol for bradycardia and lisinopril for hyperkalemia   - EP recommendations appreciated   - follows Dr. Stanley o/p    GI:  - h/o Gallstones s/p ERCP x1 month ago, benign abd exam      RENAL:  - Hyperkalemia improving. Continue to monitor.   - Nephrology eval. NS @ 76 x 12 hrs  - Trending K and Cr   -THEODORA resolved    INFECTIOUS DISEASE:  - LLE cellulitis  - recently finished 15x days of Bactrim DS PO o/p  - no fevers, wbc wnl  - off ABx    HEMATOLOGICAL:  - normocytic anemia, hgb 8.5  previously 10  - c/w home ASA and Plavix  - monitor H&H  - active type and screen  -F/U Heme/Onc out pt      ENDOCRINE:  - h/o graves s/p thyroid irradiation  - given elevated TSH 5.7, increase home synthroid from 150mcg to 175 mcg daily  - TSH = 5.7 ( August 11 2023 ) , T4 = 6.7 ( August 11 2023 )    MUSCULOSKELETAL: Bedrest, fall precautions    GOC: Full Code

## 2023-08-14 NOTE — DISCHARGE NOTE PROVIDER - NSDCFUSCHEDAPPT_GEN_ALL_CORE_FT
Franklin Torres  [facility]  GASTRO Doc Off [id number]  Scheduled Appointment: 08/16/2023

## 2023-08-14 NOTE — PROGRESS NOTE ADULT - SUBJECTIVE AND OBJECTIVE BOX
Nephrology progress note    THIS IS AN INCOMPLETE NOTE . FULL NOTE TO FOLLOW SHORTLY    Patient is seen and examined, events over the last 24 h noted .    Allergies:  codeine (Stomach Upset; Vomiting; Nausea)    Hospital Medications:   MEDICATIONS  (STANDING):  aspirin  chewable 81 milliGRAM(s) Oral daily  chlorhexidine 2% Cloths 1 Application(s) Topical <User Schedule>  clopidogrel Tablet 75 milliGRAM(s) Oral daily  heparin   Injectable 5000 Unit(s) SubCutaneous every 12 hours  levothyroxine 175 MICROGram(s) Oral daily  sodium chloride 0.9%. 1000 milliLiter(s) (75 mL/Hr) IV Continuous <Continuous>  sodium zirconium cyclosilicate 10 Gram(s) Oral two times a day        VITALS:  T(F): 98.2 (08-14-23 @ 07:33), Max: 98.2 (08-14-23 @ 07:33)  HR: 81 (08-14-23 @ 08:00)  BP: 164/67 (08-14-23 @ 07:33)  RR: 17 (08-14-23 @ 06:00)  SpO2: 99% (08-14-23 @ 08:00)  Wt(kg): --    08-12 @ 07:01  -  08-13 @ 07:00  --------------------------------------------------------  IN: 150 mL / OUT: 1300 mL / NET: -1150 mL    08-13 @ 07:01  -  08-14 @ 07:00  --------------------------------------------------------  IN: 2820 mL / OUT: 1950 mL / NET: 870 mL    08-14 @ 07:01  -  08-14 @ 09:13  --------------------------------------------------------  IN: 360 mL / OUT: 0 mL / NET: 360 mL          PHYSICAL EXAM:  Constitutional: NAD  HEENT: anicteric sclera, oropharynx clear, MMM  Neck: No JVD  Respiratory: CTAB, no wheezes, rales or rhonchi  Cardiovascular: S1, S2, RRR  Gastrointestinal: BS+, soft, NT/ND  Extremities: No cyanosis or clubbing. No peripheral edema  :  No dunn.   Skin: No rashes    LABS:  08-14    133<L>  |  103  |  53<H>  ----------------------------<  122<H>  5.0   |  19  |  1.3    Creatinine Trend: 1.3<--, 2.0<--, 1.4<--, 1.5<--, 1.6<--, 1.6<--    Ca    8.4      14 Aug 2023 04:25  Mg     2.5     08-14    TPro  5.6<L>  /  Alb  3.5  /  TBili  <0.2  /  DBili      /  AST  11  /  ALT  12  /  AlkPhos  108  08-14                          8.3    7.56  )-----------( 318      ( 14 Aug 2023 04:25 )             26.5       Urine Studies:  Urinalysis Basic - ( 14 Aug 2023 04:25 )    Color:  / Appearance:  / SG:  / pH:   Gluc: 122 mg/dL / Ketone:   / Bili:  / Urobili:    Blood:  / Protein:  / Nitrite:    Leuk Esterase:  / RBC:  / WBC    Sq Epi:  / Non Sq Epi:  / Bacteria:       Sodium, Random Urine: 27.0 mmoL/L (08-13 @ 17:20)  Creatinine, Random Urine: 103 mg/dL (08-13 @ 17:20)  Osmolality, Random Urine: 531 mos/kg (08-13 @ 17:20)      Iron 37, TIBC 335, %sat 11      [08-11-23 @ 06:35]  Ferritin 55      [08-11-23 @ 06:35]  TSH 5.69      [08-11-23 @ 06:35]  Lipid: chol 228, , HDL 47, LDL --      [08-11-23 @ 06:35]    HBsAg Nonreact      [02-04-19 @ 07:03]  HCV 0.08, Nonreact      [02-04-19 @ 07:03]        RADIOLOGY & ADDITIONAL STUDIES:   Nephrology progress note    Patient is seen and examined, events over the last 24 h noted .  Lying in bed comfortable     Allergies:  codeine (Stomach Upset; Vomiting; Nausea)    Hospital Medications:   MEDICATIONS  (STANDING):  aspirin  chewable 81 milliGRAM(s) Oral daily  clopidogrel Tablet 75 milliGRAM(s) Oral daily  heparin   Injectable 5000 Unit(s) SubCutaneous every 12 hours  levothyroxine 175 MICROGram(s) Oral daily  sodium chloride 0.9%. 1000 milliLiter(s) (75 mL/Hr) IV Continuous <Continuous>  sodium zirconium cyclosilicate 10 Gram(s) Oral two times a day        VITALS:  T(F): 98.2 (08-14-23 @ 07:33), Max: 98.2 (08-14-23 @ 07:33)  HR: 81 (08-14-23 @ 08:00)  BP: 164/67 (08-14-23 @ 07:33)  RR: 17 (08-14-23 @ 06:00)  SpO2: 99% (08-14-23 @ 08:00)      08-12 @ 07:01  -  08-13 @ 07:00  --------------------------------------------------------  IN: 150 mL / OUT: 1300 mL / NET: -1150 mL    08-13 @ 07:01  -  08-14 @ 07:00  --------------------------------------------------------  IN: 2820 mL / OUT: 1950 mL / NET: 870 mL    08-14 @ 07:01  -  08-14 @ 09:13  --------------------------------------------------------  IN: 360 mL / OUT: 0 mL / NET: 360 mL          PHYSICAL EXAM:  Constitutional: NAD  Respiratory: CTAB,   Cardiovascular: S1, S2, RRR  Gastrointestinal: BS+, soft, NT/ND  Extremities: No cyanosis or clubbing. No peripheral edema  :  No dunn.   Skin: No rashes    LABS:  08-14    133<L>  |  103  |  53<H>  ----------------------------<  122<H>  5.0   |  19  |  1.3    Creatinine Trend: 1.3<--, 2.0<--, 1.4<--, 1.5<--, 1.6<--, 1.6<--    Ca    8.4      14 Aug 2023 04:25  Mg     2.5     08-14    TPro  5.6<L>  /  Alb  3.5  /  TBili  <0.2  /  DBili      /  AST  11  /  ALT  12  /  AlkPhos  108  08-14                          8.3    7.56  )-----------( 318      ( 14 Aug 2023 04:25 )             26.5       Urine Studies:  Urinalysis Basic - ( 14 Aug 2023 04:25 )    Color:  / Appearance:  / SG:  / pH:   Gluc: 122 mg/dL / Ketone:   / Bili:  / Urobili:    Blood:  / Protein:  / Nitrite:    Leuk Esterase:  / RBC:  / WBC    Sq Epi:  / Non Sq Epi:  / Bacteria:       Sodium, Random Urine: 27.0 mmoL/L (08-13 @ 17:20)  Creatinine, Random Urine: 103 mg/dL (08-13 @ 17:20)  Osmolality, Random Urine: 531 mos/kg (08-13 @ 17:20)      Iron 37, TIBC 335, %sat 11      [08-11-23 @ 06:35]  Ferritin 55      [08-11-23 @ 06:35]  TSH 5.69      [08-11-23 @ 06:35]  Lipid: chol 228, , HDL 47, LDL --      [08-11-23 @ 06:35]    HBsAg Nonreact      [02-04-19 @ 07:03]  HCV 0.08, Nonreact      [02-04-19 @ 07:03]        RADIOLOGY & ADDITIONAL STUDIES:  < from: US Kidney and Bladder (08.13.23 @ 21:54) >  IMPRESSION:    No hydronephrosis or renal stone. Small right renal cyst. Mildly   distended urinary bladder.    < end of copied text >

## 2023-08-14 NOTE — DISCHARGE NOTE PROVIDER - NSDCMRMEDTOKEN_GEN_ALL_CORE_FT
aspirin 81 mg oral tablet, chewable: 1 tab(s) orally once a day  atenolol 25 mg oral tablet: 1 tab(s) orally once a day   furosemide 40 mg oral tablet: 1 tab(s) orally once a day  levothyroxine 175 mcg (0.175 mg) oral tablet: 1 tab(s) orally once a day  lisinopril 20 mg oral tablet: 1 tab(s) orally once a day  NIFEdipine 60 mg oral tablet, extended release: 1 tab(s) orally once a day  Plavix 75 mg oral tablet: 1 tab(s) orally once a day, to be held five days before ERCP)   aspirin 81 mg oral tablet, chewable: 1 tab(s) orally once a day  furosemide 40 mg oral tablet: 1 tab(s) orally once a day  levothyroxine 175 mcg (0.175 mg) oral tablet: 1 tab(s) orally once a day  NIFEdipine 60 mg oral tablet, extended release: 1 tab(s) orally once a day  Plavix 75 mg oral tablet: 1 tab(s) orally once a day, to be held five days before ERCP)  Xarelto 2.5 mg oral tablet: 1 tab(s) orally 2 times a day

## 2023-08-14 NOTE — DIETITIAN INITIAL EVALUATION ADULT - ORAL INTAKE PTA/DIET HISTORY
Pt reports good appetite prior to admit; prefers small frequent meals. Denies taking vitamin or mineral supplements. Denies drinking protein shake supplements. NKFA; denies any restrictive cultural or Cheondoism food preferences. No chewing or swallowing difficulties reported.

## 2023-08-14 NOTE — PROGRESS NOTE ADULT - REASON FOR ADMISSION
Dyspnea on Exertion and Lightheadedness

## 2023-08-14 NOTE — DISCHARGE NOTE PROVIDER - PROVIDER TOKENS
PROVIDER:[TOKEN:[[id number]],FOLLOWUP:[1 week],ESTABLISHEDPATIENT:[T]],PROVIDER:[TOKEN:[[id number]],FOLLOWUP:[2 weeks]],PROVIDER:[TOKEN:[[id number]],FOLLOWUP:[2 weeks]] PROVIDER:[TOKEN:[[id number]],FOLLOWUP:[1 week],ESTABLISHEDPATIENT:[T]],PROVIDER:[TOKEN:[[id number]],FOLLOWUP:[2 weeks]],PROVIDER:[TOKEN:[[id number]],FOLLOWUP:[2 weeks]],PROVIDER:[TOKEN:[[id number]],FOLLOWUP:[1 month]]

## 2023-08-14 NOTE — PROGRESS NOTE ADULT - ASSESSMENT
77F ex-smoker PMHx HTN, HLD, h/o Graves s/p Irradiation now Hypothyroid on synthroid, h/o gallstones s/p ERCP 1 month ago, PAD s/p multiple b/l LE stents on ASA/Plavix and was recently started on Xarelto x1 week ago after RLE stent placement presenting to ED from urgent care for symptomatic bradycardia. The patient stated that she had a course of 3 weeks of bactrim which she stopped 4 days back for cellulitis of the left lower leg.     THEODORA and hyperkalemia  from ATN from bactrim vs hypotensive ATN   - The patient was on bactrim for 2 weeks before admission   - creat at baseline now   - can resume ACE inh and follow K as OP / can resume CCB   - on DC lokelma 5 g po q12h   - US kidney and bladder noted no hydro/ simple cyst   - Avoid nephrotoxic medications       will sign off recall PRN / call using TEAMS or on [phone]

## 2023-08-14 NOTE — DIETITIAN INITIAL EVALUATION ADULT - PERTINENT LABORATORY DATA
08-14    133<L>  |  103  |  53<H>  ----------------------------<  122<H>  5.0   |  19  |  1.3    Ca    8.4      14 Aug 2023 04:25  Mg     2.5     08-14    TPro  5.6<L>  /  Alb  3.5  /  TBili  <0.2  /  DBili  x   /  AST  11  /  ALT  12  /  AlkPhos  108  08-14  A1C with Estimated Average Glucose Result: 6.2 % (08-11-23 @ 06:35)  A1C with Estimated Average Glucose Result: 6.5 % (01-18-23 @ 08:01)

## 2023-08-14 NOTE — DIETITIAN INITIAL EVALUATION ADULT - OTHER INFO
Pertinent Medical Information: Per H&P, pt is a 76 y/o female with PMHx of HTN, HLD, h/o Graves s/p Irradiation now Hypothyroid on synthroid, h/o gallstones s/p ERCP 1 month ago, PAD s/p multiple b/l LE stents on ASA/Plavix and was recently started on Xarelto x1 week ago after RLE stent placement presenting to ED from urgent care for symptomatic bradycardia.     Pertinent Subjective Information: Pt reports good appetite; consuming >75% of meals provided in-house. No chewing or swallowing difficulties noted. No nausea or vomiting reported.     Weight hx: #/100 KG -- checked 1 month ago per pt; wt has been stable. Current dosing weight is 102.5 KG; 2.4% unintentional weight gain in 1 month compared to current dosing weight. Pt does not meet weight criteria for malnutrition at this time.

## 2023-08-14 NOTE — PHYSICAL THERAPY INITIAL EVALUATION ADULT - GAIT TRAINING, PT EVAL
Pt will ambulate using RW or least restrictive AD for 300 ft with S/U by discharge to facilitate return to PLOF. Pt will negotiate 3 steps using 1 HR under supervision

## 2023-08-14 NOTE — PHYSICAL THERAPY INITIAL EVALUATION ADULT - PERTINENT HX OF CURRENT PROBLEM, REHAB EVAL
77F ex-smoker PMHx HTN, HLD, h/o Graves s/p Irradiation now Hypothyroid on synthroid, h/o gallstones s/p ERCP 1 month ago, PAD s/p multiple b/l LE stents on ASA/Plavix and was recently started on Xarelto x1 week ago after RLE stent placement presenting to ED from urgent care for symptomatic bradycardia. EKG done showed junctional bradycardia. HR improved on a dopamine drip and the patient was back in sinus bradycardia HR in 50s.

## 2023-08-14 NOTE — DIETITIAN INITIAL EVALUATION ADULT - OTHER CALCULATIONS
Using ABW: ENERGY: 0572-2593 kcal/day (MSJ 1520.93* 1SF); PROTEIN:  g/day (1.5-2g/kg); FLUID: 1mL/kcal -- with consideration for age, BMI, critical care setting, edema

## 2023-08-14 NOTE — PHYSICAL THERAPY INITIAL EVALUATION ADULT - ADDITIONAL COMMENTS
Pt resides with  in 1 st floor of a private house using 3 steps to enter form the side. Pt used to be independent with overall functional mobility, able to ambulate without AD, using cane for community ambulation for safety

## 2023-08-14 NOTE — PROGRESS NOTE ADULT - SUBJECTIVE AND OBJECTIVE BOX
JAKY RODRIGUEZ, 77y, Female; MRN# [id number]  Hospital Stay: 3d.    Patient is a 77y old Female who presents with a chief complaint of Dyspnea on Exertion and Lightheadedness (14 Aug 2023 09:12)  Currently admitted to the ICU with the primary diagnosis of Bradycardia      SUBJECTIVE:  Interval/Overnight events:     REVIEW OF SYSTEMS:  As per HPI  OBJECTIVE:  VITALS:  T(F): 98.2 (08-14-23 @ 07:33), Max: 98.2 (08-14-23 @ 07:33)  HR: 81 (08-14-23 @ 08:00) (64 - 81)  BP: 164/67 (08-14-23 @ 07:33) (100/56 - 165/68)  ABP: --  ABP(mean): --  RR: 17 (08-14-23 @ 06:00) (14 - 22)  SpO2: 99% (08-14-23 @ 08:00) (95% - 99%)    Vent Settings    Blood Gas    Drips  sodium chloride 0.9%. 1000 milliLiter(s) (75 mL/Hr) IV Continuous <Continuous>    I&Os:    08-13-23 @ 07:01  -  08-14-23 @ 07:00  --------------------------------------------------------  IN: 2820 mL / OUT: 1950 mL / NET: 870 mL    08-14-23 @ 07:01  -  08-14-23 @ 10:26  --------------------------------------------------------  IN: 360 mL / OUT: 0 mL / NET: 360 mL      PHYSICAL EXAM:    ACTIVE MEDICATIONS:  Standing  aspirin  chewable 81 milliGRAM(s) Oral daily  chlorhexidine 2% Cloths 1 Application(s) Topical <User Schedule>  clopidogrel Tablet 75 milliGRAM(s) Oral daily  furosemide    Tablet 40 milliGRAM(s) Oral daily  levothyroxine 175 MICROGram(s) Oral daily  NIFEdipine XL 60 milliGRAM(s) Oral daily  rivaroxaban 2.5 milliGRAM(s) Oral two times a day  sodium chloride 0.9%. 1000 milliLiter(s) (75 mL/Hr) IV Continuous <Continuous>  sodium zirconium cyclosilicate 10 Gram(s) Oral two times a day    PRN  albuterol/ipratropium for Nebulization 3 milliLiter(s) Nebulizer every 6 hours PRN    LABS:  08-14-23 @ 04:25  WBC 7.56, H/H 8.3<L>/26.5<L>, plt 318  Na 133<L>, K 5.0, Cl 103, CO2 19, BUN 53<H>, Cr 1.3, Glu 122<H>    Ca 8.4, Mg 2.5<H>, Phosphorus --    Tot Protein 5.6<L>, Alb 3.5, T. Bili <0.2, D. Bili --  AST 11, ALT 12, Alk phos 108        08-11-23 @ 12:22:  CRP 31.4<H>, ESR TNP, Procal --, Ferritin --, Fungitell --, D-dimer --  08-11-23 @ 06:35:  CRP --, ESR --, Procal 0.12<H>, Ferritin 55, Fungitell --, D-dimer --    08-11-23 @ 06:35:  TSH 5.69<H>      08-11-23 @ 06:35:  Hgb A1c 6.2<H>% | Mean plasma glucose 131<H>    08-11-23 @ 06:35:  Total Chol 228<H> | Non-<H> | HDL 47<L>  LDL-Direct -- | LDL-Calc 135<H> | <H>    08-11-23 @ 06:35:  Troponin T <0.01, BNP --  08-11-23 @ 00:53:  Troponin T <0.01, BNP --    CULTURES:    Culture - Blood (collected 08-11-23 @ 12:22)  Source: .Blood None  Preliminary Report (08-13-23 @ 23:02):    No growth at 48 Hours    Culture - Blood (collected 08-11-23 @ 12:22)  Source: .Blood None  Preliminary Report (08-13-23 @ 23:02):    No growth at 48 Hours      PENDING:    IMAGING:  Latest imaging reviewed.    ECHO:  TTE Echo Complete w/o Contrast w/ Doppler:   Transport: Portable  Monitor: w/ Monitor (08-11-23 @ 02:34)

## 2023-08-14 NOTE — CDI QUERY NOTE - NSCDIOTHERTXTBX_GEN_ALL_CORE_HH
Based on your professional judgment and the clinical indicators, please clarify if acute on chronic CHF can be further specified as:    - Acute on chronic CHF with preserved ejection fraction  - Other (please specify):  - Clinically unable to determine        CLINICAL INDICATORS    8/11 H&P Adult: Acute on chronic CHF … Imaging: CXR shows mild vascular congestion b/l, no acute cardiopulmonary pathology; Physical exam: Wheezing and crackles in right middle lung on auscultation; s/p Lasix IV x 1; 1.5L fluid restriction    8/14 Discharge Note Provider: Echo showed EF of 65% with G1 diastolic dysfunction and sclerotic aortic valve, hold diuretics … BNP 2159 (previously 871)    Labs:   8/11: Pro-BNP 2159    Echo:   8/11 TTE: EF 65-70%. Mild Grade 1 diastolic dysfunction;     Orders:  H&P: Home meds: Lisinopril 20mg; Atenolol 25mg; Furosemide 40mg – once daily  8/11: Lasix 20mg IVP x1 STAT; Lasix 40mg IVP x 1 STAT;   8/11 – 8/12: Lasix 40mg IVP daily  8/14: Procardia       Thank you,  Neena GARCIA, RN, BSN  [phone]

## 2023-08-14 NOTE — DISCHARGE NOTE PROVIDER - CARE PROVIDERS DIRECT ADDRESSES
,DirectAddress_Unknown,DirectAddress_Unknown,[email or web address] ,DirectAddress_Unknown,DirectAddress_Unknown,[email or web address],[email or web address]

## 2023-08-14 NOTE — DISCHARGE NOTE PROVIDER - HOSPITAL COURSE
77F ex-smoker PMHx HTN, HLD, h/o Graves s/p Irradiation now Hypothyroid on synthroid, h/o gallstones s/p ERCP 1 month ago, PAD s/p multiple b/l LE stents on ASA/Plavix and was recently started on Xarelto x1 week ago after RLE stent placement presenting to ED from urgent care for symptomatic bradycardia.    We did an EKG showing junctional bradycardio. HR imporved on dopamine drip. PT was given      EKG junctional bob now sinus bradycardia, HR improved on dopamine  - Continue to hold atenolol  - Continue to hold dopamine; monitor HR  - LE stents b/l on ASA/Plavix/Xarelto at home. c/w ASA and Plavix, hold Xarelto  - BNP 2159 (previously 871)  - ECHO showed EF OF 65% with G1 Diastolic dysfunction and sclerotic aortic v., hold diuretics   - Strict I&Os, daily weight  - hold home atenolol for bradycardia and lisinopril for hyperkalemia   - EP recommendations appreciated   - follows Dr. Stanley o/p      Junctional Bradycardia with sinus arrest; now sinus rhythm with bradycardia  Acute on Chronic CHF  THEODORA on CKD; worsening  Hyperkalemia; improved   LLE Cellulitis; received bactrim as outpatient   HO PAD s/p multiple LE stents b/l (most recent 1x week ago)  HO Hypothyroidism 2/2 Graves s/p Irradiation  HO Gallstones s/p ERCP (1 month ago)  HO HTN  HO HLD    PLAN:    CNS: avoid sedatives    HEENT: oral care    PULMONARY:  - Lifetime smoker quit 9 months ago  - CXR shows mild vascular congestion, hold lasix  - stats she uses a nebulizing treatment that was recently started for her wheezing  - Dounebs q6hrs PRN for wheezing  - aspiration precautions , HOB elevation     CARDIOVASCULAR:    - EKG junctional bob now sinus bradycardia, HR improved on dopamine  - Continue to hold atenolol  - Continue to hold dopamine; monitor HR  - LE stents b/l on ASA/Plavix/Xarelto at home. c/w ASA and Plavix, hold Xarelto  - BNP 2159 (previously 871)  - ECHO showed EF OF 65% with G1 Diastolic dysfunction and sclerotic aortic v., hold diuretics   - Strict I&Os, daily weight  - hold home atenolol for bradycardia and lisinopril for hyperkalemia   - EP recommendations appreciated   - follows Dr. Stanley o/p    GI:  - h/o Gallstones s/p ERCP x1 month ago, benign abd exam      RENAL:  - Hyperkalemia improving. Continue to monitor.   - Nephrology eval. NS @ 75 x 12 hrs  - Trending K and Cr   -THEODORA resolved    INFECTIOUS DISEASE:  - LLE cellulitis  - recently finished 15x days of Bactrim DS PO o/p  - no fevers, wbc wnl  - off ABx    HEMATOLOGICAL:  - normocytic anemia, hgb 8.5  previously 10  - c/w home ASA and Plavix  - monitor H&H  - active type and screen  -F/U Heme/Onc out pt      ENDOCRINE:  - h/o graves s/p thyroid irradiation  - given elevated TSH 5.7, increase home synthroid from 150mcg to 175 mcg daily  - TSH = 5.7 ( August 11 2023 ) , T4 = 6.7 ( August 11 2023 )    MUSCULOSKELETAL: Bedrest, fall precautions    GOC: Full Code     77F ex-smoker PMHx HTN, HLD, h/o Graves s/p Irradiation now Hypothyroid on synthroid, h/o gallstones s/p ERCP 1 month ago, PAD s/p multiple b/l LE stents on ASA/Plavix and was recently started on Xarelto x1 week ago after RLE stent placement presenting to ED from urgent care for symptomatic bradycardia.    We did an EKG showing junctional bradycardia. HR improved on dopamine drip to a sinus rhythem.      EKG junctional bob now sinus bradycardia, HR improved on dopamine  - Continue to hold atenolol  - Continue to hold dopamine; monitor HR  - LE stents b/l on ASA/Plavix/Xarelto at home. c/w ASA and Plavix, hold Xarelto  - BNP 2159 (previously 871)  - ECHO showed EF OF 65% with G1 Diastolic dysfunction and sclerotic aortic v., hold diuretics   - Strict I&Os, daily weight  - hold home atenolol for bradycardia and lisinopril for hyperkalemia   - EP recommendations appreciated   - follows Dr. Stanley o/p      Junctional Bradycardia with sinus arrest; now sinus rhythm with bradycardia  Acute on Chronic CHF  THEODORA on CKD; worsening  Hyperkalemia; improved   LLE Cellulitis; received bactrim as outpatient   HO PAD s/p multiple LE stents b/l (most recent 1x week ago)  HO Hypothyroidism 2/2 Graves s/p Irradiation  HO Gallstones s/p ERCP (1 month ago)  HO HTN  HO HLD    PLAN:    CNS: avoid sedatives    HEENT: oral care    PULMONARY:  - Lifetime smoker quit 9 months ago  - CXR shows mild vascular congestion, hold lasix  - stats she uses a nebulizing treatment that was recently started for her wheezing  - Dounebs q6hrs PRN for wheezing  - aspiration precautions , HOB elevation     CARDIOVASCULAR:    - EKG junctional bob now sinus bradycardia, HR improved on dopamine  - Continue to hold atenolol  - Continue to hold dopamine; monitor HR  - LE stents b/l on ASA/Plavix/Xarelto at home. c/w ASA and Plavix, hold Xarelto  - BNP 2159 (previously 871)  - ECHO showed EF OF 65% with G1 Diastolic dysfunction and sclerotic aortic v., hold diuretics   - Strict I&Os, daily weight  - hold home atenolol for bradycardia and lisinopril for hyperkalemia   - EP recommendations appreciated   - follows Dr. Stanley o/p    GI:  - h/o Gallstones s/p ERCP x1 month ago, benign abd exam      RENAL:  - Hyperkalemia improving. Continue to monitor.   - Nephrology eval. NS @ 75 x 12 hrs  - Trending K and Cr   -THEODORA resolved    INFECTIOUS DISEASE:  - LLE cellulitis  - recently finished 15x days of Bactrim DS PO o/p  - no fevers, wbc wnl  - off ABx    HEMATOLOGICAL:  - normocytic anemia, hgb 8.5  previously 10  - c/w home ASA and Plavix  - monitor H&H  - active type and screen  -F/U Heme/Onc out pt      ENDOCRINE:  - h/o graves s/p thyroid irradiation  - given elevated TSH 5.7, increase home synthroid from 150mcg to 175 mcg daily  - TSH = 5.7 ( August 11 2023 ) , T4 = 6.7 ( August 11 2023 )    MUSCULOSKELETAL: Bedrest, fall precautions    GOC: Full Code     77F ex-smoker PMHx HTN, HLD, h/o Graves s/p Irradiation now Hypothyroid on synthroid, h/o gallstones s/p ERCP 1 month ago, PAD s/p multiple b/l LE stents on ASA/Plavix and was recently started on Xarelto x1 week ago after RLE stent placement presenting to ED from urgent care for symptomatic bradycardia. EKG done showed junctional bradycardia. HR improved on a dopamine drip and the patient was back in sinus bradycardia HR in 50s.    EKG junctional bob now sinus bradycardia, HR improved on dopamine  - Atenolol will be discontinued   - Continue to hold dopamine; monitor HR  - LE stents b/l on ASA/Plavix/Xarelto at home. c/w ASA and Plavix, hold Xarelto  - BNP 2159 (previously 871)  - ECHO showed EF OF 65% with G1 Diastolic dysfunction and sclerotic aortic v., hold diuretics   - Strict I&Os, daily weight  - hold home atenolol for bradycardia and lisinopril for hyperkalemia   - EP recommendations appreciated   - follows Dr. Stanley o/p      Junctional Bradycardia with sinus arrest; now sinus rhythm with bradycardia  Acute on Chronic CHF  THEODORA on CKD; worsening  Hyperkalemia; improved   LLE Cellulitis; received bactrim as outpatient   HO PAD s/p multiple LE stents b/l (most recent 1x week ago)  HO Hypothyroidism 2/2 Graves s/p Irradiation  HO Gallstones s/p ERCP (1 month ago)  HO HTN  HO HLD    PLAN:    CNS: avoid sedatives    HEENT: oral care    PULMONARY:  - Lifetime smoker quit 9 months ago  - CXR shows mild vascular congestion, hold lasix  - stats she uses a nebulizing treatment that was recently started for her wheezing  - Dounebs q6hrs PRN for wheezing  - aspiration precautions , HOB elevation     CARDIOVASCULAR:    - EKG junctional bob now sinus bradycardia, HR improved on dopamine  - Continue to hold atenolol  - Continue to hold dopamine; monitor HR  - LE stents b/l on ASA/Plavix/Xarelto at home. c/w ASA and Plavix, hold Xarelto  - BNP 2159 (previously 871)  - ECHO showed EF OF 65% with G1 Diastolic dysfunction and sclerotic aortic v., hold diuretics   - Strict I&Os, daily weight  - hold home atenolol for bradycardia and lisinopril for hyperkalemia   - EP recommendations appreciated   - follows Dr. Stanley o/p    GI:  - h/o Gallstones s/p ERCP x1 month ago, benign abd exam      RENAL:  - Hyperkalemia improving. Continue to monitor.   - Nephrology eval. NS @ 75 x 12 hrs  - Trending K and Cr   -THEODORA resolved    INFECTIOUS DISEASE:  - LLE cellulitis  - recently finished 15x days of Bactrim DS PO o/p  - no fevers, wbc wnl  - off ABx    HEMATOLOGICAL:  - normocytic anemia, hgb 8.5  previously 10  - c/w home ASA and Plavix  - monitor H&H  - active type and screen  -F/U Heme/Onc out pt      ENDOCRINE:  - h/o graves s/p thyroid irradiation  - given elevated TSH 5.7, increase home synthroid from 150mcg to 175 mcg daily  - TSH = 5.7 ( August 11 2023 ) , T4 = 6.7 ( August 11 2023 )    MUSCULOSKELETAL: Bedrest, fall precautions    GOC: Full Code     77F ex-smoker PMHx HTN, HLD, h/o Graves s/p Irradiation now Hypothyroid on synthroid, h/o gallstones s/p ERCP 1 month ago, PAD s/p multiple b/l LE stents on ASA/Plavix and was recently started on Xarelto x1 week ago after RLE stent placement presenting to ED from urgent care for symptomatic bradycardia. EKG done showed junctional bradycardia. HR improved on a dopamine drip and the patient was back in sinus bradycardia HR in 50s.    EKG junctional bob now sinus bradycardia, HR improved on dopamine  - Atenolol was discontinued on admission and will NOT be resumed on discharge  - Dopamine drip was discontinued on 8/12 with patient remaining in sinus bob  - ECHO showed EF OF 65% with G1 Diastolic dysfunction and sclerotic aortic v., hold diuretics   - EP evaluated the patient and as the patient is back in sinus bob, she will be discharged on an MCOT and follow up with Ep as OP  - LE stents b/l on ASA/Plavix/Xarelto at home. c/w ASA and Plavix, and resume xarelto on dc  - BNP 2159 (previously 871)    THEODORA on CKD  Hyperkalemia- resolved  - Lisinopril was discontinued on admission as Cr was above baseline and patient was hyperkalemic  - Patient received lokelma and repeat K was normal   - Patient received lasix as she was volume overloaded  - Creatinine peaked at 2 and trended down to 1.3 (baseline 1.1)    HTN/HLD  - Patient continued on nifedipine 60 mg   - Lisinopril withheld   - Fu with PCP in 1 weeks  - Patient was on a statin but was dc 7 months ago because she had statin induced myopathy    Normocytic Anemia  - Hb 8.3   - Iron and TIBC were normal while %sat 11%  - F/u with a Hematologist    HO Hypothyroidism 2/2 Graves s/p Irradiation  - patient is on levothyroxine 150 mcg at home, TSH 5.7  - Levothyroxine was increased to 175 mcg daily  - f/u with PCP (Dr. Aaron) for repeat TSH in 6 weeks    HO Gallstones s/p ERCP (1 month ago)  - f/u with GI as OP

## 2023-08-14 NOTE — DISCHARGE NOTE NURSING/CASE MANAGEMENT/SOCIAL WORK - PATIENT PORTAL LINK FT
You can access the FollowMyHealth Patient Portal offered by [facility] by registering at the following website: [email or web address]. By joining [facility] FollowMyHealth portal, you will also be able to view your health information using other applications (apps) compatible with our system.

## 2023-08-14 NOTE — DIETITIAN INITIAL EVALUATION ADULT - ADD RECOMMEND
1. Continue with current diet order; if appetite declines, recommend Ensure Plus HP (350 kcal, 20g pro per carton)  2. Encourage PO intake and assist during meals    Pt is at moderate nutrition risk; will f/u in 5-7 days or prn.

## 2023-08-14 NOTE — DISCHARGE NOTE NURSING/CASE MANAGEMENT/SOCIAL WORK - NSDCPEFALRISK_GEN_ALL_CORE
For information on Fall & Injury Prevention, visit: [email or web address] OR  [email or web address] OR  https://www.cdc.gov/steadi/patient.html

## 2023-08-15 PROBLEM — Z87.891 FORMER SMOKER: Status: ACTIVE | Noted: 2023-08-15

## 2023-08-15 PROBLEM — Z78.9 CAFFEINE USE: Status: ACTIVE | Noted: 2023-08-15

## 2023-08-15 RX ORDER — ATENOLOL 25 MG/1
25 TABLET ORAL
Refills: 0 | Status: DISCONTINUED | COMMUNITY
Start: 2023-01-26 | End: 2023-08-15

## 2023-08-15 RX ORDER — LISINOPRIL 20 MG/1
20 TABLET ORAL
Refills: 0 | Status: DISCONTINUED | COMMUNITY
Start: 2023-01-26 | End: 2023-08-15

## 2023-08-15 RX ORDER — LEVOTHYROXINE SODIUM 0.15 MG/1
150 TABLET ORAL
Refills: 0 | Status: DISCONTINUED | COMMUNITY
Start: 2023-01-26 | End: 2023-08-15

## 2023-08-16 ENCOUNTER — APPOINTMENT (OUTPATIENT)
Dept: GASTROENTEROLOGY | Facility: CLINIC | Age: 78
End: 2023-08-16
Payer: MEDICARE

## 2023-08-16 DIAGNOSIS — Z78.9 OTHER SPECIFIED HEALTH STATUS: ICD-10-CM

## 2023-08-16 DIAGNOSIS — Z87.891 PERSONAL HISTORY OF NICOTINE DEPENDENCE: ICD-10-CM

## 2023-08-16 LAB
CULTURE RESULTS: SIGNIFICANT CHANGE UP
CULTURE RESULTS: SIGNIFICANT CHANGE UP
SPECIMEN SOURCE: SIGNIFICANT CHANGE UP
SPECIMEN SOURCE: SIGNIFICANT CHANGE UP

## 2023-08-16 PROCEDURE — 99443: CPT | Mod: 95

## 2023-08-17 ENCOUNTER — NON-APPOINTMENT (OUTPATIENT)
Age: 78
End: 2023-08-17
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
## 2023-08-25 DIAGNOSIS — E87.5 HYPERKALEMIA: ICD-10-CM

## 2023-08-25 DIAGNOSIS — D63.8 ANEMIA IN OTHER CHRONIC DISEASES CLASSIFIED ELSEWHERE: ICD-10-CM

## 2023-08-25 DIAGNOSIS — Z79.02 LONG TERM (CURRENT) USE OF ANTITHROMBOTICS/ANTIPLATELETS: ICD-10-CM

## 2023-08-25 DIAGNOSIS — Z79.890 HORMONE REPLACEMENT THERAPY: ICD-10-CM

## 2023-08-25 DIAGNOSIS — I95.9 HYPOTENSION, UNSPECIFIED: ICD-10-CM

## 2023-08-25 DIAGNOSIS — Z95.820 PERIPHERAL VASCULAR ANGIOPLASTY STATUS WITH IMPLANTS AND GRAFTS: ICD-10-CM

## 2023-08-25 DIAGNOSIS — Z88.5 ALLERGY STATUS TO NARCOTIC AGENT: ICD-10-CM

## 2023-08-25 DIAGNOSIS — E78.5 HYPERLIPIDEMIA, UNSPECIFIED: ICD-10-CM
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
## 2023-08-25 DIAGNOSIS — I50.33 ACUTE ON CHRONIC DIASTOLIC (CONGESTIVE) HEART FAILURE: ICD-10-CM

## 2023-08-25 DIAGNOSIS — I35.8 OTHER NONRHEUMATIC AORTIC VALVE DISORDERS: ICD-10-CM

## 2023-08-25 DIAGNOSIS — I73.9 PERIPHERAL VASCULAR DISEASE, UNSPECIFIED: ICD-10-CM

## 2023-08-25 DIAGNOSIS — Z79.01 LONG TERM (CURRENT) USE OF ANTICOAGULANTS: ICD-10-CM

## 2023-08-25 DIAGNOSIS — Z79.82 LONG TERM (CURRENT) USE OF ASPIRIN: ICD-10-CM

## 2023-08-25 DIAGNOSIS — I45.5 OTHER SPECIFIED HEART BLOCK: ICD-10-CM

## 2023-08-25 DIAGNOSIS — E89.0 POSTPROCEDURAL HYPOTHYROIDISM: ICD-10-CM

## 2023-08-25 DIAGNOSIS — N18.9 CHRONIC KIDNEY DISEASE, UNSPECIFIED: ICD-10-CM

## 2023-08-25 DIAGNOSIS — Z87.891 PERSONAL HISTORY OF NICOTINE DEPENDENCE: ICD-10-CM

## 2023-08-25 DIAGNOSIS — R00.1 BRADYCARDIA, UNSPECIFIED: ICD-10-CM
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
## 2023-08-25 DIAGNOSIS — I13.0 HYPERTENSIVE HEART AND CHRONIC KIDNEY DISEASE WITH HEART FAILURE AND STAGE 1 THROUGH STAGE 4 CHRONIC KIDNEY DISEASE, OR UNSPECIFIED CHRONIC KIDNEY DISEASE: ICD-10-CM

## 2023-08-25 DIAGNOSIS — N28.1 CYST OF KIDNEY, ACQUIRED: ICD-10-CM

## 2023-08-25 DIAGNOSIS — N17.0 ACUTE KIDNEY FAILURE WITH TUBULAR NECROSIS: ICD-10-CM

## 2023-08-29 NOTE — ASSESSMENT
[FreeTextEntry1] : Patient is a 77-year-old female with past medical history of hypothyroid and was initially seen by the advanced GI team in the hospital for abnormal liver function studies. Patient had ERCP done with stone extraction and procedure was notable for a circumferential adenoma around the papilla. Patient had ampullectomy done and repeat ERCP Feb 13th 2020. 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 Patient seen by Dr Marcus and Endoscopic management preferred by patient despite that it might not be definitive.  Patient had repeat ERCP done March 2023 which was notable for Tubular adenoma without dysplasia. She had a repet ERCP done last month; the stent was removed and replaced with another plastic stent in the CBD. Multiple filling defects were noted on the cholangiogram consistent with large stones. sphincterotomy was extended, then sphincteroplasty was performed. Stones were extracted with biliary stone extraction balloon, FIT balloon, and Tetra basket.   Ampullary Adenoma/Choledocholithiasis - Discussed ERCP with her from 7/21/23 - She had stones seen so additional ERCP needed  - Will plan additional ERCP in November for stent removal and possible EHL of residual stones; she will need to discuss with her Cardiologist, Dr. Saleh regarding clearance - Hold Plavix 5 days before - F/U after ERCP is done

## 2023-08-29 NOTE — HISTORY OF PRESENT ILLNESS
[FreeTextEntry1] : Patient is a 77-year-old female with past medical history of hypothyroid and was initially seen by the advanced GI team in the hospital for abnormal liver function studies. Patient had ERCP done with stone extraction and procedure was notable for a circumferential adenoma around the papilla. Patient had ampullectomy done and repeat ERCP Feb 13th 2020. 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 Patient seen by Dr Marcus and Endoscopic management preferred by patient despite that it might not be definitive.  Patient had repeat ERCP done March 2023 which was notable for Tubular adenoma without dysplasia. She had a repet ERCP done last month; the stent was removed and replaced with another plastic stent in the CBD. Multiple filling defects were noted on the cholangiogram consistent with large stones. sphincterotomy was extended, then sphincteroplasty was performed. Stones were extracted with biliary stone extraction balloon, FIT balloon, and Tetra basket.  [de-identified] : 7/21/23

## 2023-08-29 NOTE — REASON FOR VISIT
[Home] : at home, [unfilled] , at the time of the visit. [Medical Office: ([facility])___] : at the medical office located in  [Verbal consent obtained from patient] : the patient, [unfilled] [Post-op/Procedure: _________] : a [unfilled] post-op/procedure visit [FreeTextEntry4] : Olya

## 2023-09-07 NOTE — H&P ADULT - NSICDXPASTMEDICALHX_GEN_ALL_CORE_FT
Eye Shield Used: No PAST MEDICAL HISTORY:  Arterial insufficiency of lower extremity left leg stent placed    H/O Graves' disease     High cholesterol     HTN (hypertension)     Hypothyroid     Leg swelling     Peripheral arterial disease

## 2023-09-28 ENCOUNTER — APPOINTMENT (OUTPATIENT)
Dept: ELECTROPHYSIOLOGY | Facility: CLINIC | Age: 78
End: 2023-09-28
Payer: MEDICARE

## 2023-09-28 VITALS
WEIGHT: 220 LBS | TEMPERATURE: 98 F | HEART RATE: 66 BPM | HEIGHT: 65 IN | BODY MASS INDEX: 36.65 KG/M2 | SYSTOLIC BLOOD PRESSURE: 169 MMHG | DIASTOLIC BLOOD PRESSURE: 78 MMHG

## 2023-09-28 DIAGNOSIS — I47.29 OTHER VENTRICULAR TACHYCARDIA: ICD-10-CM

## 2023-09-28 PROCEDURE — 99214 OFFICE O/P EST MOD 30 MIN: CPT

## 2023-09-28 PROCEDURE — 93000 ELECTROCARDIOGRAM COMPLETE: CPT | Mod: 59

## 2023-10-16 ENCOUNTER — APPOINTMENT (OUTPATIENT)
Dept: HEMATOLOGY ONCOLOGY | Facility: CLINIC | Age: 78
End: 2023-10-16
Payer: MEDICARE

## 2023-10-16 ENCOUNTER — LABORATORY RESULT (OUTPATIENT)
Age: 78
End: 2023-10-16

## 2023-10-16 ENCOUNTER — OUTPATIENT (OUTPATIENT)
Dept: OUTPATIENT SERVICES | Facility: HOSPITAL | Age: 78
LOS: 1 days | End: 2023-10-16
Payer: MEDICARE

## 2023-10-16 VITALS
RESPIRATION RATE: 16 BRPM | DIASTOLIC BLOOD PRESSURE: 74 MMHG | SYSTOLIC BLOOD PRESSURE: 170 MMHG | HEIGHT: 65 IN | WEIGHT: 222 LBS | BODY MASS INDEX: 36.99 KG/M2 | HEART RATE: 64 BPM | TEMPERATURE: 98.6 F

## 2023-10-16 DIAGNOSIS — Z90.710 ACQUIRED ABSENCE OF BOTH CERVIX AND UTERUS: Chronic | ICD-10-CM

## 2023-10-16 DIAGNOSIS — Z90.49 ACQUIRED ABSENCE OF OTHER SPECIFIED PARTS OF DIGESTIVE TRACT: Chronic | ICD-10-CM

## 2023-10-16 DIAGNOSIS — Z98.890 OTHER SPECIFIED POSTPROCEDURAL STATES: Chronic | ICD-10-CM

## 2023-10-16 DIAGNOSIS — D64.9 ANEMIA, UNSPECIFIED: ICD-10-CM

## 2023-10-16 DIAGNOSIS — Z86.018 PERSONAL HISTORY OF OTHER BENIGN NEOPLASM: ICD-10-CM

## 2023-10-16 DIAGNOSIS — E03.2 HYPOTHYROIDISM DUE TO MEDICAMENTS AND OTHER EXOGENOUS SUBSTANCES: ICD-10-CM

## 2023-10-16 DIAGNOSIS — Z80.3 FAMILY HISTORY OF MALIGNANT NEOPLASM OF BREAST: ICD-10-CM

## 2023-10-16 DIAGNOSIS — I87.2 VENOUS INSUFFICIENCY (CHRONIC) (PERIPHERAL): ICD-10-CM

## 2023-10-16 LAB
HCT VFR BLD CALC: 33.3 %
HGB BLD-MCNC: 10.1 G/DL
MCHC RBC-ENTMCNC: 24.8 PG
MCHC RBC-ENTMCNC: 30.3 G/DL
MCV RBC AUTO: 81.8 FL
PLATELET # BLD AUTO: 342 K/UL
PMV BLD: 9.8 FL
RBC # BLD: 4.07 M/UL
RBC # FLD: 16 %
RETICS # AUTO: 2.4 %
RETICS AGGREG/RBC NFR: 98.1 K/UL
WBC # FLD AUTO: 9.78 K/UL

## 2023-10-16 PROCEDURE — 99204 OFFICE O/P NEW MOD 45 MIN: CPT

## 2023-10-16 PROCEDURE — 86334 IMMUNOFIX E-PHORESIS SERUM: CPT

## 2023-10-16 PROCEDURE — 85027 COMPLETE CBC AUTOMATED: CPT

## 2023-10-16 PROCEDURE — 86431 RHEUMATOID FACTOR QUANT: CPT

## 2023-10-16 PROCEDURE — 83615 LACTATE (LD) (LDH) ENZYME: CPT

## 2023-10-16 PROCEDURE — 83521 IG LIGHT CHAINS FREE EACH: CPT | Mod: 59

## 2023-10-16 PROCEDURE — 84155 ASSAY OF PROTEIN SERUM: CPT

## 2023-10-16 PROCEDURE — 86038 ANTINUCLEAR ANTIBODIES: CPT

## 2023-10-16 PROCEDURE — 80053 COMPREHEN METABOLIC PANEL: CPT

## 2023-10-16 PROCEDURE — 85046 RETICYTE/HGB CONCENTRATE: CPT

## 2023-10-16 PROCEDURE — 84165 PROTEIN E-PHORESIS SERUM: CPT

## 2023-10-17 DIAGNOSIS — D64.9 ANEMIA, UNSPECIFIED: ICD-10-CM

## 2023-10-17 PROBLEM — Z86.018: Status: RESOLVED | Noted: 2019-05-06 | Resolved: 2023-10-17

## 2023-10-17 LAB
ALBUMIN SERPL ELPH-MCNC: 4.2 G/DL
ALP BLD-CCNC: 138 U/L
ALT SERPL-CCNC: 12 U/L
ANION GAP SERPL CALC-SCNC: 13 MMOL/L
AST SERPL-CCNC: 14 U/L
BILIRUB SERPL-MCNC: 0.3 MG/DL
BUN SERPL-MCNC: 29 MG/DL
CALCIUM SERPL-MCNC: 9.3 MG/DL
CHLORIDE SERPL-SCNC: 105 MMOL/L
CO2 SERPL-SCNC: 22 MMOL/L
CREAT SERPL-MCNC: 1.1 MG/DL
DEPRECATED KAPPA LC FREE/LAMBDA SER: 2.48 RATIO
EGFR: 52 ML/MIN/1.73M2
GLUCOSE SERPL-MCNC: 155 MG/DL
KAPPA LC CSF-MCNC: 1.89 MG/DL
KAPPA LC SERPL-MCNC: 4.69 MG/DL
LDH SERPL-CCNC: 121
POTASSIUM SERPL-SCNC: 4.5 MMOL/L
PROT SERPL-MCNC: 7.3 G/DL
RHEUMATOID FACT SER QL: <10 IU/ML
SODIUM SERPL-SCNC: 140 MMOL/L

## 2023-10-19 LAB
ALBUMIN MFR SERPL ELPH: 54.5 %
ALBUMIN SERPL-MCNC: 3.9 G/DL
ALBUMIN/GLOB SERPL: 1.2 RATIO
ALPHA1 GLOB MFR SERPL ELPH: 6.1 %
ALPHA1 GLOB SERPL ELPH-MCNC: 0.4 G/DL
ALPHA2 GLOB MFR SERPL ELPH: 13 %
ALPHA2 GLOB SERPL ELPH-MCNC: 0.9 G/DL
ANA PAT FLD IF-IMP: ABNORMAL
ANA SER IF-ACNC: ABNORMAL
B-GLOBULIN MFR SERPL ELPH: 13.9 %
B-GLOBULIN SERPL ELPH-MCNC: 1 G/DL
GAMMA GLOB FLD ELPH-MCNC: 0.9 G/DL
GAMMA GLOB MFR SERPL ELPH: 12.5 %
INTERPRETATION SERPL IEP-IMP: NORMAL
M PROTEIN MFR SERPL ELPH: NORMAL
M PROTEIN SPEC IFE-MCNC: NORMAL
MONOCLON BAND OBS SERPL: NORMAL
PROT SERPL-MCNC: 7.2 G/DL
PROT SERPL-MCNC: 7.2 G/DL

## 2023-10-25 RX ORDER — ASPIRIN/CALCIUM CARB/MAGNESIUM 324 MG
1 TABLET ORAL
Qty: 0 | Refills: 0 | DISCHARGE

## 2023-11-08 VITALS — OXYGEN SATURATION: 97 % | SYSTOLIC BLOOD PRESSURE: 179 MMHG | HEART RATE: 77 BPM | DIASTOLIC BLOOD PRESSURE: 73 MMHG

## 2023-11-08 NOTE — H&P CARDIOLOGY - HISTORY OF PRESENT ILLNESS
77 y/o F, former smoker, with PMHx HTN, HLD (h/o statin induced myopathy), anemia (h/o epistaxis), CKD (THEODORA with peak Cr 2.0 on last admission in 8/2023, now back to baseline 1.1), h/o Graves s/p Irradiation-> now Hypothyroid on synthroid, h/o gallstones s/p ERCP 3 months ago, PAD s/p multiple b/l LE stents (on ASA/Plavix and Xarelto), with recent admission from 8/10/-8/14/23 for symptomatic bradycardia, ECG with junctional bradycardia, with improvement of HR on dopamine gtt, atenolol was discontinued, and pt was discharged home on an MCOT.  ECHO: EF 65% with G1 Diastolic dysfunction and sclerotic aortic valve.  MCOT revealed runs of NSVT.  Pt is now referred for LHC with possible intervention to r/o ischemic etiology.                77 y/o F, former smoker, with PMHx HTN, HLD (h/o statin induced myopathy), anemia (h/o epistaxis), CKD (THEODORA with peak Cr 2.0 on last admission in 8/2023, now back to baseline 1.1), h/o Graves s/p Irradiation-> now Hypothyroid on synthroid, h/o gallstones s/p ERCP 3 months ago, PAD s/p multiple b/l LE stents (on ASA/Plavix and Xarelto), with recent admission from 8/10/-8/14/23 for symptomatic bradycardia, ECG with junctional bradycardia, with improvement of HR on dopamine gtt, atenolol was discontinued, and pt was discharged home on an MCOT.  ECHO: EF 65% with G1 Diastolic dysfunction and sclerotic aortic valve.  MCOT revealed runs of NSVT. pt reports episodes of dizziness   Pt is now referred for LHC with possible intervention to r/o ischemic etiology.       Vital Signs Last 24 Hrs  T(C): 36.1 (10 Nov 2023 07:52), Max: 36.1 (10 Nov 2023 07:52)  T(F): 97 (10 Nov 2023 07:52), Max: 97 (10 Nov 2023 07:52)  HR: 74 (10 Nov 2023 07:52) (74 - 74)  BP: 179/74 (10 Nov 2023 07:52) (179/74 - 179/74)  BP(mean): --  RR: 18 (10 Nov 2023 07:52) (18 - 18)  SpO2: --        Pre cath note:  indication:  [ ] STEMI                [ ] NSTEMI                 [ ] Acute coronary syndrome                   [ ]Unstable Angina   [ ] high risk  [ ] intermediate risk  [ ] low risk                   [ x] Stable Angina     non-invasive testing:      NSVT on MCOT   10/23               Date:                     result: [ ] high risk  [ x] intermediate risk  [ ] low risk    Anti- Anginal medications:                    [ ] not used                       [x ] used (CCB, no BB d/t bob)                  [ ] not used but strong indication not to use    Ejection Fraction                   [ ] <29            [ ] 30-39%   [ ] 40-49%     [ x]>50%    CHF                   [ ] active (within last 14 days on meds   [ x] Chronic (on meds but no exacerbation)    COPD                   [ ] mild (on chronic bronchodilators)  [ ] moderate (on chronic steroid therapy)      [ ] severe (indication for home O2 or PACO2 >50)    Other risk factors:                     [ ] Previous MI                     [ ] CVA/ stroke                    [ ] carotid stent/ CEA                    [ ] PVD/PAD- (arterial aneurysm, non-palpable pulses, tortuous vessel with inability to insert catheter, infra-renal dissection, renal or subclavian artery stenosis)                    [ ] diabetic                    [ ] previous CABG                    [ ] Renal Failure       REVIEW OF SYSTEMS:  CONSTITUTIONAL: No fever, weight loss, or fatigue, intermittent episodes of dizziness  CARDIOLOGY: PAtient denies chest pain, shortness of breath or syncopal episodes.   RESPIRATORY: denies shortness of breath, wheezing  NEUROLOGICAL: NO weakness, no focal deficits to report.  GI: no BRBPR, no N,V,diarrhea.     PHYSICAL EXAM:  · CONSTITUTIONAL:	Well-developed, well nourished    ·RESPIRATORY:   airway patent; breath sounds equal; good air movement; respirations non-labored; clear to auscultation bilaterally; no chest wall tenderness; no intercostal retractions; no rales,rhonchi or wheeze  · CARDIOVASCULAR	regular rate and rhythm  no rub  no murmur  normal PMI  · EXTREMITIES: + pitting edema   · VASCULAR: 	Equal and normal pulses (carotid, femoral, dorsalis pedis)  	      EF: 65% 11/23  EKG: P  Bleeding Risk: 5.3%  RIGHT RADIAL ARTERY EVALUATION:  JUANITO TEST: [] Negative          [x] Positive   cc IV bolus x 1 over 1 hr precath hydration

## 2023-11-10 ENCOUNTER — OUTPATIENT (OUTPATIENT)
Dept: OUTPATIENT SERVICES | Facility: HOSPITAL | Age: 78
LOS: 1 days | Discharge: HOME | End: 2023-11-10
Payer: MEDICARE

## 2023-11-10 ENCOUNTER — TRANSCRIPTION ENCOUNTER (OUTPATIENT)
Age: 78
End: 2023-11-10

## 2023-11-10 VITALS
DIASTOLIC BLOOD PRESSURE: 59 MMHG | SYSTOLIC BLOOD PRESSURE: 160 MMHG | HEART RATE: 73 BPM | OXYGEN SATURATION: 99 % | RESPIRATION RATE: 18 BRPM

## 2023-11-10 DIAGNOSIS — I25.10 ATHEROSCLEROTIC HEART DISEASE OF NATIVE CORONARY ARTERY WITHOUT ANGINA PECTORIS: ICD-10-CM

## 2023-11-10 DIAGNOSIS — Z98.890 OTHER SPECIFIED POSTPROCEDURAL STATES: Chronic | ICD-10-CM

## 2023-11-10 DIAGNOSIS — E78.5 HYPERLIPIDEMIA, UNSPECIFIED: ICD-10-CM

## 2023-11-10 DIAGNOSIS — Z90.49 ACQUIRED ABSENCE OF OTHER SPECIFIED PARTS OF DIGESTIVE TRACT: Chronic | ICD-10-CM

## 2023-11-10 DIAGNOSIS — R06.02 SHORTNESS OF BREATH: ICD-10-CM

## 2023-11-10 DIAGNOSIS — Z90.710 ACQUIRED ABSENCE OF BOTH CERVIX AND UTERUS: Chronic | ICD-10-CM

## 2023-11-10 DIAGNOSIS — I10 ESSENTIAL (PRIMARY) HYPERTENSION: ICD-10-CM

## 2023-11-10 LAB
ANION GAP SERPL CALC-SCNC: 15 MMOL/L — HIGH (ref 7–14)
ANION GAP SERPL CALC-SCNC: 15 MMOL/L — HIGH (ref 7–14)
BUN SERPL-MCNC: 32 MG/DL — HIGH (ref 10–20)
BUN SERPL-MCNC: 32 MG/DL — HIGH (ref 10–20)
CALCIUM SERPL-MCNC: 10.5 MG/DL — HIGH (ref 8.4–10.4)
CALCIUM SERPL-MCNC: 10.5 MG/DL — HIGH (ref 8.4–10.4)
CHLORIDE SERPL-SCNC: 102 MMOL/L — SIGNIFICANT CHANGE UP (ref 98–110)
CHLORIDE SERPL-SCNC: 102 MMOL/L — SIGNIFICANT CHANGE UP (ref 98–110)
CO2 SERPL-SCNC: 22 MMOL/L — SIGNIFICANT CHANGE UP (ref 17–32)
CO2 SERPL-SCNC: 22 MMOL/L — SIGNIFICANT CHANGE UP (ref 17–32)
CREAT SERPL-MCNC: 1.1 MG/DL — SIGNIFICANT CHANGE UP (ref 0.7–1.5)
CREAT SERPL-MCNC: 1.1 MG/DL — SIGNIFICANT CHANGE UP (ref 0.7–1.5)
EGFR: 52 ML/MIN/1.73M2 — LOW
EGFR: 52 ML/MIN/1.73M2 — LOW
GLUCOSE SERPL-MCNC: 187 MG/DL — HIGH (ref 70–99)
GLUCOSE SERPL-MCNC: 187 MG/DL — HIGH (ref 70–99)
HCT VFR BLD CALC: 35.1 % — LOW (ref 37–47)
HCT VFR BLD CALC: 35.1 % — LOW (ref 37–47)
HGB BLD-MCNC: 10.6 G/DL — LOW (ref 12–16)
HGB BLD-MCNC: 10.6 G/DL — LOW (ref 12–16)
MCHC RBC-ENTMCNC: 24.4 PG — LOW (ref 27–31)
MCHC RBC-ENTMCNC: 24.4 PG — LOW (ref 27–31)
MCHC RBC-ENTMCNC: 30.2 G/DL — LOW (ref 32–37)
MCHC RBC-ENTMCNC: 30.2 G/DL — LOW (ref 32–37)
MCV RBC AUTO: 80.7 FL — LOW (ref 81–99)
MCV RBC AUTO: 80.7 FL — LOW (ref 81–99)
NRBC # BLD: 0 /100 WBCS — SIGNIFICANT CHANGE UP (ref 0–0)
NRBC # BLD: 0 /100 WBCS — SIGNIFICANT CHANGE UP (ref 0–0)
PLATELET # BLD AUTO: 352 K/UL — SIGNIFICANT CHANGE UP (ref 130–400)
PLATELET # BLD AUTO: 352 K/UL — SIGNIFICANT CHANGE UP (ref 130–400)
PMV BLD: 10.1 FL — SIGNIFICANT CHANGE UP (ref 7.4–10.4)
PMV BLD: 10.1 FL — SIGNIFICANT CHANGE UP (ref 7.4–10.4)
POTASSIUM SERPL-MCNC: 5.1 MMOL/L — HIGH (ref 3.5–5)
POTASSIUM SERPL-MCNC: 5.1 MMOL/L — HIGH (ref 3.5–5)
POTASSIUM SERPL-SCNC: 5.1 MMOL/L — HIGH (ref 3.5–5)
POTASSIUM SERPL-SCNC: 5.1 MMOL/L — HIGH (ref 3.5–5)
RBC # BLD: 4.35 M/UL — SIGNIFICANT CHANGE UP (ref 4.2–5.4)
RBC # BLD: 4.35 M/UL — SIGNIFICANT CHANGE UP (ref 4.2–5.4)
RBC # FLD: 16 % — HIGH (ref 11.5–14.5)
RBC # FLD: 16 % — HIGH (ref 11.5–14.5)
SODIUM SERPL-SCNC: 139 MMOL/L — SIGNIFICANT CHANGE UP (ref 135–146)
SODIUM SERPL-SCNC: 139 MMOL/L — SIGNIFICANT CHANGE UP (ref 135–146)
WBC # BLD: 10.84 K/UL — HIGH (ref 4.8–10.8)
WBC # BLD: 10.84 K/UL — HIGH (ref 4.8–10.8)
WBC # FLD AUTO: 10.84 K/UL — HIGH (ref 4.8–10.8)
WBC # FLD AUTO: 10.84 K/UL — HIGH (ref 4.8–10.8)

## 2023-11-10 PROCEDURE — 93458 L HRT ARTERY/VENTRICLE ANGIO: CPT

## 2023-11-10 PROCEDURE — C1887: CPT

## 2023-11-10 PROCEDURE — 93799 UNLISTED CV SVC/PROCEDURE: CPT

## 2023-11-10 PROCEDURE — 80048 BASIC METABOLIC PNL TOTAL CA: CPT

## 2023-11-10 PROCEDURE — C1894: CPT

## 2023-11-10 PROCEDURE — 85027 COMPLETE CBC AUTOMATED: CPT

## 2023-11-10 PROCEDURE — C1769: CPT

## 2023-11-10 PROCEDURE — 36415 COLL VENOUS BLD VENIPUNCTURE: CPT

## 2023-11-10 RX ORDER — SODIUM CHLORIDE 9 MG/ML
1000 INJECTION INTRAMUSCULAR; INTRAVENOUS; SUBCUTANEOUS
Refills: 0 | Status: DISCONTINUED | OUTPATIENT
Start: 2023-11-10 | End: 2023-11-10

## 2023-11-10 RX ORDER — SODIUM CHLORIDE 9 MG/ML
250 INJECTION INTRAMUSCULAR; INTRAVENOUS; SUBCUTANEOUS ONCE
Refills: 0 | Status: COMPLETED | OUTPATIENT
Start: 2023-11-10 | End: 2023-11-10

## 2023-11-10 RX ADMIN — SODIUM CHLORIDE 100 MILLILITER(S): 9 INJECTION INTRAMUSCULAR; INTRAVENOUS; SUBCUTANEOUS at 10:15

## 2023-11-10 RX ADMIN — SODIUM CHLORIDE 250 MILLILITER(S): 9 INJECTION INTRAMUSCULAR; INTRAVENOUS; SUBCUTANEOUS at 08:32

## 2023-11-10 NOTE — ASU DISCHARGE PLAN (ADULT/PEDIATRIC) - ASU DC SPECIAL INSTRUCTIONSFT
Discharge Instructions as follows:  - Continue medical regimen as prescribed to prevent chest pain.  - If you are diabetic and taking medication containing Metformin, do not take them for 48 hours after the procedure  - Instructed to call 911 if chest pain, shortness of breath or bleeding from access site.  - No heavy lifting > 10lbs x 1 week.  - No driving x 24 hours.  - No baths, swimming pools x 1 week, may shower  - Low sodium low fat low cholesterol diet  - Follow-up with Cardiologist in 1-2 weeks after discharge  - Soreness or tenderness at the site is possible, it will diminish over time. You may take Tylenol every 4-6 hours as needed. Nothing stronger is needed. NO Motrin/Ibuprofen  - Any questions call cardiac cath lab [phone]

## 2023-11-10 NOTE — ASU DISCHARGE PLAN (ADULT/PEDIATRIC) - MEDICATION INSTRUCTIONS
Soreness or tenderness at the site is possible, it will diminish over time. You may take Tylenol every 4-6 hours as needed. Nothing stronger is needed. NO Motrin/Ibuprofen

## 2023-11-10 NOTE — CHART NOTE - NSCHARTNOTEFT_GEN_A_CORE
PRE-OP DIAGNOSIS:    NSVT    PROCEDURE:     [x] Coronary Angiogram     [x] LHC     [] LVG     [] RHC     [] Intervention (see below)         PHYSICIAN:  Dr Stanley    ASSISTANT: Dr RUPERT Rey     PROCEDURE DESCRIPTION:     Consent:      [x] Patient     [] Family Member     []  Used        Anesthesia:     [] General     [x] Sedation     [x] Local        Access & Closure:     [x] 6 Fr Right Radial Artery (TR Band closure)     [] Fr Femoral Artery     [] Fr Femoral Vein     [] Fr Brachial Vein       IV Contrast: 80 mL        Intervention: None      Implants: None       FINDINGS:     Coronary Dominance: Right      LM: Minor luminal irregularities    LAD: Mild disease  D1 Mild disease    CX: Minor luminal irregularities  OM1 Minor luminal irregularities     RCA: Mid RCA 60% stenosis, IFR negative value of 0.99  RPDA mild disease     LVEDP: 30 mmHg     EF: 60 %        ESTIMATED BLOOD LOSS: < 10 mL        CONDITION:     [x] Good     [] Fair     [] Critical        SPECIMEN REMOVED: N/A       POST-OP DIAGNOSIS:      [x] Non Obstructive Coronary Artery Disease. Mid RCA 60% stenosis, IFR negative value of 0.99       PLAN OF CARE:     [x] D/C Home Today     [x] Medications: c/w home meds. F/u with EP.     [x] IV Fluids: IV NS@ 100cc/hr for 2 hours

## 2023-11-10 NOTE — ASU DISCHARGE PLAN (ADULT/PEDIATRIC) - NS MD DC FALL RISK RISK
For information on Fall & Injury Prevention, visit: [email or web address] OR  [email or web address] OR  https://www.cdc.gov/steadi/patient.html

## 2023-11-10 NOTE — ASU DISCHARGE PLAN (ADULT/PEDIATRIC) - CARE PROVIDER_API CALL
Reinaldo Stanley  Interventional Cardiology  [address] [place] [id number]  Phone: [phone]  Fax: [phone]  Established Patient  Follow Up Time: 1 week

## 2023-11-21 NOTE — ASU PATIENT PROFILE, ADULT - FALLEN IN THE PAST
no Ilumya Counseling: I discussed with the patient the risks of tildrakizumab including but not limited to immunosuppression, malignancy, posterior leukoencephalopathy syndrome, and serious infections. The patient understands that monitoring is required including a PPD at baseline and must alert us or the primary physician if symptoms of infection or other concerning signs are noted. Imiquimod Pregnancy And Lactation Text: This medication is Pregnancy Category C. It is unknown if this medication is excreted in breast milk. Topical Metronidazole Pregnancy And Lactation Text: This medication is Pregnancy Category B and considered safe during pregnancy. It is also considered safe to use while breastfeeding. Oxybutynin Counseling:  I discussed with the patient the risks of oxybutynin including but not limited to skin rash, drowsiness, dry mouth, difficulty urinating, and blurred vision. Cimzia Pregnancy And Lactation Text: This medication crosses the placenta but can be considered safe in certain situations. Cimzia may be excreted in breast milk. Tranexamic Acid Pregnancy And Lactation Text: It is unknown if this medication is safe during pregnancy or breast feeding. Doxepin Counseling:  Patient advised that the medication is sedating and not to drive a car after taking this medication. Patient informed of potential adverse effects including but not limited to dry mouth, urinary retention, and blurry vision. The patient verbalized understanding of the proper use and possible adverse effects of doxepin. All of the patient's questions and concerns were addressed. VTAMA Counseling: I discussed with the patient that Alfrieda Billing is not for use in the eyes, mouth or mouth. They should call the office if they develop any signs of allergic reactions to Alfrieda Billing. The patient verbalized understanding of the proper use and possible adverse effects of VTAMA. All of the patient's questions and concerns were addressed. Doxycycline Pregnancy And Lactation Text: This medication is Pregnancy Category D and not consider safe during pregnancy. It is also excreted in breast milk but is considered safe for shorter treatment courses. Adbry Counseling: I discussed with the patient the risks of tralokinumab including but not limited to eye infection and irritation, cold sores, injection site reactions, worsening of asthma, allergic reactions and increased risk of parasitic infection. Live vaccines should be avoided while taking tralokinumab. The patient understands that monitoring is required and they must alert us or the primary physician if symptoms of infection or other concerning signs are noted. Azithromycin Counseling:  I discussed with the patient the risks of azithromycin including but not limited to GI upset, allergic reaction, drug rash, diarrhea, and yeast infections. Simponi Pregnancy And Lactation Text: The risk during pregnancy and breastfeeding is uncertain with this medication. Picato Counseling:  I discussed with the patient the risks of Picato including but not limited to erythema, scaling, itching, weeping, crusting, and pain. Itraconazole Counseling:  I discussed with the patient the risks of itraconazole including but not limited to liver damage, nausea/vomiting, neuropathy, and severe allergy. The patient understands that this medication is best absorbed when taken with acidic beverages such as non-diet cola or ginger ale. The patient understands that monitoring is required including baseline LFTs and repeat LFTs at intervals. The patient understands that they are to contact us or the primary physician if concerning signs are noted. Xolair Pregnancy And Lactation Text: This medication is Pregnancy Category B and is considered safe during pregnancy. This medication is excreted in breast milk. Gabapentin Counseling: I discussed with the patient the risks of gabapentin including but not limited to dizziness, somnolence, fatigue and ataxia. Doxepin Pregnancy And Lactation Text: This medication is Pregnancy Category C and it isn't known if it is safe during pregnancy. It is also excreted in breast milk and breast feeding isn't recommended. Cellcept Counseling:  I discussed with the patient the risks of mycophenolate mofetil including but not limited to infection/immunosuppression, GI upset, hypokalemia, hypercholesterolemia, bone marrow suppression, lymphoproliferative disorders, malignancy, GI ulceration/bleed/perforation, colitis, interstitial lung disease, kidney failure, progressive multifocal leukoencephalopathy, and birth defects. The patient understands that monitoring is required including a baseline creatinine and regular CBC testing. In addition, patient must alert us immediately if symptoms of infection or other concerning signs are noted. Soolantra Counseling: I discussed with the patients the risks of topial Soolantra. This is a medicine which decreases the number of mites and inflammation in the skin. You experience burning, stinging, eye irritation or allergic reactions. Please call our office if you develop any problems from using this medication. Vtama Pregnancy And Lactation Text: It is unknown if this medication can cause problems during pregnancy and breastfeeding. Azithromycin Pregnancy And Lactation Text: This medication is considered safe during pregnancy and is also secreted in breast milk. Azelaic Acid Pregnancy And Lactation Text: This medication is considered safe during pregnancy and breast feeding. Erythromycin Counseling:  I discussed with the patient the risks of erythromycin including but not limited to GI upset, allergic reaction, drug rash, diarrhea, increase in liver enzymes, and yeast infections. Valtrex Counseling: I discussed with the patient the risks of valacyclovir including but not limited to kidney damage, nausea, vomiting and severe allergy. The patient understands that if the infection seems to be worsening or is not improving, they are to call. Adbry Pregnancy And Lactation Text: It is unknown if this medication will adversely affect pregnancy or breast feeding. Prednisone Pregnancy And Lactation Text: This medication is Pregnancy Category C and it isn't know if it is safe during pregnancy. This medication is excreted in breast milk. Arava Counseling:  Patient counseled regarding adverse effects of Arava including but not limited to nausea, vomiting, abnormalities in liver function tests. Patients may develop mouth sores, rash, diarrhea, and abnormalities in blood counts. The patient understands that monitoring is required including LFTs and blood counts. There is a rare possibility of scarring of the liver and lung problems that can occur when taking methotrexate. Persistent nausea, loss of appetite, pale stools, dark urine, cough, and shortness of breath should be reported immediately. Patient advised to discontinue Arava treatment and consult with a physician prior to attempting conception. The patient will have to undergo a treatment to eliminate Arava from the body prior to conception. Niacinamide Pregnancy And Lactation Text: These medications are considered safe during pregnancy. Rifampin Pregnancy And Lactation Text: This medication is Pregnancy Category C and it isn't know if it is safe during pregnancy. It is also excreted in breast milk and should not be used if you are breast feeding. Dutasteride Pregnancy And Lactation Text: This medication is absolutely contraindicated in women, especially during pregnancy and breast feeding. Feminization of male fetuses is possible if taking while pregnant. Bexarotene Counseling:  I discussed with the patient the risks of bexarotene including but not limited to hair loss, dry lips/skin/eyes, liver abnormalities, hyperlipidemia, pancreatitis, depression/suicidal ideation, photosensitivity, drug rash/allergic reactions, hypothyroidism, anemia, leukopenia, infection, cataracts, and teratogenicity. Patient understands that they will need regular blood tests to check lipid profile, liver function tests, white blood cell count, thyroid function tests and pregnancy test if applicable. Drysol Counseling:  I discussed with the patient the risks of drysol/aluminum chloride including but not limited to skin rash, itching, irritation, burning. Finasteride Counseling:  I discussed with the patient the risks of use of finasteride including but not limited to decreased libido, decreased ejaculate volume, gynecomastia, and depression. Women should not handle medication. All of the patient's questions and concerns were addressed. Bexarotene Pregnancy And Lactation Text: This medication is Pregnancy Category X and should not be given to women who are pregnant or may become pregnant. This medication should not be used if you are breast feeding. Oxybutynin Pregnancy And Lactation Text: This medication is Pregnancy Category B and is considered safe during pregnancy. It is unknown if it is excreted in breast milk. Klisyri Counseling:  I discussed with the patient the risks of Marianna Blight including but not limited to erythema, scaling, itching, weeping, crusting, and pain. Nsaids Counseling: NSAID Counseling: I discussed with the patient that NSAIDs should be taken with food. Prolonged use of NSAIDs can result in the development of stomach ulcers. Patient advised to stop taking NSAIDs if abdominal pain occurs. The patient verbalized understanding of the proper use and possible adverse effects of NSAIDs. All of the patient's questions and concerns were addressed. Rinvoq Counseling: I discussed with the patient the risks of Rinvoq therapy including but not limited to upper respiratory tract infections, shingles, cold sores, bronchitis, nausea, cough, fever, acne, and headache. Live vaccines should be avoided. This medication has been linked to serious infections; higher rate of mortality; malignancy and lymphoproliferative disorders; major adverse cardiovascular events; thrombosis; thrombocytopenia, anemia, and neutropenia; lipid elevations; liver enzyme elevations; and gastrointestinal perforations. Topical Steroids Counseling: I discussed with the patient that prolonged use of topical steroids can result in the increased appearance of superficial blood vessels (telangiectasias), lightening (hypopigmentation) and thinning of the skin (atrophy). Patient understands to avoid using high potency steroids in skin folds, the groin or the face. The patient verbalized understanding of the proper use and possible adverse effects of topical steroids. All of the patient's questions and concerns were addressed. Skyrizi Counseling: I discussed with the patient the risks of risankizumab-rzaa including but not limited to immunosuppression, and serious infections. The patient understands that monitoring is required including a PPD at baseline and must alert us or the primary physician if symptoms of infection or other concerning signs are noted. Cibinqo Counseling: I discussed with the patient the risks of Cibinqo therapy including but not limited to common cold, nausea, headache, cold sores, increased blood CPK levels, dizziness, UTIs, fatigue, acne, and vomitting. Live vaccines should be avoided. This medication has been linked to serious infections; higher rate of mortality; malignancy and lymphoproliferative disorders; major adverse cardiovascular events; thrombosis; thrombocytopenia and lymphopenia; lipid elevations; and retinal detachment. Protopic Counseling: Patient may experience a mild burning sensation during topical application. Protopic is not approved in children less than 3years of age. There have been case reports of hematologic and skin malignancies in patients using topical calcineurin inhibitors although causality is questionable. Valtrex Pregnancy And Lactation Text: this medication is Pregnancy Category B and is considered safe during pregnancy. This medication is not directly found in breast milk but it's metabolite acyclovir is present. Itraconazole Pregnancy And Lactation Text: This medication is Pregnancy Category C and it isn't know if it is safe during pregnancy. It is also excreted in breast milk. Zoryve Counseling:  I discussed with the patient that Jacike Caputo is not for use in the eyes, mouth or vagina. The most commonly reported side effects include diarrhea, headache, insomnia, application site pain, upper respiratory tract infections, and urinary tract infections. All of the patient's questions and concerns were addressed. Infliximab Counseling:  I discussed with the patient the risks of infliximab including but not limited to myelosuppression, immunosuppression, autoimmune hepatitis, demyelinating diseases, lymphoma, and serious infections. The patient understands that monitoring is required including a PPD at baseline and must alert us or the primary physician if symptoms of infection or other concerning signs are noted. Hydroxyzine Counseling: Patient advised that the medication is sedating and not to drive a car after taking this medication. Patient informed of potential adverse effects including but not limited to dry mouth, urinary retention, and blurry vision. The patient verbalized understanding of the proper use and possible adverse effects of hydroxyzine. All of the patient's questions and concerns were addressed. Topical Steroids Applications Pregnancy And Lactation Text: Most topical steroids are considered safe to use during pregnancy and lactation. Any topical steroid applied to the breast or nipple should be washed off before breastfeeding. Gabapentin Pregnancy And Lactation Text: This medication is Pregnancy Category C and isn't considered safe during pregnancy. It is excreted in breast milk. Erythromycin Pregnancy And Lactation Text: This medication is Pregnancy Category B and is considered safe during pregnancy. It is also excreted in breast milk. Cellcept Pregnancy And Lactation Text: This medication is Pregnancy Category D and isn't considered safe during pregnancy. It is unknown if this medication is excreted in breast milk. Cosentyx Counseling:  I discussed with the patient the risks of Cosentyx including but not limited to worsening of Crohn's disease, immunosuppression, allergic reactions and infections. The patient understands that monitoring is required including a PPD at baseline and must alert us or the primary physician if symptoms of infection or other concerning signs are noted. Sarecycline Counseling: Patient advised regarding possible photosensitivity and discoloration of the teeth, skin, lips, tongue and gums. Patient instructed to avoid sunlight, if possible. When exposed to sunlight, patients should wear protective clothing, sunglasses, and sunscreen. The patient was instructed to call the office immediately if the following severe adverse effects occur:  hearing changes, easy bruising/bleeding, severe headache, or vision changes. The patient verbalized understanding of the proper use and possible adverse effects of sarecycline. All of the patient's questions and concerns were addressed. Benzoyl Peroxide Counseling: Patient counseled that medicine may cause skin irritation and bleach clothing. In the event of skin irritation, the patient was advised to reduce the amount of the drug applied or use it less frequently. The patient verbalized understanding of the proper use and possible adverse effects of benzoyl peroxide. All of the patient's questions and concerns were addressed. Soolantra Pregnancy And Lactation Text: This medication is Pregnancy Category C. This medication is considered safe during breast feeding. Cosentyx Pregnancy And Lactation Text: This medication is Pregnancy Category B and is considered safe during pregnancy. It is unknown if this medication is excreted in breast milk. Topical Retinoid counseling:  Patient advised to apply a pea-sized amount only at bedtime and wait 30 minutes after washing their face before applying. If too drying, patient may add a non-comedogenic moisturizer. The patient verbalized understanding of the proper use and possible adverse effects of retinoids. All of the patient's questions and concerns were addressed. Finasteride Pregnancy And Lactation Text: This medication is absolutely contraindicated during pregnancy. It is unknown if it is excreted in breast milk. Nsaids Pregnancy And Lactation Text: These medications are considered safe up to 30 weeks gestation. It is excreted in breast milk. Elidel Counseling: Patient may experience a mild burning sensation during topical application. Elidel is not approved in children less than 3years of age. There have been case reports of hematologic and skin malignancies in patients using topical calcineurin inhibitors although causality is questionable. Sarecycline Pregnancy And Lactation Text: This medication is Pregnancy Category D and not consider safe during pregnancy. It is also excreted in breast milk. Propranolol Counseling:  I discussed with the patient the risks of propranolol including but not limited to low heart rate, low blood pressure, low blood sugar, restlessness and increased cold sensitivity. They should call the office if they experience any of these side effects. Rinvoq Pregnancy And Lactation Text: Based on animal studies, Rinvoq may cause embryo-fetal harm when administered to pregnant women. The medication should not be used in pregnancy. Breastfeeding is not recommended during treatment and for 6 days after the last dose. Erivedge Counseling- I discussed with the patient the risks of Erivedge including but not limited to nausea, vomiting, diarrhea, constipation, weight loss, changes in the sense of taste, decreased appetite, muscle spasms, and hair loss. The patient verbalized understanding of the proper use and possible adverse effects of Erivedge. All of the patient's questions and concerns were addressed. Bactrim Counseling:  I discussed with the patient the risks of sulfa antibiotics including but not limited to GI upset, allergic reaction, drug rash, diarrhea, dizziness, photosensitivity, and yeast infections. Rarely, more serious reactions can occur including but not limited to aplastic anemia, agranulocytosis, methemoglobinemia, blood dyscrasias, liver or kidney failure, lung infiltrates or desquamative/blistering drug rashes. Isotretinoin Counseling: Patient should get monthly blood tests, not donate blood, not drive at night if vision affected, not share medication, and not undergo elective surgery for 6 months after tx completed. Side effects reviewed, pt to contact office should one occur. Klisyri Pregnancy And Lactation Text: It is unknown if this medication can harm a developing fetus or if it is excreted in breast milk. Minoxidil Counseling: Minoxidil is a topical medication which can increase blood flow where it is applied. It is uncertain how this medication increases hair growth. Side effects are uncommon and include stinging and allergic reactions. Use Enhanced Medication Counseling?: No Topical Sulfur Applications Counseling: Topical Sulfur Counseling: Patient counseled that this medication may cause skin irritation or allergic reactions. In the event of skin irritation, the patient was advised to reduce the amount of the drug applied or use it less frequently. The patient verbalized understanding of the proper use and possible adverse effects of topical sulfur application. All of the patient's questions and concerns were addressed. Protopic Pregnancy And Lactation Text: This medication is Pregnancy Category C. It is unknown if this medication is excreted in breast milk when applied topically. Sotyktu Counseling:  I discussed the most common side effects of Sotyktu including: common cold, sore throat, sinus infections, cold sores, canker sores, folliculitis, and acne.  I also discussed more serious side effects of Sotyktu including but not limited to: serious allergic reactions; increased risk for infections such as TB; cancers such as lymphomas; rhabdomyolysis and elevated CPK; and elevated triglycerides and liver enzymes.   Propranolol Pregnancy And Lactation Text: This medication is Pregnancy Category C and it isn't known if it is safe during pregnancy. It is excreted in breast milk. Bactrim Pregnancy And Lactation Text: This medication is Pregnancy Category D and is known to cause fetal risk. It is also excreted in breast milk. Erivedge Pregnancy And Lactation Text: This medication is Pregnancy Category X and is absolutely contraindicated during pregnancy. It is unknown if it is excreted in breast milk. Hydroxyzine Pregnancy And Lactation Text: This medication is not safe during pregnancy and should not be taken. It is also excreted in breast milk and breast feeding isn't recommended. Cyclophosphamide Counseling:  I discussed with the patient the risks of cyclophosphamide including but not limited to hair loss, hormonal abnormalities, decreased fertility, abdominal pain, diarrhea, nausea and vomiting, bone marrow suppression and infection. The patient understands that monitoring is required while taking this medication. Glycopyrrolate Counseling:  I discussed with the patient the risks of glycopyrrolate including but not limited to skin rash, drowsiness, dry mouth, difficulty urinating, and blurred vision. Metronidazole Counseling:  I discussed with the patient the risks of metronidazole including but not limited to seizures, nausea/vomiting, a metallic taste in the mouth, nausea/vomiting and severe allergy. Ketoconazole Counseling:   Patient counseled regarding improving absorption with orange juice. Adverse effects include but are not limited to breast enlargement, headache, diarrhea, nausea, upset stomach, liver function test abnormalities, taste disturbance, and stomach pain. There is a rare possibility of liver failure that can occur when taking ketoconazole. The patient understands that monitoring of LFTs may be required, especially at baseline. The patient verbalized understanding of the proper use and possible adverse effects of ketoconazole. All of the patient's questions and concerns were addressed. Cibinqo Pregnancy And Lactation Text: It is unknown if this medication will adversely affect pregnancy or breast feeding. You should not take this medication if you are currently pregnant or planning a pregnancy or while breastfeeding. Stelara Counseling:  I discussed with the patient the risks of ustekinumab including but not limited to immunosuppression, malignancy, posterior leukoencephalopathy syndrome, and serious infections. The patient understands that monitoring is required including a PPD at baseline and must alert us or the primary physician if symptoms of infection or other concerning signs are noted. Benzoyl Peroxide Pregnancy And Lactation Text: This medication is Pregnancy Category C. It is unknown if benzoyl peroxide is excreted in breast milk. Ketoconazole Pregnancy And Lactation Text: This medication is Pregnancy Category C and it isn't know if it is safe during pregnancy. It is also excreted in breast milk and breast feeding isn't recommended. Carac Counseling:  I discussed with the patient the risks of Carac including but not limited to erythema, scaling, itching, weeping, crusting, and pain. Glycopyrrolate Pregnancy And Lactation Text: This medication is Pregnancy Category B and is considered safe during pregnancy. It is unknown if it is excreted breast milk. Cyclophosphamide Pregnancy And Lactation Text: This medication is Pregnancy Category D and it isn't considered safe during pregnancy. This medication is excreted in breast milk. Clofazimine Counseling:  I discussed with the patient the risks of clofazimine including but not limited to skin and eye pigmentation, liver damage, nausea/vomiting, gastrointestinal bleeding and allergy. Tetracycline Counseling: Patient counseled regarding possible photosensitivity and increased risk for sunburn. Patient instructed to avoid sunlight, if possible. When exposed to sunlight, patients should wear protective clothing, sunglasses, and sunscreen. The patient was instructed to call the office immediately if the following severe adverse effects occur:  hearing changes, easy bruising/bleeding, severe headache, or vision changes. The patient verbalized understanding of the proper use and possible adverse effects of tetracycline. All of the patient's questions and concerns were addressed. Patient understands to avoid pregnancy while on therapy due to potential birth defects. Olanzapine Counseling- I discussed with the patient the common side effects of olanzapine including but are not limited to: lack of energy, dry mouth, increased appetite, sleepiness, tremor, constipation, dizziness, changes in behavior, or restlessness. Explained that teenagers are more likely to experience headaches, abdominal pain, pain in the arms or legs, tiredness, and sleepiness. Serious side effects include but are not limited: increased risk of death in elderly patients who are confused, have memory loss, or dementia-related psychosis; hyperglycemia; increased cholesterol and triglycerides; and weight gain. Isotretinoin Pregnancy And Lactation Text: This medication is Pregnancy Category X and is considered extremely dangerous during pregnancy. It is unknown if it is excreted in breast milk. Birth Control Pills Counseling: Birth Control Pill Counseling: I discussed with the patient the potential side effects of OCPs including but not limited to increased risk of stroke, heart attack, thrombophlebitis, deep venous thrombosis, hepatic adenomas, breast changes, GI upset, headaches, and depression. The patient verbalized understanding of the proper use and possible adverse effects of OCPs. All of the patient's questions and concerns were addressed. Dupixent Counseling: I discussed with the patient the risks of dupilumab including but not limited to eye inflammation and irritation, cold sores, injection site reactions, allergic reactions and increased risk of parasitic infection. The patient understands that monitoring is required and they must alert us or the primary physician if symptoms of infection or other concerning signs are noted. Rituxan Counseling:  I discussed with the patient the risks of Rituxan infusions. Side effects can include infusion reactions, severe drug rashes including mucocutaneous reactions, reactivation of latent hepatitis and other infections and rarely progressive multifocal leukoencephalopathy. All of the patient's questions and concerns were addressed. Libtayo Counseling- I discussed with the patient the risks of Libtayo including but not limited to nausea, vomiting, diarrhea, and bone or muscle pain. The patient verbalized understanding of the proper use and possible adverse effects of Libtayo. All of the patient's questions and concerns were addressed. Topical Sulfur Applications Pregnancy And Lactation Text: This medication is considered safe during pregnancy and breast feeding secondary to limited systemic absorption. SSKI Counseling:  I discussed with the patient the risks of SSKI including but not limited to thyroid abnormalities, metallic taste, GI upset, fever, headache, acne, arthralgias, paraesthesias, lymphadenopathy, easy bleeding, arrhythmias, and allergic reaction. Sotyktu Pregnancy And Lactation Text: There is insufficient data to evaluate whether or not Sotyktu is safe to use during pregnancy.   It is not known if Sotyktu passes into breast milk and whether or not it is safe to use when breastfeeding.   Minoxidil Pregnancy And Lactation Text: This medication has not been assigned a Pregnancy Risk Category but animal studies failed to show danger with the topical medication. It is unknown if the medication is excreted in breast milk. Cephalexin Counseling: I counseled the patient regarding use of cephalexin as an antibiotic for prophylactic and/or therapeutic purposes. Cephalexin (commonly prescribed under brand name Keflex) is a cephalosporin antibiotic which is active against numerous classes of bacteria, including most skin bacteria. Side effects may include nausea, diarrhea, gastrointestinal upset, rash, hives, yeast infections, and in rare cases, hepatitis, kidney disease, seizures, fever, confusion, neurologic symptoms, and others. Patients with severe allergies to penicillin medications are cautioned that there is about a 10% incidence of cross-reactivity with cephalosporins. When possible, patients with penicillin allergies should use alternatives to cephalosporins for antibiotic therapy. Metronidazole Pregnancy And Lactation Text: This medication is Pregnancy Category B and considered safe during pregnancy. It is also excreted in breast milk. Litfulo Counseling: I discussed with the patient the risks of Litfulo therapy including but not limited to upper respiratory tract infections, shingles, cold sores, and nausea. Live vaccines should be avoided. This medication has been linked to serious infections; higher rate of mortality; malignancy and lymphoproliferative disorders; major adverse cardiovascular events; thrombosis; gastrointestinal perforations; neutropenia; lymphopenia; anemia; liver enzyme elevations; and lipid elevations. Zyclara Counseling:  I discussed with the patient the risks of imiquimod including but not limited to erythema, scaling, itching, weeping, crusting, and pain. Patient understands that the inflammatory response to imiquimod is variable from person to person and was educated regarded proper titration schedule. If flu-like symptoms develop, patient knows to discontinue the medication and contact us. Qbrexza Counseling:  I discussed with the patient the risks of Saravanan Curling including but not limited to headache, mydriasis, blurred vision, dry eyes, nasal dryness, dry mouth, dry throat, dry skin, urinary hesitation, and constipation. Local skin reactions including erythema, burning, stinging, and itching can also occur. Hydroxychloroquine Counseling:  I discussed with the patient that a baseline ophthalmologic exam is needed at the start of therapy and every year thereafter while on therapy. A CBC may also be warranted for monitoring. The side effects of this medication were discussed with the patient, including but not limited to agranulocytosis, aplastic anemia, seizures, rashes, retinopathy, and liver toxicity. Patient instructed to call the office should any adverse effect occur. The patient verbalized understanding of the proper use and possible adverse effects of Plaquenil. All the patient's questions and concerns were addressed. Qbrexza Pregnancy And Lactation Text: There is no available data on Qbrexza use in pregnant women. There is no available data on Qbrexza use in lactation. Cephalexin Pregnancy And Lactation Text: This medication is Pregnancy Category B and considered safe during pregnancy. It is also excreted in breast milk but can be used safely for shorter doses. Cyclosporine Counseling:  I discussed with the patient the risks of cyclosporine including but not limited to hypertension, gingival hyperplasia,myelosuppression, immunosuppression, liver damage, kidney damage, neurotoxicity, lymphoma, and serious infections. The patient understands that monitoring is required including baseline blood pressure, CBC, CMP, lipid panel and uric acid, and then 1-2 times monthly CMP and blood pressure. Tazorac Counseling:  Patient advised that medication is irritating and drying. Patient may need to apply sparingly and wash off after an hour before eventually leaving it on overnight. The patient verbalized understanding of the proper use and possible adverse effects of tazorac. All of the patient's questions and concerns were addressed. Carac Pregnancy And Lactation Text: This medication is Pregnancy Category X and contraindicated in pregnancy and in women who may become pregnant. It is unknown if this medication is excreted in breast milk. Minocycline Counseling: Patient advised regarding possible photosensitivity and discoloration of the teeth, skin, lips, tongue and gums. Patient instructed to avoid sunlight, if possible. When exposed to sunlight, patients should wear protective clothing, sunglasses, and sunscreen. The patient was instructed to call the office immediately if the following severe adverse effects occur:  hearing changes, easy bruising/bleeding, severe headache, or vision changes. The patient verbalized understanding of the proper use and possible adverse effects of minocycline. All of the patient's questions and concerns were addressed. Olanzapine Pregnancy And Lactation Text: This medication is pregnancy category C. There are no adequate and well controlled trials with olanzapine in pregnant females. Olanzapine should be used during pregnancy only if the potential benefit justifies the potential risk to the fetus. In a study in lactating healthy women, olanzapine was excreted in breast milk. It is recommended that women taking olanzapine should not breast feed. Dupixent Pregnancy And Lactation Text: This medication likely crosses the placenta but the risk for the fetus is uncertain. This medication is excreted in breast milk. Terbinafine Counseling: Patient counseling regarding adverse effects of terbinafine including but not limited to headache, diarrhea, rash, upset stomach, liver function test abnormalities, itching, taste/smell disturbance, nausea, abdominal pain, and flatulence. There is a rare possibility of liver failure that can occur when taking terbinafine. The patient understands that a baseline LFT and kidney function test may be required. The patient verbalized understanding of the proper use and possible adverse effects of terbinafine. All of the patient's questions and concerns were addressed. Birth Control Pills Pregnancy And Lactation Text: This medication should be avoided if pregnant and for the first 30 days post-partum. High Dose Vitamin A Counseling: Side effects reviewed, pt to contact office should one occur. Detail Level: Simple Albendazole Counseling:  I discussed with the patient the risks of albendazole including but not limited to cytopenia, kidney damage, nausea/vomiting and severe allergy. The patient understands that this medication is being used in an off-label manner. Eucrisa Counseling: Patient may experience a mild burning sensation during topical application. [place] Lanius is not approved in children less than 1months of age. Enbrel Counseling:  I discussed with the patient the risks of etanercept including but not limited to myelosuppression, immunosuppression, autoimmune hepatitis, demyelinating diseases, lymphoma, and infections. The patient understands that monitoring is required including a PPD at baseline and must alert us or the primary physician if symptoms of infection or other concerning signs are noted. High Dose Vitamin A Pregnancy And Lactation Text: High dose vitamin A therapy is contraindicated during pregnancy and breast feeding. Albendazole Pregnancy And Lactation Text: This medication is Pregnancy Category C and it isn't known if it is safe during pregnancy. It is also excreted in breast milk. Spironolactone Counseling: Patient advised regarding risks of diarrhea, abdominal pain, hyperkalemia, birth defects (for female patients), liver toxicity and renal toxicity. The patient may need blood work to monitor liver and kidney function and potassium levels while on therapy. The patient verbalized understanding of the proper use and possible adverse effects of spironolactone. All of the patient's questions and concerns were addressed. Oral Minoxidil Counseling- I discussed with the patient the risks of oral minoxidil including but not limited to shortness of breath, swelling of the feet or ankles, dizziness, lightheadedness, unwanted hair growth and allergic reaction. The patient verbalized understanding of the proper use and possible adverse effects of oral minoxidil. All of the patient's questions and concerns were addressed. Mirvaso Counseling: Ana Sames is a topical medication which can decrease superficial blood flow where applied. Side effects are uncommon and include stinging, redness and allergic reactions. Libtayo Pregnancy And Lactation Text: This medication is contraindicated in pregnancy and when breast feeding. Xeljanz Counseling: Jayson Bunch Counseling: I discussed with the patient the risks of Jayson Lowe therapy including increased risk of infection, liver issues, headache, diarrhea, or cold symptoms. Live vaccines should be avoided. They were instructed to call if they have any problems. Sski Pregnancy And Lactation Text: This medication is Pregnancy Category D and isn't considered safe during pregnancy. It is excreted in breast milk. Wartpeel Counseling:  I discussed with the patient the risks of Wartpeel including but not limited to erythema, scaling, itching, weeping, crusting, and pain. Taltz Counseling: I discussed with the patient the risks of ixekizumab including but not limited to immunosuppression, serious infections, worsening of inflammatory bowel disease and drug reactions. The patient understands that monitoring is required including a PPD at baseline and must alert us or the primary physician if symptoms of infection or other concerning signs are noted. Litfulo Pregnancy And Lactation Text: Based on animal studies, Melodye Fly may cause embryo-fetal harm when administered to pregnant women. The medication should not be used in pregnancy. Breastfeeding is not recommended during treatment. Rituxan Pregnancy And Lactation Text: This medication is Pregnancy Category C and it isn't know if it is safe during pregnancy. It is unknown if this medication is excreted in breast milk but similar antibodies are known to be excreted. Olumiant Counseling: I discussed with the patient the risks of Olumiant therapy including but not limited to upper respiratory tract infections, shingles, cold sores, and nausea. Live vaccines should be avoided. This medication has been linked to serious infections; higher rate of mortality; malignancy and lymphoproliferative disorders; major adverse cardiovascular events; thrombosis; gastrointestinal perforations; neutropenia; lymphopenia; anemia; liver enzyme elevations; and lipid elevations. Fluconazole Counseling:  Patient counseled regarding adverse effects of fluconazole including but not limited to headache, diarrhea, nausea, upset stomach, liver function test abnormalities, taste disturbance, and stomach pain. There is a rare possibility of liver failure that can occur when taking fluconazole. The patient understands that monitoring of LFTs and kidney function test may be required, especially at baseline. The patient verbalized understanding of the proper use and possible adverse effects of fluconazole. All of the patient's questions and concerns were addressed. Colchicine Counseling:  Patient counseled regarding adverse effects including but not limited to stomach upset (nausea, vomiting, stomach pain, or diarrhea). Patient instructed to limit alcohol consumption while taking this medication. Colchicine may reduce blood counts especially with prolonged use. The patient understands that monitoring of kidney function and blood counts may be required, especially at baseline. The patient verbalized understanding of the proper use and possible adverse effects of colchicine. All of the patient's questions and concerns were addressed. Rhofade Counseling: Rhofade is a topical medication which can decrease superficial blood flow where applied. Side effects are uncommon and include stinging, redness and allergic reactions. Thalidomide Counseling: I discussed with the patient the risks of thalidomide including but not limited to birth defects, anxiety, weakness, chest pain, dizziness, cough and severe allergy. Clindamycin Counseling: I counseled the patient regarding use of clindamycin as an antibiotic for prophylactic and/or therapeutic purposes. Clindamycin is active against numerous classes of bacteria, including skin bacteria. Side effects may include nausea, diarrhea, gastrointestinal upset, rash, hives, yeast infections, and in rare cases, colitis. Xelgayez Pregnancy And Lactation Text: This medication is Pregnancy Category D and is not considered safe during pregnancy. The risk during breast feeding is also uncertain. Hydroxychloroquine Pregnancy And Lactation Text: This medication has been shown to cause fetal harm but it isn't assigned a Pregnancy Risk Category. There are small amounts excreted in breast milk. Tazorac Pregnancy And Lactation Text: This medication is not safe during pregnancy. It is unknown if this medication is excreted in breast milk. Terbinafine Pregnancy And Lactation Text: This medication is Pregnancy Category B and is considered safe during pregnancy. It is also excreted in breast milk and breast feeding isn't recommended. Calcipotriene Counseling:  I discussed with the patient the risks of calcipotriene including but not limited to erythema, scaling, itching, and irritation. Cantharidin Counseling:  I discussed with the patient the risks of Cantharidin including but not limited to pain, redness, burning, itching, and blistering. Topical Clindamycin Counseling: Patient counseled that this medication may cause skin irritation or allergic reactions. In the event of skin irritation, the patient was advised to reduce the amount of the drug applied or use it less frequently. The patient verbalized understanding of the proper use and possible adverse effects of clindamycin. All of the patient's questions and concerns were addressed. Quinolones Counseling:  I discussed with the patient the risks of fluoroquinolones including but not limited to GI upset, allergic reaction, drug rash, diarrhea, dizziness, photosensitivity, yeast infections, liver function test abnormalities, tendonitis/tendon rupture. Oral Minoxidil Pregnancy And Lactation Text: This medication should only be used when clearly needed if you are pregnant, attempting to become pregnant or breast feeding. Hydroquinone Counseling:  Patient advised that medication may result in skin irritation, lightening (hypopigmentation), dryness, and burning. In the event of skin irritation, the patient was advised to reduce the amount of the drug applied or use it less frequently. Rarely, spots that are treated with hydroquinone can become darker (pseudoochronosis). Should this occur, patient instructed to stop medication and call the office. The patient verbalized understanding of the proper use and possible adverse effects of hydroquinone. All of the patient's questions and concerns were addressed. Methotrexate Counseling:  Patient counseled regarding adverse effects of methotrexate including but not limited to nausea, vomiting, abnormalities in liver function tests. Patients may develop mouth sores, rash, diarrhea, and abnormalities in blood counts. The patient understands that monitoring is required including LFT's and blood counts. There is a rare possibility of scarring of the liver and lung problems that can occur when taking methotrexate. Persistent nausea, loss of appetite, pale stools, dark urine, cough, and shortness of breath should be reported immediately. Patient advised to discontinue methotrexate treatment at least three months before attempting to become pregnant. I discussed the need for folate supplements while taking methotrexate. These supplements can decrease side effects during methotrexate treatment. The patient verbalized understanding of the proper use and possible adverse effects of methotrexate. All of the patient's questions and concerns were addressed. Low Dose Naltrexone Counseling- I discussed with the patient the potential risks and side effects of low dose naltrexone including but not limited to: more vivid dreams, headaches, nausea, vomiting, abdominal pain, fatigue, dizziness, and anxiety. Odomzo Counseling- I discussed with the patient the risks of Odomzo including but not limited to nausea, vomiting, diarrhea, constipation, weight loss, changes in the sense of taste, decreased appetite, muscle spasms, and hair loss. The patient verbalized understanding of the proper use and possible adverse effects of Odomzo. All of the patient's questions and concerns were addressed. Siliq Counseling:  I discussed with the patient the risks of Siliq including but not limited to new or worsening depression, suicidal thoughts and behavior, immunosuppression, malignancy, posterior leukoencephalopathy syndrome, and serious infections. The patient understands that monitoring is required including a PPD at baseline and must alert us or the primary physician if symptoms of infection or other concerning signs are noted. There is also a special program designed to monitor depression which is required with Siliq. Ivermectin Counseling:  Patient instructed to take medication on an empty stomach with a full glass of water. Patient informed of potential adverse effects including but not limited to nausea, diarrhea, dizziness, itching, and swelling of the extremities or lymph nodes. The patient verbalized understanding of the proper use and possible adverse effects of ivermectin. All of the patient's questions and concerns were addressed. Calcipotriene Pregnancy And Lactation Text: The use of this medication during pregnancy or lactation is not recommended as there is insufficient data. Spironolactone Pregnancy And Lactation Text: This medication can cause feminization of the male fetus and should be avoided during pregnancy. The active metabolite is also found in breast milk. Mirvaso Pregnancy And Lactation Text: This medication has not been assigned a Pregnancy Risk Category. It is unknown if the medication is excreted in breast milk. Opzelura Counseling:  I discussed with the patient the risks of Grayce Grumet including but not limited to nasopharngitis, bronchitis, ear infection, eosinophila, hives, diarrhea, folliculitis, tonsillitis, and rhinorrhea. Taken orally, this medication has been linked to serious infections; higher rate of mortality; malignancy and lymphoproliferative disorders; major adverse cardiovascular events; thrombosis; thrombocytopenia, anemia, and neutropenia; and lipid elevations. Winlevi Counseling:  I discussed with the patient the risks of topical clascoterone including but not limited to erythema, scaling, itching, and stinging. Patient voiced their understanding. Opioid Counseling: I discussed with the patient the potential side effects of opioids including but not limited to addiction, altered mental status, and depression. I stressed avoiding alcohol, benzodiazepines, muscle relaxants and sleep aids unless specifically okayed by a physician. The patient verbalized understanding of the proper use and possible adverse effects of opioids. All of the patient's questions and concerns were addressed. They were instructed to flush the remaining pills down the toilet if they did not need them for pain. Clindamycin Pregnancy And Lactation Text: This medication can be used in pregnancy if certain situations. Clindamycin is also present in breast milk. Aklief counseling:  Patient advised to apply a pea-sized amount only at bedtime and wait 30 minutes after washing their face before applying. If too drying, patient may add a non-comedogenic moisturizer. The most commonly reported side effects including irritation, redness, scaling, dryness, stinging, burning, itching, and increased risk of sunburn. The patient verbalized understanding of the proper use and possible adverse effects of retinoids. All of the patient's questions and concerns were addressed. Olumiant Pregnancy And Lactation Text: Based on animal studies, Haig Cancer may cause embryo-fetal harm when administered to pregnant women. The medication should not be used in pregnancy. Breastfeeding is not recommended during treatment. Tremfya Counseling: I discussed with the patient the risks of guselkumab including but not limited to immunosuppression, serious infections, and drug reactions. The patient understands that monitoring is required including a PPD at baseline and must alert us or the primary physician if symptoms of infection or other concerning signs are noted. Acitretin Counseling:  I discussed with the patient the risks of acitretin including but not limited to hair loss, dry lips/skin/eyes, liver damage, hyperlipidemia, depression/suicidal ideation, photosensitivity. Serious rare side effects can include but are not limited to pancreatitis, pseudotumor cerebri, bony changes, clot formation/stroke/heart attack. Patient understands that alcohol is contraindicated since it can result in liver toxicity and significantly prolong the elimination of the drug by many years. 5-Fu Counseling: 5-Fluorouracil Counseling:  I discussed with the patient the risks of 5-fluorouracil including but not limited to erythema, scaling, itching, weeping, crusting, and pain. Low Dose Naltrexone Pregnancy And Lactation Text: Naltrexone is pregnancy category C. There have been no adequate and well-controlled studies in pregnant women. It should be used in pregnancy only if the potential benefit justifies the potential risk to the fetus. Limited data indicates that naltrexone is minimally excreted into breastmilk. Methotrexate Pregnancy And Lactation Text: This medication is Pregnancy Category X and is known to cause fetal harm. This medication is excreted in breast milk. Dapsone Counseling: I discussed with the patient the risks of dapsone including but not limited to hemolytic anemia, agranulocytosis, rashes, methemoglobinemia, kidney failure, peripheral neuropathy, headaches, GI upset, and liver toxicity. Patients who start dapsone require monitoring including baseline LFTs and weekly CBCs for the first month, then every month thereafter. The patient verbalized understanding of the proper use and possible adverse effects of dapsone. All of the patient's questions and concerns were addressed. Otezla Counseling: Umang Pierre Counseling: The side effects of Umang Ignacio were discussed with the patient, including but not limited to worsening or new depression, weight loss, diarrhea, nausea, upper respiratory tract infection, and headache. Patient instructed to call the office should any adverse effect occur. The patient verbalized understanding of the proper use and possible adverse effects of Otezla. All the patient's questions and concerns were addressed. Cimetidine Counseling:  I discussed with the patient the risks of Cimetidine including but not limited to gynecomastia, headache, diarrhea, nausea, drowsiness, arrhythmias, pancreatitis, skin rashes, psychosis, bone marrow suppression and kidney toxicity. Cantharidin Pregnancy And Lactation Text: This medication has not been proven safe during pregnancy. It is unknown if this medication is excreted in breast milk. Humira Counseling:  I discussed with the patient the risks of adalimumab including but not limited to myelosuppression, immunosuppression, autoimmune hepatitis, demyelinating diseases, lymphoma, and serious infections. The patient understands that monitoring is required including a PPD at baseline and must alert us or the primary physician if symptoms of infection or other concerning signs are noted. Opioid Pregnancy And Lactation Text: These medications can lead to premature delivery and should be avoided during pregnancy. These medications are also present in breast milk in small amounts. Simponi Counseling:  I discussed with the patient the risks of golimumab including but not limited to myelosuppression, immunosuppression, autoimmune hepatitis, demyelinating diseases, lymphoma, and serious infections. The patient understands that monitoring is required including a PPD at baseline and must alert us or the primary physician if symptoms of infection or other concerning signs are noted. Imiquimod Counseling:  I discussed with the patient the risks of imiquimod including but not limited to erythema, scaling, itching, weeping, crusting, and pain. Patient understands that the inflammatory response to imiquimod is variable from person to person and was educated regarded proper titration schedule. If flu-like symptoms develop, patient knows to discontinue the medication and contact us. Winlevi Pregnancy And Lactation Text: This medication is considered safe during pregnancy and breastfeeding. Tranexamic Acid Counseling:  Patient advised of the small risk of bleeding problems with tranexamic acid. They were also instructed to call if they developed any nausea, vomiting or diarrhea. All of the patient's questions and concerns were addressed. Opzelura Pregnancy And Lactation Text: There is insufficient data to evaluate drug-associated risk for major birth defects, miscarriage, or other adverse maternal or fetal outcomes. There is a pregnancy registry that monitors pregnancy outcomes in pregnant persons exposed to the medication during pregnancy. It is unknown if this medication is excreted in breast milk. Do not breastfeed during treatment and for about 4 weeks after the last dose. Topical Metronidazole Counseling: Metronidazole is a topical antibiotic medication. You may experience burning, stinging, redness, or allergic reactions. Please call our office if you develop any problems from using this medication. Otezla Pregnancy And Lactation Text: This medication is Pregnancy Category C and it isn't known if it is safe during pregnancy. It is unknown if it is excreted in breast milk. Azathioprine Counseling:  I discussed with the patient the risks of azathioprine including but not limited to myelosuppression, immunosuppression, hepatotoxicity, lymphoma, and infections. The patient understands that monitoring is required including baseline LFTs, Creatinine, possible TPMP genotyping and weekly CBCs for the first month and then every 2 weeks thereafter. The patient verbalized understanding of the proper use and possible adverse effects of azathioprine. All of the patient's questions and concerns were addressed. Doxycycline Counseling:  Patient counseled regarding possible photosensitivity and increased risk for sunburn. Patient instructed to avoid sunlight, if possible. When exposed to sunlight, patients should wear protective clothing, sunglasses, and sunscreen. The patient was instructed to call the office immediately if the following severe adverse effects occur:  hearing changes, easy bruising/bleeding, severe headache, or vision changes. The patient verbalized understanding of the proper use and possible adverse effects of doxycycline. All of the patient's questions and concerns were addressed. Griseofulvin Counseling:  I discussed with the patient the risks of griseofulvin including but not limited to photosensitivity, cytopenia, liver damage, nausea/vomiting and severe allergy. The patient understands that this medication is best absorbed when taken with a fatty meal (e.g., ice cream or french fries). Solaraze Counseling:  I discussed with the patient the risks of Solaraze including but not limited to erythema, scaling, itching, weeping, crusting, and pain. Aklief Pregnancy And Lactation Text: It is unknown if this medication is safe to use during pregnancy. It is unknown if this medication is excreted in breast milk. Breastfeeding women should use the topical cream on the smallest area of the skin for the shortest time needed while breastfeeding. Do not apply to nipple and areola. Xolair Counseling:  Patient informed of potential adverse effects including but not limited to fever, muscle aches, rash and allergic reactions. The patient verbalized understanding of the proper use and possible adverse effects of Xolair. All of the patient's questions and concerns were addressed. Griseofulvin Pregnancy And Lactation Text: This medication is Pregnancy Category X and is known to cause serious birth defects. It is unknown if this medication is excreted in breast milk but breast feeding should be avoided. Azelaic Acid Counseling: Patient counseled that medicine may cause skin irritation and to avoid applying near the eyes. In the event of skin irritation, the patient was advised to reduce the amount of the drug applied or use it less frequently. The patient verbalized understanding of the proper use and possible adverse effects of azelaic acid. All of the patient's questions and concerns were addressed. Niacinamide Counseling: I recommended taking niacin or niacinamide, also know as vitamin B3, twice daily. Recent evidence suggests that taking vitamin B3 (500 mg twice daily) can reduce the risk of actinic keratoses and non-melanoma skin cancers. Side effects of vitamin B3 include flushing and headache. Solaraze Pregnancy And Lactation Text: This medication is Pregnancy Category B and is considered safe. There is some data to suggest avoiding during the third trimester. It is unknown if this medication is excreted in breast milk. Prednisone Counseling:  I discussed with the patient the risks of prolonged use of prednisone including but not limited to weight gain, insomnia, osteoporosis, mood changes, diabetes, susceptibility to infection, glaucoma and high blood pressure. In cases where prednisone use is prolonged, patients should be monitored with blood pressure checks, serum glucose levels and an eye exam.  Additionally, the patient may need to be placed on GI prophylaxis, PCP prophylaxis, and calcium and vitamin D supplementation and/or a bisphosphonate. The patient verbalized understanding of the proper use and the possible adverse effects of prednisone. All of the patient's questions and concerns were addressed. Topical Ketoconazole Counseling: Patient counseled that this medication may cause skin irritation or allergic reactions. In the event of skin irritation, the patient was advised to reduce the amount of the drug applied or use it less frequently. The patient verbalized understanding of the proper use and possible adverse effects of ketoconazole. All of the patient's questions and concerns were addressed. Dapsone Pregnancy And Lactation Text: This medication is Pregnancy Category C and is not considered safe during pregnancy or breast feeding. Rifampin Counseling: I discussed with the patient the risks of rifampin including but not limited to liver damage, kidney damage, red-orange body fluids, nausea/vomiting and severe allergy. Acitretin Pregnancy And Lactation Text: This medication is Pregnancy Category X and should not be given to women who are pregnant or may become pregnant in the future. This medication is excreted in breast milk. Cimzia Counseling:  I discussed with the patient the risks of Cimzia including but not limited to immunosuppression, allergic reactions and infections. The patient understands that monitoring is required including a PPD at baseline and must alert us or the primary physician if symptoms of infection or other concerning signs are noted. Dutasteride Counseling: Dustasteride Counseling:  I discussed with the patient the risks of use of dutasteride including but not limited to decreased libido, decreased ejaculate volume, and gynecomastia. Women who can become pregnant should not handle medication. All of the patient's questions and concerns were addressed.

## 2023-11-27 ENCOUNTER — APPOINTMENT (OUTPATIENT)
Dept: HEMATOLOGY ONCOLOGY | Facility: CLINIC | Age: 78
End: 2023-11-27

## 2023-11-27 ENCOUNTER — OUTPATIENT (OUTPATIENT)
Dept: OUTPATIENT SERVICES | Facility: HOSPITAL | Age: 78
LOS: 1 days | End: 2023-11-27
Payer: MEDICARE

## 2023-11-27 DIAGNOSIS — Z98.890 OTHER SPECIFIED POSTPROCEDURAL STATES: Chronic | ICD-10-CM

## 2023-11-27 DIAGNOSIS — D64.9 ANEMIA, UNSPECIFIED: ICD-10-CM

## 2023-11-27 DIAGNOSIS — Z90.710 ACQUIRED ABSENCE OF BOTH CERVIX AND UTERUS: Chronic | ICD-10-CM

## 2023-11-27 DIAGNOSIS — Z90.49 ACQUIRED ABSENCE OF OTHER SPECIFIED PARTS OF DIGESTIVE TRACT: Chronic | ICD-10-CM

## 2023-11-27 PROCEDURE — 84075 ASSAY ALKALINE PHOSPHATASE: CPT

## 2023-11-27 PROCEDURE — 86225 DNA ANTIBODY NATIVE: CPT

## 2023-11-27 PROCEDURE — 36415 COLL VENOUS BLD VENIPUNCTURE: CPT

## 2023-11-28 DIAGNOSIS — D64.9 ANEMIA, UNSPECIFIED: ICD-10-CM

## 2023-11-28 LAB — ALP BLD-CCNC: 145 U/L

## 2023-11-29 LAB — DSDNA AB SER-ACNC: <12 IU/ML

## 2024-01-01 ENCOUNTER — INPATIENT (INPATIENT)
Facility: HOSPITAL | Age: 79
LOS: 4 days | Discharge: HOME | DRG: 446 | End: 2024-11-23
Attending: INTERNAL MEDICINE | Admitting: STUDENT IN AN ORGANIZED HEALTH CARE EDUCATION/TRAINING PROGRAM
Payer: MEDICARE

## 2024-01-01 VITALS
OXYGEN SATURATION: 92 % | SYSTOLIC BLOOD PRESSURE: 169 MMHG | RESPIRATION RATE: 18 BRPM | HEIGHT: 65 IN | HEART RATE: 96 BPM | TEMPERATURE: 101 F | DIASTOLIC BLOOD PRESSURE: 74 MMHG

## 2024-01-01 VITALS
HEART RATE: 70 BPM | RESPIRATION RATE: 18 BRPM | OXYGEN SATURATION: 94 % | TEMPERATURE: 98 F | SYSTOLIC BLOOD PRESSURE: 135 MMHG | DIASTOLIC BLOOD PRESSURE: 73 MMHG

## 2024-01-01 DIAGNOSIS — I11.0 HYPERTENSIVE HEART DISEASE WITH HEART FAILURE: ICD-10-CM

## 2024-01-01 DIAGNOSIS — D50.9 IRON DEFICIENCY ANEMIA, UNSPECIFIED: ICD-10-CM

## 2024-01-01 DIAGNOSIS — Z79.02 LONG TERM (CURRENT) USE OF ANTITHROMBOTICS/ANTIPLATELETS: ICD-10-CM

## 2024-01-01 DIAGNOSIS — J96.01 ACUTE RESPIRATORY FAILURE WITH HYPOXIA: ICD-10-CM

## 2024-01-01 DIAGNOSIS — Z88.6 ALLERGY STATUS TO ANALGESIC AGENT: ICD-10-CM

## 2024-01-01 DIAGNOSIS — Z90.49 ACQUIRED ABSENCE OF OTHER SPECIFIED PARTS OF DIGESTIVE TRACT: Chronic | ICD-10-CM

## 2024-01-01 DIAGNOSIS — Z98.890 OTHER SPECIFIED POSTPROCEDURAL STATES: Chronic | ICD-10-CM

## 2024-01-01 DIAGNOSIS — D13.5 BENIGN NEOPLASM OF EXTRAHEPATIC BILE DUCTS: ICD-10-CM

## 2024-01-01 DIAGNOSIS — A41.9 SEPSIS, UNSPECIFIED ORGANISM: ICD-10-CM

## 2024-01-01 DIAGNOSIS — Z90.710 ACQUIRED ABSENCE OF BOTH CERVIX AND UTERUS: Chronic | ICD-10-CM

## 2024-01-01 DIAGNOSIS — I73.9 PERIPHERAL VASCULAR DISEASE, UNSPECIFIED: ICD-10-CM

## 2024-01-01 DIAGNOSIS — D84.81 IMMUNODEFICIENCY DUE TO CONDITIONS CLASSIFIED ELSEWHERE: ICD-10-CM

## 2024-01-01 DIAGNOSIS — I50.32 CHRONIC DIASTOLIC (CONGESTIVE) HEART FAILURE: ICD-10-CM

## 2024-01-01 DIAGNOSIS — K80.50 CALCULUS OF BILE DUCT WITHOUT CHOLANGITIS OR CHOLECYSTITIS WITHOUT OBSTRUCTION: ICD-10-CM

## 2024-01-01 DIAGNOSIS — E78.00 PURE HYPERCHOLESTEROLEMIA, UNSPECIFIED: ICD-10-CM

## 2024-01-01 DIAGNOSIS — D13.2 BENIGN NEOPLASM OF DUODENUM: ICD-10-CM

## 2024-01-01 DIAGNOSIS — E03.9 HYPOTHYROIDISM, UNSPECIFIED: ICD-10-CM

## 2024-01-01 DIAGNOSIS — K80.33 CALCULUS OF BILE DUCT WITH ACUTE CHOLANGITIS WITH OBSTRUCTION: ICD-10-CM

## 2024-01-01 DIAGNOSIS — Z79.890 HORMONE REPLACEMENT THERAPY: ICD-10-CM

## 2024-01-01 LAB
ALBUMIN SERPL ELPH-MCNC: 3.2 G/DL — LOW (ref 3.5–5.2)
ALBUMIN SERPL ELPH-MCNC: 3.4 G/DL — LOW (ref 3.5–5.2)
ALBUMIN SERPL ELPH-MCNC: 3.5 G/DL — SIGNIFICANT CHANGE UP (ref 3.5–5.2)
ALBUMIN SERPL ELPH-MCNC: 3.5 G/DL — SIGNIFICANT CHANGE UP (ref 3.5–5.2)
ALBUMIN SERPL ELPH-MCNC: 3.6 G/DL — SIGNIFICANT CHANGE UP (ref 3.5–5.2)
ALBUMIN SERPL ELPH-MCNC: 3.9 G/DL — SIGNIFICANT CHANGE UP (ref 3.5–5.2)
ALP SERPL-CCNC: 203 U/L — HIGH (ref 30–115)
ALP SERPL-CCNC: 224 U/L — HIGH (ref 30–115)
ALP SERPL-CCNC: 237 U/L — HIGH (ref 30–115)
ALP SERPL-CCNC: 304 U/L — HIGH (ref 30–115)
ALP SERPL-CCNC: 334 U/L — HIGH (ref 30–115)
ALP SERPL-CCNC: 412 U/L — HIGH (ref 30–115)
ALT FLD-CCNC: 137 U/L — HIGH (ref 0–41)
ALT FLD-CCNC: 182 U/L — HIGH (ref 0–41)
ALT FLD-CCNC: 274 U/L — HIGH (ref 0–41)
ALT FLD-CCNC: 47 U/L — HIGH (ref 0–41)
ALT FLD-CCNC: 58 U/L — HIGH (ref 0–41)
ALT FLD-CCNC: 82 U/L — HIGH (ref 0–41)
ANION GAP SERPL CALC-SCNC: 11 MMOL/L — SIGNIFICANT CHANGE UP (ref 7–14)
ANION GAP SERPL CALC-SCNC: 13 MMOL/L — SIGNIFICANT CHANGE UP (ref 7–14)
ANION GAP SERPL CALC-SCNC: 14 MMOL/L — SIGNIFICANT CHANGE UP (ref 7–14)
ANION GAP SERPL CALC-SCNC: 15 MMOL/L — HIGH (ref 7–14)
ANION GAP SERPL CALC-SCNC: 9 MMOL/L — SIGNIFICANT CHANGE UP (ref 7–14)
ANION GAP SERPL CALC-SCNC: 9 MMOL/L — SIGNIFICANT CHANGE UP (ref 7–14)
APPEARANCE UR: ABNORMAL
APTT BLD: 31.3 SEC — SIGNIFICANT CHANGE UP (ref 27–39.2)
AST SERPL-CCNC: 14 U/L — SIGNIFICANT CHANGE UP (ref 0–41)
AST SERPL-CCNC: 14 U/L — SIGNIFICANT CHANGE UP (ref 0–41)
AST SERPL-CCNC: 21 U/L — SIGNIFICANT CHANGE UP (ref 0–41)
AST SERPL-CCNC: 226 U/L — HIGH (ref 0–41)
AST SERPL-CCNC: 48 U/L — HIGH (ref 0–41)
AST SERPL-CCNC: 98 U/L — HIGH (ref 0–41)
BASOPHILS # BLD AUTO: 0 K/UL — SIGNIFICANT CHANGE UP (ref 0–0.2)
BASOPHILS # BLD AUTO: 0.02 K/UL — SIGNIFICANT CHANGE UP (ref 0–0.2)
BASOPHILS # BLD AUTO: 0.02 K/UL — SIGNIFICANT CHANGE UP (ref 0–0.2)
BASOPHILS # BLD AUTO: 0.03 K/UL — SIGNIFICANT CHANGE UP (ref 0–0.2)
BASOPHILS # BLD AUTO: 0.04 K/UL — SIGNIFICANT CHANGE UP (ref 0–0.2)
BASOPHILS # BLD AUTO: 0.05 K/UL — SIGNIFICANT CHANGE UP (ref 0–0.2)
BASOPHILS NFR BLD AUTO: 0 % — SIGNIFICANT CHANGE UP (ref 0–1)
BASOPHILS NFR BLD AUTO: 0.2 % — SIGNIFICANT CHANGE UP (ref 0–1)
BASOPHILS NFR BLD AUTO: 0.2 % — SIGNIFICANT CHANGE UP (ref 0–1)
BASOPHILS NFR BLD AUTO: 0.3 % — SIGNIFICANT CHANGE UP (ref 0–1)
BASOPHILS NFR BLD AUTO: 0.4 % — SIGNIFICANT CHANGE UP (ref 0–1)
BASOPHILS NFR BLD AUTO: 0.5 % — SIGNIFICANT CHANGE UP (ref 0–1)
BILIRUB DIRECT SERPL-MCNC: 2.3 MG/DL — HIGH (ref 0–0.3)
BILIRUB INDIRECT FLD-MCNC: 1.6 MG/DL — HIGH (ref 0.2–1.2)
BILIRUB SERPL-MCNC: 0.9 MG/DL — SIGNIFICANT CHANGE UP (ref 0.2–1.2)
BILIRUB SERPL-MCNC: 1 MG/DL — SIGNIFICANT CHANGE UP (ref 0.2–1.2)
BILIRUB SERPL-MCNC: 1.4 MG/DL — HIGH (ref 0.2–1.2)
BILIRUB SERPL-MCNC: 2 MG/DL — HIGH (ref 0.2–1.2)
BILIRUB SERPL-MCNC: 3.9 MG/DL — HIGH (ref 0.2–1.2)
BILIRUB SERPL-MCNC: 4 MG/DL — HIGH (ref 0.2–1.2)
BILIRUB UR-MCNC: ABNORMAL
BUN SERPL-MCNC: 13 MG/DL — SIGNIFICANT CHANGE UP (ref 10–20)
BUN SERPL-MCNC: 14 MG/DL — SIGNIFICANT CHANGE UP (ref 10–20)
BUN SERPL-MCNC: 15 MG/DL — SIGNIFICANT CHANGE UP (ref 10–20)
BUN SERPL-MCNC: 16 MG/DL — SIGNIFICANT CHANGE UP (ref 10–20)
CALCIUM SERPL-MCNC: 8.3 MG/DL — LOW (ref 8.4–10.5)
CALCIUM SERPL-MCNC: 8.3 MG/DL — LOW (ref 8.4–10.5)
CALCIUM SERPL-MCNC: 8.5 MG/DL — SIGNIFICANT CHANGE UP (ref 8.4–10.4)
CALCIUM SERPL-MCNC: 8.5 MG/DL — SIGNIFICANT CHANGE UP (ref 8.4–10.4)
CALCIUM SERPL-MCNC: 8.5 MG/DL — SIGNIFICANT CHANGE UP (ref 8.4–10.5)
CALCIUM SERPL-MCNC: 8.6 MG/DL — SIGNIFICANT CHANGE UP (ref 8.4–10.5)
CHLORIDE SERPL-SCNC: 100 MMOL/L — SIGNIFICANT CHANGE UP (ref 98–110)
CHLORIDE SERPL-SCNC: 102 MMOL/L — SIGNIFICANT CHANGE UP (ref 98–110)
CHLORIDE SERPL-SCNC: 104 MMOL/L — SIGNIFICANT CHANGE UP (ref 98–110)
CHLORIDE SERPL-SCNC: 99 MMOL/L — SIGNIFICANT CHANGE UP (ref 98–110)
CO2 SERPL-SCNC: 20 MMOL/L — SIGNIFICANT CHANGE UP (ref 17–32)
CO2 SERPL-SCNC: 23 MMOL/L — SIGNIFICANT CHANGE UP (ref 17–32)
CO2 SERPL-SCNC: 25 MMOL/L — SIGNIFICANT CHANGE UP (ref 17–32)
CO2 SERPL-SCNC: 25 MMOL/L — SIGNIFICANT CHANGE UP (ref 17–32)
CO2 SERPL-SCNC: 27 MMOL/L — SIGNIFICANT CHANGE UP (ref 17–32)
CO2 SERPL-SCNC: 30 MMOL/L — SIGNIFICANT CHANGE UP (ref 17–32)
COLOR SPEC: SIGNIFICANT CHANGE UP
CREAT SERPL-MCNC: 0.7 MG/DL — SIGNIFICANT CHANGE UP (ref 0.7–1.5)
CREAT SERPL-MCNC: 0.8 MG/DL — SIGNIFICANT CHANGE UP (ref 0.7–1.5)
CREAT SERPL-MCNC: 0.8 MG/DL — SIGNIFICANT CHANGE UP (ref 0.7–1.5)
CREAT SERPL-MCNC: 0.9 MG/DL — SIGNIFICANT CHANGE UP (ref 0.7–1.5)
CRP SERPL-MCNC: 128.4 MG/L — HIGH
CULTURE RESULTS: SIGNIFICANT CHANGE UP
CULTURE RESULTS: SIGNIFICANT CHANGE UP
DIFF PNL FLD: NEGATIVE — SIGNIFICANT CHANGE UP
EGFR: 65 ML/MIN/1.73M2 — SIGNIFICANT CHANGE UP
EGFR: 75 ML/MIN/1.73M2 — SIGNIFICANT CHANGE UP
EGFR: 88 ML/MIN/1.73M2 — SIGNIFICANT CHANGE UP
EGFR: 88 ML/MIN/1.73M2 — SIGNIFICANT CHANGE UP
EOSINOPHIL # BLD AUTO: 0 K/UL — SIGNIFICANT CHANGE UP (ref 0–0.7)
EOSINOPHIL # BLD AUTO: 0.03 K/UL — SIGNIFICANT CHANGE UP (ref 0–0.7)
EOSINOPHIL # BLD AUTO: 0.13 K/UL — SIGNIFICANT CHANGE UP (ref 0–0.7)
EOSINOPHIL # BLD AUTO: 0.16 K/UL — SIGNIFICANT CHANGE UP (ref 0–0.7)
EOSINOPHIL # BLD AUTO: 0.32 K/UL — SIGNIFICANT CHANGE UP (ref 0–0.7)
EOSINOPHIL # BLD AUTO: 0.39 K/UL — SIGNIFICANT CHANGE UP (ref 0–0.7)
EOSINOPHIL NFR BLD AUTO: 0 % — SIGNIFICANT CHANGE UP (ref 0–8)
EOSINOPHIL NFR BLD AUTO: 0.3 % — SIGNIFICANT CHANGE UP (ref 0–8)
EOSINOPHIL NFR BLD AUTO: 1.1 % — SIGNIFICANT CHANGE UP (ref 0–8)
EOSINOPHIL NFR BLD AUTO: 1.8 % — SIGNIFICANT CHANGE UP (ref 0–8)
EOSINOPHIL NFR BLD AUTO: 3.5 % — SIGNIFICANT CHANGE UP (ref 0–8)
EOSINOPHIL NFR BLD AUTO: 3.6 % — SIGNIFICANT CHANGE UP (ref 0–8)
ERYTHROCYTE [SEDIMENTATION RATE] IN BLOOD: 71 MM/HR — HIGH (ref 0–20)
FERRITIN SERPL-MCNC: 94 NG/ML — SIGNIFICANT CHANGE UP (ref 13–330)
GLUCOSE SERPL-MCNC: 102 MG/DL — HIGH (ref 70–99)
GLUCOSE SERPL-MCNC: 116 MG/DL — HIGH (ref 70–99)
GLUCOSE SERPL-MCNC: 132 MG/DL — HIGH (ref 70–99)
GLUCOSE SERPL-MCNC: 133 MG/DL — HIGH (ref 70–99)
GLUCOSE SERPL-MCNC: 138 MG/DL — HIGH (ref 70–99)
GLUCOSE SERPL-MCNC: 147 MG/DL — HIGH (ref 70–99)
GLUCOSE UR QL: NEGATIVE MG/DL — SIGNIFICANT CHANGE UP
HCT VFR BLD CALC: 26.8 % — LOW (ref 37–47)
HCT VFR BLD CALC: 27 % — LOW (ref 37–47)
HCT VFR BLD CALC: 27.8 % — LOW (ref 37–47)
HCT VFR BLD CALC: 30.4 % — LOW (ref 37–47)
HCT VFR BLD CALC: 30.7 % — LOW (ref 37–47)
HCT VFR BLD CALC: 31.5 % — LOW (ref 37–47)
HCT VFR BLD CALC: 32.1 % — LOW (ref 37–47)
HGB BLD-MCNC: 8.2 G/DL — LOW (ref 12–16)
HGB BLD-MCNC: 8.2 G/DL — LOW (ref 12–16)
HGB BLD-MCNC: 8.4 G/DL — LOW (ref 12–16)
HGB BLD-MCNC: 9.2 G/DL — LOW (ref 12–16)
HGB BLD-MCNC: 9.3 G/DL — LOW (ref 12–16)
HGB BLD-MCNC: 9.5 G/DL — LOW (ref 12–16)
HGB BLD-MCNC: 9.7 G/DL — LOW (ref 12–16)
IMM GRANULOCYTES NFR BLD AUTO: 0.3 % — SIGNIFICANT CHANGE UP (ref 0.1–0.3)
IMM GRANULOCYTES NFR BLD AUTO: 0.7 % — HIGH (ref 0.1–0.3)
IMM GRANULOCYTES NFR BLD AUTO: 0.9 % — HIGH (ref 0.1–0.3)
INR BLD: 1.15 RATIO — SIGNIFICANT CHANGE UP (ref 0.65–1.3)
IRON SATN MFR SERPL: 23 UG/DL — LOW (ref 35–150)
IRON SATN MFR SERPL: 9 % — LOW (ref 15–50)
KETONES UR-MCNC: ABNORMAL MG/DL
LACTATE SERPL-SCNC: 1.3 MMOL/L — SIGNIFICANT CHANGE UP (ref 0.7–2)
LEUKOCYTE ESTERASE UR-ACNC: NEGATIVE — SIGNIFICANT CHANGE UP
LIDOCAIN IGE QN: 25 U/L — SIGNIFICANT CHANGE UP (ref 7–60)
LYMPHOCYTES # BLD AUTO: 0.5 K/UL — LOW (ref 1.2–3.4)
LYMPHOCYTES # BLD AUTO: 0.74 K/UL — LOW (ref 1.2–3.4)
LYMPHOCYTES # BLD AUTO: 1.12 K/UL — LOW (ref 1.2–3.4)
LYMPHOCYTES # BLD AUTO: 1.23 K/UL — SIGNIFICANT CHANGE UP (ref 1.2–3.4)
LYMPHOCYTES # BLD AUTO: 1.41 K/UL — SIGNIFICANT CHANGE UP (ref 1.2–3.4)
LYMPHOCYTES # BLD AUTO: 1.69 K/UL — SIGNIFICANT CHANGE UP (ref 1.2–3.4)
LYMPHOCYTES # BLD AUTO: 14 % — LOW (ref 20.5–51.1)
LYMPHOCYTES # BLD AUTO: 15.1 % — LOW (ref 20.5–51.1)
LYMPHOCYTES # BLD AUTO: 15.8 % — LOW (ref 20.5–51.1)
LYMPHOCYTES # BLD AUTO: 2.6 % — LOW (ref 20.5–51.1)
LYMPHOCYTES # BLD AUTO: 6.3 % — LOW (ref 20.5–51.1)
LYMPHOCYTES # BLD AUTO: 9.4 % — LOW (ref 20.5–51.1)
MAGNESIUM SERPL-MCNC: 2.1 MG/DL — SIGNIFICANT CHANGE UP (ref 1.8–2.4)
MAGNESIUM SERPL-MCNC: 2.1 MG/DL — SIGNIFICANT CHANGE UP (ref 1.8–2.4)
MAGNESIUM SERPL-MCNC: 2.2 MG/DL — SIGNIFICANT CHANGE UP (ref 1.8–2.4)
MAGNESIUM SERPL-MCNC: 2.3 MG/DL — SIGNIFICANT CHANGE UP (ref 1.8–2.4)
MAGNESIUM SERPL-MCNC: 2.4 MG/DL — SIGNIFICANT CHANGE UP (ref 1.8–2.4)
MCHC RBC-ENTMCNC: 22.7 PG — LOW (ref 27–31)
MCHC RBC-ENTMCNC: 22.8 PG — LOW (ref 27–31)
MCHC RBC-ENTMCNC: 22.9 PG — LOW (ref 27–31)
MCHC RBC-ENTMCNC: 22.9 PG — LOW (ref 27–31)
MCHC RBC-ENTMCNC: 23 PG — LOW (ref 27–31)
MCHC RBC-ENTMCNC: 23 PG — LOW (ref 27–31)
MCHC RBC-ENTMCNC: 23.2 PG — LOW (ref 27–31)
MCHC RBC-ENTMCNC: 30.2 G/DL — LOW (ref 32–37)
MCHC RBC-ENTMCNC: 30.3 G/DL — LOW (ref 32–37)
MCHC RBC-ENTMCNC: 30.3 G/DL — LOW (ref 32–37)
MCHC RBC-ENTMCNC: 30.4 G/DL — LOW (ref 32–37)
MCHC RBC-ENTMCNC: 30.6 G/DL — LOW (ref 32–37)
MCV RBC AUTO: 75.2 FL — LOW (ref 81–99)
MCV RBC AUTO: 75.5 FL — LOW (ref 81–99)
MCV RBC AUTO: 75.6 FL — LOW (ref 81–99)
MCV RBC AUTO: 75.6 FL — LOW (ref 81–99)
MCV RBC AUTO: 75.7 FL — LOW (ref 81–99)
MCV RBC AUTO: 75.8 FL — LOW (ref 81–99)
MCV RBC AUTO: 75.9 FL — LOW (ref 81–99)
MONOCYTES # BLD AUTO: 0.48 K/UL — SIGNIFICANT CHANGE UP (ref 0.1–0.6)
MONOCYTES # BLD AUTO: 0.55 K/UL — SIGNIFICANT CHANGE UP (ref 0.1–0.6)
MONOCYTES # BLD AUTO: 0.76 K/UL — HIGH (ref 0.1–0.6)
MONOCYTES # BLD AUTO: 0.79 K/UL — HIGH (ref 0.1–0.6)
MONOCYTES # BLD AUTO: 0.98 K/UL — HIGH (ref 0.1–0.6)
MONOCYTES # BLD AUTO: 1 K/UL — HIGH (ref 0.1–0.6)
MONOCYTES NFR BLD AUTO: 4.9 % — SIGNIFICANT CHANGE UP (ref 1.7–9.3)
MONOCYTES NFR BLD AUTO: 5.2 % — SIGNIFICANT CHANGE UP (ref 1.7–9.3)
MONOCYTES NFR BLD AUTO: 5.5 % — SIGNIFICANT CHANGE UP (ref 1.7–9.3)
MONOCYTES NFR BLD AUTO: 6.4 % — SIGNIFICANT CHANGE UP (ref 1.7–9.3)
MONOCYTES NFR BLD AUTO: 8.4 % — SIGNIFICANT CHANGE UP (ref 1.7–9.3)
MONOCYTES NFR BLD AUTO: 8.9 % — SIGNIFICANT CHANGE UP (ref 1.7–9.3)
NEUTROPHILS # BLD AUTO: 17.74 K/UL — HIGH (ref 1.4–6.5)
NEUTROPHILS # BLD AUTO: 6.44 K/UL — SIGNIFICANT CHANGE UP (ref 1.4–6.5)
NEUTROPHILS # BLD AUTO: 6.62 K/UL — HIGH (ref 1.4–6.5)
NEUTROPHILS # BLD AUTO: 8.39 K/UL — HIGH (ref 1.4–6.5)
NEUTROPHILS # BLD AUTO: 9.79 K/UL — HIGH (ref 1.4–6.5)
NEUTROPHILS # BLD AUTO: 9.89 K/UL — HIGH (ref 1.4–6.5)
NEUTROPHILS NFR BLD AUTO: 72.3 % — SIGNIFICANT CHANGE UP (ref 42.2–75.2)
NEUTROPHILS NFR BLD AUTO: 75.2 % — SIGNIFICANT CHANGE UP (ref 42.2–75.2)
NEUTROPHILS NFR BLD AUTO: 75.5 % — HIGH (ref 42.2–75.2)
NEUTROPHILS NFR BLD AUTO: 83 % — HIGH (ref 42.2–75.2)
NEUTROPHILS NFR BLD AUTO: 83.7 % — HIGH (ref 42.2–75.2)
NEUTROPHILS NFR BLD AUTO: 92.2 % — HIGH (ref 42.2–75.2)
NITRITE UR-MCNC: NEGATIVE — SIGNIFICANT CHANGE UP
NRBC # BLD: 0 /100 WBCS — SIGNIFICANT CHANGE UP (ref 0–0)
NRBC BLD-RTO: 0 /100 WBCS — SIGNIFICANT CHANGE UP (ref 0–0)
NT-PROBNP SERPL-SCNC: 1546 PG/ML — HIGH (ref 0–300)
PH UR: 6 — SIGNIFICANT CHANGE UP (ref 5–8)
PLATELET # BLD AUTO: 259 K/UL — SIGNIFICANT CHANGE UP (ref 130–400)
PLATELET # BLD AUTO: 293 K/UL — SIGNIFICANT CHANGE UP (ref 130–400)
PLATELET # BLD AUTO: 294 K/UL — SIGNIFICANT CHANGE UP (ref 130–400)
PLATELET # BLD AUTO: 312 K/UL — SIGNIFICANT CHANGE UP (ref 130–400)
PLATELET # BLD AUTO: 321 K/UL — SIGNIFICANT CHANGE UP (ref 130–400)
PLATELET # BLD AUTO: 348 K/UL — SIGNIFICANT CHANGE UP (ref 130–400)
PLATELET # BLD AUTO: 399 K/UL — SIGNIFICANT CHANGE UP (ref 130–400)
PMV BLD: 10 FL — SIGNIFICANT CHANGE UP (ref 7.4–10.4)
PMV BLD: 10.1 FL — SIGNIFICANT CHANGE UP (ref 7.4–10.4)
PMV BLD: 10.3 FL — SIGNIFICANT CHANGE UP (ref 7.4–10.4)
PMV BLD: 10.5 FL — HIGH (ref 7.4–10.4)
PMV BLD: 10.5 FL — HIGH (ref 7.4–10.4)
POTASSIUM SERPL-MCNC: 3.7 MMOL/L — SIGNIFICANT CHANGE UP (ref 3.5–5)
POTASSIUM SERPL-MCNC: 3.7 MMOL/L — SIGNIFICANT CHANGE UP (ref 3.5–5)
POTASSIUM SERPL-MCNC: 3.9 MMOL/L — SIGNIFICANT CHANGE UP (ref 3.5–5)
POTASSIUM SERPL-MCNC: 4.3 MMOL/L — SIGNIFICANT CHANGE UP (ref 3.5–5)
POTASSIUM SERPL-MCNC: 4.3 MMOL/L — SIGNIFICANT CHANGE UP (ref 3.5–5)
POTASSIUM SERPL-MCNC: 4.6 MMOL/L — SIGNIFICANT CHANGE UP (ref 3.5–5)
POTASSIUM SERPL-SCNC: 3.7 MMOL/L — SIGNIFICANT CHANGE UP (ref 3.5–5)
POTASSIUM SERPL-SCNC: 3.7 MMOL/L — SIGNIFICANT CHANGE UP (ref 3.5–5)
POTASSIUM SERPL-SCNC: 3.9 MMOL/L — SIGNIFICANT CHANGE UP (ref 3.5–5)
POTASSIUM SERPL-SCNC: 4.3 MMOL/L — SIGNIFICANT CHANGE UP (ref 3.5–5)
POTASSIUM SERPL-SCNC: 4.3 MMOL/L — SIGNIFICANT CHANGE UP (ref 3.5–5)
POTASSIUM SERPL-SCNC: 4.6 MMOL/L — SIGNIFICANT CHANGE UP (ref 3.5–5)
PROT SERPL-MCNC: 5.4 G/DL — LOW (ref 6–8)
PROT SERPL-MCNC: 5.5 G/DL — LOW (ref 6–8)
PROT SERPL-MCNC: 5.7 G/DL — LOW (ref 6–8)
PROT SERPL-MCNC: 6 G/DL — SIGNIFICANT CHANGE UP (ref 6–8)
PROT SERPL-MCNC: 6.1 G/DL — SIGNIFICANT CHANGE UP (ref 6–8)
PROT SERPL-MCNC: 6.4 G/DL — SIGNIFICANT CHANGE UP (ref 6–8)
PROT UR-MCNC: 30 MG/DL
PROTHROM AB SERPL-ACNC: 13.6 SEC — HIGH (ref 9.95–12.87)
RBC # BLD: 3.54 M/UL — LOW (ref 4.2–5.4)
RBC # BLD: 3.57 M/UL — LOW (ref 4.2–5.4)
RBC # BLD: 3.68 M/UL — LOW (ref 4.2–5.4)
RBC # BLD: 4.02 M/UL — LOW (ref 4.2–5.4)
RBC # BLD: 4.05 M/UL — LOW (ref 4.2–5.4)
RBC # BLD: 4.19 M/UL — LOW (ref 4.2–5.4)
RBC # BLD: 4.23 M/UL — SIGNIFICANT CHANGE UP (ref 4.2–5.4)
RBC # FLD: 18.2 % — HIGH (ref 11.5–14.5)
RBC # FLD: 18.5 % — HIGH (ref 11.5–14.5)
RBC # FLD: 18.6 % — HIGH (ref 11.5–14.5)
RBC # FLD: 19 % — HIGH (ref 11.5–14.5)
RBC # FLD: 19 % — HIGH (ref 11.5–14.5)
RBC # FLD: 19.1 % — HIGH (ref 11.5–14.5)
RBC # FLD: 19.2 % — HIGH (ref 11.5–14.5)
SODIUM SERPL-SCNC: 137 MMOL/L — SIGNIFICANT CHANGE UP (ref 135–146)
SODIUM SERPL-SCNC: 138 MMOL/L — SIGNIFICANT CHANGE UP (ref 135–146)
SODIUM SERPL-SCNC: 138 MMOL/L — SIGNIFICANT CHANGE UP (ref 135–146)
SODIUM SERPL-SCNC: 139 MMOL/L — SIGNIFICANT CHANGE UP (ref 135–146)
SP GR SPEC: >1.03 — HIGH (ref 1–1.03)
SPECIMEN SOURCE: SIGNIFICANT CHANGE UP
SPECIMEN SOURCE: SIGNIFICANT CHANGE UP
TIBC SERPL-MCNC: 244 UG/DL — SIGNIFICANT CHANGE UP (ref 220–430)
TRANSFERRIN SERPL-MCNC: 189 MG/DL — LOW (ref 200–360)
TSH SERPL-MCNC: 0.08 UIU/ML — LOW (ref 0.27–4.2)
UIBC SERPL-MCNC: 221 UG/DL — SIGNIFICANT CHANGE UP (ref 110–370)
UROBILINOGEN FLD QL: 1 MG/DL — SIGNIFICANT CHANGE UP (ref 0.2–1)
WBC # BLD: 11.17 K/UL — HIGH (ref 4.8–10.8)
WBC # BLD: 11.7 K/UL — HIGH (ref 4.8–10.8)
WBC # BLD: 11.9 K/UL — HIGH (ref 4.8–10.8)
WBC # BLD: 19.24 K/UL — HIGH (ref 4.8–10.8)
WBC # BLD: 8.77 K/UL — SIGNIFICANT CHANGE UP (ref 4.8–10.8)
WBC # BLD: 8.82 K/UL — SIGNIFICANT CHANGE UP (ref 4.8–10.8)
WBC # BLD: 8.91 K/UL — SIGNIFICANT CHANGE UP (ref 4.8–10.8)
WBC # FLD AUTO: 11.17 K/UL — HIGH (ref 4.8–10.8)
WBC # FLD AUTO: 11.7 K/UL — HIGH (ref 4.8–10.8)
WBC # FLD AUTO: 11.9 K/UL — HIGH (ref 4.8–10.8)
WBC # FLD AUTO: 19.24 K/UL — HIGH (ref 4.8–10.8)
WBC # FLD AUTO: 8.77 K/UL — SIGNIFICANT CHANGE UP (ref 4.8–10.8)
WBC # FLD AUTO: 8.82 K/UL — SIGNIFICANT CHANGE UP (ref 4.8–10.8)
WBC # FLD AUTO: 8.91 K/UL — SIGNIFICANT CHANGE UP (ref 4.8–10.8)

## 2024-01-01 PROCEDURE — 85025 COMPLETE CBC W/AUTO DIFF WBC: CPT

## 2024-01-01 PROCEDURE — C1889: CPT

## 2024-01-01 PROCEDURE — 83735 ASSAY OF MAGNESIUM: CPT

## 2024-01-01 PROCEDURE — 86900 BLOOD TYPING SEROLOGIC ABO: CPT

## 2024-01-01 PROCEDURE — C9399: CPT

## 2024-01-01 PROCEDURE — 83880 ASSAY OF NATRIURETIC PEPTIDE: CPT

## 2024-01-01 PROCEDURE — C2625: CPT

## 2024-01-01 PROCEDURE — 74018 RADEX ABDOMEN 1 VIEW: CPT

## 2024-01-01 PROCEDURE — C1769: CPT

## 2024-01-01 PROCEDURE — 99223 1ST HOSP IP/OBS HIGH 75: CPT | Mod: FS,25

## 2024-01-01 PROCEDURE — 84466 ASSAY OF TRANSFERRIN: CPT

## 2024-01-01 PROCEDURE — 43264 ERCP REMOVE DUCT CALCULI: CPT | Mod: XU

## 2024-01-01 PROCEDURE — 99285 EMERGENCY DEPT VISIT HI MDM: CPT | Mod: GC

## 2024-01-01 PROCEDURE — 88305 TISSUE EXAM BY PATHOLOGIST: CPT

## 2024-01-01 PROCEDURE — 88305 TISSUE EXAM BY PATHOLOGIST: CPT | Mod: 26

## 2024-01-01 PROCEDURE — 85610 PROTHROMBIN TIME: CPT

## 2024-01-01 PROCEDURE — 82728 ASSAY OF FERRITIN: CPT

## 2024-01-01 PROCEDURE — 99233 SBSQ HOSP IP/OBS HIGH 50: CPT

## 2024-01-01 PROCEDURE — 80053 COMPREHEN METABOLIC PANEL: CPT

## 2024-01-01 PROCEDURE — 85652 RBC SED RATE AUTOMATED: CPT

## 2024-01-01 PROCEDURE — 76000 FLUOROSCOPY <1 HR PHYS/QHP: CPT | Mod: 26

## 2024-01-01 PROCEDURE — 43274 ERCP DUCT STENT PLACEMENT: CPT | Mod: XU

## 2024-01-01 PROCEDURE — 86140 C-REACTIVE PROTEIN: CPT

## 2024-01-01 PROCEDURE — 85730 THROMBOPLASTIN TIME PARTIAL: CPT

## 2024-01-01 PROCEDURE — 74177 CT ABD & PELVIS W/CONTRAST: CPT | Mod: 26,MC

## 2024-01-01 PROCEDURE — 84443 ASSAY THYROID STIM HORMONE: CPT

## 2024-01-01 PROCEDURE — 99239 HOSP IP/OBS DSCHRG MGMT >30: CPT

## 2024-01-01 PROCEDURE — 36410 VNPNXR 3YR/> PHY/QHP DX/THER: CPT

## 2024-01-01 PROCEDURE — 76937 US GUIDE VASCULAR ACCESS: CPT | Mod: 26

## 2024-01-01 PROCEDURE — 83540 ASSAY OF IRON: CPT

## 2024-01-01 PROCEDURE — 71045 X-RAY EXAM CHEST 1 VIEW: CPT

## 2024-01-01 PROCEDURE — 83550 IRON BINDING TEST: CPT

## 2024-01-01 PROCEDURE — 94640 AIRWAY INHALATION TREATMENT: CPT

## 2024-01-01 PROCEDURE — 71045 X-RAY EXAM CHEST 1 VIEW: CPT | Mod: 26

## 2024-01-01 PROCEDURE — 86850 RBC ANTIBODY SCREEN: CPT

## 2024-01-01 PROCEDURE — 85027 COMPLETE CBC AUTOMATED: CPT

## 2024-01-01 PROCEDURE — 36415 COLL VENOUS BLD VENIPUNCTURE: CPT

## 2024-01-01 PROCEDURE — 86901 BLOOD TYPING SEROLOGIC RH(D): CPT

## 2024-01-01 PROCEDURE — 43261 ENDO CHOLANGIOPANCREATOGRAPH: CPT | Mod: XU

## 2024-01-01 PROCEDURE — 99223 1ST HOSP IP/OBS HIGH 75: CPT

## 2024-01-01 PROCEDURE — 74328 X-RAY BILE DUCT ENDOSCOPY: CPT | Mod: 26,XU

## 2024-01-01 PROCEDURE — 76705 ECHO EXAM OF ABDOMEN: CPT | Mod: 26

## 2024-01-01 RX ORDER — PIPERACILLIN-TAZO-DEXTROSE,ISO 3.375G/5
3.38 IV SOLUTION, PIGGYBACK PREMIX FROZEN(ML) INTRAVENOUS EVERY 8 HOURS
Refills: 0 | Status: DISCONTINUED | OUTPATIENT
Start: 2024-01-01 | End: 2024-01-01

## 2024-01-01 RX ORDER — SODIUM CHLORIDE 9 G/1000ML
1000 INJECTION, SOLUTION INTRAVENOUS
Refills: 0 | Status: DISCONTINUED | OUTPATIENT
Start: 2024-01-01 | End: 2024-01-01

## 2024-01-01 RX ORDER — FUROSEMIDE 10 MG/ML
10 INJECTION INTRAMUSCULAR; INTRAVENOUS ONCE
Refills: 0 | Status: COMPLETED | OUTPATIENT
Start: 2024-01-01 | End: 2024-01-01

## 2024-01-01 RX ORDER — INFLUENZA A VIRUS A/IDAHO/07/2018 (H1N1) ANTIGEN (MDCK CELL DERIVED, PROPIOLACTONE INACTIVATED, INFLUENZA A VIRUS A/INDIANA/08/2018 (H3N2) ANTIGEN (MDCK CELL DERIVED, PROPIOLACTONE INACTIVATED), INFLUENZA B VIRUS B/SINGAPORE/INFTT-16-0610/2016 ANTIGEN (MDCK CELL DERIVED, PROPIOLACTONE INACTIVATED), INFLUENZA B VIRUS B/IOWA/06/2017 ANTIGEN (MDCK CELL DERIVED, PROPIOLACTONE INACTIVATED) 15; 15; 15; 15 UG/.5ML; UG/.5ML; UG/.5ML; UG/.5ML
0.5 INJECTION, SUSPENSION INTRAMUSCULAR ONCE
Refills: 0 | Status: DISCONTINUED | OUTPATIENT
Start: 2024-01-01 | End: 2024-01-01

## 2024-01-01 RX ORDER — ENOXAPARIN SODIUM 100 MG/ML
40 INJECTION SUBCUTANEOUS EVERY 24 HOURS
Refills: 0 | Status: DISCONTINUED | OUTPATIENT
Start: 2024-01-01 | End: 2024-01-01

## 2024-01-01 RX ORDER — ONDANSETRON HCL/PF 4 MG/2 ML
4 VIAL (ML) INJECTION EVERY 8 HOURS
Refills: 0 | Status: DISCONTINUED | OUTPATIENT
Start: 2024-01-01 | End: 2024-01-01

## 2024-01-01 RX ORDER — ACETAMINOPHEN 500 MG/5ML
975 LIQUID (ML) ORAL ONCE
Refills: 0 | Status: COMPLETED | OUTPATIENT
Start: 2024-01-01 | End: 2024-01-01

## 2024-01-01 RX ORDER — ONDANSETRON HCL/PF 4 MG/2 ML
4 VIAL (ML) INJECTION ONCE
Refills: 0 | Status: COMPLETED | OUTPATIENT
Start: 2024-01-01 | End: 2024-01-01

## 2024-01-01 RX ORDER — FUROSEMIDE 10 MG/ML
40 INJECTION INTRAMUSCULAR; INTRAVENOUS
Refills: 0 | Status: DISCONTINUED | OUTPATIENT
Start: 2024-01-01 | End: 2024-01-01

## 2024-01-01 RX ORDER — ALPRAZOLAM 0.5 MG
0.25 TABLET, EXTENDED RELEASE 24 HR ORAL EVERY 12 HOURS
Refills: 0 | Status: DISCONTINUED | OUTPATIENT
Start: 2024-01-01 | End: 2024-01-01

## 2024-01-01 RX ORDER — PIPERACILLIN-TAZO-DEXTROSE,ISO 3.375G/5
3.38 IV SOLUTION, PIGGYBACK PREMIX FROZEN(ML) INTRAVENOUS ONCE
Refills: 0 | Status: COMPLETED | OUTPATIENT
Start: 2024-01-01 | End: 2024-01-01

## 2024-01-01 RX ORDER — MOXIFLOXACIN HYDROCHLORIDE 400 MG/1
1 TABLET, FILM COATED ORAL
Qty: 8 | Refills: 0
Start: 2024-01-01 | End: 2024-01-01

## 2024-01-01 RX ORDER — CEFOTETAN DISODIUM 1 G
2 VIAL (EA) INJECTION ONCE
Refills: 0 | Status: COMPLETED | OUTPATIENT
Start: 2024-01-01 | End: 2024-01-01

## 2024-01-01 RX ORDER — CLOPIDOGREL BISULFATE 75 MG/1
1 TABLET, FILM COATED ORAL
Qty: 30 | Refills: 0
Start: 2024-01-01 | End: 2024-01-01

## 2024-01-01 RX ORDER — LEVOTHYROXINE SODIUM 300 MCG
1 TABLET ORAL
Qty: 30 | Refills: 1
Start: 2024-01-01 | End: 2025-01-01

## 2024-01-01 RX ORDER — ZALEPLON 10 MG/1
5 CAPSULE ORAL AT BEDTIME
Refills: 0 | Status: DISCONTINUED | OUTPATIENT
Start: 2024-01-01 | End: 2024-01-01

## 2024-01-01 RX ORDER — CLOPIDOGREL BISULFATE 75 MG/1
75 TABLET, FILM COATED ORAL DAILY
Refills: 0 | Status: DISCONTINUED | OUTPATIENT
Start: 2024-01-01 | End: 2024-01-01

## 2024-01-01 RX ORDER — MELATONIN 5 MG
5 TABLET ORAL AT BEDTIME
Refills: 0 | Status: DISCONTINUED | OUTPATIENT
Start: 2024-01-01 | End: 2024-01-01

## 2024-01-01 RX ORDER — LORAZEPAM 4 MG/ML
0.5 VIAL (ML) INJECTION ONCE
Refills: 0 | Status: DISCONTINUED | OUTPATIENT
Start: 2024-01-01 | End: 2024-01-01

## 2024-01-01 RX ORDER — ALBUTEROL SULFATE 2.5 MG/3ML
2.5 VIAL, NEBULIZER (ML) INHALATION ONCE
Refills: 0 | Status: COMPLETED | OUTPATIENT
Start: 2024-01-01 | End: 2024-01-01

## 2024-01-01 RX ORDER — LEVOTHYROXINE SODIUM 300 MCG
175 TABLET ORAL DAILY
Refills: 0 | Status: DISCONTINUED | OUTPATIENT
Start: 2024-01-01 | End: 2024-01-01

## 2024-01-01 RX ORDER — CLOPIDOGREL BISULFATE 75 MG/1
75 TABLET, FILM COATED ORAL AT BEDTIME
Refills: 0 | Status: DISCONTINUED | OUTPATIENT
Start: 2024-01-01 | End: 2024-01-01

## 2024-01-01 RX ORDER — LOSARTAN POTASSIUM 100 MG/1
50 TABLET, FILM COATED ORAL DAILY
Refills: 0 | Status: DISCONTINUED | OUTPATIENT
Start: 2024-01-01 | End: 2024-01-01

## 2024-01-01 RX ORDER — LEVOTHYROXINE SODIUM 300 MCG
150 TABLET ORAL DAILY
Refills: 0 | Status: DISCONTINUED | OUTPATIENT
Start: 2024-01-01 | End: 2024-01-01

## 2024-01-01 RX ORDER — NIFEDIPINE 30 MG
60 TABLET, EXTENDED RELEASE 24 HR ORAL DAILY
Refills: 0 | Status: DISCONTINUED | OUTPATIENT
Start: 2024-01-01 | End: 2024-01-01

## 2024-01-01 RX ADMIN — SODIUM CHLORIDE 75 MILLILITER(S): 9 INJECTION, SOLUTION INTRAVENOUS at 02:00

## 2024-01-01 RX ADMIN — Medication 175 MICROGRAM(S): at 06:00

## 2024-01-01 RX ADMIN — LOSARTAN POTASSIUM 50 MILLIGRAM(S): 100 TABLET, FILM COATED ORAL at 05:14

## 2024-01-01 RX ADMIN — Medication 25 GRAM(S): at 13:05

## 2024-01-01 RX ADMIN — Medication 25 GRAM(S): at 16:28

## 2024-01-01 RX ADMIN — Medication 25 GRAM(S): at 05:15

## 2024-01-01 RX ADMIN — Medication 60 MILLIGRAM(S): at 05:32

## 2024-01-01 RX ADMIN — Medication 5 MILLIGRAM(S): at 21:07

## 2024-01-01 RX ADMIN — Medication 25 GRAM(S): at 14:07

## 2024-01-01 RX ADMIN — CLOPIDOGREL BISULFATE 75 MILLIGRAM(S): 75 TABLET, FILM COATED ORAL at 22:02

## 2024-01-01 RX ADMIN — Medication 100 GRAM(S): at 17:58

## 2024-01-01 RX ADMIN — Medication 25 GRAM(S): at 13:12

## 2024-01-01 RX ADMIN — Medication 60 MILLIGRAM(S): at 06:00

## 2024-01-01 RX ADMIN — Medication 5 MILLIGRAM(S): at 21:34

## 2024-01-01 RX ADMIN — Medication 25 GRAM(S): at 21:34

## 2024-01-01 RX ADMIN — LOSARTAN POTASSIUM 50 MILLIGRAM(S): 100 TABLET, FILM COATED ORAL at 05:32

## 2024-01-01 RX ADMIN — Medication 150 MICROGRAM(S): at 05:13

## 2024-01-01 RX ADMIN — Medication 175 MICROGRAM(S): at 05:32

## 2024-01-01 RX ADMIN — LOSARTAN POTASSIUM 50 MILLIGRAM(S): 100 TABLET, FILM COATED ORAL at 05:16

## 2024-01-01 RX ADMIN — Medication 25 GRAM(S): at 21:07

## 2024-01-01 RX ADMIN — FUROSEMIDE 40 MILLIGRAM(S): 10 INJECTION INTRAMUSCULAR; INTRAVENOUS at 09:39

## 2024-01-01 RX ADMIN — Medication 40 MILLIGRAM(S): at 05:32

## 2024-01-01 RX ADMIN — Medication 60 MILLIGRAM(S): at 05:14

## 2024-01-01 RX ADMIN — ENOXAPARIN SODIUM 40 MILLIGRAM(S): 100 INJECTION SUBCUTANEOUS at 14:07

## 2024-01-01 RX ADMIN — ENOXAPARIN SODIUM 40 MILLIGRAM(S): 100 INJECTION SUBCUTANEOUS at 13:04

## 2024-01-01 RX ADMIN — Medication 200 GRAM(S): at 03:05

## 2024-01-01 RX ADMIN — Medication 5 MILLIGRAM(S): at 22:02

## 2024-01-01 RX ADMIN — Medication 25 GRAM(S): at 21:10

## 2024-01-01 RX ADMIN — Medication 25 GRAM(S): at 06:00

## 2024-01-01 RX ADMIN — Medication 1000 MILLILITER(S): at 16:32

## 2024-01-01 RX ADMIN — FUROSEMIDE 40 MILLIGRAM(S): 10 INJECTION INTRAMUSCULAR; INTRAVENOUS at 09:06

## 2024-01-01 RX ADMIN — Medication 5 MILLIGRAM(S): at 21:08

## 2024-01-01 RX ADMIN — Medication 2.5 MILLIGRAM(S): at 14:25

## 2024-01-01 RX ADMIN — Medication 40 MILLIGRAM(S): at 06:00

## 2024-01-01 RX ADMIN — Medication 25 GRAM(S): at 22:01

## 2024-01-01 RX ADMIN — Medication 975 MILLIGRAM(S): at 16:32

## 2024-01-01 RX ADMIN — Medication 0.25 MILLIGRAM(S): at 05:54

## 2024-01-01 RX ADMIN — Medication 4 MILLIGRAM(S): at 16:33

## 2024-01-01 RX ADMIN — Medication 40 MILLIGRAM(S): at 05:13

## 2024-01-01 RX ADMIN — Medication 175 MICROGRAM(S): at 05:16

## 2024-01-01 RX ADMIN — Medication 25 GRAM(S): at 05:45

## 2024-01-01 RX ADMIN — Medication 175 MICROGRAM(S): at 05:42

## 2024-01-01 RX ADMIN — CLOPIDOGREL BISULFATE 75 MILLIGRAM(S): 75 TABLET, FILM COATED ORAL at 21:08

## 2024-01-01 RX ADMIN — CLOPIDOGREL BISULFATE 75 MILLIGRAM(S): 75 TABLET, FILM COATED ORAL at 21:07

## 2024-01-01 RX ADMIN — FUROSEMIDE 10 MILLIGRAM(S): 10 INJECTION INTRAMUSCULAR; INTRAVENOUS at 14:31

## 2024-01-01 RX ADMIN — Medication 40 MILLIGRAM(S): at 05:16

## 2024-01-01 RX ADMIN — Medication 40 MILLIGRAM(S): at 05:42

## 2024-01-01 RX ADMIN — Medication 60 MILLIGRAM(S): at 05:16

## 2024-01-01 RX ADMIN — Medication 25 GRAM(S): at 05:12

## 2024-01-01 RX ADMIN — ENOXAPARIN SODIUM 40 MILLIGRAM(S): 100 INJECTION SUBCUTANEOUS at 13:12

## 2024-01-01 RX ADMIN — LOSARTAN POTASSIUM 50 MILLIGRAM(S): 100 TABLET, FILM COATED ORAL at 05:42

## 2024-01-01 RX ADMIN — LOSARTAN POTASSIUM 50 MILLIGRAM(S): 100 TABLET, FILM COATED ORAL at 06:01

## 2024-01-01 RX ADMIN — Medication 60 MILLIGRAM(S): at 05:42

## 2024-01-10 ENCOUNTER — APPOINTMENT (OUTPATIENT)
Dept: ELECTROPHYSIOLOGY | Facility: CLINIC | Age: 79
End: 2024-01-10
Payer: MEDICARE

## 2024-01-10 VITALS
WEIGHT: 220 LBS | BODY MASS INDEX: 36.65 KG/M2 | DIASTOLIC BLOOD PRESSURE: 74 MMHG | HEART RATE: 72 BPM | HEIGHT: 65 IN | SYSTOLIC BLOOD PRESSURE: 142 MMHG

## 2024-01-10 PROBLEM — I47.29 NSVT (NONSUSTAINED VENTRICULAR TACHYCARDIA): Status: ACTIVE | Noted: 2024-01-10

## 2024-01-10 PROCEDURE — 93000 ELECTROCARDIOGRAM COMPLETE: CPT

## 2024-01-10 PROCEDURE — 99214 OFFICE O/P EST MOD 30 MIN: CPT

## 2024-01-10 RX ORDER — ASPIRIN 81 MG/1
81 TABLET ORAL DAILY
Refills: 0 | Status: ACTIVE | COMMUNITY
Start: 2023-01-26

## 2024-01-10 RX ORDER — FUROSEMIDE 40 MG/1
40 TABLET ORAL DAILY
Refills: 0 | Status: ACTIVE | COMMUNITY

## 2024-01-10 RX ORDER — NIFEDIPINE 60 MG/1
60 TABLET, EXTENDED RELEASE ORAL DAILY
Refills: 0 | Status: ACTIVE | COMMUNITY
Start: 2023-01-26

## 2024-01-10 RX ORDER — RIVAROXABAN 2.5 MG/1
2.5 TABLET, FILM COATED ORAL DAILY
Refills: 0 | Status: ACTIVE | COMMUNITY

## 2024-01-10 RX ORDER — CLOPIDOGREL 75 MG/1
75 TABLET, FILM COATED ORAL DAILY
Refills: 0 | Status: ACTIVE | COMMUNITY

## 2024-01-10 RX ORDER — LEVOTHYROXINE SODIUM 175 UG/1
175 TABLET ORAL DAILY
Refills: 0 | Status: ACTIVE | COMMUNITY

## 2024-01-16 NOTE — HISTORY OF PRESENT ILLNESS
[FreeTextEntry1] : Ms. RODRIGUEZ is a 77 year-year old female with history of Graves' disease s/p Irradiation, hypothyroidism, hypertension, hyperlipidemia, PAD s/p multiple b/l LE stents, bradycardia is here for f-up.  Atenolol was stopped. K was corrected. No PPM was implanted. MCOT was given.  Feels fine.   01/10/2024: Feels fine. Had a left heart cath. Had 60% RCA stenosis. No intervention done. Denies any complaints.      Denies chest pain, shortness of breath, palpitation, dizziness or LOC except noted above.   EKG (01/10/2024): SR @ 72, , QRS 80, QTc 430   EKG (09/28/23): SR 66, , QRSd 82,  Cardio: Dr. Stanley

## 2024-01-16 NOTE — ASSESSMENT
[FreeTextEntry1] : ## Sinus Bradycardia ## NSVT  - MCOT Reviewed - Hospital records including TTE, EKG, labs Reviewed  - No bradycardia- no PPM needed - Long NSVT found on monitor - Ischemic workup done. 60% stenosis. No PCI.  - Will plan for MRI to assess for LGE/EF. CBD stent is made of plastic- not a contraindication- d/w GI. - She is also clear to go forward with CBD stent removal from EP standpoint. MRI can be done before or after it. It will not change any intervention in regard to pre-op clearance.   - RTC as needed. Will call patient to discuss MRI results.

## 2024-01-16 NOTE — ADDENDUM
[FreeTextEntry1] : Margarita ADAMS assisted in documentation on 01/10/2024 acting as a scribe for Dr. Renny Aguilar.

## 2024-03-04 ENCOUNTER — OUTPATIENT (OUTPATIENT)
Dept: OUTPATIENT SERVICES | Facility: HOSPITAL | Age: 79
LOS: 1 days | End: 2024-03-04
Payer: MEDICARE

## 2024-03-04 ENCOUNTER — APPOINTMENT (OUTPATIENT)
Dept: HEMATOLOGY ONCOLOGY | Facility: CLINIC | Age: 79
End: 2024-03-04
Payer: MEDICARE

## 2024-03-04 VITALS
SYSTOLIC BLOOD PRESSURE: 187 MMHG | RESPIRATION RATE: 16 BRPM | HEART RATE: 75 BPM | WEIGHT: 213 LBS | TEMPERATURE: 98.7 F | DIASTOLIC BLOOD PRESSURE: 85 MMHG | HEIGHT: 65 IN | BODY MASS INDEX: 35.49 KG/M2

## 2024-03-04 DIAGNOSIS — Z98.890 OTHER SPECIFIED POSTPROCEDURAL STATES: Chronic | ICD-10-CM

## 2024-03-04 DIAGNOSIS — D47.2 MONOCLONAL GAMMOPATHY: ICD-10-CM

## 2024-03-04 DIAGNOSIS — D64.9 ANEMIA, UNSPECIFIED: ICD-10-CM

## 2024-03-04 DIAGNOSIS — Z90.710 ACQUIRED ABSENCE OF BOTH CERVIX AND UTERUS: Chronic | ICD-10-CM

## 2024-03-04 DIAGNOSIS — Z90.49 ACQUIRED ABSENCE OF OTHER SPECIFIED PARTS OF DIGESTIVE TRACT: Chronic | ICD-10-CM

## 2024-03-04 PROCEDURE — 85027 COMPLETE CBC AUTOMATED: CPT

## 2024-03-04 PROCEDURE — 84155 ASSAY OF PROTEIN SERUM: CPT

## 2024-03-04 PROCEDURE — 80053 COMPREHEN METABOLIC PANEL: CPT

## 2024-03-04 PROCEDURE — 99214 OFFICE O/P EST MOD 30 MIN: CPT

## 2024-03-04 PROCEDURE — 83521 IG LIGHT CHAINS FREE EACH: CPT

## 2024-03-04 PROCEDURE — 83615 LACTATE (LD) (LDH) ENZYME: CPT

## 2024-03-04 PROCEDURE — 86334 IMMUNOFIX E-PHORESIS SERUM: CPT | Mod: 91

## 2024-03-04 PROCEDURE — 84165 PROTEIN E-PHORESIS SERUM: CPT

## 2024-03-05 DIAGNOSIS — D64.9 ANEMIA, UNSPECIFIED: ICD-10-CM

## 2024-03-05 PROBLEM — D47.2 MONOCLONAL GAMMOPATHY: Status: ACTIVE | Noted: 2024-03-05

## 2024-03-05 LAB
ALBUMIN SERPL ELPH-MCNC: 4.3 G/DL
ALP BLD-CCNC: 158 U/L
ALT SERPL-CCNC: 29 U/L
ANION GAP SERPL CALC-SCNC: 15 MMOL/L
AST SERPL-CCNC: 18 U/L
BASOPHILS # BLD AUTO: 0.06 K/UL
BASOPHILS NFR BLD AUTO: 0.6 %
BILIRUB SERPL-MCNC: 0.2 MG/DL
BUN SERPL-MCNC: 28 MG/DL
CALCIUM SERPL-MCNC: 9.6 MG/DL
CHLORIDE SERPL-SCNC: 101 MMOL/L
CO2 SERPL-SCNC: 24 MMOL/L
CREAT SERPL-MCNC: 0.9 MG/DL
DEPRECATED KAPPA LC FREE/LAMBDA SER: 2.28 RATIO
EGFR: 66 ML/MIN/1.73M2
EOSINOPHIL # BLD AUTO: 0.42 K/UL
EOSINOPHIL NFR BLD AUTO: 4.1 %
GLUCOSE SERPL-MCNC: 139 MG/DL
HCT VFR BLD CALC: 37.2 %
HGB BLD-MCNC: 11.6 G/DL
IMM GRANULOCYTES NFR BLD AUTO: 0.4 %
KAPPA LC CSF-MCNC: 1.89 MG/DL
KAPPA LC SERPL-MCNC: 4.3 MG/DL
LDH SERPL-CCNC: 129
LYMPHOCYTES # BLD AUTO: 1.73 K/UL
LYMPHOCYTES NFR BLD AUTO: 16.8 %
MAN DIFF?: NORMAL
MCHC RBC-ENTMCNC: 25.8 PG
MCHC RBC-ENTMCNC: 31.2 G/DL
MCV RBC AUTO: 82.7 FL
MONOCYTES # BLD AUTO: 0.68 K/UL
MONOCYTES NFR BLD AUTO: 6.6 %
NEUTROPHILS # BLD AUTO: 7.38 K/UL
NEUTROPHILS NFR BLD AUTO: 71.5 %
PLATELET # BLD AUTO: 341 K/UL
PMV BLD AUTO: 0 /100 WBCS
POTASSIUM SERPL-SCNC: 4.5 MMOL/L
PROT SERPL-MCNC: 7.2 G/DL
RBC # BLD: 4.5 M/UL
RBC # FLD: 15.8 %
SODIUM SERPL-SCNC: 140 MMOL/L
WBC # FLD AUTO: 10.31 K/UL

## 2024-03-05 NOTE — REVIEW OF SYSTEMS
[Fatigue] : fatigue [SOB on Exertion] : shortness of breath during exertion [Vision Problems] : vision problems [Constipation] : constipation [Joint Pain] : joint pain [Difficulty Walking] : difficulty walking [Insomnia] : insomnia [Easy Bruising] : a tendency for easy bruising [Negative] : Genitourinary [de-identified] : She thinks it's because of her vision. [de-identified] : Senile purpura

## 2024-03-05 NOTE — ASSESSMENT
[FreeTextEntry1] : IgG kappa monoclonal gammopathy, clinically stable. The CBC today showed improved Hgb at 11.6. The situation was discussed with the patient. In addition to the CBC, will also obtain CMP, SPEP with IFES, free light chains, quantitative IgG. Further recommendations after the above results are available. All questions answered.  If all stable, the patient may be seen in 6-9 months for follow up.

## 2024-03-05 NOTE — REASON FOR VISIT
[Follow-Up Visit] : a follow-up visit for [Family Member] : family member [FreeTextEntry2] : IgG kappa monoclonal gammopathy

## 2024-03-05 NOTE — PHYSICAL EXAM
[Restricted in physically strenuous activity but ambulatory and able to carry out work of a light or sedentary nature] : Status 1- Restricted in physically strenuous activity but ambulatory and able to carry out work of a light or sedentary nature, e.g., light house work, office work [Obese] : obese [Normal] : grossly intact [de-identified] : Arthritic changes [de-identified] : At least 1-2 + peripheral edema, bilaterally (had duplex Dopplers on a few different occasions) with chronic trophic skin changes.

## 2024-03-05 NOTE — HISTORY OF PRESENT ILLNESS
[Disease:__________________________] : Disease: [unfilled] [de-identified] : The patient is coming for her regularly scheduled follow up for her monoclonal gammopathy with IgG kappa. The only new complaint lately has been her vision, which has become somewhat blurry. She is due to see the ophthalmologist.  Otherwise, no other complaints and the legs feel better after placement of stents on both sides.

## 2024-03-08 LAB
ALBUMIN MFR SERPL ELPH: 55.3 %
ALBUMIN SERPL-MCNC: 4 G/DL
ALBUMIN/GLOB SERPL: 1.2 RATIO
ALPHA1 GLOB MFR SERPL ELPH: 6.3 %
ALPHA1 GLOB SERPL ELPH-MCNC: 0.5 G/DL
ALPHA2 GLOB MFR SERPL ELPH: 12.8 %
ALPHA2 GLOB SERPL ELPH-MCNC: 0.9 G/DL
B-GLOBULIN MFR SERPL ELPH: 13.9 %
B-GLOBULIN SERPL ELPH-MCNC: 1 G/DL
GAMMA GLOB FLD ELPH-MCNC: 0.8 G/DL
GAMMA GLOB MFR SERPL ELPH: 11.7 %
INTERPRETATION SERPL IEP-IMP: NORMAL
M PROTEIN MFR SERPL ELPH: NORMAL
M PROTEIN SPEC IFE-MCNC: NORMAL
MONOCLON BAND OBS SERPL: NORMAL
PROT SERPL-MCNC: 7.2 G/DL

## 2024-04-02 ENCOUNTER — INPATIENT (INPATIENT)
Facility: HOSPITAL | Age: 79
LOS: 1 days | Discharge: HOME | DRG: 445 | End: 2024-04-04
Attending: INTERNAL MEDICINE | Admitting: STUDENT IN AN ORGANIZED HEALTH CARE EDUCATION/TRAINING PROGRAM
Payer: MEDICARE

## 2024-04-02 VITALS
DIASTOLIC BLOOD PRESSURE: 92 MMHG | RESPIRATION RATE: 20 BRPM | TEMPERATURE: 100 F | HEIGHT: 65 IN | WEIGHT: 210.1 LBS | OXYGEN SATURATION: 99 % | HEART RATE: 100 BPM | SYSTOLIC BLOOD PRESSURE: 210 MMHG

## 2024-04-02 DIAGNOSIS — Z98.890 OTHER SPECIFIED POSTPROCEDURAL STATES: Chronic | ICD-10-CM

## 2024-04-02 DIAGNOSIS — Z90.49 ACQUIRED ABSENCE OF OTHER SPECIFIED PARTS OF DIGESTIVE TRACT: Chronic | ICD-10-CM

## 2024-04-02 DIAGNOSIS — K80.50 CALCULUS OF BILE DUCT WITHOUT CHOLANGITIS OR CHOLECYSTITIS WITHOUT OBSTRUCTION: ICD-10-CM

## 2024-04-02 DIAGNOSIS — Z90.710 ACQUIRED ABSENCE OF BOTH CERVIX AND UTERUS: Chronic | ICD-10-CM

## 2024-04-02 LAB
ALBUMIN SERPL ELPH-MCNC: 4 G/DL — SIGNIFICANT CHANGE UP (ref 3.5–5.2)
ALP SERPL-CCNC: 263 U/L — HIGH (ref 30–115)
ALT FLD-CCNC: 228 U/L — HIGH (ref 0–41)
ANION GAP SERPL CALC-SCNC: 13 MMOL/L — SIGNIFICANT CHANGE UP (ref 7–14)
AST SERPL-CCNC: 304 U/L — HIGH (ref 0–41)
BASOPHILS # BLD AUTO: 0.03 K/UL — SIGNIFICANT CHANGE UP (ref 0–0.2)
BASOPHILS NFR BLD AUTO: 0.3 % — SIGNIFICANT CHANGE UP (ref 0–1)
BILIRUB SERPL-MCNC: 1.6 MG/DL — HIGH (ref 0.2–1.2)
BUN SERPL-MCNC: 23 MG/DL — HIGH (ref 10–20)
CALCIUM SERPL-MCNC: 8.4 MG/DL — SIGNIFICANT CHANGE UP (ref 8.4–10.5)
CHLORIDE SERPL-SCNC: 101 MMOL/L — SIGNIFICANT CHANGE UP (ref 98–110)
CO2 SERPL-SCNC: 20 MMOL/L — SIGNIFICANT CHANGE UP (ref 17–32)
CREAT SERPL-MCNC: 0.9 MG/DL — SIGNIFICANT CHANGE UP (ref 0.7–1.5)
EGFR: 66 ML/MIN/1.73M2 — SIGNIFICANT CHANGE UP
EOSINOPHIL # BLD AUTO: 0.03 K/UL — SIGNIFICANT CHANGE UP (ref 0–0.7)
EOSINOPHIL NFR BLD AUTO: 0.3 % — SIGNIFICANT CHANGE UP (ref 0–8)
GLUCOSE SERPL-MCNC: 183 MG/DL — HIGH (ref 70–99)
HCT VFR BLD CALC: 32 % — LOW (ref 37–47)
HGB BLD-MCNC: 10.3 G/DL — LOW (ref 12–16)
IMM GRANULOCYTES NFR BLD AUTO: 0.3 % — SIGNIFICANT CHANGE UP (ref 0.1–0.3)
LACTATE SERPL-SCNC: 2.2 MMOL/L — HIGH (ref 0.7–2)
LIDOCAIN IGE QN: 32 U/L — SIGNIFICANT CHANGE UP (ref 7–60)
LYMPHOCYTES # BLD AUTO: 0.42 K/UL — LOW (ref 1.2–3.4)
LYMPHOCYTES # BLD AUTO: 3.7 % — LOW (ref 20.5–51.1)
MCHC RBC-ENTMCNC: 26.3 PG — LOW (ref 27–31)
MCHC RBC-ENTMCNC: 32.2 G/DL — SIGNIFICANT CHANGE UP (ref 32–37)
MCV RBC AUTO: 81.8 FL — SIGNIFICANT CHANGE UP (ref 81–99)
MONOCYTES # BLD AUTO: 0.61 K/UL — HIGH (ref 0.1–0.6)
MONOCYTES NFR BLD AUTO: 5.4 % — SIGNIFICANT CHANGE UP (ref 1.7–9.3)
NEUTROPHILS # BLD AUTO: 10.22 K/UL — HIGH (ref 1.4–6.5)
NEUTROPHILS NFR BLD AUTO: 90 % — HIGH (ref 42.2–75.2)
NRBC # BLD: 0 /100 WBCS — SIGNIFICANT CHANGE UP (ref 0–0)
NT-PROBNP SERPL-SCNC: 351 PG/ML — HIGH (ref 0–300)
PLATELET # BLD AUTO: 225 K/UL — SIGNIFICANT CHANGE UP (ref 130–400)
PMV BLD: 9.9 FL — SIGNIFICANT CHANGE UP (ref 7.4–10.4)
POTASSIUM SERPL-MCNC: 4.5 MMOL/L — SIGNIFICANT CHANGE UP (ref 3.5–5)
POTASSIUM SERPL-SCNC: 4.5 MMOL/L — SIGNIFICANT CHANGE UP (ref 3.5–5)
PROT SERPL-MCNC: 6.3 G/DL — SIGNIFICANT CHANGE UP (ref 6–8)
RBC # BLD: 3.91 M/UL — LOW (ref 4.2–5.4)
RBC # FLD: 15.8 % — HIGH (ref 11.5–14.5)
SODIUM SERPL-SCNC: 134 MMOL/L — LOW (ref 135–146)
TROPONIN T, HIGH SENSITIVITY RESULT: 27 NG/L — HIGH (ref 6–13)
TROPONIN T, HIGH SENSITIVITY RESULT: 36 NG/L — HIGH (ref 6–13)
WBC # BLD: 11.34 K/UL — HIGH (ref 4.8–10.8)
WBC # FLD AUTO: 11.34 K/UL — HIGH (ref 4.8–10.8)

## 2024-04-02 PROCEDURE — 86901 BLOOD TYPING SEROLOGIC RH(D): CPT

## 2024-04-02 PROCEDURE — 93010 ELECTROCARDIOGRAM REPORT: CPT

## 2024-04-02 PROCEDURE — 80053 COMPREHEN METABOLIC PANEL: CPT

## 2024-04-02 PROCEDURE — 0225U NFCT DS DNA&RNA 21 SARSCOV2: CPT

## 2024-04-02 PROCEDURE — 85730 THROMBOPLASTIN TIME PARTIAL: CPT

## 2024-04-02 PROCEDURE — 86850 RBC ANTIBODY SCREEN: CPT

## 2024-04-02 PROCEDURE — 85610 PROTHROMBIN TIME: CPT

## 2024-04-02 PROCEDURE — 83735 ASSAY OF MAGNESIUM: CPT

## 2024-04-02 PROCEDURE — 93005 ELECTROCARDIOGRAM TRACING: CPT

## 2024-04-02 PROCEDURE — 76705 ECHO EXAM OF ABDOMEN: CPT | Mod: 26

## 2024-04-02 PROCEDURE — 74018 RADEX ABDOMEN 1 VIEW: CPT

## 2024-04-02 PROCEDURE — 83605 ASSAY OF LACTIC ACID: CPT

## 2024-04-02 PROCEDURE — C2625: CPT

## 2024-04-02 PROCEDURE — 82247 BILIRUBIN TOTAL: CPT

## 2024-04-02 PROCEDURE — C1769: CPT

## 2024-04-02 PROCEDURE — C9399: CPT

## 2024-04-02 PROCEDURE — 86900 BLOOD TYPING SEROLOGIC ABO: CPT

## 2024-04-02 PROCEDURE — 99223 1ST HOSP IP/OBS HIGH 75: CPT

## 2024-04-02 PROCEDURE — 85025 COMPLETE CBC W/AUTO DIFF WBC: CPT

## 2024-04-02 PROCEDURE — 71045 X-RAY EXAM CHEST 1 VIEW: CPT | Mod: 26

## 2024-04-02 PROCEDURE — 82962 GLUCOSE BLOOD TEST: CPT

## 2024-04-02 PROCEDURE — 36415 COLL VENOUS BLD VENIPUNCTURE: CPT

## 2024-04-02 PROCEDURE — 74177 CT ABD & PELVIS W/CONTRAST: CPT | Mod: 26,MC

## 2024-04-02 PROCEDURE — C1889: CPT

## 2024-04-02 PROCEDURE — 82248 BILIRUBIN DIRECT: CPT

## 2024-04-02 PROCEDURE — 99285 EMERGENCY DEPT VISIT HI MDM: CPT

## 2024-04-02 RX ORDER — ACETAMINOPHEN 500 MG
975 TABLET ORAL ONCE
Refills: 0 | Status: COMPLETED | OUTPATIENT
Start: 2024-04-02 | End: 2024-04-02

## 2024-04-02 RX ORDER — LEVOTHYROXINE SODIUM 125 MCG
175 TABLET ORAL DAILY
Refills: 0 | Status: DISCONTINUED | OUTPATIENT
Start: 2024-04-02 | End: 2024-04-04

## 2024-04-02 RX ORDER — PIPERACILLIN AND TAZOBACTAM 4; .5 G/20ML; G/20ML
3.38 INJECTION, POWDER, LYOPHILIZED, FOR SOLUTION INTRAVENOUS ONCE
Refills: 0 | Status: COMPLETED | OUTPATIENT
Start: 2024-04-02 | End: 2024-04-02

## 2024-04-02 RX ORDER — PANTOPRAZOLE SODIUM 20 MG/1
40 TABLET, DELAYED RELEASE ORAL
Refills: 0 | Status: DISCONTINUED | OUTPATIENT
Start: 2024-04-02 | End: 2024-04-04

## 2024-04-02 RX ORDER — IPRATROPIUM/ALBUTEROL SULFATE 18-103MCG
3 AEROSOL WITH ADAPTER (GRAM) INHALATION
Refills: 0 | Status: COMPLETED | OUTPATIENT
Start: 2024-04-02 | End: 2024-04-02

## 2024-04-02 RX ORDER — CLOPIDOGREL BISULFATE 75 MG/1
75 TABLET, FILM COATED ORAL DAILY
Refills: 0 | Status: DISCONTINUED | OUTPATIENT
Start: 2024-04-02 | End: 2024-04-03

## 2024-04-02 RX ORDER — SODIUM CHLORIDE 9 MG/ML
250 INJECTION, SOLUTION INTRAVENOUS ONCE
Refills: 0 | Status: COMPLETED | OUTPATIENT
Start: 2024-04-02 | End: 2024-04-02

## 2024-04-02 RX ORDER — NIFEDIPINE 30 MG
60 TABLET, EXTENDED RELEASE 24 HR ORAL DAILY
Refills: 0 | Status: DISCONTINUED | OUTPATIENT
Start: 2024-04-02 | End: 2024-04-04

## 2024-04-02 RX ORDER — CEFTRIAXONE 500 MG/1
1000 INJECTION, POWDER, FOR SOLUTION INTRAMUSCULAR; INTRAVENOUS ONCE
Refills: 0 | Status: COMPLETED | OUTPATIENT
Start: 2024-04-02 | End: 2024-04-02

## 2024-04-02 RX ORDER — ACETAMINOPHEN 500 MG
650 TABLET ORAL EVERY 6 HOURS
Refills: 0 | Status: DISCONTINUED | OUTPATIENT
Start: 2024-04-02 | End: 2024-04-03

## 2024-04-02 RX ORDER — FUROSEMIDE 40 MG
40 TABLET ORAL DAILY
Refills: 0 | Status: DISCONTINUED | OUTPATIENT
Start: 2024-04-02 | End: 2024-04-03

## 2024-04-02 RX ORDER — ASPIRIN/CALCIUM CARB/MAGNESIUM 324 MG
81 TABLET ORAL DAILY
Refills: 0 | Status: DISCONTINUED | OUTPATIENT
Start: 2024-04-02 | End: 2024-04-04

## 2024-04-02 RX ADMIN — Medication 3 MILLILITER(S): at 12:39

## 2024-04-02 RX ADMIN — SODIUM CHLORIDE 250 MILLILITER(S): 9 INJECTION, SOLUTION INTRAVENOUS at 13:12

## 2024-04-02 RX ADMIN — Medication 975 MILLIGRAM(S): at 10:38

## 2024-04-02 RX ADMIN — Medication 3 MILLILITER(S): at 12:36

## 2024-04-02 RX ADMIN — Medication 125 MILLIGRAM(S): at 12:39

## 2024-04-02 RX ADMIN — CEFTRIAXONE 100 MILLIGRAM(S): 500 INJECTION, POWDER, FOR SOLUTION INTRAMUSCULAR; INTRAVENOUS at 12:39

## 2024-04-02 RX ADMIN — Medication 3 MILLILITER(S): at 12:40

## 2024-04-02 RX ADMIN — PIPERACILLIN AND TAZOBACTAM 200 GRAM(S): 4; .5 INJECTION, POWDER, LYOPHILIZED, FOR SOLUTION INTRAVENOUS at 18:25

## 2024-04-02 NOTE — CONSULT NOTE ADULT - SUBJECTIVE AND OBJECTIVE BOX
GENERAL SURGERY CONSULT NOTE    Patient: JAKY RODRIGUEZ , 78y ([birth year])Female   MRN: [id number]  Location: [facility] ED  Visit: 04-02-24 Emergency  Date: 04-02-24 @ 18:13    HPI: Patient is a 78 year old female with pmhx/pshx as below with significant history of cholecystectomy ~20 years ago, ampullary adenoma, recurrent choledocholithiasis with multiple ERCPs with stent placement/exchanges, most recently 7/2023 who presents to the emergency room with abdominal pain and dysuria. Patient found to have elevated LFTs, leukocytosis, and CBD filling defect on imaging. Patient seen and examined.      PAST MEDICAL & SURGICAL HISTORY:  Arterial insufficiency of lower extremity  left leg stent placed  Leg swelling  Hypothyroid  HTN (hypertension)  High cholesterol  Peripheral arterial disease  H/O Graves' disease  History of cholecystectomy  H/O: hysterectomy  S/P angiogram of extremity  S/P ERCP    Home Medications:  aspirin 81 mg oral tablet, chewable: 1 tab(s) orally once a day (25 Oct 2023 13:35)  Plavix 75 mg oral tablet: 1 tab(s) orally once a day, to be held five days before ERCP) (25 Oct 2023 13:35)    VITALS:  T(F): 98.6 (04-02-24 @ 17:01), Max: 100.1 (04-02-24 @ 09:20)  HR: 71 (04-02-24 @ 17:01) (71 - 100)  BP: 171/72 (04-02-24 @ 17:01) (142/70 - 210/92)  RR: 18 (04-02-24 @ 17:01) (16 - 20)  SpO2: 98% (04-02-24 @ 17:01) (98% - 99%)    PHYSICAL EXAM:  General: NAD, AAOx3, calm and cooperative  HEENT: NCAT, EOMI, Trachea ML, Neck supple  Cardiac: RRR S1, S2  Respiratory: CTAB, normal respiratory effort,   Abdomen: Soft, non-distended, non-tender, no rebound, no guarding. +BS.  Musculoskeletal: Strength 5/5 BL UE/LE, ROM intact, compartments soft  Neuro: Sensation grossly intact and equal throughout, no focal deficits  Vascular: Pulses 2+ throughout, extremities well perfused  Skin: Warm/dry, normal color, no jaundice  Incision/wound: healing well, dressings in place, clean, dry and intact    MEDICATIONS  (STANDING):  lactated ringers Bolus 250 milliLiter(s) IV Bolus once  piperacillin/tazobactam IVPB... 3.375 Gram(s) IV Intermittent once    MEDICATIONS  (PRN):      LAB/STUDIES:                        10.3   11.34 )-----------( 225      ( 02 Apr 2024 11:40 )             32.0     04-02    134<L>  |  101  |  23<H>  ----------------------------<  183<H>  4.5   |  20  |  0.9    Ca    8.4      02 Apr 2024 11:40    TPro  6.3  /  Alb  4.0  /  TBili  1.6<H>  /  DBili  x   /  AST  304<H>  /  ALT  228<H>  /  AlkPhos  263<H>  04-02      LIVER FUNCTIONS - ( 02 Apr 2024 11:40 )  Alb: 4.0 g/dL / Pro: 6.3 g/dL / ALK PHOS: 263 U/L / ALT: 228 U/L / AST: 304 U/L / GGT: x           Urinalysis Basic - ( 02 Apr 2024 11:40 )    Color: x / Appearance: x / SG: x / pH: x  Gluc: 183 mg/dL / Ketone: x  / Bili: x / Urobili: x   Blood: x / Protein: x / Nitrite: x   Leuk Esterase: x / RBC: x / WBC x   Sq Epi: x / Non Sq Epi: x / Bacteria: x    IMAGING:    < from: Xray Chest 1 View-PORTABLE IMMEDIATE (Xray Chest 1 View-PORTABLE IMMEDIATE .) (04.02.24 @ 11:12) >  Impression:  No radiographic evidence of acute cardiopulmonary disease.  --- End of Report ---  < end of copied text >    < from: CT Abdomen and Pelvis w/ IV Cont (04.02.24 @ 14:32) >  IMPRESSION:  1.  Since January 18, 2023, interval placement of a CBD stent with   improved intra-/extrahepatic ductal dilation; few filling defects again   noted within the CBD, suspicious for choledocholithiasis.  2.  Additional incidental findings as above.  --- End of Report ---  < end of copied text >    < from: US Abdomen Upper Quadrant Right (04.02.24 @ 16:26) >  FINDINGS/  IMPRESSION:  Liver: Echogenic. Pneumobilia better appreciated on prior CT.  Bile ducts: Stent noted within the CBD. Otherwise, poor visualization.  Gallbladder: Cholecystectomy.  Pancreas: Visualized portions are within normal limits.  Right kidney: 9.9. No hydronephrosis. Simple 1.5 cm lower pole cyst.  Ascites: None.  IVC: Visualized portions are within normal limits.  --- End of Report ---  < end of copied text > GENERAL SURGERY CONSULT NOTE    Patient: JAKY RODRIGUEZ , 78y ([birth year])Female   MRN: [id number]  Location: [facility] ED  Visit: 04-02-24 Emergency  Date: 04-02-24 @ 18:13    HPI: Patient is a 78 year old female with pmhx/pshx as below with significant history of cholecystectomy ~20 years ago, ampullary adenoma, recurrent choledocholithiasis with multiple ERCPs with stent placement/exchanges, most recently 7/2023 who presents to the emergency room with abdominal pain and dysuria. Patient found to have elevated LFTs, leukocytosis, and CBD filling defect on imaging. Patient seen and examined. Confirms above history, states she had her gallbladder removed when she was 21 years old, states since being here and receiving antibiotcs and pain medication her pain has subsided.     PAST MEDICAL & SURGICAL HISTORY:  Arterial insufficiency of lower extremity  left leg stent placed  Leg swelling  Hypothyroid  HTN (hypertension)  High cholesterol  Peripheral arterial disease  H/O Graves' disease  History of cholecystectomy  H/O: hysterectomy  S/P angiogram of extremity  S/P ERCP    Home Medications:  aspirin 81 mg oral tablet, chewable: 1 tab(s) orally once a day (25 Oct 2023 13:35)  Plavix 75 mg oral tablet: 1 tab(s) orally once a day, to be held five days before ERCP) (25 Oct 2023 13:35)    VITALS:  T(F): 98.6 (04-02-24 @ 17:01), Max: 100.1 (04-02-24 @ 09:20)  HR: 71 (04-02-24 @ 17:01) (71 - 100)  BP: 171/72 (04-02-24 @ 17:01) (142/70 - 210/92)  RR: 18 (04-02-24 @ 17:01) (16 - 20)  SpO2: 98% (04-02-24 @ 17:01) (98% - 99%)    PHYSICAL EXAM:  General: NAD, AAOx3, calm and cooperative  HEENT: NCAT, EOMI, Trachea ML, Neck supple  Cardiac: RRR S1, S2  Respiratory: normal respiratory effort  Abdomen: Soft, non-distended, non-tender, no rebound, no guarding  Musculoskeletal: ROM intact, compartments soft  Neuro: Sensation grossly intact and equal throughout, no focal deficits  Skin: Warm/dry, normal color, no jaundice    MEDICATIONS  (STANDING):  lactated ringers Bolus 250 milliLiter(s) IV Bolus once  piperacillin/tazobactam IVPB... 3.375 Gram(s) IV Intermittent once    MEDICATIONS  (PRN):      LAB/STUDIES:                        10.3   11.34 )-----------( 225      ( 02 Apr 2024 11:40 )             32.0     04-02    134<L>  |  101  |  23<H>  ----------------------------<  183<H>  4.5   |  20  |  0.9    Ca    8.4      02 Apr 2024 11:40    TPro  6.3  /  Alb  4.0  /  TBili  1.6<H>  /  DBili  x   /  AST  304<H>  /  ALT  228<H>  /  AlkPhos  263<H>  04-02      LIVER FUNCTIONS - ( 02 Apr 2024 11:40 )  Alb: 4.0 g/dL / Pro: 6.3 g/dL / ALK PHOS: 263 U/L / ALT: 228 U/L / AST: 304 U/L / GGT: x           Urinalysis Basic - ( 02 Apr 2024 11:40 )    Color: x / Appearance: x / SG: x / pH: x  Gluc: 183 mg/dL / Ketone: x  / Bili: x / Urobili: x   Blood: x / Protein: x / Nitrite: x   Leuk Esterase: x / RBC: x / WBC x   Sq Epi: x / Non Sq Epi: x / Bacteria: x    IMAGING:    < from: Xray Chest 1 View-PORTABLE IMMEDIATE (Xray Chest 1 View-PORTABLE IMMEDIATE .) (04.02.24 @ 11:12) >  Impression:  No radiographic evidence of acute cardiopulmonary disease.  --- End of Report ---  < end of copied text >    < from: CT Abdomen and Pelvis w/ IV Cont (04.02.24 @ 14:32) >  IMPRESSION:  1.  Since January 18, 2023, interval placement of a CBD stent with   improved intra-/extrahepatic ductal dilation; few filling defects again   noted within the CBD, suspicious for choledocholithiasis.  2.  Additional incidental findings as above.  --- End of Report ---  < end of copied text >    < from: US Abdomen Upper Quadrant Right (04.02.24 @ 16:26) >  FINDINGS/  IMPRESSION:  Liver: Echogenic. Pneumobilia better appreciated on prior CT.  Bile ducts: Stent noted within the CBD. Otherwise, poor visualization.  Gallbladder: Cholecystectomy.  Pancreas: Visualized portions are within normal limits.  Right kidney: 9.9. No hydronephrosis. Simple 1.5 cm lower pole cyst.  Ascites: None.  IVC: Visualized portions are within normal limits.  --- End of Report ---  < end of copied text > GENERAL SURGERY CONSULT NOTE    Patient: JAKY RODRIGUEZ , 78y ([birth year])Female   MRN: [id number]  Location: [facility] ED  Visit: 04-02-24 Emergency  Date: 04-02-24 @ 18:13    HPI: Patient is a 78 year old female with pmhx/pshx as below with significant history of cholecystectomy ~20 years ago, ampullary adenoma, recurrent choledocholithiasis with multiple ERCPs with stent placement/exchanges, most recently 7/2023 who presents to the emergency room with abdominal pain and dysuria. Patient found to have elevated LFTs, leukocytosis, and CBD filling defect on imaging. Patient seen and examined. Confirms above history, states she had her gallbladder removed when she was 21 years old, states since being here and receiving antibiotcs and pain medication her pain has subsided.     PAST MEDICAL & SURGICAL HISTORY:  Arterial insufficiency of lower extremity  left leg stent placed  Leg swelling  Hypothyroid  HTN (hypertension)  High cholesterol  Peripheral arterial disease  H/O Graves' disease  History of cholecystectomy  H/O: hysterectomy  S/P angiogram of extremity  S/P ERCP    Home Medications:  aspirin 81 mg oral tablet, chewable: 1 tab(s) orally once a day (25 Oct 2023 13:35)  Plavix 75 mg oral tablet: 1 tab(s) orally once a day, to be held five days before ERCP) (25 Oct 2023 13:35)    VITALS:  T(F): 98.6 (04-02-24 @ 17:01), Max: 100.1 (04-02-24 @ 09:20)  HR: 71 (04-02-24 @ 17:01) (71 - 100)  BP: 171/72 (04-02-24 @ 17:01) (142/70 - 210/92)  RR: 18 (04-02-24 @ 17:01) (16 - 20)  SpO2: 98% (04-02-24 @ 17:01) (98% - 99%)    PHYSICAL EXAM:  General: NAD, AAOx3, calm and cooperative  HEENT: NCAT, EOMI, Trachea ML, Neck supple  Cardiac: RRR S1, S2  Respiratory: normal respiratory effort  Abdomen: Soft, non-distended, non-tender, no rebound, no guarding  Musculoskeletal: ROM intact, compartments soft  Neuro: Sensation grossly intact and equal throughout, no focal deficits  Skin: Warm/dry, normal color, no jaundice    MEDICATIONS  (STANDING):  lactated ringers Bolus 250 milliLiter(s) IV Bolus once  piperacillin/tazobactam IVPB... 3.375 Gram(s) IV Intermittent once    MEDICATIONS  (PRN):  LAB/STUDIES:                        10.3   11.34 )-----------( 225      ( 02 Apr 2024 11:40 )             32.0     04-02    134<L>  |  101  |  23<H>  ----------------------------<  183<H>  4.5   |  20  |  0.9    Ca    8.4      02 Apr 2024 11:40    TPro  6.3  /  Alb  4.0  /  TBili  1.6<H>  /  DBili  x   /  AST  304<H>  /  ALT  228<H>  /  AlkPhos  263<H>  04-02    LIVER FUNCTIONS - ( 02 Apr 2024 11:40 )  Alb: 4.0 g/dL / Pro: 6.3 g/dL / ALK PHOS: 263 U/L / ALT: 228 U/L / AST: 304 U/L / GGT: x         Urinalysis Basic - ( 02 Apr 2024 11:40 )  Color: x / Appearance: x / SG: x / pH: x  Gluc: 183 mg/dL / Ketone: x  / Bili: x / Urobili: x   Blood: x / Protein: x / Nitrite: x   Leuk Esterase: x / RBC: x / WBC x   Sq Epi: x / Non Sq Epi: x / Bacteria: x    IMAGING:    < from: Xray Chest 1 View-PORTABLE IMMEDIATE (Xray Chest 1 View-PORTABLE IMMEDIATE .) (04.02.24 @ 11:12) >  Impression:  No radiographic evidence of acute cardiopulmonary disease.  --- End of Report ---  < end of copied text >    < from: CT Abdomen and Pelvis w/ IV Cont (04.02.24 @ 14:32) >  IMPRESSION:  1.  Since January 18, 2023, interval placement of a CBD stent with   improved intra-/extrahepatic ductal dilation; few filling defects again   noted within the CBD, suspicious for choledocholithiasis.  2.  Additional incidental findings as above.  --- End of Report ---  < end of copied text >    < from: US Abdomen Upper Quadrant Right (04.02.24 @ 16:26) >  FINDINGS/  IMPRESSION:  Liver: Echogenic. Pneumobilia better appreciated on prior CT.  Bile ducts: Stent noted within the CBD. Otherwise, poor visualization.  Gallbladder: Cholecystectomy.  Pancreas: Visualized portions are within normal limits.  Right kidney: 9.9. No hydronephrosis. Simple 1.5 cm lower pole cyst.  Ascites: None.  IVC: Visualized portions are within normal limits.  --- End of Report ---  < end of copied text >

## 2024-04-02 NOTE — CONSULT NOTE ADULT - ASSESSMENT
ASSESSMENT:  78yF w/ pmhx/pshx as below with significant history of cholecystectomy ~20 years ago, ampullary adenoma, recurrent choledocholithiasis with multiple ERCPs with stent placement/exchanges, most recently 7/2023 who presents to the emergency room with abdominal pain and dysuria. Physical exam findings, imaging, and labs as documented above.     PLAN:  - No acute surgical intervention warranted as patient is s/p cholecystectomy  - Recommend GI evaluation for ERCP    Above plan discussed with Attending Surgeon Dr. Robles, patient, and ED  04-02-24 @ 18:13

## 2024-04-02 NOTE — ED PROVIDER NOTE - CONSIDERATION OF ADMISSION OBSERVATION
Patient reports admission for choledocholithiasis, abdominal pain.  For GI evaluation. Consideration of Admission/Observation

## 2024-04-02 NOTE — ED PROVIDER NOTE - CLINICAL SUMMARY MEDICAL DECISION MAKING FREE TEXT BOX
Patient signed out to me from Dr. Baltazar -78-year-old female with pmhx as noted, in ER wtih c/o and pain, subjective temp.  pt afebrile in ER. Labs reviewd: wbc 11, hgb 10, CMP with elevated LFT's - , , T bili 1.6.  Lipase 32, lactate 2.2.  CT abdomen: Since January 2023 interval placement of CBD stent with improved intra/extrahepatic ductal dilation, few defects again noted within the CBD suspicious for choledocholithiasis.  RUQ sono: Pneumobilia better appreciated on prior CT, stent noted within CBD, cholecystectomy.  Patient given IVF, IV antibiotics.  Case discussed with surgery, GI notified on teams.  Patient to be admitted to medicine for further treatment and evaluation.

## 2024-04-02 NOTE — H&P ADULT - ATTENDING COMMENTS
77 y/o F w pmhx/pshx of htn, hld, smoking historu of 30py+, CHF on lasix, cholecystectomy ~20 years ago, ampullary adenoma, recurrent choledocholithiasis with multiple ERCPs with stent placement/exchanges, most recently 7/2023, PAD s/p bilateral lower extremity stents presenting with a picture of cholangitis / choledocholitiasis. .      #Transaminitis with CBD dilatation- Choledochitises . Concern for Cholangitis   #Sepsis not present on admission   #Hx of ampullary Adenoma   #HX of sinus node dysfunction   #Nonobstructrive CAD - Cath in november 2023 no PCI performed   #HFpEF   # new onset cough   #PAD with b.l stents - only on plavix and xarelto ( procedures were done in july as per patient)    Plan   EKG negative for acute ischemic changes  Trop downtrending , BNP elevated but unremarkable   Denies orthopnea, no shortness of breath, LE swelling at baseline   was not able to follow up with [facility] outpatient to remove stents due to not being cleared from her cardiac issues.   Patient is on Plavix and Xarelto for PAD. follows Dr. Stanley. Med rec says is also on aspirin but patient states that this is only because she was planning on getting cataracts surgery and was told to take aspirin while holding plavix. Patient is not currently on triple therapy  Continue zosyn   Check RVP  Hold plavix and xarelto until plan from GI is known.   Continue aspirin  Advance GI consult- MRCP vs ERCP  Cardio consult for potential clearance     #DVT proph : can start if ERCP is not tomorrow   #diet : DASH /low fat   # ambulate as tolerated   #medrec confirmed with patient

## 2024-04-02 NOTE — ED PROVIDER NOTE - OBJECTIVE STATEMENT
79 y/o F w pmhx/pshx as below with significant history of htn, hld, COPD, CHF on lasix, cholecystectomy ~20 years ago, ampullary adenoma, recurrent choledocholithiasis with multiple ERCPs with stent placement/exchanges, most recently 7/2023 who presents to the emergency room with abd pain  associated with subjective fevers and chills for the past few days.  Patient also reports dysuria.  Patient also reports some chest discomfort earlier today. Denies shortness of breath nausea vomiting diarrhea. 77 y/o F w pmhx/pshx as below with significant history of htn, hld, COPD, CHF on lasix, cholecystectomy ~20 years ago, ampullary adenoma, recurrent choledocholithiasis with multiple ERCPs with stent placement/exchanges, most recently 7/2023 who presents to the emergency room with abd pain associated with subjective fevers and chills for the past few days.  Patient also reports dysuria.  Patient also reports some chest discomfort earlier today. Denies shortness of breath nausea vomiting diarrhea.

## 2024-04-02 NOTE — ED PROVIDER NOTE - CARE PLAN
Principal Discharge DX:	Choledocholithiasis  Secondary Diagnosis:	Transaminitis  Secondary Diagnosis:	Elevated bilirubin   1

## 2024-04-02 NOTE — H&P ADULT - NSHPLABSRESULTS_GEN_ALL_CORE
10.3   11.34 )-----------( 225      ( 02 Apr 2024 11:40 )             32.0     04-02    134<L>  |  101  |  23<H>  ----------------------------<  183<H>  4.5   |  20  |  0.9    Ca    8.4      02 Apr 2024 11:40    TPro  6.3  /  Alb  4.0  /  TBili  1.6<H>  /  DBili  x   /  AST  304<H>  /  ALT  228<H>  /  AlkPhos  263<H>  04-02          LIVER FUNCTIONS - ( 02 Apr 2024 11:40 )  Alb: 4.0 g/dL / Pro: 6.3 g/dL / ALK PHOS: 263 U/L / ALT: 228 U/L / AST: 304 U/L / GGT: x           Urinalysis Basic - ( 02 Apr 2024 11:40 )    Color: x / Appearance: x / SG: x / pH: x  Gluc: 183 mg/dL / Ketone: x  / Bili: x / Urobili: x   Blood: x / Protein: x / Nitrite: x   Leuk Esterase: x / RBC: x / WBC x   Sq Epi: x / Non Sq Epi: x / Bacteria: x

## 2024-04-02 NOTE — H&P ADULT - HISTORY OF PRESENT ILLNESS
77 y/o F w pmhx/pshx as below with significant history of htn, hld, COPD, CHF on lasix, cholecystectomy ~20 years ago, ampullary adenoma, recurrent choledocholithiasis with multiple ERCPs with stent placement/exchanges, most recently 7/2023 who presents to the emergency room with abd pain associated with subjective fevers and chills for the past few days.  Patient also reports dysuria.  Patient also reports some chest discomfort earlier today. Denies shortness of breath nausea vomiting diarrhea.    · BP Systolic   210 mm Hg  · BP Diastolic  92 mm Hg  · Heart Rate  100 /min  · Respiration Rate (breaths/min)  20 /min  · Temp (F)  100.1 Degrees F  · Temp (C)  37.8 Degrees C  · Temp site  oral  · SpO2 (%)  99 %   77 y/o F w pmhx/pshx of htn, hld, smoking historu of 30py+, CHF on lasix, cholecystectomy ~20 years ago, ampullary adenoma, recurrent choledocholithiasis with multiple ERCPs with stent placement/exchanges, most recently 7/2023, PAD s/p bilateral lower extremity stents  who presents to the emergency room with abd pain associated with subjective fevers and chills for the past day.  Patient also reports dysuria.  Patient also reports some chest discomfort earlier today. Denies shortness of breath nausea vomiting diarrhea.    · BP Systolic   210 mm Hg  · BP Diastolic  92 mm Hg  · Heart Rate  100 /min  · Respiration Rate (breaths/min)  20 /min  · Temp (F)  100.1 Degrees F  · Temp (C)  37.8 Degrees C  · Temp site  oral  · SpO2 (%)  99 %      EKG : NSR no major ST/T changes   CXR ; no acute cardiopulmonary disease  77 y/o F w pmhx/pshx of htn, hld, smoking historu of 30py+, CHF on lasix, cholecystectomy ~20 years ago, ampullary adenoma, recurrent choledocholithiasis with multiple ERCPs with stent placement/exchanges, most recently 7/2023, PAD s/p bilateral lower extremity stents  who presents to the emergency room with abd pain associated with subjective fevers and chills for the past day.  Patient also reports dysuria.  Patient also reports some chest discomfort earlier today. Denies shortness of breath nausea vomiting diarrhea.        EKG : NSR no major ST/T changes   CXR ; no acute cardiopulmonary disease  79 y/o F w pmhx/pshx of htn, hld, smoking history of 30py+, HFpEF on lasix,  Sinus Node dysfunction, cholecystectomy ~20 years ago, ampullary adenoma, recurrent choledocholithiasis with multiple ERCPs with stent placement/exchanges, most recently 7/2023, PAD s/p bilateral lower extremity stents  who presents to the emergency room with abd pain associated with subjective fevers and chills for the past day.  Patient also reports dysuria.  Patient also reports some chest discomfort earlier and cough. . Denies shortness of breath nausea vomiting diarrhea.    EKG : NSR no major ST/T changes   CXR ; no acute cardiopulmonary disease

## 2024-04-02 NOTE — CONSULT NOTE ADULT - ATTENDING COMMENTS
ACS Attending Note Attestation    Patient is examined and evaluated at the bedside with the residents/PAs. Treatment plan discussed with the team, nurses, and consulting physicians and consulting teams. Medications, radiological studies and all other relevant studies reviewed. I reviewed the resident/PA note and agreed with above assessment and plan with following additions and corrections.    JAKY RODRIGUEZ Patient is a 78y old  Female who presents with a chief complaint of abdominal pain of 1 day duration. Patient has known history of choledocholithiasis and multiple ERCPs    Physical Exam: Epigastric tenderness  Radiological studies reviewed by me with following noted: Choledocholithiasis   Allergies  codeine (Stomach Upset; Vomiting; Nausea)  Intolerances    PAST MEDICAL & SURGICAL HISTORY:  Arterial insufficiency of lower extremity  left leg stent placed  Leg swelling  Hypothyroid  HTN (hypertension)  High cholesterol  Peripheral arterial disease  H/O Graves' disease  History of cholecystectomy  H/O: hysterectomy  S/P angiogram of extremity  S/P ERCP    Vital Signs Last 24 Hrs  T(C): 37 (02 Apr 2024 17:01), Max: 37.8 (02 Apr 2024 09:20)  T(F): 98.6 (02 Apr 2024 17:01), Max: 100.1 (02 Apr 2024 09:20)  HR: 71 (02 Apr 2024 17:01) (71 - 100)  BP: 171/72 (02 Apr 2024 17:01) (142/70 - 210/92)  BP(mean): --  RR: 18 (02 Apr 2024 17:01) (16 - 20)  SpO2: 98% (02 Apr 2024 17:01) (98% - 99%)    Parameters below as of 02 Apr 2024 17:01  Patient On (Oxygen Delivery Method): room air                        10.3   11.34 )-----------( 225      ( 02 Apr 2024 11:40 )             32.0     04-02    134<L>  |  101  |  23<H>  ----------------------------<  183<H>  4.5   |  20  |  0.9    Ca    8.4      02 Apr 2024 11:40    TPro  6.3  /  Alb  4.0  /  TBili  1.6<H>  /  DBili  x   /  AST  304<H>  /  ALT  228<H>  /  AlkPhos  263<H>  04-02      Diagnosis: Choledocholithiasis     Plan:	  - GI consult for ERCP  - supportive care  - GI/DVT prophylaxis  - no surgical intervention at this time   - pain management  - follow up consults  - repeat studies as needed    Jocy Robles MD, FACS  Trauma/ACS/Surcical Critical care Attending

## 2024-04-02 NOTE — ED PROVIDER NOTE - PHYSICAL EXAMINATION
VITAL SIGNS: I have reviewed nursing notes and confirm.  CONSTITUTIONAL: non-toxic, + ill appearing  SKIN: no rash, no petechiae.  EYES: pink conjunctiva, anicteric  ENT: tongue midline, no exudates, + dry MM  NECK: Supple; no meningismus, no JVD  CARD: + tachycardia, no murmurs, equal radial pulses bilaterally 2+  RESP: no respiratory distress, + wheezing and crackles diffusely worse at bases   ABD: Soft, non-tender, non-distended  EXT: Normal ROM x4. + b/l LE edema   NEURO: Alert, oriented. no focal deficits

## 2024-04-02 NOTE — ED PROVIDER NOTE - ATTENDING CONTRIBUTION TO CARE
77 yr old f w/ a pmh significant for htn, hypothyroid, PAD s/p stent, CAD, CHF, past smoker who presents with abd pain, dysuria and wheezing. Pt states that yesterday she had dysuria and lower abd pain. Pt denies any vaginal bleeding, vaginal discharge.   Of note, pt also reports that she has been more SOB and noticed some worsening lower extremity swelling. Pt also reports wheezing that started today.     VITAL SIGNS: I have reviewed nursing notes and confirm.  CONSTITUTIONAL: non-toxic, well appearing  SKIN: no rash, no petechiae.  EYES: EOMI, pink conjunctiva, anicteric  ENT: tongue midline, no exudates, MMM  NECK: Supple; no meningismus, no JVD  CARD: RRR, no murmurs, equal radial pulses bilaterally 2+  RESP: diffuse crackles/wheezing  ABD: Soft, non-tender, non-distended, no peritoneal signs, no HSM, no CVA tenderness  EXT: Normal ROM x4. 2+ pitting edema   NEURO: Alert, oriented x3. CN2-12 intact, equal strength bilaterally      77 yr old f that presents with dysuria, abd pain. concern for uti. will obtain labs, ua, imaging. of note, due to sob and LE swelling, concern for possible CHF exacerbation. Will gently hydrate pt. iv antibiotics, tylenol. consider admission.

## 2024-04-02 NOTE — ED PROVIDER NOTE - PROGRESS NOTE DETAILS
ER: pt signed out to Dr. Sanders Authored by Dr. Horvath: consulted surg with no interventions and recommend GI consult. Attempted to consult GI and unable to reach on multiple occasions, will admit, abx given and HD stable

## 2024-04-02 NOTE — H&P ADULT - ASSESSMENT
incomplete   Choledocholithiasis  77 y/o F w pmhx/pshx of htn, hld, smoking historu of 30py+, CHF on lasix, cholecystectomy ~20 years ago, ampullary adenoma, recurrent choledocholithiasis with multiple ERCPs with stent placement/exchanges, most recently 7/2023, PAD s/p bilateral lower extremity stents presenting with a picture of cholangitis / choledocholitiasis. .      #Cholangitis   Patient has high LFT high WBC count and lactate 2.2, fever at home ( non measured)  CT showing possible stones in CBD   s/p zosyn in ED will keep on zosyn for now   will stop furosemide and will start on low dose fluids nacl 0.45%@50 cc /hr   GI consulted   Fu repeat CMP and lactate in AM     #chest pain/new onset cough   chest  pain atypical in nature   troponin  trending down   normal EKG  CXR normal will  order RVP       #PAD  will keep on aspirin plavix  will stop Eliquis in case any procedure needs to be done by GI       #DVT proph : none awaiting GI in case any procedures needs to be done   #diet : DASH /low fat   # ambulate as tolerated   #medrec confirmed with patient      77 y/o F w pmhx/pshx of htn, hld, smoking historu of 30py+, CHF on lasix, cholecystectomy ~20 years ago, ampullary adenoma, recurrent choledocholithiasis with multiple ERCPs with stent placement/exchanges, most recently 7/2023, PAD s/p bilateral lower extremity stents presenting with a picture of cholangitis / choledocholitiasis. .      #Cholangitis   Patient has high LFT high WBC count and lactate 2.2, fever at home ( non measured)  CT showing possible stones in CBD   s/p zosyn in ED will keep on zosyn for now   will stop furosemide and will start on low dose fluids nacl 0.45%@50 cc /hr   GI consulted   Fu repeat CMP and lactate in AM     #chest pain/new onset cough   chest  pain atypical in nature   troponin  trending down   normal EKG  CXR normal will  order RVP       #PAD  will keep on aspirin plavix  will stop xarelto  in case any procedure needs to be done by GI       #DVT proph : none awaiting GI in case any procedures needs to be done   #diet : DASH /low fat   # ambulate as tolerated   #medrec confirmed with patient

## 2024-04-02 NOTE — H&P ADULT - NSHPPHYSICALEXAM_GEN_ALL_CORE
PHYSICAL EXAM:      Constitutional:Not in acute distress     Neck:supple neck     Back:No point tenderness in the back     Respiratory:GBAE , no rhonchi or crakles     Cardiovascular:    Gastrointestinal:soft non tender abdomen , minimal tenderness in RUQ and epigastric area     Extremities:pitting 1+ LLE , unchanged as per patient     Neurological:no focal motor or sensory deficit GENERAL: NAD, lying in bed comfortably  CHEST/LUNG: Clear to auscultation bilaterally; No rales, rhonchi, wheezing, or rubs. Unlabored respirations  HEART: Regular rate and rhythm; No murmurs, rubs, or gallops  ABDOMEN: Bowel sounds present; Soft, Nontender, Nondistended.   EXTREMITIES:  2+  LE edema  NERVOUS SYSTEM:  Alert & Oriented X3, speech clear. No deficits   MSK: FROM all 4 extremities, full and equal strength

## 2024-04-03 ENCOUNTER — TRANSCRIPTION ENCOUNTER (OUTPATIENT)
Age: 79
End: 2024-04-03

## 2024-04-03 LAB
ALBUMIN SERPL ELPH-MCNC: 3.8 G/DL — SIGNIFICANT CHANGE UP (ref 3.5–5.2)
ALP SERPL-CCNC: 209 U/L — HIGH (ref 30–115)
ALT FLD-CCNC: 192 U/L — HIGH (ref 0–41)
ANION GAP SERPL CALC-SCNC: 13 MMOL/L — SIGNIFICANT CHANGE UP (ref 7–14)
APTT BLD: 29.4 SEC — SIGNIFICANT CHANGE UP (ref 27–39.2)
AST SERPL-CCNC: 113 U/L — HIGH (ref 0–41)
BASOPHILS # BLD AUTO: 0.01 K/UL — SIGNIFICANT CHANGE UP (ref 0–0.2)
BASOPHILS NFR BLD AUTO: 0.1 % — SIGNIFICANT CHANGE UP (ref 0–1)
BILIRUB DIRECT SERPL-MCNC: 0.4 MG/DL — HIGH (ref 0–0.3)
BILIRUB INDIRECT FLD-MCNC: 0.3 MG/DL — SIGNIFICANT CHANGE UP (ref 0.2–1.2)
BILIRUB SERPL-MCNC: 0.7 MG/DL — SIGNIFICANT CHANGE UP (ref 0.2–1.2)
BILIRUB SERPL-MCNC: 0.7 MG/DL — SIGNIFICANT CHANGE UP (ref 0.2–1.2)
BUN SERPL-MCNC: 23 MG/DL — HIGH (ref 10–20)
CALCIUM SERPL-MCNC: 8.4 MG/DL — SIGNIFICANT CHANGE UP (ref 8.4–10.5)
CHLORIDE SERPL-SCNC: 104 MMOL/L — SIGNIFICANT CHANGE UP (ref 98–110)
CO2 SERPL-SCNC: 20 MMOL/L — SIGNIFICANT CHANGE UP (ref 17–32)
CREAT SERPL-MCNC: 1 MG/DL — SIGNIFICANT CHANGE UP (ref 0.7–1.5)
EGFR: 58 ML/MIN/1.73M2 — LOW
EOSINOPHIL # BLD AUTO: 0 K/UL — SIGNIFICANT CHANGE UP (ref 0–0.7)
EOSINOPHIL NFR BLD AUTO: 0 % — SIGNIFICANT CHANGE UP (ref 0–8)
GLUCOSE BLDC GLUCOMTR-MCNC: 272 MG/DL — HIGH (ref 70–99)
GLUCOSE SERPL-MCNC: 300 MG/DL — HIGH (ref 70–99)
HCT VFR BLD CALC: 30 % — LOW (ref 37–47)
HGB BLD-MCNC: 9.5 G/DL — LOW (ref 12–16)
IMM GRANULOCYTES NFR BLD AUTO: 0.4 % — HIGH (ref 0.1–0.3)
INR BLD: 1.04 RATIO — SIGNIFICANT CHANGE UP (ref 0.65–1.3)
LACTATE SERPL-SCNC: 1.8 MMOL/L — SIGNIFICANT CHANGE UP (ref 0.7–2)
LYMPHOCYTES # BLD AUTO: 0.63 K/UL — LOW (ref 1.2–3.4)
LYMPHOCYTES # BLD AUTO: 7 % — LOW (ref 20.5–51.1)
MCHC RBC-ENTMCNC: 26.3 PG — LOW (ref 27–31)
MCHC RBC-ENTMCNC: 31.7 G/DL — LOW (ref 32–37)
MCV RBC AUTO: 83.1 FL — SIGNIFICANT CHANGE UP (ref 81–99)
MONOCYTES # BLD AUTO: 0.3 K/UL — SIGNIFICANT CHANGE UP (ref 0.1–0.6)
MONOCYTES NFR BLD AUTO: 3.4 % — SIGNIFICANT CHANGE UP (ref 1.7–9.3)
NEUTROPHILS # BLD AUTO: 7.96 K/UL — HIGH (ref 1.4–6.5)
NEUTROPHILS NFR BLD AUTO: 89.1 % — HIGH (ref 42.2–75.2)
NRBC # BLD: 0 /100 WBCS — SIGNIFICANT CHANGE UP (ref 0–0)
PLATELET # BLD AUTO: 241 K/UL — SIGNIFICANT CHANGE UP (ref 130–400)
PMV BLD: 10.6 FL — HIGH (ref 7.4–10.4)
POTASSIUM SERPL-MCNC: 4.6 MMOL/L — SIGNIFICANT CHANGE UP (ref 3.5–5)
POTASSIUM SERPL-SCNC: 4.6 MMOL/L — SIGNIFICANT CHANGE UP (ref 3.5–5)
PROT SERPL-MCNC: 6.1 G/DL — SIGNIFICANT CHANGE UP (ref 6–8)
PROTHROM AB SERPL-ACNC: 11.9 SEC — SIGNIFICANT CHANGE UP (ref 9.95–12.87)
RAPID RVP RESULT: SIGNIFICANT CHANGE UP
RBC # BLD: 3.61 M/UL — LOW (ref 4.2–5.4)
RBC # FLD: 15.5 % — HIGH (ref 11.5–14.5)
SARS-COV-2 RNA SPEC QL NAA+PROBE: SIGNIFICANT CHANGE UP
SODIUM SERPL-SCNC: 137 MMOL/L — SIGNIFICANT CHANGE UP (ref 135–146)
WBC # BLD: 8.94 K/UL — SIGNIFICANT CHANGE UP (ref 4.8–10.8)
WBC # FLD AUTO: 8.94 K/UL — SIGNIFICANT CHANGE UP (ref 4.8–10.8)

## 2024-04-03 PROCEDURE — 93010 ELECTROCARDIOGRAM REPORT: CPT

## 2024-04-03 PROCEDURE — 99223 1ST HOSP IP/OBS HIGH 75: CPT | Mod: 25

## 2024-04-03 PROCEDURE — 99233 SBSQ HOSP IP/OBS HIGH 50: CPT

## 2024-04-03 PROCEDURE — 99283 EMERGENCY DEPT VISIT LOW MDM: CPT | Mod: 24,25

## 2024-04-03 PROCEDURE — 74328 X-RAY BILE DUCT ENDOSCOPY: CPT | Mod: 26

## 2024-04-03 PROCEDURE — 43264 ERCP REMOVE DUCT CALCULI: CPT | Mod: XU

## 2024-04-03 PROCEDURE — 43276 ERCP STENT EXCHANGE W/DILATE: CPT

## 2024-04-03 RX ORDER — ONDANSETRON 8 MG/1
4 TABLET, FILM COATED ORAL ONCE
Refills: 0 | Status: DISCONTINUED | OUTPATIENT
Start: 2024-04-03 | End: 2024-04-04

## 2024-04-03 RX ORDER — SODIUM CHLORIDE 9 MG/ML
1000 INJECTION, SOLUTION INTRAVENOUS
Refills: 0 | Status: DISCONTINUED | OUTPATIENT
Start: 2024-04-03 | End: 2024-04-04

## 2024-04-03 RX ORDER — PIPERACILLIN AND TAZOBACTAM 4; .5 G/20ML; G/20ML
3.38 INJECTION, POWDER, LYOPHILIZED, FOR SOLUTION INTRAVENOUS ONCE
Refills: 0 | Status: COMPLETED | OUTPATIENT
Start: 2024-04-03 | End: 2024-04-03

## 2024-04-03 RX ORDER — RIVAROXABAN 15 MG-20MG
2.5 KIT ORAL
Refills: 0 | Status: DISCONTINUED | OUTPATIENT
Start: 2024-04-03 | End: 2024-04-04

## 2024-04-03 RX ORDER — INFLUENZA VIRUS VACCINE 15; 15; 15; 15 UG/.5ML; UG/.5ML; UG/.5ML; UG/.5ML
0.7 SUSPENSION INTRAMUSCULAR ONCE
Refills: 0 | Status: DISCONTINUED | OUTPATIENT
Start: 2024-04-03 | End: 2024-04-04

## 2024-04-03 RX ORDER — LABETALOL HCL 100 MG
10 TABLET ORAL ONCE
Refills: 0 | Status: COMPLETED | OUTPATIENT
Start: 2024-04-03 | End: 2024-04-03

## 2024-04-03 RX ORDER — PIPERACILLIN AND TAZOBACTAM 4; .5 G/20ML; G/20ML
3.38 INJECTION, POWDER, LYOPHILIZED, FOR SOLUTION INTRAVENOUS EVERY 8 HOURS
Refills: 0 | Status: DISCONTINUED | OUTPATIENT
Start: 2024-04-03 | End: 2024-04-04

## 2024-04-03 RX ORDER — NIFEDIPINE 30 MG
60 TABLET, EXTENDED RELEASE 24 HR ORAL ONCE
Refills: 0 | Status: COMPLETED | OUTPATIENT
Start: 2024-04-03 | End: 2024-04-03

## 2024-04-03 RX ADMIN — PANTOPRAZOLE SODIUM 40 MILLIGRAM(S): 20 TABLET, DELAYED RELEASE ORAL at 15:03

## 2024-04-03 RX ADMIN — Medication 81 MILLIGRAM(S): at 15:03

## 2024-04-03 RX ADMIN — Medication 60 MILLIGRAM(S): at 01:23

## 2024-04-03 RX ADMIN — Medication 10 MILLIGRAM(S): at 02:55

## 2024-04-03 RX ADMIN — PIPERACILLIN AND TAZOBACTAM 25 GRAM(S): 4; .5 INJECTION, POWDER, LYOPHILIZED, FOR SOLUTION INTRAVENOUS at 06:52

## 2024-04-03 RX ADMIN — PIPERACILLIN AND TAZOBACTAM 200 GRAM(S): 4; .5 INJECTION, POWDER, LYOPHILIZED, FOR SOLUTION INTRAVENOUS at 01:23

## 2024-04-03 RX ADMIN — PIPERACILLIN AND TAZOBACTAM 25 GRAM(S): 4; .5 INJECTION, POWDER, LYOPHILIZED, FOR SOLUTION INTRAVENOUS at 15:03

## 2024-04-03 RX ADMIN — Medication 175 MICROGRAM(S): at 06:03

## 2024-04-03 RX ADMIN — SODIUM CHLORIDE 50 MILLILITER(S): 9 INJECTION, SOLUTION INTRAVENOUS at 03:12

## 2024-04-03 RX ADMIN — RIVAROXABAN 2.5 MILLIGRAM(S): KIT at 20:02

## 2024-04-03 RX ADMIN — PIPERACILLIN AND TAZOBACTAM 25 GRAM(S): 4; .5 INJECTION, POWDER, LYOPHILIZED, FOR SOLUTION INTRAVENOUS at 21:23

## 2024-04-03 NOTE — PATIENT PROFILE ADULT - FALL HARM RISK - HARM RISK INTERVENTIONS

## 2024-04-03 NOTE — PATIENT PROFILE ADULT - NSPROGENOTHERPROVIDER_GEN_A_NUR
Discharge instructions reviewed along with medications and follow-up plan. Denies having any additional questions. Verbalized understanding of instructions, medications and plan for follow-up.  Tylenol and ibuprofen instructions provided. Ambulated to discharge with steady gait.     outpatient care

## 2024-04-03 NOTE — PATIENT PROFILE ADULT - NSPROPOAPRESSUREINJURY_GEN_A_NUR
no

## 2024-04-03 NOTE — PRE-ANESTHESIA EVALUATION ADULT - NSANTHPMHFT_GEN_ALL_CORE
Chart reviewed, Med/ GI evaluation seen. Labs, CT Abs report seen. FS 272mg/dl, probably D5w, No H/O DM  PMH: PAD S/P Angioplasty and stents both legs, ?Cardiac stent, no H/O MI.

## 2024-04-03 NOTE — CHART NOTE - NSCHARTNOTEFT_GEN_A_CORE
Patient is S/P ERCP:   Suspicion of recurrent ampullary adenoma. Previously placed plastic CBD stent was removed. Cholangiogram showed multiple filing defects. Multiple CBD stones were removed using a biliary stone extraction balloon. A 10 F x 7 cm plastic CBD stent was placed with excellent drainage.      Start clear liquid diet.  Can advance to low fat diet in AM if no complications.  Monitor for post ERCP complications: pancreatitis and bleeding.  Trend H/H  Trend LFTs  Restart necessary antiplatelet and anticoagulation.   Follow up with dr. Torres in 2 to 3 weeks to reschedule for ERCP with stent removal and ampullary adenoma EMR.

## 2024-04-03 NOTE — CONSULT NOTE ADULT - ASSESSMENT
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 She noted yesterday she had some upper abdominal pain that started sudden, was sharp and without alleviating factors at the time. She was febrile in the ED on arrival but now notes she is feeling better.     Prior ERCP and Ampullectomy/ Last ERCP 7/21/2023  - Agree with Walker, Follow Blood Cx  - NPO for now  - Was on DAPt and AC for Cardiac and PAD Hx with stent  - CBD and LFT not profoundly worrisome but CBD stent needs to be removed  - Will plan ERCP today with stent exchange- No need for sphincterotomy  - Please obtain TnS and coags with next lab draw   - Will follow

## 2024-04-03 NOTE — CONSULT NOTE ADULT - SUBJECTIVE AND OBJECTIVE BOX
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 She noted yesterday she had some upper abdominal pain that started sudden, was sharp and without alleviating factors at the time. She was febrile in the ED on arrival but now notes she is feeling better.       PAST MEDICAL & SURGICAL HISTORY:  Arterial insufficiency of lower extremity left leg stent placed  Leg swelling  Hypothyroid  HTN (hypertension)  High cholesterol  Peripheral arterial disease  H/O Graves' disease  History of cholecystectomy  H/O: hysterectomy  S/P angiogram of extremity  S/P ERCP        MEDICATIONS  (STANDING):  aspirin  chewable 81 milliGRAM(s) Oral daily  levothyroxine 175 MICROGram(s) Oral daily  NIFEdipine XL 60 milliGRAM(s) Oral daily  pantoprazole    Tablet 40 milliGRAM(s) Oral before breakfast  piperacillin/tazobactam IVPB.. 3.375 Gram(s) IV Intermittent every 8 hours  sodium chloride 0.45%. 1000 milliLiter(s) (50 mL/Hr) IV Continuous <Continuous>        Allergies  codeine (Stomach Upset; Vomiting; Nausea)      Review of Systems  General:  Denies Fatigue, Denies Fever, Denies Weakness ,Denies Weight Loss   HEENT: Denies Trouble Swallowing ,Denies  Sore Throat , Denies Change in hearing/vision/speech ,Denies Dizziness    Cardio: Denies  Chest Pain , Palpitations    Respiratory: Denies worsening of SOB, Denies Cough  Abdomen: See detailed HPI  Neuro: Denies Headache Denies Dizziness, Denies Paresthesias  MSK: Denies pain in Bones/Joints/Muscles   Psych: Patient denies depression, denies suicidal or homicidal ideations  Integ: Patient Denies rash, or new skin lesions       Vital Signs Last 24 Hrs  T(C): 36.3 (03 Apr 2024 06:04), Max: 37.8 (02 Apr 2024 09:20)  T(F): 97.4 (03 Apr 2024 06:04), Max: 100.1 (02 Apr 2024 09:20)  HR: 67 (03 Apr 2024 06:04) (64 - 100)  BP: 154/61 (03 Apr 2024 06:04) (142/70 - 211/86)  BP(mean): --  RR: 18 (03 Apr 2024 06:04) (16 - 20)  SpO2: 95% (03 Apr 2024 06:04) (95% - 99%)    Parameters below as of 03 Apr 2024 06:04  Patient On (Oxygen Delivery Method): room air    Physical Exam  Gen: NAD  Head: NC/AT, no visible deformity  ENT: PERRLA, Sclera Non Icteric  Cardio: S1/S2 No S3/S4, Regular  Resp: CTA B/L  Abdomen: Soft, ND/NT  Neuro: AAOx3  Extremities: FROM x 4  Skin: No jaundice, no excoriation       Labs:                      10.3   11.34 )-----------( 225      ( 02 Apr 2024 11:40 )             32.0       Auto Neutrophil %: 90.0 % (04-02-24 @ 11:40)  Auto Immature Granulocyte %: 0.3 % (04-02-24 @ 11:40)    04-02    134<L>  |  101  |  23<H>  ----------------------------<  183<H>  4.5   |  20  |  0.9      Calcium: 8.4 mg/dL (04-02-24 @ 11:40)      LFTs:             6.3  | 1.6  | 304      ------------------[263     ( 02 Apr 2024 11:40 )  4.0  | x    | 228         Lipase:32       Lactate, Blood: 2.2 mmol/L (04-02-24 @ 11:40)    Urinalysis Basic - ( 02 Apr 2024 11:40 )  Color: x / Appearance: x / SG: x / pH: x  Gluc: 183 mg/dL / Ketone: x  / Bili: x / Urobili: x   Blood: x / Protein: x / Nitrite: x   Leuk Esterase: x / RBC: x / WBC x   Sq Epi: x / Non Sq Epi: x / Bacteria: x      RADIOLOGY & ADDITIONAL STUDIES:  CT Abdomen and Pelvis w/ IV Cont 04.02.24   IMPRESSION:  1.  Since January 18, 2023, interval placement of a CBD stent with   improved intra-/extrahepatic ductal dilation; few filling defects again   noted within the CBD, suspicious for choledocholithiasis.  2.  Additional incidental findings as above.      US Abdomen Upper Quadrant Right 04.02.24   FINDINGS/  IMPRESSION:    Liver: Echogenic. Pneumobilia better appreciated on prior CT.  Bile ducts: Stent noted within the CBD. Otherwise, poor visualization.  Gallbladder: Cholecystectomy.  Pancreas: Visualized portions are within normal limits.  Right kidney: 9.9. No hydronephrosis. Simple 1.5 cm lower pole cyst.  Ascites: None.  IVC: Visualized portions are within normal limits.

## 2024-04-04 ENCOUNTER — TRANSCRIPTION ENCOUNTER (OUTPATIENT)
Age: 79
End: 2024-04-04

## 2024-04-04 VITALS
DIASTOLIC BLOOD PRESSURE: 70 MMHG | HEART RATE: 67 BPM | SYSTOLIC BLOOD PRESSURE: 160 MMHG | OXYGEN SATURATION: 94 % | TEMPERATURE: 98 F

## 2024-04-04 LAB
ALBUMIN SERPL ELPH-MCNC: 3.6 G/DL — SIGNIFICANT CHANGE UP (ref 3.5–5.2)
ALP SERPL-CCNC: 167 U/L — HIGH (ref 30–115)
ALT FLD-CCNC: 145 U/L — HIGH (ref 0–41)
ANION GAP SERPL CALC-SCNC: 11 MMOL/L — SIGNIFICANT CHANGE UP (ref 7–14)
AST SERPL-CCNC: 53 U/L — HIGH (ref 0–41)
BASOPHILS # BLD AUTO: 0.03 K/UL — SIGNIFICANT CHANGE UP (ref 0–0.2)
BASOPHILS NFR BLD AUTO: 0.3 % — SIGNIFICANT CHANGE UP (ref 0–1)
BILIRUB SERPL-MCNC: 0.4 MG/DL — SIGNIFICANT CHANGE UP (ref 0.2–1.2)
BUN SERPL-MCNC: 19 MG/DL — SIGNIFICANT CHANGE UP (ref 10–20)
CALCIUM SERPL-MCNC: 8.3 MG/DL — LOW (ref 8.4–10.5)
CHLORIDE SERPL-SCNC: 105 MMOL/L — SIGNIFICANT CHANGE UP (ref 98–110)
CO2 SERPL-SCNC: 23 MMOL/L — SIGNIFICANT CHANGE UP (ref 17–32)
CREAT SERPL-MCNC: 0.9 MG/DL — SIGNIFICANT CHANGE UP (ref 0.7–1.5)
EGFR: 66 ML/MIN/1.73M2 — SIGNIFICANT CHANGE UP
EOSINOPHIL # BLD AUTO: 0.03 K/UL — SIGNIFICANT CHANGE UP (ref 0–0.7)
EOSINOPHIL NFR BLD AUTO: 0.3 % — SIGNIFICANT CHANGE UP (ref 0–8)
GLUCOSE SERPL-MCNC: 128 MG/DL — HIGH (ref 70–99)
HCT VFR BLD CALC: 29.1 % — LOW (ref 37–47)
HGB BLD-MCNC: 9.2 G/DL — LOW (ref 12–16)
IMM GRANULOCYTES NFR BLD AUTO: 0.4 % — HIGH (ref 0.1–0.3)
LYMPHOCYTES # BLD AUTO: 1.41 K/UL — SIGNIFICANT CHANGE UP (ref 1.2–3.4)
LYMPHOCYTES # BLD AUTO: 14 % — LOW (ref 20.5–51.1)
MAGNESIUM SERPL-MCNC: 2.5 MG/DL — HIGH (ref 1.8–2.4)
MCHC RBC-ENTMCNC: 26.4 PG — LOW (ref 27–31)
MCHC RBC-ENTMCNC: 31.6 G/DL — LOW (ref 32–37)
MCV RBC AUTO: 83.4 FL — SIGNIFICANT CHANGE UP (ref 81–99)
MONOCYTES # BLD AUTO: 0.64 K/UL — HIGH (ref 0.1–0.6)
MONOCYTES NFR BLD AUTO: 6.4 % — SIGNIFICANT CHANGE UP (ref 1.7–9.3)
NEUTROPHILS # BLD AUTO: 7.92 K/UL — HIGH (ref 1.4–6.5)
NEUTROPHILS NFR BLD AUTO: 78.6 % — HIGH (ref 42.2–75.2)
NRBC # BLD: 0 /100 WBCS — SIGNIFICANT CHANGE UP (ref 0–0)
PLATELET # BLD AUTO: 252 K/UL — SIGNIFICANT CHANGE UP (ref 130–400)
PMV BLD: 10.6 FL — HIGH (ref 7.4–10.4)
POTASSIUM SERPL-MCNC: 4.7 MMOL/L — SIGNIFICANT CHANGE UP (ref 3.5–5)
POTASSIUM SERPL-SCNC: 4.7 MMOL/L — SIGNIFICANT CHANGE UP (ref 3.5–5)
PROT SERPL-MCNC: 5.6 G/DL — LOW (ref 6–8)
RBC # BLD: 3.49 M/UL — LOW (ref 4.2–5.4)
RBC # FLD: 16.2 % — HIGH (ref 11.5–14.5)
SODIUM SERPL-SCNC: 139 MMOL/L — SIGNIFICANT CHANGE UP (ref 135–146)
WBC # BLD: 10.07 K/UL — SIGNIFICANT CHANGE UP (ref 4.8–10.8)
WBC # FLD AUTO: 10.07 K/UL — SIGNIFICANT CHANGE UP (ref 4.8–10.8)

## 2024-04-04 PROCEDURE — 99233 SBSQ HOSP IP/OBS HIGH 50: CPT

## 2024-04-04 PROCEDURE — 99232 SBSQ HOSP IP/OBS MODERATE 35: CPT

## 2024-04-04 RX ORDER — METRONIDAZOLE 500 MG
1 TABLET ORAL
Qty: 21 | Refills: 0
Start: 2024-04-04 | End: 2024-04-10

## 2024-04-04 RX ORDER — CIPROFLOXACIN LACTATE 400MG/40ML
1 VIAL (ML) INTRAVENOUS
Qty: 14 | Refills: 0
Start: 2024-04-04 | End: 2024-04-10

## 2024-04-04 RX ORDER — LANOLIN ALCOHOL/MO/W.PET/CERES
5 CREAM (GRAM) TOPICAL ONCE
Refills: 0 | Status: COMPLETED | OUTPATIENT
Start: 2024-04-04 | End: 2024-04-04

## 2024-04-04 RX ORDER — CLOPIDOGREL BISULFATE 75 MG/1
1 TABLET, FILM COATED ORAL
Qty: 0 | Refills: 0 | DISCHARGE

## 2024-04-04 RX ORDER — PIPERACILLIN AND TAZOBACTAM 4; .5 G/20ML; G/20ML
3.38 INJECTION, POWDER, LYOPHILIZED, FOR SOLUTION INTRAVENOUS EVERY 8 HOURS
Refills: 0 | Status: DISCONTINUED | OUTPATIENT
Start: 2024-04-04 | End: 2024-04-04

## 2024-04-04 RX ADMIN — PIPERACILLIN AND TAZOBACTAM 25 GRAM(S): 4; .5 INJECTION, POWDER, LYOPHILIZED, FOR SOLUTION INTRAVENOUS at 13:22

## 2024-04-04 RX ADMIN — Medication 81 MILLIGRAM(S): at 11:47

## 2024-04-04 RX ADMIN — PIPERACILLIN AND TAZOBACTAM 25 GRAM(S): 4; .5 INJECTION, POWDER, LYOPHILIZED, FOR SOLUTION INTRAVENOUS at 05:07

## 2024-04-04 RX ADMIN — RIVAROXABAN 2.5 MILLIGRAM(S): KIT at 05:07

## 2024-04-04 RX ADMIN — SODIUM CHLORIDE 50 MILLILITER(S): 9 INJECTION, SOLUTION INTRAVENOUS at 01:22

## 2024-04-04 RX ADMIN — Medication 60 MILLIGRAM(S): at 05:07

## 2024-04-04 RX ADMIN — Medication 175 MICROGRAM(S): at 05:07

## 2024-04-04 RX ADMIN — PANTOPRAZOLE SODIUM 40 MILLIGRAM(S): 20 TABLET, DELAYED RELEASE ORAL at 05:07

## 2024-04-04 RX ADMIN — Medication 5 MILLIGRAM(S): at 01:59

## 2024-04-04 NOTE — PROGRESS NOTE ADULT - ASSESSMENT
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 She is s/p ERCP 4/3/2024 with Cholangiogram notable for multiple filing defects. Multiple CBD stones were removed using a biliary stone extraction balloon. A 10 F x 7 cm plastic CBD stent was placed with excellent drainage. Patient feels well post procedure and no overnight. Pain resolved.       Prior ERCP and Ampullectomy/ Last ERCP 7/21/2023  - ERCP done 4/3 as noted above  - Can de-escalate Zosyn to PO Cipro and Flagyl to continue for seven days   - All AC and AP may be resumed  - Advanced Diet as needed   - Discussed importance of follow up as will need ERCP with EHL and removal of stent in 4-6 weeks   - Can go home today as patient requests
79 y/o F w pmhx/pshx of htn, hld, smoking historu of 30py+, CHF on lasix, cholecystectomy ~20 years ago, ampullary adenoma, recurrent choledocholithiasis with multiple ERCPs with stent placement/exchanges, most recently 7/2023, PAD s/p bilateral lower extremity stents presenting with a picture of cholangitis / choledocholitiasis. .      #Transaminitis with CBD dilatation- Choledochitises . Concern for Cholangitis   #Hx of ampullary Adenoma   #HX of sinus node dysfunction   #Nonobstructrive CAD - Cath in november 2023 no PCI performed   #HFpEF   # new onset cough   #PAD with b.l stents - only on plavix and xarelto ( procedures were done in july as per patient)    Plan:   - S/p ERCP. Start clear liquid diet and advance to low fat diet in AM if no complications. D/c IVF when tolerating full po. Monitor for pancreatitis, h/h.   - C/w Zosyn  - RVP neg   - Patient is on Plavix and Xarelto for PAD. follows Dr. Stanley. Med rec says is also on aspirin but patient states that this is only because she was planning on getting cataracts surgery and was told to take aspirin while holding plavix. Patient is not currently on triple therapy  - Restart Xarelto and aspirin    #DVT proph : Xarelto  #diet: clears    # ambulate as tolerated

## 2024-04-04 NOTE — PROGRESS NOTE ADULT - NS ATTEND OPT1 GEN_ALL_CORE
I attest my time as attending is greater than 50% of the total combined time spent on qualifying patient care activities by the PA/NP and attending. 1.73

## 2024-04-04 NOTE — DISCHARGE NOTE NURSING/CASE MANAGEMENT/SOCIAL WORK - PATIENT PORTAL LINK FT
You can access the FollowMyHealth Patient Portal offered by [facility] by registering at the following website: [email or web address]. By joining [employer]’s FollowMyHealth portal, you will also be able to view your health information using other applications (apps) compatible with our system.

## 2024-04-04 NOTE — DISCHARGE NOTE PROVIDER - CARE PROVIDERS DIRECT ADDRESSES
[email or web address]

## 2024-04-04 NOTE — DISCHARGE NOTE PROVIDER - NSDCCPCAREPLAN_GEN_ALL_CORE_FT
PRINCIPAL DISCHARGE DIAGNOSIS  Diagnosis: Choledocholithiasis  Assessment and Plan of Treatment: Choledocholithiasis is when you have a gallstone in your common bile duct. That’s the duct that carries bile from your gallbladder. Gallstones in this narrow passageway pose a risk of obstructing the flow of bile. An obstruction can cause pain, inflammation and serious complications.  The first sign of a blockage will be the symptoms of biliary colic. These can include:  Abdominal pain. Biliary pain occurs in episodes lasting from one to several hours, usually after a meal. It grows for the first 20 minutes and gradually declines after that. Most people feel it in their upper right abdomen, but it may also radiate to your right side or shoulder blade.  Nausea and vomiting. Biliary colic is often accompanied by nausea and vomiting. You’ll notice that vomiting doesn’t relieve the pain, as it does with some other types, like migraines. If biliary colic isn’t as intense, you may only notice a general lack of appetite.  Jaundice. When bile backs up and leaks into your bloodstream, it may show up as a yellow tint to your skin or the whites of your eyes. It may also turn your pee a darker color. Jaundice can come and go, like biliary colic. But it’ll keep coming back until the blockage is cleared.  Fever. Severe inflammation in your biliary system may cause a fever. Fever may also be a sign of an infection. When your bile ducts are blocked, bacteria aren’t flushed out as usual.  Imaging tests may include:  MRCP.Magnetic resonance cholangiopancreatography (MRCP) is a type of MRI that specifically visualizes the bile ducts. It’s noninvasive and creates very clear images of your biliary system, including the common bile duct. Your provider might use this test first to find a suspected gallstone there. But if they’re already pretty sure it’s there, they might go straight to an ERCP.  ERCP. ERCP stands for endoscopic retrograde cholangiopancreatography.   You underwent an ERCP to remove stones and a stent was placed      SECONDARY DISCHARGE DIAGNOSES  Diagnosis: Transaminitis  Assessment and Plan of Treatment:     Diagnosis: Elevated bilirubin  Assessment and Plan of Treatment:

## 2024-04-04 NOTE — DISCHARGE NOTE PROVIDER - NSDCFUSCHEDAPPT_GEN_ALL_CORE_FT
Unknown, Doctor  TOM Manas  [facility] Manas PreAdmits  Scheduled Appointment: 05/22/2024    [facility]  [facility] Shasta Hassan  Scheduled Appointment: 05/22/2024

## 2024-04-04 NOTE — DISCHARGE NOTE NURSING/CASE MANAGEMENT/SOCIAL WORK - NSDCPEFALRISK_GEN_ALL_CORE
For information on Fall & Injury Prevention, visit: [email or web address] OR  [email or web address] OR  https://www.cdc.gov/steadi/patient.html

## 2024-04-04 NOTE — DISCHARGE NOTE PROVIDER - HOSPITAL COURSE
79 y/o F w pmhx/pshx of htn, hld, smoking history of 30py+, HFpEF on lasix,  Sinus Node dysfunction, cholecystectomy ~20 years ago, ampullary adenoma, recurrent choledocholithiasis with multiple ERCPs with stent placement/exchanges, most recently 7/2023, PAD s/p bilateral lower extremity stents  who presents to the emergency room with abd pain associated with subjective fevers and chills for the past day.  Patient also reports dysuria.  Patient also reports some chest discomfort earlier and cough. . Denies shortness of breath nausea vomiting diarrhea.    EKG : NSR no major ST/T changes   CXR ; no acute cardiopulmonary disease       # abd pain , possible choleducolithiais,   suspected cholecystistis, not sepsis   s/p ERCP 4/3/2024 with Cholangiogram notable for multiple filing defects. Multiple CBD stones were removed using a biliary stone extraction balloon. A 10 F x 7 cm plastic CBD stent was placed with excellent drainage. Patient feels well post procedure and no overnight. Pain resolved.   change to po cipro, flagyl for 7 days on DC     positive uc but pt does not have urinary symptoms     Alb: 3.6 g/dL / Pro: 5.6 g/dL / ALK PHOS: 167 U/L / ALT: 145 U/L / AST: 53 U/L / GGT: x     /  ramone x    / 0.4 mg/dL ( 04 Apr 2024 05:37 )  Alb: 3.8 g/dL / Pro: 6.1 g/dL / ALK PHOS: 209 U/L / ALT: 192 U/L / AST: 113 U/L / GGT: x     /  ramone 0.4 mg/dL/ 0.7 mg/dL ( 03 Apr 2024 05:55 )  Alb: 4.0 g/dL / Pro: 6.3 g/dL / ALK PHOS: 263 U/L / ALT: 228 U/L / AST: 304 U/L / GGT: x     /  ramone x    / 1.6 mg/dL ( 02 Apr 2024 11:40 )    LFT is improving, may followup as outpt   advance diet to regular     # HFpEF , chronic, stable cont home po meds   # HLD, HTN, ex smoker, cont home meds

## 2024-04-04 NOTE — DISCHARGE NOTE PROVIDER - PROVIDER TOKENS
PROVIDER:[TOKEN:[[id number]],FOLLOWUP:[2 weeks]],PROVIDER:[TOKEN:[[id number]],FOLLOWUP:[1 week]]

## 2024-04-04 NOTE — PROGRESS NOTE ADULT - NS ATTEND AMEND GEN_ALL_CORE FT
Agree with the above.  Patient's status post ERCP with stone removal and stent exchange on 4/3/2024, doing well after the procedure.  She had findings suspicious of recurrent ampullary adenoma.  She will need outpatient follow-up for ERCP with ampullectomy and stent removal with primary GI Dr. Torres.

## 2024-04-04 NOTE — DISCHARGE NOTE PROVIDER - CARE PROVIDER_API CALL
Franklin Torres  Gastroenterology  [address] [place] [address]  Phone: [phone]  Fax: [phone]  Follow Up Time: 2 weeks    Jasper Beal  Internal Medicine  [address] [place] [address]  Phone: [phone]  Fax: [phone]  Follow Up Time: 1 week

## 2024-04-04 NOTE — DISCHARGE NOTE PROVIDER - NSDCMRMEDTOKEN_GEN_ALL_CORE_FT
aspirin 81 mg oral tablet, chewable: 1 tab(s) orally once a day  furosemide 40 mg oral tablet: 1 tab(s) orally once a day  levothyroxine 175 mcg (0.175 mg) oral tablet: 1 tab(s) orally once a day  NIFEdipine 60 mg oral tablet, extended release: 1 tab(s) orally once a day  Plavix 75 mg oral tablet: 1 tab(s) orally once a day, to be held five days before ERCP)  Xarelto 2.5 mg oral tablet: 1 tab(s) orally 2 times a day   aspirin 81 mg oral tablet, chewable: 1 tab(s) orally once a day  ciprofloxacin 500 mg oral tablet: 1 tab(s) orally 2 times a day  furosemide 40 mg oral tablet: 1 tab(s) orally once a day  levothyroxine 175 mcg (0.175 mg) oral tablet: 1 tab(s) orally once a day  metroNIDAZOLE 500 mg oral tablet: 1 tab(s) orally 3 times a day  NIFEdipine 60 mg oral tablet, extended release: 1 tab(s) orally once a day  Xarelto 2.5 mg oral tablet: 1 tab(s) orally 2 times a day

## 2024-04-04 NOTE — PROGRESS NOTE ADULT - SUBJECTIVE AND OBJECTIVE BOX
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 She is s/p ERCP 4/3/2024 with Cholangiogram notable for multiple filing defects. Multiple CBD stones were removed using a biliary stone extraction balloon. A 10 F x 7 cm plastic CBD stent was placed with excellent drainage. Patient feels well post procedure and no overnight. Pain resolved.       PAST MEDICAL & SURGICAL HISTORY:  Arterial insufficiency of lower extremity left leg stent placed  Leg swelling  Hypothyroid  HTN (hypertension)  High cholesterol  Peripheral arterial disease  H/O Graves' disease  History of cholecystectomy  H/O: hysterectomy  S/P angiogram of extremity  S/P ERCP      MEDICATIONS  (STANDING):  aspirin  chewable 81 milliGRAM(s) Oral daily  levothyroxine 175 MICROGram(s) Oral daily  NIFEdipine XL 60 milliGRAM(s) Oral daily  pantoprazole    Tablet 40 milliGRAM(s) Oral before breakfast  piperacillin/tazobactam IVPB.. 3.375 Gram(s) IV Intermittent every 8 hours  sodium chloride 0.45%. 1000 milliLiter(s) (50 mL/Hr) IV Continuous <Continuous>        Allergies  codeine (Stomach Upset; Vomiting; Nausea)      Review of Systems  General:  Denies Fatigue, Denies Fever, Denies Weakness ,Denies Weight Loss   HEENT: Denies Trouble Swallowing ,Denies  Sore Throat , Denies Change in hearing/vision/speech ,Denies Dizziness    Cardio: Denies  Chest Pain , Palpitations    Respiratory: Denies worsening of SOB, Denies Cough  Abdomen: See detailed HPI  Neuro: Denies Headache Denies Dizziness, Denies Paresthesias  MSK: Denies pain in Bones/Joints/Muscles   Psych: Patient denies depression, denies suicidal or homicidal ideations  Integ: Patient Denies rash, or new skin lesions     Vital Signs Last 24 Hrs  T(C): 36.7 (04 Apr 2024 05:24), Max: 36.7 (03 Apr 2024 13:30)  T(F): 98 (04 Apr 2024 05:24), Max: 98.1 (03 Apr 2024 13:30)  HR: 56 (04 Apr 2024 05:24) (56 - 79)  BP: 168/75 (04 Apr 2024 05:24) (156/74 - 193/80)  BP(mean): 111 (03 Apr 2024 18:18) (111 - 111)  RR: 19 (04 Apr 2024 05:24) (15 - 19)  SpO2: 94% (04 Apr 2024 05:24) (94% - 96%)    Parameters below as of 04 Apr 2024 05:24  Patient On (Oxygen Delivery Method): room air      Physical Exam  Gen: NAD  Head: NC/AT, no visible deformity  ENT: PERRLA, Sclera Non Icteric  Cardio: S1/S2 No S3/S4, Regular  Resp: CTA B/L  Abdomen: Soft, ND/NT  Neuro: AAOx3  Extremities: FROM x 4  Skin: No jaundice, no excoriation       Labs:                                 9.2    10.07 )-----------( 252      ( 04 Apr 2024 05:37 )             29.1     04-04    139  |  105  |  19  ----------------------------<  128<H>  4.7   |  23  |  0.9    Ca    8.3<L>      04 Apr 2024 05:37  Mg     2.5     04-04    TPro  5.6<L>  /  Alb  3.6  /  TBili  0.4  /  DBili  x   /  AST  53<H>  /  ALT  145<H>  /  AlkPhos  167<H>  04-04        RADIOLOGY & ADDITIONAL STUDIES:  CT Abdomen and Pelvis w/ IV Cont 04.02.24   IMPRESSION:  1.  Since January 18, 2023, interval placement of a CBD stent with   improved intra-/extrahepatic ductal dilation; few filling defects again   noted within the CBD, suspicious for choledocholithiasis.  2.  Additional incidental findings as above.      US Abdomen Upper Quadrant Right 04.02.24   FINDINGS/  IMPRESSION:    Liver: Echogenic. Pneumobilia better appreciated on prior CT.  Bile ducts: Stent noted within the CBD. Otherwise, poor visualization.  Gallbladder: Cholecystectomy.  Pancreas: Visualized portions are within normal limits.  Right kidney: 9.9. No hydronephrosis. Simple 1.5 cm lower pole cyst.  Ascites: None.  IVC: Visualized portions are within normal limits.      
pt seen and examined.     My notes supersede resident's notes in case of discrepancy       ROS: no cp, no sob, no n/v, no fever    Vital Signs Last 24 Hrs  T(C): 36.7 (04 Apr 2024 05:24), Max: 36.7 (03 Apr 2024 13:30)  T(F): 98 (04 Apr 2024 05:24), Max: 98.1 (03 Apr 2024 13:30)  HR: 56 (04 Apr 2024 05:24) (56 - 79)  BP: 168/75 (04 Apr 2024 05:24) (156/74 - 193/80)  BP(mean): 111 (03 Apr 2024 18:18) (111 - 111)  RR: 19 (04 Apr 2024 05:24) (15 - 19)  SpO2: 94% (04 Apr 2024 05:24) (94% - 96%)    Parameters below as of 04 Apr 2024 05:24  Patient On (Oxygen Delivery Method): room air        physical exam  constitutional NAD, AAOX3, Respiratory  lungs CTA, CVS heart RRR, GI: abdomen Soft NT, ND, BS+, skin: intact  neuro exam no focal deficit     MEDICATIONS  (STANDING):  aspirin  chewable 81 milliGRAM(s) Oral daily  influenza  Vaccine (HIGH DOSE) 0.7 milliLiter(s) IntraMuscular once  levothyroxine 175 MICROGram(s) Oral daily  NIFEdipine XL 60 milliGRAM(s) Oral daily  pantoprazole    Tablet 40 milliGRAM(s) Oral before breakfast  piperacillin/tazobactam IVPB.. 3.375 Gram(s) IV Intermittent every 8 hours  rivaroxaban 2.5 milliGRAM(s) Oral two times a day  sodium chloride 0.45%. 1000 milliLiter(s) (50 mL/Hr) IV Continuous <Continuous>    MEDICATIONS  (PRN):  ondansetron Injectable 4 milliGRAM(s) IV Push once PRN Nausea and/or Vomiting                            9.2    10.07 )-----------( 252      ( 04 Apr 2024 05:37 )             29.1     04-04    139  |  105  |  19  ----------------------------<  128<H>  4.7   |  23  |  0.9    Ca    8.3<L>      04 Apr 2024 05:37  Mg     2.5     04-04    TPro  5.6<L>  /  Alb  3.6  /  TBili  0.4  /  DBili  x   /  AST  53<H>  /  ALT  145<H>  /  AlkPhos  167<H>  04-04    PT/INR - ( 03 Apr 2024 05:55 )   PT: 11.90 sec;   INR: 1.04 ratio       PTT - ( 03 Apr 2024 05:55 )  PTT:29.4 sec    Culture - Blood (collected 02 Apr 2024 11:40)  Source: .Blood Blood  Preliminary Report (03 Apr 2024 22:03):    No growth at 24 hours    Culture - Urine (collected 02 Apr 2024 10:18)  Source: Clean Catch Clean Catch (Midstream)  Preliminary Report (04 Apr 2024 06:35):    >100,000 CFU/ml Escherichia coli        a/p    77 y/o F w pmhx/pshx of htn, hld, smoking history of 30py+, HFpEF on lasix,  Sinus Node dysfunction, cholecystectomy ~20 years ago, ampullary adenoma, recurrent choledocholithiasis with multiple ERCPs with stent placement/exchanges, most recently 7/2023, PAD s/p bilateral lower extremity stents  who presents to the emergency room with abd pain associated with subjective fevers and chills for the past day.  Patient also reports dysuria.  Patient also reports some chest discomfort earlier and cough. . Denies shortness of breath nausea vomiting diarrhea.    EKG : NSR no major ST/T changes   CXR ; no acute cardiopulmonary disease  (02 Apr 2024 23:37)  Troponin T, High Sensitivity Result: 27:  (04.02.24 @ 18:06)  Troponin T, High Sensitivity Result: 36:  (04.02.24 @ 11:40)    # abd pain , possible choleducolithiais,   suspected cholecystistis, not sepsis   s/p ERCP 4/3/2024 with Cholangiogram notable for multiple filing defects. Multiple CBD stones were removed using a biliary stone extraction balloon. A 10 F x 7 cm plastic CBD stent was placed with excellent drainage. Patient feels well post procedure and no overnight. Pain resolved.   cont abx, may change to po cipro, flagyl     positive uc but pt does not have urinary symptoms     Alb: 3.6 g/dL / Pro: 5.6 g/dL / ALK PHOS: 167 U/L / ALT: 145 U/L / AST: 53 U/L / GGT: x     /  ramone x    / 0.4 mg/dL ( 04 Apr 2024 05:37 )  Alb: 3.8 g/dL / Pro: 6.1 g/dL / ALK PHOS: 209 U/L / ALT: 192 U/L / AST: 113 U/L / GGT: x     /  ramone 0.4 mg/dL/ 0.7 mg/dL ( 03 Apr 2024 05:55 )  Alb: 4.0 g/dL / Pro: 6.3 g/dL / ALK PHOS: 263 U/L / ALT: 228 U/L / AST: 304 U/L / GGT: x     /  ramone x    / 1.6 mg/dL ( 02 Apr 2024 11:40 )    LFT is improving, may followup as outpt   advance diet to regular     # HFpEF , chronic, stable cont home po meds   # HLD, HTN, ex smoker, cont home meds     #Progress Note Handoff    Pending :  possible dc today if tolerates diet   Family discussion: dw pt   Disposition: home   code status: full code  time spent 35 min                           
Hospital Day:  1d    Subjective:    Patient is a 78y old  Female who presents with a chief complaint of abdominal pain of 1 day duration (03 Apr 2024 07:00)      Past Medical Hx:   Arterial insufficiency of lower extremity    Leg swelling    Hypothyroid    HTN (hypertension)    High cholesterol    Peripheral arterial disease    Dyslipidemia    H/O Graves' disease      Past Sx:  History of cholecystectomy    H/O: hysterectomy    S/P angiogram of extremity    S/P ERCP      Allergies:  codeine (Stomach Upset; Vomiting; Nausea)    Current Meds:   Standng Meds:  aspirin  chewable 81 milliGRAM(s) Oral daily  levothyroxine 175 MICROGram(s) Oral daily  NIFEdipine XL 60 milliGRAM(s) Oral daily  pantoprazole    Tablet 40 milliGRAM(s) Oral before breakfast  piperacillin/tazobactam IVPB.. 3.375 Gram(s) IV Intermittent every 8 hours  sodium chloride 0.45%. 1000 milliLiter(s) (50 mL/Hr) IV Continuous <Continuous>    PRN Meds:  ondansetron Injectable 4 milliGRAM(s) IV Push once PRN Nausea and/or Vomiting    HOME MEDICATIONS:  aspirin 81 mg oral tablet, chewable: 1 tab(s) orally once a day  Plavix 75 mg oral tablet: 1 tab(s) orally once a day, to be held five days before ERCP)      Vital Signs:   T(F): 98.1 (04-03-24 @ 13:30), Max: 98.1 (04-03-24 @ 13:30)  HR: 68 (04-03-24 @ 15:00) (64 - 79)  BP: 156/74 (04-03-24 @ 15:00) (154/61 - 211/86)  RR: 18 (04-03-24 @ 15:00) (15 - 18)  SpO2: 96% (04-03-24 @ 15:00) (94% - 96%)        Physical Exam:   GENERAL: NAD  HEENT: NCAT  CHEST/LUNG: CTAB  HEART: Regular rate and rhythm; s1 s2 appreciated, No murmurs, rubs, or gallops  ABDOMEN: Soft, Nontender, Nondistended; Bowel sounds present  EXTREMITIES: No LE edema b/l  SKIN: no rashes, no new lesions  NERVOUS SYSTEM:  Alert & Oriented X3  LINES/CATHETERS:        Labs:                         9.5    8.94  )-----------( 241      ( 03 Apr 2024 05:55 )             30.0     Neutophil% 89.1, Lymphocyte% 7.0, Monocyte% 3.4, Bands% 0.4 04-03-24 @ 05:55    03 Apr 2024 05:55    137    |  104    |  23     ----------------------------<  300    4.6     |  20     |  1.0      Ca    8.4        03 Apr 2024 05:55    TPro  6.1    /  Alb  3.8    /  TBili  0.7    /  DBili  0.4    /  AST  113    /  ALT  192    /  AlkPhos  209    03 Apr 2024 05:55       pTT    29.4             ----< 1.04 INR  (04-03-24 @ 05:55)    11.90        PT    Amylase --, Lipase 32, 04-02-24 @ 11:40              Urinalysis Basic - ( 03 Apr 2024 05:55 )    Color: x / Appearance: x / SG: x / pH: x  Gluc: 300 mg/dL / Ketone: x  / Bili: x / Urobili: x   Blood: x / Protein: x / Nitrite: x   Leuk Esterase: x / RBC: x / WBC x   Sq Epi: x / Non Sq Epi: x / Bacteria: x

## 2024-04-07 LAB
CULTURE RESULTS: SIGNIFICANT CHANGE UP
SPECIMEN SOURCE: SIGNIFICANT CHANGE UP

## 2024-04-10 DIAGNOSIS — Z79.01 LONG TERM (CURRENT) USE OF ANTICOAGULANTS: ICD-10-CM

## 2024-04-10 DIAGNOSIS — B96.20 UNSPECIFIED ESCHERICHIA COLI [E. COLI] AS THE CAUSE OF DISEASES CLASSIFIED ELSEWHERE: ICD-10-CM

## 2024-04-10 DIAGNOSIS — Z87.891 PERSONAL HISTORY OF NICOTINE DEPENDENCE: ICD-10-CM

## 2024-04-10 DIAGNOSIS — Z90.710 ACQUIRED ABSENCE OF BOTH CERVIX AND UTERUS: ICD-10-CM

## 2024-04-10 DIAGNOSIS — E05.00 THYROTOXICOSIS WITH DIFFUSE GOITER WITHOUT THYROTOXIC CRISIS OR STORM: ICD-10-CM

## 2024-04-10 DIAGNOSIS — Z95.820 PERIPHERAL VASCULAR ANGIOPLASTY STATUS WITH IMPLANTS AND GRAFTS: ICD-10-CM

## 2024-04-10 DIAGNOSIS — E03.9 HYPOTHYROIDISM, UNSPECIFIED: ICD-10-CM

## 2024-04-10 DIAGNOSIS — Z79.82 LONG TERM (CURRENT) USE OF ASPIRIN: ICD-10-CM

## 2024-04-10 DIAGNOSIS — I73.9 PERIPHERAL VASCULAR DISEASE, UNSPECIFIED: ICD-10-CM

## 2024-04-10 DIAGNOSIS — I50.30 UNSPECIFIED DIASTOLIC (CONGESTIVE) HEART FAILURE: ICD-10-CM

## 2024-04-10 DIAGNOSIS — Z79.899 OTHER LONG TERM (CURRENT) DRUG THERAPY: ICD-10-CM

## 2024-04-10 DIAGNOSIS — Z88.5 ALLERGY STATUS TO NARCOTIC AGENT: ICD-10-CM

## 2024-04-10 DIAGNOSIS — I11.0 HYPERTENSIVE HEART DISEASE WITH HEART FAILURE: ICD-10-CM

## 2024-04-10 DIAGNOSIS — I25.10 ATHEROSCLEROTIC HEART DISEASE OF NATIVE CORONARY ARTERY WITHOUT ANGINA PECTORIS: ICD-10-CM

## 2024-04-10 DIAGNOSIS — E78.00 PURE HYPERCHOLESTEROLEMIA, UNSPECIFIED: ICD-10-CM

## 2024-04-10 DIAGNOSIS — R07.89 OTHER CHEST PAIN: ICD-10-CM

## 2024-04-10 DIAGNOSIS — N39.0 URINARY TRACT INFECTION, SITE NOT SPECIFIED: ICD-10-CM

## 2024-04-10 DIAGNOSIS — K80.30 CALCULUS OF BILE DUCT WITH CHOLANGITIS, UNSPECIFIED, WITHOUT OBSTRUCTION: ICD-10-CM

## 2024-04-10 DIAGNOSIS — D13.5 BENIGN NEOPLASM OF EXTRAHEPATIC BILE DUCTS: ICD-10-CM

## 2024-04-10 DIAGNOSIS — Z90.49 ACQUIRED ABSENCE OF OTHER SPECIFIED PARTS OF DIGESTIVE TRACT: ICD-10-CM

## 2024-04-10 DIAGNOSIS — Z79.890 HORMONE REPLACEMENT THERAPY: ICD-10-CM

## 2024-04-10 DIAGNOSIS — Z79.02 LONG TERM (CURRENT) USE OF ANTITHROMBOTICS/ANTIPLATELETS: ICD-10-CM

## 2024-04-25 ENCOUNTER — NON-APPOINTMENT (OUTPATIENT)
Age: 79
End: 2024-04-25

## 2024-05-22 ENCOUNTER — OUTPATIENT (OUTPATIENT)
Dept: OUTPATIENT SERVICES | Facility: HOSPITAL | Age: 79
LOS: 1 days | End: 2024-05-22
Payer: MEDICARE

## 2024-05-22 DIAGNOSIS — Z00.8 ENCOUNTER FOR OTHER GENERAL EXAMINATION: ICD-10-CM

## 2024-05-22 DIAGNOSIS — Z90.710 ACQUIRED ABSENCE OF BOTH CERVIX AND UTERUS: Chronic | ICD-10-CM

## 2024-05-22 DIAGNOSIS — Z98.890 OTHER SPECIFIED POSTPROCEDURAL STATES: Chronic | ICD-10-CM

## 2024-05-22 DIAGNOSIS — I47.29 OTHER VENTRICULAR TACHYCARDIA: ICD-10-CM

## 2024-05-22 DIAGNOSIS — Z90.49 ACQUIRED ABSENCE OF OTHER SPECIFIED PARTS OF DIGESTIVE TRACT: Chronic | ICD-10-CM

## 2024-05-22 PROCEDURE — 75561 CARDIAC MRI FOR MORPH W/DYE: CPT | Mod: 26,MH

## 2024-05-22 PROCEDURE — 75561 CARDIAC MRI FOR MORPH W/DYE: CPT

## 2024-05-22 PROCEDURE — A9579: CPT

## 2024-05-23 DIAGNOSIS — I47.29 OTHER VENTRICULAR TACHYCARDIA: ICD-10-CM

## 2024-07-10 ENCOUNTER — APPOINTMENT (OUTPATIENT)
Dept: GASTROENTEROLOGY | Facility: CLINIC | Age: 79
End: 2024-07-10

## 2024-07-10 DIAGNOSIS — K80.50 CALCULUS OF BILE DUCT W/OUT CHOLANGITIS OR CHOLECYSTITIS W/OUT OBSTRUCTION: ICD-10-CM

## 2024-07-10 DIAGNOSIS — D13.5 BENIGN NEOPLASM OF EXTRAHEPATIC BILE DUCTS: ICD-10-CM

## 2024-07-16 ENCOUNTER — INPATIENT (INPATIENT)
Facility: HOSPITAL | Age: 79
LOS: 3 days | Discharge: HOME | DRG: 811 | End: 2024-07-20
Attending: HOSPITALIST | Admitting: INTERNAL MEDICINE
Payer: MEDICARE

## 2024-07-16 VITALS
TEMPERATURE: 98 F | DIASTOLIC BLOOD PRESSURE: 69 MMHG | RESPIRATION RATE: 16 BRPM | HEART RATE: 88 BPM | WEIGHT: 199.96 LBS | OXYGEN SATURATION: 98 % | SYSTOLIC BLOOD PRESSURE: 184 MMHG | HEIGHT: 65 IN

## 2024-07-16 DIAGNOSIS — Z90.710 ACQUIRED ABSENCE OF BOTH CERVIX AND UTERUS: Chronic | ICD-10-CM

## 2024-07-16 DIAGNOSIS — Z98.890 OTHER SPECIFIED POSTPROCEDURAL STATES: Chronic | ICD-10-CM

## 2024-07-16 DIAGNOSIS — R53.1 WEAKNESS: ICD-10-CM

## 2024-07-16 DIAGNOSIS — Z90.49 ACQUIRED ABSENCE OF OTHER SPECIFIED PARTS OF DIGESTIVE TRACT: Chronic | ICD-10-CM

## 2024-07-16 LAB
ALBUMIN SERPL ELPH-MCNC: 4.1 G/DL — SIGNIFICANT CHANGE UP (ref 3.5–5.2)
ALP SERPL-CCNC: 122 U/L — HIGH (ref 30–115)
ALT FLD-CCNC: 10 U/L — SIGNIFICANT CHANGE UP (ref 0–41)
ANION GAP SERPL CALC-SCNC: 15 MMOL/L — HIGH (ref 7–14)
ANISOCYTOSIS BLD QL: SIGNIFICANT CHANGE UP
APPEARANCE UR: CLEAR — SIGNIFICANT CHANGE UP
APTT BLD: 29.8 SEC — SIGNIFICANT CHANGE UP (ref 27–39.2)
APTT BLD: SIGNIFICANT CHANGE UP SEC (ref 27–39.2)
AST SERPL-CCNC: 15 U/L — SIGNIFICANT CHANGE UP (ref 0–41)
BACTERIA # UR AUTO: NEGATIVE /HPF — SIGNIFICANT CHANGE UP
BASOPHILS # BLD AUTO: 0 K/UL — SIGNIFICANT CHANGE UP (ref 0–0.2)
BASOPHILS NFR BLD AUTO: 0 % — SIGNIFICANT CHANGE UP (ref 0–1)
BILIRUB SERPL-MCNC: 0.3 MG/DL — SIGNIFICANT CHANGE UP (ref 0.2–1.2)
BILIRUB UR-MCNC: NEGATIVE — SIGNIFICANT CHANGE UP
BLD GP AB SCN SERPL QL: SIGNIFICANT CHANGE UP
BUN SERPL-MCNC: 20 MG/DL — SIGNIFICANT CHANGE UP (ref 10–20)
CALCIUM SERPL-MCNC: 8.7 MG/DL — SIGNIFICANT CHANGE UP (ref 8.4–10.5)
CAST: 0 /LPF — SIGNIFICANT CHANGE UP (ref 0–4)
CHLORIDE SERPL-SCNC: 102 MMOL/L — SIGNIFICANT CHANGE UP (ref 98–110)
CO2 SERPL-SCNC: 22 MMOL/L — SIGNIFICANT CHANGE UP (ref 17–32)
COLOR SPEC: YELLOW — SIGNIFICANT CHANGE UP
CREAT SERPL-MCNC: 0.7 MG/DL — SIGNIFICANT CHANGE UP (ref 0.7–1.5)
DIFF PNL FLD: NEGATIVE — SIGNIFICANT CHANGE UP
EGFR: 89 ML/MIN/1.73M2 — SIGNIFICANT CHANGE UP
ELLIPTOCYTES BLD QL SMEAR: SLIGHT — SIGNIFICANT CHANGE UP
EOSINOPHIL # BLD AUTO: 0.65 K/UL — SIGNIFICANT CHANGE UP (ref 0–0.7)
EOSINOPHIL NFR BLD AUTO: 5.3 % — SIGNIFICANT CHANGE UP (ref 0–8)
GIANT PLATELETS BLD QL SMEAR: PRESENT — SIGNIFICANT CHANGE UP
GLUCOSE SERPL-MCNC: 175 MG/DL — HIGH (ref 70–99)
GLUCOSE UR QL: NEGATIVE MG/DL — SIGNIFICANT CHANGE UP
HCT VFR BLD CALC: 21.5 % — LOW (ref 37–47)
HCT VFR BLD CALC: 28.2 % — LOW (ref 37–47)
HGB BLD-MCNC: 6.7 G/DL — CRITICAL LOW (ref 12–16)
HGB BLD-MCNC: 8.7 G/DL — LOW (ref 12–16)
HYPOCHROMIA BLD QL: SLIGHT — SIGNIFICANT CHANGE UP
INR BLD: 0.98 RATIO — SIGNIFICANT CHANGE UP (ref 0.65–1.3)
INR BLD: 1.03 RATIO — SIGNIFICANT CHANGE UP (ref 0.65–1.3)
KETONES UR-MCNC: NEGATIVE MG/DL — SIGNIFICANT CHANGE UP
LEUKOCYTE ESTERASE UR-ACNC: ABNORMAL
LIDOCAIN IGE QN: 45 U/L — SIGNIFICANT CHANGE UP (ref 7–60)
LYMPHOCYTES # BLD AUTO: 19.3 % — LOW (ref 20.5–51.1)
LYMPHOCYTES # BLD AUTO: 2.36 K/UL — SIGNIFICANT CHANGE UP (ref 1.2–3.4)
MANUAL SMEAR VERIFICATION: SIGNIFICANT CHANGE UP
MCHC RBC-ENTMCNC: 26.7 PG — LOW (ref 27–31)
MCHC RBC-ENTMCNC: 26.8 PG — LOW (ref 27–31)
MCHC RBC-ENTMCNC: 30.9 G/DL — LOW (ref 32–37)
MCHC RBC-ENTMCNC: 31.2 G/DL — LOW (ref 32–37)
MCV RBC AUTO: 85.7 FL — SIGNIFICANT CHANGE UP (ref 81–99)
MCV RBC AUTO: 86.8 FL — SIGNIFICANT CHANGE UP (ref 81–99)
MICROCYTES BLD QL: SIGNIFICANT CHANGE UP
MONOCYTES # BLD AUTO: 0.43 K/UL — SIGNIFICANT CHANGE UP (ref 0.1–0.6)
MONOCYTES NFR BLD AUTO: 3.5 % — SIGNIFICANT CHANGE UP (ref 1.7–9.3)
NEUTROPHILS # BLD AUTO: 8.79 K/UL — HIGH (ref 1.4–6.5)
NEUTROPHILS NFR BLD AUTO: 71.9 % — SIGNIFICANT CHANGE UP (ref 42.2–75.2)
NITRITE UR-MCNC: NEGATIVE — SIGNIFICANT CHANGE UP
NRBC # BLD: 0 /100 WBCS — SIGNIFICANT CHANGE UP (ref 0–0)
PH UR: 6 — SIGNIFICANT CHANGE UP (ref 5–8)
PLAT MORPH BLD: NORMAL — SIGNIFICANT CHANGE UP
PLATELET # BLD AUTO: 375 K/UL — SIGNIFICANT CHANGE UP (ref 130–400)
PLATELET # BLD AUTO: 409 K/UL — HIGH (ref 130–400)
PMV BLD: 10 FL — SIGNIFICANT CHANGE UP (ref 7.4–10.4)
PMV BLD: 10.6 FL — HIGH (ref 7.4–10.4)
POIKILOCYTOSIS BLD QL AUTO: SIGNIFICANT CHANGE UP
POLYCHROMASIA BLD QL SMEAR: SLIGHT — SIGNIFICANT CHANGE UP
POTASSIUM SERPL-MCNC: 4.4 MMOL/L — SIGNIFICANT CHANGE UP (ref 3.5–5)
POTASSIUM SERPL-SCNC: 4.4 MMOL/L — SIGNIFICANT CHANGE UP (ref 3.5–5)
PROT SERPL-MCNC: 6.5 G/DL — SIGNIFICANT CHANGE UP (ref 6–8)
PROT UR-MCNC: NEGATIVE MG/DL — SIGNIFICANT CHANGE UP
PROTHROM AB SERPL-ACNC: 11.2 SEC — SIGNIFICANT CHANGE UP (ref 9.95–12.87)
PROTHROM AB SERPL-ACNC: 11.7 SEC — SIGNIFICANT CHANGE UP (ref 9.95–12.87)
RBC # BLD: 2.51 M/UL — LOW (ref 4.2–5.4)
RBC # BLD: 3.25 M/UL — LOW (ref 4.2–5.4)
RBC # FLD: 15.6 % — HIGH (ref 11.5–14.5)
RBC # FLD: 16.3 % — HIGH (ref 11.5–14.5)
RBC BLD AUTO: ABNORMAL
RBC CASTS # UR COMP ASSIST: 0 /HPF — SIGNIFICANT CHANGE UP (ref 0–4)
SODIUM SERPL-SCNC: 139 MMOL/L — SIGNIFICANT CHANGE UP (ref 135–146)
SP GR SPEC: 1.01 — SIGNIFICANT CHANGE UP (ref 1–1.03)
SPHEROCYTES BLD QL SMEAR: SLIGHT — SIGNIFICANT CHANGE UP
SQUAMOUS # UR AUTO: 1 /HPF — SIGNIFICANT CHANGE UP (ref 0–5)
UROBILINOGEN FLD QL: 0.2 MG/DL — SIGNIFICANT CHANGE UP (ref 0.2–1)
WBC # BLD: 12.22 K/UL — HIGH (ref 4.8–10.8)
WBC # BLD: 12.66 K/UL — HIGH (ref 4.8–10.8)
WBC # FLD AUTO: 12.22 K/UL — HIGH (ref 4.8–10.8)
WBC # FLD AUTO: 12.66 K/UL — HIGH (ref 4.8–10.8)
WBC UR QL: 133 /HPF — HIGH (ref 0–5)

## 2024-07-16 PROCEDURE — 85610 PROTHROMBIN TIME: CPT

## 2024-07-16 PROCEDURE — 85730 THROMBOPLASTIN TIME PARTIAL: CPT

## 2024-07-16 PROCEDURE — 93970 EXTREMITY STUDY: CPT | Mod: 26

## 2024-07-16 PROCEDURE — 83540 ASSAY OF IRON: CPT

## 2024-07-16 PROCEDURE — 76937 US GUIDE VASCULAR ACCESS: CPT | Mod: 26

## 2024-07-16 PROCEDURE — 80053 COMPREHEN METABOLIC PANEL: CPT

## 2024-07-16 PROCEDURE — 99223 1ST HOSP IP/OBS HIGH 75: CPT

## 2024-07-16 PROCEDURE — 99285 EMERGENCY DEPT VISIT HI MDM: CPT

## 2024-07-16 PROCEDURE — 86900 BLOOD TYPING SEROLOGIC ABO: CPT

## 2024-07-16 PROCEDURE — 83010 ASSAY OF HAPTOGLOBIN QUANT: CPT

## 2024-07-16 PROCEDURE — 85025 COMPLETE CBC W/AUTO DIFF WBC: CPT

## 2024-07-16 PROCEDURE — 93970 EXTREMITY STUDY: CPT

## 2024-07-16 PROCEDURE — 74176 CT ABD & PELVIS W/O CONTRAST: CPT | Mod: 26,MC

## 2024-07-16 PROCEDURE — 85027 COMPLETE CBC AUTOMATED: CPT

## 2024-07-16 PROCEDURE — 83550 IRON BINDING TEST: CPT

## 2024-07-16 PROCEDURE — 83735 ASSAY OF MAGNESIUM: CPT

## 2024-07-16 PROCEDURE — 36430 TRANSFUSION BLD/BLD COMPNT: CPT

## 2024-07-16 PROCEDURE — 86850 RBC ANTIBODY SCREEN: CPT

## 2024-07-16 PROCEDURE — 36415 COLL VENOUS BLD VENIPUNCTURE: CPT

## 2024-07-16 PROCEDURE — 83690 ASSAY OF LIPASE: CPT

## 2024-07-16 PROCEDURE — 82746 ASSAY OF FOLIC ACID SERUM: CPT

## 2024-07-16 PROCEDURE — 86901 BLOOD TYPING SEROLOGIC RH(D): CPT

## 2024-07-16 PROCEDURE — C1889: CPT

## 2024-07-16 PROCEDURE — P9016: CPT

## 2024-07-16 PROCEDURE — 83036 HEMOGLOBIN GLYCOSYLATED A1C: CPT

## 2024-07-16 PROCEDURE — 87040 BLOOD CULTURE FOR BACTERIA: CPT

## 2024-07-16 PROCEDURE — 82607 VITAMIN B-12: CPT

## 2024-07-16 PROCEDURE — 85045 AUTOMATED RETICULOCYTE COUNT: CPT

## 2024-07-16 PROCEDURE — 88305 TISSUE EXAM BY PATHOLOGIST: CPT

## 2024-07-16 PROCEDURE — 82728 ASSAY OF FERRITIN: CPT

## 2024-07-16 RX ORDER — KETOROLAC TROMETHAMINE 30 MG/ML
15 INJECTION, SOLUTION INTRAMUSCULAR ONCE
Refills: 0 | Status: DISCONTINUED | OUTPATIENT
Start: 2024-07-16 | End: 2024-07-16

## 2024-07-16 RX ORDER — CLOPIDOGREL BISULFATE 75 MG/1
75 TABLET, FILM COATED ORAL DAILY
Refills: 0 | Status: DISCONTINUED | OUTPATIENT
Start: 2024-07-16 | End: 2024-07-16

## 2024-07-16 RX ORDER — SODIUM CHLORIDE 0.9 % (FLUSH) 0.9 %
1000 SYRINGE (ML) INJECTION ONCE
Refills: 0 | Status: COMPLETED | OUTPATIENT
Start: 2024-07-16 | End: 2024-07-16

## 2024-07-16 RX ORDER — PANTOPRAZOLE SODIUM 40 MG/10ML
40 INJECTION, POWDER, FOR SOLUTION INTRAVENOUS ONCE
Refills: 0 | Status: COMPLETED | OUTPATIENT
Start: 2024-07-16 | End: 2024-07-16

## 2024-07-16 RX ORDER — HYDROCORTISONE VALERATE 0.2 %
1 CREAM (GRAM) TOPICAL DAILY
Refills: 0 | Status: DISCONTINUED | OUTPATIENT
Start: 2024-07-16 | End: 2024-07-20

## 2024-07-16 RX ORDER — PANTOPRAZOLE SODIUM 40 MG/10ML
40 INJECTION, POWDER, FOR SOLUTION INTRAVENOUS
Refills: 0 | Status: DISCONTINUED | OUTPATIENT
Start: 2024-07-16 | End: 2024-07-18

## 2024-07-16 RX ORDER — LEVOTHYROXINE SODIUM 25 MCG
175 TABLET ORAL DAILY
Refills: 0 | Status: DISCONTINUED | OUTPATIENT
Start: 2024-07-16 | End: 2024-07-20

## 2024-07-16 RX ORDER — CEFTRIAXONE SODIUM 500 MG
2000 VIAL (EA) INJECTION EVERY 24 HOURS
Refills: 0 | Status: DISCONTINUED | OUTPATIENT
Start: 2024-07-16 | End: 2024-07-17

## 2024-07-16 RX ADMIN — PANTOPRAZOLE SODIUM 40 MILLIGRAM(S): 40 INJECTION, POWDER, FOR SOLUTION INTRAVENOUS at 21:55

## 2024-07-16 RX ADMIN — Medication 100 MILLIGRAM(S): at 21:54

## 2024-07-16 RX ADMIN — Medication 60 MILLIGRAM(S): at 19:35

## 2024-07-16 RX ADMIN — PANTOPRAZOLE SODIUM 40 MILLIGRAM(S): 40 INJECTION, POWDER, FOR SOLUTION INTRAVENOUS at 17:22

## 2024-07-16 RX ADMIN — Medication 3 MILLIGRAM(S): at 23:48

## 2024-07-16 NOTE — H&P ADULT - ATTENDING COMMENTS
77 yo female, current smoker patient known to have: HTN, HLD, Hypothyroidism, PAD s/p stents, hfpef,  presenting for R flank pain and urine sx, found to have anemia, Hb 6.7. Admitted for anemia and UTI.    #Acute Anemia  -Hb 6.7 (baseline around 9), MCV 85  -Patient reported black stools 2 days ago  getting 1 unit now  -iron studies, B12, and folate, hemolytic workup  -CT abdomen --> no acute pathology   -GI consult   -PPI IV BID for now   -Last Plavix dose was Saturday (patient ran out of her meds), last Xarelto dose yesterday night  -Hold Xarelto and Plavix for now   - vascular consult for AC/AP management     #UTI  -UA +ve  - Follow up urine and blood cx  - Ceftriaxone 2 g daily     #HTN  -continue with Nifedipine, BP was 184 on admission   -no active bleeding     #Hypothyroidism  -c/w Synthyroid     #GI ppx: Protonix 40 IV BID  #clears and npo after midnight     Handoff: Reciving one unit of blood, monitor h/h, GI follow up, Ucx - IV abx for UTI

## 2024-07-16 NOTE — ED ADULT NURSE REASSESSMENT NOTE - NS ED NURSE REASSESS COMMENT FT1
Blood transfusion delayed due to multiple attempts of IV with ultrasound by MD, Rn and ultrasound team. IV continuously infiltrating. MDs aware.

## 2024-07-16 NOTE — H&P ADULT - ASSESSMENT
Case of 77 yo female, current smoker patient known to have: HTN, HLD, Hypothyroidism, PAD s/p stents, hfpef,  presenting for R flank pain and urine sx, found to have anemia, Hb 6.7. Admitted for anemia and UTI.    #Acute Anemia  -Hb 6.7 (baseline around 9), MCV 85  -Patient reported black stools 2 days ago  -s/p 1 unit of PRBC in ED, repeat cbc at 8 pm   -iron studies, B12, and folate, hemolytic workup  -CT abdomen --> no acute pathology   -GI consult   -PPI IV BID for now   -Last Plavix dose was Saturday (patient ran out of her meds), last Xarelto dose yesterday night  -Hold Xarelto and Plavix for now   -Resume Plavix if Hb is stable tomorrow and no evidence of active bleeding     #UTI  -UA +ve  - Follow up urine and blood cx  - Ceftriaxone 2 g daily     #HTN  -continue with Nifedipine, BP was 184 on admission   -no active bleeding     #Hypothyroidism  -c/w Synthyroid    #DVT ppx: duplex ordered, SCDs if -ve results   #GI ppx: Protonix 40 IV BID  #NPO for now     Duplex, then scd Case of 79 yo female, current smoker patient known to have: HTN, HLD, Hypothyroidism, PAD s/p stents, hfpef,  presenting for R flank pain and urine sx, found to have anemia, Hb 6.7. Admitted for anemia and UTI.    #Acute Anemia  -Hb 6.7 (baseline around 9), MCV 85  -Patient reported black stools 2 days ago  -s/p 1 unit of PRBC in ED, repeat cbc at 8 pm   -iron studies, B12, and folate, hemolytic workup  -CT abdomen --> no acute pathology   -GI consult   -PPI IV BID for now   -Last Plavix dose was Saturday (patient ran out of her meds), last Xarelto dose yesterday night  -Hold Xarelto and Plavix for now   -Resume Plavix if Hb is stable tomorrow and no evidence of active bleeding, patient has b/l LE stents placed last year      #UTI  -UA +ve  - Follow up urine and blood cx  - Ceftriaxone 2 g daily     #HTN  -continue with Nifedipine, BP was 184 on admission   -no active bleeding     #Hypothyroidism  -c/w Synthyroid    #DVT ppx: duplex ordered, SCDs if -ve results   #GI ppx: Protonix 40 IV BID  #NPO for now     Duplex, then scd

## 2024-07-16 NOTE — ED PROVIDER NOTE - OBJECTIVE STATEMENT
79 y/o F, former smoker, with PMHx HTN, HLD (h/o statin induced myopathy), anemia (h/o epistaxis), CKD (THEODORA with peak Cr 2.0 on last admission in 8/2023, now back to baseline 1.1), h/o Graves s/p Irradiation-> now Hypothyroid on synthroid, h/o gallstones s/p ERCP 3 months ago, PAD s/p multiple b/l LE stents (on ASA/Plavix and Xarelto), presenting for evaluation of 4 days of right flank pain that radiates to the right groin.  Patient also notes that she is having urinary frequency and pressure sensation with urination.  She denies any burning, hematuria, fevers, chills, vomiting.  Patient gets occasional nausea with urination.

## 2024-07-16 NOTE — PATIENT PROFILE ADULT - FALL HARM RISK - HARM RISK INTERVENTIONS

## 2024-07-16 NOTE — ED ADULT TRIAGE NOTE - BMI (KG/M2)
33.3 Spiral Flap Text: The defect edges were debeveled with a #15c scalpel blade.  Given the location of the defect, shape of the defect and the proximity to free margins a spiral flap was deemed most appropriate.  Using a sterile surgical marker, an appropriate rotation flap was drawn incorporating the defect and placing the expected incisions within the relaxed skin tension lines where possible. The area thus outlined was incised deep to adipose tissue with a #15 scalpel blade.  The skin margins were undermined to an appropriate distance in all directions utilizing iris scissors.

## 2024-07-16 NOTE — H&P ADULT - NSHPLABSRESULTS_GEN_ALL_CORE
LABS:  cret                        6.7    12.22 )-----------( 375      ( 16 Jul 2024 08:46 )             21.5     07-16    139  |  102  |  20  ----------------------------<  175<H>  4.4   |  22  |  0.7    Ca    8.7      16 Jul 2024 08:46    TPro  6.5  /  Alb  4.1  /  TBili  0.3  /  DBili  x   /  AST  15  /  ALT  10  /  AlkPhos  122<H>  07-16    PT/INR - ( 16 Jul 2024 10:15 )   PT: TNP sec;   INR: TNP ratio         PTT - ( 16 Jul 2024 10:15 )  PTT:TNP sec

## 2024-07-16 NOTE — H&P ADULT - NSHPPHYSICALEXAM_GEN_ALL_CORE
Patient is AAO*3  Regular S1S2, no added murmer  Clear lung bilaterally  Bilateral lower limb edema +2  Soft abdomen, non tender, no CVA tenderness, negative jony

## 2024-07-16 NOTE — ED PROVIDER NOTE - PHYSICAL EXAMINATION
VITAL SIGNS: I have reviewed nursing notes and confirm.  CONSTITUTIONAL: well-appearing, non-toxic, NAD  SKIN: Warm dry, normal skin turgor  HEAD: NCAT  EYES: EOMI, PERRLA, no scleral icterus  ENT: Moist mucous membranes, normal pharynx with no erythema or exudates  NECK: Supple; non tender. Full ROM.   CARD: RRR, no murmurs, rubs or gallops  RESP: clear to ausculation b/l.  No rales, rhonchi, or wheezing.  ABD: soft, non-tender, non-distended, no rebound or guarding. No CVA tenderness  EXT: Full ROM, no bony tenderness, no pedal edema, no calf tenderness  NEURO: normal motor. normal sensory. CN II-XII intact. Normal gait.  PSYCH: Cooperative, appropriate. VITAL SIGNS: I have reviewed nursing notes and confirm.  CONSTITUTIONAL: well-appearing, non-toxic, NAD  SKIN: Warm dry, normal skin turgor  HEAD: NCAT  EYES: EOMI, PERRLA, no scleral icterus  ENT: Moist mucous membranes, normal pharynx with no erythema or exudates  NECK: Supple; non tender. Full ROM.   CARD: RRR, no murmurs, rubs or gallops  RESP: clear to ausculation b/l.  No rales, rhonchi, or wheezing.  ABD: soft, non-tender, non-distended, no rebound or guarding. No CVA tenderness  EXT: Full ROM, no bony tenderness, no pedal edema, no calf tenderness  NEURO: normal motor. normal sensory. CN II-XII intact. Normal gait.  : Rectal exam medical student Cecil as chaperone–all negative for blood. No hemorrhoids or fissures.  PSYCH: Cooperative, appropriate.

## 2024-07-16 NOTE — H&P ADULT - HISTORY OF PRESENT ILLNESS
Case of 79 yo female, current smoker patient known to have:  -HTN on Nifedipine 60 mg OD  -HLD (h/o statin induced myopathy) off statin  -h/o Graves s/p Irradiation-> now Hypothyroid on synthroid 175 mcg daily   -h/o gallstones s/p ERCP 3 months ago  -PAD s/p multiple b/l LE stents, last one was one year go, currently on Plavix 75 mg OD and Xarelto 2.5 mg BID    presenting for evaluation of 4 days of right flank pain that radiates to the right groin.  Patient also notes that she is having urinary frequency and pressure sensation with urination.  She denies any burning, hematuria, fevers, chills, vomiting.  Patient gets occasional nausea with urination. 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 Lasts s/p ERCP 4/3/2024 with Cholangiogram notable for multiple filing defects. Multiple CBD stones were removed using a biliary stone extraction balloon. A 10 F x 7 cm plastic CBD stent was placed with excellent drainage.    Presenting for evaluation of 4 days history of right flank pain that radiates to the right groin.  Patient also notes that she is having urinary frequency and pressure sensation with urination.  She denies any burning, hematuria. She also reported chills at home.    She reported on episode of black solid stools 2 days ago. However, today she had brownish BM. No blood or black stools.       In ED:    Vital signs: /69, HR 88, Temp 98.3  WBC 12K  Hb 6.7  Platelets 375  Crea 0.7  Na 139  K 4.4  UA +ve, culture sent     CT abdomen pelvis: No acute abdominal pelvic abnormality.      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 Lasts s/p ERCP 4/3/2024 with Cholangiogram notable for multiple filing defects. Multiple CBD stones were removed using a biliary stone extraction balloon. A 10 F x 7 cm plastic CBD stent was placed with excellent drainage.    Presenting for evaluation of 4 days history of right flank pain that radiates to the right groin.  Patient also notes that she is having urinary frequency and pressure sensation with urination.  She denies any burning, hematuria. She also reported chills at home.    She reported on episode of black solid stools 2 days ago. However, today she had brownish BM. No blood or black stools.       In ED:    Vital signs: /69, HR 88, Temp 98.3  WBC 12K  Hb 6.7  Platelets 375  Crea 0.7  Na 139  K 4.4  UA +ve, culture sent     CT abdomen pelvis: No acute abdominal pelvic abnormality.      Was given 1L NSS in ED  Protonix 40 mg IV once given   1 unit of PRBC  Will start Ceftriaxone 2 g daily for UTI

## 2024-07-17 LAB
A1C WITH ESTIMATED AVERAGE GLUCOSE RESULT: 6.3 % — HIGH (ref 4–5.6)
ALBUMIN SERPL ELPH-MCNC: 3.6 G/DL — SIGNIFICANT CHANGE UP (ref 3.5–5.2)
ALP SERPL-CCNC: 108 U/L — SIGNIFICANT CHANGE UP (ref 30–115)
ALT FLD-CCNC: 8 U/L — SIGNIFICANT CHANGE UP (ref 0–41)
ANION GAP SERPL CALC-SCNC: 11 MMOL/L — SIGNIFICANT CHANGE UP (ref 7–14)
AST SERPL-CCNC: 12 U/L — SIGNIFICANT CHANGE UP (ref 0–41)
BASOPHILS # BLD AUTO: 0.04 K/UL — SIGNIFICANT CHANGE UP (ref 0–0.2)
BASOPHILS NFR BLD AUTO: 0.3 % — SIGNIFICANT CHANGE UP (ref 0–1)
BILIRUB SERPL-MCNC: 0.5 MG/DL — SIGNIFICANT CHANGE UP (ref 0.2–1.2)
BUN SERPL-MCNC: 13 MG/DL — SIGNIFICANT CHANGE UP (ref 10–20)
CALCIUM SERPL-MCNC: 8.4 MG/DL — SIGNIFICANT CHANGE UP (ref 8.4–10.5)
CHLORIDE SERPL-SCNC: 107 MMOL/L — SIGNIFICANT CHANGE UP (ref 98–110)
CO2 SERPL-SCNC: 22 MMOL/L — SIGNIFICANT CHANGE UP (ref 17–32)
CREAT SERPL-MCNC: 0.6 MG/DL — LOW (ref 0.7–1.5)
EGFR: 92 ML/MIN/1.73M2 — SIGNIFICANT CHANGE UP
EOSINOPHIL # BLD AUTO: 0.61 K/UL — SIGNIFICANT CHANGE UP (ref 0–0.7)
EOSINOPHIL NFR BLD AUTO: 5.1 % — SIGNIFICANT CHANGE UP (ref 0–8)
ESTIMATED AVERAGE GLUCOSE: 134 MG/DL — HIGH (ref 68–114)
GLUCOSE SERPL-MCNC: 122 MG/DL — HIGH (ref 70–99)
HCT VFR BLD CALC: 25.8 % — LOW (ref 37–47)
HCT VFR BLD CALC: 28.7 % — LOW (ref 37–47)
HGB BLD-MCNC: 8.2 G/DL — LOW (ref 12–16)
HGB BLD-MCNC: 8.9 G/DL — LOW (ref 12–16)
IMM GRANULOCYTES NFR BLD AUTO: 0.8 % — HIGH (ref 0.1–0.3)
IRON SATN MFR SERPL: 11 % — LOW (ref 15–50)
IRON SATN MFR SERPL: 34 UG/DL — LOW (ref 35–150)
LYMPHOCYTES # BLD AUTO: 1.94 K/UL — SIGNIFICANT CHANGE UP (ref 1.2–3.4)
LYMPHOCYTES # BLD AUTO: 16.2 % — LOW (ref 20.5–51.1)
MCHC RBC-ENTMCNC: 26.3 PG — LOW (ref 27–31)
MCHC RBC-ENTMCNC: 27.2 PG — SIGNIFICANT CHANGE UP (ref 27–31)
MCHC RBC-ENTMCNC: 31 G/DL — LOW (ref 32–37)
MCHC RBC-ENTMCNC: 31.8 G/DL — LOW (ref 32–37)
MCV RBC AUTO: 84.9 FL — SIGNIFICANT CHANGE UP (ref 81–99)
MCV RBC AUTO: 85.4 FL — SIGNIFICANT CHANGE UP (ref 81–99)
MONOCYTES # BLD AUTO: 0.67 K/UL — HIGH (ref 0.1–0.6)
MONOCYTES NFR BLD AUTO: 5.6 % — SIGNIFICANT CHANGE UP (ref 1.7–9.3)
NEUTROPHILS # BLD AUTO: 8.63 K/UL — HIGH (ref 1.4–6.5)
NEUTROPHILS NFR BLD AUTO: 72 % — SIGNIFICANT CHANGE UP (ref 42.2–75.2)
NRBC # BLD: 0 /100 WBCS — SIGNIFICANT CHANGE UP (ref 0–0)
NRBC # BLD: 0 /100 WBCS — SIGNIFICANT CHANGE UP (ref 0–0)
PLATELET # BLD AUTO: 364 K/UL — SIGNIFICANT CHANGE UP (ref 130–400)
PLATELET # BLD AUTO: 398 K/UL — SIGNIFICANT CHANGE UP (ref 130–400)
PMV BLD: 9.9 FL — SIGNIFICANT CHANGE UP (ref 7.4–10.4)
PMV BLD: 9.9 FL — SIGNIFICANT CHANGE UP (ref 7.4–10.4)
POTASSIUM SERPL-MCNC: 4.6 MMOL/L — SIGNIFICANT CHANGE UP (ref 3.5–5)
POTASSIUM SERPL-SCNC: 4.6 MMOL/L — SIGNIFICANT CHANGE UP (ref 3.5–5)
PROT SERPL-MCNC: 5.9 G/DL — LOW (ref 6–8)
RBC # BLD: 3.02 M/UL — LOW (ref 4.2–5.4)
RBC # BLD: 3.02 M/UL — LOW (ref 4.2–5.4)
RBC # BLD: 3.38 M/UL — LOW (ref 4.2–5.4)
RBC # FLD: 15.9 % — HIGH (ref 11.5–14.5)
RBC # FLD: 16 % — HIGH (ref 11.5–14.5)
RETICS #: 132 K/UL — HIGH (ref 25–125)
RETICS/RBC NFR: 4.4 % — HIGH (ref 0.5–1.5)
SODIUM SERPL-SCNC: 140 MMOL/L — SIGNIFICANT CHANGE UP (ref 135–146)
TIBC SERPL-MCNC: 301 UG/DL — SIGNIFICANT CHANGE UP (ref 220–430)
UIBC SERPL-MCNC: 267 UG/DL — SIGNIFICANT CHANGE UP (ref 110–370)
WBC # BLD: 11.6 K/UL — HIGH (ref 4.8–10.8)
WBC # BLD: 11.99 K/UL — HIGH (ref 4.8–10.8)
WBC # FLD AUTO: 11.6 K/UL — HIGH (ref 4.8–10.8)
WBC # FLD AUTO: 11.99 K/UL — HIGH (ref 4.8–10.8)

## 2024-07-17 PROCEDURE — 99223 1ST HOSP IP/OBS HIGH 75: CPT

## 2024-07-17 PROCEDURE — 99233 SBSQ HOSP IP/OBS HIGH 50: CPT

## 2024-07-17 RX ORDER — NICOTINE POLACRILEX 2 MG/1
1 LOZENGE ORAL DAILY
Refills: 0 | Status: DISCONTINUED | OUTPATIENT
Start: 2024-07-17 | End: 2024-07-17

## 2024-07-17 RX ORDER — DEXTROSE MONOHYDRATE AND SODIUM CHLORIDE 5; .3 G/100ML; G/100ML
1000 INJECTION, SOLUTION INTRAVENOUS
Refills: 0 | Status: DISCONTINUED | OUTPATIENT
Start: 2024-07-17 | End: 2024-07-18

## 2024-07-17 RX ORDER — CEFTRIAXONE SODIUM 500 MG
1000 VIAL (EA) INJECTION EVERY 24 HOURS
Refills: 0 | Status: COMPLETED | OUTPATIENT
Start: 2024-07-17 | End: 2024-07-19

## 2024-07-17 RX ORDER — DEXTROSE MONOHYDRATE AND SODIUM CHLORIDE 5; .3 G/100ML; G/100ML
1000 INJECTION, SOLUTION INTRAVENOUS
Refills: 0 | Status: DISCONTINUED | OUTPATIENT
Start: 2024-07-17 | End: 2024-07-17

## 2024-07-17 RX ORDER — NICOTINE POLACRILEX 2 MG/1
1 LOZENGE ORAL DAILY
Refills: 0 | Status: DISCONTINUED | OUTPATIENT
Start: 2024-07-17 | End: 2024-07-20

## 2024-07-17 RX ADMIN — Medication 1 APPLICATION(S): at 12:09

## 2024-07-17 RX ADMIN — PANTOPRAZOLE SODIUM 40 MILLIGRAM(S): 40 INJECTION, POWDER, FOR SOLUTION INTRAVENOUS at 18:13

## 2024-07-17 RX ADMIN — Medication 100 MILLIGRAM(S): at 18:13

## 2024-07-17 RX ADMIN — Medication 175 MICROGRAM(S): at 05:04

## 2024-07-17 RX ADMIN — PANTOPRAZOLE SODIUM 40 MILLIGRAM(S): 40 INJECTION, POWDER, FOR SOLUTION INTRAVENOUS at 05:04

## 2024-07-17 RX ADMIN — NICOTINE POLACRILEX 1 PATCH: 2 LOZENGE ORAL at 18:14

## 2024-07-17 RX ADMIN — Medication 60 MILLIGRAM(S): at 05:04

## 2024-07-17 RX ADMIN — DEXTROSE MONOHYDRATE AND SODIUM CHLORIDE 75 MILLILITER(S): 5; .3 INJECTION, SOLUTION INTRAVENOUS at 23:59

## 2024-07-17 NOTE — PROGRESS NOTE ADULT - SUBJECTIVE AND OBJECTIVE BOX
BROWN RODRIGUEZJO  78y  Female      Patient is a 78y old  Female who presents with R flank pain (16 Jul 2024 17:03)      INTERVAL HPI/OVERNIGHT EVENTS:  Patient seen and examined earlier this morning  sitting in the chair  in NAD  no complaints     REVIEW OF SYSTEMS:  CONSTITUTIONAL: No fever, weight loss, or fatigue  EYES: No eye pain, visual disturbances, or discharge  ENMT:  No difficulty hearing, tinnitus, vertigo; No sinus or throat pain  NECK: No pain or stiffness  RESPIRATORY: No cough, wheezing, chills or hemoptysis; No shortness of breath  CARDIOVASCULAR: No chest pain, palpitations, dizziness, or leg swelling  GASTROINTESTINAL: No abdominal or epigastric pain. No nausea, vomiting, or hematemesis; No diarrhea or constipation. No melena or hematochezia.  GENITOURINARY: No dysuria, frequency, hematuria, or incontinence  NEUROLOGICAL: No headaches, memory loss, loss of strength, numbness, or tremors  SKIN: No itching, burning, rashes, or lesions   LYMPH NODES: No enlarged glands  ENDOCRINE: No heat or cold intolerance; No hair loss  MUSCULOSKELETAL: No joint pain or swelling; No muscle, back, or extremity pain  PSYCHIATRIC: No depression, anxiety, mood swings, or difficulty sleeping  HEME/LYMPH: No easy bruising, or bleeding gums  ALLERY AND IMMUNOLOGIC: No hives or eczema    T(C): 36.4 (07-17-24 @ 07:47), Max: 36.8 (07-17-24 @ 05:00)  HR: 67 (07-17-24 @ 07:47) (67 - 78)  BP: 158/76 (07-17-24 @ 07:47) (145/65 - 165/56)  RR: 18 (07-17-24 @ 07:47) (16 - 18)  SpO2: 98% (07-17-24 @ 05:00) (97% - 98%)    PHYSICAL EXAM:  GENERAL: NAD, well-groomed,   HEAD:  Atraumatic, Normocephalic  EYES: conjunctiva and sclera clear  ENMT: Moist mucous membranes,  No visible lesions  NECK: Supple, No JVD, Normal thyroid  NERVOUS SYSTEM:  Alert & Oriented X3, Good concentration; moves all extremities   CHEST/LUNG: good air entry   HEART: Regular rate and rhythm; No murmurs, rubs, or gallops  ABDOMEN: Soft, Nontender, Nondistended; Bowel sounds present  EXTREMITIES:  2+ Peripheral Pulses, No clubbing, cyanosis, or edema  LYMPH: No lymphadenopathy noted  SKIN: chronic venous changes     Consultant(s) Notes Reviewed:  [x ] YES  [ ] NO  Care Discussed with Consultants/Other Providers [ x] YES  [ ] NO    LAB:                        8.2    11.60 )-----------( 364      ( 17 Jul 2024 07:08 )             25.8     07-17    140  |  107  |  13  ----------------------------<  122<H>  4.6   |  22  |  0.6<L>    Ca    8.4      17 Jul 2024 07:08    TPro  5.9<L>  /  Alb  3.6  /  TBili  0.5  /  DBili  x   /  AST  12  /  ALT  8   /  AlkPhos  108  07-17    LIVER FUNCTIONS - ( 17 Jul 2024 07:08 )  Alb: 3.6 g/dL / Pro: 5.9 g/dL / ALK PHOS: 108 U/L / ALT: 8 U/L / AST: 12 U/L / GGT: x               Drug Dosing Weight  Height (cm): 165.1 (16 Jul 2024 07:41)  Weight (kg): 90.7 (16 Jul 2024 07:41)  BMI (kg/m2): 33.3 (16 Jul 2024 07:41)  BSA (m2): 1.98 (16 Jul 2024 07:41)    I&O's Summary    16 Jul 2024 07:01  -  17 Jul 2024 07:00  --------------------------------------------------------  IN: 50 mL / OUT: 0 mL / NET: 50 mL      Urinalysis Basic - ( 17 Jul 2024 07:08 )    Color: x / Appearance: x / SG: x / pH: x  Gluc: 122 mg/dL / Ketone: x  / Bili: x / Urobili: x   Blood: x / Protein: x / Nitrite: x   Leuk Esterase: x / RBC: x / WBC x   Sq Epi: x / Non Sq Epi: x / Bacteria: x        RADIOLOGY & ADDITIONAL TESTS:  Imaging Personally Reviewed:  [x] YES  [ ] NO  < from: CT Abdomen and Pelvis No Cont (07.16.24 @ 13:12) >  IMPRESSION:    No acute abdominal pelvic abnormality.    < end of copied text >      MEDS:  cefTRIAXone   IVPB 2000 milliGRAM(s) IV Intermittent every 24 hours  hydrocortisone 2.5% Ointment 1 Application(s) Topical daily  levothyroxine 175 MICROGram(s) Oral daily  melatonin 5 milliGRAM(s) Oral at bedtime  NIFEdipine XL 60 milliGRAM(s) Oral daily  pantoprazole  Injectable 40 milliGRAM(s) IV Push two times a day

## 2024-07-17 NOTE — CONSULT NOTE ADULT - ATTENDING COMMENTS
AC and follow up in the office
Hx of ampullary adenoma, choledocholithiasis SP ercp in may with stent placement. Admitted with anemia, reported melena. Adequate response SP 2 units PRBCs. Will plan potential EGD with ERCP inpatient vs outaptient if stable with Dr. Torres for stent removal and anemia work up. Pt would also warrant a colonoscopy.

## 2024-07-17 NOTE — PROGRESS NOTE ADULT - SUBJECTIVE AND OBJECTIVE BOX
SUBJECTIVE/OVERNIGHT EVENTS  Today is hospital day 1d. This morning patient was seen and examined at bedside, resting comfortably in bedside chair. No acute or major events overnight. She was NPO since midnight because of possible endoscopy today. Requests nicotine patch 21 for some anxiety from not smoking. Denies sob cp or leg swelling. No back or flank pain.       MEDICATIONS  STANDING MEDICATIONS  cefTRIAXone   IVPB 1000 milliGRAM(s) IV Intermittent every 24 hours  hydrocortisone 2.5% Ointment 1 Application(s) Topical daily  levothyroxine 175 MICROGram(s) Oral daily  melatonin 5 milliGRAM(s) Oral at bedtime  nicotine -   7 mG/24Hr(s) Patch 1 Patch Transdermal daily  NIFEdipine XL 60 milliGRAM(s) Oral daily  pantoprazole  Injectable 40 milliGRAM(s) IV Push two times a day    PRN MEDICATIONS    VITALS  T(F): 97.6 (07-17-24 @ 07:47), Max: 98.2 (07-17-24 @ 05:00)  HR: 67 (07-17-24 @ 07:47) (67 - 78)  BP: 158/76 (07-17-24 @ 07:47) (145/65 - 165/56)  RR: 18 (07-17-24 @ 07:47) (16 - 18)  SpO2: 98% (07-17-24 @ 05:00) (97% - 98%)    PHYSICAL EXAM    GENERAL: NAD,, sitting in chair comfortably  HEAD:  Atraumatic, normocephalic  EYES: EOMI, PERRLA, conjunctiva and sclera clear  ENT: Moist mucous membranes  NECK: Supple, no JVD  HEART: Regular rate and rhythm  LUNGS: Unlabored respirations.    ABDOMEN: Soft, nontender, nondistended. Negative CVA tenderness  EXTREMITIES: 2+ peripheral pulses bilaterally. No clubbing, cyanosis, or edema  NERVOUS SYSTEM:  A&Ox3, no focal deficits   SKIN: No rashes or lesions    LABS             8.2    11.60 )-----------( 364      ( 07-17-24 @ 07:08 )             25.8     140  |  107  |  13  -------------------------<  122   07-17-24 @ 07:08  4.6  |  22  |  0.6    Ca      8.4     07-17-24 @ 07:08    TPro  5.9  /  Alb  3.6  /  TBili  0.5  /  DBili  x   /  AST  12  /  ALT  8   /  AlkPhos  108  /  GGT  x     07-17-24 @ 07:08    PT/INR - ( 07-16-24 @ 20:00 )   PT: 11.70 sec;   INR: 1.03 ratio  PTT - ( 07-16-24 @ 20:00 )  PTT:29.8 sec      Urinalysis Basic - ( 17 Jul 2024 07:08 )    Color: x / Appearance: x / SG: x / pH: x  Gluc: 122 mg/dL / Ketone: x  / Bili: x / Urobili: x   Blood: x / Protein: x / Nitrite: x   Leuk Esterase: x / RBC: x / WBC x   Sq Epi: x / Non Sq Epi: x / Bacteria: x          Urinalysis with Rflx Culture (collected 16 Jul 2024 09:40)      IMAGING

## 2024-07-17 NOTE — CONSULT NOTE ADULT - SUBJECTIVE AND OBJECTIVE BOX
Gastroenterology Consultation:    Patient is a 78y old  Female who presents with a chief complaint of R flank pain (17 Jul 2024 12:35)        Admitted on: 07-16-24      HPI:     77 yo female PMH HTN hypothyroid, PAD s/p stents on plavix and xarelto, HFpEF, non-obstructive CAD, duodenal adenoma s/p EMR and amupllectomy 2023, cholodocholithiasis s/p stent placement 04/2024 presents for flank pain found to have UTI treated with abx. Gi consulted for acute on chronic anemia. Hb 6.7 >8.7 sp PRBC transfusion. BL Hb ~9. Paitent reports dark colored stools 4 days ago, not sure if melena. CT (noncon) shows No acute abdominal pelvic abnormality. RENATO light brown stool, no blood.       Prior EGD: ERCP:. Suspicion of recurrent ampullary adenoma. Previously placed plastic CBD  stent was removed. Cholangiogram showed multiple filing defects. Multiple CBD  stones were removed using a biliary stone extraction balloon. A 10 F x 7 cm  plastic CBD stent was placed with excellent drainage.      Prior Colonoscopy: none in chart      PAST MEDICAL & SURGICAL HISTORY:  Arterial insufficiency of lower extremity  left leg stent placed      Leg swelling      Hypothyroid      HTN (hypertension)      High cholesterol      Peripheral arterial disease      H/O Graves' disease      History of cholecystectomy      H/O: hysterectomy      S/P angiogram of extremity      S/P ERCP            FAMILY HISTORY:  Family history of breast cancer (Sibling)    FH: lung cancer  FATHER        Social History:  Tobacco: dneis  Alcohol:denies  Drugs:denies    Home Medications:  Plavix 75 mg oral tablet: 1 tab(s) orally once a day (16 Jul 2024 17:34)        MEDICATIONS  (STANDING):  cefTRIAXone   IVPB 1000 milliGRAM(s) IV Intermittent every 24 hours  hydrocortisone 2.5% Ointment 1 Application(s) Topical daily  levothyroxine 175 MICROGram(s) Oral daily  melatonin 5 milliGRAM(s) Oral at bedtime  NIFEdipine XL 60 milliGRAM(s) Oral daily  pantoprazole  Injectable 40 milliGRAM(s) IV Push two times a day    MEDICATIONS  (PRN):      Allergies  codeine (Stomach Upset; Vomiting; Nausea)      Review of Systems:   Constitutional:  No Fever, No Chills  ENT/Mouth:  No Hearing Changes,  No Difficulty Swallowing  Eyes:  No Eye Pain, No Vision Changes  Cardiovascular:  No Chest Pain, No Palpitations  Respiratory:  No Cough, No Dyspnea  Gastrointestinal:  As described in HPI  Musculoskeletal:  No Joint Swelling, No Back Pain  Skin:  No Skin Lesions, No Jaundice  Neuro:  No Syncope, No Dizziness  Heme/Lymph:  No Bruising, No Bleeding.          Physical Examination:  T(C): 36.4 (07-17-24 @ 07:47), Max: 36.8 (07-17-24 @ 05:00)  HR: 67 (07-17-24 @ 07:47) (67 - 78)  BP: 158/76 (07-17-24 @ 07:47) (145/65 - 165/56)  RR: 18 (07-17-24 @ 07:47) (16 - 18)  SpO2: 98% (07-17-24 @ 05:00) (97% - 98%)      07-16-24 @ 07:01  -  07-17-24 @ 07:00  --------------------------------------------------------  IN: 50 mL / OUT: 0 mL / NET: 50 mL          GENERAL: AAOx3, no acute distress.  HEAD:  Atraumatic, Normocephalic  EYES: conjunctiva and sclera clear  NECK: Supple, no JVD or thyromegaly  CHEST/LUNG: Clear to auscultation bilaterally; No wheeze, rhonchi, or rales  HEART: Regular rate and rhythm; normal S1, S2, No murmurs.  ABDOMEN: Soft, nontender, nondistended; Bowel sounds present  NEUROLOGY: No asterixis or tremor.   SKIN: Intact, no jaundice        Data:                        8.2    11.60 )-----------( 364      ( 17 Jul 2024 07:08 )             25.8     Hgb Trend:  8.2  07-17-24 @ 07:08  8.7  07-16-24 @ 20:00  6.7  07-16-24 @ 08:46        07-17    140  |  107  |  13  ----------------------------<  122<H>  4.6   |  22  |  0.6<L>    Ca    8.4      17 Jul 2024 07:08    TPro  5.9<L>  /  Alb  3.6  /  TBili  0.5  /  DBili  x   /  AST  12  /  ALT  8   /  AlkPhos  108  07-17    Liver panel trend:  TBili 0.5   /   AST 12   /   ALT 8   /   AlkP 108   /   Tptn 5.9   /   Alb 3.6    /   DBili --      07-17  TBili 0.3   /   AST 15   /   ALT 10   /   AlkP 122   /   Tptn 6.5   /   Alb 4.1    /   DBili --      07-16      PT/INR - ( 16 Jul 2024 20:00 )   PT: 11.70 sec;   INR: 1.03 ratio         PTT - ( 16 Jul 2024 20:00 )  PTT:29.8 sec    Urinalysis with Rflx Culture (collected 16 Jul 2024 09:40)          Radiology:  CT Abdomen and Pelvis No Cont:   ACC: [id number] EXAM:  CT ABDOMEN AND PELVIS   ORDERED BY: THOMAS ISSA     PROCEDURE DATE:  07/16/2024          INTERPRETATION:  Clinical Indication: Right flank and lower quadrant pain.    Technique: Multidetector-row CT images of the abdomen and pelvis were   obtained from the xiphoid through the symphysis pubis. No contrast was   administered. Coronal and sagittal reconstructions were performed.    Comparison: CT abdomen and pelvis 4/2/2024.    Findings:    01. LIVER: Normal unenhanced.  02.SPLEEN: Normal unenhanced.  03. PANCREAS: Normal unenhanced.  04. GALLBLADDER/BILIARY TREE: Postcholecystectomy. CBD stent with again   noted pneumobilia.  05. ADRENALS: Normal.  06. KIDNEYS: Symmetric in size.  No hydronephrosis or renal calculi.   Bilateral renal cysts.  07. LYMPHADENOPATHY/RETROPERITONEUM: No enlarged lymph nodes.  08. BOWEL: No bowel obstruction. Appendix is not visualized however there   are no inflammatory changes in the right lower quadrant.  09. PELVIC VISCERA: Posthysterectomy. Underdistended urinary bladder.  10. PELVIC LYMPH NODES: Stable nonspecific prominent pelvic lymph nodes,   for example:  -Stable 0.8 cm left common iliac lymph node (series 4 image 59)  -Stable on centimeter left iliac chain lymph node (series 4 image 88)  11. VASCULATURE: No abdominal aortic aneurysm. Diffuse calcified   atherosclerotic disease of the aorta and its branches. 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 Left   femoral stent.  12. PERITONEUM/ABDOMINAL WALL: No gross ascites.  13. SKELETAL: Degenerative changes.  14. LUNG BASES: Bilateral small atelectasis. Coronary artery   calcification.    IMPRESSION:    No acute abdominal pelvic abnormality.    Additional findings are detailed in the body of the report.          --- End of Report ---            MIGUEL ANDERSON MD; Attending Radiologist  This document has been electronically signed. Jul 16 2024  2:19PM (07-16-24 @ 13:12)

## 2024-07-17 NOTE — CONSULT NOTE ADULT - ASSESSMENT
79 yo female PMH HTN hypothyroid, PAD s/p stents on plavix and xarelto, HFpEF, non-obstructive CAD, cholecystectomy duodenal adenoma s/p EMR and amupllectomy 2023, choledocholithiasis s/p stent placement 04/2024 presents for flank pain found to have UTI treated with abx. Gi consulted for acute on chronic anemia. Hb 6.7 >8.7 sp PRBC transfusion. BL Hb ~9. Paitent reports dark colored stools 4 days ago, not sure if melena. CT (noncon) shows No acute abdominal pelvic abnormality. RENATO light brown stool, no blood.     #Acute on chronic MAGGY  #reported dark stools   #cholecystectomy duodenal adenoma s/p EMR and amupllectomy 2023  #choledocholithiasis s/p stent placement 04/2024  - Hemodynamically stable & no active bleeding  - Baseline hemoglobin - ~9  - Hemoglobin on admission - 6.7 >>8.7 sp 2 PRBC transfusion.   - Coags - 1.03  - Last use of Antithrombotic or no Antithrombotic - 07/16    #Rec  - NPO after midnight  - Will discuss with advanced team as patient requires stent retrieval and further evaluation ampullary adenoma for potential EGD with ERCP  - Maintain active Type and screen  - Trend H&H BID  - Please place x2 18G IVs  - Please start IV fluids  - Please start pantoprazole 40 IV BID  - Please target Hb  >7  - Please avoid any NSAIDs  - If any unstable bleed, please call GI stat

## 2024-07-17 NOTE — CONSULT NOTE ADULT - SUBJECTIVE AND OBJECTIVE BOX
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 Lasts s/p ERCP 4/3/2024 with Cholangiogram notable for multiple filing defects. Multiple CBD stones were removed using a biliary stone extraction balloon. A 10 F x 7 cm plastic CBD stent was placed with excellent drainage.    Presenting for evaluation of 4 days history of right flank pain that radiates to the right groin.  Patient also notes that she is having urinary frequency and pressure sensation with urination.  She denies any burning, hematuria. She also reported chills at home.  She reported on episode of black solid stools 2 days ago. However, today she had brownish BM. No blood or black stools. Patient planned for in-patient endoscopy and colonoscopy. Vascular surgery consulted for recommendations regarding resuming home anti-coagulation.    When examined at bedside, patient is sitting in chair, comfortable, eating her dinner. She states that she is doing well and not in any pain. Upon examination of her lower extremities, patient has bilateral pitting edema, which she states is her baseline when sitting. Patient has slightly red discoloration of bilateral shins. Bilateral DP, PT pulses dopplerable.           PAST MEDICAL & SURGICAL HISTORY:  Arterial insufficiency of lower extremity  left leg stent placed      Leg swelling      Hypothyroid      HTN (hypertension)      High cholesterol      Peripheral arterial disease      H/O Graves' disease      History of cholecystectomy      H/O: hysterectomy      S/P angiogram of extremity      S/P ERCP        codeine (Stomach Upset; Vomiting; Nausea)    Home Medications:  Plavix 75 mg oral tablet: 1 tab(s) orally once a day (16 Jul 2024 17:34)    No permtinent family history of PVD    REVIEW OF SYSTEMS:  GENERAL:                                         negative  SKIN:                                                 negative except for above  OPTHALMOLOGIC:                          negative  ENMT:                                               negative  RESPIRATORY AND THORAX:        negative  CARDIOVASCULAR:                         negative  GASTROINTESTINAL:                       negative  NEPHROLOGY:                                  negative  MUSCULOSKELETAL:                       negative except for above  NEUROLOGIC:                                   negative  PSYCHIATRIC:                                    negative  HEMATOLOGY/LYMPHATICS:         negative  ENDOCRINE:                                     negative  ALLERGIC/IMMUNOLOGIC:            negative    12 point ROS otherwise normal except as stated in HPI  FHx: none  SHX:  [ ]  smoking     [ ] alcohol use    PHYSICAL EXAM  Vital Signs Last 24 Hrs  T(C): 36.3 (18 Jul 2024 00:35), Max: 36.8 (17 Jul 2024 05:00)  T(F): 97.4 (18 Jul 2024 00:35), Max: 98.2 (17 Jul 2024 05:00)  HR: 71 (18 Jul 2024 00:35) (67 - 76)  BP: 146/65 (18 Jul 2024 00:35) (145/65 - 158/76)  BP(mean): 92 (17 Jul 2024 05:00) (92 - 92)  RR: 18 (18 Jul 2024 00:35) (18 - 18)  SpO2: 98% (17 Jul 2024 05:00) (98% - 98%)    Parameters below as of 17 Jul 2024 05:00  Patient On (Oxygen Delivery Method): room air        Appearance: Normal	  Respiratory: Lungs clear to auscultation, No Rales, Rhonchi, Wheezing	  Gastrointestinal:  Soft, Non-tender, positive BS	  Skin: No rashes, No ecchymoses, No cyanosis  Extremities: Normal range of motion, No clubbing, cyanosis or edema  Vascular: Peripheral pulses palpable 2+ bilaterally  Neurologic: Non-focal  Psychiatry: A & O x 3, Mood & affect appropriate      PULSES:  Femoral:  Popliteal:  Dorsal Pedal:  Posterior Tibial:  Capillary:    MEDICATIONS:   MEDICATIONS  (STANDING):  cefTRIAXone   IVPB 1000 milliGRAM(s) IV Intermittent every 24 hours  hydrocortisone 2.5% Ointment 1 Application(s) Topical daily  lactated ringers. 1000 milliLiter(s) (75 mL/Hr) IV Continuous <Continuous>  levothyroxine 175 MICROGram(s) Oral daily  melatonin 5 milliGRAM(s) Oral at bedtime  nicotine - 21 mG/24Hr(s) Patch 1 Patch Transdermal daily  NIFEdipine XL 60 milliGRAM(s) Oral daily  pantoprazole  Injectable 40 milliGRAM(s) IV Push two times a day    MEDICATIONS  (PRN):      LAB/STUDIES:                        8.9    11.99 )-----------( 398      ( 17 Jul 2024 18:59 )             28.7     07-17    140  |  107  |  13  ----------------------------<  122<H>  4.6   |  22  |  0.6<L>    Ca    8.4      17 Jul 2024 07:08    TPro  5.9<L>  /  Alb  3.6  /  TBili  0.5  /  DBili  x   /  AST  12  /  ALT  8   /  AlkPhos  108  07-17    PT/INR - ( 16 Jul 2024 20:00 )   PT: 11.70 sec;   INR: 1.03 ratio         PTT - ( 16 Jul 2024 20:00 )  PTT:29.8 sec  LIVER FUNCTIONS - ( 17 Jul 2024 07:08 )  Alb: 3.6 g/dL / Pro: 5.9 g/dL / ALK PHOS: 108 U/L / ALT: 8 U/L / AST: 12 U/L / GGT: x             Urinalysis Basic - ( 17 Jul 2024 07:08 )    Color: x / Appearance: x / SG: x / pH: x  Gluc: 122 mg/dL / Ketone: x  / Bili: x / Urobili: x   Blood: x / Protein: x / Nitrite: x   Leuk Esterase: x / RBC: x / WBC x   Sq Epi: x / Non Sq Epi: x / Bacteria: x                  Urinalysis with Rflx Culture (collected 16 Jul 2024 09:40)        IMAGING:       VASCULAR SURGERY CONSULT NOTE      HPI:    Case of 77 yo female, current smoker patient known to have:    -HTN on Nifedipine 60 mg OD  -HLD (h/o statin induced myopathy) off statin therapy   -h/o Graves s/p Irradiation-> now Hypothyroid on synthroid 175 mcg daily   -PAD s/p multiple b/l LE stents, last one was one year go, currently on Plavix 75 mg OD and Xarelto 2.5 mg BID  -HFpEF on Lasix 40 mg PRN   -Elective cath in 11/2023 --> non obstructive CAD  -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 Lasts s/p ERCP 4/3/2024 with Cholangiogram notable for multiple filing defects. Multiple CBD stones were removed using a biliary stone extraction balloon. A 10 F x 7 cm plastic CBD stent was placed with excellent drainage.    Presenting for evaluation of 4 days history of right flank pain that radiates to the right groin.  Patient also notes that she is having urinary frequency and pressure sensation with urination.  She denies any burning, hematuria. She also reported chills at home.  She reported on episode of black solid stools 2 days ago. However, today she had brownish BM. No blood or black stools. Patient planned for in-patient endoscopy and colonoscopy. Vascular surgery consulted for recommendations regarding resuming home anti-coagulation.    When examined at bedside, patient is sitting in chair, comfortable, eating her dinner. She states that she is doing well and not in any pain. Upon examination of her lower extremities, patient has bilateral pitting edema, which she states is her baseline when sitting. Patient has slightly red discoloration of bilateral shins. Bilateral DP, PT pulses dopplerable.           PAST MEDICAL & SURGICAL HISTORY:  Arterial insufficiency of lower extremity  left leg stent placed      Leg swelling      Hypothyroid      HTN (hypertension)      High cholesterol      Peripheral arterial disease      H/O Graves' disease      History of cholecystectomy      H/O: hysterectomy      S/P angiogram of extremity      S/P ERCP        codeine (Stomach Upset; Vomiting; Nausea)    Home Medications:  Plavix 75 mg oral tablet: 1 tab(s) orally once a day (16 Jul 2024 17:34)    No permtinent family history of PVD    REVIEW OF SYSTEMS:  GENERAL:                                         negative  SKIN:                                                 negative except for above  OPTHALMOLOGIC:                          negative  ENMT:                                               negative  RESPIRATORY AND THORAX:        negative  CARDIOVASCULAR:                         negative  GASTROINTESTINAL:                       negative  NEPHROLOGY:                                  negative  MUSCULOSKELETAL:                       negative except for above  NEUROLOGIC:                                   negative  PSYCHIATRIC:                                    negative  HEMATOLOGY/LYMPHATICS:         negative  ENDOCRINE:                                     negative  ALLERGIC/IMMUNOLOGIC:            negative    12 point ROS otherwise normal except as stated in HPI  FHx: none  SHX:  [ ]  smoking     [ ] alcohol use    PHYSICAL EXAM  Vital Signs Last 24 Hrs  T(C): 36.3 (18 Jul 2024 00:35), Max: 36.8 (17 Jul 2024 05:00)  T(F): 97.4 (18 Jul 2024 00:35), Max: 98.2 (17 Jul 2024 05:00)  HR: 71 (18 Jul 2024 00:35) (67 - 76)  BP: 146/65 (18 Jul 2024 00:35) (145/65 - 158/76)  BP(mean): 92 (17 Jul 2024 05:00) (92 - 92)  RR: 18 (18 Jul 2024 00:35) (18 - 18)  SpO2: 98% (17 Jul 2024 05:00) (98% - 98%)    Parameters below as of 17 Jul 2024 05:00  Patient On (Oxygen Delivery Method): room air        Appearance: Normal	  Respiratory: Normal respiratory effort  Gastrointestinal: NO N/V	  Skin: bilateral erythematous discoloration of bilateral shins   Extremities: 4+ pitting edema in bilateral lower extremities   Vascular: bilateral DP and PT pulses dopplerable   Neurologic: Non-focal  Psychiatry: A & O x 3, Mood & affect appropriate      PULSES:  Femoral:  Popliteal:   Dorsal Pedal: dopplerable bilaterally   Posterior Tibial: dopplerable bilaterally   Capillary:    MEDICATIONS:   MEDICATIONS  (STANDING):  cefTRIAXone   IVPB 1000 milliGRAM(s) IV Intermittent every 24 hours  hydrocortisone 2.5% Ointment 1 Application(s) Topical daily  lactated ringers. 1000 milliLiter(s) (75 mL/Hr) IV Continuous <Continuous>  levothyroxine 175 MICROGram(s) Oral daily  melatonin 5 milliGRAM(s) Oral at bedtime  nicotine - 21 mG/24Hr(s) Patch 1 Patch Transdermal daily  NIFEdipine XL 60 milliGRAM(s) Oral daily  pantoprazole  Injectable 40 milliGRAM(s) IV Push two times a day    MEDICATIONS  (PRN):      LAB/STUDIES:                        8.9    11.99 )-----------( 398      ( 17 Jul 2024 18:59 )             28.7     07-17    140  |  107  |  13  ----------------------------<  122<H>  4.6   |  22  |  0.6<L>    Ca    8.4      17 Jul 2024 07:08    TPro  5.9<L>  /  Alb  3.6  /  TBili  0.5  /  DBili  x   /  AST  12  /  ALT  8   /  AlkPhos  108  07-17    PT/INR - ( 16 Jul 2024 20:00 )   PT: 11.70 sec;   INR: 1.03 ratio         PTT - ( 16 Jul 2024 20:00 )  PTT:29.8 sec  LIVER FUNCTIONS - ( 17 Jul 2024 07:08 )  Alb: 3.6 g/dL / Pro: 5.9 g/dL / ALK PHOS: 108 U/L / ALT: 8 U/L / AST: 12 U/L / GGT: x             Urinalysis Basic - ( 17 Jul 2024 07:08 )    Color: x / Appearance: x / SG: x / pH: x  Gluc: 122 mg/dL / Ketone: x  / Bili: x / Urobili: x   Blood: x / Protein: x / Nitrite: x   Leuk Esterase: x / RBC: x / WBC x   Sq Epi: x / Non Sq Epi: x / Bacteria: x        Urinalysis with Rflx Culture (collected 16 Jul 2024 09:40)      Assessment: 77 y/o F patient with PMHx significant for arterial insufficiency of lower extremity s/p stent placed, leg swelling, hypothyroidism, HTN, HLD, and peripheral artery disease admitted for anemia in the setting of suspected GI bleed with vascular consulted for recommendations regarding resumption of home anticoagulation.    Plan:  - Recommend resuming home anticoagulation once medically stable and without suspected active bleeding.

## 2024-07-18 ENCOUNTER — TRANSCRIPTION ENCOUNTER (OUTPATIENT)
Age: 79
End: 2024-07-18

## 2024-07-18 LAB
ALBUMIN SERPL ELPH-MCNC: 4 G/DL — SIGNIFICANT CHANGE UP (ref 3.5–5.2)
ALP SERPL-CCNC: 119 U/L — HIGH (ref 30–115)
ALT FLD-CCNC: 9 U/L — SIGNIFICANT CHANGE UP (ref 0–41)
ANION GAP SERPL CALC-SCNC: 12 MMOL/L — SIGNIFICANT CHANGE UP (ref 7–14)
APTT BLD: 30.9 SEC — SIGNIFICANT CHANGE UP (ref 27–39.2)
AST SERPL-CCNC: 10 U/L — SIGNIFICANT CHANGE UP (ref 0–41)
BASOPHILS # BLD AUTO: 0.04 K/UL — SIGNIFICANT CHANGE UP (ref 0–0.2)
BASOPHILS NFR BLD AUTO: 0.4 % — SIGNIFICANT CHANGE UP (ref 0–1)
BILIRUB SERPL-MCNC: 0.3 MG/DL — SIGNIFICANT CHANGE UP (ref 0.2–1.2)
BUN SERPL-MCNC: 20 MG/DL — SIGNIFICANT CHANGE UP (ref 10–20)
CALCIUM SERPL-MCNC: 8.6 MG/DL — SIGNIFICANT CHANGE UP (ref 8.4–10.4)
CHLORIDE SERPL-SCNC: 102 MMOL/L — SIGNIFICANT CHANGE UP (ref 98–110)
CO2 SERPL-SCNC: 23 MMOL/L — SIGNIFICANT CHANGE UP (ref 17–32)
CREAT SERPL-MCNC: 0.9 MG/DL — SIGNIFICANT CHANGE UP (ref 0.7–1.5)
EGFR: 66 ML/MIN/1.73M2 — SIGNIFICANT CHANGE UP
EOSINOPHIL # BLD AUTO: 0.49 K/UL — SIGNIFICANT CHANGE UP (ref 0–0.7)
EOSINOPHIL NFR BLD AUTO: 5 % — SIGNIFICANT CHANGE UP (ref 0–8)
GLUCOSE SERPL-MCNC: 136 MG/DL — HIGH (ref 70–99)
HAPTOGLOB SERPL-MCNC: 243 MG/DL — HIGH (ref 34–200)
HCT VFR BLD CALC: 27.9 % — LOW (ref 37–47)
HGB BLD-MCNC: 8.7 G/DL — LOW (ref 12–16)
IMM GRANULOCYTES NFR BLD AUTO: 0.8 % — HIGH (ref 0.1–0.3)
INR BLD: 0.97 RATIO — SIGNIFICANT CHANGE UP (ref 0.65–1.3)
LYMPHOCYTES # BLD AUTO: 1.87 K/UL — SIGNIFICANT CHANGE UP (ref 1.2–3.4)
LYMPHOCYTES # BLD AUTO: 18.9 % — LOW (ref 20.5–51.1)
MAGNESIUM SERPL-MCNC: 2.5 MG/DL — HIGH (ref 1.8–2.4)
MCHC RBC-ENTMCNC: 26.4 PG — LOW (ref 27–31)
MCHC RBC-ENTMCNC: 31.2 G/DL — LOW (ref 32–37)
MCV RBC AUTO: 84.5 FL — SIGNIFICANT CHANGE UP (ref 81–99)
MONOCYTES # BLD AUTO: 0.55 K/UL — SIGNIFICANT CHANGE UP (ref 0.1–0.6)
MONOCYTES NFR BLD AUTO: 5.6 % — SIGNIFICANT CHANGE UP (ref 1.7–9.3)
NEUTROPHILS # BLD AUTO: 6.84 K/UL — HIGH (ref 1.4–6.5)
NEUTROPHILS NFR BLD AUTO: 69.3 % — SIGNIFICANT CHANGE UP (ref 42.2–75.2)
NRBC # BLD: 0 /100 WBCS — SIGNIFICANT CHANGE UP (ref 0–0)
PLATELET # BLD AUTO: 393 K/UL — SIGNIFICANT CHANGE UP (ref 130–400)
PMV BLD: 9.7 FL — SIGNIFICANT CHANGE UP (ref 7.4–10.4)
POTASSIUM SERPL-MCNC: 4.3 MMOL/L — SIGNIFICANT CHANGE UP (ref 3.5–5)
POTASSIUM SERPL-SCNC: 4.3 MMOL/L — SIGNIFICANT CHANGE UP (ref 3.5–5)
PROT SERPL-MCNC: 5.9 G/DL — LOW (ref 6–8)
PROTHROM AB SERPL-ACNC: 11.1 SEC — SIGNIFICANT CHANGE UP (ref 9.95–12.87)
RBC # BLD: 3.3 M/UL — LOW (ref 4.2–5.4)
RBC # FLD: 16 % — HIGH (ref 11.5–14.5)
SODIUM SERPL-SCNC: 137 MMOL/L — SIGNIFICANT CHANGE UP (ref 135–146)
WBC # BLD: 9.87 K/UL — SIGNIFICANT CHANGE UP (ref 4.8–10.8)
WBC # FLD AUTO: 9.87 K/UL — SIGNIFICANT CHANGE UP (ref 4.8–10.8)

## 2024-07-18 PROCEDURE — 99233 SBSQ HOSP IP/OBS HIGH 50: CPT

## 2024-07-18 PROCEDURE — 99232 SBSQ HOSP IP/OBS MODERATE 35: CPT

## 2024-07-18 RX ORDER — PANTOPRAZOLE SODIUM 40 MG/10ML
40 INJECTION, POWDER, FOR SOLUTION INTRAVENOUS
Refills: 0 | Status: DISCONTINUED | OUTPATIENT
Start: 2024-07-18 | End: 2024-07-20

## 2024-07-18 RX ORDER — ALPRAZOLAM 2 MG/1
0.25 TABLET ORAL ONCE
Refills: 0 | Status: DISCONTINUED | OUTPATIENT
Start: 2024-07-18 | End: 2024-07-19

## 2024-07-18 RX ORDER — DEXTROSE MONOHYDRATE AND SODIUM CHLORIDE 5; .3 G/100ML; G/100ML
1000 INJECTION, SOLUTION INTRAVENOUS
Refills: 0 | Status: COMPLETED | OUTPATIENT
Start: 2024-07-18 | End: 2024-07-19

## 2024-07-18 RX ADMIN — Medication 5 MILLIGRAM(S): at 21:28

## 2024-07-18 RX ADMIN — NICOTINE POLACRILEX 1 PATCH: 2 LOZENGE ORAL at 19:20

## 2024-07-18 RX ADMIN — PANTOPRAZOLE SODIUM 40 MILLIGRAM(S): 40 INJECTION, POWDER, FOR SOLUTION INTRAVENOUS at 17:40

## 2024-07-18 RX ADMIN — Medication 1 APPLICATION(S): at 11:30

## 2024-07-18 RX ADMIN — NICOTINE POLACRILEX 1 PATCH: 2 LOZENGE ORAL at 11:30

## 2024-07-18 RX ADMIN — PANTOPRAZOLE SODIUM 40 MILLIGRAM(S): 40 INJECTION, POWDER, FOR SOLUTION INTRAVENOUS at 05:07

## 2024-07-18 RX ADMIN — Medication 175 MICROGRAM(S): at 05:07

## 2024-07-18 RX ADMIN — NICOTINE POLACRILEX 1 PATCH: 2 LOZENGE ORAL at 07:00

## 2024-07-18 RX ADMIN — Medication 60 MILLIGRAM(S): at 05:06

## 2024-07-18 RX ADMIN — NICOTINE POLACRILEX 1 PATCH: 2 LOZENGE ORAL at 11:28

## 2024-07-18 RX ADMIN — Medication 100 MILLIGRAM(S): at 17:40

## 2024-07-18 NOTE — DISCHARGE NOTE PROVIDER - CARE PROVIDERS DIRECT ADDRESSES
[email or web address],DirectAddress_Unknown,DirectAddress_Unknown

## 2024-07-18 NOTE — DISCHARGE NOTE PROVIDER - CARE PROVIDER_API CALL
Franklin Torres  Gastroenterology  [address] [place] [address]  Phone: [phone]  Fax: [phone]  Follow Up Time: 1 week    Reinaldo Stanley  Interventional Cardiology  [address] [place] [address]  Phone: [phone]  Fax: [phone]  Follow Up Time: 1 week    Konrad Turner  Internal Medicine  [address] [place] [id number]  Phone: [phone]  Fax: [phone]  Follow Up Time: 2 weeks

## 2024-07-18 NOTE — PROGRESS NOTE ADULT - SUBJECTIVE AND OBJECTIVE BOX
JAKY RODRIGUEZ  78y  Female      Patient is a 78y old  Female who presents with R flank pain (16 Jul 2024 17:03)      INTERVAL HPI/OVERNIGHT EVENTS:  Patient seen and examined earlier this morning  sitting in the chair  in NAD  no complaints   ERCP and EGD planned for 7/19    REVIEW OF SYSTEMS:  CONSTITUTIONAL: No fever, weight loss, or fatigue  EYES: No eye pain, visual disturbances, or discharge  ENMT:  No difficulty hearing, tinnitus, vertigo; No sinus or throat pain  NECK: No pain or stiffness  RESPIRATORY: No cough, wheezing, chills or hemoptysis; No shortness of breath  CARDIOVASCULAR: No chest pain, palpitations, dizziness, or leg swelling  GASTROINTESTINAL: No abdominal or epigastric pain. No nausea, vomiting, or hematemesis; No diarrhea or constipation. No melena or hematochezia.  GENITOURINARY: No dysuria, frequency, hematuria, or incontinence  NEUROLOGICAL: No headaches, memory loss, loss of strength, numbness, or tremors  SKIN: No itching, burning, rashes, or lesions   LYMPH NODES: No enlarged glands  ENDOCRINE: No heat or cold intolerance; No hair loss  MUSCULOSKELETAL: No joint pain or swelling; No muscle, back, or extremity pain  PSYCHIATRIC: No depression, anxiety, mood swings, or difficulty sleeping  HEME/LYMPH: No easy bruising, or bleeding gums  ALLERY AND IMMUNOLOGIC: No hives or eczema    Vital Signs Last 24 Hrs  T(C): 36.6 (18 Jul 2024 07:49), Max: 36.8 (17 Jul 2024 15:00)  T(F): 97.8 (18 Jul 2024 07:49), Max: 98.2 (17 Jul 2024 15:00)  HR: 70 (18 Jul 2024 07:49) (65 - 71)  BP: 150/76 (18 Jul 2024 07:49) (139/70 - 150/76)  BP(mean): --  RR: 18 (18 Jul 2024 07:49) (18 - 19)  SpO2: 98% (18 Jul 2024 07:49) (98% - 99%)    Parameters below as of 18 Jul 2024 07:49  Patient On (Oxygen Delivery Method): room air    PHYSICAL EXAM:  GENERAL: NAD, well-groomed,   HEAD:  Atraumatic, Normocephalic  EYES: conjunctiva and sclera clear  ENMT: Moist mucous membranes,  No visible lesions  NECK: Supple, No JVD, Normal thyroid  NERVOUS SYSTEM:  Alert & Oriented X3, Good concentration; moves all extremities   CHEST/LUNG: good air entry   HEART: Regular rate and rhythm; No murmurs, rubs, or gallops  ABDOMEN: Soft, Nontender, Nondistended; Bowel sounds present, obese  EXTREMITIES:  2+ Peripheral Pulses, No clubbing, cyanosis, or edema  LYMPH: No lymphadenopathy noted  SKIN: chronic venous changes     Consultant(s) Notes Reviewed:  [x ] YES  [ ] NO  Care Discussed with Consultants/Other Providers [ x] YES  [ ] NO    LAB:                                         8.7    9.87  )-----------( 393      ( 18 Jul 2024 07:33 )             27.9     07-18    137  |  102  |  20  ----------------------------<  136<H>  4.3   |  23  |  0.9    Ca    8.6      18 Jul 2024 07:33  Mg     2.5     07-18    TPro  5.9<L>  /  Alb  4.0  /  TBili  0.3  /  DBili  x   /  AST  10  /  ALT  9   /  AlkPhos  119<H>  07-18      Drug Dosing Weight  Height (cm): 165.1 (16 Jul 2024 07:41)  Weight (kg): 90.7 (16 Jul 2024 07:41)  BMI (kg/m2): 33.3 (16 Jul 2024 07:41)  BSA (m2): 1.98 (16 Jul 2024 07:41)      Urinalysis Basic - ( 17 Jul 2024 07:08 )    Color: x / Appearance: x / SG: x / pH: x  Gluc: 122 mg/dL / Ketone: x  / Bili: x / Urobili: x   Blood: x / Protein: x / Nitrite: x   Leuk Esterase: x / RBC: x / WBC x   Sq Epi: x / Non Sq Epi: x / Bacteria: x        RADIOLOGY & ADDITIONAL TESTS:  Imaging Personally Reviewed:  [x] YES  [ ] NO  < from: CT Abdomen and Pelvis No Cont (07.16.24 @ 13:12) >  IMPRESSION:    No acute abdominal pelvic abnormality.    < end of copied text >      MEDICATIONS  (STANDING):  ALPRAZolam 0.25 milliGRAM(s) Oral once  cefTRIAXone   IVPB 1000 milliGRAM(s) IV Intermittent every 24 hours  hydrocortisone 2.5% Ointment 1 Application(s) Topical daily  lactated ringers. 1000 milliLiter(s) (75 mL/Hr) IV Continuous <Continuous>  levothyroxine 175 MICROGram(s) Oral daily  melatonin 5 milliGRAM(s) Oral at bedtime  nicotine - 21 mG/24Hr(s) Patch 1 Patch Transdermal daily  NIFEdipine XL 60 milliGRAM(s) Oral daily  pantoprazole  Injectable 40 milliGRAM(s) IV Push two times a day    MEDICATIONS  (PRN):

## 2024-07-18 NOTE — DISCHARGE NOTE PROVIDER - HOSPITAL COURSE
79 yo female, current smoker patient known to have: HTN, HLD, Hypothyroidism, PAD s/p stents, hfpef,  presenting for R flank pain and urine sx, found to have anemia, Hb 6.7. Admitted for anemia and UTI.    #Acute Anemia  -Hb 6.7 (baseline around 9), MCV 85. s/p 1 unit of PRBC in ED, Hgb stable now (8.2 today)   - has active type and screen  -Patient reported black stools 2 days before admission.  -iron studies (34 total iron, nml binding capacity, 11%iron saturation), B12, and folate pending  -CT abdomen shows no acute pathology  -PPI IV BID  -Vascular consult placed; Hold Xarelto and Plavix until bleed controlled (-Last Plavix dose was Saturday (patient ran out of her meds), last Xarelto dose Monday night)  -GI consult- EGD to be done with ERCP (for stent removal) by [facility] 7/19  -Following AM CBCs       #UTI  - UA +ve  - Following urine and blood cx  - Ceftriaxone 1 g daily started july 16    #HTN  -continue with Nifedipine, BP was 184 on admission   -dc if vitals unstable or hgb drops    #Hypothyroidism  -c/w Synthyroid    #Current Smoker  - nicotine patch 21 qd ordered     #DVT ppx: holding plavix and xarelto   #GI ppx: Protonix 40 IV BID  #Normal diet for now  Dispo: home     79 yo female, current smoker patient known to have: HTN, HLD, Hypothyroidism, PAD s/p stents, hfpef,  presenting for R flank pain and urine sx, found to have anemia, Hb 6.7. Admitted for anemia and UTI.    #Acute Anemia  -Hb 6.7 (baseline around 9), MCV 85. s/p 1 unit of PRBC in ED, Hgb stable now (8.2 today)   - has active type and screen  -Patient reported black stools 2 days before admission.  -iron studies (34 total iron, nml binding capacity, 11%iron saturation), B12, and folate pending  -CT abdomen shows no acute pathology  -PPI IV BID  -Vascular consult placed; Hold Xarelto and Plavix until bleed controlled (-Last Plavix dose was Saturday (patient ran out of her meds), last Xarelto dose Monday night)  -GI consult- EGD to be done with ERCP (for stent removal) by [facility] 7/19  -Following AM CBCs       #UTI  - UA +ve; culture + for E coli, negative blood cultures to date  - Ceftriaxone 1 g daily started july 16    #HTN  -continue with Nifedipine, BP was 184 on admission   -dc if vitals unstable or hgb drops    #Hypothyroidism  -c/w Synthyroid    #Current Smoker  - nicotine patch 21 qd ordered     #DVT ppx: holding plavix and xarelto   #GI ppx: Protonix 40 IV BID  #Normal diet for now  Dispo: home, picked up by spouse     79 yo female, current smoker patient known to have: HTN, HLD, Hypothyroidism, PAD s/p stents, hfpef,  presenting for R flank pain and urine sx, found to have anemia, Hb 6.7. Admitted for anemia and UTI.    #Acute Anemia  -Hb 6.7 (baseline around 9), MCV 85. s/p 1 unit of PRBC in ED, Hgb stable now (8.2 today)   - has active type and screen  -Patient reported black stools 2 days before admission.  -iron studies (34 total iron, nml binding capacity, 11%iron saturation), B12, and folate pending  -CT abdomen shows no acute pathology  -PPI IV BID, switch to po outpt  -Vascular consult placed; Hold Xarelto and Plavix until bleed controlled (-Last Plavix dose was Saturday (patient ran out of her meds), last Xarelto dose Monday night)  -GI consult- EGD to be done with ERCP (for stent removal) by [facility] 7/19  -Following AM CBCs       #UTI  - UA +ve; culture + for E coli, negative blood cultures to date  - Ceftriaxone 1 g daily started july 16    #HTN  -continue with Nifedipine, BP was 184 on admission   -dc if vitals unstable or hgb drops    #Hypothyroidism  -c/w Synthyroid    #Current Smoker  - nicotine patch 21 qd ordered     #DVT ppx: holding plavix and xarelto   #GI ppx: Protonix 40 IV BID  #Normal diet for now  Dispo: home, picked up by spouse     This morning pt seen at bedside resting comfortably in chair. Tolerating clear fluid diet from yst. No sob cp or light headedness.       79 yo female, current smoker patient known to have: HTN, HLD, Hypothyroidism, PAD s/p stents, hfpef,  presenting for R flank pain and urine sx, found to have anemia, Hb 6.7. Admitted for anemia and UTI.    #Acute Anemia  -Hb 6.7 (baseline around 9), MCV 85. s/p 1 unit of PRBC in ED, Hgb stable now (8.2 today)   - has active type and screen  -Patient reported black stools 2 days before admission.  -iron studies (34 total iron, nml binding capacity, 11%iron saturation), B12, and folate pending  -CT abdomen shows no acute pathology  -PPI IV BID, switch to po outpt  -Vascular consult placed; Hold Xarelto and Plavix until bleed controlled (-Last Plavix dose was Saturday (patient ran out of her meds), last Xarelto dose Monday night)  -GI consult- EGD to be done with ERCP (for stent removal) by [facility] 7/19  -EGD results and GI recs below.   EGD:	Normal esophagus.  	Normal stomach.  	Angioectasia in the anterior bulb. (Hemostasis).  	A CBD stent was seen in the ampulla. Removed with a snare. .  	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 Biopsies obtained. Not intervened today as the patient underwent this procedure for a GI bleed. .  Recommendations.   Liquid diet today.   Avoid red/maroon substances.  Advanced diet as tolerated tomorrow.  PPI BID X 4 weeks.  Follow up in clinic in 2 weeks.   Plan for OP EGD with intervention for the large likely duodenal adenoma    Return to floor for further management.      #UTI  - UA +ve; culture + for E coli, negative blood cultures to date  - Ceftriaxone 1 g daily started july 16    #HTN  -continue with Nifedipine, BP was 184 on admission   -dc if vitals unstable or hgb drops    #Hypothyroidism  -c/w Synthyroid    #Current Smoker  - nicotine patch 21 qd ordered     #DVT ppx: holding plavix and xarelto   #GI ppx: Protonix 40 IV BID  #Normal diet for now  Dispo: home, picked up by spouse     Medicine attending-    Patient seen and examined this morning  sitting in the chair  in nad  no compalints    Vital Signs Last 24 Hrs  T(C): 36.5 (20 Jul 2024 07:15), Max: 36.7 (19 Jul 2024 15:29)  T(F): 97.7 (20 Jul 2024 07:15), Max: 98.1 (19 Jul 2024 15:29)  HR: 65 (20 Jul 2024 07:15) (65 - 94)  BP: 142/78 (20 Jul 2024 07:15) (114/56 - 157/67)  BP(mean): 92 (19 Jul 2024 17:28) (92 - 92)  RR: 17 (20 Jul 2024 07:15) (17 - 18)  SpO2: 100% (19 Jul 2024 18:30) (97% - 100%)    Parameters below as of 19 Jul 2024 18:30  Patient On (Oxygen Delivery Method): room air    This morning pt seen at bedside resting comfortably in chair. Tolerating clear fluid diet from yst. No sob cp or light headedness.       79 yo female, current smoker patient known to have: HTN, HLD, Hypothyroidism, PAD s/p stents, hfpef,  presenting for R flank pain and urine sx, found to have anemia, Hb 6.7. Admitted for anemia and UTI.    #Acute Anemia  -Hb 6.7 (baseline around 9), MCV 85. s/p 1 unit of PRBC in ED, Hgb stable now (8.2 today)   - has active type and screen  -Patient reported black stools 2 days before admission.  -iron studies (34 total iron, nml binding capacity, 11%iron saturation), B12, and folate pending  -CT abdomen shows no acute pathology  -PPI IV BID, switch to po outpt  -Vascular consult placed; Hold Xarelto and Plavix until bleed controlled (-Last Plavix dose was Saturday (patient ran out of her meds), last Xarelto dose Monday night)  -GI consult- EGD to be done with ERCP (for stent removal) by [facility] 7/19  -EGD results and GI recs below.   EGD:	Normal esophagus.  	Normal stomach.  	Angioectasia in the anterior bulb. (Hemostasis).  	A CBD stent was seen in the ampulla. Removed with a snare. .  	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 Biopsies obtained. Not intervened today as the patient underwent this procedure for a GI bleed. .  Recommendations.   Liquid diet today.   Avoid red/maroon substances.  Advanced diet as tolerated tomorrow.  PPI BID X 4 weeks.  Follow up in clinic in 2 weeks.   Plan for OP EGD with intervention for the large likely duodenal adenoma    Return to floor for further management.      #UTI  - UA +ve; culture + for E coli, negative blood cultures to date  - Ceftriaxone 1 g daily started july 16    #HTN  -continue with Nifedipine, BP was 184 on admission   -dc if vitals unstable or hgb drops    #Hypothyroidism  -c/w Synthyroid    #Current Smoker  - nicotine patch 21 qd ordered     D/C today; d/c planning took over 75 min  d/c papers edited by me  discussed d/c plan and all instructions in detail

## 2024-07-18 NOTE — DISCHARGE NOTE PROVIDER - PROVIDER TOKENS
PROVIDER:[TOKEN:[[id number]],FOLLOWUP:[1 week]],PROVIDER:[TOKEN:[[id number]],FOLLOWUP:[1 week]],PROVIDER:[TOKEN:[[id number]],FOLLOWUP:[2 weeks]]

## 2024-07-18 NOTE — DISCHARGE NOTE PROVIDER - NSDCCPCAREPLAN_GEN_ALL_CORE_FT
PRINCIPAL DISCHARGE DIAGNOSIS  Diagnosis: Anemia  Assessment and Plan of Treatment: You came into the hospital for dark stools, weakness, and found to have low hemoglobin. EGD showed you have thin blood vessels in your bowels that could be causing the dark stools. No signs of active bleeding. They also took a biopsy of a piece of tissue in your abdomen.   Please take PROTONIX twice a day for 1 month. Continue to take your other home medications as prescribed, can restart your Xarelto and Plavix after the dental procedure. Follow up with the GI clinic outpatient in 2 weeks      SECONDARY DISCHARGE DIAGNOSES  Diagnosis: Anemia  Assessment and Plan of Treatment:     Diagnosis: E-coli UTI  Assessment and Plan of Treatment: You were found to have a urinary tract infection and we gave you antibiotics for it.     PRINCIPAL DISCHARGE DIAGNOSIS  Diagnosis: Anemia  Assessment and Plan of Treatment: You came into the hospital for dark stools, weakness, and found to have low hemoglobin. EGD showed you have thin blood vessels in your bowels that could be causing the dark stools. No signs of active bleeding. They also took a biopsy of a piece of tissue in your abdomen.   Please take PROTONIX twice a day for 1 month. Continue to take your other home medications as prescribed, can restart your Xarelto and Plavix after the dental procedure. Follow up with the GI clinic outpatient in 2 weeks      SECONDARY DISCHARGE DIAGNOSES  Diagnosis: E-coli UTI  Assessment and Plan of Treatment: You were found to have a urinary tract infection and we gave you antibiotics for it.

## 2024-07-18 NOTE — PROGRESS NOTE ADULT - SUBJECTIVE AND OBJECTIVE BOX
Patient is a 77 y/o Female with a PMHx of HTN, hypothyroid, PAD s/p stents on plavix and xarelto, HFpEF, non-obstructive CAD, duodenal adenoma s/p EMR and ampullectomy 2023, choledocholithiasis s/p stent placement 04/2024 presents for flank pain found to have UTI treated with abx. General GI evaluated patient for concern of anemia and transferred to [facility] today. Currently she feels well, no additional black stools. No further complaints.       PAST MEDICAL & SURGICAL HISTORY:  Arterial insufficiency of lower extremity  Leg swelling  Hypothyroid  HTN (hypertension)  High cholesterol  Peripheral arterial disease  H/O Graves' disease  History of cholecystectomy  H/O: hysterectomy  S/P angiogram of extremity      Home Medications:  Plavix 75 mg oral tablet: 1 tab(s) orally once a day (16 Jul 2024 17:34)      MEDICATIONS  (STANDING):  cefTRIAXone   IVPB 1000 milliGRAM(s) IV Intermittent every 24 hours  hydrocortisone 2.5% Ointment 1 Application(s) Topical daily  levothyroxine 175 MICROGram(s) Oral daily  melatonin 5 milliGRAM(s) Oral at bedtime  NIFEdipine XL 60 milliGRAM(s) Oral daily  pantoprazole  Injectable 40 milliGRAM(s) IV Push two times a day      Allergies  codeine (Stomach Upset; Vomiting; Nausea)      Review of Systems:   Constitutional:  No Fever, No Chills  ENT/Mouth:  No Hearing Changes,  No Difficulty Swallowing  Eyes:  No Eye Pain, No Vision Changes  Cardiovascular:  No Chest Pain, No Palpitations  Respiratory:  No Cough, No Dyspnea  Gastrointestinal:  As described in HPI  Musculoskeletal:  No Joint Swelling, No Back Pain  Skin:  No Skin Lesions, No Jaundice  Neuro:  No Syncope, No Dizziness  Heme/Lymph:  No Bruising, No Bleeding.      Vital Signs Last 24 Hrs  T(C): 36.6 (18 Jul 2024 07:49), Max: 36.8 (17 Jul 2024 15:00)  T(F): 97.8 (18 Jul 2024 07:49), Max: 98.2 (17 Jul 2024 15:00)  HR: 70 (18 Jul 2024 07:49) (65 - 71)  BP: 150/76 (18 Jul 2024 07:49) (139/70 - 150/76)  BP(mean): --  RR: 18 (18 Jul 2024 07:49) (18 - 19)  SpO2: 98% (18 Jul 2024 07:49) (98% - 99%)    Parameters below as of 18 Jul 2024 07:49  Patient On (Oxygen Delivery Method): room air      GENERAL: AAOx3, no acute distress.  HEAD:  Atraumatic, Normocephalic  EYES: conjunctiva and sclera clear  NECK: Supple, no JVD or thyromegaly  CHEST/LUNG: Clear to auscultation bilaterally; No wheeze, rhonchi, or rales  HEART: Regular rate and rhythm; normal S1, S2, No murmurs.  ABDOMEN: Soft, nontender, nondistended; Bowel sounds present  NEUROLOGY: No asterixis or tremor.   SKIN: Intact, no jaundice    Labs:                        8.7    9.87  )-----------( 393      ( 18 Jul 2024 07:33 )             27.9     07-18    137  |  102  |  20  ----------------------------<  136<H>  4.3   |  23  |  0.9    Ca    8.6      18 Jul 2024 07:33  Mg     2.5     07-18    TPro  5.9<L>  /  Alb  4.0  /  TBili  0.3  /  DBili  x   /  AST  10  /  ALT  9   /  AlkPhos  119<H>  07-18

## 2024-07-18 NOTE — DISCHARGE NOTE PROVIDER - NSDCMRMEDTOKEN_GEN_ALL_CORE_FT
furosemide 40 mg oral tablet: 1 tab(s) orally once a day  levothyroxine 175 mcg (0.175 mg) oral tablet: 1 tab(s) orally once a day  NIFEdipine 60 mg oral tablet, extended release: 1 tab(s) orally once a day  Plavix 75 mg oral tablet: 1 tab(s) orally once a day  Xarelto 2.5 mg oral tablet: 1 tab(s) orally 2 times a day   furosemide 40 mg oral tablet: 1 tab(s) orally once a day  levothyroxine 175 mcg (0.175 mg) oral tablet: 1 tab(s) orally once a day  NIFEdipine 60 mg oral tablet, extended release: 1 tab(s) orally once a day  pantoprazole 40 mg oral delayed release tablet: 1 tab(s) orally 2 times a day  Plavix 75 mg oral tablet: 1 tab(s) orally once a day  Xarelto 2.5 mg oral tablet: 1 tab(s) orally 2 times a day   furosemide 40 mg oral tablet: 1 tab(s) orally once a day  levothyroxine 175 mcg (0.175 mg) oral tablet: 1 tab(s) orally once a day  NIFEdipine 60 mg oral tablet, extended release: 1 tab(s) orally once a day  pantoprazole 40 mg oral delayed release tablet: 1 tab(s) orally 2 times a day  Plavix 75 mg oral tablet: 1 tab(s) orally once a day restart after dental procedure  Xarelto 2.5 mg oral tablet: 1 tab(s) orally 2 times a day restart after dental procedure

## 2024-07-18 NOTE — PROGRESS NOTE ADULT - SUBJECTIVE AND OBJECTIVE BOX
SUBJECTIVE/OVERNIGHT EVENTS  Today is hospital day 2d. This morning patient was seen and examined at bedside, resting comfortably in bed. No acute or major events overnight.    HOSPITAL COURSE  Day 1:   Day 2:   Day 3:     CODE STATUS:    FAMILY COMMUNICATION  Contact date:  Name of person contacted:  Relationship to patient:  Communication details:    MEDICATIONS  STANDING MEDICATIONS  ALPRAZolam 0.25 milliGRAM(s) Oral once  cefTRIAXone   IVPB 1000 milliGRAM(s) IV Intermittent every 24 hours  hydrocortisone 2.5% Ointment 1 Application(s) Topical daily  lactated ringers. 1000 milliLiter(s) IV Continuous <Continuous>  levothyroxine 175 MICROGram(s) Oral daily  melatonin 5 milliGRAM(s) Oral at bedtime  nicotine - 21 mG/24Hr(s) Patch 1 Patch Transdermal daily  NIFEdipine XL 60 milliGRAM(s) Oral daily  pantoprazole  Injectable 40 milliGRAM(s) IV Push two times a day    PRN MEDICATIONS    VITALS  T(F): 97.8 (07-18-24 @ 07:49), Max: 98.2 (07-17-24 @ 15:00)  HR: 70 (07-18-24 @ 07:49) (65 - 71)  BP: 150/76 (07-18-24 @ 07:49) (139/70 - 150/76)  RR: 18 (07-18-24 @ 07:49) (18 - 19)  SpO2: 98% (07-18-24 @ 07:49) (98% - 99%)    PHYSICAL EXAM  GENERAL  (  ) NAD, lying in bed comfortably     (  ) obtunded     (  ) lethargic     (  ) somnolent    HEAD  (  ) Atraumatic     (  ) hematoma     (  ) laceration (specify location:       )     NECK  (  ) Supple     (  ) neck stiffness     (  ) nuchal rigidity     (  )  no JVD     (  ) JVD present ( -- cm)    HEART  Rate -->  (  ) normal rate    (  ) bradycardic    (  ) tachycardic  Rhythm -->  (  ) regular    (  ) regularly irregular    (  ) irregularly irregular  Murmurs -->  (  ) normal s1/s2    (  ) systolic murmur    (  ) diastolic murmur    (  ) continuous murmur     (  ) S3 present    (  ) S4 present    LUNGS  (  )Unlabored respirations     (  ) tachypnea  (  ) B/L air entry     (  ) decreased breath sounds in:  (location     )    (  ) no adventitious sound     (  ) crackles     (  ) wheezing      (  ) rhonchi      (specify location:       )  (  ) chest wall tenderness (specify location:       )    ABDOMEN  (  ) Soft     (  ) tense   |   (  ) nondistended     (  ) distended   |   (  ) +BS     (  ) hypoactive bowel sounds     (  ) hyperactive bowel sounds  (  ) nontender     (  ) RUQ tenderness     (  ) RLQ tenderness     (  ) LLQ tenderness     (  ) epigastric tenderness     (  ) diffuse tenderness  (  ) Splenomegaly      (  ) Hepatomegaly      (  ) Jaundice     (  ) ecchymosis     EXTREMITIES  (  ) Normal     (  ) Rash     (  ) ecchymosis     (  ) varicose veins      (  ) pitting edema     (  ) non-pitting edema   (  ) ulceration     (  ) gangrene:     (location:     )    NERVOUS SYSTEM  (  ) A&Ox3     (  ) confused     (  ) lethargic  CN II-XII:     (  ) Intact     (  ) focal deficits  (Specify:     )   Upper extremities:     (  ) strength X/5     (  ) focal deficit (specify:    )  Lower extremities:     (  ) strength  X/5    (  ) focal deficit (specify:    )    SKIN  (  ) No rashes or lesions     (  ) maculopapular rash     (  ) pustules     (  ) vesicles     (  ) ulcer     (  ) ecchymosis     (specify location:     )    (  ) Indwelling Babcock Catheter   Date insterted:    Reason (  ) Critical illness     (  ) urinary retention    (  ) Accurate Ins/Outs Monitoring     (  ) CMO patient    (  ) Central Line  Date inserted:  Location: (  ) Right IJ   (  ) Left IJ   (  ) Right Fem   (  ) Left Fem    (  ) SPC  (  ) pigtail  (  ) PEG tube  (  ) colostomy  (  ) jejunostomy  (  ) U-Dall    LABS             8.7    9.87  )-----------( 393      ( 07-18-24 @ 07:33 )             27.9     137  |  102  |  20  -------------------------<  136   07-18-24 @ 07:33  4.3  |  23  |  0.9    Ca      8.6     07-18-24 @ 07:33  Mg     2.5     07-18-24 @ 07:33    TPro  5.9  /  Alb  4.0  /  TBili  0.3  /  DBili  x   /  AST  10  /  ALT  9   /  AlkPhos  119  /  GGT  x     07-18-24 @ 07:33    PT/INR - ( 07-18-24 @ 07:33 )   PT: 11.10 sec;   INR: 0.97 ratio  PTT - ( 07-18-24 @ 07:33 )  PTT:30.9 sec      Urinalysis Basic - ( 18 Jul 2024 07:33 )    Color: x / Appearance: x / SG: x / pH: x  Gluc: 136 mg/dL / Ketone: x  / Bili: x / Urobili: x   Blood: x / Protein: x / Nitrite: x   Leuk Esterase: x / RBC: x / WBC x   Sq Epi: x / Non Sq Epi: x / Bacteria: x          Culture - Blood (collected 16 Jul 2024 20:00)  Source: .Blood Blood-Peripheral  Preliminary Report (18 Jul 2024 10:01):    No growth at 24 hours    Urinalysis with Rflx Culture (collected 16 Jul 2024 09:40)    Culture - Urine (collected 16 Jul 2024 09:40)  Source: Clean Catch None  Preliminary Report (18 Jul 2024 08:14):    50,000 - 99,000 CFU/mL Escherichia coli      IMAGING SUBJECTIVE/OVERNIGHT EVENTS  Today is hospital day 2d. This morning patient was seen and examined at bedside, resting comfortably in chair. No acute or major events overnight. No sob, urinary pain. having Bowel movements and wal;rod around . feeling anxious about procedure and anaesthesia       MEDICATIONS  STANDING MEDICATIONS  ALPRAZolam 0.25 milliGRAM(s) Oral once  cefTRIAXone   IVPB 1000 milliGRAM(s) IV Intermittent every 24 hours  hydrocortisone 2.5% Ointment 1 Application(s) Topical daily  lactated ringers. 1000 milliLiter(s) IV Continuous <Continuous>  levothyroxine 175 MICROGram(s) Oral daily  melatonin 5 milliGRAM(s) Oral at bedtime  nicotine - 21 mG/24Hr(s) Patch 1 Patch Transdermal daily  NIFEdipine XL 60 milliGRAM(s) Oral daily  pantoprazole  Injectable 40 milliGRAM(s) IV Push two times a day    PRN MEDICATIONS    VITALS  T(F): 97.8 (07-18-24 @ 07:49), Max: 98.2 (07-17-24 @ 15:00)  HR: 70 (07-18-24 @ 07:49) (65 - 71)  BP: 150/76 (07-18-24 @ 07:49) (139/70 - 150/76)  RR: 18 (07-18-24 @ 07:49) (18 - 19)  SpO2: 98% (07-18-24 @ 07:49) (98% - 99%)    PHYSICAL EXAM  GENERAL: NAD,, sitting in chair comfortably  HEAD:  Atraumatic, normocephalic  EYES: EOMI, PERRLA, conjunctiva and sclera clear  ENT: Moist mucous membranes  NECK: Supple, no JVD  HEART: Regular rate and rhythm  LUNGS: Unlabored respirations.    ABDOMEN: Soft, nontender, nondistended. Negative CVA tenderness  EXTREMITIES: 2+ peripheral pulses bilaterally. No clubbing, cyanosis. Edema in legs consistent with prior   NERVOUS SYSTEM:  A&Ox3, no focal deficits   SKIN: No rashes or lesions    LABS             8.7    9.87  )-----------( 393      ( 07-18-24 @ 07:33 )             27.9     137  |  102  |  20  -------------------------<  136   07-18-24 @ 07:33  4.3  |  23  |  0.9    Ca      8.6     07-18-24 @ 07:33  Mg     2.5     07-18-24 @ 07:33    TPro  5.9  /  Alb  4.0  /  TBili  0.3  /  DBili  x   /  AST  10  /  ALT  9   /  AlkPhos  119  /  GGT  x     07-18-24 @ 07:33    PT/INR - ( 07-18-24 @ 07:33 )   PT: 11.10 sec;   INR: 0.97 ratio  PTT - ( 07-18-24 @ 07:33 )  PTT:30.9 sec      Urinalysis Basic - ( 18 Jul 2024 07:33 )    Color: x / Appearance: x / SG: x / pH: x  Gluc: 136 mg/dL / Ketone: x  / Bili: x / Urobili: x   Blood: x / Protein: x / Nitrite: x   Leuk Esterase: x / RBC: x / WBC x   Sq Epi: x / Non Sq Epi: x / Bacteria: x          Culture - Blood (collected 16 Jul 2024 20:00)  Source: .Blood Blood-Peripheral  Preliminary Report (18 Jul 2024 10:01):    No growth at 24 hours    Urinalysis with Rflx Culture (collected 16 Jul 2024 09:40)    Culture - Urine (collected 16 Jul 2024 09:40)  Source: Clean Catch None  Preliminary Report (18 Jul 2024 08:14):    50,000 - 99,000 CFU/mL Escherichia coli      IMAGING

## 2024-07-19 ENCOUNTER — RESULT REVIEW (OUTPATIENT)
Age: 79
End: 2024-07-19

## 2024-07-19 LAB
-  AMOXICILLIN/CLAVULANIC ACID: SIGNIFICANT CHANGE UP
-  AMPICILLIN/SULBACTAM: SIGNIFICANT CHANGE UP
-  AMPICILLIN: SIGNIFICANT CHANGE UP
-  AZTREONAM: SIGNIFICANT CHANGE UP
-  CEFAZOLIN: SIGNIFICANT CHANGE UP
-  CEFEPIME: SIGNIFICANT CHANGE UP
-  CEFOXITIN: SIGNIFICANT CHANGE UP
-  CEFTRIAXONE: SIGNIFICANT CHANGE UP
-  CEFUROXIME: SIGNIFICANT CHANGE UP
-  CIPROFLOXACIN: SIGNIFICANT CHANGE UP
-  ERTAPENEM: SIGNIFICANT CHANGE UP
-  GENTAMICIN: SIGNIFICANT CHANGE UP
-  IMIPENEM: SIGNIFICANT CHANGE UP
-  LEVOFLOXACIN: SIGNIFICANT CHANGE UP
-  MEROPENEM: SIGNIFICANT CHANGE UP
-  NITROFURANTOIN: SIGNIFICANT CHANGE UP
-  PIPERACILLIN/TAZOBACTAM: SIGNIFICANT CHANGE UP
-  TOBRAMYCIN: SIGNIFICANT CHANGE UP
-  TRIMETHOPRIM/SULFAMETHOXAZOLE: SIGNIFICANT CHANGE UP
BASOPHILS # BLD AUTO: 0.03 K/UL — SIGNIFICANT CHANGE UP (ref 0–0.2)
BASOPHILS NFR BLD AUTO: 0.3 % — SIGNIFICANT CHANGE UP (ref 0–1)
CULTURE RESULTS: ABNORMAL
EOSINOPHIL # BLD AUTO: 0.56 K/UL — SIGNIFICANT CHANGE UP (ref 0–0.7)
EOSINOPHIL NFR BLD AUTO: 6 % — SIGNIFICANT CHANGE UP (ref 0–8)
FERRITIN SERPL-MCNC: 39 NG/ML — SIGNIFICANT CHANGE UP (ref 13–330)
FOLATE SERPL-MCNC: 8.4 NG/ML — SIGNIFICANT CHANGE UP
HCT VFR BLD CALC: 26.2 % — LOW (ref 37–47)
HGB BLD-MCNC: 8.2 G/DL — LOW (ref 12–16)
IMM GRANULOCYTES NFR BLD AUTO: 0.6 % — HIGH (ref 0.1–0.3)
LYMPHOCYTES # BLD AUTO: 1.72 K/UL — SIGNIFICANT CHANGE UP (ref 1.2–3.4)
LYMPHOCYTES # BLD AUTO: 18.4 % — LOW (ref 20.5–51.1)
MCHC RBC-ENTMCNC: 27 PG — SIGNIFICANT CHANGE UP (ref 27–31)
MCHC RBC-ENTMCNC: 31.3 G/DL — LOW (ref 32–37)
MCV RBC AUTO: 86.2 FL — SIGNIFICANT CHANGE UP (ref 81–99)
METHOD TYPE: SIGNIFICANT CHANGE UP
MONOCYTES # BLD AUTO: 0.63 K/UL — HIGH (ref 0.1–0.6)
MONOCYTES NFR BLD AUTO: 6.7 % — SIGNIFICANT CHANGE UP (ref 1.7–9.3)
NEUTROPHILS # BLD AUTO: 6.34 K/UL — SIGNIFICANT CHANGE UP (ref 1.4–6.5)
NEUTROPHILS NFR BLD AUTO: 68 % — SIGNIFICANT CHANGE UP (ref 42.2–75.2)
NRBC # BLD: 0 /100 WBCS — SIGNIFICANT CHANGE UP (ref 0–0)
ORGANISM # SPEC MICROSCOPIC CNT: ABNORMAL
ORGANISM # SPEC MICROSCOPIC CNT: SIGNIFICANT CHANGE UP
PLATELET # BLD AUTO: 347 K/UL — SIGNIFICANT CHANGE UP (ref 130–400)
PMV BLD: 9.8 FL — SIGNIFICANT CHANGE UP (ref 7.4–10.4)
RBC # BLD: 3.04 M/UL — LOW (ref 4.2–5.4)
RBC # FLD: 16 % — HIGH (ref 11.5–14.5)
SPECIMEN SOURCE: SIGNIFICANT CHANGE UP
VIT B12 SERPL-MCNC: 311 PG/ML — SIGNIFICANT CHANGE UP (ref 232–1245)
WBC # BLD: 9.34 K/UL — SIGNIFICANT CHANGE UP (ref 4.8–10.8)
WBC # FLD AUTO: 9.34 K/UL — SIGNIFICANT CHANGE UP (ref 4.8–10.8)

## 2024-07-19 PROCEDURE — 99233 SBSQ HOSP IP/OBS HIGH 50: CPT

## 2024-07-19 PROCEDURE — 93970 EXTREMITY STUDY: CPT | Mod: 26

## 2024-07-19 PROCEDURE — 88305 TISSUE EXAM BY PATHOLOGIST: CPT | Mod: 26

## 2024-07-19 PROCEDURE — 43239 EGD BIOPSY SINGLE/MULTIPLE: CPT | Mod: XU

## 2024-07-19 PROCEDURE — 43247 EGD REMOVE FOREIGN BODY: CPT

## 2024-07-19 RX ORDER — ACETAMINOPHEN 325 MG
650 TABLET ORAL ONCE
Refills: 0 | Status: COMPLETED | OUTPATIENT
Start: 2024-07-19 | End: 2024-07-19

## 2024-07-19 RX ADMIN — ALPRAZOLAM 0.25 MILLIGRAM(S): 2 TABLET ORAL at 10:29

## 2024-07-19 RX ADMIN — Medication 100 MILLIGRAM(S): at 18:54

## 2024-07-19 RX ADMIN — DEXTROSE MONOHYDRATE AND SODIUM CHLORIDE 75 MILLILITER(S): 5; .3 INJECTION, SOLUTION INTRAVENOUS at 11:02

## 2024-07-19 RX ADMIN — DEXTROSE MONOHYDRATE AND SODIUM CHLORIDE 75 MILLILITER(S): 5; .3 INJECTION, SOLUTION INTRAVENOUS at 20:10

## 2024-07-19 RX ADMIN — NICOTINE POLACRILEX 1 PATCH: 2 LOZENGE ORAL at 11:09

## 2024-07-19 RX ADMIN — Medication 1 APPLICATION(S): at 11:00

## 2024-07-19 RX ADMIN — Medication 60 MILLIGRAM(S): at 05:37

## 2024-07-19 RX ADMIN — PANTOPRAZOLE SODIUM 40 MILLIGRAM(S): 40 INJECTION, POWDER, FOR SOLUTION INTRAVENOUS at 05:37

## 2024-07-19 RX ADMIN — DEXTROSE MONOHYDRATE AND SODIUM CHLORIDE 75 MILLILITER(S): 5; .3 INJECTION, SOLUTION INTRAVENOUS at 00:36

## 2024-07-19 RX ADMIN — Medication 650 MILLIGRAM(S): at 08:23

## 2024-07-19 RX ADMIN — PANTOPRAZOLE SODIUM 40 MILLIGRAM(S): 40 INJECTION, POWDER, FOR SOLUTION INTRAVENOUS at 18:54

## 2024-07-19 RX ADMIN — Medication 5 MILLIGRAM(S): at 21:32

## 2024-07-19 RX ADMIN — NICOTINE POLACRILEX 1 PATCH: 2 LOZENGE ORAL at 11:01

## 2024-07-19 RX ADMIN — Medication 175 MICROGRAM(S): at 05:37

## 2024-07-19 RX ADMIN — NICOTINE POLACRILEX 1 PATCH: 2 LOZENGE ORAL at 07:25

## 2024-07-19 RX ADMIN — Medication 650 MILLIGRAM(S): at 08:55

## 2024-07-19 NOTE — CHART NOTE - NSCHARTNOTEFT_GEN_A_CORE
EGD:	Normal esophagus.  	Normal stomach.  	Angioectasia in the anterior bulb. (Hemostasis).  	A CBD stent was seen in the ampulla. Removed with a snare. .  	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 Biopsies obtained. Not intervened today as the patient underwent this procedure for a GI bleed. .    Recommendations.   Liquid diet today.   Avoid red/maroon substances.    Advanced diet as tolerated tomorrow.    PPI BID X 4 weeks.   Follow up in clinic in 2 weeks.    Plan for OP EGD with intervention for the large likely duodenal adenoma    Return to floor for further management.

## 2024-07-19 NOTE — PROGRESS NOTE ADULT - SUBJECTIVE AND OBJECTIVE BOX
SUBJECTIVE/OVERNIGHT EVENTS  Today is hospital day 3d. This morning patient was seen and examined at bedside, resting comfortably in bed. No acute or major events overnight. Feeling anxious about EGD today. Having some numbness in R leg and lower back pain. Having bowel movements, urinating ok.     MEDICATIONS  STANDING MEDICATIONS  cefTRIAXone   IVPB 1000 milliGRAM(s) IV Intermittent every 24 hours  hydrocortisone 2.5% Ointment 1 Application(s) Topical daily  lactated ringers. 1000 milliLiter(s) IV Continuous <Continuous>  levothyroxine 175 MICROGram(s) Oral daily  melatonin 5 milliGRAM(s) Oral at bedtime  nicotine - 21 mG/24Hr(s) Patch 1 Patch Transdermal daily  NIFEdipine XL 60 milliGRAM(s) Oral daily  pantoprazole    Tablet 40 milliGRAM(s) Oral two times a day    PRN MEDICATIONS    VITALS  T(F): 95.9 (07-19-24 @ 07:52), Max: 98.4 (07-18-24 @ 15:34)  HR: 64 (07-19-24 @ 07:52) (64 - 77)  BP: 135/72 (07-19-24 @ 07:52) (127/78 - 138/77)  RR: 18 (07-19-24 @ 07:52) (18 - 18)  SpO2: 94% (07-19-24 @ 07:52) (94% - 98%)    PHYSICAL EXAM    GENERAL: NAD, sitting in chair comfortably  HEAD:  Atraumatic, normocephalic  EYES: EOMI, PERRLA, conjunctiva and sclera clear  ENT: Moist mucous membranes  NECK: Supple, no JVD  HEART: Regular rate and rhythm  LUNGS: Unlabored respirations.    ABDOMEN: Soft, nontender, nondistended. Negative CVA tenderness  EXTREMITIES: 2+ peripheral pulses bilaterally. No clubbing, cyanosis. Edema in legs consistent with prior, non pitting. Tenderness on palpation of R lower hip in paravertebreal muscles.  CVA negative.   NERVOUS SYSTEM:  A&Ox3, no focal deficits   SKIN: No rashes or lesions      LABS             8.2    9.34  )-----------( 347      ( 07-19-24 @ 07:29 )             26.2     137  |  102  |  20  -------------------------<  136   07-18-24 @ 07:33  4.3  |  23  |  0.9    Ca      8.6     07-18-24 @ 07:33  Mg     2.5     07-18-24 @ 07:33    TPro  5.9  /  Alb  4.0  /  TBili  0.3  /  DBili  x   /  AST  10  /  ALT  9   /  AlkPhos  119  /  GGT  x     07-18-24 @ 07:33    PT/INR - ( 07-18-24 @ 07:33 )   PT: 11.10 sec;   INR: 0.97 ratio  PTT - ( 07-18-24 @ 07:33 )  PTT:30.9 sec      Urinalysis Basic - ( 18 Jul 2024 07:33 )    Color: x / Appearance: x / SG: x / pH: x  Gluc: 136 mg/dL / Ketone: x  / Bili: x / Urobili: x   Blood: x / Protein: x / Nitrite: x   Leuk Esterase: x / RBC: x / WBC x   Sq Epi: x / Non Sq Epi: x / Bacteria: x          Culture - Blood (collected 17 Jul 2024 07:08)  Source: .Blood None  Preliminary Report (18 Jul 2024 17:02):    No growth at 24 hours    Culture - Blood (collected 16 Jul 2024 20:00)  Source: .Blood Blood-Peripheral  Preliminary Report (19 Jul 2024 10:02):    No growth at 48 Hours      IMAGING

## 2024-07-19 NOTE — PROGRESS NOTE ADULT - PROVIDER SPECIALTY LIST ADULT
Hospitalist
Internal Medicine
Hospitalist
Internal Medicine
Internal Medicine
Gastroenterology
Hospitalist

## 2024-07-19 NOTE — PROGRESS NOTE ADULT - ASSESSMENT
#Acute Anemia  -Hb 6.7 (baseline around 9), MCV 85. s/p 1 unit of PRBC in ED, Hgb stable now (8.2 today)   - has active type and screen  -Patient reported black stools 2 days before admission.  -iron studies (34 total iron, nml binding capacity, 11%iron saturation), B12, and folate pending  -CT abdomen shows no acute pathology  -PPI IV BID, switch to po today                  -Vascular consult placed; Hold Xarelto and Plavix until bleed controlled (-Last Plavix dose was Saturday (patient ran out of her meds), last Xarelto dose Monday night); doppler for LE today negative for dvt   -GI consult- EGD to be done with ERCP (for stent removal) by [facility] 7/19  - plan for dc tomorrow, will advance diet after procedure today     #backpain  - patient has hx of sciatica she reports   - tylnenol prn     #UTI  - UA +ve; culture + for E coli, negative blood cultures to date  - Ceftriaxone 1 g daily started july 16    #HTN  -continue with Nifedipine, BP was 184 on admission   -dc if vitals unstable or hgb drops    #Hypothyroidism  -c/w Synthyroid    #Current Smoker  - nicotine patch 21 qd ordered     #DVT ppx: holding plavix and xarelto   #GI ppx: Protonix 40 IV BID  #Normal diet for now  Dispo: home, picked up by spouse
77 yo female, current smoker patient known to have: HTN, HLD, Hypothyroidism, PAD s/p stents, hfpef,  presenting for R flank pain and urine sx, found to have anemia, Hb 6.7. Admitted for anemia and UTI.    #Melena/Acute on chronic anemia - etiology?  -baseline around 9  -s/p 1 unit prbc  -GI follow up for ERCP/EGD appreciated - scheduled for 7/19  -repeat cbc in am  -CT abdomen - no acute pathology   -PPI IV BID for now   -Last Plavix dose was Saturday (patient ran out of her meds), last Xarelto dose on 7/15  -vascular consult appreciated     #UTI  - Follow up urine cx - E. Coli   - Ceftriaxone 2 g daily     #HTN  -continue with Nifedipine    #Hypothyroidism  -c/w Synthroid     Progress Note Handoff  Pending Consults: GI  Pending Tests: egd  Pending Results: egd, labs  Family Discussion: Discussed labs, meds, diet, consults and possible EGD with patient and medical staff. All questions answered. PFD for 48hrs pending results of scopes   Disposition: Home__x___/SNF______/Other_____/Unknown at this time_____  Spent over 55 min reviewing chart, speaking with patient/family and on coordinating patient care during interdisciplinary rounds     Please call me with any questions at extension [phone]
79 yo female, current smoker patient known to have: HTN, HLD, Hypothyroidism, PAD s/p stents, hfpef,  presenting for R flank pain and urine sx, found to have anemia, Hb 6.7. Admitted for anemia and UTI.    #Acute Anemia  -Hb 6.7 (baseline around 9), MCV 85. s/p 1 unit of PRBC in ED, Hgb stable now (8.2 today)   -Patient reported black stools 2 days before admission.  -iron studies (34 total iron, nml binding capacity, 11%iron saturation), B12, and folate pending  -CT abdomen shows no acute pathology  -GI consult- EGD to be scheduled, date not decided  -PPI IV BID for now   -Vascular consult placed; Hold Xarelto and Plavix for now (-Last Plavix dose was Saturday (patient ran out of her meds), last Xarelto dose Monday night)  - Follow cbc     #UTI  - UA +ve  - Pending urine and blood cx  - Ceftriaxone 2 g daily     #HTN  -continue with Nifedipine, BP was 184 on admission   -dc if vitals unstable or hgb drops    #Hypothyroidism  -c/w Synthyroid    #Current Smoker  - nicotine patch 21 qd ordered     #DVT ppx: duplex ordered, pending vascular rec  #GI ppx: Protonix 40 IV BID  #Normal diet for now
79 yo female, current smoker patient known to have: HTN, HLD, Hypothyroidism, PAD s/p stents, hfpef,  presenting for R flank pain and urine sx, found to have anemia, Hb 6.7. Admitted for anemia and UTI.    #Melena/Acute on chronic anemia - etiology?  -baseline around 9  -s/p 1 unit prbc  -GI follow up for ERCP/EGD appreciated - scheduled for today  -daily cbc  -CT abdomen - no acute pathology   -PPI IV BID for now - change to oral in am   -Last Plavix dose was Saturday (patient ran out of her meds), last Xarelto dose on 7/15  -vascular consult appreciated     #UTI  - Follow up urine cx - E. Coli   - Ceftriaxone 1 g daily     #HTN  -continue with Nifedipine    #Hypothyroidism  -c/w Synthroid     Progress Note Handoff  Pending Consults: GI  Pending Tests: egd  Pending Results: egd, labs  Family Discussion: Discussed labs, meds, diet, consults and possible EGD with patient and medical staff. All questions answered. PFD for 24hrs pending results of scopes vs. later today  Disposition: Home__x___/SNF______/Other_____/Unknown at this time_____  Spent over 55 min reviewing chart, speaking with patient/family and on coordinating patient care during interdisciplinary rounds     Please call me with any questions at extension [phone]
79 yo female, current smoker patient known to have: HTN, HLD, Hypothyroidism, PAD s/p stents, hfpef,  presenting for R flank pain and urine sx, found to have anemia, Hb 6.7. Admitted for anemia and UTI.    #Melena/Acute on chronic anemia - etiology?  -baseline around 9  -s/p 1 unit prbc  -await GI follow up for EGD - if no EGD - start diet   -repeat cbc in am  -CT abdomen - no acute pathology   -PPI IV BID for now   -Last Plavix dose was Saturday (patient ran out of her meds), last Xarelto dose on 7/15  -Holding Xarelto and Plavix   -vascular consult     #UTI  - Follow up urine and blood cx  - Ceftriaxone 2 g daily     #HTN  -continue with Nifedipine    #Hypothyroidism  -c/w Synthroid     Progress Note Handoff  Pending Consults: GI, vascular  Pending Tests: egd  Pending Results: egd, labs  Family Discussion: Discussed labs, meds, diet, consults and possible EGD with patient and medical staff. All questions answered. PFD for 24hrs   Disposition: Home__x___/SNF______/Other_____/Unknown at this time_____  Spent over 55 min reviewing chart, speaking with patient/family and on coordinating patient care during interdisciplinary rounds     Please call me with any questions at extension [phone]
Patient is a 77 y/o Female with a PMHx of HTN, hypothyroid, PAD s/p stents on plavix and xarelto, HFpEF, non-obstructive CAD, duodenal adenoma s/p EMR and ampullectomy 2023, choledocholithiasis s/p stent placement 04/2024 presents for flank pain found to have UTI treated with abx. General GI evaluated patient for concern of anemia and transferred to [facility] today. Currently she feels well, no additional black stools. No further complaints.     Acute on chronic MAGGY  reported dark stools   cholecystectomy duodenal adenoma s/p EMR and ampullectomy 2023  choledocholithiasis s/p stent placement 04/2024  - Hemodynamically stable & no active bleeding  - Baseline hemoglobin - ~9  - Hemoglobin on admission - 6.7 >>8.7 sp 2 PRBC transfusion.   - Patient may eat today  - NPO after midnight   - Continue to hold Plavix   - Plan EGD and ERCP tomorrow Friday 7/19  - Planned discussed with Senior Resident  - Call [phone] for any questions or concerns 
79 yo female, current smoker patient known to have: HTN, HLD, Hypothyroidism, PAD s/p stents, hfpef,  presenting for R flank pain and urine sx, found to have anemia, Hb 6.7. Admitted for anemia and UTI.    #Acute Anemia  -Hb 6.7 (baseline around 9), MCV 85. s/p 1 unit of PRBC in ED, Hgb stable now (8.2 today)   - has active type and screen  -Patient reported black stools 2 days before admission.  -iron studies (34 total iron, nml binding capacity, 11%iron saturation), B12, and folate pending  -CT abdomen shows no acute pathology  -PPI IV BID for now   -Vascular consult placed; Hold Xarelto and Plavix until bleed controlled (-Last Plavix dose was Saturday (patient ran out of her meds), last Xarelto dose Monday night)  -GI consult- EGD to be done with ERCP (for stent removal) by [facility] tomorrow   - NPO midnight* no ivf ordered per attending rec for now  - Follow cbc AM   - needs 2 18G IVs     #UTI  - UA +ve  - Pending urine and blood cx  - Ceftriaxone 1 g daily started july 16    #HTN  -continue with Nifedipine, BP was 184 on admission   -dc if vitals unstable or hgb drops    #Hypothyroidism  -c/w Synthyroid    #Current Smoker  - nicotine patch 21 qd ordered     #DVT ppx: holding plavix and xarelto   #GI ppx: Protonix 40 IV BID  #Normal diet for now    Pending: EGD w ERCP clary with [facility]

## 2024-07-19 NOTE — PROGRESS NOTE ADULT - SUBJECTIVE AND OBJECTIVE BOX
BROWN RODRIGUEZJO  78y  Female      Patient is a 78y old  Female who presents with R flank pain (16 Jul 2024 17:03)      INTERVAL HPI/OVERNIGHT EVENTS:  Patient seen and examined earlier this morning  sitting in the chair  in NAD  no complaints   ERCP and EGD planned for today     Vital Signs Last 24 Hrs  T(C): 35.5 (19 Jul 2024 07:52), Max: 36.9 (18 Jul 2024 15:34)  T(F): 95.9 (19 Jul 2024 07:52), Max: 98.4 (18 Jul 2024 15:34)  HR: 64 (19 Jul 2024 07:52) (64 - 77)  BP: 135/72 (19 Jul 2024 07:52) (127/78 - 138/77)  BP(mean): --  RR: 18 (19 Jul 2024 07:52) (18 - 18)  SpO2: 94% (19 Jul 2024 07:52) (94% - 98%)    Parameters below as of 19 Jul 2024 07:52  Patient On (Oxygen Delivery Method): room air    PHYSICAL EXAM:  GENERAL: NAD, well-groomed,   HEAD:  Atraumatic, Normocephalic  EYES: conjunctiva and sclera clear  ENMT: Moist mucous membranes,  No visible lesions  NECK: Supple, No JVD, Normal thyroid  NERVOUS SYSTEM:  Alert & Oriented X3, Good concentration; moves all extremities   CHEST/LUNG: good air entry   HEART: Regular rate and rhythm; No murmurs, rubs, or gallops  ABDOMEN: Soft, Nontender, Nondistended; Bowel sounds present, obese  EXTREMITIES:  2+ Peripheral Pulses, No clubbing, cyanosis, or edema  LYMPH: No lymphadenopathy noted  SKIN: chronic venous changes     Consultant(s) Notes Reviewed:  [x ] YES  [ ] NO  Care Discussed with Consultants/Other Providers [ x] YES  [ ] NO    LAB:                                         8.7    9.87  )-----------( 393      ( 18 Jul 2024 07:33 )             27.9     07-18    137  |  102  |  20  ----------------------------<  136<H>  4.3   |  23  |  0.9    Ca    8.6      18 Jul 2024 07:33  Mg     2.5     07-18    TPro  5.9<L>  /  Alb  4.0  /  TBili  0.3  /  DBili  x   /  AST  10  /  ALT  9   /  AlkPhos  119<H>  07-18      Drug Dosing Weight  Height (cm): 165.1 (16 Jul 2024 07:41)  Weight (kg): 90.7 (16 Jul 2024 07:41)  BMI (kg/m2): 33.3 (16 Jul 2024 07:41)  BSA (m2): 1.98 (16 Jul 2024 07:41)      Urinalysis Basic - ( 17 Jul 2024 07:08 )    Color: x / Appearance: x / SG: x / pH: x  Gluc: 122 mg/dL / Ketone: x  / Bili: x / Urobili: x   Blood: x / Protein: x / Nitrite: x   Leuk Esterase: x / RBC: x / WBC x   Sq Epi: x / Non Sq Epi: x / Bacteria: x        RADIOLOGY & ADDITIONAL TESTS:  Imaging Personally Reviewed:  [x] YES  [ ] NO  < from: CT Abdomen and Pelvis No Cont (07.16.24 @ 13:12) >  IMPRESSION:    No acute abdominal pelvic abnormality.    < end of copied text >      MEDICATIONS  (STANDING):  ALPRAZolam 0.25 milliGRAM(s) Oral once  cefTRIAXone   IVPB 1000 milliGRAM(s) IV Intermittent every 24 hours  hydrocortisone 2.5% Ointment 1 Application(s) Topical daily  lactated ringers. 1000 milliLiter(s) (75 mL/Hr) IV Continuous <Continuous>  levothyroxine 175 MICROGram(s) Oral daily  melatonin 5 milliGRAM(s) Oral at bedtime  nicotine - 21 mG/24Hr(s) Patch 1 Patch Transdermal daily  NIFEdipine XL 60 milliGRAM(s) Oral daily  pantoprazole    Tablet 40 milliGRAM(s) Oral two times a day    MEDICATIONS  (PRN):

## 2024-07-19 NOTE — PRE-OP CHECKLIST - INTERNAL PROSTHESES
Initial SW/CM Assessment/Plan of Care Note     Baseline Assessment  62 year old admitted 2/9/2022 as Observation with a diagnosis of right hip osteoarhriis.   Prior to admission patient was living with Adult children and residing in a House.   Patient does not  have a Power of  for Healthcare. Agent is self.  Patient’s Primary Care Provider is Jimmy Guzman MD.     Prior to Admission Status       Agency/Support Services  Type of Services Prior to Hospitalization: None  Agency Service Comments                              Support Systems: Children  Home Devices/Equipment: Mobility assist device  Mobility Assist Devices: Standard walker  Sensory Support Devices: Eyeglasses, Dentures       Current Status  Physical Therapy: Pending.  Occupational Therapy: Recommends daily skilled therapy for 1-3 hours a day.  Nutrition/SLP - Recommendations:  .    Insurance  Primary: BCBS OUT OF STATE (SEND TO LOCAL)  Secondary: N/A    Barriers to Discharge  Identified Barriers to Discharge/Transition Planning:      Progress Note  Patient from home, was independent with adl's and on RA. POD# 1 Right hip arthoplasty.      Plan  SW/CM - Recommendations for Discharge: anticipating home with [facility].       Refer to SW/CM Flowsheet for Goals and objective data.   
 Ongoing SW/CM Assessment/Plan of Care Note     See SW/CM flowsheets for goals and other objective data.    Progress note:  11:40 PATIENT IS ACCEPTED FOR HOME HEALTH CARE SERVICES VIA [facility].     HOME HEALTH CARE REFERRAL SENT TO [facility].  AWAITING ACCEPTENCE.        Discharge plan:  HOME WITH HOME HEALTH CARE    CM/SW team to continue to follow for discharge needs.    
 Ongoing SW/CM Assessment/Plan of Care Note     See SW/CM flowsheets for goals and other objective data.    Progress note:  POD # 2 right JIMBO, pain management. Therapy eval continues. Xray of right knee pending.     Current Status  Physical Therapy: Recommends daily skilled therapy for 1-3 hours a day.  Occupational Therapy: Recommends daily skilled therapy for 1-3 hours a day.    Barriers to discharge:   Medical clearance    Discharge plan:  Home with [facility] vs SNF    CM/SW team to continue to follow for discharge needs.    
.. Ongoing SW/CM Assessment/Plan of Care Note     See SW/CM flowsheets for goals and other objective data.    Progress note:  Order received in workqueue for HH. OR scheduled for 2/09/22. Workqueue referral forwarded to [facility]. No [facility] referral done.    CM/SW team to continue to follow for discharge needs.    
SW/CM Discharge Plan  Anticipate that patient will be medically cleared for discharge today. Patient’s discharge destination is home. Patient to be picked up by son Domingo. Patient/family aware and agreeable to discharge plan.  Discharge plan communicated to MD, RN and CM.    After Visit Summary - Transition Report Information  Receiving Agency/Facility: [facility]   Receiving Agency/Facility phone number: [phone]  Receiving Agency/Facility Type: Home Health/Hospice  Receiving Agency/Facility Comment: You will receive a phone call prior to your 1st home visit.   
no
no

## 2024-07-20 ENCOUNTER — TRANSCRIPTION ENCOUNTER (OUTPATIENT)
Age: 79
End: 2024-07-20

## 2024-07-20 VITALS
SYSTOLIC BLOOD PRESSURE: 142 MMHG | DIASTOLIC BLOOD PRESSURE: 78 MMHG | RESPIRATION RATE: 17 BRPM | HEART RATE: 65 BPM | TEMPERATURE: 98 F

## 2024-07-20 LAB
ALBUMIN SERPL ELPH-MCNC: 3.7 G/DL — SIGNIFICANT CHANGE UP (ref 3.5–5.2)
ALP SERPL-CCNC: 111 U/L — SIGNIFICANT CHANGE UP (ref 30–115)
ALT FLD-CCNC: 11 U/L — SIGNIFICANT CHANGE UP (ref 0–41)
ANION GAP SERPL CALC-SCNC: 10 MMOL/L — SIGNIFICANT CHANGE UP (ref 7–14)
AST SERPL-CCNC: 12 U/L — SIGNIFICANT CHANGE UP (ref 0–41)
BASOPHILS # BLD AUTO: 0.05 K/UL — SIGNIFICANT CHANGE UP (ref 0–0.2)
BASOPHILS NFR BLD AUTO: 0.5 % — SIGNIFICANT CHANGE UP (ref 0–1)
BILIRUB SERPL-MCNC: 0.3 MG/DL — SIGNIFICANT CHANGE UP (ref 0.2–1.2)
BUN SERPL-MCNC: 11 MG/DL — SIGNIFICANT CHANGE UP (ref 10–20)
CALCIUM SERPL-MCNC: 8.3 MG/DL — LOW (ref 8.4–10.5)
CHLORIDE SERPL-SCNC: 104 MMOL/L — SIGNIFICANT CHANGE UP (ref 98–110)
CO2 SERPL-SCNC: 25 MMOL/L — SIGNIFICANT CHANGE UP (ref 17–32)
CREAT SERPL-MCNC: 0.8 MG/DL — SIGNIFICANT CHANGE UP (ref 0.7–1.5)
EGFR: 76 ML/MIN/1.73M2 — SIGNIFICANT CHANGE UP
EOSINOPHIL # BLD AUTO: 0.53 K/UL — SIGNIFICANT CHANGE UP (ref 0–0.7)
EOSINOPHIL NFR BLD AUTO: 5.7 % — SIGNIFICANT CHANGE UP (ref 0–8)
GLUCOSE SERPL-MCNC: 122 MG/DL — HIGH (ref 70–99)
HCT VFR BLD CALC: 28.7 % — LOW (ref 37–47)
HGB BLD-MCNC: 8.6 G/DL — LOW (ref 12–16)
IMM GRANULOCYTES NFR BLD AUTO: 0.4 % — HIGH (ref 0.1–0.3)
LIDOCAIN IGE QN: 20 U/L — SIGNIFICANT CHANGE UP (ref 7–60)
LYMPHOCYTES # BLD AUTO: 2.03 K/UL — SIGNIFICANT CHANGE UP (ref 1.2–3.4)
LYMPHOCYTES # BLD AUTO: 21.9 % — SIGNIFICANT CHANGE UP (ref 20.5–51.1)
MAGNESIUM SERPL-MCNC: 2.2 MG/DL — SIGNIFICANT CHANGE UP (ref 1.8–2.4)
MCHC RBC-ENTMCNC: 26.4 PG — LOW (ref 27–31)
MCHC RBC-ENTMCNC: 30 G/DL — LOW (ref 32–37)
MCV RBC AUTO: 88 FL — SIGNIFICANT CHANGE UP (ref 81–99)
MONOCYTES # BLD AUTO: 0.5 K/UL — SIGNIFICANT CHANGE UP (ref 0.1–0.6)
MONOCYTES NFR BLD AUTO: 5.4 % — SIGNIFICANT CHANGE UP (ref 1.7–9.3)
NEUTROPHILS # BLD AUTO: 6.1 K/UL — SIGNIFICANT CHANGE UP (ref 1.4–6.5)
NEUTROPHILS NFR BLD AUTO: 66.1 % — SIGNIFICANT CHANGE UP (ref 42.2–75.2)
NRBC # BLD: 0 /100 WBCS — SIGNIFICANT CHANGE UP (ref 0–0)
PLATELET # BLD AUTO: 395 K/UL — SIGNIFICANT CHANGE UP (ref 130–400)
PMV BLD: 9.7 FL — SIGNIFICANT CHANGE UP (ref 7.4–10.4)
POTASSIUM SERPL-MCNC: 5 MMOL/L — SIGNIFICANT CHANGE UP (ref 3.5–5)
POTASSIUM SERPL-SCNC: 5 MMOL/L — SIGNIFICANT CHANGE UP (ref 3.5–5)
PROT SERPL-MCNC: 5.7 G/DL — LOW (ref 6–8)
RBC # BLD: 3.26 M/UL — LOW (ref 4.2–5.4)
RBC # FLD: 15.9 % — HIGH (ref 11.5–14.5)
SODIUM SERPL-SCNC: 139 MMOL/L — SIGNIFICANT CHANGE UP (ref 135–146)
WBC # BLD: 9.25 K/UL — SIGNIFICANT CHANGE UP (ref 4.8–10.8)
WBC # FLD AUTO: 9.25 K/UL — SIGNIFICANT CHANGE UP (ref 4.8–10.8)

## 2024-07-20 PROCEDURE — 99239 HOSP IP/OBS DSCHRG MGMT >30: CPT

## 2024-07-20 RX ORDER — CLOPIDOGREL BISULFATE 75 MG/1
1 TABLET, FILM COATED ORAL
Qty: 30 | Refills: 0
Start: 2024-07-20 | End: 2024-08-18

## 2024-07-20 RX ORDER — PANTOPRAZOLE SODIUM 40 MG/1
1 TABLET, DELAYED RELEASE ORAL
Qty: 60 | Refills: 0 | DISCHARGE
Start: 2024-07-20 | End: 2024-08-18

## 2024-07-20 RX ORDER — PANTOPRAZOLE SODIUM 40 MG/10ML
1 INJECTION, POWDER, FOR SOLUTION INTRAVENOUS
Qty: 60 | Refills: 0
Start: 2024-07-20 | End: 2024-08-18

## 2024-07-20 RX ORDER — RIVAROXABAN 10 MG/1
1 TABLET, FILM COATED ORAL
Qty: 30 | Refills: 0
Start: 2024-07-20

## 2024-07-20 RX ORDER — CLOPIDOGREL BISULFATE 75 MG/1
1 TABLET, FILM COATED ORAL
Refills: 0 | DISCHARGE

## 2024-07-20 RX ADMIN — NICOTINE POLACRILEX 1 PATCH: 2 LOZENGE ORAL at 07:24

## 2024-07-20 RX ADMIN — Medication 175 MICROGRAM(S): at 05:50

## 2024-07-20 RX ADMIN — DEXTROSE MONOHYDRATE AND SODIUM CHLORIDE 75 MILLILITER(S): 5; .3 INJECTION, SOLUTION INTRAVENOUS at 06:31

## 2024-07-20 RX ADMIN — PANTOPRAZOLE SODIUM 40 MILLIGRAM(S): 40 INJECTION, POWDER, FOR SOLUTION INTRAVENOUS at 05:50

## 2024-07-20 RX ADMIN — NICOTINE POLACRILEX 1 PATCH: 2 LOZENGE ORAL at 00:58

## 2024-07-20 RX ADMIN — Medication 60 MILLIGRAM(S): at 05:50

## 2024-07-23 LAB — SURGICAL PATHOLOGY STUDY: SIGNIFICANT CHANGE UP

## 2024-07-31 DIAGNOSIS — I50.32 CHRONIC DIASTOLIC (CONGESTIVE) HEART FAILURE: ICD-10-CM

## 2024-07-31 DIAGNOSIS — K31.811 ANGIODYSPLASIA OF STOMACH AND DUODENUM WITH BLEEDING: ICD-10-CM

## 2024-07-31 DIAGNOSIS — E03.9 HYPOTHYROIDISM, UNSPECIFIED: ICD-10-CM

## 2024-07-31 DIAGNOSIS — F17.210 NICOTINE DEPENDENCE, CIGARETTES, UNCOMPLICATED: ICD-10-CM

## 2024-07-31 DIAGNOSIS — I73.9 PERIPHERAL VASCULAR DISEASE, UNSPECIFIED: ICD-10-CM

## 2024-07-31 DIAGNOSIS — Z79.890 HORMONE REPLACEMENT THERAPY: ICD-10-CM

## 2024-07-31 DIAGNOSIS — N39.0 URINARY TRACT INFECTION, SITE NOT SPECIFIED: ICD-10-CM

## 2024-07-31 DIAGNOSIS — D50.9 IRON DEFICIENCY ANEMIA, UNSPECIFIED: ICD-10-CM

## 2024-07-31 DIAGNOSIS — M54.30 SCIATICA, UNSPECIFIED SIDE: ICD-10-CM

## 2024-07-31 DIAGNOSIS — I25.10 ATHEROSCLEROTIC HEART DISEASE OF NATIVE CORONARY ARTERY WITHOUT ANGINA PECTORIS: ICD-10-CM

## 2024-07-31 DIAGNOSIS — Z79.01 LONG TERM (CURRENT) USE OF ANTICOAGULANTS: ICD-10-CM

## 2024-07-31 DIAGNOSIS — D13.2 BENIGN NEOPLASM OF DUODENUM: ICD-10-CM

## 2024-07-31 DIAGNOSIS — I13.0 HYPERTENSIVE HEART AND CHRONIC KIDNEY DISEASE WITH HEART FAILURE AND STAGE 1 THROUGH STAGE 4 CHRONIC KIDNEY DISEASE, OR UNSPECIFIED CHRONIC KIDNEY DISEASE: ICD-10-CM

## 2024-07-31 DIAGNOSIS — B96.20 UNSPECIFIED ESCHERICHIA COLI [E. COLI] AS THE CAUSE OF DISEASES CLASSIFIED ELSEWHERE: ICD-10-CM

## 2024-07-31 DIAGNOSIS — N18.9 CHRONIC KIDNEY DISEASE, UNSPECIFIED: ICD-10-CM

## 2024-07-31 DIAGNOSIS — Z88.5 ALLERGY STATUS TO NARCOTIC AGENT: ICD-10-CM

## 2024-07-31 DIAGNOSIS — Z92.3 PERSONAL HISTORY OF IRRADIATION: ICD-10-CM

## 2024-07-31 DIAGNOSIS — Z79.02 LONG TERM (CURRENT) USE OF ANTITHROMBOTICS/ANTIPLATELETS: ICD-10-CM

## 2024-08-12 ENCOUNTER — APPOINTMENT (OUTPATIENT)
Dept: GASTROENTEROLOGY | Facility: CLINIC | Age: 79
End: 2024-08-12

## 2024-08-12 DIAGNOSIS — K31.819 ANGIODYSPLASIA OF STOMACH AND DUODENUM W/OUT BLEEDING: ICD-10-CM

## 2024-08-12 DIAGNOSIS — Z87.440 PERSONAL HISTORY OF URINARY (TRACT) INFECTIONS: ICD-10-CM

## 2024-08-12 DIAGNOSIS — Z78.9 OTHER SPECIFIED HEALTH STATUS: ICD-10-CM

## 2024-08-12 DIAGNOSIS — D13.5 BENIGN NEOPLASM OF EXTRAHEPATIC BILE DUCTS: ICD-10-CM

## 2024-08-12 DIAGNOSIS — D64.9 ANEMIA, UNSPECIFIED: ICD-10-CM

## 2024-08-12 DIAGNOSIS — K80.50 CALCULUS OF BILE DUCT W/OUT CHOLANGITIS OR CHOLECYSTITIS W/OUT OBSTRUCTION: ICD-10-CM

## 2024-08-12 DIAGNOSIS — Z87.891 PERSONAL HISTORY OF NICOTINE DEPENDENCE: ICD-10-CM

## 2024-08-12 DIAGNOSIS — F17.200 NICOTINE DEPENDENCE, UNSPECIFIED, UNCOMPLICATED: ICD-10-CM

## 2024-08-12 PROCEDURE — 99442: CPT | Mod: 93

## 2024-08-12 RX ORDER — NIFEDIPINE 60 MG/1
60 TABLET, EXTENDED RELEASE ORAL
Refills: 0 | Status: ACTIVE | COMMUNITY
Start: 2024-08-12

## 2024-08-12 NOTE — ASSESSMENT
[FreeTextEntry1] : 79 y/o Female with a PMHx of HTN, hypothyroid, PAD s/p stents on plavix , HFpEF, non-obstructive CAD, duodenal adenoma s/p EMR and ampullectomy 2023, choledocholithiasis s/p stent placement 04/2024 presented to the ED for flank pain found to have UTI treated with abx. General GI evaluated patient for concern of anemia and transferred to [facility] while inpatient.  She had EGD with ERCP while inpatient on 7/19/24 which showed an angioectasia which was thought to be the cultprit of the UGI bleed. She is for F/U today. She denied any problems post procedure. She is concerned regarding a recurrence of the UTI with flank pain without fevers. She denied melena, hematemesis, weight loss, abdominal pain, acid reflux, dysphagia, or diarrhea. She C/O occasional constipation for which she takes prune juice and colace which are not always effective. She denied blood in the stool. she has been taking pantoprazole bid since 7/19/24.   S/P UGI bleed due to angioectasia in the duodenum Duodenal Adenoma without HGD - Will schedule a repeat EGD for removal of the duodenal adenoma in 2-3 months when patient feeling stronger; risks and benefits discussed - Continue PPI bid - Pt told to make an appt with Hematology for F/U of Anemia  H/O Choledocholithiasis, S/P ERCP with stent placement and subsequent removal  Constipation - She was told to take miralax OD to bid prn   - Pt told to make an appt with Hematology

## 2024-08-12 NOTE — HISTORY OF PRESENT ILLNESS
[de-identified] : 7/19/24 [FreeTextEntry1] : 79 y/o Female with a PMHx of HTN, hypothyroid, PAD s/p stents on plavix , HFpEF, non-obstructive CAD, duodenal adenoma s/p EMR and ampullectomy 2023, choledocholithiasis s/p stent placement 04/2024 presented to the ED for flank pain found to have UTI treated with abx. General GI evaluated patient for concern of anemia and transferred to [facility] while inpatient.  She had EGD with ERCP while inpatient on 7/19/24 which showed an angioectasia which was thought to be the cultprit of the UGI bleed. She is for F/U today. She denied any problems post procedure. She is concerned regarding a recurrence of the UTI with flank pain without fevers. She denied melena, hematemesis, weight loss, abdominal pain, acid reflux, dysphagia, or diarrhea. She C/O occasional constipation for which she takes prune juice and colace which are not always effective. She denied blood in the stool. she has been taking pantoprazole bid since 7/19/24.   EGD/ERCP: Normal esophagus. Normal stomach. Angioectasia in the anterior bulb. (Hemostasis). A CBD stent was seen in the ampulla. Removed with a snare. . 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 Biopsies obtained. Not intervened today as the patient underwent this procedure for a GI bleed. .  CBC done 8/2/24: H/H improved but still low at 9.4/31.7 Iron 34 TIBC 301 % sat 11

## 2024-08-12 NOTE — REASON FOR VISIT
[Home] : at home, [unfilled] , at the time of the visit. [Medical Office: ([facility])___] : at the medical office located in  [Verbal consent obtained from patient] : the patient, [unfilled] [Post-op/Procedure: _________] : a [unfilled] post-op/procedure visit [FreeTextEntry4] : Olya
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
## 2024-08-19 RX ORDER — PANTOPRAZOLE 40 MG/1
40 TABLET, DELAYED RELEASE ORAL
Qty: 90 | Refills: 3 | Status: ACTIVE | COMMUNITY
Start: 1900-01-01 | End: 1900-01-01

## 2024-09-26 ENCOUNTER — NON-APPOINTMENT (OUTPATIENT)
Age: 79
End: 2024-09-26

## 2024-09-26 ENCOUNTER — APPOINTMENT (OUTPATIENT)
Dept: OBGYN | Facility: CLINIC | Age: 79
End: 2024-09-26
Payer: MEDICARE

## 2024-09-26 VITALS — WEIGHT: 190 LBS | HEIGHT: 65 IN | BODY MASS INDEX: 31.65 KG/M2

## 2024-09-26 DIAGNOSIS — R39.9 UNSPECIFIED SYMPTOMS AND SIGNS INVOLVING THE GENITOURINARY SYSTEM: ICD-10-CM

## 2024-09-26 DIAGNOSIS — N81.10 CYSTOCELE, UNSPECIFIED: ICD-10-CM

## 2024-09-26 PROCEDURE — 99203 OFFICE O/P NEW LOW 30 MIN: CPT

## 2024-09-26 NOTE — PLAN
[FreeTextEntry1] : JKAY RODRIGUEZ is a 78 year female with 3rd degree cystocle refer to urogyn    urine c/s

## 2024-10-03 LAB — BACTERIA UR CULT: ABNORMAL

## 2024-10-04 ENCOUNTER — NON-APPOINTMENT (OUTPATIENT)
Age: 79
End: 2024-10-04

## 2024-10-12 ENCOUNTER — INPATIENT (INPATIENT)
Facility: HOSPITAL | Age: 79
LOS: 4 days | Discharge: HOME | DRG: 195 | End: 2024-10-17
Attending: INTERNAL MEDICINE | Admitting: STUDENT IN AN ORGANIZED HEALTH CARE EDUCATION/TRAINING PROGRAM
Payer: MEDICARE

## 2024-10-12 VITALS
HEART RATE: 89 BPM | SYSTOLIC BLOOD PRESSURE: 200 MMHG | RESPIRATION RATE: 25 BRPM | TEMPERATURE: 98 F | WEIGHT: 190.04 LBS | OXYGEN SATURATION: 95 % | DIASTOLIC BLOOD PRESSURE: 44 MMHG | HEIGHT: 65 IN

## 2024-10-12 DIAGNOSIS — Z98.890 OTHER SPECIFIED POSTPROCEDURAL STATES: Chronic | ICD-10-CM

## 2024-10-12 DIAGNOSIS — Z90.710 ACQUIRED ABSENCE OF BOTH CERVIX AND UTERUS: Chronic | ICD-10-CM

## 2024-10-12 DIAGNOSIS — Z90.49 ACQUIRED ABSENCE OF OTHER SPECIFIED PARTS OF DIGESTIVE TRACT: Chronic | ICD-10-CM

## 2024-10-12 DIAGNOSIS — J18.9 PNEUMONIA, UNSPECIFIED ORGANISM: ICD-10-CM

## 2024-10-12 LAB
ALBUMIN SERPL ELPH-MCNC: 4.1 G/DL — SIGNIFICANT CHANGE UP (ref 3.5–5.2)
ALP SERPL-CCNC: 200 U/L — HIGH (ref 30–115)
ALT FLD-CCNC: 16 U/L — SIGNIFICANT CHANGE UP (ref 0–41)
ANION GAP SERPL CALC-SCNC: 13 MMOL/L — SIGNIFICANT CHANGE UP (ref 7–14)
AST SERPL-CCNC: 13 U/L — SIGNIFICANT CHANGE UP (ref 0–41)
BASE EXCESS BLDV CALC-SCNC: 3 MMOL/L — SIGNIFICANT CHANGE UP (ref -2–3)
BASOPHILS # BLD AUTO: 0.07 K/UL — SIGNIFICANT CHANGE UP (ref 0–0.2)
BASOPHILS NFR BLD AUTO: 0.7 % — SIGNIFICANT CHANGE UP (ref 0–1)
BILIRUB SERPL-MCNC: 0.3 MG/DL — SIGNIFICANT CHANGE UP (ref 0.2–1.2)
BUN SERPL-MCNC: 19 MG/DL — SIGNIFICANT CHANGE UP (ref 10–20)
CA-I SERPL-SCNC: 1.25 MMOL/L — SIGNIFICANT CHANGE UP (ref 1.15–1.33)
CALCIUM SERPL-MCNC: 9.5 MG/DL — SIGNIFICANT CHANGE UP (ref 8.4–10.4)
CHLORIDE SERPL-SCNC: 102 MMOL/L — SIGNIFICANT CHANGE UP (ref 98–110)
CO2 SERPL-SCNC: 23 MMOL/L — SIGNIFICANT CHANGE UP (ref 17–32)
CREAT SERPL-MCNC: 0.7 MG/DL — SIGNIFICANT CHANGE UP (ref 0.7–1.5)
EGFR: 89 ML/MIN/1.73M2 — SIGNIFICANT CHANGE UP
EOSINOPHIL # BLD AUTO: 0.12 K/UL — SIGNIFICANT CHANGE UP (ref 0–0.7)
EOSINOPHIL NFR BLD AUTO: 1.2 % — SIGNIFICANT CHANGE UP (ref 0–8)
FLUAV AG NPH QL: SIGNIFICANT CHANGE UP
FLUBV AG NPH QL: SIGNIFICANT CHANGE UP
GAS PNL BLDA: SIGNIFICANT CHANGE UP
GAS PNL BLDV: 135 MMOL/L — LOW (ref 136–145)
GAS PNL BLDV: SIGNIFICANT CHANGE UP
GAS PNL BLDV: SIGNIFICANT CHANGE UP
GLUCOSE BLDC GLUCOMTR-MCNC: 265 MG/DL — HIGH (ref 70–99)
GLUCOSE SERPL-MCNC: 166 MG/DL — HIGH (ref 70–99)
HCO3 BLDV-SCNC: 27 MMOL/L — SIGNIFICANT CHANGE UP (ref 22–29)
HCT VFR BLD CALC: 33.8 % — LOW (ref 37–47)
HCT VFR BLDA CALC: 24 % — LOW (ref 34.5–46.5)
HGB BLD CALC-MCNC: 8.1 G/DL — LOW (ref 11.7–16.1)
HGB BLD-MCNC: 10.1 G/DL — LOW (ref 12–16)
IMM GRANULOCYTES NFR BLD AUTO: 0.5 % — HIGH (ref 0.1–0.3)
LACTATE BLDV-MCNC: 1.3 MMOL/L — SIGNIFICANT CHANGE UP (ref 0.5–2)
LYMPHOCYTES # BLD AUTO: 1.29 K/UL — SIGNIFICANT CHANGE UP (ref 1.2–3.4)
LYMPHOCYTES # BLD AUTO: 12.7 % — LOW (ref 20.5–51.1)
MCHC RBC-ENTMCNC: 22.2 PG — LOW (ref 27–31)
MCHC RBC-ENTMCNC: 29.9 G/DL — LOW (ref 32–37)
MCV RBC AUTO: 74.3 FL — LOW (ref 81–99)
MONOCYTES # BLD AUTO: 0.49 K/UL — SIGNIFICANT CHANGE UP (ref 0.1–0.6)
MONOCYTES NFR BLD AUTO: 4.8 % — SIGNIFICANT CHANGE UP (ref 1.7–9.3)
NEUTROPHILS # BLD AUTO: 8.14 K/UL — HIGH (ref 1.4–6.5)
NEUTROPHILS NFR BLD AUTO: 80.1 % — HIGH (ref 42.2–75.2)
NRBC # BLD: 0 /100 WBCS — SIGNIFICANT CHANGE UP (ref 0–0)
NT-PROBNP SERPL-SCNC: 268 PG/ML — SIGNIFICANT CHANGE UP (ref 0–300)
PCO2 BLDV: 38 MMHG — LOW (ref 39–42)
PH BLDV: 7.46 — HIGH (ref 7.32–7.43)
PLATELET # BLD AUTO: 382 K/UL — SIGNIFICANT CHANGE UP (ref 130–400)
PMV BLD: 9.7 FL — SIGNIFICANT CHANGE UP (ref 7.4–10.4)
PO2 BLDV: 61 MMHG — HIGH (ref 25–45)
POTASSIUM BLDV-SCNC: 4.6 MMOL/L — SIGNIFICANT CHANGE UP (ref 3.5–5.1)
POTASSIUM SERPL-MCNC: 4.2 MMOL/L — SIGNIFICANT CHANGE UP (ref 3.5–5)
POTASSIUM SERPL-SCNC: 4.2 MMOL/L — SIGNIFICANT CHANGE UP (ref 3.5–5)
PROT SERPL-MCNC: 6.8 G/DL — SIGNIFICANT CHANGE UP (ref 6–8)
RBC # BLD: 4.55 M/UL — SIGNIFICANT CHANGE UP (ref 4.2–5.4)
RBC # FLD: 18.1 % — HIGH (ref 11.5–14.5)
RSV RNA NPH QL NAA+NON-PROBE: SIGNIFICANT CHANGE UP
SAO2 % BLDV: 94.5 % — HIGH (ref 67–88)
SARS-COV-2 RNA SPEC QL NAA+PROBE: SIGNIFICANT CHANGE UP
SODIUM SERPL-SCNC: 138 MMOL/L — SIGNIFICANT CHANGE UP (ref 135–146)
TROPONIN T, HIGH SENSITIVITY RESULT: 30 NG/L — HIGH (ref 6–13)
WBC # BLD: 10.16 K/UL — SIGNIFICANT CHANGE UP (ref 4.8–10.8)
WBC # FLD AUTO: 10.16 K/UL — SIGNIFICANT CHANGE UP (ref 4.8–10.8)

## 2024-10-12 PROCEDURE — 97162 PT EVAL MOD COMPLEX 30 MIN: CPT | Mod: GP

## 2024-10-12 PROCEDURE — 71045 X-RAY EXAM CHEST 1 VIEW: CPT | Mod: 26,77

## 2024-10-12 PROCEDURE — 84100 ASSAY OF PHOSPHORUS: CPT

## 2024-10-12 PROCEDURE — 0225U NFCT DS DNA&RNA 21 SARSCOV2: CPT

## 2024-10-12 PROCEDURE — 94640 AIRWAY INHALATION TREATMENT: CPT

## 2024-10-12 PROCEDURE — 93010 ELECTROCARDIOGRAM REPORT: CPT

## 2024-10-12 PROCEDURE — 97530 THERAPEUTIC ACTIVITIES: CPT | Mod: GP

## 2024-10-12 PROCEDURE — 71275 CT ANGIOGRAPHY CHEST: CPT | Mod: MC

## 2024-10-12 PROCEDURE — 71045 X-RAY EXAM CHEST 1 VIEW: CPT | Mod: 26

## 2024-10-12 PROCEDURE — 82803 BLOOD GASES ANY COMBINATION: CPT

## 2024-10-12 PROCEDURE — 93970 EXTREMITY STUDY: CPT

## 2024-10-12 PROCEDURE — 80048 BASIC METABOLIC PNL TOTAL CA: CPT

## 2024-10-12 PROCEDURE — 85014 HEMATOCRIT: CPT

## 2024-10-12 PROCEDURE — 92610 EVALUATE SWALLOWING FUNCTION: CPT | Mod: GN

## 2024-10-12 PROCEDURE — 87640 STAPH A DNA AMP PROBE: CPT

## 2024-10-12 PROCEDURE — 87449 NOS EACH ORGANISM AG IA: CPT

## 2024-10-12 PROCEDURE — 82962 GLUCOSE BLOOD TEST: CPT

## 2024-10-12 PROCEDURE — 87899 AGENT NOS ASSAY W/OPTIC: CPT

## 2024-10-12 PROCEDURE — 83735 ASSAY OF MAGNESIUM: CPT

## 2024-10-12 PROCEDURE — 82330 ASSAY OF CALCIUM: CPT

## 2024-10-12 PROCEDURE — 84145 PROCALCITONIN (PCT): CPT

## 2024-10-12 PROCEDURE — 36415 COLL VENOUS BLD VENIPUNCTURE: CPT

## 2024-10-12 PROCEDURE — 80053 COMPREHEN METABOLIC PANEL: CPT

## 2024-10-12 PROCEDURE — 71275 CT ANGIOGRAPHY CHEST: CPT | Mod: 26

## 2024-10-12 PROCEDURE — 99285 EMERGENCY DEPT VISIT HI MDM: CPT

## 2024-10-12 PROCEDURE — 99222 1ST HOSP IP/OBS MODERATE 55: CPT

## 2024-10-12 PROCEDURE — 87641 MR-STAPH DNA AMP PROBE: CPT

## 2024-10-12 PROCEDURE — 85025 COMPLETE CBC W/AUTO DIFF WBC: CPT

## 2024-10-12 PROCEDURE — 71045 X-RAY EXAM CHEST 1 VIEW: CPT

## 2024-10-12 PROCEDURE — 85018 HEMOGLOBIN: CPT

## 2024-10-12 PROCEDURE — 83605 ASSAY OF LACTIC ACID: CPT

## 2024-10-12 PROCEDURE — 84132 ASSAY OF SERUM POTASSIUM: CPT

## 2024-10-12 PROCEDURE — 84295 ASSAY OF SERUM SODIUM: CPT

## 2024-10-12 RX ORDER — VANCOMYCIN HCL-SODIUM CHLORIDE IV SOLN 1.5 GM/250ML-0.9% 1.5-0.9/25 GM/ML-%
1250 SOLUTION INTRAVENOUS ONCE
Refills: 0 | Status: COMPLETED | OUTPATIENT
Start: 2024-10-12 | End: 2024-10-12

## 2024-10-12 RX ORDER — ACETAMINOPHEN 325 MG
650 TABLET ORAL EVERY 6 HOURS
Refills: 0 | Status: DISCONTINUED | OUTPATIENT
Start: 2024-10-12 | End: 2024-10-17

## 2024-10-12 RX ORDER — AZITHROMYCIN 250 MG/1
500 TABLET, FILM COATED ORAL EVERY 24 HOURS
Refills: 0 | Status: DISCONTINUED | OUTPATIENT
Start: 2024-10-13 | End: 2024-10-17

## 2024-10-12 RX ORDER — ONDANSETRON HCL/PF 4 MG/2 ML
4 VIAL (ML) INJECTION EVERY 8 HOURS
Refills: 0 | Status: DISCONTINUED | OUTPATIENT
Start: 2024-10-12 | End: 2024-10-17

## 2024-10-12 RX ORDER — CEFTRIAXONE SODIUM 1 G
1000 VIAL (EA) INJECTION EVERY 24 HOURS
Refills: 0 | Status: COMPLETED | OUTPATIENT
Start: 2024-10-12 | End: 2024-10-16

## 2024-10-12 RX ORDER — FUROSEMIDE 10 MG/ML
40 INJECTION INTRAVENOUS
Refills: 0 | Status: DISCONTINUED | OUTPATIENT
Start: 2024-10-12 | End: 2024-10-17

## 2024-10-12 RX ORDER — ENOXAPARIN SODIUM 150 MG/ML
40 INJECTION SUBCUTANEOUS EVERY 24 HOURS
Refills: 0 | Status: DISCONTINUED | OUTPATIENT
Start: 2024-10-12 | End: 2024-10-17

## 2024-10-12 RX ORDER — PANTOPRAZOLE SODIUM 40 MG/1
40 TABLET, DELAYED RELEASE ORAL
Refills: 0 | Status: DISCONTINUED | OUTPATIENT
Start: 2024-10-12 | End: 2024-10-17

## 2024-10-12 RX ORDER — LABETALOL HYDROCHLORIDE 200 MG/1
10 TABLET, FILM COATED ORAL ONCE
Refills: 0 | Status: COMPLETED | OUTPATIENT
Start: 2024-10-12 | End: 2024-10-12

## 2024-10-12 RX ORDER — CEFEPIME 2 G/1
2000 INJECTION, POWDER, FOR SOLUTION INTRAVENOUS ONCE
Refills: 0 | Status: COMPLETED | OUTPATIENT
Start: 2024-10-12 | End: 2024-10-12

## 2024-10-12 RX ORDER — VANCOMYCIN HCL-SODIUM CHLORIDE IV SOLN 1.5 GM/250ML-0.9% 1.5-0.9/25 GM/ML-%
1250 SOLUTION INTRAVENOUS EVERY 12 HOURS
Refills: 0 | Status: DISCONTINUED | OUTPATIENT
Start: 2024-10-12 | End: 2024-10-12

## 2024-10-12 RX ORDER — METHYLPREDNISOLONE ACETATE 80 MG/ML
60 INJECTION, SUSPENSION INTRA-ARTICULAR; INTRALESIONAL; INTRAMUSCULAR; SOFT TISSUE
Refills: 0 | Status: DISCONTINUED | OUTPATIENT
Start: 2024-10-12 | End: 2024-10-14

## 2024-10-12 RX ORDER — FUROSEMIDE 10 MG/ML
40 INJECTION INTRAVENOUS ONCE
Refills: 0 | Status: COMPLETED | OUTPATIENT
Start: 2024-10-12 | End: 2024-10-12

## 2024-10-12 RX ORDER — FUROSEMIDE 10 MG/ML
1 INJECTION INTRAVENOUS
Refills: 0 | DISCHARGE

## 2024-10-12 RX ORDER — AZITHROMYCIN 250 MG/1
500 TABLET, FILM COATED ORAL ONCE
Refills: 0 | Status: COMPLETED | OUTPATIENT
Start: 2024-10-12 | End: 2024-10-12

## 2024-10-12 RX ORDER — AZITHROMYCIN 250 MG/1
TABLET, FILM COATED ORAL
Refills: 0 | Status: DISCONTINUED | OUTPATIENT
Start: 2024-10-12 | End: 2024-10-17

## 2024-10-12 RX ORDER — ALBUTEROL 90 MCG
2 AEROSOL (GRAM) INHALATION EVERY 4 HOURS
Refills: 0 | Status: DISCONTINUED | OUTPATIENT
Start: 2024-10-12 | End: 2024-10-17

## 2024-10-12 RX ORDER — IPRATROPIUM BROMIDE AND ALBUTEROL SULFATE .5; 3 MG/3ML; MG/3ML
3 SOLUTION RESPIRATORY (INHALATION) EVERY 6 HOURS
Refills: 0 | Status: DISCONTINUED | OUTPATIENT
Start: 2024-10-12 | End: 2024-10-17

## 2024-10-12 RX ADMIN — VANCOMYCIN HCL-SODIUM CHLORIDE IV SOLN 1.5 GM/250ML-0.9% 166.67 MILLIGRAM(S): 1.5-0.9/25 SOLUTION at 13:51

## 2024-10-12 RX ADMIN — AZITHROMYCIN 255 MILLIGRAM(S): 250 TABLET, FILM COATED ORAL at 18:13

## 2024-10-12 RX ADMIN — LABETALOL HYDROCHLORIDE 10 MILLIGRAM(S): 200 TABLET, FILM COATED ORAL at 19:37

## 2024-10-12 RX ADMIN — IPRATROPIUM BROMIDE AND ALBUTEROL SULFATE 3 MILLILITER(S): .5; 3 SOLUTION RESPIRATORY (INHALATION) at 19:28

## 2024-10-12 RX ADMIN — Medication 60 MILLIGRAM(S): at 16:06

## 2024-10-12 RX ADMIN — Medication 100 MILLIGRAM(S): at 23:00

## 2024-10-12 RX ADMIN — CEFEPIME 100 MILLIGRAM(S): 2 INJECTION, POWDER, FOR SOLUTION INTRAVENOUS at 12:32

## 2024-10-12 RX ADMIN — METHYLPREDNISOLONE ACETATE 60 MILLIGRAM(S): 80 INJECTION, SUSPENSION INTRA-ARTICULAR; INTRALESIONAL; INTRAMUSCULAR; SOFT TISSUE at 16:04

## 2024-10-12 RX ADMIN — FUROSEMIDE 40 MILLIGRAM(S): 10 INJECTION INTRAVENOUS at 19:38

## 2024-10-12 RX ADMIN — PANTOPRAZOLE SODIUM 40 MILLIGRAM(S): 40 TABLET, DELAYED RELEASE ORAL at 18:13

## 2024-10-12 RX ADMIN — Medication 1 PATCH: at 16:05

## 2024-10-12 NOTE — ED ADULT TRIAGE NOTE - CHIEF COMPLAINT QUOTE
Pt presents for SOB x 2 days. Pt recently diagnosed with bronchitis, Currently on abx . Pt O2 on RA 82 %. EKG done in triage Pt presents for SOB x 2 days. Pt recently diagnosed with bronchitis, Currently on abx . Pt O2 on RA 82 %. 95% on 4 L NC  EKG done in triage. Hx HTN. Denies any chest pain

## 2024-10-12 NOTE — PATIENT PROFILE ADULT - DO YOU FEEL LIKE HURTING YOURSELF OR OTHERS?
From: Justina Nina  To: Marcia Mason MD  Sent: 4/19/2017 8:56 PM CDT  Subject: Test Results Question    I have a question about THINPREP PAP SMEAR B resulted on 4/15/17 at 8:00 PM.  what where the results? it didn't have any
I already addressed this before-- look at test result & instructions on it
PLEASE REVIEW PAP.
no

## 2024-10-12 NOTE — H&P ADULT - HISTORY OF PRESENT ILLNESS
Vitals  Temp: 97.7  HR: 89  BP: 200/44 (pending repeat)  O2: 82% on RA -> 95% on 4L NC  RR: 25    Labs  WBC: 10.16  HgB: 10.1 (baseline ~9)  ALP: 200    Imaging  CXR: Bilateral diffuse interstitial opacities. Right basilar opacity.    In the ED  Vancomycin 1250mg IV x1  BCx x2  RVP 77 yo female, current smoker patient known to have: HTN, HLD, Hypothyroidism, PAD s/p stents, hfpef, with recent admission for anemia due to suspected GIB s/p EGD inpatient presents for worsening shortness of breath and cough over the last week. Patient states she was taking PO doxycycline for about 7 days but she started to feel more short of breath over the past two days. She states that several members of her family have been sick with coughs/colds and she has been around them; she did not get tested for COVID/Flu outpatient and does not know if any of her family members were positive. She denies chest pain and abdominal pain. She endorses shortness of breath and cough and endorses chronic constipation and swelling in the legs. She is admitted to medicine for further management.    Vitals  Temp: 97.7  HR: 89  BP: 200/44 (pending repeat)  O2: 82% on RA -> 95% on 4L NC  RR: 25    Labs  WBC: 10.16  HgB: 10.1 (baseline ~9)  ALP: 200  Lactate: 1.3    Imaging  CXR: Bilateral diffuse interstitial opacities. Right basilar opacity.    In the ED  Vancomycin 1250mg IV x1  BCx x2  RVP 79 yo female, current smoker patient known to have: HTN, HLD, Hypothyroidism, PAD s/p stents, hfpef, with recent admission for anemia due to suspected GIB s/p EGD inpatient presents for worsening shortness of breath and cough over the last week. Patient states she was taking PO doxycycline for about 7 days but she started to feel more short of breath over the past two days. She states that several members of her family have been sick with coughs/colds and she has been around them; she did not get tested for COVID/Flu outpatient and does not know if any of her family members were positive. She denies chest pain and abdominal pain. She endorses shortness of breath and cough and endorses chronic constipation and swelling in the legs. She is admitted to medicine for further management.    Vitals  Temp: 97.7  HR: 89  BP: 200/44 (pending repeat)  O2: 82% on RA -> 95% on 4L NC  RR: 25    Labs  WBC: 10.16  HgB: 10.1 (baseline ~9)  ALP: 200  Lactate: 1.3    Imaging  CXR: Bilateral diffuse interstitial opacities. Right basilar opacity.    In the ED  Vancomycin  Cefepime  BCx x2  RVP

## 2024-10-12 NOTE — ED PROVIDER NOTE - WR INTERPRETATION 1
Lolita Hammonds is a 6 year old female who presents for her well child evaluation.    Concerns raised today include:Cough and congestion-on going at home. Sister, Bailee, just finished antibiotics. Cough does not keep her awake. Not interrupting eating, D today and possibky yest No fever    REVIEW OF SYSTEMS : Appetite-poptarts and andrea glenda;  Fruit, corn, potatoes and veg                      Sleep-9:30p till 6-8a                      Activity-pm K-4,     PAST HISTORY reviewed.  FAMILY HISTORY  reviewed.  SOCIAL HISTORY reviewed.Lives with Mom,Dad and sisters, Bailee,4 and Jorge,16    PHYSICAL EXAM  GENERAL: Lolita Hammonds is an alert, vigorous female with appropriate behavior. She is in no acute distress.  SKIN: Skin color, texture, turgor are normal. There are no bruises, rashes or lesions.   HEAD: The head is atraumatic and normocephalic.  EYES: Extraocular movement intact. Pupils equal, round and reactive to light and accommodation. No erythema. No edema. Fundi have sharp discs   EARS: The external auditory canals  are not obstructed with cerumen. The tympanic membranes are normal.   NOSE: There is no nasal flaring. The turbinates  are not  enlearged.  THROAT:  Lips, mucosa, and tongue normal. Teeth and gums normal. Oropharynx clear  TEETH: The teeth are normal.  NECK: The neck is normal. The thyroid is not palpably enlarged.  LYMPH NODES: There are no palpably enlarged lymph nodes in the neck, axillae or groin.  LUNGS: The lung fields are clear to auscultation. No retractions.  HEART: The precordium is quiet. The heart rhythm is grossly regular. S1 and S2 are normal. The heart tones are strong. There are no murmurs. There are +2 distal pulses. brisk capillary refill time.  ABDOMEN: There is not an umbilical hernia. The abdomen is flat, soft, and not tender. There are no masses. Neither the liver nor the spleen is enlarged. The bowel sounds are normal.  GENITALIA: Lolita Hammonds has normal, Thiago stage  I, female genitalia   EXTREMITIES: The extremities are normal. There is no clubbing. There is no edema. Full range of motion.   BACK: Straight on forward bend  NEUROLOGIC: Lolita Hammonds displays normal tone, sensation, and strength throughout. There are no focal deficits. Cerebellum intact.  PSYCHIATRIC: Loliat Hammonds is oriented to person, place, time, and general circumstances Her has no symptoms of depression.    ASSESSMENT: Well 6 year old female child-Good growth and development   Very happy, cooperative little girl   Still overweight, but it is NOT increasing beyond her established curve    GUIDANCE: School readiness - DISCUSSED            Speech articulation - DISCUSSED            Memorize name, address & phone - DISCUSSED            Dental care - DISCUSSED             PLAN:  Plan per orders-Immunizations ar eup to dte  Return for next well child exam in 1 year.    RLL consolidation

## 2024-10-12 NOTE — H&P ADULT - ATTENDING COMMENTS
My note supersedes all residents notes that I sign, My correction for their notes are in my notes   Pt seen in ED3-12 , hx confirmed   On exam  General: awake, alert, NAD, chronic ill appearance  Lungs:  b/l rhonchi and wheezing  , normal resp effort  Heart: regular rhythm   Abdomen: soft, non tender non distended  Ext: chronic + edema as per the patient , can move all  his extremities     79 yo female, current smoker patient known to have: HTN, HLD, Hypothyroidism, PAD s/p stents, hfpef, with recent admission for anemia due to suspected GIB s/p EGD inpatient presents for suspected PNA complicated by hypoxia    [] Acute hypoxic respiratory failure likely secondary to RLL PNA  #Active Smoker (last cigarette 2d ago)  + sick contact   no improvement on op doxcy   - SOB worsened overnight prompting ED visit  -on admission RR 25,  82% O2 on RA -> 95% on 4L , No sepsis POA   - CXR: Bilateral diffuse interstitial opacities. Right basilar opacity.  - S/p Cefepime and Vanc in ED - as per ED report that the patient and her family refused Levaquin as it has bad effect on her    EKG noted, NSR, Non specific , give azithromycin and ceftriaxone for now , escalate if no improvement   check Pneumonia work up, MRSA, Procl, strep and urine legionella, Full RVP , sputum culture, follow /up blood cultures   rsv/flu/covid NEGATIVE   Trop 30 ( was 36-> 25 on prior admission ) , repeat , likely demand , cardiac monitor for now   lactate 1.3   give IV soluemderol , inhalers   - Nicotine patch 21mg QD  - Smoking cessation counseling provided, patient wishes to use nicotine patch, wants to quit  -LE duplex   IF No improvement  or if duplex +  Get CT chest   monitor resp status and work of breathing   wean o2 as tolerated   ID consult   SLP abraham     #HTN  - SBP on arrival 200, unsure how accuarate as diasotlic was low   re check and mointor   - C/w home Nifedipine 60mg XR QD      #PAD  - C/w home Plavix 75mg QD  - States she was on Xarelto but was told to stop as she had history of recent bleed    #Hypothyroidism  - C/w home Levothyroxine 175mcg QD    #HFpEF  - C/w Lasix 40mg PO q48h    #Recent GIB  #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 (Hemostasis).  A CBD stent was seen in the ampulla. Removed with a snare. .  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 Biopsies obtained. Not intervened today as the  patient underwent this procedure for a GI bleed. .  - C/w Protonix BID until 10/18 then switch to QD  monitor CBC , hGB TODAY 10.1 improved from before         DVT ppx: Lovenox  GI ppx: Protonix My note supersedes all residents notes that I sign, My correction for their notes are in my notes   Pt seen in ED3-12 , hx confirmed   On exam  General: awake, alert, NAD, chronic ill appearance  Lungs:  b/l rhonchi and wheezing  , normal resp effort  Heart: regular rhythm   Abdomen: soft, non tender non distended  Ext: chronic + edema as per the patient , can move all  his extremities     77 yo female, current smoker patient known to have: HTN, HLD, Hypothyroidism, PAD s/p stents, hfpef, with recent admission for anemia due to suspected GIB s/p EGD inpatient presents for suspected PNA complicated by hypoxia    [] Acute hypoxic respiratory failure likely secondary to RLL PNA  #Active Smoker (last cigarette 2d ago)  + sick contact   no improvement on op doxcy   - SOB worsened overnight prompting ED visit  -on admission RR 25,  82% O2 on RA -> 95% on 4L , No sepsis POA   - CXR: Bilateral diffuse interstitial opacities. Right basilar opacity.  - S/p Cefepime and Vanc in ED - as per ED report that the patient and her family refused Levaquin as it has bad effect on her    EKG noted, NSR, Non specific , give azithromycin and ceftriaxone for now , escalate if no improvement   check Pneumonia work up, MRSA, Procl, strep and urine legionella, Full RVP , sputum culture, follow /up blood cultures   rsv/flu/covid NEGATIVE   Trop 30 ( was 36-> 25 on prior admission ) , repeat , likely demand , no active chest pain   cardiac monitor for now   lactate 1.3   give IV soluemderol , inhalers   - Nicotine patch 21mg QD  - Smoking cessation counseling provided, patient wishes to use nicotine patch, wants to quit  -LE duplex   IF No improvement  or if duplex +  Get CT chest   monitor resp status and work of breathing   wean o2 as tolerated   ID consult   SLP eval     #HTN  - SBP on arrival 200, unsure how accuarate as diasotlic was low   re check and mointor   - C/w home Nifedipine 60mg XR QD      #PAD  - C/w home Plavix 75mg QD  - States she was on Xarelto but was told to stop as she had history of recent bleed    #Hypothyroidism  - C/w home Levothyroxine 175mcg QD    #HFpEF  - C/w Lasix 40mg PO q48h    #Recent GIB  #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 (Hemostasis).  A CBD stent was seen in the ampulla. Removed with a snare. .  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 Biopsies obtained. Not intervened today as the  patient underwent this procedure for a GI bleed. .  - C/w Protonix BID until 10/18 then switch to QD  monitor CBC , hGB TODAY 10.1 improved from before         DVT ppx: Lovenox  GI ppx: Protonix My note supersedes all residents notes that I sign, My correction for their notes are in my notes   Pt seen in ED3-12 , hx confirmed   On exam  General: awake, alert, NAD, chronic ill appearance  Lungs:  b/l rhonchi and wheezing  , normal resp effort  Heart: regular rhythm   Abdomen: soft, non tender non distended  Ext: chronic + edema as per the patient , can move all  his extremities     79 yo female, current smoker patient known to have: HTN, HLD, Hypothyroidism, PAD s/p stents, hfpef, with recent admission for anemia due to suspected GIB s/p EGD inpatient presents for suspected PNA complicated by hypoxia    [] Acute hypoxic respiratory failure likely secondary to RLL PNA  #Active Smoker (last cigarette 2d ago)  + sick contact   no improvement on op doxcy   - SOB worsened overnight prompting ED visit  -on admission RR 25,  82% O2 on RA -> 95% on 4L , No sepsis POA   - CXR: Bilateral diffuse interstitial opacities. Right basilar opacity.  - S/p Cefepime and Vanc in ED - as per ED report that the patient and her family refused Levaquin as it has bad effect on her    EKG noted, NSR, Non specific , give azithromycin and ceftriaxone for now , escalate if no improvement   check Pneumonia work up, MRSA, Procl, strep and urine legionella, Full RVP , sputum culture, follow /up blood cultures   rsv/flu/covid NEGATIVE   Trop 30 ( was 36-> 25 on prior admission ) , repeat , likely demand , no active chest pain   cardiac monitor for now   lactate 1.3   give IV soluemderol , inhalers   - Nicotine patch 21mg QD  - Smoking cessation counseling provided, patient wishes to use nicotine patch, wants to quit  -LE duplex   IF No improvement  or if duplex +  Get CT chest   monitor resp status and work of breathing   wean o2 as tolerated   ID consult   SLP eval     #HTN  - SBP on arrival 200, unsure how accuarate as diasotlic was low   re check and mointor   - C/w home Nifedipine 60mg XR QD      #PAD  - C/w home Plavix 75mg QD  - States she was on Xarelto but was told to stop as she had history of recent bleed    #Hypothyroidism  - C/w home Levothyroxine 175mcg QD    #HFpEF  - C/w Lasix 40mg PO q48h    #Recent GIB  #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 (Hemostasis).  A CBD stent was seen in the ampulla. Removed with a snare. .  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 Biopsies obtained. Not intervened today as the  patient underwent this procedure for a GI bleed. .  - C/w Protonix BID until 10/18 then switch to QD  monitor CBC , hGB TODAY 10.1 improved from before         DVT ppx: Lovenox  GI ppx: Protonix    addendum: RRT called  for the patient for resp distress I was present  - refer to RRT note for details  patient daughter at bedside   ICU team aware, CXR and blood gas noted, get CTA ,  low threshold for upgrade

## 2024-10-12 NOTE — ED PROVIDER NOTE - OBJECTIVE STATEMENT
78-year-old female, past medical history as documented, presenting with 1 week of worsening cough and dyspnea.  Patient was seen in urgent care outpatient 1 week ago and was prescribed doxycycline for presumed pneumonia but states that symptoms have worsened so she came to the ED. Patient denying fevers, chest pain, nausea/vomiting/diarrhea, blood in stool, urinary symptoms or any other complaints.  Also denies recent travel or sick contacts.

## 2024-10-12 NOTE — ED ADULT NURSE NOTE - CHIEF COMPLAINT QUOTE
Pt presents for SOB x 2 days. Pt recently diagnosed with bronchitis, Currently on abx . Pt O2 on RA 82 %. 95% on 4 L NC  EKG done in triage. Hx HTN. Denies any chest pain

## 2024-10-12 NOTE — SWALLOW BEDSIDE ASSESSMENT ADULT - SLP PERTINENT HISTORY OF CURRENT PROBLEM
79 yo female, current smoker patient known to have: HTN, HLD, Hypothyroidism, PAD s/p stents, hfpef, with recent admission for anemia due to suspected GIB s/p EGD inpatient presents for worsening shortness of breath and cough over the last week. Patient states she was taking PO doxycycline for about 7 days but she started to feel more short of breath over the past two days. She states that several members of her family have been sick with coughs/colds and she has been around them; she did not get tested for COVID/Flu outpatient and does not know if any of her family members were positive. She denies chest pain and abdominal pain. She endorses shortness of breath and cough and endorses chronic constipation and swelling in the legs. She is admitted to medicine for further management. CXR 10/12/24 Bilateral diffuse interstitial opacities. Right basilar opacity

## 2024-10-12 NOTE — H&P ADULT - ASSESSMENT
#Misc  #Code Status: Full Code  #DVT ppx:   #GI ppx:  #Diet:  #Activity: AAT  #Dispo: Med/Tele 77 yo female, current smoker patient known to have: HTN, HLD, Hypothyroidism, PAD s/p stents, hfpef, with recent admission for anemia due to suspected GIB s/p EGD inpatient presents for suspected PNA complicated by hypoxia    #Suspected RLL PNA  #Active Smoker (last cigarette 2d ago)  - Doxycycline for ~1 week prior, around family members that had cough/flu-like symptoms  - SOB worsened overnight prompting ED visit  - 82% O2 on RA -> 95% on 4L  - Not septic on admission  - CXR: Bilateral diffuse interstitial opacities. Right basilar opacity.  - S/p Cefepime and Vanc in ED  - Rocephin 1g IV QD  - Azithromycin 500mg IV QD  - Duonebs q6h standing  - Albuterol inhaler q4h PRN  - Solumedrol 60mg BID  - Nicotine patch 21mg QD  - Smoking cessation counseling provided, patient wishes to use nicotine patch, wants to quit  - Flu/Covid/RSV negative, sending full-RVP after discussion w/ attg  - F/u BCx, MRSA, strep, and legionella  - F/u LE duplex (active smoker, RLE appears slightly larger than LLE though has hx of stents for PAD)  - F/u Speech and Swallow for possible silent aspiration (patient endorses some difficulty chewing but not coughing while eating)    #HTN  - SBP on arrival 200  - C/w home Nifedipine 60mg XR QD  - F/u repeat BP    #PAD  - C/w home Plavix 75mg QD  - States she was on Xarelto but was told to stop as she had history of recent bleed    #Hypothyroidism  - C/w home Levothyroxine 175mcg QD    #HFpEF  - C/w Lasix 40mg PO q48h    #Recent GIB  #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 (Hemostasis).  A CBD stent was seen in the ampulla. Removed with a snare. .  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 Biopsies obtained. Not intervened today as the  patient underwent this procedure for a GI bleed. .  - C/w Protonix BID until 10/18 then switch to QD    #Misc  #Code Status: Full Code  #DVT ppx: Lovenox  #GI ppx: Protonix  #Diet: Easy to Chew, DASH  #Activity: AAT  #Dispo: Med/Tele 77 yo female, current smoker patient known to have: HTN, HLD, Hypothyroidism, PAD s/p stents, hfpef, with recent admission for anemia due to suspected GIB s/p EGD inpatient presents for suspected PNA complicated by hypoxia    #Suspected RLL PNA  #Active Smoker (last cigarette 2d ago)  - Doxycycline for ~1 week prior, around family members that had cough/flu-like symptoms  - SOB worsened overnight prompting ED visit  - 82% O2 on RA -> 95% on 4L  - Not septic on admission  - CXR: Bilateral diffuse interstitial opacities. Right basilar opacity.  - S/p Cefepime and Vanc in ED  - Rocephin 1g IV QD  - Azithromycin 500mg IV QD  - Duonebs q6h standing  - Albuterol inhaler q4h PRN  - Solumedrol 60mg BID  - Nicotine patch 21mg QD  - Smoking cessation counseling provided, patient wishes to use nicotine patch, wants to quit  - Flu/Covid/RSV negative, sending full-RVP, strep, legionella after discussion w/ attg  - F/u BCx, MRSA, strep, and legionella  - F/u LE duplex (active smoker, RLE appears slightly larger than LLE though has hx of stents for PAD)  - F/u Speech and Swallow for possible silent aspiration (patient endorses some difficulty chewing but not coughing while eating)    #HTN  - SBP on arrival 200  - C/w home Nifedipine 60mg XR QD  - F/u repeat BP    #PAD  - C/w home Plavix 75mg QD  - States she was on Xarelto but was told to stop as she had history of recent bleed    #Hypothyroidism  - C/w home Levothyroxine 175mcg QD    #HFpEF  - C/w Lasix 40mg PO q48h    #Recent GIB  #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 (Hemostasis).  A CBD stent was seen in the ampulla. Removed with a snare. .  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 Biopsies obtained. Not intervened today as the  patient underwent this procedure for a GI bleed. .  - C/w Protonix BID until 10/18 then switch to QD    #Misc  #Code Status: Full Code  #DVT ppx: Lovenox  #GI ppx: Protonix  #Diet: Easy to Chew, DASH  #Activity: AAT  #Dispo: Med/Tele

## 2024-10-12 NOTE — RAPID RESPONSE TEAM SUMMARY - NSADDTLFINDINGSRRT_GEN_ALL_CORE
AAOx3, w/ respiratory distress  Noted to be tachycardic, hypertensive, satting 96% on 6L NC  STAT CXR unchanged from prior  ABG w/ low pO2 otherwise unremakrable

## 2024-10-12 NOTE — RAPID RESPONSE TEAM SUMMARY - NSSITUATIONBACKGROUNDRRT_GEN_ALL_CORE
78 y/ F, current smoker patient known to have: HTN, HLD, Hypothyroidism, PAD s/p stents, HFpEF, with recent admission for anemia due to suspected GIB s/p EGD inpatient presents for worsening shortness of breath and cough over the last week. Admitted for AHRF 2/2 RLLL pna    RR called for acute worsening of SOB.

## 2024-10-12 NOTE — ED PROVIDER NOTE - CLINICAL SUMMARY MEDICAL DECISION MAKING FREE TEXT BOX
Patient presented with worsening cough and dyspnea over the past week, prescribed antibiotics for presumed pneumonia, but this did not improve symptoms.  Otherwise unremarkable afebrile, hemodynamic stable, lungs overall with rhonchi bilaterally, but no acute distress on O2 nasal cannula.  He is confirmed to be hypoxic on room air.  EKG obtained nonspecific, but no evidence of STEMI. Obtained labs which were grossly unremarkable including no significant leukocytosis, anemia, signs of dehydration/THEODORA, transaminitis or significant electrolyte abnormalities. CXR showed (+) RLL opacity which is likely etiology. Patient remained stable during ED course but given the above, will require IV abx and admission for further management. Family agreeable with plan. HD stable at time of admission.

## 2024-10-12 NOTE — ED PROVIDER NOTE - PHYSICAL EXAMINATION
Vital Signs: I have reviewed the initial vital signs.  Constitutional: NAD, well-nourished, appears stated age, no acute distress.  HEENT: Airway patent, moist MM, no erythema/swelling/deformity of oral structures. EOMI, PERRLA.  CV: regular rate, regular rhythm, well-perfused extremities, 2+ b/l DP and radial pulses equal.  Lungs: (+) b/l rhonchi, no increased WOB.  ABD: NTND, no guarding or rebound, no pulsatile mass, no hernias.   MSK: Neck supple, nontender, nl ROM, no stepoff. Chest nontender. Back nontender in TLS spine or to b/l bony structures or flanks. Ext nontender, nl rom, no deformity.   INTEG: Skin warm, dry, no rash.  NEURO: A&Ox3, normal strength, nl sensation throughout, normal speech.   PSYCH: Calm, cooperative, normal affect and interaction.

## 2024-10-12 NOTE — H&P ADULT - NSHPPHYSICALEXAM_GEN_ALL_CORE
General: Pleasant, appears mildly anxious  Head: NC in place  Neck: Supple, no JVD  Cardiac: Regular rate and rhythm  Pulmonary: Wheezing b/l  Abdominal: Soft, nontender, and nondistended  Extremities: No pitting edema, positive venous stasis dermatitis b/l LE, RLE slightly wider than LLE (previous stent placements)  Neurologic: AOx3, nonfocal  Skin: Stasis dermatitis b/l LE

## 2024-10-12 NOTE — ED ADULT NURSE NOTE - NSFALLUNIVINTERV_ED_ALL_ED
Bed/Stretcher in lowest position, wheels locked, appropriate side rails in place/Call bell, personal items and telephone in reach/Instruct patient to call for assistance before getting out of bed/chair/stretcher/Non-slip footwear applied when patient is off stretcher/[place] to call system/Physically safe environment - no spills, clutter or unnecessary equipment/Purposeful proactive rounding/Room/bathroom lighting operational, light cord in reach

## 2024-10-12 NOTE — RAPID RESPONSE TEAM SUMMARY - NSOTHERINTERVENTIONSRRT_GEN_ALL_CORE
STAT CTA pending  Spoke w/ crit [id number], will await CTA results and monitor on floor, low threshold for SDU but will remain on floor

## 2024-10-12 NOTE — SWALLOW BEDSIDE ASSESSMENT ADULT - SWALLOW EVAL: DIAGNOSIS
baseline cough. tolerating thin liquids, puree, minced and moist, and soft and bite size textures. No overt s/s of aspiration/ penetration

## 2024-10-12 NOTE — PATIENT PROFILE ADULT - FUNCTIONAL ASSESSMENT - BASIC MOBILITY 6.
3 = A little assistance  2-calculated by average/Not able to assess (calculate score using [facility] averaging method)

## 2024-10-12 NOTE — ED PROVIDER NOTE - CONSIDERATION OF ADMISSION OBSERVATION
Consideration of Admission/Observation Patient with hypoxia, pneumonia, new O2 requirement - will require admission

## 2024-10-12 NOTE — SWALLOW BEDSIDE ASSESSMENT ADULT - SWALLOW EVAL: RECOMMENDED FEEDING/EATING TECHNIQUES
allow for swallow between intakes/alternate food with liquid/maintain upright posture during/after eating for 30 mins/small sips/bites

## 2024-10-12 NOTE — ED PROVIDER NOTE - DIFFERENTIAL DIAGNOSIS
Differential Diagnosis Dyspnea, hypoxia, consider ACS vs PE vs dissection vs tamponade vs esophageal rupture vs pneumothorax vs pneumonia vs fluid overload

## 2024-10-12 NOTE — ED PROVIDER NOTE - PROGRESS NOTE DETAILS
JUICE: CXR with RLL PNA - per family, refusing levaquin as patient's  had bad reaction and they do not wish to risk it for the patient. Will give cefepime/vanc

## 2024-10-13 LAB
ANION GAP SERPL CALC-SCNC: 12 MMOL/L — SIGNIFICANT CHANGE UP (ref 7–14)
BASOPHILS # BLD AUTO: 0.03 K/UL — SIGNIFICANT CHANGE UP (ref 0–0.2)
BASOPHILS NFR BLD AUTO: 0.3 % — SIGNIFICANT CHANGE UP (ref 0–1)
BUN SERPL-MCNC: 19 MG/DL — SIGNIFICANT CHANGE UP (ref 10–20)
CALCIUM SERPL-MCNC: 9.6 MG/DL — SIGNIFICANT CHANGE UP (ref 8.4–10.5)
CHLORIDE SERPL-SCNC: 99 MMOL/L — SIGNIFICANT CHANGE UP (ref 98–110)
CO2 SERPL-SCNC: 28 MMOL/L — SIGNIFICANT CHANGE UP (ref 17–32)
CREAT SERPL-MCNC: 0.8 MG/DL — SIGNIFICANT CHANGE UP (ref 0.7–1.5)
EGFR: 76 ML/MIN/1.73M2 — SIGNIFICANT CHANGE UP
EOSINOPHIL # BLD AUTO: 0 K/UL — SIGNIFICANT CHANGE UP (ref 0–0.7)
EOSINOPHIL NFR BLD AUTO: 0 % — SIGNIFICANT CHANGE UP (ref 0–8)
GLUCOSE SERPL-MCNC: 123 MG/DL — HIGH (ref 70–99)
HCT VFR BLD CALC: 33.6 % — LOW (ref 37–47)
HGB BLD-MCNC: 10.3 G/DL — LOW (ref 12–16)
IMM GRANULOCYTES NFR BLD AUTO: 0.6 % — HIGH (ref 0.1–0.3)
LYMPHOCYTES # BLD AUTO: 1.31 K/UL — SIGNIFICANT CHANGE UP (ref 1.2–3.4)
LYMPHOCYTES # BLD AUTO: 12.1 % — LOW (ref 20.5–51.1)
MAGNESIUM SERPL-MCNC: 2.2 MG/DL — SIGNIFICANT CHANGE UP (ref 1.8–2.4)
MCHC RBC-ENTMCNC: 22.7 PG — LOW (ref 27–31)
MCHC RBC-ENTMCNC: 30.7 G/DL — LOW (ref 32–37)
MCV RBC AUTO: 74 FL — LOW (ref 81–99)
MONOCYTES # BLD AUTO: 0.67 K/UL — HIGH (ref 0.1–0.6)
MONOCYTES NFR BLD AUTO: 6.2 % — SIGNIFICANT CHANGE UP (ref 1.7–9.3)
NEUTROPHILS # BLD AUTO: 8.74 K/UL — HIGH (ref 1.4–6.5)
NEUTROPHILS NFR BLD AUTO: 80.8 % — HIGH (ref 42.2–75.2)
NRBC # BLD: 0 /100 WBCS — SIGNIFICANT CHANGE UP (ref 0–0)
PLATELET # BLD AUTO: 404 K/UL — HIGH (ref 130–400)
PMV BLD: 9.7 FL — SIGNIFICANT CHANGE UP (ref 7.4–10.4)
POTASSIUM SERPL-MCNC: 4.6 MMOL/L — SIGNIFICANT CHANGE UP (ref 3.5–5)
POTASSIUM SERPL-SCNC: 4.6 MMOL/L — SIGNIFICANT CHANGE UP (ref 3.5–5)
RBC # BLD: 4.54 M/UL — SIGNIFICANT CHANGE UP (ref 4.2–5.4)
RBC # FLD: 17.6 % — HIGH (ref 11.5–14.5)
SODIUM SERPL-SCNC: 139 MMOL/L — SIGNIFICANT CHANGE UP (ref 135–146)
WBC # BLD: 10.81 K/UL — HIGH (ref 4.8–10.8)
WBC # FLD AUTO: 10.81 K/UL — HIGH (ref 4.8–10.8)

## 2024-10-13 PROCEDURE — 99232 SBSQ HOSP IP/OBS MODERATE 35: CPT

## 2024-10-13 PROCEDURE — 93970 EXTREMITY STUDY: CPT | Mod: 26

## 2024-10-13 RX ORDER — QUETIAPINE FUMARATE 50 MG/1
12.5 TABLET, FILM COATED ORAL ONCE
Refills: 0 | Status: COMPLETED | OUTPATIENT
Start: 2024-10-13 | End: 2024-10-13

## 2024-10-13 RX ORDER — HYDRALAZINE HYDROCHLORIDE 100 MG/1
10 TABLET ORAL ONCE
Refills: 0 | Status: COMPLETED | OUTPATIENT
Start: 2024-10-13 | End: 2024-10-13

## 2024-10-13 RX ADMIN — Medication 1 PATCH: at 11:32

## 2024-10-13 RX ADMIN — FUROSEMIDE 40 MILLIGRAM(S): 10 INJECTION INTRAVENOUS at 06:35

## 2024-10-13 RX ADMIN — Medication 1 PATCH: at 18:12

## 2024-10-13 RX ADMIN — HYDRALAZINE HYDROCHLORIDE 10 MILLIGRAM(S): 100 TABLET ORAL at 15:57

## 2024-10-13 RX ADMIN — Medication 60 MILLIGRAM(S): at 06:32

## 2024-10-13 RX ADMIN — Medication 100 MILLIGRAM(S): at 22:29

## 2024-10-13 RX ADMIN — AZITHROMYCIN 255 MILLIGRAM(S): 250 TABLET, FILM COATED ORAL at 14:36

## 2024-10-13 RX ADMIN — PANTOPRAZOLE SODIUM 40 MILLIGRAM(S): 40 TABLET, DELAYED RELEASE ORAL at 17:39

## 2024-10-13 RX ADMIN — METHYLPREDNISOLONE ACETATE 60 MILLIGRAM(S): 80 INJECTION, SUSPENSION INTRA-ARTICULAR; INTRALESIONAL; INTRAMUSCULAR; SOFT TISSUE at 06:34

## 2024-10-13 RX ADMIN — QUETIAPINE FUMARATE 12.5 MILLIGRAM(S): 50 TABLET, FILM COATED ORAL at 15:57

## 2024-10-13 RX ADMIN — IPRATROPIUM BROMIDE AND ALBUTEROL SULFATE 3 MILLILITER(S): .5; 3 SOLUTION RESPIRATORY (INHALATION) at 10:31

## 2024-10-13 RX ADMIN — Medication 75 MILLIGRAM(S): at 11:32

## 2024-10-13 RX ADMIN — PANTOPRAZOLE SODIUM 40 MILLIGRAM(S): 40 TABLET, DELAYED RELEASE ORAL at 06:32

## 2024-10-13 RX ADMIN — Medication 175 MICROGRAM(S): at 06:32

## 2024-10-13 RX ADMIN — Medication 1 PATCH: at 11:27

## 2024-10-13 RX ADMIN — METHYLPREDNISOLONE ACETATE 60 MILLIGRAM(S): 80 INJECTION, SUSPENSION INTRA-ARTICULAR; INTRALESIONAL; INTRAMUSCULAR; SOFT TISSUE at 17:39

## 2024-10-13 RX ADMIN — IPRATROPIUM BROMIDE AND ALBUTEROL SULFATE 3 MILLILITER(S): .5; 3 SOLUTION RESPIRATORY (INHALATION) at 14:36

## 2024-10-13 NOTE — PROGRESS NOTE ADULT - SUBJECTIVE AND OBJECTIVE BOX
JAKY RODRIGUEZ 78y Female  MRN#: [id number]   Hospital Day: 1d    HPI:  77 yo female, current smoker patient known to have: HTN, HLD, Hypothyroidism, PAD s/p stents, hfpef, with recent admission for anemia due to suspected GIB s/p EGD inpatient presents for worsening shortness of breath and cough over the last week. Patient states she was taking PO doxycycline for about 7 days but she started to feel more short of breath over the past two days. She states that several members of her family have been sick with coughs/colds and she has been around them; she did not get tested for COVID/Flu outpatient and does not know if any of her family members were positive. She denies chest pain and abdominal pain. She endorses shortness of breath and cough and endorses chronic constipation and swelling in the legs. She is admitted to medicine for further management.    Vitals  Temp: 97.7  HR: 89  BP: 200/44 (pending repeat)  O2: 82% on RA -> 95% on 4L NC  RR: 25    Labs  WBC: 10.16  HgB: 10.1 (baseline ~9)  ALP: 200  Lactate: 1.3    Imaging  CXR: Bilateral diffuse interstitial opacities. Right basilar opacity.    In the ED  Vancomycin  Cefepime  BCx x2  RVP (12 Oct 2024 13:26)      SUBJECTIVE  Patient is a 78y old Female who presents with a chief complaint of Currently admitted to medicine with the primary diagnosis of Pneumonia      INTERVAL HPI AND OVERNIGHT EVENTS:  Patient was examined and seen at bedside. This morning she is resting comfortably in bed and reports no issues or overnight events.    REVIEW OF SYMPTOMS:  CONSTITUTIONAL: No weakness, fevers or chills; No headaches  EYES: No visual changes, eye pain, or discharge  ENT: No vertigo; No ear pain or change in hearing; No sore throat or difficulty swallowing  NECK: No pain or stiffness  RESPIRATORY: No cough, wheezing, or hemoptysis; No shortness of breath  CARDIOVASCULAR: No chest pain or palpitations  GASTROINTESTINAL: No abdominal or epigastric pain; No nausea, vomiting, or hematemesis; No diarrhea or constipation; No melena or hematochezia  GENITOURINARY: No dysuria, frequency or hematuria  MUSCULOSKELETAL: No joint pain, no muscle pain, no weakness  NEUROLOGICAL: No numbness or weakness  SKIN: No itching or rashes    OBJECTIVE  PAST MEDICAL & SURGICAL HISTORY  Arterial insufficiency of lower extremity  left leg stent placed    Leg swelling    Hypothyroid    HTN (hypertension)    High cholesterol    Peripheral arterial disease    H/O Graves' disease    History of cholecystectomy    H/O: hysterectomy    S/P angiogram of extremity    S/P ERCP      ALLERGIES:  codeine (Stomach Upset; Vomiting; Nausea)    MEDICATIONS:  STANDING MEDICATIONS  albuterol/ipratropium for Nebulization 3 milliLiter(s) Nebulizer every 6 hours  azithromycin  IVPB      azithromycin  IVPB 500 milliGRAM(s) IV Intermittent every 24 hours  cefTRIAXone   IVPB 1000 milliGRAM(s) IV Intermittent every 24 hours  clopidogrel Tablet 75 milliGRAM(s) Oral daily  enoxaparin Injectable 40 milliGRAM(s) SubCutaneous every 24 hours  furosemide    Tablet 40 milliGRAM(s) Oral <User Schedule>  levothyroxine 175 MICROGram(s) Oral daily  methylPREDNISolone sodium succinate Injectable 60 milliGRAM(s) IV Push two times a day  nicotine - 21 mG/24Hr(s) Patch 1 Patch Transdermal daily  NIFEdipine XL 60 milliGRAM(s) Oral daily  pantoprazole    Tablet 40 milliGRAM(s) Oral two times a day    PRN MEDICATIONS  acetaminophen     Tablet .. 650 milliGRAM(s) Oral every 6 hours PRN  albuterol    90 MICROgram(s) HFA Inhaler 2 Puff(s) Inhalation every 4 hours PRN  ondansetron Injectable 4 milliGRAM(s) IV Push every 8 hours PRN      VITAL SIGNS: Last 24 Hours  T(C): 36.6 (13 Oct 2024 07:40), Max: 36.6 (12 Oct 2024 16:00)  T(F): 97.8 (13 Oct 2024 07:40), Max: 97.9 (12 Oct 2024 16:00)  HR: 69 (13 Oct 2024 07:40) (69 - 89)  BP: 162/82 (13 Oct 2024 07:40) (93/75 - 200/44)  BP(mean): 103 (12 Oct 2024 17:38) (103 - 107)  RR: 18 (13 Oct 2024 07:40) (18 - 25)  SpO2: 98% (13 Oct 2024 07:40) (95% - 98%)    LABS:                        10.3   10.81 )-----------( 404      ( 13 Oct 2024 07:26 )             33.6     10-13    139  |  99  |  19  ----------------------------<  123[H]  4.6   |  28  |  0.8    Ca    9.6      13 Oct 2024 07:26  Mg     2.2     10-13    TPro  6.8  /  Alb  4.1  /  TBili  0.3  /  DBili  x   /  AST  13  /  ALT  16  /  AlkPhos  200[H]  10-12      Urinalysis Basic - ( 13 Oct 2024 07:26 )    Color: x / Appearance: x / SG: x / pH: x  Gluc: 123 mg/dL / Ketone: x  / Bili: x / Urobili: x   Blood: x / Protein: x / Nitrite: x   Leuk Esterase: x / RBC: x / WBC x   Sq Epi: x / Non Sq Epi: x / Bacteria: x      ABG - ( 12 Oct 2024 19:10 )  pH, Arterial: 7.41  pH, Blood: x     /  pCO2: 41    /  pO2: 71    / HCO3: 26    / Base Excess: 1.2   /  SaO2: 95.2              PHYSICAL EXAM:  CONSTITUTIONAL: No acute distress, ill-appearing  HEAD: Atraumatic, normocephalic  PULMONARY: dec b/l, rhonchi  CARDIOVASCULAR: Regular rate and rhythm  GASTROINTESTINAL: Soft, non-tender, non-distended; bowel sounds present  MUSCULOSKELETAL: 2+ peripheral pulses; +edema  NEUROLOGY: non-focal    ASSESSMENT & PLAN    77 yo female, current smoker patient known to have: HTN, HLD, Hypothyroidism, PAD s/p stents, hfpef, with recent admission for anemia due to suspected GIB s/p EGD inpatient presents for suspected PNA complicated by hypoxia    >>Rapid called last night for SOB, see note in chart. Daughter updated bedside.  ICU team aware, possible upgrade. CXR and blood gas noted. CTA >> no PE    #Acute hypoxic respiratory failure likely secondary to RLL PNA  #Active Smoker (last cigarette 2d ago)  + sick contact   no improvement on op doxcy   - SOB worsened overnight prompting ED visit  -on admission RR 25,  82% O2 on RA -> 95% on 4L , No sepsis POA   - CXR: Bilateral diffuse interstitial opacities. Right basilar opacity.  - S/p Cefepime and Vanc in ED - as per ED report that the patient and her family refused Levaquin as it has bad effect on her    -EKG noted, NSR, Non specific , give azithromycin and ceftriaxone for now , escalate if no improvement   -check Pneumonia work up, MRSA, Procl, strep and urine legionella, Full RVP , sputum culture, follow /up blood cultures   -rsv/flu/covid NEGATIVE   -Trop 30 ( was 36-> 25 on prior admission ) , repeat , likely demand , no active chest pain   -cardiac monitor for now   -lactate 1.3   -give IV soluemderol , inhalers   - Nicotine patch 21mg QD  - Smoking cessation counseling provided, patient wishes to use nicotine patch, wants to quit  -LE duplex   -IF No improvement  or if duplex +  Get CT chest   -monitor resp status and work of breathing   -wean o2 as tolerated   -fu SLP eval   -fu ID consult  -CTA>  1.  No evidence of acute pulmonary embolism.  2.  Right middle and lower lobe consolidations.  3.  Likely reactive mediastinal lymphadenopathy.    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 (Hemostasis).  A CBD stent was seen in the ampulla. Removed with a snare. .  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 Biopsies obtained. Not intervened today as the  patient underwent this procedure for a GI bleed. .  - C/w Protonix BID until 10/18 then switch to QD  monitor CBC , hGB TODAY 10.1 improved from before     DVT ppx: Lovenox  GI ppx: Protonix   JAKY RODRIGUEZ 78y Female  MRN#: [id number]   Hospital Day: 1d    HPI:  77 yo female, current smoker patient known to have: HTN, HLD, Hypothyroidism, PAD s/p stents, hfpef, with recent admission for anemia due to suspected GIB s/p EGD inpatient presents for worsening shortness of breath and cough over the last week. Patient states she was taking PO doxycycline for about 7 days but she started to feel more short of breath over the past two days. She states that several members of her family have been sick with coughs/colds and she has been around them; she did not get tested for COVID/Flu outpatient and does not know if any of her family members were positive. She denies chest pain and abdominal pain. She endorses shortness of breath and cough and endorses chronic constipation and swelling in the legs. She is admitted to medicine for further management.    Vitals  Temp: 97.7  HR: 89  BP: 200/44 (pending repeat)  O2: 82% on RA -> 95% on 4L NC  RR: 25    Labs  WBC: 10.16  HgB: 10.1 (baseline ~9)  ALP: 200  Lactate: 1.3    Imaging  CXR: Bilateral diffuse interstitial opacities. Right basilar opacity.    In the ED  Vancomycin  Cefepime  BCx x2  RVP (12 Oct 2024 13:26)      SUBJECTIVE  Patient is a 78y old Female who presents with a chief complaint of Currently admitted to medicine with the primary diagnosis of Pneumonia      INTERVAL HPI AND OVERNIGHT EVENTS:  Patient was examined and seen at bedside. Pt is resting in bed comfortably. Had a RRT last night (note in chart).    OBJECTIVE  PAST MEDICAL & SURGICAL HISTORY  Arterial insufficiency of lower extremity  left leg stent placed    Leg swelling    Hypothyroid    HTN (hypertension)    High cholesterol    Peripheral arterial disease    H/O Graves' disease    History of cholecystectomy    H/O: hysterectomy    S/P angiogram of extremity    S/P ERCP      ALLERGIES:  codeine (Stomach Upset; Vomiting; Nausea)    MEDICATIONS:  STANDING MEDICATIONS  albuterol/ipratropium for Nebulization 3 milliLiter(s) Nebulizer every 6 hours  azithromycin  IVPB      azithromycin  IVPB 500 milliGRAM(s) IV Intermittent every 24 hours  cefTRIAXone   IVPB 1000 milliGRAM(s) IV Intermittent every 24 hours  clopidogrel Tablet 75 milliGRAM(s) Oral daily  enoxaparin Injectable 40 milliGRAM(s) SubCutaneous every 24 hours  furosemide    Tablet 40 milliGRAM(s) Oral <User Schedule>  levothyroxine 175 MICROGram(s) Oral daily  methylPREDNISolone sodium succinate Injectable 60 milliGRAM(s) IV Push two times a day  nicotine - 21 mG/24Hr(s) Patch 1 Patch Transdermal daily  NIFEdipine XL 60 milliGRAM(s) Oral daily  pantoprazole    Tablet 40 milliGRAM(s) Oral two times a day    PRN MEDICATIONS  acetaminophen     Tablet .. 650 milliGRAM(s) Oral every 6 hours PRN  albuterol    90 MICROgram(s) HFA Inhaler 2 Puff(s) Inhalation every 4 hours PRN  ondansetron Injectable 4 milliGRAM(s) IV Push every 8 hours PRN      VITAL SIGNS: Last 24 Hours  T(C): 36.6 (13 Oct 2024 07:40), Max: 36.6 (12 Oct 2024 16:00)  T(F): 97.8 (13 Oct 2024 07:40), Max: 97.9 (12 Oct 2024 16:00)  HR: 69 (13 Oct 2024 07:40) (69 - 89)  BP: 162/82 (13 Oct 2024 07:40) (93/75 - 200/44)  BP(mean): 103 (12 Oct 2024 17:38) (103 - 107)  RR: 18 (13 Oct 2024 07:40) (18 - 25)  SpO2: 98% (13 Oct 2024 07:40) (95% - 98%)    LABS:                        10.3   10.81 )-----------( 404      ( 13 Oct 2024 07:26 )             33.6     10-13    139  |  99  |  19  ----------------------------<  123[H]  4.6   |  28  |  0.8    Ca    9.6      13 Oct 2024 07:26  Mg     2.2     10-13    TPro  6.8  /  Alb  4.1  /  TBili  0.3  /  DBili  x   /  AST  13  /  ALT  16  /  AlkPhos  200[H]  10-12      Urinalysis Basic - ( 13 Oct 2024 07:26 )    Color: x / Appearance: x / SG: x / pH: x  Gluc: 123 mg/dL / Ketone: x  / Bili: x / Urobili: x   Blood: x / Protein: x / Nitrite: x   Leuk Esterase: x / RBC: x / WBC x   Sq Epi: x / Non Sq Epi: x / Bacteria: x      ABG - ( 12 Oct 2024 19:10 )  pH, Arterial: 7.41  pH, Blood: x     /  pCO2: 41    /  pO2: 71    / HCO3: 26    / Base Excess: 1.2   /  SaO2: 95.2              PHYSICAL EXAM:  CONSTITUTIONAL: No acute distress, ill-appearing  HEAD: Atraumatic, normocephalic  PULMONARY: dec b/l, rhonchi  CARDIOVASCULAR: Regular rate and rhythm  GASTROINTESTINAL: Soft, non-tender, non-distended; bowel sounds present  MUSCULOSKELETAL: 2+ peripheral pulses; +edema  NEUROLOGY: non-focal    ASSESSMENT & PLAN    77 yo female, current smoker patient known to have: HTN, HLD, Hypothyroidism, PAD s/p stents, hfpef, with recent admission for anemia due to suspected GIB s/p EGD inpatient presents for suspected PNA complicated by hypoxia    >>Rapid called last night for SOB, see note in chart. Daughter updated bedside.  ICU team aware, possible upgrade. CXR and blood gas noted. CTA >> no PE    #Acute hypoxic respiratory failure likely secondary to RLL PNA  #Active Smoker (last cigarette 2d ago)  + sick contact   no improvement on op doxcy   - SOB worsened overnight prompting ED visit  -on admission RR 25,  82% O2 on RA -> 95% on 4L , No sepsis POA   - CXR: Bilateral diffuse interstitial opacities. Right basilar opacity.  - S/p Cefepime and Vanc in ED - as per ED report that the patient and her family refused Levaquin as it has bad effect on her    -EKG noted, NSR, Non specific , give azithromycin and ceftriaxone for now , escalate if no improvement   -check Pneumonia work up, MRSA, Procl, strep and urine legionella, Full RVP , sputum culture, follow /up blood cultures   -rsv/flu/covid NEGATIVE   -Trop 30 ( was 36-> 25 on prior admission ) , repeat , likely demand , no active chest pain   -cardiac monitor for now   -lactate 1.3   -give IV soluemderol , inhalers   - Nicotine patch 21mg QD  - Smoking cessation counseling provided, patient wishes to use nicotine patch, wants to quit  -LE duplex   -IF No improvement  or if duplex +  Get CT chest   -monitor resp status and work of breathing   -wean o2 as tolerated   -fu SLP eval   -fu ID consult  -CTA>  1.  No evidence of acute pulmonary embolism.  2.  Right middle and lower lobe consolidations.  3.  Likely reactive mediastinal lymphadenopathy.    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 (Hemostasis).  A CBD stent was seen in the ampulla. Removed with a snare. .  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 Biopsies obtained. Not intervened today as the  patient underwent this procedure for a GI bleed. .  - C/w Protonix BID until 10/18 then switch to QD  monitor CBC , hGB TODAY 10.1 improved from before     DVT ppx: Lovenox  GI ppx: Protonix   JAKY RODRIGUEZ 78y Female  MRN#: [id number]   Hospital Day: 1d    HPI:  79 yo female, current smoker patient known to have: HTN, HLD, Hypothyroidism, PAD s/p stents, hfpef, with recent admission for anemia due to suspected GIB s/p EGD inpatient presents for worsening shortness of breath and cough over the last week. Patient states she was taking PO doxycycline for about 7 days but she started to feel more short of breath over the past two days. She states that several members of her family have been sick with coughs/colds and she has been around them; she did not get tested for COVID/Flu outpatient and does not know if any of her family members were positive. She denies chest pain and abdominal pain. She endorses shortness of breath and cough and endorses chronic constipation and swelling in the legs. She is admitted to medicine for further management.    Vitals  Temp: 97.7  HR: 89  BP: 200/44 (pending repeat)  O2: 82% on RA -> 95% on 4L NC  RR: 25    Labs  WBC: 10.16  HgB: 10.1 (baseline ~9)  ALP: 200  Lactate: 1.3    Imaging  CXR: Bilateral diffuse interstitial opacities. Right basilar opacity.    In the ED  Vancomycin  Cefepime  BCx x2  RVP (12 Oct 2024 13:26)      SUBJECTIVE  Patient is a 78y old Female who presents with a chief complaint of Currently admitted to medicine with the primary diagnosis of Pneumonia      INTERVAL HPI AND OVERNIGHT EVENTS:  Patient was examined and seen at bedside. Pt is resting in bed comfortably. Had a RRT last night (note in chart).    OBJECTIVE  PAST MEDICAL & SURGICAL HISTORY  Arterial insufficiency of lower extremity  left leg stent placed    Leg swelling    Hypothyroid    HTN (hypertension)    High cholesterol    Peripheral arterial disease    H/O Graves' disease    History of cholecystectomy    H/O: hysterectomy    S/P angiogram of extremity    S/P ERCP      ALLERGIES:  codeine (Stomach Upset; Vomiting; Nausea)    MEDICATIONS:  STANDING MEDICATIONS  albuterol/ipratropium for Nebulization 3 milliLiter(s) Nebulizer every 6 hours  azithromycin  IVPB      azithromycin  IVPB 500 milliGRAM(s) IV Intermittent every 24 hours  cefTRIAXone   IVPB 1000 milliGRAM(s) IV Intermittent every 24 hours  clopidogrel Tablet 75 milliGRAM(s) Oral daily  enoxaparin Injectable 40 milliGRAM(s) SubCutaneous every 24 hours  furosemide    Tablet 40 milliGRAM(s) Oral <User Schedule>  levothyroxine 175 MICROGram(s) Oral daily  methylPREDNISolone sodium succinate Injectable 60 milliGRAM(s) IV Push two times a day  nicotine - 21 mG/24Hr(s) Patch 1 Patch Transdermal daily  NIFEdipine XL 60 milliGRAM(s) Oral daily  pantoprazole    Tablet 40 milliGRAM(s) Oral two times a day    PRN MEDICATIONS  acetaminophen     Tablet .. 650 milliGRAM(s) Oral every 6 hours PRN  albuterol    90 MICROgram(s) HFA Inhaler 2 Puff(s) Inhalation every 4 hours PRN  ondansetron Injectable 4 milliGRAM(s) IV Push every 8 hours PRN      VITAL SIGNS: Last 24 Hours  T(C): 36.6 (13 Oct 2024 07:40), Max: 36.6 (12 Oct 2024 16:00)  T(F): 97.8 (13 Oct 2024 07:40), Max: 97.9 (12 Oct 2024 16:00)  HR: 69 (13 Oct 2024 07:40) (69 - 89)  BP: 162/82 (13 Oct 2024 07:40) (93/75 - 200/44)  BP(mean): 103 (12 Oct 2024 17:38) (103 - 107)  RR: 18 (13 Oct 2024 07:40) (18 - 25)  SpO2: 98% (13 Oct 2024 07:40) (95% - 98%)    LABS:                        10.3   10.81 )-----------( 404      ( 13 Oct 2024 07:26 )             33.6     10-13    139  |  99  |  19  ----------------------------<  123[H]  4.6   |  28  |  0.8    Ca    9.6      13 Oct 2024 07:26  Mg     2.2     10-13    TPro  6.8  /  Alb  4.1  /  TBili  0.3  /  DBili  x   /  AST  13  /  ALT  16  /  AlkPhos  200[H]  10-12      Urinalysis Basic - ( 13 Oct 2024 07:26 )    Color: x / Appearance: x / SG: x / pH: x  Gluc: 123 mg/dL / Ketone: x  / Bili: x / Urobili: x   Blood: x / Protein: x / Nitrite: x   Leuk Esterase: x / RBC: x / WBC x   Sq Epi: x / Non Sq Epi: x / Bacteria: x      ABG - ( 12 Oct 2024 19:10 )  pH, Arterial: 7.41  pH, Blood: x     /  pCO2: 41    /  pO2: 71    / HCO3: 26    / Base Excess: 1.2   /  SaO2: 95.2              PHYSICAL EXAM:  CONSTITUTIONAL: No acute distress, ill-appearing  HEAD: Atraumatic, normocephalic  PULMONARY: dec b/l, rhonchi  CARDIOVASCULAR: Regular rate and rhythm  GASTROINTESTINAL: Soft, non-tender, non-distended; bowel sounds present  MUSCULOSKELETAL: 2+ peripheral pulses; +edema  NEUROLOGY: non-focal

## 2024-10-13 NOTE — PROGRESS NOTE ADULT - ASSESSMENT
ASSESSMENT & PLAN    77 yo female, current smoker patient known to have: HTN, HLD, Hypothyroidism, PAD s/p stents, hfpef, with recent admission for anemia due to suspected GIB s/p EGD inpatient presents for suspected PNA complicated by hypoxia    >>Rapid called last night for SOB, see note in chart. Daughter updated bedside.  ICU team aware, possible upgrade. CXR and blood gas noted. CTA >> no PE    #Acute hypoxic respiratory failure likely secondary to RLL PNA  #Active Smoker (last cigarette 2d ago)  + sick contact   no improvement on op doxcy   - SOB worsened overnight prompting ED visit  -on admission RR 25,  82% O2 on RA -> 95% on 4L , No sepsis POA   - CXR: Bilateral diffuse interstitial opacities. Right basilar opacity.  - S/p Cefepime and Vanc in ED - as per ED report that the patient and her family refused Levaquin as it has bad effect on her    -EKG noted, NSR, Non specific , give azithromycin and ceftriaxone for now , escalate if no improvement   -check Pneumonia work up, MRSA, Procl, strep and urine legionella, Full RVP , sputum culture, follow /up blood cultures   -rsv/flu/covid NEGATIVE   -Trop 30 ( was 36-> 25 on prior admission ) , repeat , likely demand , no active chest pain   -cardiac monitor for now   -lactate 1.3   -give IV soluemderol , inhalers   - Nicotine patch 21mg QD  - Smoking cessation counseling provided, patient wishes to use nicotine patch, wants to quit  -LE duplex   -IF No improvement  or if duplex +  Get CT chest   -monitor resp status and work of breathing   -wean o2 as tolerated   -fu SLP eval   -fu ID consult  -CTA>  1.  No evidence of acute pulmonary embolism.  2.  Right middle and lower lobe consolidations.  3.  Likely reactive mediastinal lymphadenopathy.    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 (Hemostasis).  A CBD stent was seen in the ampulla. Removed with a snare. .  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 Biopsies obtained. Not intervened today as the  patient underwent this procedure for a GI bleed. .  - C/w Protonix BID until 10/18 then switch to QD  monitor CBC , hGB TODAY 10.1 improved from before     DVT ppx: Lovenox  GI ppx: Protonix

## 2024-10-13 NOTE — CONSULT NOTE ADULT - ASSESSMENT
77 yo female, current smoker patient known to have: HTN, HLD, Hypothyroidism, PAD s/p stents, hfpef, with recent admission for anemia due to suspected GIB s/p EGD inpatient presents for worsening shortness of breath and cough over the last week. Patient states she was taking PO doxycycline for about 7 days but she started to feel more short of breath over the past two days. She states that several members of her family have been sick with coughs/colds and she has been around them; she did not get tested for COVID/Flu outpatient and does not know if any of her family members were positive.     CXR: Bilateral diffuse interstitial opacities. Right basilar opacity.    IMPRESSION/RECOMMENDATIONS  Immunosuppression/Immunosenescence ( above age 60 yrs there is a exponential decline in immunity which could result in poor clinical outcomes.  79 yo female, current smoker patient known to have: HTN, HLD, Hypothyroidism, PAD s/p stents, hfpef, with recent admission for anemia due to suspected GIB s/p EGD inpatient presents for worsening shortness of breath and cough over the last week. Patient states she was taking PO doxycycline for about 7 days but she started to feel more short of breath over the past two days. She states that several members of her family have been sick with coughs/colds and she has been around them; she did not get tested for COVID/Flu outpatient and does not know if any of her family members were positive.     CXR: Bilateral diffuse interstitial opacities. Right basilar opacity.    IMPRESSION/RECOMMENDATIONS  Immunosuppression/Immunosenescence ( above age 60 yrs there is a exponential decline in immunity which could result in poor clinical outcomes.     HTN  HLD  Hypothyroidism  PAD s/p stents  hfpef, 77 yo female, current smoker patient known to have: HTN, HLD, Hypothyroidism, PAD s/p stents, hfpef, with recent admission for anemia due to suspected GIB s/p EGD inpatient presents for worsening shortness of breath and cough over the last week. Patient states she was taking PO doxycycline for about 7 days but she started to feel more short of breath over the past two days. She states that several members of her family have been sick with coughs/colds and she has been around them; she did not get tested for COVID/Flu outpatient and does not know if any of her family members were positive.     CXR: Bilateral diffuse interstitial opacities. Right basilar opacity.    IMPRESSION/RECOMMENDATIONS  Immunosuppression/Immunosenescence ( above age 60 yrs there is a exponential decline in immunity which could result in poor clinical outcomes.   RLL bacterial PNA  No sepsis  WBC 10.1  HTN  HLD  Hypothyroidism  PAD s/p stents  hfpef,    -Rocephin 1 gm iv q24h  -Azithromycin 500 mg iv q24h  -could d/c today on po Levoquin 500 mg q24h stating 10/14 for 6 more days    Please do not hesitate to recall ID if any questions arise either through [employer] or through [employer]

## 2024-10-13 NOTE — PROGRESS NOTE ADULT - ATTENDING COMMENTS
Patient was seen and examined today in ED 3  Presented to the hospital for dyspnea.  Found to have acute hypoxic respiratory failure most likely secondary to right middle and lower lobe  bacterial pneumonia.  Had a rapid response yesterday for worsening dyspnea  Workup as above  Seen by ID, will continue with Rocephin and azithromycin for today  Will try to wean the patient off oxygen  Will anticipate the patient to be discharged in 24 to 48 hours pending clinical progression on p.o. antibiotic  Rest of management as above  I edited the note  Discussed with the patient was agreeable with the plan Patient was seen and examined today in ED 3  Presented to the hospital for dyspnea.  Found to have acute hypoxic respiratory failure most likely secondary to right middle and lower lobe  bacterial pneumonia.  Had a rapid response yesterday for worsening dyspnea  Workup as above  Seen by ID, will continue with Rocephin and azithromycin for today  Will try to wean the patient off oxygen  c/w with IV steroids, will switch to PO soon   Will anticipate the patient to be discharged in 24 to 48 hours pending clinical progression on p.o. antibiotic  Rest of management as above  I edited the note  Discussed with the patient was agreeable with the plan

## 2024-10-14 LAB
ALBUMIN SERPL ELPH-MCNC: 3.8 G/DL — SIGNIFICANT CHANGE UP (ref 3.5–5.2)
ALP SERPL-CCNC: 166 U/L — HIGH (ref 30–115)
ALT FLD-CCNC: 11 U/L — SIGNIFICANT CHANGE UP (ref 0–41)
ANION GAP SERPL CALC-SCNC: 13 MMOL/L — SIGNIFICANT CHANGE UP (ref 7–14)
AST SERPL-CCNC: 9 U/L — SIGNIFICANT CHANGE UP (ref 0–41)
BASOPHILS # BLD AUTO: 0.01 K/UL — SIGNIFICANT CHANGE UP (ref 0–0.2)
BASOPHILS NFR BLD AUTO: 0.1 % — SIGNIFICANT CHANGE UP (ref 0–1)
BILIRUB SERPL-MCNC: 0.2 MG/DL — SIGNIFICANT CHANGE UP (ref 0.2–1.2)
BUN SERPL-MCNC: 29 MG/DL — HIGH (ref 10–20)
CALCIUM SERPL-MCNC: 9.4 MG/DL — SIGNIFICANT CHANGE UP (ref 8.4–10.5)
CHLORIDE SERPL-SCNC: 100 MMOL/L — SIGNIFICANT CHANGE UP (ref 98–110)
CO2 SERPL-SCNC: 26 MMOL/L — SIGNIFICANT CHANGE UP (ref 17–32)
CREAT SERPL-MCNC: 0.8 MG/DL — SIGNIFICANT CHANGE UP (ref 0.7–1.5)
EGFR: 76 ML/MIN/1.73M2 — SIGNIFICANT CHANGE UP
EOSINOPHIL # BLD AUTO: 0 K/UL — SIGNIFICANT CHANGE UP (ref 0–0.7)
EOSINOPHIL NFR BLD AUTO: 0 % — SIGNIFICANT CHANGE UP (ref 0–8)
GLUCOSE SERPL-MCNC: 143 MG/DL — HIGH (ref 70–99)
HCT VFR BLD CALC: 32.6 % — LOW (ref 37–47)
HGB BLD-MCNC: 10.1 G/DL — LOW (ref 12–16)
IMM GRANULOCYTES NFR BLD AUTO: 0.8 % — HIGH (ref 0.1–0.3)
LYMPHOCYTES # BLD AUTO: 1.28 K/UL — SIGNIFICANT CHANGE UP (ref 1.2–3.4)
LYMPHOCYTES # BLD AUTO: 10.8 % — LOW (ref 20.5–51.1)
MAGNESIUM SERPL-MCNC: 2.3 MG/DL — SIGNIFICANT CHANGE UP (ref 1.8–2.4)
MCHC RBC-ENTMCNC: 22.8 PG — LOW (ref 27–31)
MCHC RBC-ENTMCNC: 31 G/DL — LOW (ref 32–37)
MCV RBC AUTO: 73.6 FL — LOW (ref 81–99)
MONOCYTES # BLD AUTO: 0.53 K/UL — SIGNIFICANT CHANGE UP (ref 0.1–0.6)
MONOCYTES NFR BLD AUTO: 4.5 % — SIGNIFICANT CHANGE UP (ref 1.7–9.3)
MRSA PCR RESULT.: NEGATIVE — SIGNIFICANT CHANGE UP
NEUTROPHILS # BLD AUTO: 9.93 K/UL — HIGH (ref 1.4–6.5)
NEUTROPHILS NFR BLD AUTO: 83.8 % — HIGH (ref 42.2–75.2)
NRBC # BLD: 0 /100 WBCS — SIGNIFICANT CHANGE UP (ref 0–0)
PLATELET # BLD AUTO: 391 K/UL — SIGNIFICANT CHANGE UP (ref 130–400)
PMV BLD: 9.9 FL — SIGNIFICANT CHANGE UP (ref 7.4–10.4)
POTASSIUM SERPL-MCNC: 4.4 MMOL/L — SIGNIFICANT CHANGE UP (ref 3.5–5)
POTASSIUM SERPL-SCNC: 4.4 MMOL/L — SIGNIFICANT CHANGE UP (ref 3.5–5)
PROCALCITONIN SERPL-MCNC: 0.19 NG/ML — HIGH (ref 0.02–0.1)
PROT SERPL-MCNC: 6.3 G/DL — SIGNIFICANT CHANGE UP (ref 6–8)
RAPID RVP RESULT: SIGNIFICANT CHANGE UP
RBC # BLD: 4.43 M/UL — SIGNIFICANT CHANGE UP (ref 4.2–5.4)
RBC # FLD: 17.9 % — HIGH (ref 11.5–14.5)
SARS-COV-2 RNA SPEC QL NAA+PROBE: SIGNIFICANT CHANGE UP
SODIUM SERPL-SCNC: 139 MMOL/L — SIGNIFICANT CHANGE UP (ref 135–146)
WBC # BLD: 11.85 K/UL — HIGH (ref 4.8–10.8)
WBC # FLD AUTO: 11.85 K/UL — HIGH (ref 4.8–10.8)

## 2024-10-14 PROCEDURE — 99232 SBSQ HOSP IP/OBS MODERATE 35: CPT

## 2024-10-14 RX ORDER — INFLUENZA VIRUS VACCINE 15; 15; 15; 15 UG/.5ML; UG/.5ML; UG/.5ML; UG/.5ML
0.5 SUSPENSION INTRAMUSCULAR ONCE
Refills: 0 | Status: DISCONTINUED | OUTPATIENT
Start: 2024-10-14 | End: 2024-10-17

## 2024-10-14 RX ORDER — METHYLPREDNISOLONE ACETATE 80 MG/ML
60 INJECTION, SUSPENSION INTRA-ARTICULAR; INTRALESIONAL; INTRAMUSCULAR; SOFT TISSUE DAILY
Refills: 0 | Status: DISCONTINUED | OUTPATIENT
Start: 2024-10-15 | End: 2024-10-15

## 2024-10-14 RX ORDER — FUROSEMIDE 10 MG/ML
40 INJECTION INTRAVENOUS ONCE
Refills: 0 | Status: COMPLETED | OUTPATIENT
Start: 2024-10-14 | End: 2024-10-14

## 2024-10-14 RX ORDER — ALPRAZOLAM 0.5 MG/1
0.25 TABLET ORAL THREE TIMES A DAY
Refills: 0 | Status: DISCONTINUED | OUTPATIENT
Start: 2024-10-14 | End: 2024-10-17

## 2024-10-14 RX ADMIN — Medication 100 MILLIGRAM(S): at 23:51

## 2024-10-14 RX ADMIN — AZITHROMYCIN 255 MILLIGRAM(S): 250 TABLET, FILM COATED ORAL at 14:18

## 2024-10-14 RX ADMIN — PANTOPRAZOLE SODIUM 40 MILLIGRAM(S): 40 TABLET, DELAYED RELEASE ORAL at 17:13

## 2024-10-14 RX ADMIN — Medication 175 MICROGRAM(S): at 05:35

## 2024-10-14 RX ADMIN — Medication 30 MILLIGRAM(S): at 09:54

## 2024-10-14 RX ADMIN — IPRATROPIUM BROMIDE AND ALBUTEROL SULFATE 3 MILLILITER(S): .5; 3 SOLUTION RESPIRATORY (INHALATION) at 09:52

## 2024-10-14 RX ADMIN — Medication 1 PATCH: at 11:22

## 2024-10-14 RX ADMIN — METHYLPREDNISOLONE ACETATE 60 MILLIGRAM(S): 80 INJECTION, SUSPENSION INTRA-ARTICULAR; INTRALESIONAL; INTRAMUSCULAR; SOFT TISSUE at 05:35

## 2024-10-14 RX ADMIN — PANTOPRAZOLE SODIUM 40 MILLIGRAM(S): 40 TABLET, DELAYED RELEASE ORAL at 05:36

## 2024-10-14 RX ADMIN — Medication 75 MILLIGRAM(S): at 11:22

## 2024-10-14 RX ADMIN — IPRATROPIUM BROMIDE AND ALBUTEROL SULFATE 3 MILLILITER(S): .5; 3 SOLUTION RESPIRATORY (INHALATION) at 14:18

## 2024-10-14 RX ADMIN — IPRATROPIUM BROMIDE AND ALBUTEROL SULFATE 3 MILLILITER(S): .5; 3 SOLUTION RESPIRATORY (INHALATION) at 20:02

## 2024-10-14 RX ADMIN — Medication 1 PATCH: at 11:21

## 2024-10-14 RX ADMIN — Medication 1 PATCH: at 17:11

## 2024-10-14 RX ADMIN — Medication 1 PATCH: at 09:52

## 2024-10-14 RX ADMIN — FUROSEMIDE 40 MILLIGRAM(S): 10 INJECTION INTRAVENOUS at 12:02

## 2024-10-14 RX ADMIN — ALPRAZOLAM 0.25 MILLIGRAM(S): 0.5 TABLET ORAL at 14:19

## 2024-10-14 RX ADMIN — Medication 60 MILLIGRAM(S): at 05:35

## 2024-10-14 NOTE — PROGRESS NOTE ADULT - ASSESSMENT
79 yo female, current smoker patient known to have: HTN, HLD, Hypothyroidism, PAD s/p stents, hfpef, with recent admission for anemia due to suspected GIB s/p EGD inpatient presents for suspected PNA complicated by hypoxia        #Acute hypoxic respiratory failure likely secondary to RLL PNA  #Active Smoker (last cigarette 2d ago)  #Rapid called during this admission for SOB, see note in chart. ICU team aware, possible upgrade. CXR and blood gas noted. CTA >> no PE  + sick contact   - no improvement on op doxcy   - SOB worsened overnight prompting ED visit  - on admission RR 25,  82% O2 on RA -> 95% on 4L , No sepsis POA   - CXR: Bilateral diffuse interstitial opacities. Right basilar opacity.  - S/p Cefepime and Vanc in ED - as per ED report that the patient and her family refused Levaquin as it has bad effect on her    - EKG noted, NSR, Non specific , give azithromycin and ceftriaxone for now , escalate if no improvement   - check Pneumonia work up, MRSA negative,   - Procal, strep and urine legionella Full RVP pending  - follow /up blood cultures NTD  - rsv/flu/covid NEGATIVE   - Trop 30 ( was 36-> 25 on prior admission ), likely demand , no active chest pain   - cardiac monitor for now   - lactate 1.3 10/12  - on IV soluemderol , inhalers, taper down to 6mg IV solumedrol Once a day  - Smoking cessation counseling provided, patient wishes to use nicotine patch, wants to quit, Nicotine patch 21mg QD  -fu SLP eval   -CTA>  1.  No evidence of acute pulmonary embolism.  2.  Right middle and lower lobe consolidations.  3.  Likely reactive mediastinal lymphadenopathy.    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 (Hemostasis).  A CBD stent was seen in the ampulla. Removed with a snare. .  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 Biopsies obtained. Not intervened today as the  patient underwent this procedure for a GI bleed. .  - C/w Protonix BID until 10/18 then switch to QD    DVT ppx: Lovenox  GI ppx: Protonix     Pt anticipated for discharge in 24 hours 77 yo female, current smoker patient known to have: HTN, HLD, Hypothyroidism, PAD s/p stents, hfpef, with recent admission for anemia due to suspected GIB s/p EGD inpatient presents for suspected PNA complicated by hypoxia      #Acute hypoxic respiratory failure likely secondary to RLL PNA  #Active Smoker (last cigarette 2d ago)  #Rapid called during this admission for SOB, see note in chart. ICU team aware, possible upgrade. CXR and blood gas noted. CTA >> no PE  + sick contact   - no improvement on op doxcy   - SOB worsened overnight prompting ED visit  - on admission RR 25,  82% O2 on RA -> 95% on 4L , No sepsis POA   - CXR: Bilateral diffuse interstitial opacities. Right basilar opacity.  - S/p Cefepime and Vanc in ED - as per ED report that the patient and her family refused Levaquin as it has bad effect on her    - EKG noted, NSR, Non specific , give azithromycin and ceftriaxone for now , escalate if no improvement   - check Pneumonia work up, MRSA negative,   - Procal, strep and urine legionella Full RVP pending  - follow /up blood cultures NTD  - rsv/flu/covid NEGATIVE   - Trop 30 ( was 36-> 25 on prior admission ), likely demand , no active chest pain   - cardiac monitor for now   - lactate 1.3 10/12  - on IV soluemderol , inhalers, taper down to 6mg IV solumedrol Once a day  - Smoking cessation counseling provided, patient wishes to use nicotine patch, wants to quit, Nicotine patch 21mg QD  -fu SLP eval   -CTA>  1.  No evidence of acute pulmonary embolism.  2.  Right middle and lower lobe consolidations.  3.  Likely reactive mediastinal lymphadenopathy.    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 (Hemostasis).  A CBD stent was seen in the ampulla. Removed with a snare. .  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 Biopsies obtained. Not intervened today as the  patient underwent this procedure for a GI bleed. .  - C/w Protonix BID until 10/18 then switch to QD    DVT ppx: Lovenox  GI ppx: Protonix     Pt anticipated for discharge in 24 hours

## 2024-10-14 NOTE — PROGRESS NOTE ADULT - SUBJECTIVE AND OBJECTIVE BOX
24H events:    Patient is a 78y old Female who presents with a chief complaint of   Primary diagnosis of Pneumonia    Today is 2d of hospitalization. This morning patient was seen and examined at bedside, resting comfortably in bed.    No acute or major events overnight. Noted overnight evets, pt comfortable, endorses shortness of breath is better compared to yesterday.       PAST MEDICAL & SURGICAL HISTORY  Arterial insufficiency of lower extremity  left leg stent placed    Leg swelling    Hypothyroid    HTN (hypertension)    High cholesterol    Peripheral arterial disease    H/O Graves' disease    History of cholecystectomy    H/O: hysterectomy    S/P angiogram of extremity    S/P ERCP      SOCIAL HISTORY:  Social History:      ALLERGIES:  codeine (Stomach Upset; Vomiting; Nausea)    MEDICATIONS:  STANDING MEDICATIONS  albuterol/ipratropium for Nebulization 3 milliLiter(s) Nebulizer every 6 hours  azithromycin  IVPB      azithromycin  IVPB 500 milliGRAM(s) IV Intermittent every 24 hours  cefTRIAXone   IVPB 1000 milliGRAM(s) IV Intermittent every 24 hours  clopidogrel Tablet 75 milliGRAM(s) Oral daily  enoxaparin Injectable 40 milliGRAM(s) SubCutaneous every 24 hours  furosemide    Tablet 40 milliGRAM(s) Oral <User Schedule>  levothyroxine 175 MICROGram(s) Oral daily  methylPREDNISolone sodium succinate Injectable 60 milliGRAM(s) IV Push two times a day  nicotine - 21 mG/24Hr(s) Patch 1 Patch Transdermal daily  NIFEdipine XL 60 milliGRAM(s) Oral daily  pantoprazole    Tablet 40 milliGRAM(s) Oral two times a day    PRN MEDICATIONS  acetaminophen     Tablet .. 650 milliGRAM(s) Oral every 6 hours PRN  albuterol    90 MICROgram(s) HFA Inhaler 2 Puff(s) Inhalation every 4 hours PRN  ondansetron Injectable 4 milliGRAM(s) IV Push every 8 hours PRN    VITALS:   T(F): 97.5  HR: 71  BP: 166/87  RR: 18  SpO2: 97%    PHYSICAL EXAM:  CONSTITUTIONAL: No acute distress, ill-appearing  HEAD: Atraumatic, normocephalic  PULMONARY: dec b/l, rhonchi  CARDIOVASCULAR: Regular rate and rhythm  GASTROINTESTINAL: Soft, non-tender, non-distended; bowel sounds present  MUSCULOSKELETAL: 2+ peripheral pulses; +edema  NEUROLOGY: non-focal    LABS:                        10.1   11.85 )-----------( 391      ( 14 Oct 2024 06:52 )             32.6     10-14    139  |  100  |  29[H]  ----------------------------<  143[H]  4.4   |  26  |  0.8    Ca    9.4      14 Oct 2024 06:52  Mg     2.3     10-14    TPro  6.3  /  Alb  3.8  /  TBili  0.2  /  DBili  x   /  AST  9   /  ALT  11  /  AlkPhos  166[H]  10-14      Urinalysis Basic - ( 14 Oct 2024 06:52 )    Color: x / Appearance: x / SG: x / pH: x  Gluc: 143 mg/dL / Ketone: x  / Bili: x / Urobili: x   Blood: x / Protein: x / Nitrite: x   Leuk Esterase: x / RBC: x / WBC x   Sq Epi: x / Non Sq Epi: x / Bacteria: x      ABG - ( 12 Oct 2024 19:10 )  pH, Arterial: 7.41  pH, Blood: x     /  pCO2: 41    /  pO2: 71    / HCO3: 26    / Base Excess: 1.2   /  SaO2: 95.2                  Culture - Blood (collected 12 Oct 2024 10:39)  Source: .Blood BLOOD  Preliminary Report (13 Oct 2024 17:01):    No growth at 24 hours    Culture - Blood (collected 12 Oct 2024 10:39)  Source: .Blood BLOOD  Preliminary Report (13 Oct 2024 17:01):    No growth at 24 hours                   24H events:    Patient is a 78y old Female who presents with a chief complaint of   Primary diagnosis of Pneumonia    Today is 2d of hospitalization. This morning patient was seen and examined at bedside, resting comfortably in bed.    No acute or major events overnight. Noted overnight evets, pt comfortable, endorses shortness of breath is better compared to yesterday. Noted high blood pressure, will have GOC conversation with her. PT is on 2l of oxygen, Pt anticipated for discharge in 24 hours. Will try to wean off of oxygen.       PAST MEDICAL & SURGICAL HISTORY  Arterial insufficiency of lower extremity  left leg stent placed    Leg swelling    Hypothyroid    HTN (hypertension)    High cholesterol    Peripheral arterial disease    H/O Graves' disease    History of cholecystectomy    H/O: hysterectomy    S/P angiogram of extremity    S/P ERCP      SOCIAL HISTORY:  Social History:      ALLERGIES:  codeine (Stomach Upset; Vomiting; Nausea)    MEDICATIONS:  STANDING MEDICATIONS  albuterol/ipratropium for Nebulization 3 milliLiter(s) Nebulizer every 6 hours  azithromycin  IVPB      azithromycin  IVPB 500 milliGRAM(s) IV Intermittent every 24 hours  cefTRIAXone   IVPB 1000 milliGRAM(s) IV Intermittent every 24 hours  clopidogrel Tablet 75 milliGRAM(s) Oral daily  enoxaparin Injectable 40 milliGRAM(s) SubCutaneous every 24 hours  furosemide    Tablet 40 milliGRAM(s) Oral <User Schedule>  levothyroxine 175 MICROGram(s) Oral daily  methylPREDNISolone sodium succinate Injectable 60 milliGRAM(s) IV Push two times a day  nicotine - 21 mG/24Hr(s) Patch 1 Patch Transdermal daily  NIFEdipine XL 60 milliGRAM(s) Oral daily  pantoprazole    Tablet 40 milliGRAM(s) Oral two times a day    PRN MEDICATIONS  acetaminophen     Tablet .. 650 milliGRAM(s) Oral every 6 hours PRN  albuterol    90 MICROgram(s) HFA Inhaler 2 Puff(s) Inhalation every 4 hours PRN  ondansetron Injectable 4 milliGRAM(s) IV Push every 8 hours PRN    VITALS:   T(F): 97.5  HR: 71  BP: 166/87  RR: 18  SpO2: 97%    PHYSICAL EXAM:  CONSTITUTIONAL: No acute distress, ill-appearing  HEAD: Atraumatic, normocephalic  PULMONARY: dec b/l, rhonchi  CARDIOVASCULAR: Regular rate and rhythm  GASTROINTESTINAL: Soft, non-tender, non-distended; bowel sounds present  MUSCULOSKELETAL: 2+ peripheral pulses; +edema  NEUROLOGY: non-focal    LABS:                        10.1   11.85 )-----------( 391      ( 14 Oct 2024 06:52 )             32.6     10-14    139  |  100  |  29[H]  ----------------------------<  143[H]  4.4   |  26  |  0.8    Ca    9.4      14 Oct 2024 06:52  Mg     2.3     10-14    TPro  6.3  /  Alb  3.8  /  TBili  0.2  /  DBili  x   /  AST  9   /  ALT  11  /  AlkPhos  166[H]  10-14      Urinalysis Basic - ( 14 Oct 2024 06:52 )    Color: x / Appearance: x / SG: x / pH: x  Gluc: 143 mg/dL / Ketone: x  / Bili: x / Urobili: x   Blood: x / Protein: x / Nitrite: x   Leuk Esterase: x / RBC: x / WBC x   Sq Epi: x / Non Sq Epi: x / Bacteria: x      ABG - ( 12 Oct 2024 19:10 )  pH, Arterial: 7.41  pH, Blood: x     /  pCO2: 41    /  pO2: 71    / HCO3: 26    / Base Excess: 1.2   /  SaO2: 95.2          Culture - Blood (collected 12 Oct 2024 10:39)  Source: .Blood BLOOD  Preliminary Report (13 Oct 2024 17:01):    No growth at 24 hours    Culture - Blood (collected 12 Oct 2024 10:39)  Source: .Blood BLOOD  Preliminary Report (13 Oct 2024 17:01):    No growth at 24 hours

## 2024-10-14 NOTE — PHYSICAL THERAPY INITIAL EVALUATION ADULT - NSPTDISCHREC_GEN_A_CORE
Condition:: Foreign body left hand
Please Describe Your Condition:: Splinter in left hand \\n
Home PT

## 2024-10-14 NOTE — PHYSICAL THERAPY INITIAL EVALUATION ADULT - PERTINENT HX OF CURRENT PROBLEM, REHAB EVAL
79 yo female, current smoker patient known to have: HTN, HLD, Hypothyroidism, PAD s/p stents, hfpef, with recent admission for anemia due to suspected GIB s/p EGD inpatient presents for worsening shortness of breath and cough over the last week. Patient states she was taking PO doxycycline for about 7 days but she started to feel more short of breath over the past two days. She states that several members of her family have been sick with coughs/colds and she has been around them; she did not get tested for COVID/Flu outpatient and does not know if any of her family members were positive. She denies chest pain and abdominal pain. She endorses shortness of breath and cough and endorses chronic constipation and swelling in the legs. She is admitted to medicine for further management.

## 2024-10-14 NOTE — PHYSICAL THERAPY INITIAL EVALUATION ADULT - GENERAL OBSERVATIONS, REHAB EVAL
[phone] Pt received semifowlers in bed, left sitting EOB, NAD, pt agreeable to PT session +3L O2 NC +tele +SpO2 +primafit

## 2024-10-14 NOTE — PHYSICAL THERAPY INITIAL EVALUATION ADULT - ADDITIONAL COMMENTS
Pt lives with  and daughter in house with 8 steps to enter, 1 flight of stairs inside to 2nd floor. Pt ambulates household distances with no assistive device, uses SC intermittently for community ambulation.

## 2024-10-15 LAB
ALBUMIN SERPL ELPH-MCNC: 4 G/DL — SIGNIFICANT CHANGE UP (ref 3.5–5.2)
ALP SERPL-CCNC: 162 U/L — HIGH (ref 30–115)
ALT FLD-CCNC: 9 U/L — SIGNIFICANT CHANGE UP (ref 0–41)
ANION GAP SERPL CALC-SCNC: 17 MMOL/L — HIGH (ref 7–14)
AST SERPL-CCNC: 11 U/L — SIGNIFICANT CHANGE UP (ref 0–41)
BASOPHILS # BLD AUTO: 0.06 K/UL — SIGNIFICANT CHANGE UP (ref 0–0.2)
BASOPHILS NFR BLD AUTO: 0.5 % — SIGNIFICANT CHANGE UP (ref 0–1)
BILIRUB SERPL-MCNC: 0.2 MG/DL — SIGNIFICANT CHANGE UP (ref 0.2–1.2)
BUN SERPL-MCNC: 31 MG/DL — HIGH (ref 10–20)
CALCIUM SERPL-MCNC: 8.9 MG/DL — SIGNIFICANT CHANGE UP (ref 8.4–10.4)
CHLORIDE SERPL-SCNC: 96 MMOL/L — LOW (ref 98–110)
CO2 SERPL-SCNC: 25 MMOL/L — SIGNIFICANT CHANGE UP (ref 17–32)
CREAT SERPL-MCNC: 0.9 MG/DL — SIGNIFICANT CHANGE UP (ref 0.7–1.5)
EGFR: 65 ML/MIN/1.73M2 — SIGNIFICANT CHANGE UP
EOSINOPHIL # BLD AUTO: 0.03 K/UL — SIGNIFICANT CHANGE UP (ref 0–0.7)
EOSINOPHIL NFR BLD AUTO: 0.2 % — SIGNIFICANT CHANGE UP (ref 0–8)
GLUCOSE SERPL-MCNC: 152 MG/DL — HIGH (ref 70–99)
HCT VFR BLD CALC: 36.7 % — LOW (ref 37–47)
HGB BLD-MCNC: 11 G/DL — LOW (ref 12–16)
IMM GRANULOCYTES NFR BLD AUTO: 0.9 % — HIGH (ref 0.1–0.3)
LEGIONELLA AG UR QL: NEGATIVE — SIGNIFICANT CHANGE UP
LYMPHOCYTES # BLD AUTO: 19.5 % — LOW (ref 20.5–51.1)
LYMPHOCYTES # BLD AUTO: 2.46 K/UL — SIGNIFICANT CHANGE UP (ref 1.2–3.4)
MAGNESIUM SERPL-MCNC: 2.3 MG/DL — SIGNIFICANT CHANGE UP (ref 1.8–2.4)
MCHC RBC-ENTMCNC: 22.4 PG — LOW (ref 27–31)
MCHC RBC-ENTMCNC: 30 G/DL — LOW (ref 32–37)
MCV RBC AUTO: 74.6 FL — LOW (ref 81–99)
MONOCYTES # BLD AUTO: 0.6 K/UL — SIGNIFICANT CHANGE UP (ref 0.1–0.6)
MONOCYTES NFR BLD AUTO: 4.8 % — SIGNIFICANT CHANGE UP (ref 1.7–9.3)
NEUTROPHILS # BLD AUTO: 9.37 K/UL — HIGH (ref 1.4–6.5)
NEUTROPHILS NFR BLD AUTO: 74.1 % — SIGNIFICANT CHANGE UP (ref 42.2–75.2)
NRBC # BLD: 0 /100 WBCS — SIGNIFICANT CHANGE UP (ref 0–0)
PLATELET # BLD AUTO: 435 K/UL — HIGH (ref 130–400)
PMV BLD: 9.6 FL — SIGNIFICANT CHANGE UP (ref 7.4–10.4)
POTASSIUM SERPL-MCNC: 4.3 MMOL/L — SIGNIFICANT CHANGE UP (ref 3.5–5)
POTASSIUM SERPL-SCNC: 4.3 MMOL/L — SIGNIFICANT CHANGE UP (ref 3.5–5)
PROT SERPL-MCNC: 6.6 G/DL — SIGNIFICANT CHANGE UP (ref 6–8)
RBC # BLD: 4.92 M/UL — SIGNIFICANT CHANGE UP (ref 4.2–5.4)
RBC # FLD: 18.3 % — HIGH (ref 11.5–14.5)
S PNEUM AG UR QL: NEGATIVE — SIGNIFICANT CHANGE UP
SODIUM SERPL-SCNC: 138 MMOL/L — SIGNIFICANT CHANGE UP (ref 135–146)
WBC # BLD: 12.63 K/UL — HIGH (ref 4.8–10.8)
WBC # FLD AUTO: 12.63 K/UL — HIGH (ref 4.8–10.8)

## 2024-10-15 PROCEDURE — 99233 SBSQ HOSP IP/OBS HIGH 50: CPT

## 2024-10-15 RX ORDER — PREDNISONE 5 MG/1
40 TABLET ORAL DAILY
Refills: 0 | Status: DISCONTINUED | OUTPATIENT
Start: 2024-10-16 | End: 2024-10-17

## 2024-10-15 RX ORDER — LOSARTAN POTASSIUM 100 MG/1
25 TABLET, FILM COATED ORAL DAILY
Refills: 0 | Status: DISCONTINUED | OUTPATIENT
Start: 2024-10-15 | End: 2024-10-17

## 2024-10-15 RX ADMIN — ALPRAZOLAM 0.25 MILLIGRAM(S): 0.5 TABLET ORAL at 11:19

## 2024-10-15 RX ADMIN — IPRATROPIUM BROMIDE AND ALBUTEROL SULFATE 3 MILLILITER(S): .5; 3 SOLUTION RESPIRATORY (INHALATION) at 19:53

## 2024-10-15 RX ADMIN — Medication 100 MILLIGRAM(S): at 23:14

## 2024-10-15 RX ADMIN — ENOXAPARIN SODIUM 40 MILLIGRAM(S): 150 INJECTION SUBCUTANEOUS at 11:19

## 2024-10-15 RX ADMIN — Medication 175 MICROGRAM(S): at 05:54

## 2024-10-15 RX ADMIN — AZITHROMYCIN 255 MILLIGRAM(S): 250 TABLET, FILM COATED ORAL at 15:16

## 2024-10-15 RX ADMIN — Medication 1 PATCH: at 11:16

## 2024-10-15 RX ADMIN — Medication 1 PATCH: at 08:21

## 2024-10-15 RX ADMIN — Medication 75 MILLIGRAM(S): at 11:19

## 2024-10-15 RX ADMIN — FUROSEMIDE 40 MILLIGRAM(S): 10 INJECTION INTRAVENOUS at 06:02

## 2024-10-15 RX ADMIN — METHYLPREDNISOLONE ACETATE 60 MILLIGRAM(S): 80 INJECTION, SUSPENSION INTRA-ARTICULAR; INTRALESIONAL; INTRAMUSCULAR; SOFT TISSUE at 05:53

## 2024-10-15 RX ADMIN — Medication 90 MILLIGRAM(S): at 05:54

## 2024-10-15 RX ADMIN — Medication 1 PATCH: at 11:20

## 2024-10-15 RX ADMIN — PANTOPRAZOLE SODIUM 40 MILLIGRAM(S): 40 TABLET, DELAYED RELEASE ORAL at 17:20

## 2024-10-15 RX ADMIN — Medication 1 PATCH: at 23:25

## 2024-10-15 RX ADMIN — PANTOPRAZOLE SODIUM 40 MILLIGRAM(S): 40 TABLET, DELAYED RELEASE ORAL at 05:54

## 2024-10-15 NOTE — PROGRESS NOTE ADULT - ASSESSMENT
79 yo female, current smoker patient known to have: HTN, HLD, Hypothyroidism, PAD s/p stents, hfpef, with recent admission for anemia due to suspected GIB s/p EGD inpatient presents for suspected PNA complicated by hypoxia      #Acute hypoxic respiratory failure likely secondary to RLL PNA  #Active Smoker (last cigarette 2d ago)  #Rapid called during this admission for SOB, see note in chart. ICU team aware, possible upgrade. CXR and blood gas noted. CTA >> no PE  + sick contact   - no improvement on op doxcy   - SOB worsened overnight prompting ED visit  - on admission RR 25,  82% O2 on RA -> 95% on 4L , No sepsis POA   - CXR: Bilateral diffuse interstitial opacities. Right basilar opacity.  - S/p Cefepime and Vanc in ED - as per ED report that the patient and her family refused Levaquin as it has bad effect on her    - procal 0.19   - strep neg and legionella pending  - MRSA neg  - follow /up blood cultures NTD  - rvp/rsv/flu/covid NEGATIVE   - Trop 30 ( was 36-> 25 on prior admission ), likely demand , no active chest pain   - cardiac monitor for now   - lactate 1.3 10/12  - on IV soluemderol 60mg daily  - Smoking cessation counseling provided, patient wishes to use nicotine patch, wants to quit, Nicotine patch 21mg QD  - fu SLP eval   - ID on board: Rocephin 1g q24 and azithromycin 500 q24. d/c on levaquin 500  -CTA>  1.  No evidence of acute pulmonary embolism.  2.  Right middle and lower lobe consolidations.  3.  Likely reactive mediastinal lymphadenopathy.  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 (Hemostasis).  A CBD stent was seen in the ampulla. Removed with a snare. .  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 Biopsies obtained. Not intervened today as the  patient underwent this procedure for a GI bleed. .  - C/w Protonix BID until 10/18 then switch to QD    DVT ppx: Lovenox  GI ppx: Protonix     Pt anticipated for discharge in 24 hours

## 2024-10-15 NOTE — PROGRESS NOTE ADULT - SUBJECTIVE AND OBJECTIVE BOX
SUBJECTIVE/OVERNIGHT EVENTS  Today is hospital day 3d. This morning patient was seen and examined at bedside, resting comfortably in bed. No acute or major events overnight. Patient is saturating at 90% on 2L NC. She was never on oxygen before    CODE STATUS:    FAMILY COMMUNICATION  Contact date:  Name of person contacted:  Relationship to patient:  Communication details:    MEDICATIONS  STANDING MEDICATIONS  albuterol/ipratropium for Nebulization 3 milliLiter(s) Nebulizer every 6 hours  azithromycin  IVPB      azithromycin  IVPB 500 milliGRAM(s) IV Intermittent every 24 hours  cefTRIAXone   IVPB 1000 milliGRAM(s) IV Intermittent every 24 hours  clopidogrel Tablet 75 milliGRAM(s) Oral daily  enoxaparin Injectable 40 milliGRAM(s) SubCutaneous every 24 hours  furosemide    Tablet 40 milliGRAM(s) Oral <User Schedule>  influenza  Vaccine (HIGH DOSE) 0.5 milliLiter(s) IntraMuscular once  levothyroxine 175 MICROGram(s) Oral daily  losartan 25 milliGRAM(s) Oral daily  methylPREDNISolone sodium succinate Injectable 60 milliGRAM(s) IV Push daily  nicotine - 21 mG/24Hr(s) Patch 1 Patch Transdermal daily  NIFEdipine XL 90 milliGRAM(s) Oral daily  pantoprazole    Tablet 40 milliGRAM(s) Oral two times a day    PRN MEDICATIONS  acetaminophen     Tablet .. 650 milliGRAM(s) Oral every 6 hours PRN  albuterol    90 MICROgram(s) HFA Inhaler 2 Puff(s) Inhalation every 4 hours PRN  ALPRAZolam 0.25 milliGRAM(s) Oral three times a day PRN  ondansetron Injectable 4 milliGRAM(s) IV Push every 8 hours PRN    VITALS  T(F): 97.5 (10-15-24 @ 04:55), Max: 97.8 (10-14-24 @ 19:53)  HR: 70 (10-15-24 @ 04:55) (70 - 86)  BP: 165/70 (10-15-24 @ 04:55) (165/70 - 172/71)  RR: 18 (10-15-24 @ 04:55) (16 - 18)  SpO2: 90% (10-15-24 @ 08:19) (90% - 95%)    PHYSICAL EXAM  GENERAL  ( x ) NAD, lying in bed comfortably     (  ) obtunded     (  ) lethargic     (  ) somnolent    HEAD  ( x ) Atraumatic     (  ) hematoma     (  ) laceration (specify location:       )     NECK  (  x) Supple     (  ) neck stiffness     (  ) nuchal rigidity     (  )  no JVD     (  ) JVD present ( -- cm)    HEART  Rate -->  ( x ) normal rate    (  ) bradycardic    (  ) tachycardic  Rhythm -->  (  ) regular    (  ) regularly irregular    (  ) irregularly irregular  Murmurs -->  (  ) normal s1/s2    (  ) systolic murmur    (  ) diastolic murmur    (  ) continuous murmur     (  ) S3 present    (  ) S4 present    LUNGS  (  )Unlabored respirations     (  ) tachypnea  (  ) B/L air entry     (  ) decreased breath sounds in:  (location     )    (  ) no adventitious sound     (x  ) crackles RLL     (  ) wheezing      (  ) rhonchi      (specify location:       )  (  ) chest wall tenderness (specify location:       )    ABDOMEN  (x  ) Soft     (  ) tense   |   (  ) nondistended     (  ) distended   |   (  ) +BS     (  ) hypoactive bowel sounds     (  ) hyperactive bowel sounds  (  ) nontender     (  ) RUQ tenderness     (  ) RLQ tenderness     (  ) LLQ tenderness     (  ) epigastric tenderness     (  ) diffuse tenderness  (  ) Splenomegaly      (  ) Hepatomegaly      (  ) Jaundice     (  ) ecchymosis     EXTREMITIES  ( x ) Normal     (  ) Rash     (  ) ecchymosis     (  ) varicose veins      (  ) pitting edema     (  ) non-pitting edema   (  ) ulceration     (  ) gangrene:     (location:     )    NERVOUS SYSTEM  (x  ) A&Ox3     (  ) confused     (  ) lethargic  CN II-XII:     (  ) Intact     (  ) focal deficits  (Specify:     )   Upper extremities:     (  ) strength X/5     (  ) focal deficit (specify:    )  Lower extremities:     (  ) strength  X/5    (  ) focal deficit (specify:    )    SKIN  (  ) No rashes or lesions     (  ) maculopapular rash     (  ) pustules     (  ) vesicles     (  ) ulcer     (  ) ecchymosis     (specify location:     )    (  ) Indwelling Babcock Catheter   Date insterted:    Reason (  ) Critical illness     (  ) urinary retention    (  ) Accurate Ins/Outs Monitoring     (  ) CMO patient    (  ) Central Line  Date inserted:  Location: (  ) Right IJ   (  ) Left IJ   (  ) Right Fem   (  ) Left Fem    (  ) SPC  (  ) pigtail  (  ) PEG tube  (  ) colostomy  (  ) jejunostomy  (  ) U-Dall    LABS             11.0   12.63 )-----------( 435      ( 10-15-24 @ 07:16 )             36.7     138  |  96  |  31  -------------------------<  152   10-15-24 @ 07:16  4.3  |  25  |  0.9    Ca      8.9     10-15-24 @ 07:16  Mg     2.3     10-15-24 @ 07:16    TPro  6.6  /  Alb  4.0  /  TBili  0.2  /  DBili  x   /  AST  11  /  ALT  9   /  AlkPhos  162  /  GGT  x     10-15-24 @ 07:16        Urinalysis Basic - ( 15 Oct 2024 07:16 )    Color: x / Appearance: x / SG: x / pH: x  Gluc: 152 mg/dL / Ketone: x  / Bili: x / Urobili: x   Blood: x / Protein: x / Nitrite: x   Leuk Esterase: x / RBC: x / WBC x   Sq Epi: x / Non Sq Epi: x / Bacteria: x          IMAGING

## 2024-10-15 NOTE — PROGRESS NOTE ADULT - SUBJECTIVE AND OBJECTIVE BOX
Patient is a 79y old  Female who presents with a chief complaint of   HPI:  77 yo female, current smoker patient known to have: HTN, HLD, Hypothyroidism, PAD s/p stents, hfpef, with recent admission for anemia due to suspected GIB s/p EGD inpatient presents for worsening shortness of breath and cough over the last week. Patient states she was taking PO doxycycline for about 7 days but she started to feel more short of breath over the past two days. She states that several members of her family have been sick with coughs/colds and she has been around them; she did not get tested for COVID/Flu outpatient and does not know if any of her family members were positive. She denies chest pain and abdominal pain. She endorses shortness of breath and cough and endorses chronic constipation and swelling in the legs. She is admitted to medicine for further management. (12 Oct 2024 13:26)    PAST MEDICAL & SURGICAL HISTORY:  Arterial insufficiency of lower extremity  left leg stent placed  Leg swelling  Hypothyroid  HTN (hypertension)  High cholesterol  Peripheral arterial disease  H/O Graves' disease  h/o COPD  Tobacco smoker     patient seen and examined independently on morning rounds for the first time today, chart reviewed and discussed with the medicine resident and on interdisciplinary rounds:    hospital day #3    remains on o2 suppl (has not had home o2 previously)--says she is feeling better today- less sob and no chest pain-     Vital Signs Last 24 Hrs  T(C): 36.4 (15 Oct 2024 12:03), Max: 36.6 (14 Oct 2024 19:53)  T(F): 97.5 (15 Oct 2024 12:03), Max: 97.8 (14 Oct 2024 19:53)  HR: 71 (15 Oct 2024 12:03) (70 - 86)  BP: 149/74 (15 Oct 2024 12:03) (149/74 - 172/71)  BP(mean): 103 (14 Oct 2024 16:05) (103 - 103)  RR: 18 (15 Oct 2024 12:03) (16 - 18)  SpO2: 92% (15 Oct 2024 12:03) (90% - 95%)    Parameters below as of 15 Oct 2024 08:19  Patient On (Oxygen Delivery Method): nasal cannula  O2 Flow (L/min): 2    PE:  GEN-NAD, AAOx3, oob in chair- on 2L o2 via NC  NECK- supple no jvd, no lymphadenopathy  PULM- fair air entry- +diffuse expiratory crackles  CVS- +s1/s2 RRR   GI- soft NT ND +bs, no rebound, no guarding  EXT- no edema                        11.0   12.63 )-----------( 435      ( 15 Oct 2024 07:16 )             36.7     10-15    138  |  96[L]  |  31[H]  ----------------------------<  152[H]  4.3   |  25  |  0.9    Ca    8.9      15 Oct 2024 07:16  Mg     2.3     10-15    TPro  6.6  /  Alb  4.0  /  TBili  0.2  /  DBili  x   /  AST  11  /  ALT  9   /  AlkPhos  162[H]  10-15        Urinalysis Basic - ( 15 Oct 2024 07:16 )    Color: x / Appearance: x / SG: x / pH: x  Gluc: 152 mg/dL / Ketone: x  / Bili: x / Urobili: x   Blood: x / Protein: x / Nitrite: x   Leuk Esterase: x / RBC: x / WBC x   Sq Epi: x / Non Sq Epi: x / Bacteria: x          MEDICATIONS  (STANDING):  albuterol/ipratropium for Nebulization 3 milliLiter(s) Nebulizer every 6 hours  azithromycin  IVPB      azithromycin  IVPB 500 milliGRAM(s) IV Intermittent every 24 hours  cefTRIAXone   IVPB 1000 milliGRAM(s) IV Intermittent every 24 hours  clopidogrel Tablet 75 milliGRAM(s) Oral daily  enoxaparin Injectable 40 milliGRAM(s) SubCutaneous every 24 hours  furosemide    Tablet 40 milliGRAM(s) Oral <User Schedule>  influenza  Vaccine (HIGH DOSE) 0.5 milliLiter(s) IntraMuscular once  levothyroxine 175 MICROGram(s) Oral daily  losartan 25 milliGRAM(s) Oral daily  methylPREDNISolone sodium succinate Injectable 60 milliGRAM(s) IV Push daily  nicotine - 21 mG/24Hr(s) Patch 1 Patch Transdermal daily  NIFEdipine XL 90 milliGRAM(s) Oral daily  pantoprazole    Tablet 40 milliGRAM(s) Oral two times a day

## 2024-10-15 NOTE — PROGRESS NOTE ADULT - ASSESSMENT
A/P:    79 yo female, current smoker (had previously quit and restarted recently due to increased stress) with h/o HTN, HLD, Hypothyroidism, PAD s/p stents, hfpef, with recent admission for anemia due to suspected GIB s/p EGD inpatient presents for suspected PNA complicated by hypoxia    #Acute hypoxic respiratory failure secondary to RLL PNA  #Active Smoker (last cigarette 2d ago)--suspected COPD  #HFpEF- chronic  #HTN   -nicotine patch 21 mg daily and smoking cessation counseling  -monitor o2 sat and wean o2 as tolerated (DOES not have home o2 in place)  -PT eval---check o2 sat with ambulation (on admission was dropping to low 80% on RA)--will need outpaitent pulmonary f/u  -RVP/flu/covid negative  -continue iv ceftraixone and azithromycin   -taper steroids---on solumedrol 60 mg daily (received dose today)---start prednisone 40 mg daily x 5 days starting tomorrow   - CXR: Bilateral diffuse interstitial opacities. Right basilar opacity.   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 (Hemostasis).  A CBD stent was seen in the ampulla. Removed with a snare. .  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 Biopsies obtained. Not intervened today as the  patient underwent this procedure for a GI bleed. .  -Protonix 40 mg BID until 10/18 then switch to 40 mg QD  -monitor cbc and electrolytes (hb stable 11 today)  -GI f/u as outpatient     DVT/GI ppx  guarded prognosis    anticipate likely dc home in next 24-48 hrs---transition off iv solumedrol, monitor o2 sat (if pulse ox with ambulation and on RA drops <85% will need consideration for home o2 to be set up)    Total time spent to complete patient's bedside assessment, review medical chart, discuss medical plan of care with covering medical team was more than 50 minutes  with >50% of time spendt face to face with patient, discussion with patient/family and/or coordination of care A/P:    77 yo female, current smoker (had previously quit and restarted recently due to increased stress) with h/o HTN, HLD, Hypothyroidism, PAD s/p stents, hfpef, with recent admission for anemia due to suspected GIB s/p EGD inpatient presents for suspected PNA complicated by hypoxia    #Acute hypoxic respiratory failure secondary to RLL PNA  #Active Smoker (last cigarette 2d ago)--suspected COPD  #HFpEF- chronic  #HTN   -nicotine patch 21 mg daily and smoking cessation counseling  -monitor o2 sat and wean o2 as tolerated (DOES not have home o2 in place)  -PT eval---check o2 sat with ambulation (on admission was dropping to low 80% on RA)--will need outpaitent pulmonary f/u  -RVP/flu/covid negative  -continue iv ceftraixone and azithromycin   -taper steroids---on solumedrol 60 mg daily (received dose today)---start prednisone 40 mg daily x 5 days starting tomorrow   - CXR: Bilateral diffuse interstitial opacities. Right basilar opacity.   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 (Hemostasis).  A CBD stent was seen in the ampulla. Removed with a snare. .  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 Biopsies obtained. Not intervened today as the  patient underwent this procedure for a GI bleed. .  -Protonix 40 mg BID until 10/18 then switch to 40 mg QD  -monitor cbc and electrolytes (hb stable 11 today)  -GI f/u as outpatient     DVT/GI ppx  guarded prognosis    FULL CODE    anticipate likely dc home in next 24-48 hrs---transition off iv solumedrol, monitor o2 sat (if pulse ox with ambulation and on RA drops <85% will need consideration for home o2 to be set up)    Total time spent to complete patient's bedside assessment, review medical chart, discuss medical plan of care with covering medical team was more than 50 minutes  with >50% of time spendt face to face with patient, discussion with patient/family and/or coordination of care

## 2024-10-16 LAB
ALBUMIN SERPL ELPH-MCNC: 4 G/DL — SIGNIFICANT CHANGE UP (ref 3.5–5.2)
ALP SERPL-CCNC: 165 U/L — HIGH (ref 30–115)
ALT FLD-CCNC: 14 U/L — SIGNIFICANT CHANGE UP (ref 0–41)
ANION GAP SERPL CALC-SCNC: 18 MMOL/L — HIGH (ref 7–14)
AST SERPL-CCNC: 14 U/L — SIGNIFICANT CHANGE UP (ref 0–41)
BASOPHILS # BLD AUTO: 0.08 K/UL — SIGNIFICANT CHANGE UP (ref 0–0.2)
BASOPHILS NFR BLD AUTO: 0.6 % — SIGNIFICANT CHANGE UP (ref 0–1)
BILIRUB SERPL-MCNC: 0.3 MG/DL — SIGNIFICANT CHANGE UP (ref 0.2–1.2)
BUN SERPL-MCNC: 40 MG/DL — HIGH (ref 10–20)
CALCIUM SERPL-MCNC: 9 MG/DL — SIGNIFICANT CHANGE UP (ref 8.4–10.5)
CHLORIDE SERPL-SCNC: 95 MMOL/L — LOW (ref 98–110)
CO2 SERPL-SCNC: 23 MMOL/L — SIGNIFICANT CHANGE UP (ref 17–32)
CREAT SERPL-MCNC: 1 MG/DL — SIGNIFICANT CHANGE UP (ref 0.7–1.5)
EGFR: 58 ML/MIN/1.73M2 — LOW
EOSINOPHIL # BLD AUTO: 0.06 K/UL — SIGNIFICANT CHANGE UP (ref 0–0.7)
EOSINOPHIL NFR BLD AUTO: 0.4 % — SIGNIFICANT CHANGE UP (ref 0–8)
GLUCOSE SERPL-MCNC: 149 MG/DL — HIGH (ref 70–99)
HCT VFR BLD CALC: 38.1 % — SIGNIFICANT CHANGE UP (ref 37–47)
HGB BLD-MCNC: 11.6 G/DL — LOW (ref 12–16)
IMM GRANULOCYTES NFR BLD AUTO: 0.7 % — HIGH (ref 0.1–0.3)
LYMPHOCYTES # BLD AUTO: 27.4 % — SIGNIFICANT CHANGE UP (ref 20.5–51.1)
LYMPHOCYTES # BLD AUTO: 3.74 K/UL — HIGH (ref 1.2–3.4)
MAGNESIUM SERPL-MCNC: 2.4 MG/DL — SIGNIFICANT CHANGE UP (ref 1.8–2.4)
MCHC RBC-ENTMCNC: 22.5 PG — LOW (ref 27–31)
MCHC RBC-ENTMCNC: 30.4 G/DL — LOW (ref 32–37)
MCV RBC AUTO: 73.8 FL — LOW (ref 81–99)
MONOCYTES # BLD AUTO: 1.04 K/UL — HIGH (ref 0.1–0.6)
MONOCYTES NFR BLD AUTO: 7.6 % — SIGNIFICANT CHANGE UP (ref 1.7–9.3)
NEUTROPHILS # BLD AUTO: 8.66 K/UL — HIGH (ref 1.4–6.5)
NEUTROPHILS NFR BLD AUTO: 63.3 % — SIGNIFICANT CHANGE UP (ref 42.2–75.2)
NRBC # BLD: 0 /100 WBCS — SIGNIFICANT CHANGE UP (ref 0–0)
PLATELET # BLD AUTO: 449 K/UL — HIGH (ref 130–400)
PMV BLD: 9.8 FL — SIGNIFICANT CHANGE UP (ref 7.4–10.4)
POTASSIUM SERPL-MCNC: 4.2 MMOL/L — SIGNIFICANT CHANGE UP (ref 3.5–5)
POTASSIUM SERPL-SCNC: 4.2 MMOL/L — SIGNIFICANT CHANGE UP (ref 3.5–5)
PROT SERPL-MCNC: 6.7 G/DL — SIGNIFICANT CHANGE UP (ref 6–8)
RBC # BLD: 5.16 M/UL — SIGNIFICANT CHANGE UP (ref 4.2–5.4)
RBC # FLD: 18.3 % — HIGH (ref 11.5–14.5)
SODIUM SERPL-SCNC: 136 MMOL/L — SIGNIFICANT CHANGE UP (ref 135–146)
WBC # BLD: 13.67 K/UL — HIGH (ref 4.8–10.8)
WBC # FLD AUTO: 13.67 K/UL — HIGH (ref 4.8–10.8)

## 2024-10-16 PROCEDURE — 99232 SBSQ HOSP IP/OBS MODERATE 35: CPT

## 2024-10-16 PROCEDURE — 71045 X-RAY EXAM CHEST 1 VIEW: CPT | Mod: 26

## 2024-10-16 RX ADMIN — IPRATROPIUM BROMIDE AND ALBUTEROL SULFATE 3 MILLILITER(S): .5; 3 SOLUTION RESPIRATORY (INHALATION) at 14:40

## 2024-10-16 RX ADMIN — Medication 75 MILLIGRAM(S): at 11:23

## 2024-10-16 RX ADMIN — ENOXAPARIN SODIUM 40 MILLIGRAM(S): 150 INJECTION SUBCUTANEOUS at 11:23

## 2024-10-16 RX ADMIN — Medication 100 MILLIGRAM(S): at 23:11

## 2024-10-16 RX ADMIN — AZITHROMYCIN 255 MILLIGRAM(S): 250 TABLET, FILM COATED ORAL at 13:05

## 2024-10-16 RX ADMIN — IPRATROPIUM BROMIDE AND ALBUTEROL SULFATE 3 MILLILITER(S): .5; 3 SOLUTION RESPIRATORY (INHALATION) at 08:18

## 2024-10-16 RX ADMIN — Medication 175 MICROGRAM(S): at 06:13

## 2024-10-16 RX ADMIN — Medication 90 MILLIGRAM(S): at 06:12

## 2024-10-16 RX ADMIN — PANTOPRAZOLE SODIUM 40 MILLIGRAM(S): 40 TABLET, DELAYED RELEASE ORAL at 06:12

## 2024-10-16 RX ADMIN — LOSARTAN POTASSIUM 25 MILLIGRAM(S): 100 TABLET, FILM COATED ORAL at 06:13

## 2024-10-16 RX ADMIN — PANTOPRAZOLE SODIUM 40 MILLIGRAM(S): 40 TABLET, DELAYED RELEASE ORAL at 17:17

## 2024-10-16 RX ADMIN — IPRATROPIUM BROMIDE AND ALBUTEROL SULFATE 3 MILLILITER(S): .5; 3 SOLUTION RESPIRATORY (INHALATION) at 19:43

## 2024-10-16 RX ADMIN — Medication 1 PATCH: at 19:00

## 2024-10-16 RX ADMIN — ALPRAZOLAM 0.25 MILLIGRAM(S): 0.5 TABLET ORAL at 12:06

## 2024-10-16 RX ADMIN — PREDNISONE 40 MILLIGRAM(S): 5 TABLET ORAL at 06:12

## 2024-10-16 RX ADMIN — Medication 1 PATCH: at 11:23

## 2024-10-16 NOTE — PROGRESS NOTE ADULT - ASSESSMENT
79 yo female, current smoker patient known to have: HTN, HLD, Hypothyroidism, PAD s/p stents, hfpef, with recent admission for anemia due to suspected GIB s/p EGD inpatient presents for worsening shortness of breath and cough over the last week. Patient states she was taking PO doxycycline for about 7 days but she started to feel more short of breath over the past two days.  79 yo female, current smoker patient known to have: HTN, HLD, Hypothyroidism, PAD s/p stents, HFpEF, with recent admission for anemia due to suspected GIB s/p EGD inpatient presents for worsening shortness of breath and cough over the last week. Patient states she was taking PO doxycycline for about 7 days but she started to feel more short of breath over the past two days.     Acute hypoxic respiratory failure likely secondary to RML & RLL PNA  RML & RLL PNA  Tobacco use  HTN / DL  H/O PAD s/p stents  Chronic HFpEF  No Sepsis               PLAN:    ·	Tele reviewed. No events.   ·	CTA chest reviewed. No acute PE. Right middle and lower lobe consolidations.  ·	Venous doppler of LE is negative for DVT  ·	On Rocephin and Azithromycin. Cont it  ·	ID eval noted. Recommended one week of Abx. On d/c will switch her to oral Abx  ·	Blood cxs x 4 are negative. MRSA PCR is also negative  ·	Check her RA POX at rest and on exertion  ·	D/C IV Solumedrol. Switch her to Prednisone 40 mg po daily for five days.   ·	Counselled to quit smoking  ·	Cont her other home meds    Progress Note Handoff    Pending (specify):  Consults_________, Tests________, Test Results_______, Other__Check RA POX on exertion and rest._______  Family discussion:  Disposition: Home___/SNF___/Other________/Unknown at this time________    Noe South MD  Spectra: [phone]

## 2024-10-16 NOTE — CHART NOTE - NSCHARTNOTEFT_GEN_A_CORE
Dx : congestive heart failure_ (ICD10: _I50.22___)  - Room air pulse ox. at rest:  -92-%   - Room air pulse ox. while ambulating: -87-%  - Pulse ox while ambulating on 2 liters n/c  O2 94%    Patient will require home O2 for discharge.  Patient is aware and agreeable to home O2.  Patient is in a chronic stable state of _congestive heart failure___.

## 2024-10-16 NOTE — PROGRESS NOTE ADULT - ASSESSMENT
79 yo female, current smoker patient known to have: HTN, HLD, Hypothyroidism, PAD s/p stents, hfpef, with recent admission for anemia due to suspected GIB s/p EGD inpatient presents for suspected PNA complicated by hypoxia      #Acute hypoxic respiratory failure likely secondary to RLL PNA  #Active Smoker (last cigarette 2d ago)  #Rapid called during this admission for SOB, see note in chart. ICU team aware, possible upgrade. CXR and blood gas noted. CTA >> no PE  + sick contact   - no improvement on op doxcy   - SOB worsened overnight prompting ED visit  - on admission RR 25,  82% O2 on RA -> 95% on 4L , No sepsis POA   - CXR: Bilateral diffuse interstitial opacities. Right basilar opacity.  - S/p Cefepime and Vanc in ED - as per ED report that the patient and her family refused Levaquin as it has bad effect on her    - procal 0.19   - strep neg and legionella pending  - MRSA neg  - follow /up blood cultures NTD  - rvp/rsv/flu/covid NEGATIVE   - Trop 30 ( was 36-> 25 on prior admission ), likely demand , no active chest pain   - cardiac monitor for now   - lactate 1.3 10/12  - s/p IV solumderol  - Smoking cessation counseling provided, patient wishes to use nicotine patch, wants to quit, Nicotine patch 21mg QD  -CTA>  1.  No evidence of acute pulmonary embolism.  2.  Right middle and lower lobe consolidations.  3.  Likely reactive mediastinal lymphadenopathy.  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 (Hemostasis).  A CBD stent was seen in the ampulla. Removed with a snare. .  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 Biopsies obtained. Not intervened today as the  patient underwent this procedure for a GI bleed. .  - C/w Protonix BID until 10/18 then switch to QD    DVT ppx: Lovenox  GI ppx: Protonix

## 2024-10-16 NOTE — PROGRESS NOTE ADULT - SUBJECTIVE AND OBJECTIVE BOX
SUBJECTIVE/OVERNIGHT EVENTS  Today is hospital day 4d. This morning patient was seen and examined at bedside, resting comfortably in bed. No acute or major events overnight.        MEDICATIONS  STANDING MEDICATIONS  albuterol/ipratropium for Nebulization 3 milliLiter(s) Nebulizer every 6 hours  azithromycin  IVPB      azithromycin  IVPB 500 milliGRAM(s) IV Intermittent every 24 hours  cefTRIAXone   IVPB 1000 milliGRAM(s) IV Intermittent every 24 hours  clopidogrel Tablet 75 milliGRAM(s) Oral daily  enoxaparin Injectable 40 milliGRAM(s) SubCutaneous every 24 hours  furosemide    Tablet 40 milliGRAM(s) Oral <User Schedule>  influenza  Vaccine (HIGH DOSE) 0.5 milliLiter(s) IntraMuscular once  levothyroxine 175 MICROGram(s) Oral daily  losartan 25 milliGRAM(s) Oral daily  nicotine - 21 mG/24Hr(s) Patch 1 Patch Transdermal daily  NIFEdipine XL 90 milliGRAM(s) Oral daily  pantoprazole    Tablet 40 milliGRAM(s) Oral two times a day  predniSONE   Tablet 40 milliGRAM(s) Oral daily    PRN MEDICATIONS  acetaminophen     Tablet .. 650 milliGRAM(s) Oral every 6 hours PRN  albuterol    90 MICROgram(s) HFA Inhaler 2 Puff(s) Inhalation every 4 hours PRN  ALPRAZolam 0.25 milliGRAM(s) Oral three times a day PRN  ondansetron Injectable 4 milliGRAM(s) IV Push every 8 hours PRN    VITALS  T(F): 97.5 (10-16-24 @ 12:30), Max: 97.9 (10-15-24 @ 20:42)  HR: 75 (10-16-24 @ 12:30) (73 - 80)  BP: 166/73 (10-16-24 @ 12:30) (156/69 - 166/73)  RR: 18 (10-16-24 @ 12:47) (18 - 19)  SpO2: 88% (10-16-24 @ 12:47) (88% - 96%)    PHYSICAL EXAM  GENERAL  (x  ) NAD, lying in bed comfortably     (  ) obtunded     (  ) lethargic     (  ) somnolent    HEAD  (  ) Atraumatic     (  ) hematoma     (  ) laceration (specify location:       )     NECK  (  ) Supple     (  ) neck stiffness     (  ) nuchal rigidity     (  )  no JVD     (  ) JVD present ( -- cm)    HEART  Rate -->  ( x ) normal rate    (  ) bradycardic    (  ) tachycardic  Rhythm -->  ( x ) regular    (  ) regularly irregular    (  ) irregularly irregular  Murmurs -->  (  ) normal s1/s2    (  ) systolic murmur    (  ) diastolic murmur    (  ) continuous murmur     (  ) S3 present    (  ) S4 present    LUNGS  ( x )Unlabored respirations     (  ) tachypnea  ( ) B/L air entry     (  ) decreased breath sounds in:  (location     )    (  ) no adventitious sound     (  ) crackles     (  ) wheezing      (  ) rhonchi      (specify location:       )  (  ) chest wall tenderness (specify location:       )  -RLL wheezing  - on 2L NC    ABDOMEN  (x  ) Soft     (  ) tense   |   (  ) nondistended     (  ) distended   |   (  ) +BS     (  ) hypoactive bowel sounds     (  ) hyperactive bowel sounds  ( x ) nontender     (  ) RUQ tenderness     (  ) RLQ tenderness     (  ) LLQ tenderness     (  ) epigastric tenderness     (  ) diffuse tenderness  (  ) Splenomegaly      (  ) Hepatomegaly      (  ) Jaundice     (  ) ecchymosis     EXTREMITIES  (  x) Normal     (  ) Rash     (  ) ecchymosis     (  ) varicose veins      (  ) pitting edema     (  ) non-pitting edema   (  ) ulceration     (  ) gangrene:     (location:     )    NERVOUS SYSTEM  ( x ) A&Ox3     (  ) confused     (  ) lethargic  CN II-XII:     (  ) Intact     (  ) focal deficits  (Specify:     )   Upper extremities:     (  ) strength X/5     (  ) focal deficit (specify:    )  Lower extremities:     (  ) strength  X/5    (  ) focal deficit (specify:    )    SKIN  (  ) No rashes or lesions     (  ) maculopapular rash     (  ) pustules     (  ) vesicles     (  ) ulcer     (  ) ecchymosis     (specify location:     )    (  ) Indwelling Babcock Catheter   Date insterted:    Reason (  ) Critical illness     (  ) urinary retention    (  ) Accurate Ins/Outs Monitoring     (  ) CMO patient    (  ) Central Line  Date inserted:  Location: (  ) Right IJ   (  ) Left IJ   (  ) Right Fem   (  ) Left Fem    (  ) SPC  (  ) pigtail  (  ) PEG tube  (  ) colostomy  (  ) jejunostomy  (  ) U-Dall    LABS             11.6   13.67 )-----------( 449      ( 10-16-24 @ 07:09 )             38.1     136  |  95  |  40  -------------------------<  149   10-16-24 @ 07:09  4.2  |  23  |  1.0    Ca      9.0     10-16-24 @ 07:09  Mg     2.4     10-16-24 @ 07:09    TPro  6.7  /  Alb  4.0  /  TBili  0.3  /  DBili  x   /  AST  14  /  ALT  14  /  AlkPhos  165  /  GGT  x     10-16-24 @ 07:09        Urinalysis Basic - ( 16 Oct 2024 07:09 )    Color: x / Appearance: x / SG: x / pH: x  Gluc: 149 mg/dL / Ketone: x  / Bili: x / Urobili: x   Blood: x / Protein: x / Nitrite: x   Leuk Esterase: x / RBC: x / WBC x   Sq Epi: x / Non Sq Epi: x / Bacteria: x          IMAGING

## 2024-10-16 NOTE — PROGRESS NOTE ADULT - SUBJECTIVE AND OBJECTIVE BOX
JAKY RODRIGUEZ  79y Female    CHIEF COMPLAINT:    Patient is a 79y old  Female who presents with a chief complaint of shortness of breath (15 Oct 2024 13:17)      INTERVAL HPI/OVERNIGHT EVENTS:    Patient seen and examined.    ROS: All other systems are negative.    Vital Signs:    T(F): 97.4 (10-16-24 @ 06:00), Max: 97.9 (10-15-24 @ 20:42)  HR: 73 (10-16-24 @ 06:00) (71 - 80)  BP: 156/69 (10-16-24 @ 06:00) (149/74 - 165/74)  RR: 18 (10-16-24 @ 06:00) (18 - 19)  SpO2: 92% (10-16-24 @ 06:00) (92% - 92%)  I&O's Summary    15 Oct 2024 07:01  -  16 Oct 2024 07:00  --------------------------------------------------------  IN: 1198 mL / OUT: 3750 mL / NET: -2552 mL    16 Oct 2024 07:01  -  16 Oct 2024 12:02  --------------------------------------------------------  IN: 118 mL / OUT: 0 mL / NET: 118 mL      Daily     Daily   CAPILLARY BLOOD GLUCOSE          PHYSICAL EXAM:    GENERAL:  NAD  SKIN: No rashes or lesions  HENT: Atraumatic. Normocephalic. PERRL. Moist membranes.  NECK: Supple, No JVD. No lymphadenopathy.  PULMONARY: CTA B/L. No wheezing. No rales  CVS: Normal S1, S2. Rate and Rhythm are regular. No murmurs.  ABDOMEN/GI: Soft, Nontender, Nondistended; BS present  EXTREMITIES: Peripheral pulses intact. No edema B/L LE.  NEUROLOGIC:  No motor or sensory deficit.  PSYCH: Alert & oriented x 3    Consultant(s) Notes Reviewed:  [x ] YES  [ ] NO  Care Discussed with Consultants/Other Providers [ x] YES  [ ] NO    EKG reviewed  Telemetry reviewed    LABS:                        11.6   13.67 )-----------( 449      ( 16 Oct 2024 07:09 )             38.1     10-16    136  |  95[L]  |  40[H]  ----------------------------<  149[H]  4.2   |  23  |  1.0    Ca    9.0      16 Oct 2024 07:09  Mg     2.4     10-16    TPro  6.7  /  Alb  4.0  /  TBili  0.3  /  DBili  x   /  AST  14  /  ALT  14  /  AlkPhos  165[H]  10-16              RADIOLOGY & ADDITIONAL TESTS:      Imaging or report Personally Reviewed:  [ ] YES  [ ] NO    Medications:  Standing  albuterol/ipratropium for Nebulization 3 milliLiter(s) Nebulizer every 6 hours  azithromycin  IVPB      azithromycin  IVPB 500 milliGRAM(s) IV Intermittent every 24 hours  cefTRIAXone   IVPB 1000 milliGRAM(s) IV Intermittent every 24 hours  clopidogrel Tablet 75 milliGRAM(s) Oral daily  enoxaparin Injectable 40 milliGRAM(s) SubCutaneous every 24 hours  furosemide    Tablet 40 milliGRAM(s) Oral <User Schedule>  influenza  Vaccine (HIGH DOSE) 0.5 milliLiter(s) IntraMuscular once  levothyroxine 175 MICROGram(s) Oral daily  losartan 25 milliGRAM(s) Oral daily  nicotine - 21 mG/24Hr(s) Patch 1 Patch Transdermal daily  NIFEdipine XL 90 milliGRAM(s) Oral daily  pantoprazole    Tablet 40 milliGRAM(s) Oral two times a day  predniSONE   Tablet 40 milliGRAM(s) Oral daily    PRN Meds  acetaminophen     Tablet .. 650 milliGRAM(s) Oral every 6 hours PRN  albuterol    90 MICROgram(s) HFA Inhaler 2 Puff(s) Inhalation every 4 hours PRN  ALPRAZolam 0.25 milliGRAM(s) Oral three times a day PRN  ondansetron Injectable 4 milliGRAM(s) IV Push every 8 hours PRN      Case discussed with resident    Care discussed with pt/family           JAKY RODRIGUEZ  79y Female    CHIEF COMPLAINT:    Patient is a 79y old  Female who presents with a chief complaint of shortness of breath (15 Oct 2024 13:17)      INTERVAL HPI/OVERNIGHT EVENTS:    Patient seen and examined. Sitting in a chair. Denies sob on O2. C/O mild cough. No fever. No N/V    ROS: All other systems are negative.    Vital Signs:    T(F): 97.4 (10-16-24 @ 06:00), Max: 97.9 (10-15-24 @ 20:42)  HR: 73 (10-16-24 @ 06:00) (71 - 80)  BP: 156/69 (10-16-24 @ 06:00) (149/74 - 165/74)  RR: 18 (10-16-24 @ 06:00) (18 - 19)  SpO2: 92% (10-16-24 @ 06:00) (92% - 92%)  I&O's Summary    15 Oct 2024 07:01  -  16 Oct 2024 07:00  --------------------------------------------------------  IN: 1198 mL / OUT: 3750 mL / NET: -2552 mL    16 Oct 2024 07:01  -  16 Oct 2024 12:02  --------------------------------------------------------  IN: 118 mL / OUT: 0 mL / NET: 118 mL      Daily     Daily   CAPILLARY BLOOD GLUCOSE          PHYSICAL EXAM:    GENERAL:  NAD  SKIN: No rashes or lesions  HENT: Atraumatic. Normocephalic. PERRL. Moist membranes.  NECK: Supple, No JVD. No lymphadenopathy.  PULMONARY: CTA B/L. No wheezing. No rales  CVS: Normal S1, S2. Rate and Rhythm are regular. No murmurs.  ABDOMEN/GI: Soft, Nontender, Nondistended; BS present  EXTREMITIES: Peripheral pulses intact. No edema B/L LE.  NEUROLOGIC:  No motor or sensory deficit.  PSYCH: Alert & oriented x 3    Consultant(s) Notes Reviewed:  [x ] YES  [ ] NO  Care Discussed with Consultants/Other Providers [ x] YES  [ ] NO    EKG reviewed  Telemetry reviewed    LABS:                        11.6   13.67 )-----------( 449      ( 16 Oct 2024 07:09 )             38.1     10-16    136  |  95[L]  |  40[H]  ----------------------------<  149[H]  4.2   |  23  |  1.0    Ca    9.0      16 Oct 2024 07:09  Mg     2.4     10-16    TPro  6.7  /  Alb  4.0  /  TBili  0.3  /  DBili  x   /  AST  14  /  ALT  14  /  AlkPhos  165[H]  10-16              RADIOLOGY & ADDITIONAL TESTS:    < from: CT Angio Chest PE Protocol w/ IV Cont (10.12.24 @ 21:35) >    IMPRESSION:  1.  No evidence of acute pulmonary embolism.  2.  Right middle and lower lobe consolidations.  3.  Likely reactive mediastinal lymphadenopathy.    < end of copied text >  < from: [facility] Duplex Lower Ext Vein Scan, Bilat (10.13.24 @ 08:24) >  IMPRESSION:  No evidence of deep venous thrombosis in either lower extremity.      < end of copied text >  < from: Xray Chest 1 View- PORTABLE-Routine (10.16.24 @ 05:33) >    IMPRESSION:    Bilateral interstitial opacities, right basilar consolidation.      < end of copied text >    Imaging or report Personally Reviewed:  [x] YES  [ ] NO    Medications:  Standing  albuterol/ipratropium for Nebulization 3 milliLiter(s) Nebulizer every 6 hours  azithromycin  IVPB      azithromycin  IVPB 500 milliGRAM(s) IV Intermittent every 24 hours  cefTRIAXone   IVPB 1000 milliGRAM(s) IV Intermittent every 24 hours  clopidogrel Tablet 75 milliGRAM(s) Oral daily  enoxaparin Injectable 40 milliGRAM(s) SubCutaneous every 24 hours  furosemide    Tablet 40 milliGRAM(s) Oral <User Schedule>  influenza  Vaccine (HIGH DOSE) 0.5 milliLiter(s) IntraMuscular once  levothyroxine 175 MICROGram(s) Oral daily  losartan 25 milliGRAM(s) Oral daily  nicotine - 21 mG/24Hr(s) Patch 1 Patch Transdermal daily  NIFEdipine XL 90 milliGRAM(s) Oral daily  pantoprazole    Tablet 40 milliGRAM(s) Oral two times a day  predniSONE   Tablet 40 milliGRAM(s) Oral daily    PRN Meds  acetaminophen     Tablet .. 650 milliGRAM(s) Oral every 6 hours PRN  albuterol    90 MICROgram(s) HFA Inhaler 2 Puff(s) Inhalation every 4 hours PRN  ALPRAZolam 0.25 milliGRAM(s) Oral three times a day PRN  ondansetron Injectable 4 milliGRAM(s) IV Push every 8 hours PRN      Case discussed with resident    Care discussed with pt/family

## 2024-10-17 ENCOUNTER — TRANSCRIPTION ENCOUNTER (OUTPATIENT)
Age: 79
End: 2024-10-17

## 2024-10-17 VITALS
RESPIRATION RATE: 18 BRPM | DIASTOLIC BLOOD PRESSURE: 77 MMHG | SYSTOLIC BLOOD PRESSURE: 158 MMHG | TEMPERATURE: 99 F | HEART RATE: 74 BPM | OXYGEN SATURATION: 94 %

## 2024-10-17 LAB
ALBUMIN SERPL ELPH-MCNC: 3.7 G/DL — SIGNIFICANT CHANGE UP (ref 3.5–5.2)
ALP SERPL-CCNC: 146 U/L — HIGH (ref 30–115)
ALT FLD-CCNC: 11 U/L — SIGNIFICANT CHANGE UP (ref 0–41)
ANION GAP SERPL CALC-SCNC: 12 MMOL/L — SIGNIFICANT CHANGE UP (ref 7–14)
AST SERPL-CCNC: 11 U/L — SIGNIFICANT CHANGE UP (ref 0–41)
BASOPHILS # BLD AUTO: 0.05 K/UL — SIGNIFICANT CHANGE UP (ref 0–0.2)
BASOPHILS NFR BLD AUTO: 0.4 % — SIGNIFICANT CHANGE UP (ref 0–1)
BILIRUB SERPL-MCNC: 0.2 MG/DL — SIGNIFICANT CHANGE UP (ref 0.2–1.2)
BUN SERPL-MCNC: 38 MG/DL — HIGH (ref 10–20)
CALCIUM SERPL-MCNC: 8.9 MG/DL — SIGNIFICANT CHANGE UP (ref 8.4–10.5)
CHLORIDE SERPL-SCNC: 97 MMOL/L — LOW (ref 98–110)
CO2 SERPL-SCNC: 26 MMOL/L — SIGNIFICANT CHANGE UP (ref 17–32)
CREAT SERPL-MCNC: 0.9 MG/DL — SIGNIFICANT CHANGE UP (ref 0.7–1.5)
CULTURE RESULTS: SIGNIFICANT CHANGE UP
CULTURE RESULTS: SIGNIFICANT CHANGE UP
EGFR: 65 ML/MIN/1.73M2 — SIGNIFICANT CHANGE UP
EOSINOPHIL # BLD AUTO: 0.1 K/UL — SIGNIFICANT CHANGE UP (ref 0–0.7)
EOSINOPHIL NFR BLD AUTO: 0.8 % — SIGNIFICANT CHANGE UP (ref 0–8)
GLUCOSE SERPL-MCNC: 120 MG/DL — HIGH (ref 70–99)
HCT VFR BLD CALC: 33.4 % — LOW (ref 37–47)
HGB BLD-MCNC: 10.2 G/DL — LOW (ref 12–16)
IMM GRANULOCYTES NFR BLD AUTO: 0.7 % — HIGH (ref 0.1–0.3)
LYMPHOCYTES # BLD AUTO: 30.7 % — SIGNIFICANT CHANGE UP (ref 20.5–51.1)
LYMPHOCYTES # BLD AUTO: 4.08 K/UL — HIGH (ref 1.2–3.4)
MAGNESIUM SERPL-MCNC: 2.6 MG/DL — HIGH (ref 1.8–2.4)
MCHC RBC-ENTMCNC: 22.6 PG — LOW (ref 27–31)
MCHC RBC-ENTMCNC: 30.5 G/DL — LOW (ref 32–37)
MCV RBC AUTO: 74.1 FL — LOW (ref 81–99)
MONOCYTES # BLD AUTO: 0.95 K/UL — HIGH (ref 0.1–0.6)
MONOCYTES NFR BLD AUTO: 7.2 % — SIGNIFICANT CHANGE UP (ref 1.7–9.3)
NEUTROPHILS # BLD AUTO: 8.01 K/UL — HIGH (ref 1.4–6.5)
NEUTROPHILS NFR BLD AUTO: 60.2 % — SIGNIFICANT CHANGE UP (ref 42.2–75.2)
NRBC # BLD: 0 /100 WBCS — SIGNIFICANT CHANGE UP (ref 0–0)
PHOSPHATE SERPL-MCNC: 4.5 MG/DL — SIGNIFICANT CHANGE UP (ref 2.1–4.9)
PLATELET # BLD AUTO: 403 K/UL — HIGH (ref 130–400)
PMV BLD: 9.9 FL — SIGNIFICANT CHANGE UP (ref 7.4–10.4)
POTASSIUM SERPL-MCNC: 4.4 MMOL/L — SIGNIFICANT CHANGE UP (ref 3.5–5)
POTASSIUM SERPL-SCNC: 4.4 MMOL/L — SIGNIFICANT CHANGE UP (ref 3.5–5)
PROT SERPL-MCNC: 6.1 G/DL — SIGNIFICANT CHANGE UP (ref 6–8)
RBC # BLD: 4.51 M/UL — SIGNIFICANT CHANGE UP (ref 4.2–5.4)
RBC # FLD: 18 % — HIGH (ref 11.5–14.5)
SODIUM SERPL-SCNC: 135 MMOL/L — SIGNIFICANT CHANGE UP (ref 135–146)
SPECIMEN SOURCE: SIGNIFICANT CHANGE UP
SPECIMEN SOURCE: SIGNIFICANT CHANGE UP
WBC # BLD: 13.28 K/UL — HIGH (ref 4.8–10.8)
WBC # FLD AUTO: 13.28 K/UL — HIGH (ref 4.8–10.8)

## 2024-10-17 PROCEDURE — 99239 HOSP IP/OBS DSCHRG MGMT >30: CPT

## 2024-10-17 PROCEDURE — 99497 ADVNCD CARE PLAN 30 MIN: CPT | Mod: 25

## 2024-10-17 PROCEDURE — 99223 1ST HOSP IP/OBS HIGH 75: CPT

## 2024-10-17 RX ORDER — LOSARTAN POTASSIUM 100 MG/1
1 TABLET, FILM COATED ORAL
Qty: 14 | Refills: 0
Start: 2024-10-17 | End: 2024-10-30

## 2024-10-17 RX ORDER — CEFPODOXIME PROXETIL 50 MG/5 ML
200 SUSPENSION, RECONSTITUTED, ORAL (ML) ORAL EVERY 12 HOURS
Refills: 0 | Status: DISCONTINUED | OUTPATIENT
Start: 2024-10-17 | End: 2024-10-17

## 2024-10-17 RX ADMIN — PANTOPRAZOLE SODIUM 40 MILLIGRAM(S): 40 TABLET, DELAYED RELEASE ORAL at 17:02

## 2024-10-17 RX ADMIN — ALPRAZOLAM 0.25 MILLIGRAM(S): 0.5 TABLET ORAL at 09:25

## 2024-10-17 RX ADMIN — Medication 1 PATCH: at 08:12

## 2024-10-17 RX ADMIN — IPRATROPIUM BROMIDE AND ALBUTEROL SULFATE 3 MILLILITER(S): .5; 3 SOLUTION RESPIRATORY (INHALATION) at 08:39

## 2024-10-17 RX ADMIN — Medication 200 MILLIGRAM(S): at 17:03

## 2024-10-17 RX ADMIN — Medication 1 PATCH: at 11:22

## 2024-10-17 RX ADMIN — PREDNISONE 40 MILLIGRAM(S): 5 TABLET ORAL at 05:08

## 2024-10-17 RX ADMIN — PANTOPRAZOLE SODIUM 40 MILLIGRAM(S): 40 TABLET, DELAYED RELEASE ORAL at 05:09

## 2024-10-17 RX ADMIN — IPRATROPIUM BROMIDE AND ALBUTEROL SULFATE 3 MILLILITER(S): .5; 3 SOLUTION RESPIRATORY (INHALATION) at 13:26

## 2024-10-17 RX ADMIN — FUROSEMIDE 40 MILLIGRAM(S): 10 INJECTION INTRAVENOUS at 05:21

## 2024-10-17 RX ADMIN — Medication 175 MICROGRAM(S): at 05:09

## 2024-10-17 RX ADMIN — LOSARTAN POTASSIUM 25 MILLIGRAM(S): 100 TABLET, FILM COATED ORAL at 05:09

## 2024-10-17 RX ADMIN — Medication 90 MILLIGRAM(S): at 05:09

## 2024-10-17 NOTE — DISCHARGE NOTE PROVIDER - CARE PROVIDER_API CALL
Darrell Hayden  Internal Medicine  [address] [place] [address]  Phone: [phone]  Fax: [phone]  Follow Up Time: 2 weeks

## 2024-10-17 NOTE — PROGRESS NOTE ADULT - SUBJECTIVE AND OBJECTIVE BOX
JAKY RODRIGUEZ  79y Female    CHIEF COMPLAINT:    Patient is a 79y old  Female who presents with a chief complaint of SOB (16 Oct 2024 14:32)      INTERVAL HPI/OVERNIGHT EVENTS:    Patient seen and examined. Sitting in a chair. Feels good. No cough. No sob No fever.     ROS: All other systems are negative.    Vital Signs:    T(F): 97.8 (10-17-24 @ 04:39), Max: 97.8 (10-17-24 @ 04:39)  HR: 69 (10-17-24 @ 04:39) (69 - 75)  BP: 146/67 (10-17-24 @ 04:39) (146/67 - 166/73)  RR: 18 (10-17-24 @ 04:39) (18 - 18)  SpO2: 88% (10-17-24 @ 10:13) (88% - 95%)  I&O's Summary    16 Oct 2024 07:01  -  17 Oct 2024 07:00  --------------------------------------------------------  IN: 703 mL / OUT: 1950 mL / NET: -1247 mL      Daily     Daily Weight in k (17 Oct 2024 04:55)  CAPILLARY BLOOD GLUCOSE          PHYSICAL EXAM:    GENERAL:  NAD  SKIN: No rashes or lesions  HENT: Atraumatic. Normocephalic. PERRL. Moist membranes.  NECK: Supple, No JVD. No lymphadenopathy.  PULMONARY: CTA B/L. No wheezing. No rales  CVS: Normal S1, S2. Rate and Rhythm are regular. No murmurs.  ABDOMEN/GI: Soft, Nontender, Nondistended; BS present  EXTREMITIES: Peripheral pulses intact. No edema B/L LE.  NEUROLOGIC:  No motor or sensory deficit.  PSYCH: Alert & oriented x 3    Consultant(s) Notes Reviewed:  [x ] YES  [ ] NO  Care Discussed with Consultants/Other Providers [ x] YES  [ ] NO    EKG reviewed  Telemetry reviewed    LABS:                        10.2   13.28 )-----------( 403      ( 17 Oct 2024 05:48 )             33.4     10-17    135  |  97[L]  |  38[H]  ----------------------------<  120[H]  4.4   |  26  |  0.9    Ca    8.9      17 Oct 2024 05:48  Phos  4.5     10  Mg     2.6     10-17    TPro  6.1  /  Alb  3.7  /  TBili  0.2  /  DBili  x   /  AST  11  /  ALT  11  /  AlkPhos  146[H]  10-17              RADIOLOGY & ADDITIONAL TESTS:      Imaging or report Personally Reviewed:  [ ] YES  [ ] NO    Medications:  Standing  albuterol/ipratropium for Nebulization 3 milliLiter(s) Nebulizer every 6 hours  cefpodoxime 200 milliGRAM(s) Oral every 12 hours  clopidogrel Tablet 75 milliGRAM(s) Oral daily  enoxaparin Injectable 40 milliGRAM(s) SubCutaneous every 24 hours  furosemide    Tablet 40 milliGRAM(s) Oral <User Schedule>  influenza  Vaccine (HIGH DOSE) 0.5 milliLiter(s) IntraMuscular once  levothyroxine 175 MICROGram(s) Oral daily  losartan 25 milliGRAM(s) Oral daily  nicotine - 21 mG/24Hr(s) Patch 1 Patch Transdermal daily  NIFEdipine XL 90 milliGRAM(s) Oral daily  pantoprazole    Tablet 40 milliGRAM(s) Oral two times a day  predniSONE   Tablet 40 milliGRAM(s) Oral daily    PRN Meds  acetaminophen     Tablet .. 650 milliGRAM(s) Oral every 6 hours PRN  albuterol    90 MICROgram(s) HFA Inhaler 2 Puff(s) Inhalation every 4 hours PRN  ALPRAZolam 0.25 milliGRAM(s) Oral three times a day PRN  ondansetron Injectable 4 milliGRAM(s) IV Push every 8 hours PRN      Case discussed with resident    Care discussed with pt/family

## 2024-10-17 NOTE — DISCHARGE NOTE PROVIDER - HOSPITAL COURSE
77 yo female, current smoker patient known to have: HTN, HLD, Hypothyroidism, PAD s/p stents, hfpef, with recent admission for anemia due to suspected GIB s/p EGD inpatient presents for worsening shortness of breath and cough over the last week. Patient states she was taking PO doxycycline for about 7 days but she started to feel more short of breath over the past two days. She states that several members of her family have been sick with coughs/colds and she has been around them; she did not get tested for COVID/Flu outpatient and does not know if any of her family members were positive. She denies chest pain and abdominal pain. She endorses shortness of breath and cough and endorses chronic constipation and swelling in the legs. She is admitted to medicine for further management.    Vitals  Temp: 97.7  HR: 89  BP: 200/44 (pending repeat)  O2: 82% on RA -> 95% on 4L NC  RR: 25    Labs  WBC: 10.16  HgB: 10.1 (baseline ~9)  ALP: 200  Lactate: 1.3    Imaging  CXR: Bilateral diffuse interstitial opacities. Right basilar opacity.    In the ED: Vancomycin, Cefepime, BCx x2, RVP    On the floors she was seen by ID for her PNA who put her on an abx regimen of ceftriaxone and azithromycin. On dc she is to take cefpodoxime until 10/20. A CTA was neg for PE. Pt was also put on a course of steroids as she is a smoker. Pt is to finish course on dc, will be given prednisone for 3 more days.  Letter of medical necessity written for home O2 as patient O2 sat went down to 87%. Will need until PNA resolves.     #Acute hypoxic respiratory failure likely secondary to RLL PNA  #Active Smoker (last cigarette 2d ago)  #Rapid called during this admission for SOB, see note in chart. ICU team aware, possible upgrade. CXR and blood gas noted. CTA >> no PE  + sick contact   - no improvement on op doxcy   - SOB worsened overnight prompting ED visit  - on admission RR 25,  82% O2 on RA -> 95% on 4L , No sepsis POA   - CXR: Bilateral diffuse interstitial opacities. Right basilar opacity.  - S/p Cefepime and Vanc in ED - as per ED report that the patient and her family refused Levaquin as it has bad effect on her    - procal 0.19   -  blood cultures NTD  - rvp/rsv/flu/covid NEGATIVE   - Trop 30 ( was 36-> 25 on prior admission ), likely demand , no active chest pain   - lactate 1.3 10/12  - s/p IV solumderol  - 5 days of prednisone 40 until 10/20  - Smoking cessation counseling provided, patient wishes to use nicotine patch, wants to quit, Nicotine patch 21mg QD  -CTA>  1.  No evidence of acute pulmonary embolism.  2.  Right middle and lower lobe consolidations.  3.  Likely reactive mediastinal lymphadenopathy.  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 (Hemostasis).  A CBD stent was seen in the ampulla. Removed with a snare. .  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 Biopsies obtained. Not intervened today as the  patient underwent this procedure for a GI bleed. .  - C/w Protonix BID until 10/18 then switch to QD    Discussion of discharge plan of care, including dc diagnoses, med rec, and follow ups, was conducted with Dr. South on 10/17/24, and dc was approved.

## 2024-10-17 NOTE — DISCHARGE NOTE NURSING/CASE MANAGEMENT/SOCIAL WORK - PATIENT PORTAL LINK FT
You can access the FollowMyHealth Patient Portal offered by [facility] by registering at the following website: [email or web address]. By joining [employer]’s FollowMyHealth portal, you will also be able to view your health information using other applications (apps) compatible with our system.

## 2024-10-17 NOTE — DISCHARGE NOTE PROVIDER - NSDCCPCAREPLAN_GEN_ALL_CORE_FT
PRINCIPAL DISCHARGE DIAGNOSIS  Diagnosis: Pneumonia  Assessment and Plan of Treatment: You came in because you were having SOB. You were found to have PNA in your right lower lung. You were seen by the infectious disease team for this and given a course of IV antibiotics during your stay. You are to take oral antibiotics for 2 more days on discharge. You were given a course of steroids during your stay and are to continue this on discharge until 10/20. Please follow up with your PCP for further management outpatient.      SECONDARY DISCHARGE DIAGNOSES  Diagnosis: Hypoxia  Assessment and Plan of Treatment:

## 2024-10-17 NOTE — PROGRESS NOTE ADULT - CONVERSATION DETAILS
Explained her about the diagnosis and prognosis. Also explained her about resuscitation and intubation. Pt is undecided yet. Will call the palliative care evaluation.

## 2024-10-17 NOTE — DISCHARGE NOTE NURSING/CASE MANAGEMENT/SOCIAL WORK - NSDCPEFALRISK_GEN_ALL_CORE
For information on Fall & Injury Prevention, visit: [email or web address] OR  [email or web address] OR  https://www.cdc.gov/steadi/patient.html

## 2024-10-17 NOTE — DISCHARGE NOTE PROVIDER - NSDCMRMEDTOKEN_GEN_ALL_CORE_FT
furosemide 40 mg oral tablet: 1 tab(s) orally every other day  levothyroxine 175 mcg (0.175 mg) oral tablet: 1 tab(s) orally once a day  NIFEdipine 60 mg oral tablet, extended release: 1 tab(s) orally once a day  pantoprazole 40 mg oral delayed release tablet: 1 tab(s) orally once a day  Plavix 75 mg oral tablet: 1 tab(s) orally once a day restart after dental procedure   cefpodoxime 200 mg oral tablet: 1 tab(s) orally every 12 hours  furosemide 40 mg oral tablet: 1 tab(s) orally every other day  levothyroxine 175 mcg (0.175 mg) oral tablet: 1 tab(s) orally once a day  losartan 25 mg oral tablet: 1 tab(s) orally once a day  NIFEdipine 90 mg oral tablet, extended release: 1 tab(s) orally once a day  pantoprazole 40 mg oral delayed release tablet: 1 tab(s) orally once a day  Plavix 75 mg oral tablet: 1 tab(s) orally once a day restart after dental procedure  predniSONE 20 mg oral tablet: 2 tab(s) orally once a day

## 2024-10-17 NOTE — DISCHARGE NOTE NURSING/CASE MANAGEMENT/SOCIAL WORK - FINANCIAL ASSISTANCE
[facility] provides services at a reduced cost to those who are determined to be eligible through [facility]’s financial assistance program. Information regarding [facility]’s financial assistance program can be found by going to [email or web address] or by calling [phone].

## 2024-10-17 NOTE — PROGRESS NOTE ADULT - ASSESSMENT
79 yo female, current smoker patient known to have: HTN, HLD, Hypothyroidism, PAD s/p stents, HFpEF, with recent admission for anemia due to suspected GIB s/p EGD inpatient presents for worsening shortness of breath and cough over the last week. Patient states she was taking PO doxycycline for about 7 days but she started to feel more short of breath over the past two days.     Acute hypoxic respiratory failure likely secondary to RML & RLL PNA  RML & RLL PNA  Tobacco use  HTN / DL  H/O PAD s/p stents  Chronic HFpEF  No Sepsis               PLAN:    ·	Tele reviewed. No events.   ·	CTA chest reviewed. No acute PE. Right middle and lower lobe consolidations.  ·	Venous doppler of LE is negative for DVT  ·	Can d/c Rocephin and Azithromycin. Switch her to Cefpodoxime 200 mg po q 12h for two more days.   ·	ID eval noted. Recommended one week of Abx. On d/c will switch her to oral Abx  ·	Blood cxs x 4 are negative. MRSA PCR is also negative  ·	RA POX on exertion is >88% today  ·	Cont Prednisone 40 mg po daily for three more days.    ·	Counselled to quit smoking  ·	Cont her other home meds  ·	D/C home    * Med rec reviewed. Plan of care d/w the pt. Time spent 41 minutes.

## 2024-10-17 NOTE — CONSULT NOTE ADULT - SUBJECTIVE AND OBJECTIVE BOX
KIKORICARDOJAKY ADAMS  78y, Female  Allergy: codeine (Stomach Upset; Vomiting; Nausea)      All historical available data reviewed.    HPI:  79 yo female, current smoker patient known to have: HTN, HLD, Hypothyroidism, PAD s/p stents, hfpef, with recent admission for anemia due to suspected GIB s/p EGD inpatient presents for worsening shortness of breath and cough over the last week. Patient states she was taking PO doxycycline for about 7 days but she started to feel more short of breath over the past two days. She states that several members of her family have been sick with coughs/colds and she has been around them; she did not get tested for COVID/Flu outpatient and does not know if any of her family members were positive. She denies chest pain and abdominal pain. She endorses shortness of breath and cough and endorses chronic constipation and swelling in the legs. She is admitted to medicine for further management.    Vitals  Temp: 97.7  HR: 89  BP: 200/44 (pending repeat)  O2: 82% on RA -> 95% on 4L NC  RR: 25    Labs  WBC: 10.16  HgB: 10.1 (baseline ~9)  ALP: 200  Lactate: 1.3    Imaging  CXR: Bilateral diffuse interstitial opacities. Right basilar opacity.    In the ED  Vancomycin  Cefepime  BCx x2  RVP (12 Oct 2024 13:26)    FAMILY HISTORY:  Family history of breast cancer (Sibling)    FH: lung cancer  FATHER      PAST MEDICAL & SURGICAL HISTORY:  Arterial insufficiency of lower extremity  left leg stent placed      Leg swelling      Hypothyroid      HTN (hypertension)      High cholesterol      Peripheral arterial disease      H/O Graves' disease      History of cholecystectomy      H/O: hysterectomy      S/P angiogram of extremity      S/P ERCP            VITALS:  T(F): 97.9, Max: 97.9 (10-12-24 @ 16:00)  HR: 74  BP: 171/67  RR: 18Vital Signs Last 24 Hrs  T(C): 36.6 (12 Oct 2024 16:00), Max: 36.6 (12 Oct 2024 16:00)  T(F): 97.9 (12 Oct 2024 16:00), Max: 97.9 (12 Oct 2024 16:00)  HR: 74 (12 Oct 2024 22:55) (74 - 89)  BP: 171/67 (12 Oct 2024 19:00) (93/75 - 200/44)  BP(mean): 103 (12 Oct 2024 17:38) (103 - 107)  RR: 18 (12 Oct 2024 16:00) (18 - 25)  SpO2: 98% (12 Oct 2024 22:55) (95% - 98%)    Parameters below as of 12 Oct 2024 16:00  Patient On (Oxygen Delivery Method): room air        TESTS & MEASUREMENTS:                        10.1   10.16 )-----------( 382      ( 12 Oct 2024 10:39 )             33.8     10-12    138  |  102  |  19  ----------------------------<  166[H]  4.2   |  23  |  0.7    Ca    9.5      12 Oct 2024 10:39    TPro  6.8  /  Alb  4.1  /  TBili  0.3  /  DBili  x   /  AST  13  /  ALT  16  /  AlkPhos  200[H]  10-12    LIVER FUNCTIONS - ( 12 Oct 2024 10:39 )  Alb: 4.1 g/dL / Pro: 6.8 g/dL / ALK PHOS: 200 U/L / ALT: 16 U/L / AST: 13 U/L / GGT: x             Urinalysis Basic - ( 12 Oct 2024 10:39 )    Color: x / Appearance: x / SG: x / pH: x  Gluc: 166 mg/dL / Ketone: x  / Bili: x / Urobili: x   Blood: x / Protein: x / Nitrite: x   Leuk Esterase: x / RBC: x / WBC x   Sq Epi: x / Non Sq Epi: x / Bacteria: x          RADIOLOGY & ADDITIONAL TESTS:  Personal review of radiological diagnostics performed  Echo and EKG results noted when applicable.     MEDICATIONS:  acetaminophen     Tablet .. 650 milliGRAM(s) Oral every 6 hours PRN  albuterol    90 MICROgram(s) HFA Inhaler 2 Puff(s) Inhalation every 4 hours PRN  albuterol/ipratropium for Nebulization 3 milliLiter(s) Nebulizer every 6 hours  azithromycin  IVPB      azithromycin  IVPB 500 milliGRAM(s) IV Intermittent every 24 hours  cefTRIAXone   IVPB 1000 milliGRAM(s) IV Intermittent every 24 hours  clopidogrel Tablet 75 milliGRAM(s) Oral daily  enoxaparin Injectable 40 milliGRAM(s) SubCutaneous every 24 hours  furosemide    Tablet 40 milliGRAM(s) Oral <User Schedule>  levothyroxine 175 MICROGram(s) Oral daily  methylPREDNISolone sodium succinate Injectable 60 milliGRAM(s) IV Push two times a day  nicotine - 21 mG/24Hr(s) Patch 1 Patch Transdermal daily  NIFEdipine XL 60 milliGRAM(s) Oral daily  ondansetron Injectable 4 milliGRAM(s) IV Push every 8 hours PRN  pantoprazole    Tablet 40 milliGRAM(s) Oral two times a day      ANTIBIOTICS:  azithromycin  IVPB      azithromycin  IVPB 500 milliGRAM(s) IV Intermittent every 24 hours  cefTRIAXone   IVPB 1000 milliGRAM(s) IV Intermittent every 24 hours    
Patient is a 79y old  Female who presents with a chief complaint of shortness of breath (15 Oct 2024 13:17)      HPI:  77 yo female, current smoker patient known to have: HTN, HLD, Hypothyroidism, PAD s/p stents, hfpef, with recent admission for anemia due to suspected GIB s/p EGD inpatient presents for worsening shortness of breath and cough over the last week. Patient states she was taking PO doxycycline for about 7 days but she started to feel more short of breath over the past two days. She states that several members of her family have been sick with coughs/colds and she has been around them; she did not get tested for COVID/Flu outpatient and does not know if any of her family members were positive. She denies chest pain and abdominal pain. She endorses shortness of breath and cough and endorses chronic constipation and swelling in the legs. She is admitted to medicine for further management.    Vitals  Temp: 97.7  HR: 89  BP: 200/44 (pending repeat)  O2: 82% on RA -> 95% on 4L NC  RR: 25    Labs  WBC: 10.16  HgB: 10.1 (baseline ~9)  ALP: 200  Lactate: 1.3    Imaging  CXR: Bilateral diffuse interstitial opacities. Right basilar opacity.    In the ED  Vancomycin  Cefepime  BCx x2  RVP (12 Oct 2024 13:26)      PAST MEDICAL & SURGICAL HISTORY:  Arterial insufficiency of lower extremity  left leg stent placed      Leg swelling      Hypothyroid      HTN (hypertension)      High cholesterol      Peripheral arterial disease      H/O Graves' disease      History of cholecystectomy      H/O: hysterectomy      S/P angiogram of extremity      S/P ERCP          PREVIOUS DIAGNOSTIC TESTING:      ECHO  FINDINGS:    STRESS  FINDINGS:    CATHETERIZATION  FINDINGS:    MEDICATIONS  (STANDING):  albuterol/ipratropium for Nebulization 3 milliLiter(s) Nebulizer every 6 hours  azithromycin  IVPB      azithromycin  IVPB 500 milliGRAM(s) IV Intermittent every 24 hours  cefTRIAXone   IVPB 1000 milliGRAM(s) IV Intermittent every 24 hours  clopidogrel Tablet 75 milliGRAM(s) Oral daily  enoxaparin Injectable 40 milliGRAM(s) SubCutaneous every 24 hours  furosemide    Tablet 40 milliGRAM(s) Oral <User Schedule>  influenza  Vaccine (HIGH DOSE) 0.5 milliLiter(s) IntraMuscular once  levothyroxine 175 MICROGram(s) Oral daily  losartan 25 milliGRAM(s) Oral daily  nicotine - 21 mG/24Hr(s) Patch 1 Patch Transdermal daily  NIFEdipine XL 90 milliGRAM(s) Oral daily  pantoprazole    Tablet 40 milliGRAM(s) Oral two times a day  predniSONE   Tablet 40 milliGRAM(s) Oral daily    MEDICATIONS  (PRN):  acetaminophen     Tablet .. 650 milliGRAM(s) Oral every 6 hours PRN Temp greater or equal to 38C (100.4F), Mild Pain (1 - 3), Moderate Pain (4 - 6)  albuterol    90 MICROgram(s) HFA Inhaler 2 Puff(s) Inhalation every 4 hours PRN Shortness of Breath and/or Wheezing  ALPRAZolam 0.25 milliGRAM(s) Oral three times a day PRN for anxiety  ondansetron Injectable 4 milliGRAM(s) IV Push every 8 hours PRN Nausea and/or Vomiting      FAMILY HISTORY:  Family history of breast cancer (Sibling)    FH: lung cancer  FATHER        SOCIAL HISTORY:  CIGARETTES:    ALCOHOL:    REVIEW OF SYSTEMS:  CONSTITUTIONAL: No fever, weight loss, or fatigue  NECK: No pain or stiffness  RESPIRATORY: No cough, wheezing, chills or hemoptysis; No shortness of breath  CARDIOVASCULAR: No chest pain, palpitations, dizziness, or leg swelling  GASTROINTESTINAL: No abdominal or epigastric pain. No nausea, vomiting, or hematemesis; No diarrhea or constipation. No melena or hematochezia.  GENITOURINARY: No dysuria, frequency, hematuria, or incontinence  NEUROLOGICAL: No headaches, memory loss, loss of strength, numbness, or tremors  SKIN: No itching, burning, rashes, or lesions   ENDOCRINE: No heat or cold intolerance; No hair loss  MUSCULOSKELETAL: No joint pain or swelling; No muscle, back, or extremity pain  HEME/LYMPH: No easy bruising, or bleeding gums          Vital Signs Last 24 Hrs  T(C): 36.3 (16 Oct 2024 06:00), Max: 36.6 (15 Oct 2024 20:42)  T(F): 97.4 (16 Oct 2024 06:00), Max: 97.9 (15 Oct 2024 20:42)  HR: 73 (16 Oct 2024 06:00) (71 - 80)  BP: 156/69 (16 Oct 2024 06:00) (149/74 - 165/74)  BP(mean): 98 (16 Oct 2024 06:00) (98 - 98)  RR: 18 (16 Oct 2024 06:00) (18 - 19)  SpO2: 92% (16 Oct 2024 06:00) (92% - 92%)    Parameters below as of 16 Oct 2024 06:00  Patient On (Oxygen Delivery Method): nasal cannula  O2 Flow (L/min): 2          PHYSICAL EXAM:  GENERAL: NAD, well-groomed, well-developed  HEAD:  Atraumatic, Normocephalic  NECK: Supple, No JVD, Normal thyroid  NERVOUS SYSTEM:  Alert & Oriented X3, Good concentration  CHEST/LUNG: Clear to percussion bilaterally; No rales, rhonchi, wheezing, or rubs  HEART: Regular rate and rhythm; No murmurs, rubs, or gallops  ABDOMEN: Soft, Nontender, Nondistended; Bowel sounds present  EXTREMITIES:  2+ Peripheral Pulses, No clubbing, cyanosis, or edema  SKIN: No rashes or lesions    INTERPRETATION OF TELEMETRY:    ECG:    I&O's Detail    15 Oct 2024 07:01  -  16 Oct 2024 07:00  --------------------------------------------------------  IN:    IV PiggyBack: 50 mL    Oral Fluid: 1148 mL  Total IN: 1198 mL    OUT:    Voided (mL): 3750 mL  Total OUT: 3750 mL    Total NET: -2552 mL          LABS:                        11.6   13.67 )-----------( 449      ( 16 Oct 2024 07:09 )             38.1     10-16    136  |  95[L]  |  40[H]  ----------------------------<  149[H]  4.2   |  23  |  1.0    Ca    9.0      16 Oct 2024 07:09  Mg     2.4     10-16    TPro  6.7  /  Alb  4.0  /  TBili  0.3  /  DBili  x   /  AST  14  /  ALT  14  /  AlkPhos  165[H]  10-16          Urinalysis Basic - ( 16 Oct 2024 07:09 )    Color: x / Appearance: x / SG: x / pH: x  Gluc: 149 mg/dL / Ketone: x  / Bili: x / Urobili: x   Blood: x / Protein: x / Nitrite: x   Leuk Esterase: x / RBC: x / WBC x   Sq Epi: x / Non Sq Epi: x / Bacteria: x      I&O's Summary    15 Oct 2024 07:01  -  16 Oct 2024 07:00  --------------------------------------------------------  IN: 1198 mL / OUT: 3750 mL / NET: -2552 mL        RADIOLOGY & ADDITIONAL STUDIES:        furosemide    Tablet 40 milliGRAM(s) Oral <User Schedule>  losartan 25 milliGRAM(s) Oral daily  NIFEdipine XL 90 milliGRAM(s) Oral daily    
[facility]  Kalen Guadarrama Palliative Care Attending  Contact Info: Call [phone] (HEAL Line) or message on Microsoft Teams    HPI:  79 yo female, current smoker patient known to have: HTN, HLD, Hypothyroidism, PAD s/p stents, hfpef, with recent admission for anemia due to suspected GIB s/p EGD inpatient presents for worsening shortness of breath and cough over the last week. Patient states she was taking PO doxycycline for about 7 days but she started to feel more short of breath over the past two days. She states that several members of her family have been sick with coughs/colds and she has been around them; she did not get tested for COVID/Flu outpatient and does not know if any of her family members were positive. She denies chest pain and abdominal pain. She endorses shortness of breath and cough and endorses chronic constipation and swelling in the legs. She is admitted to medicine for further management.    Vitals  Temp: 97.7  HR: 89  BP: 200/44 (pending repeat)  O2: 82% on RA -> 95% on 4L NC  RR: 25    Labs  WBC: 10.16  HgB: 10.1 (baseline ~9)  ALP: 200  Lactate: 1.3    Imaging  CXR: Bilateral diffuse interstitial opacities. Right basilar opacity.    In the ED  Vancomycin  Cefepime  BCx x2  RVP (12 Oct 2024 13:26)    PERTINENT PM/SXH:   Arterial insufficiency of lower extremity    Leg swelling    Hypothyroid    HTN (hypertension)    High cholesterol    Peripheral arterial disease    Dyslipidemia    H/O Graves' disease      History of cholecystectomy    H/O: hysterectomy    S/P angiogram of extremity    S/P ERCP      FAMILY HISTORY:  Family history of breast cancer (Sibling)    FH: lung cancer  FATHER      ITEMS NOT CHECKED ARE NOT PRESENT    SOCIAL HISTORY:   Significant other/partner:  []  Children:  []   Substance hx:  []   Tobacco hx:  []   Alcohol hx: []   Home Opioid hx:  [] I-Stop Reference No:  - no active Rx's / see chart note  Living Situation: []Home  []Long term care  []Rehab []Other  Church/Spiritual practice: ; Role of organized [employer] [ ] important [ ] some [ ] unable to assess  Coping: [ ] well [ ] with difficulty [ ] poor coping [ ] unable to assess  Support system: [ ] strong [ ] adequate [ ] inadequate    ADVANCE DIRECTIVES:    []MOLST  []Living Will  DECISION MAKER(s):  [] Health Care Proxy(s)  [] Surrogate(s)  [] Guardian           Name(s)/Phone Number(s):     BASELINE (I)ADLs (prior to admission):  [place]: []Total  [] Moderate []Dependent    ALLERGIES:  codeine (Stomach Upset; Vomiting; Nausea)    MEDICATIONS  (STANDING):  albuterol/ipratropium for Nebulization 3 milliLiter(s) Nebulizer every 6 hours  cefpodoxime 200 milliGRAM(s) Oral every 12 hours  clopidogrel Tablet 75 milliGRAM(s) Oral daily  enoxaparin Injectable 40 milliGRAM(s) SubCutaneous every 24 hours  furosemide    Tablet 40 milliGRAM(s) Oral <User Schedule>  influenza  Vaccine (HIGH DOSE) 0.5 milliLiter(s) IntraMuscular once  levothyroxine 175 MICROGram(s) Oral daily  losartan 25 milliGRAM(s) Oral daily  nicotine - 21 mG/24Hr(s) Patch 1 Patch Transdermal daily  NIFEdipine XL 90 milliGRAM(s) Oral daily  pantoprazole    Tablet 40 milliGRAM(s) Oral two times a day  predniSONE   Tablet 40 milliGRAM(s) Oral daily    MEDICATIONS  (PRN):  acetaminophen     Tablet .. 650 milliGRAM(s) Oral every 6 hours PRN Temp greater or equal to 38C (100.4F), Mild Pain (1 - 3), Moderate Pain (4 - 6)  albuterol    90 MICROgram(s) HFA Inhaler 2 Puff(s) Inhalation every 4 hours PRN Shortness of Breath and/or Wheezing  ALPRAZolam 0.25 milliGRAM(s) Oral three times a day PRN for anxiety  ondansetron Injectable 4 milliGRAM(s) IV Push every 8 hours PRN Nausea and/or Vomiting    PRESENT SYMPTOMS: []Unable to obtain due to poor mentation/encephalopathy  Source if other than patient:  []Family   []Team     Pain: [ ] yes [ ] no  QOL impact -   Location -                    Aggravating Factors -  Quality -  Radiation -  Timing -  Severity (0-10 scale) -   Minimal Acceptable Level (0-10 scale) -    PAIN AD Score:  [email or web address] (press ctrl +  left click to view)    Dyspnea:                           [ ]Mild  [ ]Moderate [ ]Severe  Anxiety:                             [ ]Mild [ ]Moderate [ ]Severe  Fatigue:                             [ ]Mild [ ]Moderate [ ]Severe  Nausea:                             [ ]Mild [ ]Moderate [ ]Severe  Loss of Appetite:             [ ]Mild [ ]Moderate [ ]Severe  Constipation:                   [ ]Mild [ ]Moderate [ ]Severe    Other Symptoms:  [ ]All other review of systems negative     Palliative Performance Status Version 2:  %    [email or web address]    PHYSICAL EXAM:  GENERAL:  [ ]Alert  [ ]Oriented x   [ ]Lethargic  [ ]Cachexia  [ ]Unarousable  [ ]Verbal  [ ]Non-Verbal  Behavioral:   [ ]Anxiety  [ ]Delirium [ ]Agitation [ ]Cooperative  HEENT:  [ ]Normal   [ ]Dry mouth   [ ]ET Tube/Trach  [ ]Oral lesions  PULMONARY:   [ ]Clear []Tachypnea  []Audible excessive secretions   [ ]Rhonchi        [ ]Right [ ]Left [ ]Bilateral  [ ]Crackles        [ ]Right [ ]Left [ ]Bilateral  [ ]Wheezing     [ ]Right [ ]Left [ ]Bilateral  CARDIOVASCULAR:    [ ]Regular [ ]Irregular [ ]Tachy  [ ]Tad [ ]Murmur [ ]Other  GASTROINTESTINAL:  [ ]Soft  [ ]Distended   [ ]+BS  [ ]Non tender [ ]Tender  [ ]PEG [ ]OGT/ NGT  Last BM:     GENITOURINARY:  [ ]Normal [ ] Incontinent   [ ]Oliguria/Anuria   [ ][place]  MUSCULOSKELETAL:   [ ]Normal   [ ]Weakness  [ ]Bed/Wheelchair bound [ ]Edema  NEUROLOGIC:   [ ]No focal deficits  [ ]Cognitive impairment  [ ]Dysphagia [ ]Dysarthria [ ]Paresis [ ]Encephalopathic   SKIN:   [ ]Normal   [ ]Pressure ulcer(s)  [ ]Rash    CRITICAL CARE:  [ ]Shock Present  [ ]Septic [ ]Cardiogenic [ ]Neurologic [ ]Hypovolemic  [ ]Vasopressors [ ]Inotropes   [ ]Respiratory failure present [ ]Mechanical Ventilation [ ]Non-invasive ventilatory support [ ]High-Flow  [ ]Acute  [ ]Chronic [ ]Hypoxic  [ ]Hypercarbic  [ ]Other organ failure    Vital Signs Last 24 Hrs  T(C): 36.6 (17 Oct 2024 04:39), Max: 36.6 (17 Oct 2024 04:39)  T(F): 97.8 (17 Oct 2024 04:39), Max: 97.8 (17 Oct 2024 04:39)  HR: 69 (17 Oct 2024 04:39) (69 - 74)  BP: 146/67 (17 Oct 2024 04:39) (146/67 - 152/73)  BP(mean): --  RR: 18 (17 Oct 2024 04:39) (18 - 18)  SpO2: 88% (17 Oct 2024 10:13) (88% - 94%)    Parameters below as of 17 Oct 2024 10:13  Patient On (Oxygen Delivery Method): room air     I&O's Summary    16 Oct 2024 07:01  -  17 Oct 2024 07:00  --------------------------------------------------------  IN: 703 mL / OUT: 1950 mL / NET: -1247 mL        LABS:                        10.2   13.28 )-----------( 403      ( 17 Oct 2024 05:48 )             33.4   10-17    135  |  97[L]  |  38[H]  ----------------------------<  120[H]  4.4   |  26  |  0.9    Ca    8.9      17 Oct 2024 05:48  Phos  4.5     10-17  Mg     2.6     10-17    TPro  6.1  /  Alb  3.7  /  TBili  0.2  /  DBili  x   /  AST  11  /  ALT  11  /  AlkPhos  146[H]  10-17    Urinalysis Basic - ( 17 Oct 2024 05:48 )    Color: x / Appearance: x / SG: x / pH: x  Gluc: 120 mg/dL / Ketone: x  / Bili: x / Urobili: x   Blood: x / Protein: x / Nitrite: x   Leuk Esterase: x / RBC: x / WBC x   Sq Epi: x / Non Sq Epi: x / Bacteria: x      RADIOLOGY & ADDITIONAL STUDIES:      PROTEIN CALORIE MALNUTRITION PRESENT: [ ]mild [ ]moderate [ ]severe [ ]underweight [ ]morbid obesity  [ ]PPSV2 < or = to 30% [ ]significant weight loss  [ ]poor nutritional intake [ ]catabolic state [ ]anasarca     Artificial Nutrition [ ]     REFERRALS:  [x]Social Work  [ ]Case management [ ]PT/OT [ ]Chaplaincy  [ ]Hospice  [ ]Patient/Family Support    DISCUSSION OF CASE: Family - to obtain additional history and to provide emotional support; ( ) -

## 2024-10-18 ENCOUNTER — TRANSCRIPTION ENCOUNTER (OUTPATIENT)
Age: 79
End: 2024-10-18

## 2024-10-18 RX ORDER — CEFPODOXIME PROXETIL 50 MG/5 ML
1 SUSPENSION, RECONSTITUTED, ORAL (ML) ORAL
Qty: 4 | Refills: 0
Start: 2024-10-18 | End: 2024-10-19

## 2024-10-18 RX ORDER — PREDNISONE 5 MG/1
2 TABLET ORAL
Qty: 6 | Refills: 0
Start: 2024-10-18 | End: 2024-10-20

## 2024-10-21 ENCOUNTER — TRANSCRIPTION ENCOUNTER (OUTPATIENT)
Age: 79
End: 2024-10-21

## 2024-10-22 ENCOUNTER — TRANSCRIPTION ENCOUNTER (OUTPATIENT)
Age: 79
End: 2024-10-22

## 2024-10-25 DIAGNOSIS — I11.0 HYPERTENSIVE HEART DISEASE WITH HEART FAILURE: ICD-10-CM

## 2024-10-25 DIAGNOSIS — F17.210 NICOTINE DEPENDENCE, CIGARETTES, UNCOMPLICATED: ICD-10-CM

## 2024-10-25 DIAGNOSIS — Z88.5 ALLERGY STATUS TO NARCOTIC AGENT: ICD-10-CM

## 2024-10-25 DIAGNOSIS — J15.9 UNSPECIFIED BACTERIAL PNEUMONIA: ICD-10-CM

## 2024-10-25 DIAGNOSIS — K59.09 OTHER CONSTIPATION: ICD-10-CM

## 2024-10-25 DIAGNOSIS — Z71.6 TOBACCO ABUSE COUNSELING: ICD-10-CM

## 2024-10-25 DIAGNOSIS — D13.2 BENIGN NEOPLASM OF DUODENUM: ICD-10-CM

## 2024-10-25 DIAGNOSIS — E03.9 HYPOTHYROIDISM, UNSPECIFIED: ICD-10-CM

## 2024-10-25 DIAGNOSIS — Z79.890 HORMONE REPLACEMENT THERAPY: ICD-10-CM

## 2024-10-25 DIAGNOSIS — Z79.01 LONG TERM (CURRENT) USE OF ANTICOAGULANTS: ICD-10-CM

## 2024-10-25 DIAGNOSIS — I50.32 CHRONIC DIASTOLIC (CONGESTIVE) HEART FAILURE: ICD-10-CM

## 2024-10-25 DIAGNOSIS — I73.9 PERIPHERAL VASCULAR DISEASE, UNSPECIFIED: ICD-10-CM

## 2024-10-25 DIAGNOSIS — D64.9 ANEMIA, UNSPECIFIED: ICD-10-CM

## 2024-10-25 DIAGNOSIS — E78.00 PURE HYPERCHOLESTEROLEMIA, UNSPECIFIED: ICD-10-CM

## 2024-10-25 DIAGNOSIS — J96.01 ACUTE RESPIRATORY FAILURE WITH HYPOXIA: ICD-10-CM

## 2024-10-25 DIAGNOSIS — Z79.02 LONG TERM (CURRENT) USE OF ANTITHROMBOTICS/ANTIPLATELETS: ICD-10-CM

## 2024-10-25 DIAGNOSIS — Z20.9 CONTACT WITH AND (SUSPECTED) EXPOSURE TO UNSPECIFIED COMMUNICABLE DISEASE: ICD-10-CM

## 2024-10-31 ENCOUNTER — TRANSCRIPTION ENCOUNTER (OUTPATIENT)
Age: 79
End: 2024-10-31

## 2024-11-07 ENCOUNTER — TRANSCRIPTION ENCOUNTER (OUTPATIENT)
Age: 79
End: 2024-11-07

## 2024-11-08 NOTE — ASU PATIENT PROFILE, ADULT - PACKS YRS CALCULATION
Medical screening examination initiated.  I have conducted a focused provider triage encounter, findings are as follows:    Brief history of present illness:  generalized abd pain, rib pain.  Told recently that she has a lytic lesion on bone during ct scan at [facility]    Vitals:    11/08/24 1323 11/08/24 1324   BP: 113/85    BP Location: Right arm    Pulse: 95    Resp: (!) 24    Temp:  97.5 °F (36.4 °C)   TempSrc: Oral Oral   SpO2: 95%    Weight: 106.6 kg (235 lb)        Pertinent physical exam:  nad, alert    Brief workup plan:  initiate labs    Preliminary workup initiated; this workup will be continued and followed by the physician or advanced practice provider that is assigned to the patient when roomed.   0

## 2024-11-19 ENCOUNTER — RESULT REVIEW (OUTPATIENT)
Age: 79
End: 2024-11-19

## 2024-11-19 ENCOUNTER — TRANSCRIPTION ENCOUNTER (OUTPATIENT)
Age: 79
End: 2024-11-19

## 2024-11-23 ENCOUNTER — TRANSCRIPTION ENCOUNTER (OUTPATIENT)
Age: 79
End: 2024-11-23

## 2024-12-03 ENCOUNTER — APPOINTMENT (OUTPATIENT)
Dept: SURGERY | Facility: CLINIC | Age: 79
End: 2024-12-03
Payer: MEDICARE

## 2024-12-03 VITALS
OXYGEN SATURATION: 98 % | BODY MASS INDEX: 30.82 KG/M2 | DIASTOLIC BLOOD PRESSURE: 80 MMHG | HEIGHT: 65 IN | WEIGHT: 185 LBS | HEART RATE: 76 BPM | SYSTOLIC BLOOD PRESSURE: 142 MMHG

## 2024-12-03 DIAGNOSIS — D13.5 BENIGN NEOPLASM OF EXTRAHEPATIC BILE DUCTS: ICD-10-CM

## 2024-12-03 PROCEDURE — 99205 OFFICE O/P NEW HI 60 MIN: CPT

## 2024-12-18 ENCOUNTER — OUTPATIENT (OUTPATIENT)
Dept: OUTPATIENT SERVICES | Facility: HOSPITAL | Age: 79
LOS: 1 days | End: 2024-12-18

## 2024-12-18 DIAGNOSIS — Z90.49 ACQUIRED ABSENCE OF OTHER SPECIFIED PARTS OF DIGESTIVE TRACT: Chronic | ICD-10-CM

## 2024-12-18 DIAGNOSIS — Z98.890 OTHER SPECIFIED POSTPROCEDURAL STATES: Chronic | ICD-10-CM

## 2024-12-18 DIAGNOSIS — Z90.710 ACQUIRED ABSENCE OF BOTH CERVIX AND UTERUS: Chronic | ICD-10-CM

## 2024-12-18 DIAGNOSIS — D13.5 BENIGN NEOPLASM OF EXTRAHEPATIC BILE DUCTS: ICD-10-CM

## 2024-12-19 ENCOUNTER — APPOINTMENT (OUTPATIENT)
Dept: GASTROENTEROLOGY | Facility: CLINIC | Age: 79
End: 2024-12-19
Payer: MEDICARE

## 2024-12-19 DIAGNOSIS — D13.5 BENIGN NEOPLASM OF EXTRAHEPATIC BILE DUCTS: ICD-10-CM

## 2024-12-19 PROCEDURE — 99442: CPT | Mod: 93

## 2024-12-20 NOTE — PATIENT PROFILE ADULT - NSPRONUTRITIONRISK_GEN_A_NUR
Physical Therapy Treatment    Patient Name: Keith Matos  MRN: [id number]  Today's Date: 12/20/2024     PT Therapeutic Procedures Time Entry  Therapeutic Exercise Time Entry: 36                   Current Problem  1. Left hip pain        2. Antalgic gait            Insurance   ANTHEM BMN PT COPAY 0 DED [id number]  COVERAGE 100 OOP [id number]   NO AUTH REQ  REF# HANSA [id number]      Visit: 2/8  Precautions  Precautions  Precautions Comment: LLE WBAT, posterior REYES precautions      Subjective   Pt stating he has most difficulty sleeping. No issues with his HEP.     Pain  Pain Assessment: 0-10  0-10 (Numeric) Pain Score: 0 - No pain      Objective     Treatments:  Nu-step L4 8 min  SLR: 2x10  Side lying hip abduction: 2x10  Bridge with arms up or crossed over chest: 2x10 with 3 second hold    Supine hip flexor stretch: 2x30 seconds  Mini squat:  2x10  3 way hip: RTB x15 each   Step ups F/L 6 in x15  Marches 2x10    Assessment:  Pt continues to do extremely well with exercises without any pain reported. Added standing marches, Nu-step, and step ups. Some lateral hip discomfort noted after treatment stating it was about 1-2/10 but tolerable.     Plan:  Add side stepping     
No indicators present

## 2024-12-29 NOTE — ASU PATIENT PROFILE, ADULT - SURGICAL SITE INCISION
HTN not well controlled. She has only been on Lisinopril 5mg, BP today 163/115. She has been having worsening hot flashes at home.   - At this point, patient needs increase of her BP regimen. Will increased Lisinopril to 20mg daily. I advised patient to take at night time for improved CV benefit with dipping of BP. Rx sent.   - I anticipate we will need to increase regimen again at next appt.   - She had a hysterectomy, so no need for BC with ACE/ARB tx.   - Due for labs: CBC, CMP, Lipid, TSH, A1c.   - Will follow-up HTN in 6wks.   - If needs additional regimen, consider uptitrating Lisinopril or adding BB.    no

## 2024-12-31 ENCOUNTER — NON-APPOINTMENT (OUTPATIENT)
Age: 79
End: 2024-12-31

## 2025-01-01 ENCOUNTER — APPOINTMENT (OUTPATIENT)
Dept: OPHTHALMOLOGY | Facility: CLINIC | Age: 80
End: 2025-01-01

## 2025-01-01 ENCOUNTER — INPATIENT (INPATIENT)
Facility: HOSPITAL | Age: 80
LOS: 9 days | DRG: 446 | End: 2025-08-06
Attending: INTERNAL MEDICINE | Admitting: STUDENT IN AN ORGANIZED HEALTH CARE EDUCATION/TRAINING PROGRAM
Payer: MEDICARE

## 2025-01-01 ENCOUNTER — TRANSCRIPTION ENCOUNTER (OUTPATIENT)
Age: 80
End: 2025-01-01

## 2025-01-01 ENCOUNTER — INPATIENT (INPATIENT)
Facility: HOSPITAL | Age: 80
LOS: 3 days | Discharge: HOME | DRG: 444 | End: 2025-02-08
Attending: INTERNAL MEDICINE | Admitting: STUDENT IN AN ORGANIZED HEALTH CARE EDUCATION/TRAINING PROGRAM
Payer: MEDICARE

## 2025-01-01 ENCOUNTER — RESULT REVIEW (OUTPATIENT)
Age: 80
End: 2025-01-01

## 2025-01-01 VITALS
HEART RATE: 72 BPM | RESPIRATION RATE: 18 BRPM | TEMPERATURE: 98 F | DIASTOLIC BLOOD PRESSURE: 62 MMHG | SYSTOLIC BLOOD PRESSURE: 120 MMHG

## 2025-01-01 VITALS
SYSTOLIC BLOOD PRESSURE: 171 MMHG | RESPIRATION RATE: 16 BRPM | DIASTOLIC BLOOD PRESSURE: 67 MMHG | HEART RATE: 75 BPM | HEIGHT: 65 IN | TEMPERATURE: 98 F | WEIGHT: 175.93 LBS | OXYGEN SATURATION: 95 %

## 2025-01-01 VITALS
OXYGEN SATURATION: 99 % | HEART RATE: 90 BPM | WEIGHT: 190.04 LBS | TEMPERATURE: 98 F | SYSTOLIC BLOOD PRESSURE: 161 MMHG | RESPIRATION RATE: 18 BRPM | DIASTOLIC BLOOD PRESSURE: 74 MMHG

## 2025-01-01 VITALS — RESPIRATION RATE: 1 BRPM | OXYGEN SATURATION: 85 %

## 2025-01-01 DIAGNOSIS — E03.9 HYPOTHYROIDISM, UNSPECIFIED: ICD-10-CM

## 2025-01-01 DIAGNOSIS — Z98.890 OTHER SPECIFIED POSTPROCEDURAL STATES: Chronic | ICD-10-CM

## 2025-01-01 DIAGNOSIS — E78.00 PURE HYPERCHOLESTEROLEMIA, UNSPECIFIED: ICD-10-CM

## 2025-01-01 DIAGNOSIS — I50.32 CHRONIC DIASTOLIC (CONGESTIVE) HEART FAILURE: ICD-10-CM

## 2025-01-01 DIAGNOSIS — E87.70 FLUID OVERLOAD, UNSPECIFIED: ICD-10-CM

## 2025-01-01 DIAGNOSIS — J96.01 ACUTE RESPIRATORY FAILURE WITH HYPOXIA: ICD-10-CM

## 2025-01-01 DIAGNOSIS — Z90.710 ACQUIRED ABSENCE OF BOTH CERVIX AND UTERUS: Chronic | ICD-10-CM

## 2025-01-01 DIAGNOSIS — I73.9 PERIPHERAL VASCULAR DISEASE, UNSPECIFIED: ICD-10-CM

## 2025-01-01 DIAGNOSIS — A41.9 SEPSIS, UNSPECIFIED ORGANISM: ICD-10-CM

## 2025-01-01 DIAGNOSIS — I50.30 UNSPECIFIED DIASTOLIC (CONGESTIVE) HEART FAILURE: ICD-10-CM

## 2025-01-01 DIAGNOSIS — K83.8 OTHER SPECIFIED DISEASES OF BILIARY TRACT: ICD-10-CM

## 2025-01-01 DIAGNOSIS — K83.1 OBSTRUCTION OF BILE DUCT: ICD-10-CM

## 2025-01-01 DIAGNOSIS — Z51.5 ENCOUNTER FOR PALLIATIVE CARE: ICD-10-CM

## 2025-01-01 DIAGNOSIS — D13.5 BENIGN NEOPLASM OF EXTRAHEPATIC BILE DUCTS: ICD-10-CM

## 2025-01-01 DIAGNOSIS — Z79.890 HORMONE REPLACEMENT THERAPY: ICD-10-CM

## 2025-01-01 DIAGNOSIS — I11.0 HYPERTENSIVE HEART DISEASE WITH HEART FAILURE: ICD-10-CM

## 2025-01-01 DIAGNOSIS — Z95.820 PERIPHERAL VASCULAR ANGIOPLASTY STATUS WITH IMPLANTS AND GRAFTS: ICD-10-CM

## 2025-01-01 DIAGNOSIS — Z90.49 ACQUIRED ABSENCE OF OTHER SPECIFIED PARTS OF DIGESTIVE TRACT: Chronic | ICD-10-CM

## 2025-01-01 DIAGNOSIS — D84.9 IMMUNODEFICIENCY, UNSPECIFIED: ICD-10-CM

## 2025-01-01 DIAGNOSIS — Z79.02 LONG TERM (CURRENT) USE OF ANTITHROMBOTICS/ANTIPLATELETS: ICD-10-CM

## 2025-01-01 DIAGNOSIS — Z71.89 OTHER SPECIFIED COUNSELING: ICD-10-CM

## 2025-01-01 DIAGNOSIS — K83.09 OTHER CHOLANGITIS: ICD-10-CM

## 2025-01-01 LAB
-  AMPICILLIN/SULBACTAM: SIGNIFICANT CHANGE UP
-  AMPICILLIN/SULBACTAM: SIGNIFICANT CHANGE UP
-  AMPICILLIN: SIGNIFICANT CHANGE UP
-  AMPICILLIN: SIGNIFICANT CHANGE UP
-  AZTREONAM: SIGNIFICANT CHANGE UP
-  AZTREONAM: SIGNIFICANT CHANGE UP
-  CEFAZOLIN: SIGNIFICANT CHANGE UP
-  CEFAZOLIN: SIGNIFICANT CHANGE UP
-  CEFEPIME: SIGNIFICANT CHANGE UP
-  CEFEPIME: SIGNIFICANT CHANGE UP
-  CEFOXITIN: SIGNIFICANT CHANGE UP
-  CEFOXITIN: SIGNIFICANT CHANGE UP
-  CEFTRIAXONE: SIGNIFICANT CHANGE UP
-  CEFTRIAXONE: SIGNIFICANT CHANGE UP
-  CIPROFLOXACIN: SIGNIFICANT CHANGE UP
-  CIPROFLOXACIN: SIGNIFICANT CHANGE UP
-  ERTAPENEM: SIGNIFICANT CHANGE UP
-  ERTAPENEM: SIGNIFICANT CHANGE UP
-  GENTAMICIN: SIGNIFICANT CHANGE UP
-  GENTAMICIN: SIGNIFICANT CHANGE UP
-  IMIPENEM: SIGNIFICANT CHANGE UP
-  IMIPENEM: SIGNIFICANT CHANGE UP
-  LEVOFLOXACIN: SIGNIFICANT CHANGE UP
-  LEVOFLOXACIN: SIGNIFICANT CHANGE UP
-  MEROPENEM: SIGNIFICANT CHANGE UP
-  MEROPENEM: SIGNIFICANT CHANGE UP
-  PIPERACILLIN/TAZOBACTAM: SIGNIFICANT CHANGE UP
-  PIPERACILLIN/TAZOBACTAM: SIGNIFICANT CHANGE UP
-  TIGECYCLINE: SIGNIFICANT CHANGE UP
-  TIGECYCLINE: SIGNIFICANT CHANGE UP
-  TOBRAMYCIN: SIGNIFICANT CHANGE UP
-  TOBRAMYCIN: SIGNIFICANT CHANGE UP
-  TRIMETHOPRIM/SULFAMETHOXAZOLE: SIGNIFICANT CHANGE UP
-  TRIMETHOPRIM/SULFAMETHOXAZOLE: SIGNIFICANT CHANGE UP
ALBUMIN SERPL ELPH-MCNC: 2.3 G/DL — LOW (ref 3.5–5.2)
ALBUMIN SERPL ELPH-MCNC: 2.8 G/DL — LOW (ref 3.5–5.2)
ALBUMIN SERPL ELPH-MCNC: 2.8 G/DL — LOW (ref 3.5–5.2)
ALBUMIN SERPL ELPH-MCNC: 2.9 G/DL — LOW (ref 3.5–5.2)
ALBUMIN SERPL ELPH-MCNC: 2.9 G/DL — LOW (ref 3.5–5.2)
ALBUMIN SERPL ELPH-MCNC: 3.2 G/DL — LOW (ref 3.5–5.2)
ALBUMIN SERPL ELPH-MCNC: 3.5 G/DL — SIGNIFICANT CHANGE UP (ref 3.5–5.2)
ALBUMIN SERPL ELPH-MCNC: 3.8 G/DL — SIGNIFICANT CHANGE UP (ref 3.5–5.2)
ALBUMIN SERPL ELPH-MCNC: 3.9 G/DL — SIGNIFICANT CHANGE UP (ref 3.5–5.2)
ALBUMIN SERPL ELPH-MCNC: 4 G/DL — SIGNIFICANT CHANGE UP (ref 3.5–5.2)
ALBUMIN SERPL ELPH-MCNC: 4 G/DL — SIGNIFICANT CHANGE UP (ref 3.5–5.2)
ALBUMIN SERPL ELPH-MCNC: 4.1 G/DL — SIGNIFICANT CHANGE UP (ref 3.5–5.2)
ALP SERPL-CCNC: 132 U/L — HIGH (ref 30–115)
ALP SERPL-CCNC: 134 U/L — HIGH (ref 30–115)
ALP SERPL-CCNC: 146 U/L — HIGH (ref 30–115)
ALP SERPL-CCNC: 161 U/L — HIGH (ref 30–115)
ALP SERPL-CCNC: 187 U/L — HIGH (ref 30–115)
ALP SERPL-CCNC: 225 U/L — HIGH (ref 30–115)
ALP SERPL-CCNC: 229 U/L — HIGH (ref 30–115)
ALP SERPL-CCNC: 249 U/L — HIGH (ref 30–115)
ALP SERPL-CCNC: 280 U/L — HIGH (ref 30–115)
ALP SERPL-CCNC: 294 U/L — HIGH (ref 30–115)
ALP SERPL-CCNC: 297 U/L — HIGH (ref 30–115)
ALP SERPL-CCNC: 299 U/L — HIGH (ref 30–115)
ALP SERPL-CCNC: 335 U/L — HIGH (ref 30–115)
ALP SERPL-CCNC: 369 U/L — HIGH (ref 30–115)
ALT FLD-CCNC: 114 U/L — HIGH (ref 0–41)
ALT FLD-CCNC: 116 U/L — HIGH (ref 0–41)
ALT FLD-CCNC: 157 U/L — HIGH (ref 0–41)
ALT FLD-CCNC: 171 U/L — HIGH (ref 0–41)
ALT FLD-CCNC: 202 U/L — HIGH (ref 0–41)
ALT FLD-CCNC: 24 U/L — SIGNIFICANT CHANGE UP (ref 0–41)
ALT FLD-CCNC: 28 U/L — SIGNIFICANT CHANGE UP (ref 0–41)
ALT FLD-CCNC: 288 U/L — HIGH (ref 0–41)
ALT FLD-CCNC: 291 U/L — HIGH (ref 0–41)
ALT FLD-CCNC: 36 U/L — SIGNIFICANT CHANGE UP (ref 0–41)
ALT FLD-CCNC: 51 U/L — HIGH (ref 0–41)
ALT FLD-CCNC: 53 U/L — HIGH (ref 0–41)
ALT FLD-CCNC: 65 U/L — HIGH (ref 0–41)
ALT FLD-CCNC: 71 U/L — HIGH (ref 0–41)
ANION GAP SERPL CALC-SCNC: 10 MMOL/L — SIGNIFICANT CHANGE UP (ref 7–14)
ANION GAP SERPL CALC-SCNC: 11 MMOL/L — SIGNIFICANT CHANGE UP (ref 7–14)
ANION GAP SERPL CALC-SCNC: 12 MMOL/L — SIGNIFICANT CHANGE UP (ref 7–14)
ANION GAP SERPL CALC-SCNC: 12 MMOL/L — SIGNIFICANT CHANGE UP (ref 7–14)
ANION GAP SERPL CALC-SCNC: 14 MMOL/L — SIGNIFICANT CHANGE UP (ref 7–14)
ANION GAP SERPL CALC-SCNC: 15 MMOL/L — HIGH (ref 7–14)
ANION GAP SERPL CALC-SCNC: 15 MMOL/L — HIGH (ref 7–14)
ANION GAP SERPL CALC-SCNC: 16 MMOL/L — HIGH (ref 7–14)
ANION GAP SERPL CALC-SCNC: 17 MMOL/L — HIGH (ref 7–14)
ANISOCYTOSIS BLD QL: SLIGHT — SIGNIFICANT CHANGE UP
APPEARANCE UR: CLEAR — SIGNIFICANT CHANGE UP
APPEARANCE UR: CLEAR — SIGNIFICANT CHANGE UP
APTT BLD: 136.2 SEC — CRITICAL HIGH (ref 27–39.2)
APTT BLD: 30.1 SEC — SIGNIFICANT CHANGE UP (ref 27–39.2)
APTT BLD: 30.3 SEC — SIGNIFICANT CHANGE UP (ref 27–39.2)
APTT BLD: 30.9 SEC — SIGNIFICANT CHANGE UP (ref 27–39.2)
APTT BLD: 37.2 SEC — SIGNIFICANT CHANGE UP (ref 27–39.2)
APTT BLD: 41.9 SEC — HIGH (ref 27–39.2)
APTT BLD: 43.4 SEC — HIGH (ref 27–39.2)
APTT BLD: 60.7 SEC — HIGH (ref 27–39.2)
APTT BLD: 76.8 SEC — CRITICAL HIGH (ref 27–39.2)
APTT BLD: 79.7 SEC — CRITICAL HIGH (ref 27–39.2)
APTT BLD: 83.3 SEC — CRITICAL HIGH (ref 27–39.2)
APTT BLD: 84.1 SEC — CRITICAL HIGH (ref 27–39.2)
AST SERPL-CCNC: 10 U/L — SIGNIFICANT CHANGE UP (ref 0–41)
AST SERPL-CCNC: 11 U/L — SIGNIFICANT CHANGE UP (ref 0–41)
AST SERPL-CCNC: 112 U/L — HIGH (ref 0–41)
AST SERPL-CCNC: 12 U/L — SIGNIFICANT CHANGE UP (ref 0–41)
AST SERPL-CCNC: 16 U/L — SIGNIFICANT CHANGE UP (ref 0–41)
AST SERPL-CCNC: 167 U/L — HIGH (ref 0–41)
AST SERPL-CCNC: 22 U/L — SIGNIFICANT CHANGE UP (ref 0–41)
AST SERPL-CCNC: 239 U/L — HIGH (ref 0–41)
AST SERPL-CCNC: 28 U/L — SIGNIFICANT CHANGE UP (ref 0–41)
AST SERPL-CCNC: 406 U/L — HIGH (ref 0–41)
AST SERPL-CCNC: 53 U/L — HIGH (ref 0–41)
AST SERPL-CCNC: 82 U/L — HIGH (ref 0–41)
BASE EXCESS BLDA CALC-SCNC: 1.3 MMOL/L — SIGNIFICANT CHANGE UP (ref -2–3)
BASE EXCESS BLDV CALC-SCNC: -1 MMOL/L — SIGNIFICANT CHANGE UP (ref -2–3)
BASOPHILS # BLD AUTO: 0 K/UL — SIGNIFICANT CHANGE UP (ref 0–0.2)
BASOPHILS # BLD AUTO: 0 K/UL — SIGNIFICANT CHANGE UP (ref 0–0.2)
BASOPHILS # BLD AUTO: 0.01 K/UL — SIGNIFICANT CHANGE UP (ref 0–0.2)
BASOPHILS # BLD AUTO: 0.02 K/UL — SIGNIFICANT CHANGE UP (ref 0–0.2)
BASOPHILS # BLD AUTO: 0.03 K/UL — SIGNIFICANT CHANGE UP (ref 0–0.2)
BASOPHILS # BLD AUTO: 0.04 K/UL — SIGNIFICANT CHANGE UP (ref 0–0.2)
BASOPHILS # BLD AUTO: 0.04 K/UL — SIGNIFICANT CHANGE UP (ref 0–0.2)
BASOPHILS # BLD AUTO: 0.07 K/UL — SIGNIFICANT CHANGE UP (ref 0–0.2)
BASOPHILS # BLD AUTO: 0.08 K/UL — SIGNIFICANT CHANGE UP (ref 0–0.2)
BASOPHILS NFR BLD AUTO: 0 % — SIGNIFICANT CHANGE UP (ref 0–1)
BASOPHILS NFR BLD AUTO: 0 % — SIGNIFICANT CHANGE UP (ref 0–1)
BASOPHILS NFR BLD AUTO: 0.1 % — SIGNIFICANT CHANGE UP (ref 0–1)
BASOPHILS NFR BLD AUTO: 0.2 % — SIGNIFICANT CHANGE UP (ref 0–1)
BASOPHILS NFR BLD AUTO: 0.3 % — SIGNIFICANT CHANGE UP (ref 0–1)
BASOPHILS NFR BLD AUTO: 0.5 % — SIGNIFICANT CHANGE UP (ref 0–1)
BASOPHILS NFR BLD AUTO: 0.6 % — SIGNIFICANT CHANGE UP (ref 0–1)
BASOPHILS NFR BLD AUTO: 0.9 % — SIGNIFICANT CHANGE UP (ref 0–1)
BILIRUB DIRECT SERPL-MCNC: 1.3 MG/DL — HIGH (ref 0–0.3)
BILIRUB INDIRECT FLD-MCNC: 1.6 MG/DL — HIGH (ref 0.2–1.2)
BILIRUB SERPL-MCNC: 0.5 MG/DL — SIGNIFICANT CHANGE UP (ref 0.2–1.2)
BILIRUB SERPL-MCNC: 0.6 MG/DL — SIGNIFICANT CHANGE UP (ref 0.2–1.2)
BILIRUB SERPL-MCNC: 0.7 MG/DL — SIGNIFICANT CHANGE UP (ref 0.2–1.2)
BILIRUB SERPL-MCNC: 0.8 MG/DL — SIGNIFICANT CHANGE UP (ref 0.2–1.2)
BILIRUB SERPL-MCNC: 0.9 MG/DL — SIGNIFICANT CHANGE UP (ref 0.2–1.2)
BILIRUB SERPL-MCNC: 1 MG/DL — SIGNIFICANT CHANGE UP (ref 0.2–1.2)
BILIRUB SERPL-MCNC: 1.3 MG/DL — HIGH (ref 0.2–1.2)
BILIRUB SERPL-MCNC: 1.9 MG/DL — HIGH (ref 0.2–1.2)
BILIRUB SERPL-MCNC: 2.6 MG/DL — HIGH (ref 0.2–1.2)
BILIRUB SERPL-MCNC: 2.9 MG/DL — HIGH (ref 0.2–1.2)
BILIRUB SERPL-MCNC: 3.9 MG/DL — HIGH (ref 0.2–1.2)
BILIRUB SERPL-MCNC: 3.9 MG/DL — HIGH (ref 0.2–1.2)
BILIRUB UR-MCNC: NEGATIVE — SIGNIFICANT CHANGE UP
BILIRUB UR-MCNC: NEGATIVE — SIGNIFICANT CHANGE UP
BLD GP AB SCN SERPL QL: SIGNIFICANT CHANGE UP
BUN SERPL-MCNC: 19 MG/DL — SIGNIFICANT CHANGE UP (ref 10–20)
BUN SERPL-MCNC: 20 MG/DL — SIGNIFICANT CHANGE UP (ref 10–20)
BUN SERPL-MCNC: 20 MG/DL — SIGNIFICANT CHANGE UP (ref 10–20)
BUN SERPL-MCNC: 22 MG/DL — HIGH (ref 10–20)
BUN SERPL-MCNC: 22 MG/DL — HIGH (ref 10–20)
BUN SERPL-MCNC: 24 MG/DL — HIGH (ref 10–20)
BUN SERPL-MCNC: 25 MG/DL — HIGH (ref 10–20)
BUN SERPL-MCNC: 36 MG/DL — HIGH (ref 10–20)
BUN SERPL-MCNC: 48 MG/DL — HIGH (ref 10–20)
BUN SERPL-MCNC: 51 MG/DL — HIGH (ref 10–20)
BUN SERPL-MCNC: 53 MG/DL — HIGH (ref 10–20)
BUN SERPL-MCNC: 60 MG/DL — HIGH (ref 10–20)
BUN SERPL-MCNC: 61 MG/DL — CRITICAL HIGH (ref 10–20)
BUN SERPL-MCNC: 61 MG/DL — CRITICAL HIGH (ref 10–20)
BUN SERPL-MCNC: 64 MG/DL — CRITICAL HIGH (ref 10–20)
CA-I SERPL-SCNC: 1.21 MMOL/L — SIGNIFICANT CHANGE UP (ref 1.15–1.33)
CALCIUM SERPL-MCNC: 7 MG/DL — LOW (ref 8.4–10.5)
CALCIUM SERPL-MCNC: 7.4 MG/DL — LOW (ref 8.4–10.5)
CALCIUM SERPL-MCNC: 7.4 MG/DL — LOW (ref 8.4–10.5)
CALCIUM SERPL-MCNC: 7.5 MG/DL — LOW (ref 8.4–10.5)
CALCIUM SERPL-MCNC: 7.5 MG/DL — LOW (ref 8.4–10.5)
CALCIUM SERPL-MCNC: 7.6 MG/DL — LOW (ref 8.4–10.5)
CALCIUM SERPL-MCNC: 7.8 MG/DL — LOW (ref 8.4–10.5)
CALCIUM SERPL-MCNC: 8 MG/DL — LOW (ref 8.4–10.5)
CALCIUM SERPL-MCNC: 8.1 MG/DL — LOW (ref 8.4–10.5)
CALCIUM SERPL-MCNC: 8.3 MG/DL — LOW (ref 8.4–10.5)
CALCIUM SERPL-MCNC: 8.3 MG/DL — LOW (ref 8.4–10.5)
CALCIUM SERPL-MCNC: 8.4 MG/DL — SIGNIFICANT CHANGE UP (ref 8.4–10.5)
CALCIUM SERPL-MCNC: 8.7 MG/DL — SIGNIFICANT CHANGE UP (ref 8.4–10.5)
CALCIUM SERPL-MCNC: 8.8 MG/DL — SIGNIFICANT CHANGE UP (ref 8.4–10.5)
CALCIUM SERPL-MCNC: 9 MG/DL — SIGNIFICANT CHANGE UP (ref 8.4–10.5)
CHLORIDE SERPL-SCNC: 100 MMOL/L — SIGNIFICANT CHANGE UP (ref 98–110)
CHLORIDE SERPL-SCNC: 102 MMOL/L — SIGNIFICANT CHANGE UP (ref 98–110)
CHLORIDE SERPL-SCNC: 102 MMOL/L — SIGNIFICANT CHANGE UP (ref 98–110)
CHLORIDE SERPL-SCNC: 103 MMOL/L — SIGNIFICANT CHANGE UP (ref 98–110)
CHLORIDE SERPL-SCNC: 103 MMOL/L — SIGNIFICANT CHANGE UP (ref 98–110)
CHLORIDE SERPL-SCNC: 104 MMOL/L — SIGNIFICANT CHANGE UP (ref 98–110)
CHLORIDE SERPL-SCNC: 104 MMOL/L — SIGNIFICANT CHANGE UP (ref 98–110)
CHLORIDE SERPL-SCNC: 95 MMOL/L — LOW (ref 98–110)
CHLORIDE SERPL-SCNC: 97 MMOL/L — LOW (ref 98–110)
CHLORIDE SERPL-SCNC: 98 MMOL/L — SIGNIFICANT CHANGE UP (ref 98–110)
CHLORIDE SERPL-SCNC: 99 MMOL/L — SIGNIFICANT CHANGE UP (ref 98–110)
CO2 SERPL-SCNC: 19 MMOL/L — SIGNIFICANT CHANGE UP (ref 17–32)
CO2 SERPL-SCNC: 19 MMOL/L — SIGNIFICANT CHANGE UP (ref 17–32)
CO2 SERPL-SCNC: 20 MMOL/L — SIGNIFICANT CHANGE UP (ref 17–32)
CO2 SERPL-SCNC: 21 MMOL/L — SIGNIFICANT CHANGE UP (ref 17–32)
CO2 SERPL-SCNC: 23 MMOL/L — SIGNIFICANT CHANGE UP (ref 17–32)
CO2 SERPL-SCNC: 24 MMOL/L — SIGNIFICANT CHANGE UP (ref 17–32)
CO2 SERPL-SCNC: 25 MMOL/L — SIGNIFICANT CHANGE UP (ref 17–32)
CO2 SERPL-SCNC: 26 MMOL/L — SIGNIFICANT CHANGE UP (ref 17–32)
CO2 SERPL-SCNC: 30 MMOL/L — SIGNIFICANT CHANGE UP (ref 17–32)
COLOR SPEC: YELLOW — SIGNIFICANT CHANGE UP
COLOR SPEC: YELLOW — SIGNIFICANT CHANGE UP
CREAT SERPL-MCNC: 0.8 MG/DL — SIGNIFICANT CHANGE UP (ref 0.7–1.5)
CREAT SERPL-MCNC: 0.9 MG/DL — SIGNIFICANT CHANGE UP (ref 0.7–1.5)
CREAT SERPL-MCNC: 1 MG/DL — SIGNIFICANT CHANGE UP (ref 0.7–1.5)
CREAT SERPL-MCNC: 1 MG/DL — SIGNIFICANT CHANGE UP (ref 0.7–1.5)
CREAT SERPL-MCNC: 1.1 MG/DL — SIGNIFICANT CHANGE UP (ref 0.7–1.5)
CREAT SERPL-MCNC: 1.1 MG/DL — SIGNIFICANT CHANGE UP (ref 0.7–1.5)
CREAT SERPL-MCNC: 1.2 MG/DL — SIGNIFICANT CHANGE UP (ref 0.7–1.5)
CREAT SERPL-MCNC: 1.3 MG/DL — SIGNIFICANT CHANGE UP (ref 0.7–1.5)
CREAT SERPL-MCNC: 1.7 MG/DL — HIGH (ref 0.7–1.5)
CREAT SERPL-MCNC: 1.7 MG/DL — HIGH (ref 0.7–1.5)
CREAT SERPL-MCNC: 2.2 MG/DL — HIGH (ref 0.7–1.5)
CREAT SERPL-MCNC: 2.3 MG/DL — HIGH (ref 0.7–1.5)
CREAT SERPL-MCNC: 2.5 MG/DL — HIGH (ref 0.7–1.5)
CRP SERPL-MCNC: 170.4 MG/L — HIGH
CRP SERPL-MCNC: 181 MG/L — HIGH
CULTURE RESULTS: ABNORMAL
CULTURE RESULTS: SIGNIFICANT CHANGE UP
D DIMER BLD IA.RAPID-MCNC: 1666 NG/ML DDU — HIGH
DIFF PNL FLD: NEGATIVE — SIGNIFICANT CHANGE UP
DIFF PNL FLD: NEGATIVE — SIGNIFICANT CHANGE UP
E COLI DNA BLD POS QL NAA+NON-PROBE: SIGNIFICANT CHANGE UP
EGFR: 19 ML/MIN/1.73M2 — LOW
EGFR: 19 ML/MIN/1.73M2 — LOW
EGFR: 21 ML/MIN/1.73M2 — LOW
EGFR: 21 ML/MIN/1.73M2 — LOW
EGFR: 22 ML/MIN/1.73M2 — LOW
EGFR: 22 ML/MIN/1.73M2 — LOW
EGFR: 30 ML/MIN/1.73M2 — LOW
EGFR: 42 ML/MIN/1.73M2 — LOW
EGFR: 42 ML/MIN/1.73M2 — LOW
EGFR: 46 ML/MIN/1.73M2 — LOW
EGFR: 46 ML/MIN/1.73M2 — LOW
EGFR: 51 ML/MIN/1.73M2 — LOW
EGFR: 58 ML/MIN/1.73M2 — LOW
EGFR: 65 ML/MIN/1.73M2 — SIGNIFICANT CHANGE UP
EGFR: 75 ML/MIN/1.73M2 — SIGNIFICANT CHANGE UP
EGFR: 75 ML/MIN/1.73M2 — SIGNIFICANT CHANGE UP
EOSINOPHIL # BLD AUTO: 0 K/UL — SIGNIFICANT CHANGE UP (ref 0–0.7)
EOSINOPHIL # BLD AUTO: 0 K/UL — SIGNIFICANT CHANGE UP (ref 0–0.7)
EOSINOPHIL # BLD AUTO: 0.01 K/UL — SIGNIFICANT CHANGE UP (ref 0–0.7)
EOSINOPHIL # BLD AUTO: 0.02 K/UL — SIGNIFICANT CHANGE UP (ref 0–0.7)
EOSINOPHIL # BLD AUTO: 0.02 K/UL — SIGNIFICANT CHANGE UP (ref 0–0.7)
EOSINOPHIL # BLD AUTO: 0.08 K/UL — SIGNIFICANT CHANGE UP (ref 0–0.7)
EOSINOPHIL # BLD AUTO: 0.08 K/UL — SIGNIFICANT CHANGE UP (ref 0–0.7)
EOSINOPHIL # BLD AUTO: 0.1 K/UL — SIGNIFICANT CHANGE UP (ref 0–0.7)
EOSINOPHIL # BLD AUTO: 0.28 K/UL — SIGNIFICANT CHANGE UP (ref 0–0.7)
EOSINOPHIL # BLD AUTO: 0.34 K/UL — SIGNIFICANT CHANGE UP (ref 0–0.7)
EOSINOPHIL NFR BLD AUTO: 0 % — SIGNIFICANT CHANGE UP (ref 0–8)
EOSINOPHIL NFR BLD AUTO: 0.1 % — SIGNIFICANT CHANGE UP (ref 0–8)
EOSINOPHIL NFR BLD AUTO: 0.2 % — SIGNIFICANT CHANGE UP (ref 0–8)
EOSINOPHIL NFR BLD AUTO: 0.2 % — SIGNIFICANT CHANGE UP (ref 0–8)
EOSINOPHIL NFR BLD AUTO: 0.5 % — SIGNIFICANT CHANGE UP (ref 0–8)
EOSINOPHIL NFR BLD AUTO: 0.7 % — SIGNIFICANT CHANGE UP (ref 0–8)
EOSINOPHIL NFR BLD AUTO: 0.9 % — SIGNIFICANT CHANGE UP (ref 0–8)
EOSINOPHIL NFR BLD AUTO: 3.3 % — SIGNIFICANT CHANGE UP (ref 0–8)
EOSINOPHIL NFR BLD AUTO: 3.7 % — SIGNIFICANT CHANGE UP (ref 0–8)
ERYTHROCYTE [SEDIMENTATION RATE] IN BLOOD: 54 MM/HR — HIGH (ref 0–20)
ERYTHROCYTE [SEDIMENTATION RATE] IN BLOOD: 62 MM/HR — HIGH (ref 0–20)
FERRITIN SERPL-MCNC: 76 NG/ML — SIGNIFICANT CHANGE UP (ref 13–330)
FLUAV AG NPH QL: SIGNIFICANT CHANGE UP
FLUBV AG NPH QL: SIGNIFICANT CHANGE UP
GAS PNL BLDA: SIGNIFICANT CHANGE UP
GAS PNL BLDV: 134 MMOL/L — LOW (ref 136–145)
GAS PNL BLDV: SIGNIFICANT CHANGE UP
GLUCOSE BLDC GLUCOMTR-MCNC: 166 MG/DL — HIGH (ref 70–99)
GLUCOSE BLDC GLUCOMTR-MCNC: 211 MG/DL — HIGH (ref 70–99)
GLUCOSE SERPL-MCNC: 115 MG/DL — HIGH (ref 70–99)
GLUCOSE SERPL-MCNC: 121 MG/DL — HIGH (ref 70–99)
GLUCOSE SERPL-MCNC: 126 MG/DL — HIGH (ref 70–99)
GLUCOSE SERPL-MCNC: 128 MG/DL — HIGH (ref 70–99)
GLUCOSE SERPL-MCNC: 131 MG/DL — HIGH (ref 70–99)
GLUCOSE SERPL-MCNC: 145 MG/DL — HIGH (ref 70–99)
GLUCOSE SERPL-MCNC: 146 MG/DL — HIGH (ref 70–99)
GLUCOSE SERPL-MCNC: 148 MG/DL — HIGH (ref 70–99)
GLUCOSE SERPL-MCNC: 149 MG/DL — HIGH (ref 70–99)
GLUCOSE SERPL-MCNC: 152 MG/DL — HIGH (ref 70–99)
GLUCOSE SERPL-MCNC: 168 MG/DL — HIGH (ref 70–99)
GLUCOSE SERPL-MCNC: 192 MG/DL — HIGH (ref 70–99)
GLUCOSE SERPL-MCNC: 192 MG/DL — HIGH (ref 70–99)
GLUCOSE SERPL-MCNC: 91 MG/DL — SIGNIFICANT CHANGE UP (ref 70–99)
GLUCOSE SERPL-MCNC: 97 MG/DL — SIGNIFICANT CHANGE UP (ref 70–99)
GLUCOSE UR QL: NEGATIVE MG/DL — SIGNIFICANT CHANGE UP
GLUCOSE UR QL: NEGATIVE MG/DL — SIGNIFICANT CHANGE UP
GRAM STN FLD: ABNORMAL
GRAM STN FLD: ABNORMAL
HCO3 BLDA-SCNC: 25 MMOL/L — SIGNIFICANT CHANGE UP (ref 21–28)
HCO3 BLDV-SCNC: 26 MMOL/L — SIGNIFICANT CHANGE UP (ref 22–29)
HCT VFR BLD CALC: 25.7 % — LOW (ref 37–47)
HCT VFR BLD CALC: 27 % — LOW (ref 37–47)
HCT VFR BLD CALC: 28.9 % — LOW (ref 37–47)
HCT VFR BLD CALC: 29.4 % — LOW (ref 37–47)
HCT VFR BLD CALC: 29.7 % — LOW (ref 37–47)
HCT VFR BLD CALC: 29.9 % — LOW (ref 37–47)
HCT VFR BLD CALC: 30.3 % — LOW (ref 37–47)
HCT VFR BLD CALC: 30.6 % — LOW (ref 37–47)
HCT VFR BLD CALC: 31.2 % — LOW (ref 37–47)
HCT VFR BLD CALC: 31.3 % — LOW (ref 37–47)
HCT VFR BLD CALC: 31.4 % — LOW (ref 37–47)
HCT VFR BLD CALC: 32.1 % — LOW (ref 37–47)
HCT VFR BLD CALC: 32.4 % — LOW (ref 37–47)
HCT VFR BLD CALC: 32.4 % — LOW (ref 37–47)
HCT VFR BLD CALC: 32.6 % — LOW (ref 37–47)
HCT VFR BLD CALC: 33.9 % — LOW (ref 37–47)
HCT VFR BLD CALC: 34.4 % — LOW (ref 37–47)
HCT VFR BLD CALC: 36.9 % — LOW (ref 37–47)
HCT VFR BLDA CALC: 33 % — LOW (ref 34.5–46.5)
HGB BLD CALC-MCNC: 10.9 G/DL — LOW (ref 11.7–16.1)
HGB BLD-MCNC: 10.3 G/DL — LOW (ref 12–16)
HGB BLD-MCNC: 10.4 G/DL — LOW (ref 12–16)
HGB BLD-MCNC: 10.5 G/DL — LOW (ref 12–16)
HGB BLD-MCNC: 12 G/DL — SIGNIFICANT CHANGE UP (ref 12–16)
HGB BLD-MCNC: 8.3 G/DL — LOW (ref 12–16)
HGB BLD-MCNC: 8.6 G/DL — LOW (ref 12–16)
HGB BLD-MCNC: 9 G/DL — LOW (ref 12–16)
HGB BLD-MCNC: 9.1 G/DL — LOW (ref 12–16)
HGB BLD-MCNC: 9.2 G/DL — LOW (ref 12–16)
HGB BLD-MCNC: 9.4 G/DL — LOW (ref 12–16)
HGB BLD-MCNC: 9.5 G/DL — LOW (ref 12–16)
HGB BLD-MCNC: 9.6 G/DL — LOW (ref 12–16)
HGB BLD-MCNC: 9.9 G/DL — LOW (ref 12–16)
HGB BLD-MCNC: 9.9 G/DL — LOW (ref 12–16)
HOROWITZ INDEX BLDA+IHG-RTO: 50 — SIGNIFICANT CHANGE UP
HYPOCHROMIA BLD QL: SLIGHT — SIGNIFICANT CHANGE UP
IMM GRANULOCYTES NFR BLD AUTO: 0.4 % — HIGH (ref 0.1–0.3)
IMM GRANULOCYTES NFR BLD AUTO: 0.5 % — HIGH (ref 0.1–0.3)
IMM GRANULOCYTES NFR BLD AUTO: 0.6 % — HIGH (ref 0.1–0.3)
IMM GRANULOCYTES NFR BLD AUTO: 0.8 % — HIGH (ref 0.1–0.3)
IMM GRANULOCYTES NFR BLD AUTO: 0.9 % — HIGH (ref 0.1–0.3)
IMM GRANULOCYTES NFR BLD AUTO: 1 % — HIGH (ref 0.1–0.3)
IMM GRANULOCYTES NFR BLD AUTO: 1.3 % — HIGH (ref 0.1–0.3)
IMM GRANULOCYTES NFR BLD AUTO: 11.1 % — HIGH (ref 0.1–0.3)
IMM GRANULOCYTES NFR BLD AUTO: 2.2 % — HIGH (ref 0.1–0.3)
IMM GRANULOCYTES NFR BLD AUTO: 2.5 % — HIGH (ref 0.1–0.3)
IMM GRANULOCYTES NFR BLD AUTO: 4.4 % — HIGH (ref 0.1–0.3)
INR BLD: 1.02 RATIO — SIGNIFICANT CHANGE UP (ref 0.65–1.3)
INR BLD: 1.05 RATIO — SIGNIFICANT CHANGE UP (ref 0.65–1.3)
INR BLD: 1.13 RATIO — SIGNIFICANT CHANGE UP (ref 0.65–1.3)
INR BLD: 1.16 RATIO — SIGNIFICANT CHANGE UP (ref 0.65–1.3)
IRON SATN MFR SERPL: 21 UG/DL — LOW (ref 35–150)
IRON SATN MFR SERPL: 7 % — LOW (ref 15–50)
KETONES UR QL: NEGATIVE MG/DL — SIGNIFICANT CHANGE UP
KETONES UR-MCNC: NEGATIVE MG/DL — SIGNIFICANT CHANGE UP
LACTATE BLDV-MCNC: 1.8 MMOL/L — SIGNIFICANT CHANGE UP (ref 0.5–2)
LACTATE SERPL-SCNC: 1.5 MMOL/L — SIGNIFICANT CHANGE UP (ref 0.7–2)
LEUKOCYTE ESTERASE UR-ACNC: NEGATIVE — SIGNIFICANT CHANGE UP
LEUKOCYTE ESTERASE UR-ACNC: NEGATIVE — SIGNIFICANT CHANGE UP
LIDOCAIN IGE QN: 25 U/L — SIGNIFICANT CHANGE UP (ref 7–60)
LIDOCAIN IGE QN: 41 U/L — SIGNIFICANT CHANGE UP (ref 7–60)
LYMPHOCYTES # BLD AUTO: 0.14 K/UL — LOW (ref 1.2–3.4)
LYMPHOCYTES # BLD AUTO: 0.18 K/UL — LOW (ref 1.2–3.4)
LYMPHOCYTES # BLD AUTO: 0.19 K/UL — LOW (ref 1.2–3.4)
LYMPHOCYTES # BLD AUTO: 0.34 K/UL — LOW (ref 1.2–3.4)
LYMPHOCYTES # BLD AUTO: 0.85 K/UL — LOW (ref 1.2–3.4)
LYMPHOCYTES # BLD AUTO: 1.03 K/UL — LOW (ref 1.2–3.4)
LYMPHOCYTES # BLD AUTO: 1.06 K/UL — LOW (ref 1.2–3.4)
LYMPHOCYTES # BLD AUTO: 1.12 K/UL — LOW (ref 1.2–3.4)
LYMPHOCYTES # BLD AUTO: 1.17 K/UL — LOW (ref 1.2–3.4)
LYMPHOCYTES # BLD AUTO: 1.2 % — LOW (ref 20.5–51.1)
LYMPHOCYTES # BLD AUTO: 1.27 K/UL — SIGNIFICANT CHANGE UP (ref 1.2–3.4)
LYMPHOCYTES # BLD AUTO: 1.38 K/UL — SIGNIFICANT CHANGE UP (ref 1.2–3.4)
LYMPHOCYTES # BLD AUTO: 1.51 K/UL — SIGNIFICANT CHANGE UP (ref 1.2–3.4)
LYMPHOCYTES # BLD AUTO: 1.7 % — LOW (ref 20.5–51.1)
LYMPHOCYTES # BLD AUTO: 1.7 % — LOW (ref 20.5–51.1)
LYMPHOCYTES # BLD AUTO: 1.83 K/UL — SIGNIFICANT CHANGE UP (ref 1.2–3.4)
LYMPHOCYTES # BLD AUTO: 10 % — LOW (ref 20.5–51.1)
LYMPHOCYTES # BLD AUTO: 11.2 % — LOW (ref 20.5–51.1)
LYMPHOCYTES # BLD AUTO: 18 % — LOW (ref 20.5–51.1)
LYMPHOCYTES # BLD AUTO: 19.9 % — LOW (ref 20.5–51.1)
LYMPHOCYTES # BLD AUTO: 3.5 % — LOW (ref 20.5–51.1)
LYMPHOCYTES # BLD AUTO: 5.1 % — LOW (ref 20.5–51.1)
LYMPHOCYTES # BLD AUTO: 5.3 % — LOW (ref 20.5–51.1)
LYMPHOCYTES # BLD AUTO: 7.9 % — LOW (ref 20.5–51.1)
LYMPHOCYTES # BLD AUTO: 7.9 % — LOW (ref 20.5–51.1)
LYMPHOCYTES # BLD AUTO: 9.7 % — LOW (ref 20.5–51.1)
MAGNESIUM SERPL-MCNC: 1.5 MG/DL — LOW (ref 1.8–2.4)
MAGNESIUM SERPL-MCNC: 2 MG/DL — SIGNIFICANT CHANGE UP (ref 1.8–2.4)
MAGNESIUM SERPL-MCNC: 2.2 MG/DL — SIGNIFICANT CHANGE UP (ref 1.8–2.4)
MAGNESIUM SERPL-MCNC: 2.2 MG/DL — SIGNIFICANT CHANGE UP (ref 1.8–2.4)
MAGNESIUM SERPL-MCNC: 2.5 MG/DL — HIGH (ref 1.8–2.4)
MAGNESIUM SERPL-MCNC: 2.6 MG/DL — HIGH (ref 1.8–2.4)
MAGNESIUM SERPL-MCNC: 2.7 MG/DL — HIGH (ref 1.8–2.4)
MAGNESIUM SERPL-MCNC: 2.8 MG/DL — HIGH (ref 1.8–2.4)
MANUAL SMEAR VERIFICATION: SIGNIFICANT CHANGE UP
MCHC RBC-ENTMCNC: 23 PG — LOW (ref 27–31)
MCHC RBC-ENTMCNC: 23.2 PG — LOW (ref 27–31)
MCHC RBC-ENTMCNC: 23.5 PG — LOW (ref 27–31)
MCHC RBC-ENTMCNC: 23.6 PG — LOW (ref 27–31)
MCHC RBC-ENTMCNC: 23.7 PG — LOW (ref 27–31)
MCHC RBC-ENTMCNC: 26.3 PG — LOW (ref 27–31)
MCHC RBC-ENTMCNC: 26.9 PG — LOW (ref 27–31)
MCHC RBC-ENTMCNC: 27 PG — SIGNIFICANT CHANGE UP (ref 27–31)
MCHC RBC-ENTMCNC: 27.1 PG — SIGNIFICANT CHANGE UP (ref 27–31)
MCHC RBC-ENTMCNC: 27.2 PG — SIGNIFICANT CHANGE UP (ref 27–31)
MCHC RBC-ENTMCNC: 27.2 PG — SIGNIFICANT CHANGE UP (ref 27–31)
MCHC RBC-ENTMCNC: 27.3 PG — SIGNIFICANT CHANGE UP (ref 27–31)
MCHC RBC-ENTMCNC: 27.4 PG — SIGNIFICANT CHANGE UP (ref 27–31)
MCHC RBC-ENTMCNC: 27.4 PG — SIGNIFICANT CHANGE UP (ref 27–31)
MCHC RBC-ENTMCNC: 30.4 G/DL — LOW (ref 32–37)
MCHC RBC-ENTMCNC: 30.4 G/DL — LOW (ref 32–37)
MCHC RBC-ENTMCNC: 30.5 G/DL — LOW (ref 32–37)
MCHC RBC-ENTMCNC: 30.6 G/DL — LOW (ref 32–37)
MCHC RBC-ENTMCNC: 30.6 G/DL — LOW (ref 32–37)
MCHC RBC-ENTMCNC: 31.1 G/DL — LOW (ref 32–37)
MCHC RBC-ENTMCNC: 31.4 G/DL — LOW (ref 32–37)
MCHC RBC-ENTMCNC: 31.7 G/DL — LOW (ref 32–37)
MCHC RBC-ENTMCNC: 31.8 G/DL — LOW (ref 32–37)
MCHC RBC-ENTMCNC: 31.9 G/DL — LOW (ref 32–37)
MCHC RBC-ENTMCNC: 32.2 G/DL — SIGNIFICANT CHANGE UP (ref 32–37)
MCHC RBC-ENTMCNC: 32.3 G/DL — SIGNIFICANT CHANGE UP (ref 32–37)
MCHC RBC-ENTMCNC: 32.3 G/DL — SIGNIFICANT CHANGE UP (ref 32–37)
MCHC RBC-ENTMCNC: 32.4 G/DL — SIGNIFICANT CHANGE UP (ref 32–37)
MCHC RBC-ENTMCNC: 32.5 G/DL — SIGNIFICANT CHANGE UP (ref 32–37)
MCHC RBC-ENTMCNC: 32.9 G/DL — SIGNIFICANT CHANGE UP (ref 32–37)
MCV RBC AUTO: 75.8 FL — LOW (ref 81–99)
MCV RBC AUTO: 75.9 FL — LOW (ref 81–99)
MCV RBC AUTO: 76.3 FL — LOW (ref 81–99)
MCV RBC AUTO: 76.5 FL — LOW (ref 81–99)
MCV RBC AUTO: 77.7 FL — LOW (ref 81–99)
MCV RBC AUTO: 83 FL — SIGNIFICANT CHANGE UP (ref 81–99)
MCV RBC AUTO: 83.2 FL — SIGNIFICANT CHANGE UP (ref 81–99)
MCV RBC AUTO: 83.2 FL — SIGNIFICANT CHANGE UP (ref 81–99)
MCV RBC AUTO: 83.8 FL — SIGNIFICANT CHANGE UP (ref 81–99)
MCV RBC AUTO: 83.8 FL — SIGNIFICANT CHANGE UP (ref 81–99)
MCV RBC AUTO: 84.1 FL — SIGNIFICANT CHANGE UP (ref 81–99)
MCV RBC AUTO: 84.3 FL — SIGNIFICANT CHANGE UP (ref 81–99)
MCV RBC AUTO: 84.6 FL — SIGNIFICANT CHANGE UP (ref 81–99)
MCV RBC AUTO: 84.6 FL — SIGNIFICANT CHANGE UP (ref 81–99)
MCV RBC AUTO: 85.1 FL — SIGNIFICANT CHANGE UP (ref 81–99)
MCV RBC AUTO: 85.5 FL — SIGNIFICANT CHANGE UP (ref 81–99)
MCV RBC AUTO: 85.7 FL — SIGNIFICANT CHANGE UP (ref 81–99)
MCV RBC AUTO: 88 FL — SIGNIFICANT CHANGE UP (ref 81–99)
METAMYELOCYTES # FLD: 0.9 % — HIGH (ref 0–0)
METAMYELOCYTES NFR BLD: 0.9 % — HIGH (ref 0–0)
METHOD TYPE: SIGNIFICANT CHANGE UP
MICROCYTES BLD QL: SLIGHT — SIGNIFICANT CHANGE UP
MONOCYTES # BLD AUTO: 0.08 K/UL — LOW (ref 0.1–0.6)
MONOCYTES # BLD AUTO: 0.1 K/UL — SIGNIFICANT CHANGE UP (ref 0.1–0.6)
MONOCYTES # BLD AUTO: 0.49 K/UL — SIGNIFICANT CHANGE UP (ref 0.1–0.6)
MONOCYTES # BLD AUTO: 0.51 K/UL — SIGNIFICANT CHANGE UP (ref 0.1–0.6)
MONOCYTES # BLD AUTO: 0.56 K/UL — SIGNIFICANT CHANGE UP (ref 0.1–0.6)
MONOCYTES # BLD AUTO: 0.58 K/UL — SIGNIFICANT CHANGE UP (ref 0.1–0.6)
MONOCYTES # BLD AUTO: 0.59 K/UL — SIGNIFICANT CHANGE UP (ref 0.1–0.6)
MONOCYTES # BLD AUTO: 0.6 K/UL — SIGNIFICANT CHANGE UP (ref 0.1–0.6)
MONOCYTES # BLD AUTO: 0.61 K/UL — HIGH (ref 0.1–0.6)
MONOCYTES # BLD AUTO: 0.62 K/UL — HIGH (ref 0.1–0.6)
MONOCYTES # BLD AUTO: 0.74 K/UL — HIGH (ref 0.1–0.6)
MONOCYTES # BLD AUTO: 0.75 K/UL — HIGH (ref 0.1–0.6)
MONOCYTES # BLD AUTO: 0.96 K/UL — HIGH (ref 0.1–0.6)
MONOCYTES NFR BLD AUTO: 0.9 % — LOW (ref 1.7–9.3)
MONOCYTES NFR BLD AUTO: 0.9 % — LOW (ref 1.7–9.3)
MONOCYTES NFR BLD AUTO: 2.9 % — SIGNIFICANT CHANGE UP (ref 1.7–9.3)
MONOCYTES NFR BLD AUTO: 3.5 % — SIGNIFICANT CHANGE UP (ref 1.7–9.3)
MONOCYTES NFR BLD AUTO: 4.3 % — SIGNIFICANT CHANGE UP (ref 1.7–9.3)
MONOCYTES NFR BLD AUTO: 4.7 % — SIGNIFICANT CHANGE UP (ref 1.7–9.3)
MONOCYTES NFR BLD AUTO: 4.8 % — SIGNIFICANT CHANGE UP (ref 1.7–9.3)
MONOCYTES NFR BLD AUTO: 5.1 % — SIGNIFICANT CHANGE UP (ref 1.7–9.3)
MONOCYTES NFR BLD AUTO: 5.5 % — SIGNIFICANT CHANGE UP (ref 1.7–9.3)
MONOCYTES NFR BLD AUTO: 5.8 % — SIGNIFICANT CHANGE UP (ref 1.7–9.3)
MONOCYTES NFR BLD AUTO: 6.1 % — SIGNIFICANT CHANGE UP (ref 1.7–9.3)
MONOCYTES NFR BLD AUTO: 6.7 % — SIGNIFICANT CHANGE UP (ref 1.7–9.3)
MONOCYTES NFR BLD AUTO: 6.7 % — SIGNIFICANT CHANGE UP (ref 1.7–9.3)
MRSA PCR RESULT.: SIGNIFICANT CHANGE UP
NEUTROPHILS # BLD AUTO: 10.8 K/UL — HIGH (ref 1.4–6.5)
NEUTROPHILS # BLD AUTO: 12.04 K/UL — HIGH (ref 1.4–6.5)
NEUTROPHILS # BLD AUTO: 12.41 K/UL — HIGH (ref 1.4–6.5)
NEUTROPHILS # BLD AUTO: 12.52 K/UL — HIGH (ref 1.4–6.5)
NEUTROPHILS # BLD AUTO: 14.4 K/UL — HIGH (ref 1.4–6.5)
NEUTROPHILS # BLD AUTO: 14.56 K/UL — HIGH (ref 1.4–6.5)
NEUTROPHILS # BLD AUTO: 17.85 K/UL — HIGH (ref 1.4–6.5)
NEUTROPHILS # BLD AUTO: 5.95 K/UL — SIGNIFICANT CHANGE UP (ref 1.4–6.5)
NEUTROPHILS # BLD AUTO: 6.2 K/UL — SIGNIFICANT CHANGE UP (ref 1.4–6.5)
NEUTROPHILS # BLD AUTO: 7.23 K/UL — HIGH (ref 1.4–6.5)
NEUTROPHILS # BLD AUTO: 7.99 K/UL — HIGH (ref 1.4–6.5)
NEUTROPHILS # BLD AUTO: 8.38 K/UL — HIGH (ref 1.4–6.5)
NEUTROPHILS # BLD AUTO: 9.3 K/UL — HIGH (ref 1.4–6.5)
NEUTROPHILS NFR BLD AUTO: 67.5 % — SIGNIFICANT CHANGE UP (ref 42.2–75.2)
NEUTROPHILS NFR BLD AUTO: 70.7 % — SIGNIFICANT CHANGE UP (ref 42.2–75.2)
NEUTROPHILS NFR BLD AUTO: 73.4 % — SIGNIFICANT CHANGE UP (ref 42.2–75.2)
NEUTROPHILS NFR BLD AUTO: 78.4 % — HIGH (ref 42.2–75.2)
NEUTROPHILS NFR BLD AUTO: 84.5 % — HIGH (ref 42.2–75.2)
NEUTROPHILS NFR BLD AUTO: 84.6 % — HIGH (ref 42.2–75.2)
NEUTROPHILS NFR BLD AUTO: 86.8 % — HIGH (ref 42.2–75.2)
NEUTROPHILS NFR BLD AUTO: 87.5 % — HIGH (ref 42.2–75.2)
NEUTROPHILS NFR BLD AUTO: 87.9 % — HIGH (ref 42.2–75.2)
NEUTROPHILS NFR BLD AUTO: 89.1 % — HIGH (ref 42.2–75.2)
NEUTROPHILS NFR BLD AUTO: 92.9 % — HIGH (ref 42.2–75.2)
NEUTROPHILS NFR BLD AUTO: 95.6 % — HIGH (ref 42.2–75.2)
NEUTROPHILS NFR BLD AUTO: 96.7 % — HIGH (ref 42.2–75.2)
NITRITE UR-MCNC: NEGATIVE — SIGNIFICANT CHANGE UP
NITRITE UR-MCNC: NEGATIVE — SIGNIFICANT CHANGE UP
NRBC # BLD: 0 /100 WBCS — SIGNIFICANT CHANGE UP (ref 0–0)
NRBC BLD AUTO-RTO: 0 /100 WBCS — SIGNIFICANT CHANGE UP (ref 0–0)
NRBC BLD AUTO-RTO: SIGNIFICANT CHANGE UP /100 WBCS (ref 0–0)
NRBC BLD-RTO: 0 /100 WBCS — SIGNIFICANT CHANGE UP (ref 0–0)
NT-PROBNP SERPL-SCNC: 1127 PG/ML — HIGH (ref 0–300)
ORGANISM # SPEC MICROSCOPIC CNT: ABNORMAL
ORGANISM # SPEC MICROSCOPIC CNT: SIGNIFICANT CHANGE UP
OVALOCYTES BLD QL SMEAR: SLIGHT — SIGNIFICANT CHANGE UP
PCO2 BLDA: 36 MMHG — SIGNIFICANT CHANGE UP (ref 32–45)
PCO2 BLDV: 51 MMHG — HIGH (ref 39–42)
PH BLDA: 7.45 — SIGNIFICANT CHANGE UP (ref 7.35–7.45)
PH BLDV: 7.31 — LOW (ref 7.32–7.43)
PH UR: 5.5 — SIGNIFICANT CHANGE UP (ref 5–8)
PH UR: 6 — SIGNIFICANT CHANGE UP (ref 5–8)
PHOSPHATE SERPL-MCNC: 3.7 MG/DL — SIGNIFICANT CHANGE UP (ref 2.1–4.9)
PHOSPHATE SERPL-MCNC: 3.9 MG/DL — SIGNIFICANT CHANGE UP (ref 2.1–4.9)
PHOSPHATE SERPL-MCNC: 4 MG/DL — SIGNIFICANT CHANGE UP (ref 2.1–4.9)
PHOSPHATE SERPL-MCNC: 4.4 MG/DL — SIGNIFICANT CHANGE UP (ref 2.1–4.9)
PHOSPHATE SERPL-MCNC: 4.6 MG/DL — SIGNIFICANT CHANGE UP (ref 2.1–4.9)
PHOSPHATE SERPL-MCNC: 4.9 MG/DL — SIGNIFICANT CHANGE UP (ref 2.1–4.9)
PHOSPHATE SERPL-MCNC: 5 MG/DL — HIGH (ref 2.1–4.9)
PHOSPHATE SERPL-MCNC: 5.3 MG/DL — HIGH (ref 2.1–4.9)
PLAT MORPH BLD: ABNORMAL
PLATELET # BLD AUTO: 135 K/UL — SIGNIFICANT CHANGE UP (ref 130–400)
PLATELET # BLD AUTO: 149 K/UL — SIGNIFICANT CHANGE UP (ref 130–400)
PLATELET # BLD AUTO: 151 K/UL — SIGNIFICANT CHANGE UP (ref 130–400)
PLATELET # BLD AUTO: 152 K/UL — SIGNIFICANT CHANGE UP (ref 130–400)
PLATELET # BLD AUTO: 158 K/UL — SIGNIFICANT CHANGE UP (ref 130–400)
PLATELET # BLD AUTO: 163 K/UL — SIGNIFICANT CHANGE UP (ref 130–400)
PLATELET # BLD AUTO: 165 K/UL — SIGNIFICANT CHANGE UP (ref 130–400)
PLATELET # BLD AUTO: 167 K/UL — SIGNIFICANT CHANGE UP (ref 130–400)
PLATELET # BLD AUTO: 167 K/UL — SIGNIFICANT CHANGE UP (ref 130–400)
PLATELET # BLD AUTO: 185 K/UL — SIGNIFICANT CHANGE UP (ref 130–400)
PLATELET # BLD AUTO: 188 K/UL — SIGNIFICANT CHANGE UP (ref 130–400)
PLATELET # BLD AUTO: 215 K/UL — SIGNIFICANT CHANGE UP (ref 130–400)
PLATELET # BLD AUTO: 230 K/UL — SIGNIFICANT CHANGE UP (ref 130–400)
PLATELET # BLD AUTO: 235 K/UL — SIGNIFICANT CHANGE UP (ref 130–400)
PLATELET # BLD AUTO: 244 K/UL — SIGNIFICANT CHANGE UP (ref 130–400)
PLATELET # BLD AUTO: 277 K/UL — SIGNIFICANT CHANGE UP (ref 130–400)
PLATELET # BLD AUTO: 280 K/UL — SIGNIFICANT CHANGE UP (ref 130–400)
PLATELET # BLD AUTO: 320 K/UL — SIGNIFICANT CHANGE UP (ref 130–400)
PMV BLD: 10.2 FL — SIGNIFICANT CHANGE UP (ref 7.4–10.4)
PMV BLD: 10.3 FL — SIGNIFICANT CHANGE UP (ref 7.4–10.4)
PMV BLD: 10.4 FL — SIGNIFICANT CHANGE UP (ref 7.4–10.4)
PMV BLD: 10.5 FL — HIGH (ref 7.4–10.4)
PMV BLD: 10.5 FL — HIGH (ref 7.4–10.4)
PMV BLD: 10.6 FL — HIGH (ref 7.4–10.4)
PMV BLD: 10.7 FL — HIGH (ref 7.4–10.4)
PMV BLD: 10.8 FL — HIGH (ref 7.4–10.4)
PMV BLD: 10.9 FL — HIGH (ref 7.4–10.4)
PMV BLD: 11.2 FL — HIGH (ref 7.4–10.4)
PMV BLD: 11.5 FL — HIGH (ref 7.4–10.4)
PMV BLD: 11.7 FL — HIGH (ref 7.4–10.4)
PMV BLD: 9.4 FL — SIGNIFICANT CHANGE UP (ref 7.4–10.4)
PMV BLD: 9.8 FL — SIGNIFICANT CHANGE UP (ref 7.4–10.4)
PO2 BLDA: 137 MMHG — HIGH (ref 83–108)
PO2 BLDV: 37 MMHG — SIGNIFICANT CHANGE UP (ref 25–45)
POIKILOCYTOSIS BLD QL AUTO: SLIGHT — SIGNIFICANT CHANGE UP
POLYCHROMASIA BLD QL SMEAR: SLIGHT — SIGNIFICANT CHANGE UP
POTASSIUM BLDV-SCNC: 3.2 MMOL/L — LOW (ref 3.5–5.1)
POTASSIUM SERPL-MCNC: 3 MMOL/L — LOW (ref 3.5–5)
POTASSIUM SERPL-MCNC: 3.5 MMOL/L — SIGNIFICANT CHANGE UP (ref 3.5–5)
POTASSIUM SERPL-MCNC: 3.6 MMOL/L — SIGNIFICANT CHANGE UP (ref 3.5–5)
POTASSIUM SERPL-MCNC: 3.7 MMOL/L — SIGNIFICANT CHANGE UP (ref 3.5–5)
POTASSIUM SERPL-MCNC: 3.7 MMOL/L — SIGNIFICANT CHANGE UP (ref 3.5–5)
POTASSIUM SERPL-MCNC: 3.8 MMOL/L — SIGNIFICANT CHANGE UP (ref 3.5–5)
POTASSIUM SERPL-MCNC: 3.9 MMOL/L — SIGNIFICANT CHANGE UP (ref 3.5–5)
POTASSIUM SERPL-MCNC: 4 MMOL/L — SIGNIFICANT CHANGE UP (ref 3.5–5)
POTASSIUM SERPL-MCNC: 4.2 MMOL/L — SIGNIFICANT CHANGE UP (ref 3.5–5)
POTASSIUM SERPL-SCNC: 3 MMOL/L — LOW (ref 3.5–5)
POTASSIUM SERPL-SCNC: 3.5 MMOL/L — SIGNIFICANT CHANGE UP (ref 3.5–5)
POTASSIUM SERPL-SCNC: 3.6 MMOL/L — SIGNIFICANT CHANGE UP (ref 3.5–5)
POTASSIUM SERPL-SCNC: 3.7 MMOL/L — SIGNIFICANT CHANGE UP (ref 3.5–5)
POTASSIUM SERPL-SCNC: 3.7 MMOL/L — SIGNIFICANT CHANGE UP (ref 3.5–5)
POTASSIUM SERPL-SCNC: 3.8 MMOL/L — SIGNIFICANT CHANGE UP (ref 3.5–5)
POTASSIUM SERPL-SCNC: 3.9 MMOL/L — SIGNIFICANT CHANGE UP (ref 3.5–5)
POTASSIUM SERPL-SCNC: 4 MMOL/L — SIGNIFICANT CHANGE UP (ref 3.5–5)
POTASSIUM SERPL-SCNC: 4.2 MMOL/L — SIGNIFICANT CHANGE UP (ref 3.5–5)
PROCALCITONIN SERPL-MCNC: 20.6 NG/ML — HIGH (ref 0.02–0.1)
PROT SERPL-MCNC: 4.2 G/DL — LOW (ref 6–8)
PROT SERPL-MCNC: 4.8 G/DL — LOW (ref 6–8)
PROT SERPL-MCNC: 4.9 G/DL — LOW (ref 6–8)
PROT SERPL-MCNC: 5.1 G/DL — LOW (ref 6–8)
PROT SERPL-MCNC: 5.3 G/DL — LOW (ref 6–8)
PROT SERPL-MCNC: 5.5 G/DL — LOW (ref 6–8)
PROT SERPL-MCNC: 5.8 G/DL — LOW (ref 6–8)
PROT SERPL-MCNC: 5.9 G/DL — LOW (ref 6–8)
PROT SERPL-MCNC: 6 G/DL — SIGNIFICANT CHANGE UP (ref 6–8)
PROT SERPL-MCNC: 6.2 G/DL — SIGNIFICANT CHANGE UP (ref 6–8)
PROT SERPL-MCNC: 6.3 G/DL — SIGNIFICANT CHANGE UP (ref 6–8)
PROT SERPL-MCNC: 6.4 G/DL — SIGNIFICANT CHANGE UP (ref 6–8)
PROT SERPL-MCNC: 6.5 G/DL — SIGNIFICANT CHANGE UP (ref 6–8)
PROT SERPL-MCNC: 6.7 G/DL — SIGNIFICANT CHANGE UP (ref 6–8)
PROT UR-MCNC: NEGATIVE MG/DL — SIGNIFICANT CHANGE UP
PROT UR-MCNC: NEGATIVE MG/DL — SIGNIFICANT CHANGE UP
PROTHROM AB SERPL-ACNC: 12 SEC — SIGNIFICANT CHANGE UP (ref 9.95–12.87)
PROTHROM AB SERPL-ACNC: 12.4 SEC — SIGNIFICANT CHANGE UP (ref 9.95–12.87)
PROTHROM AB SERPL-ACNC: 13.4 SEC — HIGH (ref 9.95–12.87)
PROTHROM AB SERPL-ACNC: 13.7 SEC — HIGH (ref 9.95–12.87)
RAPID RVP RESULT: SIGNIFICANT CHANGE UP
RBC # BLD: 3.09 M/UL — LOW (ref 4.2–5.4)
RBC # BLD: 3.19 M/UL — LOW (ref 4.2–5.4)
RBC # BLD: 3.51 M/UL — LOW (ref 4.2–5.4)
RBC # BLD: 3.57 M/UL — LOW (ref 4.2–5.4)
RBC # BLD: 3.57 M/UL — LOW (ref 4.2–5.4)
RBC # BLD: 3.64 M/UL — LOW (ref 4.2–5.4)
RBC # BLD: 3.68 M/UL — LOW (ref 4.2–5.4)
RBC # BLD: 3.77 M/UL — LOW (ref 4.2–5.4)
RBC # BLD: 3.79 M/UL — LOW (ref 4.2–5.4)
RBC # BLD: 3.79 M/UL — LOW (ref 4.2–5.4)
RBC # BLD: 3.81 M/UL — LOW (ref 4.2–5.4)
RBC # BLD: 3.83 M/UL — LOW (ref 4.2–5.4)
RBC # BLD: 3.83 M/UL — LOW (ref 4.2–5.4)
RBC # BLD: 3.88 M/UL — LOW (ref 4.2–5.4)
RBC # BLD: 3.9 M/UL — LOW (ref 4.2–5.4)
RBC # BLD: 4.39 M/UL — SIGNIFICANT CHANGE UP (ref 4.2–5.4)
RBC # BLD: 4.47 M/UL — SIGNIFICANT CHANGE UP (ref 4.2–5.4)
RBC # BLD: 4.53 M/UL — SIGNIFICANT CHANGE UP (ref 4.2–5.4)
RBC # FLD: 14.4 % — SIGNIFICANT CHANGE UP (ref 11.5–14.5)
RBC # FLD: 14.4 % — SIGNIFICANT CHANGE UP (ref 11.5–14.5)
RBC # FLD: 14.5 % — SIGNIFICANT CHANGE UP (ref 11.5–14.5)
RBC # FLD: 14.6 % — HIGH (ref 11.5–14.5)
RBC # FLD: 14.7 % — HIGH (ref 11.5–14.5)
RBC # FLD: 14.8 % — HIGH (ref 11.5–14.5)
RBC # FLD: 14.9 % — HIGH (ref 11.5–14.5)
RBC # FLD: 14.9 % — HIGH (ref 11.5–14.5)
RBC # FLD: 15.1 % — HIGH (ref 11.5–14.5)
RBC # FLD: 15.1 % — HIGH (ref 11.5–14.5)
RBC # FLD: 16.5 % — HIGH (ref 11.5–14.5)
RBC # FLD: 16.6 % — HIGH (ref 11.5–14.5)
RBC # FLD: 16.6 % — HIGH (ref 11.5–14.5)
RBC # FLD: 16.8 % — HIGH (ref 11.5–14.5)
RBC # FLD: 17 % — HIGH (ref 11.5–14.5)
RBC BLD AUTO: ABNORMAL
RSV RNA NPH QL NAA+NON-PROBE: SIGNIFICANT CHANGE UP
SAO2 % BLDA: 99.7 % — HIGH (ref 94–98)
SAO2 % BLDV: 61.3 % — LOW (ref 67–88)
SARS-COV-2 RNA SPEC QL NAA+PROBE: SIGNIFICANT CHANGE UP
SARS-COV-2 RNA SPEC QL NAA+PROBE: SIGNIFICANT CHANGE UP
SODIUM SERPL-SCNC: 132 MMOL/L — LOW (ref 135–146)
SODIUM SERPL-SCNC: 133 MMOL/L — LOW (ref 135–146)
SODIUM SERPL-SCNC: 134 MMOL/L — LOW (ref 135–146)
SODIUM SERPL-SCNC: 135 MMOL/L — SIGNIFICANT CHANGE UP (ref 135–146)
SODIUM SERPL-SCNC: 136 MMOL/L — SIGNIFICANT CHANGE UP (ref 135–146)
SODIUM SERPL-SCNC: 136 MMOL/L — SIGNIFICANT CHANGE UP (ref 135–146)
SODIUM SERPL-SCNC: 137 MMOL/L — SIGNIFICANT CHANGE UP (ref 135–146)
SODIUM SERPL-SCNC: 137 MMOL/L — SIGNIFICANT CHANGE UP (ref 135–146)
SODIUM SERPL-SCNC: 140 MMOL/L — SIGNIFICANT CHANGE UP (ref 135–146)
SODIUM SERPL-SCNC: 140 MMOL/L — SIGNIFICANT CHANGE UP (ref 135–146)
SODIUM SERPL-SCNC: 141 MMOL/L — SIGNIFICANT CHANGE UP (ref 135–146)
SODIUM SERPL-SCNC: 142 MMOL/L — SIGNIFICANT CHANGE UP (ref 135–146)
SODIUM SERPL-SCNC: 146 MMOL/L — SIGNIFICANT CHANGE UP (ref 135–146)
SP GR SPEC: 1.02 — SIGNIFICANT CHANGE UP (ref 1–1.03)
SP GR SPEC: 1.02 — SIGNIFICANT CHANGE UP (ref 1–1.03)
SPECIMEN SOURCE: SIGNIFICANT CHANGE UP
SPHEROCYTES BLD QL SMEAR: SLIGHT — SIGNIFICANT CHANGE UP
T3FREE SERPL-MCNC: 0.92 PG/ML — LOW (ref 2–4.4)
T4 FREE SERPL-MCNC: 1.6 NG/DL — SIGNIFICANT CHANGE UP (ref 0.9–1.7)
TIBC SERPL-MCNC: 285 UG/DL — SIGNIFICANT CHANGE UP (ref 220–430)
TRANSFERRIN SERPL-MCNC: 263 MG/DL — SIGNIFICANT CHANGE UP (ref 200–360)
TROPONIN T, HIGH SENSITIVITY RESULT: 128 NG/L — CRITICAL HIGH (ref 6–13)
TROPONIN T, HIGH SENSITIVITY RESULT: 138 NG/L — CRITICAL HIGH (ref 6–13)
TROPONIN T, HIGH SENSITIVITY RESULT: 138 NG/L — CRITICAL HIGH (ref 6–13)
TROPONIN T, HIGH SENSITIVITY RESULT: 48 NG/L — HIGH (ref 6–13)
UIBC SERPL-MCNC: 264 UG/DL — SIGNIFICANT CHANGE UP (ref 110–370)
UROBILINOGEN FLD QL: 0.2 MG/DL — SIGNIFICANT CHANGE UP (ref 0.2–1)
UROBILINOGEN FLD QL: 1 MG/DL — SIGNIFICANT CHANGE UP (ref 0.2–1)
WBC # BLD: 10.8 K/UL — SIGNIFICANT CHANGE UP (ref 4.8–10.8)
WBC # BLD: 11.17 K/UL — HIGH (ref 4.8–10.8)
WBC # BLD: 12.66 K/UL — HIGH (ref 4.8–10.8)
WBC # BLD: 12.85 K/UL — HIGH (ref 4.8–10.8)
WBC # BLD: 14.18 K/UL — HIGH (ref 4.8–10.8)
WBC # BLD: 14.25 K/UL — HIGH (ref 4.8–10.8)
WBC # BLD: 14.79 K/UL — HIGH (ref 4.8–10.8)
WBC # BLD: 15.49 K/UL — HIGH (ref 4.8–10.8)
WBC # BLD: 16.56 K/UL — HIGH (ref 4.8–10.8)
WBC # BLD: 17.65 K/UL — HIGH (ref 4.8–10.8)
WBC # BLD: 20.05 K/UL — HIGH (ref 4.8–10.8)
WBC # BLD: 22.26 K/UL — HIGH (ref 4.8–10.8)
WBC # BLD: 8.36 K/UL — SIGNIFICANT CHANGE UP (ref 4.8–10.8)
WBC # BLD: 8.41 K/UL — SIGNIFICANT CHANGE UP (ref 4.8–10.8)
WBC # BLD: 9.19 K/UL — SIGNIFICANT CHANGE UP (ref 4.8–10.8)
WBC # BLD: 9.23 K/UL — SIGNIFICANT CHANGE UP (ref 4.8–10.8)
WBC # BLD: 9.65 K/UL — SIGNIFICANT CHANGE UP (ref 4.8–10.8)
WBC # BLD: 9.97 K/UL — SIGNIFICANT CHANGE UP (ref 4.8–10.8)
WBC # FLD AUTO: 10.8 K/UL — SIGNIFICANT CHANGE UP (ref 4.8–10.8)
WBC # FLD AUTO: 11.17 K/UL — HIGH (ref 4.8–10.8)
WBC # FLD AUTO: 12.66 K/UL — HIGH (ref 4.8–10.8)
WBC # FLD AUTO: 12.85 K/UL — HIGH (ref 4.8–10.8)
WBC # FLD AUTO: 14.18 K/UL — HIGH (ref 4.8–10.8)
WBC # FLD AUTO: 14.25 K/UL — HIGH (ref 4.8–10.8)
WBC # FLD AUTO: 14.79 K/UL — HIGH (ref 4.8–10.8)
WBC # FLD AUTO: 15.49 K/UL — HIGH (ref 4.8–10.8)
WBC # FLD AUTO: 16.56 K/UL — HIGH (ref 4.8–10.8)
WBC # FLD AUTO: 17.65 K/UL — HIGH (ref 4.8–10.8)
WBC # FLD AUTO: 20.05 K/UL — HIGH (ref 4.8–10.8)
WBC # FLD AUTO: 22.26 K/UL — HIGH (ref 4.8–10.8)
WBC # FLD AUTO: 8.36 K/UL — SIGNIFICANT CHANGE UP (ref 4.8–10.8)
WBC # FLD AUTO: 8.41 K/UL — SIGNIFICANT CHANGE UP (ref 4.8–10.8)
WBC # FLD AUTO: 9.19 K/UL — SIGNIFICANT CHANGE UP (ref 4.8–10.8)
WBC # FLD AUTO: 9.23 K/UL — SIGNIFICANT CHANGE UP (ref 4.8–10.8)
WBC # FLD AUTO: 9.65 K/UL — SIGNIFICANT CHANGE UP (ref 4.8–10.8)
WBC # FLD AUTO: 9.97 K/UL — SIGNIFICANT CHANGE UP (ref 4.8–10.8)

## 2025-01-01 PROCEDURE — 83540 ASSAY OF IRON: CPT

## 2025-01-01 PROCEDURE — 99233 SBSQ HOSP IP/OBS HIGH 50: CPT | Mod: FS

## 2025-01-01 PROCEDURE — 87186 SC STD MICRODIL/AGAR DIL: CPT

## 2025-01-01 PROCEDURE — 87077 CULTURE AEROBIC IDENTIFY: CPT

## 2025-01-01 PROCEDURE — C1889: CPT

## 2025-01-01 PROCEDURE — 84145 PROCALCITONIN (PCT): CPT

## 2025-01-01 PROCEDURE — 83550 IRON BINDING TEST: CPT

## 2025-01-01 PROCEDURE — 99291 CRITICAL CARE FIRST HOUR: CPT

## 2025-01-01 PROCEDURE — 0225U NFCT DS DNA&RNA 21 SARSCOV2: CPT

## 2025-01-01 PROCEDURE — 93970 EXTREMITY STUDY: CPT | Mod: 26

## 2025-01-01 PROCEDURE — 99223 1ST HOSP IP/OBS HIGH 75: CPT

## 2025-01-01 PROCEDURE — 71045 X-RAY EXAM CHEST 1 VIEW: CPT | Mod: 26,77

## 2025-01-01 PROCEDURE — 99232 SBSQ HOSP IP/OBS MODERATE 35: CPT

## 2025-01-01 PROCEDURE — 85025 COMPLETE CBC W/AUTO DIFF WBC: CPT

## 2025-01-01 PROCEDURE — 83735 ASSAY OF MAGNESIUM: CPT

## 2025-01-01 PROCEDURE — 94640 AIRWAY INHALATION TREATMENT: CPT

## 2025-01-01 PROCEDURE — 85610 PROTHROMBIN TIME: CPT

## 2025-01-01 PROCEDURE — 43275 ERCP REMOVE FORGN BODY DUCT: CPT | Mod: XU

## 2025-01-01 PROCEDURE — 82728 ASSAY OF FERRITIN: CPT

## 2025-01-01 PROCEDURE — 71045 X-RAY EXAM CHEST 1 VIEW: CPT | Mod: 26

## 2025-01-01 PROCEDURE — 85652 RBC SED RATE AUTOMATED: CPT

## 2025-01-01 PROCEDURE — 94003 VENT MGMT INPAT SUBQ DAY: CPT

## 2025-01-01 PROCEDURE — C2625: CPT

## 2025-01-01 PROCEDURE — 36415 COLL VENOUS BLD VENIPUNCTURE: CPT

## 2025-01-01 PROCEDURE — 92610 EVALUATE SWALLOWING FUNCTION: CPT | Mod: GN

## 2025-01-01 PROCEDURE — C1769: CPT

## 2025-01-01 PROCEDURE — 85018 HEMOGLOBIN: CPT

## 2025-01-01 PROCEDURE — 84295 ASSAY OF SERUM SODIUM: CPT

## 2025-01-01 PROCEDURE — 93010 ELECTROCARDIOGRAM REPORT: CPT

## 2025-01-01 PROCEDURE — 84100 ASSAY OF PHOSPHORUS: CPT

## 2025-01-01 PROCEDURE — 86901 BLOOD TYPING SEROLOGIC RH(D): CPT

## 2025-01-01 PROCEDURE — 87040 BLOOD CULTURE FOR BACTERIA: CPT

## 2025-01-01 PROCEDURE — 85730 THROMBOPLASTIN TIME PARTIAL: CPT

## 2025-01-01 PROCEDURE — 83605 ASSAY OF LACTIC ACID: CPT

## 2025-01-01 PROCEDURE — 43276 ERCP STENT EXCHANGE W/DILATE: CPT

## 2025-01-01 PROCEDURE — 84132 ASSAY OF SERUM POTASSIUM: CPT

## 2025-01-01 PROCEDURE — 99223 1ST HOSP IP/OBS HIGH 75: CPT | Mod: 25

## 2025-01-01 PROCEDURE — 99497 ADVNCD CARE PLAN 30 MIN: CPT | Mod: 25

## 2025-01-01 PROCEDURE — 80053 COMPREHEN METABOLIC PANEL: CPT

## 2025-01-01 PROCEDURE — 99223 1ST HOSP IP/OBS HIGH 75: CPT | Mod: FS

## 2025-01-01 PROCEDURE — 80048 BASIC METABOLIC PNL TOTAL CA: CPT

## 2025-01-01 PROCEDURE — C9399: CPT

## 2025-01-01 PROCEDURE — 84481 FREE ASSAY (FT-3): CPT

## 2025-01-01 PROCEDURE — 74177 CT ABD & PELVIS W/CONTRAST: CPT | Mod: 26

## 2025-01-01 PROCEDURE — 93970 EXTREMITY STUDY: CPT

## 2025-01-01 PROCEDURE — 99238 HOSP IP/OBS DSCHRG MGMT 30/<: CPT

## 2025-01-01 PROCEDURE — 92526 ORAL FUNCTION THERAPY: CPT | Mod: GN

## 2025-01-01 PROCEDURE — 74018 RADEX ABDOMEN 1 VIEW: CPT

## 2025-01-01 PROCEDURE — 86850 RBC ANTIBODY SCREEN: CPT

## 2025-01-01 PROCEDURE — 84484 ASSAY OF TROPONIN QUANT: CPT

## 2025-01-01 PROCEDURE — 71045 X-RAY EXAM CHEST 1 VIEW: CPT

## 2025-01-01 PROCEDURE — 84439 ASSAY OF FREE THYROXINE: CPT

## 2025-01-01 PROCEDURE — 76705 ECHO EXAM OF ABDOMEN: CPT

## 2025-01-01 PROCEDURE — 71045 X-RAY EXAM CHEST 1 VIEW: CPT | Mod: 26,77,76

## 2025-01-01 PROCEDURE — 99233 SBSQ HOSP IP/OBS HIGH 50: CPT

## 2025-01-01 PROCEDURE — C1874: CPT

## 2025-01-01 PROCEDURE — 86140 C-REACTIVE PROTEIN: CPT

## 2025-01-01 PROCEDURE — 99285 EMERGENCY DEPT VISIT HI MDM: CPT

## 2025-01-01 PROCEDURE — 82962 GLUCOSE BLOOD TEST: CPT

## 2025-01-01 PROCEDURE — 87640 STAPH A DNA AMP PROBE: CPT

## 2025-01-01 PROCEDURE — 93306 TTE W/DOPPLER COMPLETE: CPT | Mod: 26

## 2025-01-01 PROCEDURE — 84466 ASSAY OF TRANSFERRIN: CPT

## 2025-01-01 PROCEDURE — 94660 CPAP INITIATION&MGMT: CPT

## 2025-01-01 PROCEDURE — 43264 ERCP REMOVE DUCT CALCULI: CPT | Mod: XU

## 2025-01-01 PROCEDURE — 87641 MR-STAPH DNA AMP PROBE: CPT

## 2025-01-01 PROCEDURE — 93306 TTE W/DOPPLER COMPLETE: CPT

## 2025-01-01 PROCEDURE — 85014 HEMATOCRIT: CPT

## 2025-01-01 PROCEDURE — 82330 ASSAY OF CALCIUM: CPT

## 2025-01-01 PROCEDURE — 94002 VENT MGMT INPAT INIT DAY: CPT

## 2025-01-01 PROCEDURE — 93005 ELECTROCARDIOGRAM TRACING: CPT

## 2025-01-01 PROCEDURE — 82803 BLOOD GASES ANY COMBINATION: CPT

## 2025-01-01 PROCEDURE — 85379 FIBRIN DEGRADATION QUANT: CPT

## 2025-01-01 PROCEDURE — 87150 DNA/RNA AMPLIFIED PROBE: CPT

## 2025-01-01 PROCEDURE — 85027 COMPLETE CBC AUTOMATED: CPT

## 2025-01-01 PROCEDURE — 76705 ECHO EXAM OF ABDOMEN: CPT | Mod: 26

## 2025-01-01 PROCEDURE — 86900 BLOOD TYPING SEROLOGIC ABO: CPT

## 2025-01-01 PROCEDURE — 74328 X-RAY BILE DUCT ENDOSCOPY: CPT | Mod: 26

## 2025-01-01 PROCEDURE — 43274 ERCP DUCT STENT PLACEMENT: CPT

## 2025-01-01 RX ORDER — BUMETANIDE 1 MG/1
1 TABLET ORAL DAILY
Refills: 0 | Status: DISCONTINUED | OUTPATIENT
Start: 2025-01-01 | End: 2025-01-01

## 2025-01-01 RX ORDER — DEXMEDETOMIDINE HYDROCHLORIDE IN SODIUM CHLORIDE 4 UG/ML
0.2 INJECTION INTRAVENOUS
Qty: 200 | Refills: 0 | Status: DISCONTINUED | OUTPATIENT
Start: 2025-01-01 | End: 2025-01-01

## 2025-01-01 RX ORDER — FERROUS SULFATE 137(45) MG
325 TABLET, EXTENDED RELEASE ORAL DAILY
Refills: 0 | Status: DISCONTINUED | OUTPATIENT
Start: 2025-01-01 | End: 2025-01-01

## 2025-01-01 RX ORDER — DEXMEDETOMIDINE HYDROCHLORIDE IN SODIUM CHLORIDE 4 UG/ML
0.05 INJECTION INTRAVENOUS
Qty: 400 | Refills: 0 | Status: DISCONTINUED | OUTPATIENT
Start: 2025-01-01 | End: 2025-01-01

## 2025-01-01 RX ORDER — SODIUM BICARBONATE 1 MEQ/ML
650 SYRINGE (ML) INTRAVENOUS THREE TIMES A DAY
Refills: 0 | Status: DISCONTINUED | OUTPATIENT
Start: 2025-01-01 | End: 2025-01-01

## 2025-01-01 RX ORDER — BISMUTH SUBSALICYLATE 262 MG/1
30 TABLET, CHEWABLE ORAL ONCE
Refills: 0 | Status: COMPLETED | OUTPATIENT
Start: 2025-01-01 | End: 2025-01-01

## 2025-01-01 RX ORDER — SODIUM CHLORIDE 9 G/1000ML
1000 INJECTION, SOLUTION INTRAVENOUS ONCE
Refills: 0 | Status: COMPLETED | OUTPATIENT
Start: 2025-01-01 | End: 2025-01-01

## 2025-01-01 RX ORDER — NIFEDIPINE 30 MG
1 TABLET, EXTENDED RELEASE 24 HR ORAL
Refills: 0 | DISCHARGE

## 2025-01-01 RX ORDER — NIFEDIPINE 30 MG
90 TABLET, EXTENDED RELEASE 24 HR ORAL DAILY
Refills: 0 | Status: DISCONTINUED | OUTPATIENT
Start: 2025-01-01 | End: 2025-01-01

## 2025-01-01 RX ORDER — ONDANSETRON HCL/PF 4 MG/2 ML
4 VIAL (ML) INJECTION EVERY 8 HOURS
Refills: 0 | Status: DISCONTINUED | OUTPATIENT
Start: 2025-01-01 | End: 2025-01-01

## 2025-01-01 RX ORDER — GLYCOPYRROLATE 0.2 MG/ML
0.2 INJECTION INTRAMUSCULAR; INTRAVENOUS
Refills: 0 | Status: DISCONTINUED | OUTPATIENT
Start: 2025-01-01 | End: 2025-01-01

## 2025-01-01 RX ORDER — DEXMEDETOMIDINE HYDROCHLORIDE IN SODIUM CHLORIDE 4 UG/ML
0.2 INJECTION INTRAVENOUS
Qty: 400 | Refills: 0 | Status: DISCONTINUED | OUTPATIENT
Start: 2025-01-01 | End: 2025-01-01

## 2025-01-01 RX ORDER — ACETAMINOPHEN 500 MG/5ML
1000 LIQUID (ML) ORAL ONCE
Refills: 0 | Status: COMPLETED | OUTPATIENT
Start: 2025-01-01 | End: 2025-01-01

## 2025-01-01 RX ORDER — METOPROLOL SUCCINATE 50 MG/1
25 TABLET, EXTENDED RELEASE ORAL EVERY 6 HOURS
Refills: 0 | Status: DISCONTINUED | OUTPATIENT
Start: 2025-01-01 | End: 2025-01-01

## 2025-01-01 RX ORDER — SODIUM CHLORIDE 9 G/1000ML
1000 INJECTION, SOLUTION INTRAVENOUS
Refills: 0 | Status: DISCONTINUED | OUTPATIENT
Start: 2025-01-01 | End: 2025-01-01

## 2025-01-01 RX ORDER — HEPARIN SODIUM 1000 [USP'U]/ML
6500 INJECTION INTRAVENOUS; SUBCUTANEOUS ONCE
Refills: 0 | Status: DISCONTINUED | OUTPATIENT
Start: 2025-01-01 | End: 2025-01-01

## 2025-01-01 RX ORDER — HYDROMORPHONE/SOD CHLOR,ISO/PF 2 MG/10 ML
1 SYRINGE (ML) INJECTION EVERY 4 HOURS
Refills: 0 | Status: DISCONTINUED | OUTPATIENT
Start: 2025-01-01 | End: 2025-01-01

## 2025-01-01 RX ORDER — METOPROLOL SUCCINATE 50 MG/1
5 TABLET, EXTENDED RELEASE ORAL ONCE
Refills: 0 | Status: COMPLETED | OUTPATIENT
Start: 2025-01-01 | End: 2025-01-01

## 2025-01-01 RX ORDER — HYDROMORPHONE/SOD CHLOR,ISO/PF 2 MG/10 ML
0.5 SYRINGE (ML) INJECTION
Refills: 0 | Status: DISCONTINUED | OUTPATIENT
Start: 2025-01-01 | End: 2025-01-01

## 2025-01-01 RX ORDER — SODIUM CHLORIDE 9 G/1000ML
250 INJECTION, SOLUTION INTRAVENOUS ONCE
Refills: 0 | Status: DISCONTINUED | OUTPATIENT
Start: 2025-01-01 | End: 2025-01-01

## 2025-01-01 RX ORDER — MAGNESIUM SULFATE 500 MG/ML
2 SYRINGE (ML) INJECTION ONCE
Refills: 0 | Status: DISCONTINUED | OUTPATIENT
Start: 2025-01-01 | End: 2025-01-01

## 2025-01-01 RX ORDER — NOREPINEPHRINE BITARTRATE 8 MG
0.05 SOLUTION INTRAVENOUS
Qty: 8 | Refills: 0 | Status: DISCONTINUED | OUTPATIENT
Start: 2025-01-01 | End: 2025-01-01

## 2025-01-01 RX ORDER — FUROSEMIDE 10 MG/ML
40 INJECTION INTRAMUSCULAR; INTRAVENOUS DAILY
Refills: 0 | Status: DISCONTINUED | OUTPATIENT
Start: 2025-01-01 | End: 2025-01-01

## 2025-01-01 RX ORDER — METOPROLOL SUCCINATE 50 MG/1
25 TABLET, EXTENDED RELEASE ORAL ONCE
Refills: 0 | Status: COMPLETED | OUTPATIENT
Start: 2025-01-01 | End: 2025-01-01

## 2025-01-01 RX ORDER — HEPARIN SODIUM 1000 [USP'U]/ML
3000 INJECTION INTRAVENOUS; SUBCUTANEOUS EVERY 6 HOURS
Refills: 0 | Status: DISCONTINUED | OUTPATIENT
Start: 2025-01-01 | End: 2025-01-01

## 2025-01-01 RX ORDER — HYDROMORPHONE/SOD CHLOR,ISO/PF 2 MG/10 ML
1 SYRINGE (ML) INJECTION
Refills: 0 | Status: DISCONTINUED | OUTPATIENT
Start: 2025-01-01 | End: 2025-01-01

## 2025-01-01 RX ORDER — SODIUM CHLORIDE 9 G/1000ML
250 INJECTION, SOLUTION INTRAVENOUS ONCE
Refills: 0 | Status: COMPLETED | OUTPATIENT
Start: 2025-01-01 | End: 2025-01-01

## 2025-01-01 RX ORDER — IOHEXOL 350 MG/ML
30 INJECTION, SOLUTION INTRAVENOUS ONCE
Refills: 0 | Status: COMPLETED | OUTPATIENT
Start: 2025-01-01 | End: 2025-01-01

## 2025-01-01 RX ORDER — HYDROMORPHONE/SOD CHLOR,ISO/PF 2 MG/10 ML
0.2 SYRINGE (ML) INJECTION ONCE
Refills: 0 | Status: DISCONTINUED | OUTPATIENT
Start: 2025-01-01 | End: 2025-01-01

## 2025-01-01 RX ORDER — APIXABAN 5 MG/1
5 TABLET, FILM COATED ORAL
Refills: 0 | Status: DISCONTINUED | OUTPATIENT
Start: 2025-01-01 | End: 2025-01-01

## 2025-01-01 RX ORDER — ENOXAPARIN SODIUM 100 MG/ML
80 INJECTION SUBCUTANEOUS EVERY 12 HOURS
Refills: 0 | Status: DISCONTINUED | OUTPATIENT
Start: 2025-01-01 | End: 2025-01-01

## 2025-01-01 RX ORDER — LOSARTAN POTASSIUM 100 MG/1
1 TABLET, FILM COATED ORAL
Refills: 0 | DISCHARGE

## 2025-01-01 RX ORDER — LOSARTAN POTASSIUM 100 MG/1
50 TABLET, FILM COATED ORAL DAILY
Refills: 0 | Status: DISCONTINUED | OUTPATIENT
Start: 2025-01-01 | End: 2025-01-01

## 2025-01-01 RX ORDER — LORAZEPAM 4 MG/ML
0.5 VIAL (ML) INJECTION EVERY 4 HOURS
Refills: 0 | Status: DISCONTINUED | OUTPATIENT
Start: 2025-01-01 | End: 2025-01-01

## 2025-01-01 RX ORDER — FENTANYL CITRATE-0.9 % NACL/PF 100MCG/2ML
0.5 SYRINGE (ML) INTRAVENOUS
Qty: 2500 | Refills: 0 | Status: DISCONTINUED | OUTPATIENT
Start: 2025-01-01 | End: 2025-01-01

## 2025-01-01 RX ORDER — NIFEDIPINE 30 MG
1 TABLET, EXTENDED RELEASE 24 HR ORAL
Qty: 30 | Refills: 0
Start: 2025-01-01 | End: 2025-01-01

## 2025-01-01 RX ORDER — MAGNESIUM, ALUMINUM HYDROXIDE 200-200 MG
30 TABLET,CHEWABLE ORAL EVERY 4 HOURS
Refills: 0 | Status: DISCONTINUED | OUTPATIENT
Start: 2025-01-01 | End: 2025-01-01

## 2025-01-01 RX ORDER — PIPERACILLIN-TAZO-DEXTROSE,ISO 3.375G/5
3.38 IV SOLUTION, PIGGYBACK PREMIX FROZEN(ML) INTRAVENOUS EVERY 8 HOURS
Refills: 0 | Status: DISCONTINUED | OUTPATIENT
Start: 2025-01-01 | End: 2025-01-01

## 2025-01-01 RX ORDER — HEPARIN SODIUM 1000 [USP'U]/ML
6500 INJECTION INTRAVENOUS; SUBCUTANEOUS EVERY 6 HOURS
Refills: 0 | Status: DISCONTINUED | OUTPATIENT
Start: 2025-01-01 | End: 2025-01-01

## 2025-01-01 RX ORDER — HYDROXYZINE HYDROCHLORIDE 25 MG/1
25 TABLET, FILM COATED ORAL EVERY 8 HOURS
Refills: 0 | Status: DISCONTINUED | OUTPATIENT
Start: 2025-01-01 | End: 2025-01-01

## 2025-01-01 RX ORDER — LOSARTAN POTASSIUM 100 MG/1
100 TABLET, FILM COATED ORAL DAILY
Refills: 0 | Status: DISCONTINUED | OUTPATIENT
Start: 2025-01-01 | End: 2025-01-01

## 2025-01-01 RX ORDER — FUROSEMIDE 10 MG/ML
40 INJECTION INTRAMUSCULAR; INTRAVENOUS ONCE
Refills: 0 | Status: COMPLETED | OUTPATIENT
Start: 2025-01-01 | End: 2025-01-01

## 2025-01-01 RX ORDER — ROPINIROLE 5 MG/1
0.25 TABLET, FILM COATED ORAL AT BEDTIME
Refills: 0 | Status: DISCONTINUED | OUTPATIENT
Start: 2025-01-01 | End: 2025-01-01

## 2025-01-01 RX ORDER — METOPROLOL SUCCINATE 50 MG/1
2.5 TABLET, EXTENDED RELEASE ORAL EVERY 6 HOURS
Refills: 0 | Status: DISCONTINUED | OUTPATIENT
Start: 2025-01-01 | End: 2025-01-01

## 2025-01-01 RX ORDER — HEPARIN SODIUM 1000 [USP'U]/ML
INJECTION INTRAVENOUS; SUBCUTANEOUS
Qty: 25000 | Refills: 0 | Status: DISCONTINUED | OUTPATIENT
Start: 2025-01-01 | End: 2025-01-01

## 2025-01-01 RX ORDER — FUROSEMIDE 10 MG/ML
20 INJECTION INTRAMUSCULAR; INTRAVENOUS ONCE
Refills: 0 | Status: COMPLETED | OUTPATIENT
Start: 2025-01-01 | End: 2025-01-01

## 2025-01-01 RX ORDER — ONDANSETRON HCL/PF 4 MG/2 ML
4 VIAL (ML) INJECTION ONCE
Refills: 0 | Status: COMPLETED | OUTPATIENT
Start: 2025-01-01 | End: 2025-01-01

## 2025-01-01 RX ORDER — LABETALOL HYDROCHLORIDE 200 MG/1
5 TABLET, FILM COATED ORAL EVERY 6 HOURS
Refills: 0 | Status: DISCONTINUED | OUTPATIENT
Start: 2025-01-01 | End: 2025-01-01

## 2025-01-01 RX ORDER — FERROUS SULFATE 137(45) MG
1 TABLET, EXTENDED RELEASE ORAL
Qty: 30 | Refills: 0
Start: 2025-01-01 | End: 2025-01-01

## 2025-01-01 RX ORDER — CLOPIDOGREL BISULFATE 75 MG/1
75 TABLET, FILM COATED ORAL DAILY
Refills: 0 | Status: DISCONTINUED | OUTPATIENT
Start: 2025-01-01 | End: 2025-01-01

## 2025-01-01 RX ORDER — PIPERACILLIN-TAZO-DEXTROSE,ISO 3.375G/5
3.38 IV SOLUTION, PIGGYBACK PREMIX FROZEN(ML) INTRAVENOUS ONCE
Refills: 0 | Status: COMPLETED | OUTPATIENT
Start: 2025-01-01 | End: 2025-01-01

## 2025-01-01 RX ORDER — METRONIDAZOLE 250 MG
1 TABLET ORAL
Qty: 24 | Refills: 0
Start: 2025-01-01 | End: 2025-01-01

## 2025-01-01 RX ORDER — METOPROLOL SUCCINATE 50 MG/1
25 TABLET, EXTENDED RELEASE ORAL
Refills: 0 | Status: DISCONTINUED | OUTPATIENT
Start: 2025-01-01 | End: 2025-01-01

## 2025-01-01 RX ORDER — NIFEDIPINE 30 MG
60 TABLET, EXTENDED RELEASE 24 HR ORAL DAILY
Refills: 0 | Status: DISCONTINUED | OUTPATIENT
Start: 2025-01-01 | End: 2025-01-01

## 2025-01-01 RX ORDER — METRONIDAZOLE 250 MG
500 TABLET ORAL EVERY 12 HOURS
Refills: 0 | Status: DISCONTINUED | OUTPATIENT
Start: 2025-01-01 | End: 2025-01-01

## 2025-01-01 RX ORDER — DEXAMETHASONE 0.5 MG/1
10 TABLET ORAL ONCE
Refills: 0 | Status: COMPLETED | OUTPATIENT
Start: 2025-01-01 | End: 2025-01-01

## 2025-01-01 RX ORDER — DIPHENHYDRAMINE HCL 12.5MG/5ML
50 ELIXIR ORAL ONCE
Refills: 0 | Status: COMPLETED | OUTPATIENT
Start: 2025-01-01 | End: 2025-01-01

## 2025-01-01 RX ORDER — LEVOTHYROXINE SODIUM 300 MCG
150 TABLET ORAL DAILY
Refills: 0 | Status: DISCONTINUED | OUTPATIENT
Start: 2025-01-01 | End: 2025-01-01

## 2025-01-01 RX ORDER — AMLODIPINE BESYLATE 10 MG/1
10 TABLET ORAL AT BEDTIME
Refills: 0 | Status: DISCONTINUED | OUTPATIENT
Start: 2025-01-01 | End: 2025-01-01

## 2025-01-01 RX ORDER — METOPROLOL SUCCINATE 50 MG/1
5 TABLET, EXTENDED RELEASE ORAL EVERY 12 HOURS
Refills: 0 | Status: DISCONTINUED | OUTPATIENT
Start: 2025-01-01 | End: 2025-01-01

## 2025-01-01 RX ORDER — BUMETANIDE 1 MG/1
1 TABLET ORAL EVERY 12 HOURS
Refills: 0 | Status: DISCONTINUED | OUTPATIENT
Start: 2025-01-01 | End: 2025-01-01

## 2025-01-01 RX ORDER — MIDAZOLAM IN 0.9 % SOD.CHLORID 1 MG/ML
2 PLASTIC BAG, INJECTION (ML) INTRAVENOUS ONCE
Refills: 0 | Status: DISCONTINUED | OUTPATIENT
Start: 2025-01-01 | End: 2025-01-01

## 2025-01-01 RX ORDER — CEFEPIME 2 G/20ML
INJECTION, POWDER, FOR SOLUTION INTRAVENOUS
Refills: 0 | Status: DISCONTINUED | OUTPATIENT
Start: 2025-01-01 | End: 2025-01-01

## 2025-01-01 RX ORDER — MOXIFLOXACIN HYDROCHLORIDE 400 MG/1
1 TABLET, FILM COATED ORAL
Qty: 12 | Refills: 0
Start: 2025-01-01 | End: 2025-01-01

## 2025-01-01 RX ORDER — HYDROMORPHONE/SOD CHLOR,ISO/PF 2 MG/10 ML
0.5 SYRINGE (ML) INJECTION ONCE
Refills: 0 | Status: DISCONTINUED | OUTPATIENT
Start: 2025-01-01 | End: 2025-01-01

## 2025-01-01 RX ORDER — ONDANSETRON HCL/PF 4 MG/2 ML
4 VIAL (ML) INJECTION EVERY 6 HOURS
Refills: 0 | Status: DISCONTINUED | OUTPATIENT
Start: 2025-01-01 | End: 2025-01-01

## 2025-01-01 RX ORDER — TRAMADOL HYDROCHLORIDE 50 MG/1
25 TABLET, FILM COATED ORAL EVERY 4 HOURS
Refills: 0 | Status: DISCONTINUED | OUTPATIENT
Start: 2025-01-01 | End: 2025-01-01

## 2025-01-01 RX ORDER — SODIUM BICARBONATE 1 MEQ/ML
1300 SYRINGE (ML) INTRAVENOUS THREE TIMES A DAY
Refills: 0 | Status: DISCONTINUED | OUTPATIENT
Start: 2025-01-01 | End: 2025-01-01

## 2025-01-01 RX ORDER — ACETAMINOPHEN 500 MG/5ML
650 LIQUID (ML) ORAL EVERY 6 HOURS
Refills: 0 | Status: DISCONTINUED | OUTPATIENT
Start: 2025-01-01 | End: 2025-01-01

## 2025-01-01 RX ORDER — ALBUTEROL SULFATE 2.5 MG/3ML
2.5 VIAL, NEBULIZER (ML) INHALATION ONCE
Refills: 0 | Status: COMPLETED | OUTPATIENT
Start: 2025-01-01 | End: 2025-01-01

## 2025-01-01 RX ORDER — ONDANSETRON HCL/PF 4 MG/2 ML
1 VIAL (ML) INJECTION
Qty: 120 | Refills: 0
Start: 2025-01-01 | End: 2025-01-01

## 2025-01-01 RX ORDER — ONDANSETRON HCL/PF 4 MG/2 ML
1 VIAL (ML) INJECTION
Qty: 12 | Refills: 0
Start: 2025-01-01 | End: 2025-01-01

## 2025-01-01 RX ORDER — METOPROLOL SUCCINATE 50 MG/1
25 TABLET, EXTENDED RELEASE ORAL EVERY 12 HOURS
Refills: 0 | Status: DISCONTINUED | OUTPATIENT
Start: 2025-01-01 | End: 2025-01-01

## 2025-01-01 RX ORDER — SIMETHICONE 80 MG
80 TABLET,CHEWABLE ORAL ONCE
Refills: 0 | Status: COMPLETED | OUTPATIENT
Start: 2025-01-01 | End: 2025-01-01

## 2025-01-01 RX ORDER — CEFTRIAXONE 500 MG/1
2000 INJECTION, POWDER, FOR SOLUTION INTRAMUSCULAR; INTRAVENOUS EVERY 24 HOURS
Refills: 0 | Status: DISCONTINUED | OUTPATIENT
Start: 2025-01-01 | End: 2025-01-01

## 2025-01-01 RX ORDER — SENNA 187 MG
2 TABLET ORAL AT BEDTIME
Refills: 0 | Status: DISCONTINUED | OUTPATIENT
Start: 2025-01-01 | End: 2025-01-01

## 2025-01-01 RX ORDER — MAGNESIUM SULFATE 500 MG/ML
1 SYRINGE (ML) INJECTION
Refills: 0 | Status: COMPLETED | OUTPATIENT
Start: 2025-01-01 | End: 2025-01-01

## 2025-01-01 RX ORDER — ENOXAPARIN SODIUM 100 MG/ML
40 INJECTION SUBCUTANEOUS EVERY 24 HOURS
Refills: 0 | Status: DISCONTINUED | OUTPATIENT
Start: 2025-01-01 | End: 2025-01-01

## 2025-01-01 RX ORDER — POLYETHYLENE GLYCOL 3350 17 G/17G
17 POWDER, FOR SOLUTION ORAL
Refills: 0 | Status: DISCONTINUED | OUTPATIENT
Start: 2025-01-01 | End: 2025-01-01

## 2025-01-01 RX ORDER — IPRATROPIUM BROMIDE AND ALBUTEROL SULFATE .5; 2.5 MG/3ML; MG/3ML
3 SOLUTION RESPIRATORY (INHALATION) ONCE
Refills: 0 | Status: COMPLETED | OUTPATIENT
Start: 2025-01-01 | End: 2025-01-01

## 2025-01-01 RX ORDER — CEFEPIME 2 G/20ML
2000 INJECTION, POWDER, FOR SOLUTION INTRAVENOUS ONCE
Refills: 0 | Status: COMPLETED | OUTPATIENT
Start: 2025-01-01 | End: 2025-01-01

## 2025-01-01 RX ORDER — IBUPROFEN 200 MG
600 TABLET ORAL EVERY 8 HOURS
Refills: 0 | Status: DISCONTINUED | OUTPATIENT
Start: 2025-01-01 | End: 2025-01-01

## 2025-01-01 RX ORDER — BISMUTH SUBSALICYLATE 262 MG/1
30 TABLET, CHEWABLE ORAL ONCE
Refills: 0 | Status: DISCONTINUED | OUTPATIENT
Start: 2025-01-01 | End: 2025-01-01

## 2025-01-01 RX ORDER — METOPROLOL SUCCINATE 50 MG/1
5 TABLET, EXTENDED RELEASE ORAL EVERY 6 HOURS
Refills: 0 | Status: DISCONTINUED | OUTPATIENT
Start: 2025-01-01 | End: 2025-01-01

## 2025-01-01 RX ORDER — HYDROMORPHONE/SOD CHLOR,ISO/PF 2 MG/10 ML
1 SYRINGE (ML) INJECTION ONCE
Refills: 0 | Status: DISCONTINUED | OUTPATIENT
Start: 2025-01-01 | End: 2025-01-01

## 2025-01-01 RX ORDER — CEFEPIME 2 G/20ML
2000 INJECTION, POWDER, FOR SOLUTION INTRAVENOUS EVERY 8 HOURS
Refills: 0 | Status: DISCONTINUED | OUTPATIENT
Start: 2025-01-01 | End: 2025-01-01

## 2025-01-01 RX ORDER — POLYETHYLENE GLYCOL 3350 17 G/17G
17 POWDER, FOR SOLUTION ORAL DAILY
Refills: 0 | Status: DISCONTINUED | OUTPATIENT
Start: 2025-01-01 | End: 2025-01-01

## 2025-01-01 RX ORDER — IPRATROPIUM BROMIDE AND ALBUTEROL SULFATE .5; 2.5 MG/3ML; MG/3ML
3 SOLUTION RESPIRATORY (INHALATION) EVERY 6 HOURS
Refills: 0 | Status: DISCONTINUED | OUTPATIENT
Start: 2025-01-01 | End: 2025-01-01

## 2025-01-01 RX ORDER — METRONIDAZOLE 250 MG
500 TABLET ORAL EVERY 8 HOURS
Refills: 0 | Status: DISCONTINUED | OUTPATIENT
Start: 2025-01-01 | End: 2025-01-01

## 2025-01-01 RX ORDER — NIFEDIPINE 30 MG
90 TABLET, EXTENDED RELEASE 24 HR ORAL AT BEDTIME
Refills: 0 | Status: DISCONTINUED | OUTPATIENT
Start: 2025-01-01 | End: 2025-01-01

## 2025-01-01 RX ORDER — TRAMADOL HYDROCHLORIDE 50 MG/1
50 TABLET, FILM COATED ORAL ONCE
Refills: 0 | Status: DISCONTINUED | OUTPATIENT
Start: 2025-01-01 | End: 2025-01-01

## 2025-01-01 RX ORDER — CALCIUM CHLORIDE 100 MG/ML
1000 INJECTION, SOLUTION INTRAVENOUS ONCE
Refills: 0 | Status: DISCONTINUED | OUTPATIENT
Start: 2025-01-01 | End: 2025-01-01

## 2025-01-01 RX ORDER — ROCURONIUM BROMIDE 10 MG/ML
40 INJECTION, SOLUTION INTRAVENOUS ONCE
Refills: 0 | Status: COMPLETED | OUTPATIENT
Start: 2025-01-01 | End: 2025-01-01

## 2025-01-01 RX ORDER — ASPIRIN 325 MG
81 TABLET ORAL DAILY
Refills: 0 | Status: DISCONTINUED | OUTPATIENT
Start: 2025-01-01 | End: 2025-01-01

## 2025-01-01 RX ORDER — FENTANYL CITRATE-0.9 % NACL/PF 100MCG/2ML
50 SYRINGE (ML) INTRAVENOUS ONCE
Refills: 0 | Status: DISCONTINUED | OUTPATIENT
Start: 2025-01-01 | End: 2025-01-01

## 2025-01-01 RX ORDER — SODIUM BICARBONATE 1 MEQ/ML
325 SYRINGE (ML) INTRAVENOUS THREE TIMES A DAY
Refills: 0 | Status: DISCONTINUED | OUTPATIENT
Start: 2025-01-01 | End: 2025-01-01

## 2025-01-01 RX ORDER — FUROSEMIDE 10 MG/ML
40 INJECTION INTRAMUSCULAR; INTRAVENOUS
Refills: 0 | Status: DISCONTINUED | OUTPATIENT
Start: 2025-01-01 | End: 2025-01-01

## 2025-01-01 RX ORDER — MOXIFLOXACIN HYDROCHLORIDE 400 MG/1
400 TABLET, FILM COATED ORAL DAILY
Refills: 0 | Status: DISCONTINUED | OUTPATIENT
Start: 2025-01-01 | End: 2025-01-01

## 2025-01-01 RX ORDER — VANCOMYCIN HCL IN 5 % DEXTROSE 1.5G/250ML
1500 PLASTIC BAG, INJECTION (ML) INTRAVENOUS EVERY 12 HOURS
Refills: 0 | Status: DISCONTINUED | OUTPATIENT
Start: 2025-01-01 | End: 2025-01-01

## 2025-01-01 RX ORDER — ETOMIDATE 2 MG/ML
24 AMPUL (ML) INTRAVENOUS ONCE
Refills: 0 | Status: COMPLETED | OUTPATIENT
Start: 2025-01-01 | End: 2025-01-01

## 2025-01-01 RX ORDER — MIDAZOLAM IN 0.9 % SOD.CHLORID 1 MG/ML
2 PLASTIC BAG, INJECTION (ML) INTRAVENOUS
Refills: 0 | Status: DISCONTINUED | OUTPATIENT
Start: 2025-01-01 | End: 2025-01-01

## 2025-01-01 RX ORDER — ENOXAPARIN SODIUM 100 MG/ML
30 INJECTION SUBCUTANEOUS ONCE
Refills: 0 | Status: DISCONTINUED | OUTPATIENT
Start: 2025-01-01 | End: 2025-01-01

## 2025-01-01 RX ADMIN — Medication 1 MILLIGRAM(S): at 02:18

## 2025-01-01 RX ADMIN — Medication 40 MILLIGRAM(S): at 05:28

## 2025-01-01 RX ADMIN — Medication 2 MILLIGRAM(S): at 11:55

## 2025-01-01 RX ADMIN — Medication 0.5 MILLIGRAM(S): at 09:20

## 2025-01-01 RX ADMIN — LOSARTAN POTASSIUM 50 MILLIGRAM(S): 100 TABLET, FILM COATED ORAL at 05:40

## 2025-01-01 RX ADMIN — HEPARIN SODIUM 1600 UNIT(S)/HR: 1000 INJECTION INTRAVENOUS; SUBCUTANEOUS at 07:22

## 2025-01-01 RX ADMIN — Medication 150 MICROGRAM(S): at 05:47

## 2025-01-01 RX ADMIN — Medication 200 GRAM(S): at 08:29

## 2025-01-01 RX ADMIN — Medication 2 MILLIGRAM(S): at 14:26

## 2025-01-01 RX ADMIN — Medication 50 MILLIEQUIVALENT(S): at 16:50

## 2025-01-01 RX ADMIN — Medication 40 MILLIGRAM(S): at 05:48

## 2025-01-01 RX ADMIN — Medication 2 MILLIGRAM(S): at 07:30

## 2025-01-01 RX ADMIN — Medication 1000 MILLIGRAM(S): at 17:46

## 2025-01-01 RX ADMIN — Medication 50 MILLIGRAM(S): at 19:12

## 2025-01-01 RX ADMIN — HEPARIN SODIUM 1600 UNIT(S)/HR: 1000 INJECTION INTRAVENOUS; SUBCUTANEOUS at 07:07

## 2025-01-01 RX ADMIN — Medication 150 MICROGRAM(S): at 05:21

## 2025-01-01 RX ADMIN — Medication 325 MILLIGRAM(S): at 12:15

## 2025-01-01 RX ADMIN — Medication 2 TABLET(S): at 21:30

## 2025-01-01 RX ADMIN — Medication 500 MILLIGRAM(S): at 05:39

## 2025-01-01 RX ADMIN — Medication 50 MICROGRAM(S): at 15:10

## 2025-01-01 RX ADMIN — TRAMADOL HYDROCHLORIDE 25 MILLIGRAM(S): 50 TABLET, FILM COATED ORAL at 18:56

## 2025-01-01 RX ADMIN — Medication 1000 MILLIGRAM(S): at 11:00

## 2025-01-01 RX ADMIN — Medication 40 MILLIGRAM(S): at 11:53

## 2025-01-01 RX ADMIN — METOPROLOL SUCCINATE 5 MILLIGRAM(S): 50 TABLET, EXTENDED RELEASE ORAL at 06:05

## 2025-01-01 RX ADMIN — CLOPIDOGREL BISULFATE 75 MILLIGRAM(S): 75 TABLET, FILM COATED ORAL at 11:48

## 2025-01-01 RX ADMIN — Medication 4 MILLILITER(S): at 16:48

## 2025-01-01 RX ADMIN — Medication 1 APPLICATION(S): at 12:04

## 2025-01-01 RX ADMIN — Medication 4 MILLIGRAM(S): at 07:21

## 2025-01-01 RX ADMIN — ENOXAPARIN SODIUM 40 MILLIGRAM(S): 100 INJECTION SUBCUTANEOUS at 05:20

## 2025-01-01 RX ADMIN — Medication 50 MILLIGRAM(S): at 13:28

## 2025-01-01 RX ADMIN — CEFEPIME 100 MILLIGRAM(S): 2 INJECTION, POWDER, FOR SOLUTION INTRAVENOUS at 22:08

## 2025-01-01 RX ADMIN — TRAMADOL HYDROCHLORIDE 25 MILLIGRAM(S): 50 TABLET, FILM COATED ORAL at 09:00

## 2025-01-01 RX ADMIN — CLOPIDOGREL BISULFATE 75 MILLIGRAM(S): 75 TABLET, FILM COATED ORAL at 21:30

## 2025-01-01 RX ADMIN — Medication 500 MILLIGRAM(S): at 05:48

## 2025-01-01 RX ADMIN — POLYETHYLENE GLYCOL 3350 17 GRAM(S): 17 POWDER, FOR SOLUTION ORAL at 05:21

## 2025-01-01 RX ADMIN — DEXMEDETOMIDINE HYDROCHLORIDE IN SODIUM CHLORIDE 1 MICROGRAM(S)/KG/HR: 4 INJECTION INTRAVENOUS at 06:05

## 2025-01-01 RX ADMIN — Medication 2.5 MILLIGRAM(S): at 17:22

## 2025-01-01 RX ADMIN — Medication 0.2 MILLIGRAM(S): at 21:47

## 2025-01-01 RX ADMIN — Medication 1 MILLIGRAM(S): at 00:01

## 2025-01-01 RX ADMIN — FUROSEMIDE 40 MILLIGRAM(S): 10 INJECTION INTRAMUSCULAR; INTRAVENOUS at 05:23

## 2025-01-01 RX ADMIN — CEFEPIME 100 MILLIGRAM(S): 2 INJECTION, POWDER, FOR SOLUTION INTRAVENOUS at 21:30

## 2025-01-01 RX ADMIN — DEXMEDETOMIDINE HYDROCHLORIDE IN SODIUM CHLORIDE 4.02 MICROGRAM(S)/KG/HR: 4 INJECTION INTRAVENOUS at 06:11

## 2025-01-01 RX ADMIN — Medication 0.5 MILLIGRAM(S): at 10:57

## 2025-01-01 RX ADMIN — Medication 1 APPLICATION(S): at 13:06

## 2025-01-01 RX ADMIN — Medication 50 MILLIEQUIVALENT(S): at 11:58

## 2025-01-01 RX ADMIN — Medication 650 MILLIGRAM(S): at 12:15

## 2025-01-01 RX ADMIN — Medication 2 TABLET(S): at 21:43

## 2025-01-01 RX ADMIN — Medication 150 MICROGRAM(S): at 05:35

## 2025-01-01 RX ADMIN — Medication 500 MILLIGRAM(S): at 18:24

## 2025-01-01 RX ADMIN — Medication 1 MILLIGRAM(S): at 23:45

## 2025-01-01 RX ADMIN — LABETALOL HYDROCHLORIDE 5 MILLIGRAM(S): 200 TABLET, FILM COATED ORAL at 21:54

## 2025-01-01 RX ADMIN — FUROSEMIDE 40 MILLIGRAM(S): 10 INJECTION INTRAMUSCULAR; INTRAVENOUS at 15:27

## 2025-01-01 RX ADMIN — FUROSEMIDE 40 MILLIGRAM(S): 10 INJECTION INTRAMUSCULAR; INTRAVENOUS at 19:12

## 2025-01-01 RX ADMIN — METOPROLOL SUCCINATE 5 MILLIGRAM(S): 50 TABLET, EXTENDED RELEASE ORAL at 11:48

## 2025-01-01 RX ADMIN — Medication 500 MILLIGRAM(S): at 17:36

## 2025-01-01 RX ADMIN — Medication 2 TABLET(S): at 22:19

## 2025-01-01 RX ADMIN — Medication 1 MILLIGRAM(S): at 09:23

## 2025-01-01 RX ADMIN — Medication 1 MILLIGRAM(S): at 22:24

## 2025-01-01 RX ADMIN — Medication 25 GRAM(S): at 05:58

## 2025-01-01 RX ADMIN — METOPROLOL SUCCINATE 5 MILLIGRAM(S): 50 TABLET, EXTENDED RELEASE ORAL at 07:55

## 2025-01-01 RX ADMIN — TRAMADOL HYDROCHLORIDE 50 MILLIGRAM(S): 50 TABLET, FILM COATED ORAL at 02:45

## 2025-01-01 RX ADMIN — Medication 100 GRAM(S): at 12:02

## 2025-01-01 RX ADMIN — Medication 1 MILLIGRAM(S): at 02:26

## 2025-01-01 RX ADMIN — HEPARIN SODIUM 1600 UNIT(S)/HR: 1000 INJECTION INTRAVENOUS; SUBCUTANEOUS at 00:33

## 2025-01-01 RX ADMIN — FUROSEMIDE 40 MILLIGRAM(S): 10 INJECTION INTRAMUSCULAR; INTRAVENOUS at 05:10

## 2025-01-01 RX ADMIN — SODIUM CHLORIDE 1000 MILLILITER(S): 9 INJECTION, SOLUTION INTRAVENOUS at 23:56

## 2025-01-01 RX ADMIN — Medication 100 GRAM(S): at 13:02

## 2025-01-01 RX ADMIN — Medication 1 MILLIGRAM(S): at 18:57

## 2025-01-01 RX ADMIN — Medication 15 MILLILITER(S): at 05:23

## 2025-01-01 RX ADMIN — Medication 100 GRAM(S): at 08:29

## 2025-01-01 RX ADMIN — Medication 4 MILLILITER(S): at 13:00

## 2025-01-01 RX ADMIN — Medication 0.5 MILLIGRAM(S): at 15:39

## 2025-01-01 RX ADMIN — CEFEPIME 100 MILLIGRAM(S): 2 INJECTION, POWDER, FOR SOLUTION INTRAVENOUS at 14:08

## 2025-01-01 RX ADMIN — DEXMEDETOMIDINE HYDROCHLORIDE IN SODIUM CHLORIDE 3.99 MICROGRAM(S)/KG/HR: 4 INJECTION INTRAVENOUS at 13:03

## 2025-01-01 RX ADMIN — HEPARIN SODIUM 1600 UNIT(S)/HR: 1000 INJECTION INTRAVENOUS; SUBCUTANEOUS at 19:25

## 2025-01-01 RX ADMIN — METOPROLOL SUCCINATE 5 MILLIGRAM(S): 50 TABLET, EXTENDED RELEASE ORAL at 17:25

## 2025-01-01 RX ADMIN — Medication 40 MILLIGRAM(S): at 06:04

## 2025-01-01 RX ADMIN — Medication 150 MICROGRAM(S): at 05:23

## 2025-01-01 RX ADMIN — Medication 50 MILLIGRAM(S): at 21:12

## 2025-01-01 RX ADMIN — Medication 50 MICROGRAM(S): at 14:55

## 2025-01-01 RX ADMIN — Medication 25 GRAM(S): at 18:51

## 2025-01-01 RX ADMIN — Medication 50 MILLIEQUIVALENT(S): at 15:33

## 2025-01-01 RX ADMIN — DEXMEDETOMIDINE HYDROCHLORIDE IN SODIUM CHLORIDE 4.02 MICROGRAM(S)/KG/HR: 4 INJECTION INTRAVENOUS at 01:21

## 2025-01-01 RX ADMIN — Medication 1 MILLIGRAM(S): at 12:19

## 2025-01-01 RX ADMIN — Medication 4 MILLIGRAM(S): at 16:37

## 2025-01-01 RX ADMIN — FUROSEMIDE 40 MILLIGRAM(S): 10 INJECTION INTRAMUSCULAR; INTRAVENOUS at 05:16

## 2025-01-01 RX ADMIN — Medication 40 MILLIGRAM(S): at 06:08

## 2025-01-01 RX ADMIN — Medication 1300 MILLIGRAM(S): at 05:41

## 2025-01-01 RX ADMIN — TRAMADOL HYDROCHLORIDE 25 MILLIGRAM(S): 50 TABLET, FILM COATED ORAL at 18:12

## 2025-01-01 RX ADMIN — HEPARIN SODIUM 1600 UNIT(S)/HR: 1000 INJECTION INTRAVENOUS; SUBCUTANEOUS at 14:05

## 2025-01-01 RX ADMIN — Medication 500 MILLIGRAM(S): at 16:50

## 2025-01-01 RX ADMIN — Medication 650 MILLIGRAM(S): at 09:26

## 2025-01-01 RX ADMIN — BUMETANIDE 1 MILLIGRAM(S): 1 TABLET ORAL at 05:58

## 2025-01-01 RX ADMIN — Medication 100 GRAM(S): at 10:19

## 2025-01-01 RX ADMIN — HEPARIN SODIUM 1300 UNIT(S)/HR: 1000 INJECTION INTRAVENOUS; SUBCUTANEOUS at 15:04

## 2025-01-01 RX ADMIN — ENOXAPARIN SODIUM 40 MILLIGRAM(S): 100 INJECTION SUBCUTANEOUS at 05:41

## 2025-01-01 RX ADMIN — METOPROLOL SUCCINATE 5 MILLIGRAM(S): 50 TABLET, EXTENDED RELEASE ORAL at 05:59

## 2025-01-01 RX ADMIN — Medication 650 MILLIGRAM(S): at 08:56

## 2025-01-01 RX ADMIN — Medication 500 MILLIGRAM(S): at 15:28

## 2025-01-01 RX ADMIN — Medication 500 MILLIGRAM(S): at 22:07

## 2025-01-01 RX ADMIN — Medication 0.5 MILLIGRAM(S): at 11:20

## 2025-01-01 RX ADMIN — Medication 4 MILLILITER(S): at 09:23

## 2025-01-01 RX ADMIN — Medication 1300 MILLIGRAM(S): at 13:51

## 2025-01-01 RX ADMIN — Medication 2 MILLIGRAM(S): at 20:51

## 2025-01-01 RX ADMIN — Medication 4 MILLIGRAM(S): at 10:18

## 2025-01-01 RX ADMIN — Medication 25 GRAM(S): at 22:09

## 2025-01-01 RX ADMIN — Medication 1 MILLIGRAM(S): at 23:15

## 2025-01-01 RX ADMIN — Medication 1 MILLIGRAM(S): at 14:30

## 2025-01-01 RX ADMIN — LOSARTAN POTASSIUM 50 MILLIGRAM(S): 100 TABLET, FILM COATED ORAL at 05:48

## 2025-01-01 RX ADMIN — METOPROLOL SUCCINATE 5 MILLIGRAM(S): 50 TABLET, EXTENDED RELEASE ORAL at 05:42

## 2025-01-01 RX ADMIN — LOSARTAN POTASSIUM 50 MILLIGRAM(S): 100 TABLET, FILM COATED ORAL at 05:34

## 2025-01-01 RX ADMIN — POLYETHYLENE GLYCOL 3350 17 GRAM(S): 17 POWDER, FOR SOLUTION ORAL at 05:39

## 2025-01-01 RX ADMIN — CEFTRIAXONE 100 MILLIGRAM(S): 500 INJECTION, POWDER, FOR SOLUTION INTRAMUSCULAR; INTRAVENOUS at 05:22

## 2025-01-01 RX ADMIN — Medication 500 MILLIGRAM(S): at 05:42

## 2025-01-01 RX ADMIN — Medication 4 MILLIGRAM(S): at 17:26

## 2025-01-01 RX ADMIN — METOPROLOL SUCCINATE 5 MILLIGRAM(S): 50 TABLET, EXTENDED RELEASE ORAL at 17:17

## 2025-01-01 RX ADMIN — Medication 90 MILLIGRAM(S): at 05:21

## 2025-01-01 RX ADMIN — Medication 81 MILLIGRAM(S): at 11:48

## 2025-01-01 RX ADMIN — CEFTRIAXONE 100 MILLIGRAM(S): 500 INJECTION, POWDER, FOR SOLUTION INTRAMUSCULAR; INTRAVENOUS at 06:06

## 2025-01-01 RX ADMIN — Medication 2 MILLIGRAM(S): at 21:42

## 2025-01-01 RX ADMIN — Medication 400 MILLIGRAM(S): at 10:34

## 2025-01-01 RX ADMIN — Medication 1 MILLIGRAM(S): at 19:15

## 2025-01-01 RX ADMIN — SODIUM CHLORIDE 1000 MILLILITER(S): 9 INJECTION, SOLUTION INTRAVENOUS at 11:56

## 2025-01-01 RX ADMIN — Medication 650 MILLIGRAM(S): at 20:35

## 2025-01-01 RX ADMIN — Medication 2 MILLIGRAM(S): at 10:00

## 2025-01-01 RX ADMIN — POLYETHYLENE GLYCOL 3350 17 GRAM(S): 17 POWDER, FOR SOLUTION ORAL at 13:03

## 2025-01-01 RX ADMIN — Medication 600 MILLIGRAM(S): at 13:29

## 2025-01-01 RX ADMIN — Medication 1 APPLICATION(S): at 00:10

## 2025-01-01 RX ADMIN — IPRATROPIUM BROMIDE AND ALBUTEROL SULFATE 3 MILLILITER(S): .5; 2.5 SOLUTION RESPIRATORY (INHALATION) at 09:22

## 2025-01-01 RX ADMIN — Medication 150 MICROGRAM(S): at 11:52

## 2025-01-01 RX ADMIN — Medication 0.5 MILLIGRAM(S): at 15:35

## 2025-01-01 RX ADMIN — HYDROXYZINE HYDROCHLORIDE 25 MILLIGRAM(S): 25 TABLET, FILM COATED ORAL at 21:12

## 2025-01-01 RX ADMIN — TRAMADOL HYDROCHLORIDE 25 MILLIGRAM(S): 50 TABLET, FILM COATED ORAL at 13:37

## 2025-01-01 RX ADMIN — HEPARIN SODIUM 0 UNIT(S)/HR: 1000 INJECTION INTRAVENOUS; SUBCUTANEOUS at 06:39

## 2025-01-01 RX ADMIN — Medication 150 MICROGRAM(S): at 05:43

## 2025-01-01 RX ADMIN — Medication 2 MILLIGRAM(S): at 09:49

## 2025-01-01 RX ADMIN — Medication 80 MILLIGRAM(S): at 18:24

## 2025-01-01 RX ADMIN — Medication 500 MILLIGRAM(S): at 17:18

## 2025-01-01 RX ADMIN — Medication 650 MILLIGRAM(S): at 21:30

## 2025-01-01 RX ADMIN — SODIUM CHLORIDE 60 MILLILITER(S): 9 INJECTION, SOLUTION INTRAVENOUS at 17:09

## 2025-01-01 RX ADMIN — Medication 40 MILLIGRAM(S): at 05:34

## 2025-01-01 RX ADMIN — Medication 4 MILLIGRAM(S): at 08:30

## 2025-01-01 RX ADMIN — TRAMADOL HYDROCHLORIDE 25 MILLIGRAM(S): 50 TABLET, FILM COATED ORAL at 00:09

## 2025-01-01 RX ADMIN — FUROSEMIDE 20 MILLIGRAM(S): 10 INJECTION INTRAMUSCULAR; INTRAVENOUS at 16:15

## 2025-01-01 RX ADMIN — Medication 500 MILLIGRAM(S): at 06:07

## 2025-01-01 RX ADMIN — SODIUM CHLORIDE 80 MILLILITER(S): 9 INJECTION, SOLUTION INTRAVENOUS at 11:35

## 2025-01-01 RX ADMIN — CEFEPIME 100 MILLIGRAM(S): 2 INJECTION, POWDER, FOR SOLUTION INTRAVENOUS at 15:08

## 2025-01-01 RX ADMIN — CLOPIDOGREL BISULFATE 75 MILLIGRAM(S): 75 TABLET, FILM COATED ORAL at 13:03

## 2025-01-01 RX ADMIN — IOHEXOL 30 MILLILITER(S): 350 INJECTION, SOLUTION INTRAVENOUS at 16:36

## 2025-01-01 RX ADMIN — CEFTRIAXONE 100 MILLIGRAM(S): 500 INJECTION, POWDER, FOR SOLUTION INTRAMUSCULAR; INTRAVENOUS at 05:05

## 2025-01-01 RX ADMIN — Medication 1 APPLICATION(S): at 06:06

## 2025-01-01 RX ADMIN — Medication 40 MILLIGRAM(S): at 05:40

## 2025-01-01 RX ADMIN — SODIUM CHLORIDE 80 MILLILITER(S): 9 INJECTION, SOLUTION INTRAVENOUS at 11:57

## 2025-01-01 RX ADMIN — Medication 0.5 MILLIGRAM(S): at 19:51

## 2025-01-01 RX ADMIN — Medication 1 APPLICATION(S): at 14:26

## 2025-01-01 RX ADMIN — Medication 15 MILLILITER(S): at 17:36

## 2025-01-01 RX ADMIN — Medication 15 MILLILITER(S): at 16:49

## 2025-01-01 RX ADMIN — Medication 650 MILLIGRAM(S): at 20:02

## 2025-01-01 RX ADMIN — LOSARTAN POTASSIUM 100 MILLIGRAM(S): 100 TABLET, FILM COATED ORAL at 05:16

## 2025-01-01 RX ADMIN — DEXMEDETOMIDINE HYDROCHLORIDE IN SODIUM CHLORIDE 3.99 MICROGRAM(S)/KG/HR: 4 INJECTION INTRAVENOUS at 11:35

## 2025-01-01 RX ADMIN — Medication 0.5 MILLIGRAM(S): at 09:02

## 2025-01-01 RX ADMIN — Medication 1 MILLIGRAM(S): at 05:06

## 2025-01-01 RX ADMIN — CEFTRIAXONE 100 MILLIGRAM(S): 500 INJECTION, POWDER, FOR SOLUTION INTRAMUSCULAR; INTRAVENOUS at 05:59

## 2025-01-01 RX ADMIN — Medication 1000 MILLILITER(S): at 13:07

## 2025-01-01 RX ADMIN — POLYETHYLENE GLYCOL 3350 17 GRAM(S): 17 POWDER, FOR SOLUTION ORAL at 22:19

## 2025-01-01 RX ADMIN — BISMUTH SUBSALICYLATE 30 MILLILITER(S): 262 TABLET, CHEWABLE ORAL at 21:52

## 2025-01-01 RX ADMIN — Medication 4 MILLILITER(S): at 07:19

## 2025-01-01 RX ADMIN — CLOPIDOGREL BISULFATE 75 MILLIGRAM(S): 75 TABLET, FILM COATED ORAL at 12:29

## 2025-01-01 RX ADMIN — Medication 400 MILLIGRAM(S): at 17:23

## 2025-01-01 RX ADMIN — Medication 1 APPLICATION(S): at 11:57

## 2025-01-01 RX ADMIN — Medication 500 MILLIGRAM(S): at 21:30

## 2025-01-01 RX ADMIN — Medication 15 MILLILITER(S): at 18:51

## 2025-01-01 RX ADMIN — Medication 650 MILLIGRAM(S): at 13:00

## 2025-01-01 RX ADMIN — HEPARIN SODIUM 1300 UNIT(S)/HR: 1000 INJECTION INTRAVENOUS; SUBCUTANEOUS at 08:31

## 2025-01-01 RX ADMIN — Medication 4 MILLILITER(S): at 07:27

## 2025-01-01 RX ADMIN — BUMETANIDE 1 MILLIGRAM(S): 1 TABLET ORAL at 17:57

## 2025-01-01 RX ADMIN — TRAMADOL HYDROCHLORIDE 25 MILLIGRAM(S): 50 TABLET, FILM COATED ORAL at 13:52

## 2025-01-01 RX ADMIN — Medication 4 MILLILITER(S): at 07:14

## 2025-01-01 RX ADMIN — ROPINIROLE 0.25 MILLIGRAM(S): 5 TABLET, FILM COATED ORAL at 22:09

## 2025-01-01 RX ADMIN — ROPINIROLE 0.25 MILLIGRAM(S): 5 TABLET, FILM COATED ORAL at 21:53

## 2025-01-01 RX ADMIN — Medication 3.99 MICROGRAM(S)/KG/HR: at 11:35

## 2025-01-01 RX ADMIN — Medication 400 MILLIGRAM(S): at 10:39

## 2025-01-01 RX ADMIN — Medication 500 MILLIGRAM(S): at 14:08

## 2025-01-01 RX ADMIN — Medication 60 MILLIGRAM(S): at 05:48

## 2025-01-01 RX ADMIN — Medication 0.5 MILLIGRAM(S): at 16:46

## 2025-01-01 RX ADMIN — HEPARIN SODIUM 1400 UNIT(S)/HR: 1000 INJECTION INTRAVENOUS; SUBCUTANEOUS at 11:35

## 2025-01-01 RX ADMIN — HEPARIN SODIUM 1400 UNIT(S)/HR: 1000 INJECTION INTRAVENOUS; SUBCUTANEOUS at 18:15

## 2025-01-01 RX ADMIN — Medication 4 MILLIGRAM(S): at 08:17

## 2025-01-01 RX ADMIN — Medication 650 MILLIGRAM(S): at 13:58

## 2025-01-01 RX ADMIN — ROCURONIUM BROMIDE 40 MILLIGRAM(S): 10 INJECTION, SOLUTION INTRAVENOUS at 10:05

## 2025-01-01 RX ADMIN — Medication 81 MILLIGRAM(S): at 13:03

## 2025-01-01 RX ADMIN — LOSARTAN POTASSIUM 50 MILLIGRAM(S): 100 TABLET, FILM COATED ORAL at 05:21

## 2025-01-01 RX ADMIN — Medication 2 MILLIGRAM(S): at 23:31

## 2025-01-01 RX ADMIN — Medication 1 APPLICATION(S): at 14:57

## 2025-01-01 RX ADMIN — METOPROLOL SUCCINATE 5 MILLIGRAM(S): 50 TABLET, EXTENDED RELEASE ORAL at 00:00

## 2025-01-01 RX ADMIN — CEFTRIAXONE 100 MILLIGRAM(S): 500 INJECTION, POWDER, FOR SOLUTION INTRAMUSCULAR; INTRAVENOUS at 05:42

## 2025-01-01 RX ADMIN — Medication 1 MILLIGRAM(S): at 05:36

## 2025-01-01 RX ADMIN — HEPARIN SODIUM 1400 UNIT(S)/HR: 1000 INJECTION INTRAVENOUS; SUBCUTANEOUS at 08:27

## 2025-01-01 RX ADMIN — Medication 24 MILLIGRAM(S): at 10:05

## 2025-01-01 RX ADMIN — Medication 500 MILLIGRAM(S): at 05:21

## 2025-01-01 RX ADMIN — Medication 1300 MILLIGRAM(S): at 13:09

## 2025-01-01 RX ADMIN — Medication 200 GRAM(S): at 03:00

## 2025-01-01 RX ADMIN — GLYCOPYRROLATE 0.2 MILLIGRAM(S): 0.2 INJECTION INTRAMUSCULAR; INTRAVENOUS at 23:15

## 2025-01-01 RX ADMIN — Medication 1 MILLIGRAM(S): at 23:31

## 2025-01-01 RX ADMIN — SODIUM CHLORIDE 500 MILLILITER(S): 9 INJECTION, SOLUTION INTRAVENOUS at 23:21

## 2025-01-01 RX ADMIN — CLOPIDOGREL BISULFATE 75 MILLIGRAM(S): 75 TABLET, FILM COATED ORAL at 12:15

## 2025-01-01 RX ADMIN — TRAMADOL HYDROCHLORIDE 25 MILLIGRAM(S): 50 TABLET, FILM COATED ORAL at 05:41

## 2025-01-01 RX ADMIN — Medication 15 MILLILITER(S): at 07:03

## 2025-01-01 RX ADMIN — Medication 2 TABLET(S): at 21:52

## 2025-01-01 RX ADMIN — METOPROLOL SUCCINATE 5 MILLIGRAM(S): 50 TABLET, EXTENDED RELEASE ORAL at 17:56

## 2025-01-01 RX ADMIN — Medication 40 MILLIGRAM(S): at 05:21

## 2025-01-01 RX ADMIN — LABETALOL HYDROCHLORIDE 5 MILLIGRAM(S): 200 TABLET, FILM COATED ORAL at 14:35

## 2025-01-01 RX ADMIN — METOPROLOL SUCCINATE 5 MILLIGRAM(S): 50 TABLET, EXTENDED RELEASE ORAL at 12:07

## 2025-01-01 RX ADMIN — Medication 25 GRAM(S): at 05:16

## 2025-01-01 RX ADMIN — Medication 1300 MILLIGRAM(S): at 21:52

## 2025-01-01 RX ADMIN — HEPARIN SODIUM 1600 UNIT(S)/HR: 1000 INJECTION INTRAVENOUS; SUBCUTANEOUS at 06:37

## 2025-01-01 RX ADMIN — Medication 1 MILLIGRAM(S): at 17:18

## 2025-01-01 RX ADMIN — DEXAMETHASONE 102 MILLIGRAM(S): 0.5 TABLET ORAL at 19:52

## 2025-01-01 RX ADMIN — ROPINIROLE 0.25 MILLIGRAM(S): 5 TABLET, FILM COATED ORAL at 21:30

## 2025-01-01 RX ADMIN — LABETALOL HYDROCHLORIDE 5 MILLIGRAM(S): 200 TABLET, FILM COATED ORAL at 07:21

## 2025-01-01 RX ADMIN — BUMETANIDE 1 MILLIGRAM(S): 1 TABLET ORAL at 09:08

## 2025-01-01 RX ADMIN — Medication 2 MILLIGRAM(S): at 21:12

## 2025-01-01 RX ADMIN — Medication 25 GRAM(S): at 12:04

## 2025-01-01 RX ADMIN — Medication 50 MILLIGRAM(S): at 05:16

## 2025-01-01 RX ADMIN — METOPROLOL SUCCINATE 5 MILLIGRAM(S): 50 TABLET, EXTENDED RELEASE ORAL at 00:09

## 2025-01-01 RX ADMIN — METOPROLOL SUCCINATE 5 MILLIGRAM(S): 50 TABLET, EXTENDED RELEASE ORAL at 06:54

## 2025-01-01 RX ADMIN — SODIUM CHLORIDE 100 MILLILITER(S): 9 INJECTION, SOLUTION INTRAVENOUS at 10:15

## 2025-01-01 RX ADMIN — CEFEPIME 100 MILLIGRAM(S): 2 INJECTION, POWDER, FOR SOLUTION INTRAVENOUS at 05:49

## 2025-01-01 RX ADMIN — SODIUM CHLORIDE 1000 MILLILITER(S): 9 INJECTION, SOLUTION INTRAVENOUS at 16:37

## 2025-01-01 RX ADMIN — APIXABAN 5 MILLIGRAM(S): 5 TABLET, FILM COATED ORAL at 16:49

## 2025-01-01 RX ADMIN — Medication 1 APPLICATION(S): at 11:55

## 2025-01-01 RX ADMIN — Medication 600 MILLIGRAM(S): at 13:03

## 2025-01-01 RX ADMIN — FUROSEMIDE 40 MILLIGRAM(S): 10 INJECTION INTRAMUSCULAR; INTRAVENOUS at 05:35

## 2025-01-01 RX ADMIN — POLYETHYLENE GLYCOL 3350 17 GRAM(S): 17 POWDER, FOR SOLUTION ORAL at 17:03

## 2025-01-01 RX ADMIN — Medication 4 MILLILITER(S): at 20:55

## 2025-01-01 RX ADMIN — Medication 4 MILLILITER(S): at 18:48

## 2025-01-01 RX ADMIN — CLOPIDOGREL BISULFATE 75 MILLIGRAM(S): 75 TABLET, FILM COATED ORAL at 11:05

## 2025-01-01 RX ADMIN — POLYETHYLENE GLYCOL 3350 17 GRAM(S): 17 POWDER, FOR SOLUTION ORAL at 11:53

## 2025-01-01 RX ADMIN — APIXABAN 5 MILLIGRAM(S): 5 TABLET, FILM COATED ORAL at 06:08

## 2025-01-01 RX ADMIN — IPRATROPIUM BROMIDE AND ALBUTEROL SULFATE 3 MILLILITER(S): .5; 2.5 SOLUTION RESPIRATORY (INHALATION) at 13:49

## 2025-01-01 RX ADMIN — Medication 300 MILLIGRAM(S): at 17:20

## 2025-01-01 RX ADMIN — BUMETANIDE 1 MILLIGRAM(S): 1 TABLET ORAL at 05:42

## 2025-01-01 RX ADMIN — Medication 325 MILLIGRAM(S): at 12:29

## 2025-01-01 RX ADMIN — CEFEPIME 100 MILLIGRAM(S): 2 INJECTION, POWDER, FOR SOLUTION INTRAVENOUS at 05:20

## 2025-01-01 RX ADMIN — LABETALOL HYDROCHLORIDE 5 MILLIGRAM(S): 200 TABLET, FILM COATED ORAL at 08:08

## 2025-01-01 RX ADMIN — Medication 81 MILLIGRAM(S): at 11:53

## 2025-01-01 RX ADMIN — Medication 325 MILLIGRAM(S): at 11:05

## 2025-01-01 RX ADMIN — Medication 0.5 MILLIGRAM(S): at 20:22

## 2025-01-01 RX ADMIN — Medication 1 MILLIGRAM(S): at 09:08

## 2025-01-01 RX ADMIN — DEXMEDETOMIDINE HYDROCHLORIDE IN SODIUM CHLORIDE 4.02 MICROGRAM(S)/KG/HR: 4 INJECTION INTRAVENOUS at 08:00

## 2025-01-01 RX ADMIN — Medication 1 MILLIGRAM(S): at 11:55

## 2025-01-01 RX ADMIN — Medication 90 MILLIGRAM(S): at 22:09

## 2025-01-01 RX ADMIN — Medication 650 MILLIGRAM(S): at 06:08

## 2025-01-01 RX ADMIN — Medication 300 MILLIGRAM(S): at 05:17

## 2025-01-01 RX ADMIN — Medication 150 MICROGRAM(S): at 05:39

## 2025-01-01 RX ADMIN — DEXMEDETOMIDINE HYDROCHLORIDE IN SODIUM CHLORIDE 4.02 MICROGRAM(S)/KG/HR: 4 INJECTION INTRAVENOUS at 09:08

## 2025-01-01 RX ADMIN — Medication 150 MICROGRAM(S): at 05:16

## 2025-01-01 RX ADMIN — Medication 1 MILLIGRAM(S): at 22:13

## 2025-01-01 RX ADMIN — Medication 25 GRAM(S): at 02:02

## 2025-01-01 RX ADMIN — ENOXAPARIN SODIUM 40 MILLIGRAM(S): 100 INJECTION SUBCUTANEOUS at 05:47

## 2025-01-01 RX ADMIN — Medication 4 MILLIGRAM(S): at 12:29

## 2025-01-01 RX ADMIN — DEXMEDETOMIDINE HYDROCHLORIDE IN SODIUM CHLORIDE 3.99 MICROGRAM(S)/KG/HR: 4 INJECTION INTRAVENOUS at 18:51

## 2025-01-01 RX ADMIN — Medication 4.02 MICROGRAM(S)/KG/HR: at 21:21

## 2025-01-01 RX ADMIN — BUMETANIDE 1 MILLIGRAM(S): 1 TABLET ORAL at 06:05

## 2025-01-01 RX ADMIN — Medication 60 MILLIGRAM(S): at 05:34

## 2025-01-01 RX ADMIN — HEPARIN SODIUM 0 UNIT(S)/HR: 1000 INJECTION INTRAVENOUS; SUBCUTANEOUS at 07:23

## 2025-01-01 RX ADMIN — TRAMADOL HYDROCHLORIDE 25 MILLIGRAM(S): 50 TABLET, FILM COATED ORAL at 08:27

## 2025-01-01 RX ADMIN — MOXIFLOXACIN HYDROCHLORIDE 400 MILLIGRAM(S): 400 TABLET, FILM COATED ORAL at 11:05

## 2025-01-01 RX ADMIN — METOPROLOL SUCCINATE 5 MILLIGRAM(S): 50 TABLET, EXTENDED RELEASE ORAL at 11:50

## 2025-01-01 RX ADMIN — Medication 1 MILLIGRAM(S): at 14:45

## 2025-01-01 RX ADMIN — Medication 1 APPLICATION(S): at 05:06

## 2025-01-01 RX ADMIN — Medication 1000 MILLIGRAM(S): at 10:50

## 2025-01-13 ENCOUNTER — APPOINTMENT (OUTPATIENT)
Dept: UROGYNECOLOGY | Facility: CLINIC | Age: 80
End: 2025-01-13

## 2025-01-13 VITALS
WEIGHT: 185 LBS | BODY MASS INDEX: 30.82 KG/M2 | DIASTOLIC BLOOD PRESSURE: 72 MMHG | SYSTOLIC BLOOD PRESSURE: 161 MMHG | HEIGHT: 65 IN | HEART RATE: 68 BPM

## 2025-01-13 DIAGNOSIS — N81.10 CYSTOCELE, UNSPECIFIED: ICD-10-CM

## 2025-01-13 DIAGNOSIS — R35.1 NOCTURIA: ICD-10-CM

## 2025-01-13 DIAGNOSIS — N81.9 FEMALE GENITAL PROLAPSE, UNSPECIFIED: ICD-10-CM

## 2025-01-13 DIAGNOSIS — Z83.49 FAMILY HISTORY OF OTHER ENDOCRINE, NUTRITIONAL AND METABOLIC DISEASES: ICD-10-CM

## 2025-01-13 DIAGNOSIS — N39.41 URGE INCONTINENCE: ICD-10-CM

## 2025-01-13 DIAGNOSIS — R39.9 UNSPECIFIED SYMPTOMS AND SIGNS INVOLVING THE GENITOURINARY SYSTEM: ICD-10-CM

## 2025-01-13 DIAGNOSIS — K59.09 OTHER CONSTIPATION: ICD-10-CM

## 2025-01-13 DIAGNOSIS — Z87.19 PERSONAL HISTORY OF OTHER DISEASES OF THE DIGESTIVE SYSTEM: ICD-10-CM

## 2025-01-13 DIAGNOSIS — N39.3 STRESS INCONTINENCE (FEMALE) (MALE): ICD-10-CM

## 2025-01-13 DIAGNOSIS — Z83.3 FAMILY HISTORY OF DIABETES MELLITUS: ICD-10-CM

## 2025-01-13 DIAGNOSIS — Z86.39 PERSONAL HISTORY OF OTHER ENDOCRINE, NUTRITIONAL AND METABOLIC DISEASE: ICD-10-CM

## 2025-01-13 DIAGNOSIS — R35.0 FREQUENCY OF MICTURITION: ICD-10-CM

## 2025-01-13 DIAGNOSIS — Z82.49 FAMILY HISTORY OF ISCHEMIC HEART DISEASE AND OTHER DISEASES OF THE CIRCULATORY SYSTEM: ICD-10-CM

## 2025-01-13 PROCEDURE — 99205 OFFICE O/P NEW HI 60 MIN: CPT | Mod: 25

## 2025-01-13 PROCEDURE — 99215 OFFICE O/P EST HI 40 MIN: CPT | Mod: 25

## 2025-01-13 PROCEDURE — 51701 INSERT BLADDER CATHETER: CPT

## 2025-01-13 PROCEDURE — 99459 PELVIC EXAMINATION: CPT

## 2025-01-13 RX ORDER — VIBEGRON 75 MG/1
75 TABLET, FILM COATED ORAL
Qty: 30 | Refills: 5 | Status: ACTIVE | COMMUNITY
Start: 2025-01-13 | End: 1900-01-01

## 2025-01-13 RX ORDER — FUROSEMIDE 40 MG/1
40 TABLET ORAL
Refills: 0 | Status: ACTIVE | COMMUNITY

## 2025-01-13 RX ORDER — LEVOTHYROXINE SODIUM 150 UG/1
150 CAPSULE ORAL
Refills: 0 | Status: ACTIVE | COMMUNITY

## 2025-01-13 RX ORDER — LOSARTAN POTASSIUM 50 MG/1
50 TABLET, FILM COATED ORAL
Refills: 0 | Status: ACTIVE | COMMUNITY

## 2025-01-14 PROBLEM — N81.9 VAGINAL VAULT PROLAPSE: Status: ACTIVE | Noted: 2025-01-14

## 2025-01-14 PROBLEM — N39.41 URGE INCONTINENCE OF URINE: Status: ACTIVE | Noted: 2025-01-14

## 2025-01-14 PROBLEM — K59.09 CHRONIC CONSTIPATION: Status: ACTIVE | Noted: 2025-01-14

## 2025-01-14 PROBLEM — R39.9 UTI SYMPTOMS: Status: RESOLVED | Noted: 2024-09-26 | Resolved: 2025-01-14

## 2025-01-14 PROBLEM — R35.1 NOCTURIA: Status: ACTIVE | Noted: 2025-01-14

## 2025-01-14 PROBLEM — N39.3 SUI (STRESS URINARY INCONTINENCE, FEMALE): Status: ACTIVE | Noted: 2025-01-14

## 2025-01-14 LAB
APPEARANCE: CLEAR
BILIRUBIN URINE: NEGATIVE
BLOOD URINE: NEGATIVE
COLOR: YELLOW
GLUCOSE QUALITATIVE U: NEGATIVE MG/DL
KETONES URINE: NEGATIVE MG/DL
LEUKOCYTE ESTERASE URINE: NEGATIVE
NITRITE URINE: NEGATIVE
PH URINE: 6
PROTEIN URINE: NEGATIVE MG/DL
SPECIFIC GRAVITY URINE: 1.01
UROBILINOGEN URINE: 0.2 MG/DL

## 2025-01-15 ENCOUNTER — NON-APPOINTMENT (OUTPATIENT)
Age: 80
End: 2025-01-15

## 2025-01-16 ENCOUNTER — OUTPATIENT (OUTPATIENT)
Dept: OUTPATIENT SERVICES | Facility: HOSPITAL | Age: 80
LOS: 1 days | End: 2025-01-16
Payer: MEDICARE

## 2025-01-16 ENCOUNTER — RESULT REVIEW (OUTPATIENT)
Age: 80
End: 2025-01-16

## 2025-01-16 DIAGNOSIS — Z98.890 OTHER SPECIFIED POSTPROCEDURAL STATES: Chronic | ICD-10-CM

## 2025-01-16 DIAGNOSIS — Z90.710 ACQUIRED ABSENCE OF BOTH CERVIX AND UTERUS: Chronic | ICD-10-CM

## 2025-01-16 DIAGNOSIS — Z00.8 ENCOUNTER FOR OTHER GENERAL EXAMINATION: ICD-10-CM

## 2025-01-16 DIAGNOSIS — Z90.49 ACQUIRED ABSENCE OF OTHER SPECIFIED PARTS OF DIGESTIVE TRACT: Chronic | ICD-10-CM

## 2025-01-16 DIAGNOSIS — K80.51 CALCULUS OF BILE DUCT WITHOUT CHOLANGITIS OR CHOLECYSTITIS WITH OBSTRUCTION: ICD-10-CM

## 2025-01-16 LAB — URINE CULTURE <10: NORMAL

## 2025-01-16 PROCEDURE — 71260 CT THORAX DX C+: CPT

## 2025-01-16 PROCEDURE — 71260 CT THORAX DX C+: CPT | Mod: 26

## 2025-01-17 DIAGNOSIS — K80.51 CALCULUS OF BILE DUCT WITHOUT CHOLANGITIS OR CHOLECYSTITIS WITH OBSTRUCTION: ICD-10-CM

## 2025-01-30 ENCOUNTER — APPOINTMENT (OUTPATIENT)
Dept: SURGERY | Facility: CLINIC | Age: 80
End: 2025-01-30
Payer: MEDICARE

## 2025-01-30 VITALS
HEART RATE: 90 BPM | OXYGEN SATURATION: 96 % | DIASTOLIC BLOOD PRESSURE: 66 MMHG | BODY MASS INDEX: 31.32 KG/M2 | HEIGHT: 65 IN | WEIGHT: 188 LBS | SYSTOLIC BLOOD PRESSURE: 152 MMHG

## 2025-01-30 DIAGNOSIS — D13.5 BENIGN NEOPLASM OF EXTRAHEPATIC BILE DUCTS: ICD-10-CM

## 2025-01-30 PROCEDURE — 99215 OFFICE O/P EST HI 40 MIN: CPT

## 2025-02-05 ENCOUNTER — TRANSCRIPTION ENCOUNTER (OUTPATIENT)
Age: 80
End: 2025-02-05

## 2025-02-06 NOTE — PRE-ANESTHESIA EVALUATION ADULT - NSANTHAPLANRD_GEN_ALL_CORE
Subjective:       Patient ID: Garret Buenrostro is a 35 y.o. female.    Chief Complaint: General Illness (Entered automatically based on patient selection in [employer].)      No past medical history on file.    Past Surgical History:   Procedure Laterality Date     SECTION      CHOLECYSTECTOMY  2023    LAPAROSCOPIC CHOLECYSTECTOMY Bilateral 2023    Procedure: CHOLECYSTECTOMY-LAPAROSCOPIC;  Surgeon: Monica Engle MD;  Location: [facility] OR;  Service: General;  Laterality: Bilateral;        Social History     Socioeconomic History    Marital status:    Tobacco Use    Smoking status: Never     Passive exposure: Never    Smokeless tobacco: Never   Substance and Sexual Activity    Alcohol use: Yes     Comment: Very very occasionally    Drug use: Not Currently     Types: Marijuana    Sexual activity: Yes     Partners: Male     Birth control/protection: Implant   Social History Narrative    Plans from [facility]        Anatomy US today normal     OB Visit 33597/3/2020     IUP @ 15/3 weeks    hx of PCOS/infertility        VIsit Summary:    Limited U/S today    SI part II        Return for follow up appointment in 4-5 weeks for anatomy scan and meet MD of choice.     OB Visit      IUP @ 10/6 weeks    urinary tract infection        Visit Summary    Pap/ gc/ct and Affirm done    Labs: OB profile, SI part I    Urine Culture today        Return for follow up appointment in 5 weeks with MD Of choice.     New OB 10      New OB @ 8/3 weeks    hx of PCOS/infertility        Visit Summary    UDS/UCS    U/S reviewed with patient    PNV samples given        Prescriptions:    SIG: progesterone micronized 200 mg oral capsule,  days, Dispense #30 Capsule, 1 Refills    Directions: I capsule VAGINALLY at bedtime        Return for follow up appointment in 3 weeks for pap/pelvic and bloodwork.     Social Drivers of Health     Financial Resource Strain: Patient Declined (2024)    Overall  Financial Resource Strain (CARDIA)     Difficulty of Paying Living Expenses: Patient declined   Food Insecurity: Patient Declined (5/6/2024)    Hunger Vital Sign     Worried About Running Out of Food in the Last Year: Patient declined     Ran Out of Food in the Last Year: Patient declined   Transportation Needs: Patient Declined (5/6/2024)    PRAPARE - Transportation     Lack of Transportation (Medical): Patient declined     Lack of Transportation (Non-Medical): Patient declined   Physical Activity: Unknown (5/6/2024)    Exercise Vital Sign     Days of Exercise per Week: Patient declined   Stress: Patient Declined (5/6/2024)    [employer] [place] [employer] - Occupational Stress Questionnaire     Feeling of Stress : Patient declined   Housing Stability: Unknown (5/6/2024)    Housing Stability Vital Sign     Unable to Pay for Housing in the Last Year: Patient declined       Family History   Problem Relation Name Age of Onset    No Known Problems Paternal Grandfather      Colon cancer Paternal Grandmother      Cancer Maternal Grandmother Madison     Kidney cancer Maternal Grandmother Madison     Colon cancer Maternal Grandfather Erich     Diabetes Maternal Grandfather Erich     No Known Problems Father      Diabetes Mother Cruz     No Known Problems Brother      No Known Problems Sister         Review of patient's allergies indicates:   Allergen Reactions    Bactrim [sulfamethoxazole-trimethoprim] Rash     Not a 100% sure          Current Outpatient Medications:     diclofenac (VOLTAREN) 75 MG EC tablet, Take 1 tablet (75 mg total) by mouth 2 (two) times daily., Disp: 30 tablet, Rfl: 1    diclofenac sodium (VOLTAREN) 1 % Gel, Apply 2 g topically 4 (four) times daily., Disp: 100 g, Rfl: 3    ibuprofen (ADVIL,MOTRIN) 800 MG tablet, Take 1 tablet (800 mg total) by mouth 2 (two) times daily as needed., Disp: 60 tablet, Rfl: 0    topiramate (TOPAMAX) 50 MG tablet, Take 1 tablet (50 mg total) by mouth every evening.,  Disp: 30 tablet, Rfl: 2    Ms. Buenrostro is a 35-year-old female who presents for an E visit this evening for possible BV.  She states that she has vaginal discharge and itching with a white milky discharge.  She does not have pain while passing urine.  She states her periods are unpredictable so she does not know when her last menstrual period was.  She states that she has had pills for yeast infection in the past and they did work    This patient is 35 years old and still in a child bearing age.  Diflucan which is generally prescribed for vaginal yeast infections is a pregnancy category C.  Therefore a urinalysis and a pregnancy test should be done prior to prescribing any medications at this time    General Illness      Review of Systems   Genitourinary:  Positive for vaginal discharge. Negative for difficulty urinating, dysuria, flank pain, vaginal bleeding and vaginal pain.       Objective:      Physical Exam  Genitourinary:     Comments: Patient states vaginal discharge that is white and milky, which she thinks is yeast , she is requesting a pill for a yeast infection  .  She is currently of menstruating age and Diflucan is a pregnancy category C.  We will have to verify that patient is not pregnant and we will do a urinalysis to rule out bacterial vaginosis versus vaginal candidiasis        Assessment:       1. Vaginal discharge        Plan:         Vaginal discharge  -     Vaginosis Screen by DNA Probe  -     Urinalysis, Reflex to Urine Culture; Future; Expected date: 02/05/2025  -     KOH prep; Future; Expected date: 02/05/2025  -     Pregnancy, urine rapid    Once labs have resulted, appropriate medication will be prescribed if indicated.    13 minutes was utilized in reviewing this patient's E visit questionnaire, past medical history, medication list, allergy list, her last office visit and in dictating this E visit note  Risks, benefits, and side effects were discussed with the patient. All questions  were answered to the fullest satisfaction of the patient, and pt verbalized understanding and agreement to treatment plan. Pt was to call with any new or worsening symptoms, or present to the ER.        Julianne Person MD    71 year old male presents with RUQ pain, cholelithiasis and choledocholithiasis.     S.P ERC with sphincterotomy and multiple stone extraction on 4/10. Patient was then taken to  the OR on 4/12 and laparoscopic converted to open partial cholecystectomy. Patient tolerated procedure well. Diet was advanced and tolerated     Patient for d/c home with outpatient follow up monitored anesthesia care (MAC) 71 year old male presents with RUQ pain, cholelithiasis and choledocholithiasis.     S.P ERC with sphincterotomy and multiple stone extraction on 4/10. Patient was then taken to  the OR on 4/12 and laparoscopic converted to open partial cholecystectomy. Patient tolerated procedure well. Diet was advanced and tolerated. Patient was noted to have acute blood loss anemia, patient received 3 units of prbc. Patient responded appropriately      Patient for d/c home with outpatient follow up

## 2025-02-08 ENCOUNTER — TRANSCRIPTION ENCOUNTER (OUTPATIENT)
Age: 80
End: 2025-02-08

## 2025-02-26 ENCOUNTER — APPOINTMENT (OUTPATIENT)
Dept: UROGYNECOLOGY | Facility: CLINIC | Age: 80
End: 2025-02-26

## 2025-03-20 ENCOUNTER — APPOINTMENT (OUTPATIENT)
Dept: SURGERY | Facility: CLINIC | Age: 80
End: 2025-03-20

## 2025-03-26 ENCOUNTER — APPOINTMENT (OUTPATIENT)
Dept: GASTROENTEROLOGY | Facility: CLINIC | Age: 80
End: 2025-03-26
Payer: MEDICARE

## 2025-03-26 DIAGNOSIS — K80.50 CALCULUS OF BILE DUCT W/OUT CHOLANGITIS OR CHOLECYSTITIS W/OUT OBSTRUCTION: ICD-10-CM

## 2025-03-26 DIAGNOSIS — D13.5 BENIGN NEOPLASM OF EXTRAHEPATIC BILE DUCTS: ICD-10-CM

## 2025-03-26 PROCEDURE — 99213 OFFICE O/P EST LOW 20 MIN: CPT | Mod: 93

## 2025-04-03 ENCOUNTER — NON-APPOINTMENT (OUTPATIENT)
Age: 80
End: 2025-04-03

## 2025-04-16 ENCOUNTER — INPATIENT (INPATIENT)
Facility: HOSPITAL | Age: 80
LOS: 10 days | Discharge: HOME HEALTH | DRG: 919 | End: 2025-04-27
Attending: INTERNAL MEDICINE | Admitting: INTERNAL MEDICINE
Payer: MEDICARE

## 2025-04-16 VITALS
WEIGHT: 190.04 LBS | OXYGEN SATURATION: 98 % | TEMPERATURE: 99 F | RESPIRATION RATE: 17 BRPM | DIASTOLIC BLOOD PRESSURE: 74 MMHG | SYSTOLIC BLOOD PRESSURE: 202 MMHG | HEART RATE: 95 BPM

## 2025-04-16 DIAGNOSIS — J18.9 PNEUMONIA, UNSPECIFIED ORGANISM: ICD-10-CM

## 2025-04-16 DIAGNOSIS — Z90.710 ACQUIRED ABSENCE OF BOTH CERVIX AND UTERUS: Chronic | ICD-10-CM

## 2025-04-16 DIAGNOSIS — Z98.890 OTHER SPECIFIED POSTPROCEDURAL STATES: Chronic | ICD-10-CM

## 2025-04-16 DIAGNOSIS — Z90.49 ACQUIRED ABSENCE OF OTHER SPECIFIED PARTS OF DIGESTIVE TRACT: Chronic | ICD-10-CM

## 2025-04-16 LAB
ALBUMIN SERPL ELPH-MCNC: 4.2 G/DL — SIGNIFICANT CHANGE UP (ref 3.5–5.2)
ALP SERPL-CCNC: 349 U/L — HIGH (ref 30–115)
ALT FLD-CCNC: 408 U/L — HIGH (ref 0–41)
ANION GAP SERPL CALC-SCNC: 13 MMOL/L — SIGNIFICANT CHANGE UP (ref 7–14)
APPEARANCE UR: CLEAR — SIGNIFICANT CHANGE UP
APTT BLD: 29.4 SEC — SIGNIFICANT CHANGE UP (ref 27–39.2)
AST SERPL-CCNC: 384 U/L — HIGH (ref 0–41)
BASOPHILS # BLD AUTO: 0 K/UL — SIGNIFICANT CHANGE UP (ref 0–0.2)
BASOPHILS NFR BLD AUTO: 0 % — SIGNIFICANT CHANGE UP (ref 0–1)
BILIRUB SERPL-MCNC: 2.5 MG/DL — HIGH (ref 0.2–1.2)
BILIRUB UR-MCNC: NEGATIVE — SIGNIFICANT CHANGE UP
BUN SERPL-MCNC: 21 MG/DL — HIGH (ref 10–20)
CALCIUM SERPL-MCNC: 8.8 MG/DL — SIGNIFICANT CHANGE UP (ref 8.4–10.5)
CHLORIDE SERPL-SCNC: 107 MMOL/L — SIGNIFICANT CHANGE UP (ref 98–110)
CO2 SERPL-SCNC: 21 MMOL/L — SIGNIFICANT CHANGE UP (ref 17–32)
COLOR SPEC: SIGNIFICANT CHANGE UP
CREAT SERPL-MCNC: 0.8 MG/DL — SIGNIFICANT CHANGE UP (ref 0.7–1.5)
DIFF PNL FLD: NEGATIVE — SIGNIFICANT CHANGE UP
EGFR: 75 ML/MIN/1.73M2 — SIGNIFICANT CHANGE UP
EGFR: 75 ML/MIN/1.73M2 — SIGNIFICANT CHANGE UP
EOSINOPHIL # BLD AUTO: 0 K/UL — SIGNIFICANT CHANGE UP (ref 0–0.7)
EOSINOPHIL NFR BLD AUTO: 0 % — SIGNIFICANT CHANGE UP (ref 0–8)
GAS PNL BLDV: SIGNIFICANT CHANGE UP
GLUCOSE SERPL-MCNC: 92 MG/DL — SIGNIFICANT CHANGE UP (ref 70–99)
GLUCOSE UR QL: NEGATIVE MG/DL — SIGNIFICANT CHANGE UP
HCT VFR BLD CALC: 30.9 % — LOW (ref 37–47)
HGB BLD-MCNC: 9.6 G/DL — LOW (ref 12–16)
INR BLD: 0.98 RATIO — SIGNIFICANT CHANGE UP (ref 0.65–1.3)
KETONES UR-MCNC: NEGATIVE MG/DL — SIGNIFICANT CHANGE UP
LACTATE SERPL-SCNC: 1.1 MMOL/L — SIGNIFICANT CHANGE UP (ref 0.7–2)
LEUKOCYTE ESTERASE UR-ACNC: NEGATIVE — SIGNIFICANT CHANGE UP
LIDOCAIN IGE QN: 44 U/L — SIGNIFICANT CHANGE UP (ref 7–60)
LYMPHOCYTES # BLD AUTO: 0.09 K/UL — LOW (ref 1.2–3.4)
LYMPHOCYTES # BLD AUTO: 0.9 % — LOW (ref 20.5–51.1)
MCHC RBC-ENTMCNC: 23.8 PG — LOW (ref 27–31)
MCHC RBC-ENTMCNC: 31.1 G/DL — LOW (ref 32–37)
MCV RBC AUTO: 76.5 FL — LOW (ref 81–99)
MONOCYTES # BLD AUTO: 0.18 K/UL — SIGNIFICANT CHANGE UP (ref 0.1–0.6)
MONOCYTES NFR BLD AUTO: 1.8 % — SIGNIFICANT CHANGE UP (ref 1.7–9.3)
NEUTROPHILS # BLD AUTO: 9.4 K/UL — HIGH (ref 1.4–6.5)
NEUTROPHILS NFR BLD AUTO: 93.9 % — HIGH (ref 42.2–75.2)
NITRITE UR-MCNC: NEGATIVE — SIGNIFICANT CHANGE UP
PH UR: 6 — SIGNIFICANT CHANGE UP (ref 5–8)
PLATELET # BLD AUTO: 256 K/UL — SIGNIFICANT CHANGE UP (ref 130–400)
PMV BLD: 10.3 FL — SIGNIFICANT CHANGE UP (ref 7.4–10.4)
POTASSIUM SERPL-MCNC: 4.4 MMOL/L — SIGNIFICANT CHANGE UP (ref 3.5–5)
POTASSIUM SERPL-SCNC: 4.4 MMOL/L — SIGNIFICANT CHANGE UP (ref 3.5–5)
PROT SERPL-MCNC: 6.4 G/DL — SIGNIFICANT CHANGE UP (ref 6–8)
PROT UR-MCNC: 30 MG/DL
PROTHROM AB SERPL-ACNC: 11.5 SEC — SIGNIFICANT CHANGE UP (ref 9.95–12.87)
RBC # BLD: 4.04 M/UL — LOW (ref 4.2–5.4)
RBC # FLD: 17.9 % — HIGH (ref 11.5–14.5)
SODIUM SERPL-SCNC: 141 MMOL/L — SIGNIFICANT CHANGE UP (ref 135–146)
SP GR SPEC: 1.01 — SIGNIFICANT CHANGE UP (ref 1–1.03)
UROBILINOGEN FLD QL: 0.2 MG/DL — SIGNIFICANT CHANGE UP (ref 0.2–1)
WBC # BLD: 9.83 K/UL — SIGNIFICANT CHANGE UP (ref 4.8–10.8)
WBC # FLD AUTO: 9.83 K/UL — SIGNIFICANT CHANGE UP (ref 4.8–10.8)

## 2025-04-16 PROCEDURE — 84703 CHORIONIC GONADOTROPIN ASSAY: CPT

## 2025-04-16 PROCEDURE — 97530 THERAPEUTIC ACTIVITIES: CPT | Mod: GP

## 2025-04-16 PROCEDURE — 85025 COMPLETE CBC W/AUTO DIFF WBC: CPT

## 2025-04-16 PROCEDURE — 97162 PT EVAL MOD COMPLEX 30 MIN: CPT | Mod: GP

## 2025-04-16 PROCEDURE — 0241U: CPT

## 2025-04-16 PROCEDURE — C1769: CPT

## 2025-04-16 PROCEDURE — 74177 CT ABD & PELVIS W/CONTRAST: CPT | Mod: MC

## 2025-04-16 PROCEDURE — 74018 RADEX ABDOMEN 1 VIEW: CPT

## 2025-04-16 PROCEDURE — C1889: CPT

## 2025-04-16 PROCEDURE — 86900 BLOOD TYPING SEROLOGIC ABO: CPT

## 2025-04-16 PROCEDURE — 99223 1ST HOSP IP/OBS HIGH 75: CPT

## 2025-04-16 PROCEDURE — 80053 COMPREHEN METABOLIC PANEL: CPT

## 2025-04-16 PROCEDURE — C1874: CPT

## 2025-04-16 PROCEDURE — 87040 BLOOD CULTURE FOR BACTERIA: CPT

## 2025-04-16 PROCEDURE — 99285 EMERGENCY DEPT VISIT HI MDM: CPT | Mod: FS

## 2025-04-16 PROCEDURE — 83690 ASSAY OF LIPASE: CPT

## 2025-04-16 PROCEDURE — 83735 ASSAY OF MAGNESIUM: CPT

## 2025-04-16 PROCEDURE — 74177 CT ABD & PELVIS W/CONTRAST: CPT | Mod: 26

## 2025-04-16 PROCEDURE — 83605 ASSAY OF LACTIC ACID: CPT

## 2025-04-16 PROCEDURE — 71260 CT THORAX DX C+: CPT | Mod: 26

## 2025-04-16 PROCEDURE — 87640 STAPH A DNA AMP PROBE: CPT

## 2025-04-16 PROCEDURE — 87641 MR-STAPH DNA AMP PROBE: CPT

## 2025-04-16 PROCEDURE — 86901 BLOOD TYPING SEROLOGIC RH(D): CPT

## 2025-04-16 PROCEDURE — 36415 COLL VENOUS BLD VENIPUNCTURE: CPT

## 2025-04-16 PROCEDURE — 71045 X-RAY EXAM CHEST 1 VIEW: CPT | Mod: 26

## 2025-04-16 PROCEDURE — 97116 GAIT TRAINING THERAPY: CPT | Mod: GP

## 2025-04-16 PROCEDURE — 86850 RBC ANTIBODY SCREEN: CPT

## 2025-04-16 RX ORDER — BENZONATATE 100 MG
100 CAPSULE ORAL EVERY 8 HOURS
Refills: 0 | Status: DISCONTINUED | OUTPATIENT
Start: 2025-04-16 | End: 2025-04-27

## 2025-04-16 RX ORDER — PIPERACILLIN-TAZO-DEXTROSE,ISO 2.25G/50ML
3.38 IV SOLUTION, PIGGYBACK PREMIX FROZEN(ML) INTRAVENOUS EVERY 8 HOURS
Refills: 0 | Status: DISCONTINUED | OUTPATIENT
Start: 2025-04-17 | End: 2025-04-22

## 2025-04-16 RX ORDER — DOXYCYCLINE HYCLATE 100 MG
100 TABLET ORAL ONCE
Refills: 0 | Status: COMPLETED | OUTPATIENT
Start: 2025-04-16 | End: 2025-04-16

## 2025-04-16 RX ORDER — CLOPIDOGREL BISULFATE 75 MG/1
75 TABLET, FILM COATED ORAL DAILY
Refills: 0 | Status: DISCONTINUED | OUTPATIENT
Start: 2025-04-16 | End: 2025-04-18

## 2025-04-16 RX ORDER — DOXYCYCLINE HYCLATE 100 MG
100 TABLET ORAL EVERY 12 HOURS
Refills: 0 | Status: DISCONTINUED | OUTPATIENT
Start: 2025-04-16 | End: 2025-04-16

## 2025-04-16 RX ORDER — LOSARTAN POTASSIUM 100 MG/1
100 TABLET, FILM COATED ORAL DAILY
Refills: 0 | Status: DISCONTINUED | OUTPATIENT
Start: 2025-04-16 | End: 2025-04-27

## 2025-04-16 RX ORDER — PIPERACILLIN-TAZO-DEXTROSE,ISO 2.25G/50ML
3.38 IV SOLUTION, PIGGYBACK PREMIX FROZEN(ML) INTRAVENOUS ONCE
Refills: 0 | Status: COMPLETED | OUTPATIENT
Start: 2025-04-16 | End: 2025-04-16

## 2025-04-16 RX ORDER — ACETAMINOPHEN 500 MG/5ML
650 LIQUID (ML) ORAL EVERY 6 HOURS
Refills: 0 | Status: DISCONTINUED | OUTPATIENT
Start: 2025-04-16 | End: 2025-04-27

## 2025-04-16 RX ORDER — PIPERACILLIN-TAZO-DEXTROSE,ISO 2.25G/50ML
3.38 IV SOLUTION, PIGGYBACK PREMIX FROZEN(ML) INTRAVENOUS ONCE
Refills: 0 | Status: COMPLETED | OUTPATIENT
Start: 2025-04-17 | End: 2025-04-17

## 2025-04-16 RX ORDER — ONDANSETRON HCL/PF 4 MG/2 ML
4 VIAL (ML) INJECTION ONCE
Refills: 0 | Status: COMPLETED | OUTPATIENT
Start: 2025-04-16 | End: 2025-04-16

## 2025-04-16 RX ORDER — ACETAMINOPHEN 500 MG/5ML
975 LIQUID (ML) ORAL ONCE
Refills: 0 | Status: COMPLETED | OUTPATIENT
Start: 2025-04-16 | End: 2025-04-16

## 2025-04-16 RX ORDER — LOSARTAN POTASSIUM 100 MG/1
1 TABLET, FILM COATED ORAL
Refills: 0 | DISCHARGE

## 2025-04-16 RX ORDER — DOXYCYCLINE HYCLATE 100 MG
100 TABLET ORAL EVERY 12 HOURS
Refills: 0 | Status: DISCONTINUED | OUTPATIENT
Start: 2025-04-17 | End: 2025-04-18

## 2025-04-16 RX ORDER — MELATONIN 5 MG
3 TABLET ORAL AT BEDTIME
Refills: 0 | Status: DISCONTINUED | OUTPATIENT
Start: 2025-04-16 | End: 2025-04-27

## 2025-04-16 RX ORDER — FUROSEMIDE 10 MG/ML
40 INJECTION INTRAMUSCULAR; INTRAVENOUS
Refills: 0 | Status: DISCONTINUED | OUTPATIENT
Start: 2025-04-16 | End: 2025-04-27

## 2025-04-16 RX ORDER — MAGNESIUM, ALUMINUM HYDROXIDE 200-200 MG
30 TABLET,CHEWABLE ORAL EVERY 4 HOURS
Refills: 0 | Status: DISCONTINUED | OUTPATIENT
Start: 2025-04-16 | End: 2025-04-27

## 2025-04-16 RX ORDER — NIFEDIPINE 30 MG
60 TABLET, EXTENDED RELEASE 24 HR ORAL DAILY
Refills: 0 | Status: DISCONTINUED | OUTPATIENT
Start: 2025-04-16 | End: 2025-04-21

## 2025-04-16 RX ORDER — ENOXAPARIN SODIUM 100 MG/ML
40 INJECTION SUBCUTANEOUS EVERY 24 HOURS
Refills: 0 | Status: COMPLETED | OUTPATIENT
Start: 2025-04-16 | End: 2025-04-17

## 2025-04-16 RX ORDER — ONDANSETRON HCL/PF 4 MG/2 ML
4 VIAL (ML) INJECTION EVERY 8 HOURS
Refills: 0 | Status: DISCONTINUED | OUTPATIENT
Start: 2025-04-16 | End: 2025-04-27

## 2025-04-16 RX ORDER — LEVOTHYROXINE SODIUM 300 MCG
150 TABLET ORAL DAILY
Refills: 0 | Status: DISCONTINUED | OUTPATIENT
Start: 2025-04-16 | End: 2025-04-27

## 2025-04-16 RX ADMIN — Medication 650 MILLIGRAM(S): at 20:53

## 2025-04-16 RX ADMIN — Medication 200 GRAM(S): at 23:22

## 2025-04-16 RX ADMIN — Medication 100 MILLIGRAM(S): at 17:17

## 2025-04-16 RX ADMIN — Medication 4 MILLIGRAM(S): at 13:00

## 2025-04-16 RX ADMIN — Medication 4 MILLIGRAM(S): at 20:53

## 2025-04-16 RX ADMIN — Medication 25 MILLIGRAM(S): at 23:27

## 2025-04-16 RX ADMIN — Medication 975 MILLIGRAM(S): at 13:00

## 2025-04-16 RX ADMIN — Medication 1000 MILLILITER(S): at 13:00

## 2025-04-16 RX ADMIN — Medication 4 MILLIGRAM(S): at 12:06

## 2025-04-16 RX ADMIN — Medication 200 GRAM(S): at 14:06

## 2025-04-16 NOTE — ED PROVIDER NOTE - PROGRESS NOTE DETAILS
KA - ct resulted. patient and family informed. ICU consulted, cleared for floor with low threshold for upgrade. MAR aware. GI consulted, keep NPO.

## 2025-04-16 NOTE — ED PROVIDER NOTE - CARE PLAN
1 Principal Discharge DX:	Cholangitis  Secondary Diagnosis:	Pneumonia  Secondary Diagnosis:	Total bilirubin, elevated  Secondary Diagnosis:	Elevated LFTs

## 2025-04-16 NOTE — ED PROVIDER NOTE - PHYSICAL EXAMINATION
As Follows:  CONST: Well appearing in NAD  EYES: PERRL, EOMI, Sclera and conjunctiva clear.   ENT: No nasal discharge. Oropharynx normal appearing, no erythema / exudates. Uvula midline. Airway intact.   CARD: No murmurs, rubs, or gallops; Normal rate and rhythm  RESP: BS Equal B/L, No wheezes, rhonchi or rales. No distress or accessory breathing  GI: RUQ tenderness with epigastric discomfort. Soft, non-distended. No cva tenderness.   SKIN: Warm, dry, no acute rashes. MMM  NEURO: Alert and Oriented, No focal deficits.

## 2025-04-16 NOTE — ED PROVIDER NOTE - RHYTHM STRIP/ULTRASOUND IMAGES/CT SCAN IMAGES SHOWED
Independent interpretation of the CT scan as a preliminary reading by Dr. Sarthak Knowles shows Right lower lobe pleural effusion focal infiltrate

## 2025-04-16 NOTE — ED PROVIDER NOTE - CLINICAL SUMMARY MEDICAL DECISION MAKING FREE TEXT BOX
patient signed out to me from Dr. Knowles     78 yo F presented to ED for eval of abdominal pain, vomiting, chills. Labs were ordered and reviewed.  Imaging was ordered and reviewed by me. Clinical picture concerning for cholangitis. Abx also given to cover for pneumonia with new pleural effusion.  Appropriate medications for patient's presenting complaints were ordered and effects were reassessed.  Patient's records (prior hospital, ED visit, and/or nursing home notes if available) were reviewed.  Additional history was obtained from EMS, family, and/or PCP (where available).  Escalation to admission/observation was considered. Patient requires inpatient hospitalization for further care.

## 2025-04-16 NOTE — H&P ADULT - NSHPLABSRESULTS_GEN_ALL_CORE
9.6    9.83  )-----------( 256      ( 16 Apr 2025 13:18 )             30.9     04-16    141  |  107  |  21[H]  ----------------------------<  92  4.4   |  21  |  0.8    Ca    8.8      16 Apr 2025 13:18    TPro  6.4  /  Alb  4.2  /  TBili  2.5[H]  /  DBili  x   /  AST  384[H]  /  ALT  408[H]  /  AlkPhos  349[H]  04-16    CT of the Chest, Abdomen and Pelvis was performed.  Sagittal and coronal reformats were performed.    FINDINGS:  CHEST:  LUNGS AND LARGE AIRWAYS: Biapical pleural-parenchymal thickening. Since   February 5, 2025, new right lower and right middle lobe consolidated   opacities with associated bronchiolectasis, possibly reflecting   atelectasis and/or pneumonia in the appropriate clinical setting. Few   overall unchanged bilateral pulmonary nodules are noted. For example:    Right upper lobe 5 mm nodule,series 4 image 119.  Left upper lobe solid 4 mm nodule, series 4 image 81.  Left upper lobe 4 mm solid nodule, series 4 image 91.    PLEURA: New trace right pleural effusion.  VESSELS: Aortic calcifications. Coronary artery calcifications.  HEART: Heart size is normal. No pericardial effusion.  MEDIASTINUM AND SHAYLA: No significant change in multiple subcentimeter and   mildly enlarged mediastinal lymph nodes.  CHEST WALL AND LOWER NECK: Bilateral axillary lymph nodes similar to prior.    ABDOMEN AND PELVIS:  LIVER: Within normal limits.  BILE DUCTS: Redemonstrated is a CBD stent, with slightly increased intrahepatic and extrahepatic biliary ductal dilatation. Interval resolution of previously noted pneumobilia.  GALLBLADDER: Status postcholecystectomy.  SPLEEN: Within normal limits.  PANCREAS: Within normal limits.  ADRENALS: Within normal limits.  KIDNEYS/URETERS: No hydronephrosis. Symmetric renal enhancement. Multiple bilateral renal cysts and other hypodensities, which are too small to further characterize, are noted.    BLADDER: Decompressed, limiting further evaluation.  REPRODUCTIVE ORGANS: Status post hysterectomy.    BOWEL: Sigmoid colonic diverticulosis. No evidence of bowel obstruction.  PERITONEUM/RETROPERITONEUM: No ascites or pneumoperitoneum.  VESSELS: Left femoral vascular stent. Atherosclerotic changes.  LYMPH NODES: No significant change in multiple subcentimeter and mildly enlarged gastrohepatic, pancreaticoduodenal, bilateral iliac chain and bilateral inguinal lymph nodes.  ABDOMINAL WALL: Within normal limits.  BONES: Degenerative changes of the spine.    IMPRESSION:    1. Since February 5, 2025, new trace right pleural effusion and new right lower and middle lobe consolidated opacities with associated bronchiolectasis, possibly reflecting atelectasis and/or pneumonia in the appropriate clinical setting. Follow-up to complete resolution is   suggested.  2. Slightly increased intrahepatic and extrahepatic biliary ductal dilatation with CBD stent in place; interval resolution of previously noted pneumobilia.  2. No significant change in multiple subcentimeter and mildly enlarged intrathoracic and abdominopelvic lymph nodes.    --- End of Report ---

## 2025-04-16 NOTE — H&P ADULT - ATTENDING COMMENTS
HPI:  80 y/o Female with PMHx of HTN, HLD, hypothyroidism,  PAD on Plavix, (HFpEF) (EF 65-70% in August 2023), and cholecystectomy with multiple endoscopic retrograde cholangiopancreatography (ERCP) past choledocholithiasis / CBD stent,  presents for abdominal pain    She was last admitted in February for abdominal  pain similar to prior pains, and fever/chills, dark urine, subjective fevers, abdominal pain which have currently resolved after receiving antibiotics.  Denies overt signs of GI bleeding.  She was admitted for suspected cholangitis and discharged on Moxifloxacin and Flagyl. Of note, patient has a previously placed CBD stent.  She also has a periampullary adenoma.    On presentation, she reports having had a flu-like illness 2 weeks ago and she received antibiotics from the acute care outpatient  She currently presents for 1 day of abd pain, nausea, generalized, fever, chills, Poor PO intake over the past couple of days and complaints of neck pain  She denies any vomiting, chest pain, shortness of breath, abdominal pain, or urinary complaints    In the ED:  - Vitals: T 37.1 HR 95 /74 SpO2 17 RR 98%  - Labs: WBC 9.83 Hgb 9.6  Na 141 K 4.4 Cr 0.8, Elevated LFTS, UA (-)  - Imaging: CT Chest / Abdomen  1. Since February 5, 2025, new trace right pleural effusion and new right lower and middle lobe consolidated opacities with associated bronchiolectasis, possibly reflecting atelectasis and/or pneumonia in the appropriate clinical setting. Follow-up to complete resolution is   suggested.  2. Slightly increased intrahepatic and extrahepatic biliary ductal dilatation with CBD stent in place; interval resolution of previously noted pneumobilia.  2. No significant change in multiple subcentimeter and mildly enlarged intrathoracic and abdominopelvic lymph nodes.    She is s/p Doxycycline and Zosyn in the ED and she will be admitted to medicine (16 Apr 2025 21:04)    REVIEW OF SYSTEMS: see cc/HPI   CONSTITUTIONAL: No weakness, fevers or chills  EYES/ENT: No visual changes;  No vertigo or throat pain   NECK: No pain or stiffness  RESPIRATORY: No cough, wheezing, hemoptysis; No shortness of breath  CARDIOVASCULAR: No chest pain or palpitations  GASTROINTESTINAL: No abdominal or epigastric pain. No nausea, vomiting, or hematemesis; No diarrhea or constipation. No melena or hematochezia.  GENITOURINARY: No dysuria, frequency or hematuria  NEUROLOGICAL: No numbness or weakness  SKIN: No itching, rashes  ENDO: No hyperglycemia, No thyroid disorder, No dyslipidemia   HEM: No bleeding, No easy bruising, No anemia   PSYCHE: No psychosis, No mood disorder No hallucinations No delusion   MSK: No deformity, No fracture, No Joint swelling    Physical Exam:  General: WN/WD NAD  Neurology: A&Ox3, nonfocal, follows commands  Eyes: PERRLA/ EOMI  ENT/Neck: Neck supple, trachea midline, No JVD  Respiratory: CTA B/L, No wheezing, rales, rhonchi  CV: Normal rate regular rhythm, S1S2, no murmurs, rubs or gallops  Abdominal: Soft, NT, ND +BS,   Extremities: No edema, + peripheral pulses  Skin: No Rashes, Hematoma, Ecchymosis    A/p HPI:  80 y/o Female with PMHx of HTN, HLD, hypothyroidism,  PAD on Plavix, (HFpEF) (EF 65-70% in August 2023), and cholecystectomy with multiple endoscopic retrograde cholangiopancreatography (ERCP) past choledocholithiasis / CBD stent,  presents for abdominal pain    She was last admitted in February for abdominal  pain similar to prior pains, and fever/chills, dark urine, subjective fevers, abdominal pain which have currently resolved after receiving antibiotics.  Denies overt signs of GI bleeding.  She was admitted for suspected cholangitis and discharged on Moxifloxacin and Flagyl. Of note, patient has a previously placed CBD stent.  She also has a periampullary adenoma.    On presentation, she reports having had a flu-like illness 2 weeks ago and she received antibiotics from the acute care outpatient  She currently presents for 1 day of abd pain, nausea, generalized, fever, chills, Poor PO intake over the past couple of days and complaints of neck pain  She denies any vomiting, chest pain, shortness of breath, abdominal pain, or urinary complaints    In the ED:  - Vitals: T 37.1 HR 95 /74 SpO2 17 RR 98%  - Labs: WBC 9.83 Hgb 9.6  Na 141 K 4.4 Cr 0.8, Elevated LFTS, UA (-)  - Imaging: CT Chest / Abdomen  1. Since February 5, 2025, new trace right pleural effusion and new right lower and middle lobe consolidated opacities with associated bronchiolectasis, possibly reflecting atelectasis and/or pneumonia in the appropriate clinical setting. Follow-up to complete resolution is   suggested.  2. Slightly increased intrahepatic and extrahepatic biliary ductal dilatation with CBD stent in place; interval resolution of previously noted pneumobilia.  2. No significant change in multiple subcentimeter and mildly enlarged intrathoracic and abdominopelvic lymph nodes.    She is s/p Doxycycline and Zosyn in the ED and she will be admitted to medicine (16 Apr 2025 21:04)    REVIEW OF SYSTEMS: see cc/HPI   CONSTITUTIONAL: No weakness, fevers or chills  EYES/ENT: No visual changes;  No vertigo or throat pain   NECK: No pain or stiffness  RESPIRATORY: No cough, wheezing, hemoptysis; No shortness of breath  CARDIOVASCULAR: No chest pain or palpitations  GASTROINTESTINAL: No abdominal or epigastric pain. No nausea, vomiting, or hematemesis; No diarrhea or constipation. No melena or hematochezia.  GENITOURINARY: No dysuria, frequency or hematuria  NEUROLOGICAL: No numbness or weakness  SKIN: No itching, rashes  ENDO: No hyperglycemia, No thyroid disorder, No dyslipidemia   HEM: No bleeding, No easy bruising, No anemia   PSYCHE: No psychosis, No mood disorder No hallucinations No delusion   MSK: No deformity, No fracture, No Joint swelling    Physical Exam:  General: WN/WD NAD  Neurology: A&Ox3, nonfocal, follows commands  Eyes: PERRLA/ EOMI  ENT/Neck: Neck supple, trachea midline, No JVD  Respiratory: CTA B/L, No wheezing, rales, rhonchi, (+) coughing intermittently   CV: Normal rate regular rhythm, S1S2, no murmurs, rubs or gallops  Abdominal: Soft, ND +BS, tender in the upper abdomen area - especially in the mid epigastric area   Extremities: No edema, + peripheral pulses  Skin: No Rashes, Hematoma, Ecchymosis    A/p  Pneumonia - suspected gram negative organism given recent Abx  -IV abx   -check blood Cx and sputum Cx  -check RVP and MRSA screening     suspected Cholangitis   - NPO and IV fluids   -PRN pain Rx   -advance GI team for possible intervention   -PRN pain Rx - Morphine PRN     H/o PAD   HFpEF  -c/w OP Rx     Hypothyroidism   -c/w Levo-thyroxine     DVT prophylaxis     PATIENT SEEN by ATTENDING 4/16/ 2025 ( note revisited 4/17)

## 2025-04-16 NOTE — ED PROVIDER NOTE - ATTENDING APP SHARED VISIT CONTRIBUTION OF CARE
79-year-old female history of hypertension dyslipidemia hypothyroidism PAD heart failure with prior cholecystectomy with ERCP for choledocholithiasis presenting with abdominal pain fever persistent nausea.  No vomiting.  Decreased oral appetite.  On exam there are some mild tenderness to palpation in the upper abdomen there is no scleral icterus plan is to obtain labs and CT scan

## 2025-04-16 NOTE — ED PROVIDER NOTE - OBJECTIVE STATEMENT
Patient is a 79 year old female with pmhx of htn, hld, hypothyroidism, chf, pad on plavix, past choledocholithiasis / CBD stent presents for 1 day of abd pain, nausea, generalized, fever, chills, bodyaches. She denies any vomiting, chest pain, shortness of breath, abdominal pain, or urinary complaints.

## 2025-04-16 NOTE — H&P ADULT - ASSESSMENT
78 y/o Female with PMHx of HTN, HLD, hypothyroidism, PAD on Plavix, HFpEF (EF 65-70% in August 2023), and cholecystectomy with multiple ERCP in the past for choledocholithiasis / CBD stent,  presents for abdominal pain    # Fever  # Cough  # Shortness of breath  - Patient has been reporting flu like symptoms 2 weeks ago, resolved, then came back again yesterday  - CT scan showing: new trace right pleural effusion and new right lower and middle lobe consolidated opacities with associated bronchiolectasis, possibly reflecting atelectasis and/or pneumonia in the appropriate clinical setting. Follow-up to complete resolution is   suggested.  - SIRS criteria HR > 90 (but no tachypnea, WBC < 12k, no documented fever here, subjective fever at home)  - 2 points on CURB 65  - VBG showing: pH 7.41, CO2 34, O2 53, Bicarb 22  - Given hospitalization requiring antibiotics within the past 3 months, will cover with Zosyn and Doxycycline for pseudomonal and mrsa coverage  - F/U RVP and MRSA swab  - F/U Blood culture  - F/U ID consult    # Abdominal pain  # Hx of cholecystectomy  # Hx of multiple ERCP for choledocholithiasis  - Last ERCP on 02/05/2025: Successful ERCP with removal of stent, removal of stones and sludge and placement of plastic CBD stent. 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  - Abdominal pain similar to previous admissions  - CT abdomen showing: Slightly increased intrahepatic and extrahepatic biliary ductal dilatation with CBD stent in place; interval resolution of previously noted pneumobilia.  - T ramone 2.5          - Evaluated by GI in the ED, will plan for stent exchange once infection controlled    #HTN  - Difficult to control HTN outpatient, patient follows with Dr. Stanley, was supposed to have a follow up with him today  - will continue with Hydralazine 25 mg q8, Losartan 100 and Nifedipine 60 + Lasix 40 mg every other day    #PAD on Plavix  #HFpEF - chronic, not in acute decompensation  - patient warm and dry  - c/w Plavix for now, hold per GI recs    #Hypothyroidism  - c/w Levothyroxine 150 ug daily    #Miscellaneous  - DVT prophylaxis: Lovenox 40 mg SC daily  - GI prophylaxis: Pantoprazole 40 mg daily  - Diet: Clear liquid DASH  - Activity: AAT  - Disposition: med/surg  - Medrec completed with patient's daughter Moira ([phone])

## 2025-04-16 NOTE — H&P ADULT - HISTORY OF PRESENT ILLNESS
78 y/o Female with PMHx of HTN, HLD, hypothyroidism,  PAD on Plavix, (HFpEF) (EF 65-70% in August 2023), and cholecystectomy with multiple endoscopic retrograde cholangiopancreatography (ERCP) past choledocholithiasis / CBD stent,  presents for abdominal pain    She was last admitted in February for abdominal  pain similar to prior pains, and fever/chills, dark urine, subjective fevers, abdominal pain which have currently resolved after receiving antibiotics.  Denies overt signs of GI bleeding.  She was admitted for suspected cholangitis and discharged on Moxifloxacin and Flagyl. Of note, patient has a previously placed CBD stent.  She also has a periampullary adenoma.    On presentation, she reports having had a flu-like illness 2 weeks ago and she received antibiotics from the acute care outpatient  She currently presents for 1 day of abd pain, nausea, generalized, fever, chills, Poor PO intake over the past couple of days and complaints of neck pain  She denies any vomiting, chest pain, shortness of breath, abdominal pain, or urinary complaints    In the ED:  - Vitals: T 37.1 HR 95 /74 SpO2 17 RR 98%  - Labs: WBC 9.83 Hgb 9.6  Na 141 K 4.4 Cr 0.8, Elevated LFTS, UA (-)  - Imaging: CT Chest / Abdomen  1. Since February 5, 2025, new trace right pleural effusion and new right lower and middle lobe consolidated opacities with associated bronchiolectasis, possibly reflecting atelectasis and/or pneumonia in the appropriate clinical setting. Follow-up to complete resolution is   suggested.  2. Slightly increased intrahepatic and extrahepatic biliary ductal dilatation with CBD stent in place; interval resolution of previously noted pneumobilia.  2. No significant change in multiple subcentimeter and mildly enlarged intrathoracic and abdominopelvic lymph nodes.    She is s/p Doxycycline and Zosyn in the ED and she will be admitted to medicine

## 2025-04-16 NOTE — H&P ADULT - NSHPPHYSICALEXAM_GEN_ALL_CORE
CONST: Well appearing in NAD  	EYES: PERRL, EOMI, Sclera and conjunctiva clear.   	ENT: No nasal discharge. Oropharynx normal appearing, no erythema / exudates. Uvula midline. Airway intact.   	CARD: No murmurs, rubs, or gallops; Normal rate and rhythm  	RESP: BS Equal B/L, No wheezes, rhonchi or rales. No distress or accessory breathing  	GI: RUQ tenderness with epigastric discomfort. Soft, non-distended. No cva tenderness.   	SKIN: Warm, dry, no acute rashes. MMM  NEURO: Alert and Oriented, No focal deficits    Vital Signs Last 24 Hrs  T(C): 36.8 (16 Apr 2025 15:58), Max: 37.1 (16 Apr 2025 10:57)  T(F): 98.3 (16 Apr 2025 15:58), Max: 98.7 (16 Apr 2025 10:57)  HR: 86 (16 Apr 2025 15:58) (86 - 95)  BP: 152/64 (16 Apr 2025 15:58) (152/64 - 202/74)  RR: 17 (16 Apr 2025 15:58) (17 - 17)  SpO2: 98% (16 Apr 2025 10:57) (98% - 98%)    Parameters below as of 16 Apr 2025 10:57  Patient On (Oxygen Delivery Method): room air

## 2025-04-16 NOTE — CONSULT NOTE ADULT - SUBJECTIVE AND OBJECTIVE BOX
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 CTAP revealing pneumonia with CBD dilation. [facility] consulted for concern for cholangitis.       Prior EGD: see below    Prior Colonoscopy: see below      PAST MEDICAL & SURGICAL HISTORY:  Arterial insufficiency of lower extremity  left leg stent placed      Leg swelling      Hypothyroid      HTN (hypertension)      High cholesterol      Peripheral arterial disease      H/O Graves' disease      History of cholecystectomy      H/O: hysterectomy      S/P angiogram of extremity      S/P ERCP            FAMILY HISTORY:  Family history of breast cancer (Sibling)    FH: lung cancer  FATHER        Social History:  Tobacco: denies  Alcohol: denies  Drugs: denies    Home Medications:  furosemide 40 mg oral tablet: 1 tab(s) orally every other day (19 Nov 2024 00:09)  losartan 50 mg oral tablet: 1 tab(s) orally once a day (19 Nov 2024 00:08)  pantoprazole 40 mg oral delayed release tablet: 1 tab(s) orally once a day (19 Nov 2024 00:09)        MEDICATIONS  (STANDING):    MEDICATIONS  (PRN):      Allergies  codeine (Stomach Upset; Vomiting; Nausea)      Review of Systems:   Constitutional:  No Fever, No Chills  ENT/Mouth:  No Hearing Changes,  No Difficulty Swallowing  Eyes:  No Eye Pain, No Vision Changes  Cardiovascular:  No Chest Pain, No Palpitations  Respiratory:  No Cough, No Dyspnea  Gastrointestinal:  As described in HPI          Physical Examination:  T(C): 36.8 (04-16-25 @ 15:58), Max: 37.1 (04-16-25 @ 10:57)  HR: 86 (04-16-25 @ 15:58) (86 - 95)  BP: 152/64 (04-16-25 @ 15:58) (152/64 - 202/74)  RR: 17 (04-16-25 @ 15:58) (17 - 17)  SpO2: 98% (04-16-25 @ 10:57) (98% - 98%)    Weight (kg): 86.2 (04-16-25 @ 10:57)    GENERAL: AAOx3, no acute distress.  HEAD:  Atraumatic, Normocephalic  EYES: conjunctiva and sclera clear  CHEST/LUNG: Dec BS  HEART: Regular rate and rhythm;   ABDOMEN: Soft, nontender, nondistended  SKIN: Intact, no jaundice    Data:                        9.6    9.83  )-----------( 256      ( 16 Apr 2025 13:18 )             30.9     Hgb Trend:  9.6  04-16-25 @ 13:18      04-16    141  |  107  |  21[H]  ----------------------------<  92  4.4   |  21  |  0.8    Ca    8.8      16 Apr 2025 13:18    TPro  6.4  /  Alb  4.2  /  TBili  2.5[H]  /  DBili  x   /  AST  384[H]  /  ALT  408[H]  /  AlkPhos  349[H]  04-16    Liver panel trend:  TBili 2.5   /      /      /   AlkP 349   /   Tptn 6.4   /   Alb 4.2    /   DBili --      04-16      PT/INR - ( 16 Apr 2025 13:18 )   PT: 11.50 sec;   INR: 0.98 ratio         PTT - ( 16 Apr 2025 13:18 )  PTT:29.4 sec    Urinalysis with Rflx Culture (collected 16 Apr 2025 13:18)          Radiology:  CT Abdomen and Pelvis w/ IV Cont:   ACC: [id number] EXAM:  CT CHEST IC   ORDERED BY: OTTO MCMANUS     ACC: [id number] EXAM:  CT ABDOMEN AND PELVIS IC   ORDERED BY: OTTO MCMANUS     PROCEDURE DATE:  04/16/2025          INTERPRETATION:  CLINICAL INFORMATION: Abdominal pain, history of   choledocholithiasis, cough.    COMPARISON: CT abdomen pelvis 2/5/2025, 1/18/2023. CT chest 1/16/2025,   and 12/20/2024.    CONTRAST/COMPLICATIONS:  IV Contrast: Omnipaque 350, 100 cc administered   0 cc discarded  Oral Contrast: NONE    PROCEDURE:  CT of the Chest, Abdomen and Pelvis was performed.  Sagittal and coronal reformats were performed.    FINDINGS:  CHEST:  LUNGS AND LARGE AIRWAYS: Biapical pleural-parenchymal thickening. Since   February 5, 2025, new right lower and right middle lobe consolidated   opacities with associated bronchiolectasis, possibly reflecting   atelectasis and/or pneumonia in the appropriate clinical setting. Few   overall unchanged bilateral pulmonary nodules are noted. For example:    Right upper lobe 5 mm nodule,series 4 image 119.  Left upper lobe solid 4 mm nodule, series 4 image 81.  Left upper lobe 4 mm solid nodule, series 4 image 91.    PLEURA: New trace right pleural effusion.  VESSELS: Aortic calcifications. Coronary artery calcifications.  HEART: Heart size is normal. No pericardial effusion.  MEDIASTINUM AND SHAYLA: No significant change in multiple subcentimeter and   mildly enlarged mediastinal lymph nodes.  CHEST WALL AND LOWER NECK: Bilateral axillary lymph nodes similar to   prior.    ABDOMEN AND PELVIS:  LIVER: Within normal limits.  BILE DUCTS: Redemonstrated is a CBD stent, with slightly increased   intrahepatic and extrahepatic biliary ductal dilatation. Interval   resolution of previously noted pneumobilia.  GALLBLADDER: Status postcholecystectomy.  SPLEEN: Within normal limits.  PANCREAS: Within normal limits.  ADRENALS: Within normal limits.  KIDNEYS/URETERS: No hydronephrosis. Symmetric renal enhancement. Multiple   bilateral renal cysts and other hypodensities, which are too small to   further characterize, are noted.    BLADDER: Decompressed, limiting further evaluation.  REPRODUCTIVE ORGANS: Status post hysterectomy.    BOWEL: Sigmoid colonic diverticulosis. No evidence of bowel obstruction.  PERITONEUM/RETROPERITONEUM: No ascites or pneumoperitoneum.  VESSELS: Left femoral vascular stent. Atherosclerotic changes.  LYMPH NODES: No significant change in multiple subcentimeter and mildly   enlarged gastrohepatic, pancreaticoduodenal, bilateral iliac chain and   bilateral inguinal lymph nodes.  ABDOMINAL WALL: Within normal limits.  BONES: Degenerative changes of the spine.    IMPRESSION:    1. Since February 5, 2025, new trace right pleural effusion and new right   lower and middle lobe consolidated opacities with associated   bronchiolectasis, possibly reflecting atelectasis and/or pneumonia in the   appropriate clinical setting. Follow-up to complete resolution is   suggested.  2. Slightly increased intrahepatic and extrahepatic biliary ductal   dilatationwith CBD stent in place; interval resolution of previously   noted pneumobilia.  2. No significant change in multiple subcentimeter and mildly enlarged   intrathoracic and abdominopelvic lymph nodes.      --- End of Report ---          MERVAT ENGEL MD; Resident Radiologist  This document has been electronically signed.  LINDSEY WAKEFIELD MD; Attending Radiologist  This document has been electronically signed. Apr 16 2025  3:51PM (04-16-25 @ 13:41)

## 2025-04-16 NOTE — CONSULT NOTE ADULT - ASSESSMENT
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 CTAP revealing pneumonia with CBD dilation. [facility] consulted for concern for cholangitis.       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  on plavix  currently on 3L O2, being admitted for pneumonia    RECS:  - Please check RVP, COVID 19  - IV abx, please send Blood cx  - Will plan for ERCP with stent exchange once optimized  - Monitor LFTs  - We will follow

## 2025-04-17 LAB
ALBUMIN SERPL ELPH-MCNC: 3.8 G/DL — SIGNIFICANT CHANGE UP (ref 3.5–5.2)
ALP SERPL-CCNC: 297 U/L — HIGH (ref 30–115)
ALT FLD-CCNC: 305 U/L — HIGH (ref 0–41)
ANION GAP SERPL CALC-SCNC: 13 MMOL/L — SIGNIFICANT CHANGE UP (ref 7–14)
AST SERPL-CCNC: 169 U/L — HIGH (ref 0–41)
BASOPHILS # BLD AUTO: 0.02 K/UL — SIGNIFICANT CHANGE UP (ref 0–0.2)
BASOPHILS NFR BLD AUTO: 0.2 % — SIGNIFICANT CHANGE UP (ref 0–1)
BILIRUB SERPL-MCNC: 4.5 MG/DL — HIGH (ref 0.2–1.2)
BUN SERPL-MCNC: 16 MG/DL — SIGNIFICANT CHANGE UP (ref 10–20)
CALCIUM SERPL-MCNC: 8.4 MG/DL — SIGNIFICANT CHANGE UP (ref 8.4–10.5)
CHLORIDE SERPL-SCNC: 104 MMOL/L — SIGNIFICANT CHANGE UP (ref 98–110)
CO2 SERPL-SCNC: 20 MMOL/L — SIGNIFICANT CHANGE UP (ref 17–32)
CREAT SERPL-MCNC: 0.9 MG/DL — SIGNIFICANT CHANGE UP (ref 0.7–1.5)
E COLI DNA BLD POS QL NAA+NON-PROBE: SIGNIFICANT CHANGE UP
EGFR: 65 ML/MIN/1.73M2 — SIGNIFICANT CHANGE UP
EGFR: 65 ML/MIN/1.73M2 — SIGNIFICANT CHANGE UP
EOSINOPHIL # BLD AUTO: 0.16 K/UL — SIGNIFICANT CHANGE UP (ref 0–0.7)
EOSINOPHIL NFR BLD AUTO: 1.3 % — SIGNIFICANT CHANGE UP (ref 0–8)
FLUAV AG NPH QL: SIGNIFICANT CHANGE UP
FLUBV AG NPH QL: SIGNIFICANT CHANGE UP
GLUCOSE SERPL-MCNC: 97 MG/DL — SIGNIFICANT CHANGE UP (ref 70–99)
GRAM STN FLD: ABNORMAL
HCT VFR BLD CALC: 30.8 % — LOW (ref 37–47)
HGB BLD-MCNC: 9.6 G/DL — LOW (ref 12–16)
IMM GRANULOCYTES NFR BLD AUTO: 0.5 % — HIGH (ref 0.1–0.3)
LACTATE SERPL-SCNC: 1.1 MMOL/L — SIGNIFICANT CHANGE UP (ref 0.7–2)
LYMPHOCYTES # BLD AUTO: 0.93 K/UL — LOW (ref 1.2–3.4)
LYMPHOCYTES # BLD AUTO: 7.7 % — LOW (ref 20.5–51.1)
MAGNESIUM SERPL-MCNC: 2.2 MG/DL — SIGNIFICANT CHANGE UP (ref 1.8–2.4)
MCHC RBC-ENTMCNC: 24.1 PG — LOW (ref 27–31)
MCHC RBC-ENTMCNC: 31.2 G/DL — LOW (ref 32–37)
MCV RBC AUTO: 77.2 FL — LOW (ref 81–99)
METHOD TYPE: SIGNIFICANT CHANGE UP
MONOCYTES # BLD AUTO: 0.87 K/UL — HIGH (ref 0.1–0.6)
MONOCYTES NFR BLD AUTO: 7.2 % — SIGNIFICANT CHANGE UP (ref 1.7–9.3)
MRSA PCR RESULT.: NEGATIVE — SIGNIFICANT CHANGE UP
NEUTROPHILS # BLD AUTO: 10.1 K/UL — HIGH (ref 1.4–6.5)
NEUTROPHILS NFR BLD AUTO: 83.1 % — HIGH (ref 42.2–75.2)
NRBC BLD AUTO-RTO: 0 /100 WBCS — SIGNIFICANT CHANGE UP (ref 0–0)
PLATELET # BLD AUTO: 217 K/UL — SIGNIFICANT CHANGE UP (ref 130–400)
PMV BLD: 10.4 FL — SIGNIFICANT CHANGE UP (ref 7.4–10.4)
POTASSIUM SERPL-MCNC: 4.2 MMOL/L — SIGNIFICANT CHANGE UP (ref 3.5–5)
POTASSIUM SERPL-SCNC: 4.2 MMOL/L — SIGNIFICANT CHANGE UP (ref 3.5–5)
PROT SERPL-MCNC: 6 G/DL — SIGNIFICANT CHANGE UP (ref 6–8)
RBC # BLD: 3.99 M/UL — LOW (ref 4.2–5.4)
RBC # FLD: 18.2 % — HIGH (ref 11.5–14.5)
RSV RNA NPH QL NAA+NON-PROBE: SIGNIFICANT CHANGE UP
SARS-COV-2 RNA SPEC QL NAA+PROBE: SIGNIFICANT CHANGE UP
SODIUM SERPL-SCNC: 137 MMOL/L — SIGNIFICANT CHANGE UP (ref 135–146)
SOURCE RESPIRATORY: SIGNIFICANT CHANGE UP
SPECIMEN SOURCE: SIGNIFICANT CHANGE UP
WBC # BLD: 12.14 K/UL — HIGH (ref 4.8–10.8)
WBC # FLD AUTO: 12.14 K/UL — HIGH (ref 4.8–10.8)

## 2025-04-17 PROCEDURE — 99223 1ST HOSP IP/OBS HIGH 75: CPT

## 2025-04-17 PROCEDURE — 99233 SBSQ HOSP IP/OBS HIGH 50: CPT

## 2025-04-17 RX ORDER — HYDROCORTISONE 10 MG/G
1 CREAM TOPICAL EVERY 12 HOURS
Refills: 0 | Status: DISCONTINUED | OUTPATIENT
Start: 2025-04-17 | End: 2025-04-27

## 2025-04-17 RX ORDER — INFLUENZA A VIRUS A/IDAHO/07/2018 (H1N1) ANTIGEN (MDCK CELL DERIVED, PROPIOLACTONE INACTIVATED, INFLUENZA A VIRUS A/INDIANA/08/2018 (H3N2) ANTIGEN (MDCK CELL DERIVED, PROPIOLACTONE INACTIVATED), INFLUENZA B VIRUS B/SINGAPORE/INFTT-16-0610/2016 ANTIGEN (MDCK CELL DERIVED, PROPIOLACTONE INACTIVATED), INFLUENZA B VIRUS B/IOWA/06/2017 ANTIGEN (MDCK CELL DERIVED, PROPIOLACTONE INACTIVATED) 15; 15; 15; 15 UG/.5ML; UG/.5ML; UG/.5ML; UG/.5ML
0.5 INJECTION, SUSPENSION INTRAMUSCULAR ONCE
Refills: 0 | Status: DISCONTINUED | OUTPATIENT
Start: 2025-04-17 | End: 2025-04-27

## 2025-04-17 RX ORDER — HYDROXYZINE HYDROCHLORIDE 25 MG/1
25 TABLET, FILM COATED ORAL EVERY 6 HOURS
Refills: 0 | Status: DISCONTINUED | OUTPATIENT
Start: 2025-04-17 | End: 2025-04-27

## 2025-04-17 RX ADMIN — CLOPIDOGREL BISULFATE 75 MILLIGRAM(S): 75 TABLET, FILM COATED ORAL at 11:15

## 2025-04-17 RX ADMIN — Medication 25 GRAM(S): at 01:40

## 2025-04-17 RX ADMIN — Medication 60 MILLIGRAM(S): at 06:02

## 2025-04-17 RX ADMIN — Medication 25 MILLIGRAM(S): at 13:29

## 2025-04-17 RX ADMIN — ENOXAPARIN SODIUM 40 MILLIGRAM(S): 100 INJECTION SUBCUTANEOUS at 11:16

## 2025-04-17 RX ADMIN — Medication 4 MILLIGRAM(S): at 00:39

## 2025-04-17 RX ADMIN — Medication 4 MILLIGRAM(S): at 21:27

## 2025-04-17 RX ADMIN — Medication 150 MICROGRAM(S): at 06:01

## 2025-04-17 RX ADMIN — Medication 75 MILLILITER(S): at 06:00

## 2025-04-17 RX ADMIN — Medication 100 MILLIGRAM(S): at 17:31

## 2025-04-17 RX ADMIN — Medication 100 MILLIGRAM(S): at 06:08

## 2025-04-17 RX ADMIN — Medication 4 MILLIGRAM(S): at 15:22

## 2025-04-17 RX ADMIN — Medication 25 MILLIGRAM(S): at 06:01

## 2025-04-17 RX ADMIN — HYDROXYZINE HYDROCHLORIDE 25 MILLIGRAM(S): 25 TABLET, FILM COATED ORAL at 08:29

## 2025-04-17 RX ADMIN — Medication 25 GRAM(S): at 13:29

## 2025-04-17 RX ADMIN — FUROSEMIDE 40 MILLIGRAM(S): 10 INJECTION INTRAMUSCULAR; INTRAVENOUS at 06:02

## 2025-04-17 RX ADMIN — Medication 25 GRAM(S): at 21:27

## 2025-04-17 RX ADMIN — Medication 4 MILLIGRAM(S): at 01:15

## 2025-04-17 RX ADMIN — Medication 25 GRAM(S): at 06:08

## 2025-04-17 RX ADMIN — Medication 1 APPLICATION(S): at 06:02

## 2025-04-17 RX ADMIN — Medication 200 GRAM(S): at 00:46

## 2025-04-17 RX ADMIN — HYDROCORTISONE 1 APPLICATION(S): 10 CREAM TOPICAL at 18:10

## 2025-04-17 RX ADMIN — Medication 4 MILLIGRAM(S): at 22:08

## 2025-04-17 RX ADMIN — Medication 25 MILLIGRAM(S): at 21:27

## 2025-04-17 RX ADMIN — Medication 4 MILLIGRAM(S): at 09:00

## 2025-04-17 RX ADMIN — Medication 40 MILLIGRAM(S): at 06:02

## 2025-04-17 RX ADMIN — LOSARTAN POTASSIUM 100 MILLIGRAM(S): 100 TABLET, FILM COATED ORAL at 06:02

## 2025-04-17 RX ADMIN — HYDROXYZINE HYDROCHLORIDE 25 MILLIGRAM(S): 25 TABLET, FILM COATED ORAL at 18:11

## 2025-04-17 RX ADMIN — Medication 4 MILLIGRAM(S): at 08:29

## 2025-04-17 NOTE — CONSULT NOTE ADULT - ASSESSMENT
IMPRESSION:    - RML opacities   - PNA  - Sepsis with bacteremia gm -   - Cholangitis   - H/O PAD   - H/O smoking       PLAN:    -CXR and CT noted   -RVP negative, MRSA nares negative, f/u Urine Strep and Legionella  -f/u procal, ESR, CRP, BNP, echo   -Cefepime and azitho for Abx, cover for pseudomonas   -High risk for low risk procedure 2/2 age and active PNA with 02 req   -Duonebs PRN   -DVT PPX    IMPRESSION:    - RML opacities   - PNA  - Sepsis with bacteremia gm -   - Cholangitis   - H/O PAD   - H/O smoking       PLAN:    -CXR and CT noted   -RVP negative, MRSA nares negative, f/u Urine Strep and Legionella  -f/u procal, ESR, CRP, BNP, echo   -Cefepime and azitho for Abx, cover for pseudomonas   -High risk for low risk procedure 2/2 age and active PNA with 02 req   -Duonebs PRN  -OP pulm FU, will need PFTS  -ABG   -DVT PPX    IMPRESSION:    - RML opacities   - PNA - RLL consolidation   - Sepsis with bacteremia gm -   - Cholangitis   - H/O PAD   - H/O smoking       PLAN:    -CXR and CT noted; RLL consolidation and reactive mediastinal adenopathy   -On 4 LPM; keep spo2 more than 92%   -Aspiration precautions   -Albuterol nebs; chest PT   -RVP negative, MRSA nares negative, f/u Urine Strep and Legionella  -f/u procal, ESR, CRP  -Recommend Zosyn and odxy for now; covers both pneumonia and the cholangitis   -High risk patient; ongoing respiratory symptoms; moderate risk procedure  -Outpatient PFTs   -NPO for now per GI  -Needs repeat CT chest in 6 weeks outpatient  -Rule out aspiration - speech and swallow prior to diet   -Will follow

## 2025-04-17 NOTE — PROGRESS NOTE ADULT - SUBJECTIVE AND OBJECTIVE BOX
MICHAELBROWN  79y  Female  ***My note supersedes ALL resident notes that I sign.  My corrections for their notes are in my note.***    I can be reached directly via Teams or my office number is [phone] or [phone].    INTERVAL EVENTS: Here for f/u of pneumonia and abd pain. Pt says she is feeling a little better. She still has some abd pain. She is hungry and wants more full liquids (at least coffee and cream). She still has cough and some SOB. She is requiring O2 and is usually not on home O2. She said that she has not been doing that well post-ERCP the last couple of times (gets dizzy, lightheaded, SOB, cough, diff seeing, slight change in MS briefly, etc).     T(F): 97.6 (04-17-25 @ 04:38), Max: 98.3 (04-16-25 @ 15:58)  HR: 72 (04-17-25 @ 08:03) (60 - 100)  BP: 147/62 (04-17-25 @ 08:03) (141/68 - 180/69)  RR: 18 (04-17-25 @ 04:38) (17 - 18)  SpO2: 98% (04-17-25 @ 04:38) (95% - 98%)    Gen: NAD  HEENT: PERRL, EOMI, mouth clr, nose clr  Neck: no nodes, no JVD, thyroid nl; post neck at hairline - scaly, erythematous/pink pruritic patch c/w eczematous flare  lungs: + crackles on rt base; no wheeze; + mild cough  hrt: s1 s2 rrr no murmur  abd: soft, ND, + tender all over, but santi RUQ/epigastric areas; no HS megaly  ext: 1+ edema, no c/c; legs are mild pruritic; no rash; very slight chr stasis changes in lower legs  neuro: aa ox3, cn intact, can move all 4 ext    LABS:                      9.6     (    77.2   12.14 )-----------( ---------      217      ( 17 Apr 2025 06:27 )             30.8    (    18.2     WBC Count: 12.14 K/uL (04-17-25 @ 06:27) - slightly worse, not on steroids  WBC Count: 9.83 K/uL (04-16-25 @ 13:18)    137   (   104   (   97      04-17-25 @ 06:27  ----------------------               4.2   (   20   (   16                             -----                        0.9  Ca  8.4   Mg  2.2    P   --     LFT  6.0  (  4.5  (  169       04-17-25 @ 06:27  -------------------------  3.8  (  297  (  305    Alb 3.8  T ramone 4.5         04-17-25 @ 06:27 t bili a little worse, other LFTs a little better  Alb 4.2  T ramone 2.5         04-16-25 @ 13:18    PT/INR - ( 16 Apr 2025 13:18 )   PT: 11.50 sec;   INR: 0.98 ratio    PTT - ( 16 Apr 2025 13:18 )  PTT: 29.4 sec    Urinalysis Basic - ( 17 Apr 2025 06:27 )    Color: x / Appearance: x / SG: x / pH: x  Gluc: 97 mg/dL / Ketone: x  / Bili: x / Urobili: x   Blood: x / Protein: x / Nitrite: x   Leuk Esterase: x / RBC: x / WBC x   Sq Epi: x / Non Sq Epi: x / Bacteria: x    Culture - Blood (collected 04-16-25 @ 13:20)  Source: Blood Blood-Peripheral  Gram Stain (04-17-25 @ 09:25):    Growth in aerobic bottle: Gram Negative Rods  Preliminary Report (04-17-25 @ 09:25):    Growth in aerobic bottle: Gram Negative Rods    Direct identification is available within approximately 3-5    hours either by Blood Panel Multiplexed PCR or Direct    MALDI-TOF. Details: [email or web address]  Organism: Blood Culture PCR (04-17-25 @ 10:54)  Organism: Blood Culture PCR (04-17-25 @ 10:54)      -  Escherichia coli: Detec      Method Type: PCR    RADIOLOGY & ADDITIONAL TESTS:  < from: CT Abdomen and Pelvis w/ IV Cont (04.16.25 @ 13:41) >  CHEST:  LUNGS AND LARGE AIRWAYS: Biapical pleural-parenchymal thickening. Since   February 5, 2025, new right lower and right middle lobe consolidated   opacities with associated bronchiolectasis, possibly reflecting   atelectasis and/or pneumonia in the appropriate clinical setting. Few   overall unchanged bilateral pulmonary nodules are noted. For example:    Right upper lobe 5 mm nodule,series 4 image 119.  Left upper lobe solid 4 mm nodule, series 4 image 81.  Left upper lobe 4 mm solid nodule, series 4 image 91.    PLEURA: New trace right pleural effusion.  VESSELS: Aortic calcifications. Coronary artery calcifications.  HEART: Heart size is normal. No pericardial effusion.  MEDIASTINUM AND SHAYLA: No significant change in multiple subcentimeter and   mildly enlarged mediastinal lymph nodes.  CHEST WALL AND LOWER NECK: Bilateral axillary lymph nodes similar to prior.    ABDOMEN AND PELVIS:  LIVER: Within normal limits.  BILE DUCTS: Redemonstrated is a CBD stent, with slightly increased   intrahepatic and extrahepatic biliary ductal dilatation. Interval   resolution of previously noted pneumobilia.  GALLBLADDER: Status postcholecystectomy.  SPLEEN: Within normal limits.  PANCREAS: Within normal limits.  ADRENALS: Within normal limits.  KIDNEYS/URETERS: No hydronephrosis. Symmetric renal enhancement. Multiple   bilateral renal cysts and other hypodensities, which are too small to   further characterize, are noted.    BLADDER: Decompressed, limiting further evaluation.  REPRODUCTIVE ORGANS: Status post hysterectomy.    BOWEL: Sigmoid colonic diverticulosis. No evidence of bowel obstruction.  PERITONEUM/RETROPERITONEUM: No ascites or pneumoperitoneum.  VESSELS: Left femoral vascular stent. Atherosclerotic changes.  LYMPH NODES: No significant change in multiple subcentimeter and mildly   enlarged gastrohepatic, pancreaticoduodenal, bilateral iliac chain and   bilateral inguinal lymph nodes.  ABDOMINAL WALL: Within normal limits.  BONES: Degenerative changes of the spine.    IMPRESSION:    1. Since February 5, 2025, new trace right pleural effusion and new right   lower and middle lobe consolidated opacities with associated   bronchiolectasis, possibly reflecting atelectasis and/or pneumonia in the   appropriate clinical setting. Follow-up to complete resolution is   suggested.  2. Slightly increased intrahepatic and extrahepatic biliary ductal   dilatationwith CBD stent in place; interval resolution of previously   noted pneumobilia.  2. No significant change in multiple subcentimeter and mildly enlarged   intrathoracic and abdominopelvic lymph nodes.    < end of copied text >    MEDICATIONS:  doxycycline IVPB 100 milliGRAM(s) IV Intermittent every 12 hours  piperacillin/tazobactam IVPB.. 3.375 Gram(s) IV Intermittent every 8 hours    acetaminophen     Tablet .. 650 milliGRAM(s) Oral every 6 hours PRN  aluminum hydroxide/magnesium hydroxide/simethicone Suspension 30 milliLiter(s) Oral every 4 hours PRN  benzonatate 100 milliGRAM(s) Oral every 8 hours PRN  chlorhexidine 2% Cloths 1 Application(s) Topical <User Schedule>  clopidogrel Tablet 75 milliGRAM(s) Oral daily  enoxaparin Injectable 40 milliGRAM(s) SubCutaneous every 24 hours  furosemide    Tablet 40 milliGRAM(s) Oral <User Schedule>  hydrALAZINE 25 milliGRAM(s) Oral three times a day  hydrocortisone 1% Ointment 1 Application(s) Topical every 12 hours  hydrOXYzine hydrochloride 25 milliGRAM(s) Oral every 6 hours PRN  influenza  Vaccine (HIGH DOSE) 0.5 milliLiter(s) IntraMuscular once  levothyroxine 150 MICROGram(s) Oral daily  losartan 100 milliGRAM(s) Oral daily  melatonin 3 milliGRAM(s) Oral at bedtime PRN  morphine  - Injectable 4 milliGRAM(s) IV Push every 6 hours PRN  NIFEdipine XL 60 milliGRAM(s) Oral daily  ondansetron Injectable 4 milliGRAM(s) IV Push every 8 hours PRN  pantoprazole    Tablet 40 milliGRAM(s) Oral before breakfast  sodium chloride 0.9%. 1000 milliLiter(s) IV Continuous <Continuous>

## 2025-04-17 NOTE — PATIENT PROFILE ADULT - FALL HARM RISK - RISK INTERVENTIONS

## 2025-04-17 NOTE — PROGRESS NOTE ADULT - SUBJECTIVE AND OBJECTIVE BOX
Gastroenterology progress note:     Patient is a 79y old  Female who presents with a chief complaint of Abdominal pain (17 Apr 2025 14:29)       Admitted on: 04-16-25    We are following the patient for: cholangitis       Interval History: patient still has cough on 4L O2      PAST MEDICAL & SURGICAL HISTORY:  Arterial insufficiency of lower extremity  left leg stent placed      Leg swelling      Hypothyroid      HTN (hypertension)      High cholesterol      Peripheral arterial disease      H/O Graves' disease      History of cholecystectomy      H/O: hysterectomy      S/P angiogram of extremity      S/P ERCP          MEDICATIONS  (STANDING):  chlorhexidine 2% Cloths 1 Application(s) Topical <User Schedule>  clopidogrel Tablet 75 milliGRAM(s) Oral daily  doxycycline IVPB 100 milliGRAM(s) IV Intermittent every 12 hours  furosemide    Tablet 40 milliGRAM(s) Oral <User Schedule>  hydrALAZINE 25 milliGRAM(s) Oral three times a day  hydrocortisone 1% Ointment 1 Application(s) Topical every 12 hours  influenza  Vaccine (HIGH DOSE) 0.5 milliLiter(s) IntraMuscular once  levothyroxine 150 MICROGram(s) Oral daily  losartan 100 milliGRAM(s) Oral daily  NIFEdipine XL 60 milliGRAM(s) Oral daily  pantoprazole    Tablet 40 milliGRAM(s) Oral before breakfast  piperacillin/tazobactam IVPB.. 3.375 Gram(s) IV Intermittent every 8 hours    MEDICATIONS  (PRN):  acetaminophen     Tablet .. 650 milliGRAM(s) Oral every 6 hours PRN Temp greater or equal to 38C (100.4F), Mild Pain (1 - 3)  aluminum hydroxide/magnesium hydroxide/simethicone Suspension 30 milliLiter(s) Oral every 4 hours PRN Dyspepsia  benzonatate 100 milliGRAM(s) Oral every 8 hours PRN Cough  hydrOXYzine hydrochloride 25 milliGRAM(s) Oral every 6 hours PRN Anxiety  melatonin 3 milliGRAM(s) Oral at bedtime PRN Insomnia  morphine  - Injectable 4 milliGRAM(s) IV Push every 6 hours PRN Severe Pain (7 - 10)  ondansetron Injectable 4 milliGRAM(s) IV Push every 8 hours PRN Nausea and/or Vomiting      Allergies  codeine (Stomach Upset; Vomiting; Nausea)      Review of Systems:   Cardiovascular:  No Chest Pain, No Palpitations  Respiratory:  No Cough, No Dyspnea  Gastrointestinal:  As described in HPI  Skin:  No Skin Lesions, No Jaundice  Neuro:  No Syncope, No Dizziness    Physical Examination:  T(C): 36.6 (04-17-25 @ 13:51), Max: 36.7 (04-17-25 @ 00:09)  HR: 69 (04-17-25 @ 13:51) (60 - 100)  BP: 153/67 (04-17-25 @ 13:51) (141/68 - 180/69)  RR: 18 (04-17-25 @ 13:51) (18 - 18)  SpO2: 96% (04-17-25 @ 13:51) (95% - 98%)  Weight (kg): 84.7 (04-17-25 @ 13:59)      GENERAL: AAOx3, no acute distress.  HEAD:  Atraumatic, Normocephalic  EYES: conjunctiva and sclera clear  NECK: Supple, no JVD or thyromegaly  CHEST/LUNG: Dec BS, crackles+  HEART: Regular rate and rhythm; normal S1, S2,  ABDOMEN: Soft, nontender, nondistended;  NEUROLOGY: No asterixis or tremor.   SKIN: Intact, no jaundice     Data:                        9.6    12.14 )-----------( 217      ( 17 Apr 2025 06:27 )             30.8     Hgb trend:  9.6  04-17-25 @ 06:27  9.6  04-16-25 @ 13:18      04-17    137  |  104  |  16  ----------------------------<  97  4.2   |  20  |  0.9    Ca    8.4      17 Apr 2025 06:27  Mg     2.2     04-17    TPro  6.0  /  Alb  3.8  /  TBili  4.5[H]  /  DBili  x   /  AST  169[H]  /  ALT  305[H]  /  AlkPhos  297[H]  04-17    Liver panel trend:  TBili 4.5   /      /      /   AlkP 297   /   Tptn 6.0   /   Alb 3.8    /   DBili --      04-17  TBili 2.5   /      /      /   AlkP 349   /   Tptn 6.4   /   Alb 4.2    /   DBili --      04-16      PT/INR - ( 16 Apr 2025 13:18 )   PT: 11.50 sec;   INR: 0.98 ratio         PTT - ( 16 Apr 2025 13:18 )  PTT:29.4 sec    Culture - Blood (collected 16 Apr 2025 13:20)  Source: Blood Blood-Peripheral  Gram Stain (17 Apr 2025 09:25):    Growth in aerobic bottle: Gram Negative Rods  Preliminary Report (17 Apr 2025 09:25):    Growth in aerobic bottle: Gram Negative Rods    Direct identification is available within approximately 3-5    hours either by Blood Panel Multiplexed PCR or Direct    MALDI-TOF. Details: [email or web address]  Organism: Blood Culture PCR (17 Apr 2025 10:54)  Organism: Blood Culture PCR (17 Apr 2025 10:54)    Urinalysis with Rflx Culture (collected 16 Apr 2025 13:18)

## 2025-04-17 NOTE — PATIENT PROFILE ADULT - HOW PATIENT ADDRESSED, PROFILE
Patient reports she is vomiting and not able to keep anything down, I felt like I was going to pass out.  Not sure if she is pregnant or not Deloris Wilson

## 2025-04-17 NOTE — PROGRESS NOTE ADULT - ASSESSMENT
80 y/o woman with PMHx of HTN, HLD, hypothyroidism, PAD on Plavix, HFpEF (EF 65-70% in August 2023), and cholecystectomy with multiple ERCP in the past for choledocholithiasis / CBD stent, s/p BRYSON; presents for abdominal pain    # pt is immunocompromised 2/2 age    # obtain Ht/Wt and calc BMI    # outpt MDs  PMD: was Dr Aaron; needs referral to [facility] on d/c  Cardio/Vasc: Dr Stanley  GI: Dr Torres    # Cough and Shortness of breath w/ acute hypox resp failure 2/2 RLLRML PNA w/ gram neg bacteremia; NO SIRS/NO SEPSIS  Patient has been reporting flu like symptoms 2 weeks ago, resolved, then came back again yesterday  CT scan showing: new trace right pleural effusion and new right lower and middle lobe consolidated opacities with associated bronchiolectasis, possibly reflecting atelectasis and/or pneumonia in the appropriate clinical setting.   2 points on CURB 65  s/p hospitalization requiring antibiotics within the past 3 months  RVP neg; MRSA neg  Blood culture 1 of 1: + E Coli  ID consult  Pulm consult  abx: Zosyn 3.375gm iv q8 + Doxycycline 100mg po q12  incent rosey  tessalon prn cough  NC O2 to keep sat > 92% (on 4L, 94% sat)  echo not needed  WBC a little worse  cbc q24    # Abdominal pain 2/2 acute cholangitis w/ gram neg bacteremia; Hx CCY and BRYSON; Hx of multiple ERCP for choledocholithiasis (last CBD stent placed 2/5/25)  Last ERCP on 02/05/2025: Successful ERCP with removal of stent, removal of stones and sludge and placement of plastic CBD stent. 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  T abdomen showing: Slightly increased intrahepatic and extrahepatic biliary ductal dilatation with CBD stent in place; interval resolution of previously noted pneumobilia.  T bili: increasing 2.5 -> 4.5  other LFTs downtrending w/ abx  diet: full liquids (DASH restrictions)  IVFs: d/c (BP is high)  Adv GI eval: eventual stent exchange - need pulm clearance - poss clary or Mon for ERCP - NPO p MN for now  Bld Cx: + E Coli  abx: above  LFT q24    # HTN  Difficult to control HTN outpatient, patient follows with Dr. Stanley  c/w Hydralazine 25 mg q8  c/w Losartan 100 q24  c/w Nifedipine xl 60 q24  c/w Lasix 40 mg every other day    # PAD on Plavix; has stents in both legs (lt fem stent seen on CT A/P); HFpEF - chronic, not in acute decompensation  c/w Plavix 75 q24 - GI said OK to cont  not on statin - would not start one now w/ LFTs anyway    # Hypothyroidism  c/w Levothyroxine 150 ug daily    # lymphadenopathy  has unchanged, diffuse, sub-cm LN intrathoracic, axillary and intraabd of unclear etiology  likely reactive  no further inpt w/u    # Anxiety  atarax 25mg po q6 prn - helped  melatonin prn sleep    # chr venous stasis; eczema post neck  HCT 1% oint top q12 to lower legs and post neck    # DVT prophylaxis: Lovenox 40 mg SC daily    # GI prophylaxis: Pantoprazole 40 mg daily    # Activity: can amb to [place]; walks at home w/ no asst device; no PT for now needed    # updated daughter Moira ([phone]) at bedside    Dispo: iv abx; f/u Pulm/GI/ID; d/c IVFs; full liquids - NPO p MN; f/u BCx - E Coli; HCT oint; incent rosey; daily labs  eventually, pt will need abx at home (poss po vs IV) - f/u CM - still acute    Prog is a bit guarded. IF VS become unstable - upgrade to SDU or ICU. 80 y/o woman with PMHx of HTN, HLD, hypothyroidism, PAD on Plavix, HFpEF (EF 65-70% in August 2023), and cholecystectomy with multiple ERCP in the past for choledocholithiasis / CBD stent, s/p BRYSON; presents for abdominal pain    # pt is immunocompromised 2/2 age    # This is a STAR pt - GOC done 4/17    # obtain Ht/Wt and calc BMI    # outpt MDs  PMD: was Dr Aaron; needs referral [facility]/c  Cardio/Vasc: Dr Stanley  GI: Dr Torres    # Cough and Shortness of breath w/ acute hypox resp failure 2/2 RLLRML PNA w/ gram neg bacteremia; NO SIRS/NO SEPSIS  Patient has been reporting flu like symptoms 2 weeks ago, resolved, then came back again yesterday  CT scan showing: new trace right pleural effusion and new right lower and middle lobe consolidated opacities with associated bronchiolectasis, possibly reflecting atelectasis and/or pneumonia in the appropriate clinical setting.   2 points on CURB 65  s/p hospitalization requiring antibiotics within the past 3 months  RVP neg; MRSA neg  Blood culture 1 of 1: + E Coli  ID consult  Pulm consult  abx: Zosyn 3.375gm iv q8 + Doxycycline 100mg po q12  incent rosey  tessalon prn cough  NC O2 to keep sat > 92% (on 4L, 94% sat)  echo not needed  WBC a little worse  cbc q24    # Abdominal pain 2/2 acute cholangitis w/ gram neg bacteremia; Hx CCY and BRYSON; Hx of multiple ERCP for choledocholithiasis (last CBD stent placed 2/5/25)  Last ERCP on 02/05/2025: Successful ERCP with removal of stent, removal of stones and sludge and placement of plastic CBD stent. 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  T abdomen showing: Slightly increased intrahepatic and extrahepatic biliary ductal dilatation with CBD stent in place; interval resolution of previously noted pneumobilia.  T bili: increasing 2.5 -> 4.5  other LFTs downtrending w/ abx  diet: full liquids (DASH restrictions)  IVFs: d/c (BP is high)  Adv GI eval: eventual stent exchange - need pulm clearance - poss clary or Mon for ERCP - NPO p MN for now  Bld Cx: + E Coli  abx: above  LFT q24    # HTN  Difficult to control HTN outpatient, patient follows with Dr. Stanley  c/w Hydralazine 25 mg q8  c/w Losartan 100 q24  c/w Nifedipine xl 60 q24  c/w Lasix 40 mg every other day    # PAD on Plavix; has stents in both legs (lt fem stent seen on CT A/P); HFpEF - chronic, not in acute decompensation  c/w Plavix 75 q24 - GI said OK to cont  not on statin - would not start one now w/ LFTs anyway    # Hypothyroidism  c/w Levothyroxine 150 ug daily    # lymphadenopathy  has unchanged, diffuse, sub-cm LN intrathoracic, axillary and intraabd of unclear etiology  likely reactive  no further inpt w/u    # Anxiety  atarax 25mg po q6 prn - helped  melatonin prn sleep    # chr venous stasis; eczema post neck  HCT 1% oint top q12 to lower legs and post neck    # DVT prophylaxis: Lovenox 40 mg SC daily    # GI prophylaxis: Pantoprazole 40 mg daily    # Activity: can amb to [place]; walks at home w/ no asst device; no PT for now needed    # updated daughter Moira ([phone]) at bedside    Dispo: iv abx; f/u Pulm/GI/ID; d/c IVFs; full liquids - NPO p MN; f/u BCx - E Coli; HCT oint; incent rosey; daily labs  eventually, pt will need abx at home (poss po vs IV) - f/u CM - still acute    Prog is a bit guarded. IF VS become unstable - upgrade to SDU or ICU.

## 2025-04-17 NOTE — PROGRESS NOTE ADULT - SUBJECTIVE AND OBJECTIVE BOX
78 y/o Female with PMHx of HTN, HLD, hypothyroidism,  PAD on Plavix, (HFpEF) (EF 65-70% in August 2023), and cholecystectomy with multiple endoscopic retrograde cholangiopancreatography (ERCP) past choledocholithiasis / CBD stent,  presents for abdominal pain.    No overnight events, still on 3L NC, having bowel movements and voiding well.       VITALS  T(F): 97.6 (04-17-25 @ 04:38), Max: 98.7 (04-16-25 @ 10:57)  HR: 72 (04-17-25 @ 08:03) (60 - 100)  BP: 147/62 (04-17-25 @ 08:03) (141/68 - 202/74)  RR: 18 (04-17-25 @ 04:38) (17 - 18)  SpO2: 98% (04-17-25 @ 04:38) (95% - 98%)      Physical Exam:  General: NAD, well appearing  HEENT: EOMI, conjunctiva and sclera clear, moist mucus membranes  Cardio: +S1/S2, no murmurs, +pedal pulses  Pulm: CTA b/l, no wheezes or rales  Abd: no TTP, nondistended, no organomegaly  Neuro: AAOx4, neurovascularly intact  Skin: No rashes or lesions  MSK: no edema    LABS             9.6    12.14 )-----------( 217      ( 04-17-25 @ 06:27 )             30.8     141  |  107  |  21  -------------------------<  92   04-16-25 @ 13:18  4.4  |  21  |  0.8    Ca      8.8     04-16-25 @ 13:18    TPro  6.4  /  Alb  4.2  /  TBili  2.5  /  DBili  x   /  AST  384  /  ALT  408  /  AlkPhos  349  /  GGT  x     04-16-25 @ 13:18    PT/INR - ( 04-16-25 @ 13:18 )   PT: 11.50 sec;   INR: 0.98 ratio  PTT - ( 04-16-25 @ 13:18 )  PTT:29.4 sec      Urinalysis Basic - ( 16 Apr 2025 13:18 )    Color: x / Appearance: x / SG: x / pH: x  Gluc: 92 mg/dL / Ketone: x  / Bili: x / Urobili: x   Blood: x / Protein: x / Nitrite: x   Leuk Esterase: x / RBC: x / WBC x   Sq Epi: x / Non Sq Epi: x / Bacteria: x          Urinalysis with Rflx Culture (collected 16 Apr 2025 13:18)      IMAGING    MEDICATIONS  STANDING MEDICATIONS  chlorhexidine 2% Cloths 1 Application(s) Topical <User Schedule>  clopidogrel Tablet 75 milliGRAM(s) Oral daily  doxycycline IVPB 100 milliGRAM(s) IV Intermittent every 12 hours  enoxaparin Injectable 40 milliGRAM(s) SubCutaneous every 24 hours  furosemide    Tablet 40 milliGRAM(s) Oral <User Schedule>  hydrALAZINE 25 milliGRAM(s) Oral three times a day  influenza  Vaccine (HIGH DOSE) 0.5 milliLiter(s) IntraMuscular once  levothyroxine 150 MICROGram(s) Oral daily  losartan 100 milliGRAM(s) Oral daily  NIFEdipine XL 60 milliGRAM(s) Oral daily  pantoprazole    Tablet 40 milliGRAM(s) Oral before breakfast  piperacillin/tazobactam IVPB.. 3.375 Gram(s) IV Intermittent every 8 hours  sodium chloride 0.9%. 1000 milliLiter(s) IV Continuous <Continuous>    PRN MEDICATIONS  acetaminophen     Tablet .. 650 milliGRAM(s) Oral every 6 hours PRN  aluminum hydroxide/magnesium hydroxide/simethicone Suspension 30 milliLiter(s) Oral every 4 hours PRN  benzonatate 100 milliGRAM(s) Oral every 8 hours PRN  hydrOXYzine hydrochloride 25 milliGRAM(s) Oral every 6 hours PRN  melatonin 3 milliGRAM(s) Oral at bedtime PRN  morphine  - Injectable 4 milliGRAM(s) IV Push every 6 hours PRN  ondansetron Injectable 4 milliGRAM(s) IV Push every 8 hours PRN   78 y/o Female with PMHx of HTN, HLD, hypothyroidism,  PAD on Plavix, (HFpEF) (EF 65-70% in August 2023), and cholecystectomy with multiple endoscopic retrograde cholangiopancreatography (ERCP) past choledocholithiasis / CBD stent,  presents for abdominal pain.    No overnight events, still on 3L NC, having bowel movements and voiding well.       VITALS  T(F): 97.6 (04-17-25 @ 04:38), Max: 98.7 (04-16-25 @ 10:57)  HR: 72 (04-17-25 @ 08:03) (60 - 100)  BP: 147/62 (04-17-25 @ 08:03) (141/68 - 202/74)  RR: 18 (04-17-25 @ 04:38) (17 - 18)  SpO2: 98% (04-17-25 @ 04:38) (95% - 98%)        LABS             9.6    12.14 )-----------( 217      ( 04-17-25 @ 06:27 )             30.8     141  |  107  |  21  -------------------------<  92   04-16-25 @ 13:18  4.4  |  21  |  0.8    Ca      8.8     04-16-25 @ 13:18    TPro  6.4  /  Alb  4.2  /  TBili  2.5  /  DBili  x   /  AST  384  /  ALT  408  /  AlkPhos  349  /  GGT  x     04-16-25 @ 13:18    PT/INR - ( 04-16-25 @ 13:18 )   PT: 11.50 sec;   INR: 0.98 ratio  PTT - ( 04-16-25 @ 13:18 )  PTT:29.4 sec      Urinalysis Basic - ( 16 Apr 2025 13:18 )    Color: x / Appearance: x / SG: x / pH: x  Gluc: 92 mg/dL / Ketone: x  / Bili: x / Urobili: x   Blood: x / Protein: x / Nitrite: x   Leuk Esterase: x / RBC: x / WBC x   Sq Epi: x / Non Sq Epi: x / Bacteria: x          Urinalysis with Rflx Culture (collected 16 Apr 2025 13:18)      IMAGING    MEDICATIONS  STANDING MEDICATIONS  chlorhexidine 2% Cloths 1 Application(s) Topical <User Schedule>  clopidogrel Tablet 75 milliGRAM(s) Oral daily  doxycycline IVPB 100 milliGRAM(s) IV Intermittent every 12 hours  enoxaparin Injectable 40 milliGRAM(s) SubCutaneous every 24 hours  furosemide    Tablet 40 milliGRAM(s) Oral <User Schedule>  hydrALAZINE 25 milliGRAM(s) Oral three times a day  influenza  Vaccine (HIGH DOSE) 0.5 milliLiter(s) IntraMuscular once  levothyroxine 150 MICROGram(s) Oral daily  losartan 100 milliGRAM(s) Oral daily  NIFEdipine XL 60 milliGRAM(s) Oral daily  pantoprazole    Tablet 40 milliGRAM(s) Oral before breakfast  piperacillin/tazobactam IVPB.. 3.375 Gram(s) IV Intermittent every 8 hours  sodium chloride 0.9%. 1000 milliLiter(s) IV Continuous <Continuous>    PRN MEDICATIONS  acetaminophen     Tablet .. 650 milliGRAM(s) Oral every 6 hours PRN  aluminum hydroxide/magnesium hydroxide/simethicone Suspension 30 milliLiter(s) Oral every 4 hours PRN  benzonatate 100 milliGRAM(s) Oral every 8 hours PRN  hydrOXYzine hydrochloride 25 milliGRAM(s) Oral every 6 hours PRN  melatonin 3 milliGRAM(s) Oral at bedtime PRN  morphine  - Injectable 4 milliGRAM(s) IV Push every 6 hours PRN  ondansetron Injectable 4 milliGRAM(s) IV Push every 8 hours PRN

## 2025-04-17 NOTE — PROGRESS NOTE ADULT - ASSESSMENT
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 CTAP revealing pneumonia with CBD dilation. [facility] consulted for concern for cholangitis.       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  on plavix  currently on 4L O2  Bcx: gram -ve rods    RECS:  - Plan for ERCP once optimized from pulm standpoint, keep NPO tentatively tomorrow   - IV abx,   - Pulm consult for risk stratification  - Monitor LFTs  - We will follow

## 2025-04-17 NOTE — PROGRESS NOTE ADULT - ASSESSMENT
78 y/o Female with PMHx of HTN, HLD, hypothyroidism,  PAD on Plavix, (HFpEF) (EF 65-70% in August 2023), and cholecystectomy with multiple endoscopic retrograde cholangiopancreatography (ERCP) past choledocholithiasis / CBD stent,  presents for abdominal pain.    #Pneumonia - suspected gram negative organism given recent Abx  - on 4L NC, wean as tolerated  - on doxycycline IV 100mg q12  - follow up sputum cx (two: one for AFB and cx and one for bacterial cx)  - follow up blood cx  - RVP negative  - follow up MRSA     #suspected Cholangitis   - on zosyn  - clear liquid diet  - morphine IV 4mg q6h  - history of CBD stent, last changed on 2/5/2025 (can last approximately 3 months before needing another exchange)  - GI eval    #anxiety, new  - started on atarax 25mg q6h PRN    #H/o PAD   #HFpEF - 60% EF  -c/w home meds    #Hypothyroidism   -c/w Levo-thyroxine     #DVT ppx: lovenox   #GI ppx: protonix  #Diet: clear liquid  #Activity: as tolerated  #Code Status: full  #Dispo: 3B    Pending: wean off O2, clinical improvement, GI eval   80 y/o Female with PMHx of HTN, HLD, hypothyroidism,  PAD on Plavix, (HFpEF) (EF 65-70% in August 2023), and cholecystectomy with multiple endoscopic retrograde cholangiopancreatography (ERCP) past choledocholithiasis / CBD stent,  presents for abdominal pain.    #Pneumonia - suspected gram negative organism given recent Abx  - on 4L NC, wean as tolerated  - on doxycycline IV 100mg q12  - follow up blood cx  - RVP negative  - follow up MRSA     #suspected Cholangitis   - on zosyn  - clear liquid diet  - morphine IV 4mg q6h  - history of CBD stent, last changed on 2/5/2025 (can last approximately 3 months before needing another exchange)  - GI eval    #anxiety, new  - started on atarax 25mg q6h PRN    #H/o PAD   #HFpEF - 60% EF  -c/w home meds    #Hypothyroidism   -c/w Levo-thyroxine     #DVT ppx: lovenox   #GI ppx: protonix  #Diet: clear liquid  #Activity: as tolerated  #Code Status: full  #Dispo: 3B    Pending: wean off O2, clinical improvement, GI eval

## 2025-04-17 NOTE — CONSULT NOTE ADULT - SUBJECTIVE AND OBJECTIVE BOX
Patient is a 79y old  Female who presents with a chief complaint of Abdominal pain (17 Apr 2025 13:22)      HPI:  78 y/o Female with PMHx of HTN, HLD, hypothyroidism,  PAD on Plavix, (HFpEF) (EF 65-70% in August 2023), and cholecystectomy with multiple endoscopic retrograde cholangiopancreatography (ERCP) past choledocholithiasis / CBD stent,  presents for abdominal pain    She was last admitted in February for abdominal  pain similar to prior pains, and fever/chills, dark urine, subjective fevers, abdominal pain which have currently resolved after receiving antibiotics.  Denies overt signs of GI bleeding.  She was admitted for suspected cholangitis and discharged on Moxifloxacin and Flagyl. Of note, patient has a previously placed CBD stent.  She also has a periampullary adenoma.    On presentation, she reports having had a flu-like illness 2 weeks ago and she received antibiotics from the acute care outpatient  She currently presents for 1 day of abd pain, nausea, generalized, fever, chills, Poor PO intake over the past couple of days and complaints of neck pain  She denies any vomiting, chest pain, shortness of breath, abdominal pain, or urinary complaints    In the ED:  - Vitals: T 37.1 HR 95 /74 SpO2 17 RR 98%  - Labs: WBC 9.83 Hgb 9.6  Na 141 K 4.4 Cr 0.8, Elevated LFTS, UA (-)  - Imaging: CT Chest / Abdomen  1. Since February 5, 2025, new trace right pleural effusion and new right lower and middle lobe consolidated opacities with associated bronchiolectasis, possibly reflecting atelectasis and/or pneumonia in the appropriate clinical setting. Follow-up to complete resolution is   suggested.  2. Slightly increased intrahepatic and extrahepatic biliary ductal dilatation with CBD stent in place; interval resolution of previously noted pneumobilia.  2. No significant change in multiple subcentimeter and mildly enlarged intrathoracic and abdominopelvic lymph nodes.    She is s/p Doxycycline and Zosyn in the ED and she will be admitted to medicine (16 Apr 2025 21:04)      PAST MEDICAL & SURGICAL HISTORY:  Arterial insufficiency of lower extremity  left leg stent placed      Leg swelling      Hypothyroid      HTN (hypertension)      High cholesterol      Peripheral arterial disease      H/O Graves' disease      History of cholecystectomy      H/O: hysterectomy      S/P angiogram of extremity      S/P ERCP          SOCIAL HX:   Smoking   >35 pack h/o       FAMILY HISTORY:  Family history of breast cancer (Sibling)    FH: lung cancer  FATHER    .  No cardiovascular or pulmonary family history     REVIEW OF SYSTEMS:    All ROS are negative exept per HPI       Allergies    codeine (Stomach Upset; Vomiting; Nausea)    Intolerances          PHYSICAL EXAM  Vital Signs Last 24 Hrs  T(C): 36.6 (17 Apr 2025 13:51), Max: 36.8 (16 Apr 2025 15:58)  T(F): 97.9 (17 Apr 2025 13:51), Max: 98.3 (16 Apr 2025 15:58)  HR: 69 (17 Apr 2025 13:51) (60 - 100)  BP: 153/67 (17 Apr 2025 13:51) (141/68 - 180/69)  BP(mean): 96 (17 Apr 2025 13:51) (86 - 96)  RR: 18 (17 Apr 2025 13:51) (17 - 18)  SpO2: 96% (17 Apr 2025 13:51) (95% - 98%)    Parameters below as of 17 Apr 2025 13:51  Patient On (Oxygen Delivery Method): nasal cannula        CONSTITUTIONAL:  Well nourished.    ENT:   Airway patent,   No thrush    EYES:   Clear bilaterally,   pupils equal,   round and reactive to light.    CARDIAC:   Normal rate,   regular rhythm.    1+ edema       RESPIRATORY:   No wheezing   Normal chest expansion  Crackles    GASTROINTESTINAL:  Abdomen soft, non-tender,   No guarding,   Positive BS    MUSCULOSKELETAL:   Range of motion is not limited,  No clubbing, cyanosis    NEUROLOGICAL:   Alert and oriented   No motor deficits.    SKIN:   Skin normal color for race,   No evidence of rash.    LABS:                          9.6    12.14 )-----------( 217      ( 17 Apr 2025 06:27 )             30.8                                               04-17    137  |  104  |  16  ----------------------------<  97  4.2   |  20  |  0.9    Ca    8.4      17 Apr 2025 06:27  Mg     2.2     04-17    TPro  6.0  /  Alb  3.8  /  TBili  4.5[H]  /  DBili  x   /  AST  169[H]  /  ALT  305[H]  /  AlkPhos  297[H]  04-17      PT/INR - ( 16 Apr 2025 13:18 )   PT: 11.50 sec;   INR: 0.98 ratio         PTT - ( 16 Apr 2025 13:18 )  PTT:29.4 sec                                       Urinalysis Basic - ( 17 Apr 2025 06:27 )    Color: x / Appearance: x / SG: x / pH: x  Gluc: 97 mg/dL / Ketone: x  / Bili: x / Urobili: x   Blood: x / Protein: x / Nitrite: x   Leuk Esterase: x / RBC: x / WBC x   Sq Epi: x / Non Sq Epi: x / Bacteria: x                                                  LIVER FUNCTIONS - ( 17 Apr 2025 06:27 )  Alb: 3.8 g/dL / Pro: 6.0 g/dL / ALK PHOS: 297 U/L / ALT: 305 U/L / AST: 169 U/L / GGT: x                                                  Culture - Blood (collected 16 Apr 2025 13:20)  Source: Blood Blood-Peripheral  Gram Stain (17 Apr 2025 09:25):    Growth in aerobic bottle: Gram Negative Rods  Preliminary Report (17 Apr 2025 09:25):    Growth in aerobic bottle: Gram Negative Rods    Direct identification is available within approximately 3-5    hours either by Blood Panel Multiplexed PCR or Direct    MALDI-TOF. Details: [email or web address]  Organism: Blood Culture PCR (17 Apr 2025 10:54)  Organism: Blood Culture PCR (17 Apr 2025 10:54)    Urinalysis with Rflx Culture (collected 16 Apr 2025 13:18)                                                    MEDICATIONS  (STANDING):  chlorhexidine 2% Cloths 1 Application(s) Topical <User Schedule>  clopidogrel Tablet 75 milliGRAM(s) Oral daily  doxycycline IVPB 100 milliGRAM(s) IV Intermittent every 12 hours  enoxaparin Injectable 40 milliGRAM(s) SubCutaneous every 24 hours  furosemide    Tablet 40 milliGRAM(s) Oral <User Schedule>  hydrALAZINE 25 milliGRAM(s) Oral three times a day  hydrocortisone 1% Ointment 1 Application(s) Topical every 12 hours  influenza  Vaccine (HIGH DOSE) 0.5 milliLiter(s) IntraMuscular once  levothyroxine 150 MICROGram(s) Oral daily  losartan 100 milliGRAM(s) Oral daily  NIFEdipine XL 60 milliGRAM(s) Oral daily  pantoprazole    Tablet 40 milliGRAM(s) Oral before breakfast  piperacillin/tazobactam IVPB.. 3.375 Gram(s) IV Intermittent every 8 hours    MEDICATIONS  (PRN):  acetaminophen     Tablet .. 650 milliGRAM(s) Oral every 6 hours PRN Temp greater or equal to 38C (100.4F), Mild Pain (1 - 3)  aluminum hydroxide/magnesium hydroxide/simethicone Suspension 30 milliLiter(s) Oral every 4 hours PRN Dyspepsia  benzonatate 100 milliGRAM(s) Oral every 8 hours PRN Cough  hydrOXYzine hydrochloride 25 milliGRAM(s) Oral every 6 hours PRN Anxiety  melatonin 3 milliGRAM(s) Oral at bedtime PRN Insomnia  morphine  - Injectable 4 milliGRAM(s) IV Push every 6 hours PRN Severe Pain (7 - 10)  ondansetron Injectable 4 milliGRAM(s) IV Push every 8 hours PRN Nausea and/or Vomiting      X-Rays reviewed:

## 2025-04-18 LAB
-  AMPICILLIN/SULBACTAM: SIGNIFICANT CHANGE UP
-  AMPICILLIN: SIGNIFICANT CHANGE UP
-  AZTREONAM: SIGNIFICANT CHANGE UP
-  CEFAZOLIN: SIGNIFICANT CHANGE UP
-  CEFEPIME: SIGNIFICANT CHANGE UP
-  CEFOXITIN: SIGNIFICANT CHANGE UP
-  CEFTRIAXONE: SIGNIFICANT CHANGE UP
-  CIPROFLOXACIN: SIGNIFICANT CHANGE UP
-  ERTAPENEM: SIGNIFICANT CHANGE UP
-  GENTAMICIN: SIGNIFICANT CHANGE UP
-  IMIPENEM: SIGNIFICANT CHANGE UP
-  LEVOFLOXACIN: SIGNIFICANT CHANGE UP
-  MEROPENEM: SIGNIFICANT CHANGE UP
-  PIPERACILLIN/TAZOBACTAM: SIGNIFICANT CHANGE UP
-  TOBRAMYCIN: SIGNIFICANT CHANGE UP
-  TRIMETHOPRIM/SULFAMETHOXAZOLE: SIGNIFICANT CHANGE UP
ALBUMIN SERPL ELPH-MCNC: 3.5 G/DL — SIGNIFICANT CHANGE UP (ref 3.5–5.2)
ALP SERPL-CCNC: 285 U/L — HIGH (ref 30–115)
ALT FLD-CCNC: 196 U/L — HIGH (ref 0–41)
ANION GAP SERPL CALC-SCNC: 13 MMOL/L — SIGNIFICANT CHANGE UP (ref 7–14)
AST SERPL-CCNC: 78 U/L — HIGH (ref 0–41)
BASOPHILS # BLD AUTO: 0.02 K/UL — SIGNIFICANT CHANGE UP (ref 0–0.2)
BASOPHILS NFR BLD AUTO: 0.2 % — SIGNIFICANT CHANGE UP (ref 0–1)
BILIRUB SERPL-MCNC: 4 MG/DL — HIGH (ref 0.2–1.2)
BUN SERPL-MCNC: 17 MG/DL — SIGNIFICANT CHANGE UP (ref 10–20)
CALCIUM SERPL-MCNC: 8.7 MG/DL — SIGNIFICANT CHANGE UP (ref 8.4–10.5)
CHLORIDE SERPL-SCNC: 100 MMOL/L — SIGNIFICANT CHANGE UP (ref 98–110)
CO2 SERPL-SCNC: 22 MMOL/L — SIGNIFICANT CHANGE UP (ref 17–32)
CREAT SERPL-MCNC: 1 MG/DL — SIGNIFICANT CHANGE UP (ref 0.7–1.5)
CULTURE RESULTS: ABNORMAL
EGFR: 58 ML/MIN/1.73M2 — LOW
EGFR: 58 ML/MIN/1.73M2 — LOW
EOSINOPHIL # BLD AUTO: 0.1 K/UL — SIGNIFICANT CHANGE UP (ref 0–0.7)
EOSINOPHIL NFR BLD AUTO: 1 % — SIGNIFICANT CHANGE UP (ref 0–8)
GLUCOSE SERPL-MCNC: 109 MG/DL — HIGH (ref 70–99)
HCT VFR BLD CALC: 30.9 % — LOW (ref 37–47)
HGB BLD-MCNC: 9.5 G/DL — LOW (ref 12–16)
IMM GRANULOCYTES NFR BLD AUTO: 0.9 % — HIGH (ref 0.1–0.3)
LYMPHOCYTES # BLD AUTO: 0.89 K/UL — LOW (ref 1.2–3.4)
LYMPHOCYTES # BLD AUTO: 8.8 % — LOW (ref 20.5–51.1)
MAGNESIUM SERPL-MCNC: 2 MG/DL — SIGNIFICANT CHANGE UP (ref 1.8–2.4)
MCHC RBC-ENTMCNC: 24.1 PG — LOW (ref 27–31)
MCHC RBC-ENTMCNC: 30.7 G/DL — LOW (ref 32–37)
MCV RBC AUTO: 78.2 FL — LOW (ref 81–99)
METHOD TYPE: SIGNIFICANT CHANGE UP
MONOCYTES # BLD AUTO: 0.72 K/UL — HIGH (ref 0.1–0.6)
MONOCYTES NFR BLD AUTO: 7.1 % — SIGNIFICANT CHANGE UP (ref 1.7–9.3)
NEUTROPHILS # BLD AUTO: 8.33 K/UL — HIGH (ref 1.4–6.5)
NEUTROPHILS NFR BLD AUTO: 82 % — HIGH (ref 42.2–75.2)
NRBC BLD AUTO-RTO: 0 /100 WBCS — SIGNIFICANT CHANGE UP (ref 0–0)
ORGANISM # SPEC MICROSCOPIC CNT: ABNORMAL
ORGANISM # SPEC MICROSCOPIC CNT: ABNORMAL
ORGANISM # SPEC MICROSCOPIC CNT: SIGNIFICANT CHANGE UP
PLATELET # BLD AUTO: 261 K/UL — SIGNIFICANT CHANGE UP (ref 130–400)
PMV BLD: 10.4 FL — SIGNIFICANT CHANGE UP (ref 7.4–10.4)
POTASSIUM SERPL-MCNC: 3.7 MMOL/L — SIGNIFICANT CHANGE UP (ref 3.5–5)
POTASSIUM SERPL-SCNC: 3.7 MMOL/L — SIGNIFICANT CHANGE UP (ref 3.5–5)
PROT SERPL-MCNC: 6.3 G/DL — SIGNIFICANT CHANGE UP (ref 6–8)
RBC # BLD: 3.95 M/UL — LOW (ref 4.2–5.4)
RBC # FLD: 18.2 % — HIGH (ref 11.5–14.5)
SODIUM SERPL-SCNC: 135 MMOL/L — SIGNIFICANT CHANGE UP (ref 135–146)
SPECIMEN SOURCE: SIGNIFICANT CHANGE UP
WBC # BLD: 10.15 K/UL — SIGNIFICANT CHANGE UP (ref 4.8–10.8)
WBC # FLD AUTO: 10.15 K/UL — SIGNIFICANT CHANGE UP (ref 4.8–10.8)

## 2025-04-18 PROCEDURE — 99233 SBSQ HOSP IP/OBS HIGH 50: CPT

## 2025-04-18 RX ORDER — DOXYCYCLINE HYCLATE 100 MG
100 TABLET ORAL EVERY 12 HOURS
Refills: 0 | Status: DISCONTINUED | OUTPATIENT
Start: 2025-04-18 | End: 2025-04-22

## 2025-04-18 RX ORDER — DOXYCYCLINE HYCLATE 100 MG
100 TABLET ORAL EVERY 12 HOURS
Refills: 0 | Status: DISCONTINUED | OUTPATIENT
Start: 2025-04-18 | End: 2025-04-18

## 2025-04-18 RX ADMIN — Medication 25 MILLIGRAM(S): at 14:31

## 2025-04-18 RX ADMIN — Medication 100 MILLIGRAM(S): at 14:30

## 2025-04-18 RX ADMIN — Medication 4 MILLIGRAM(S): at 12:45

## 2025-04-18 RX ADMIN — Medication 60 MILLIGRAM(S): at 05:30

## 2025-04-18 RX ADMIN — Medication 150 MICROGRAM(S): at 05:30

## 2025-04-18 RX ADMIN — Medication 25 MILLIGRAM(S): at 05:30

## 2025-04-18 RX ADMIN — Medication 100 MILLIGRAM(S): at 05:29

## 2025-04-18 RX ADMIN — Medication 100 MILLIGRAM(S): at 17:35

## 2025-04-18 RX ADMIN — Medication 4 MILLIGRAM(S): at 18:30

## 2025-04-18 RX ADMIN — HYDROCORTISONE 1 APPLICATION(S): 10 CREAM TOPICAL at 05:31

## 2025-04-18 RX ADMIN — Medication 25 GRAM(S): at 05:29

## 2025-04-18 RX ADMIN — Medication 4 MILLIGRAM(S): at 19:05

## 2025-04-18 RX ADMIN — HYDROCORTISONE 1 APPLICATION(S): 10 CREAM TOPICAL at 17:33

## 2025-04-18 RX ADMIN — Medication 25 GRAM(S): at 21:34

## 2025-04-18 RX ADMIN — Medication 40 MILLIGRAM(S): at 05:31

## 2025-04-18 RX ADMIN — Medication 4 MILLIGRAM(S): at 12:15

## 2025-04-18 RX ADMIN — LOSARTAN POTASSIUM 100 MILLIGRAM(S): 100 TABLET, FILM COATED ORAL at 05:31

## 2025-04-18 RX ADMIN — Medication 1 APPLICATION(S): at 05:31

## 2025-04-18 RX ADMIN — Medication 25 MILLIGRAM(S): at 21:39

## 2025-04-18 RX ADMIN — Medication 25 GRAM(S): at 14:30

## 2025-04-18 NOTE — PROGRESS NOTE ADULT - ASSESSMENT
78 y/o woman with PMHx of HTN, HLD, hypothyroidism, PAD on Plavix, HFpEF (EF 65-70% in August 2023), and cholecystectomy with multiple ERCP in the past for choledocholithiasis / CBD stent, s/p BRYSON; presents for abdominal pain    # pt is immunocompromised 2/2 age    # This is a STAR pt - GOC done 4/17    # BMI 31.1 = obesity  wt loss advised after she recovers from acute illness    # outpt MDs  PMD: was Dr Aaron; needs referral to [facility] on d/c  Cardio/Vasc: Dr Stanley  GI: Dr Torres    # ideally, pt needs source control via ERCP and CBD stent exchange, to allow biliary system to better drain; however, the current PNA w/ hypoxic resp failure makes ERCP more dangerous now  IR is unable to do biliary drain because pt was on plavix - bleeding is much too high  if pt decompensates (AMS or shock or septic or worse LFTs or other) - then upgrade to ICU and [facility] will have to do urgent ERCP with pt intubated (family aware)    # Cough and Shortness of breath w/ acute hypox resp failure 2/2 RLLRML PNA w/ gram neg bacteremia; NO SIRS/NO SEPSIS  Patient has been reporting flu like symptoms 2 weeks ago, resolved, then came back again yesterday  CT scan showing: new trace right pleural effusion and new right lower and middle lobe consolidated opacities with associated bronchiolectasis, possibly reflecting atelectasis and/or pneumonia in the appropriate clinical setting.   2 points on CURB 65  s/p hospitalization requiring antibiotics within the past 3 months  RVP neg; MRSA neg  Blood culture 1 of 2: + E Coli  ID consult: noted  Pulm consult: noted  abx: Zosyn 3.375gm iv q8 + Doxycycline 100mg iv q12 (will do doxy PO when evin PO better)  incent rosey  tessalon prn cough  NC O2 to keep sat > 92% (on 3L, 94% sat)  echo not needed  WBC a little better  cbc q24    # Abdominal pain 2/2 acute cholangitis w/ gram neg bacteremia; Hx CCY and BRYSON; Hx of multiple ERCP for choledocholithiasis (last CBD stent placed 2/5/25)  Last ERCP on 02/05/2025: Successful ERCP with removal of stent, removal of stones and sludge and placement of plastic CBD stent. 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  T abdomen showing: Slightly increased intrahepatic and extrahepatic biliary ductal dilatation with CBD stent in place; interval resolution of previously noted pneumobilia.  T bili: increasing 2.5 -> 4.5  other LFTs downtrending w/ abx  diet: full liquids (DASH restrictions)   IVFs: d/c (BP is high)  Adv GI eval: eventual stent exchange - need pulm clearance - poss Mon for ERCP - NPO p MN SUN  Bld Cx: + E Coli  abx: above  LFT q24    # HTN  Difficult to control HTN outpatient, patient follows with Dr. Stanley  c/w Hydralazine 25 mg q8  c/w Losartan 100 q24  c/w Nifedipine xl 60 q24  c/w Lasix 40 mg every other day    # PAD on Plavix; has stents in both legs (lt fem stent seen on CT A/P); HFpEF - chronic, not in acute decompensation  HOLD Plavix 75 q24 - in case IR has to do biliary drain next week; GI said OK to cont if they just do ERCP  not on statin - would not start one now w/ LFTs anyway  lipid panel as outpt    # Hypothyroidism  c/w Levothyroxine 150 ug daily    # lymphadenopathy  has unchanged, diffuse, sub-cm LN intrathoracic, axillary and intraabd of unclear etiology  likely reactive  no further inpt w/u    # Anxiety  atarax 25mg po q6 prn - helped  melatonin prn sleep    # chr venous stasis; eczema post neck  HCT 1% oint top q12 to lower legs and post neck    # DVT prophylaxis: Lovenox 40 mg SC daily    # GI prophylaxis: Pantoprazole 40 mg daily    # Activity: can amb to BR; walks at home w/ no asst device; no PT for now needed    # updated daughter Moira ([phone]) at bedside, along with 2 of her other sisters    Dispo: iv abx; f/u Pulm/GI/ID; full liquids - NPO p MN SUN; HOLD plavix; f/u BCx - E Coli; HCT oint; incent rosey; daily labs  eventually, pt will need abx at home (poss po vs IV) - f/u CM - still acute    Prog is a bit guarded. IF VS become unstable - upgrade to SDU or ICU.

## 2025-04-18 NOTE — CONSULT NOTE ADULT - SUBJECTIVE AND OBJECTIVE BOX
INTERVENTIONAL RADIOLOGY CONSULT:     HPI:  78 y/o Female with PMHx of HTN, HLD, hypothyroidism,  PAD on Plavix, (HFpEF) (EF 65-70% in August 2023), and cholecystectomy with multiple endoscopic retrograde cholangiopancreatography (ERCP) past choledocholithiasis / CBD stent,  presents for abdominal pain    She was last admitted in February for abdominal  pain similar to prior pains, and fever/chills, dark urine, subjective fevers, abdominal pain which have currently resolved after receiving antibiotics.  Denies overt signs of GI bleeding.  She was admitted for suspected cholangitis and discharged on Moxifloxacin and Flagyl. Of note, patient has a previously placed CBD stent.  She also has a periampullary adenoma.    On presentation, she reports having had a flu-like illness 2 weeks ago and she received antibiotics from the acute care outpatient  She currently presents for 1 day of abd pain, nausea, generalized, fever, chills, Poor PO intake over the past couple of days and complaints of neck pain  She denies any vomiting, chest pain, shortness of breath, abdominal pain, or urinary complaints    In the ED:  - Vitals: T 37.1 HR 95 /74 SpO2 17 RR 98%  - Labs: WBC 9.83 Hgb 9.6  Na 141 K 4.4 Cr 0.8, Elevated LFTS, UA (-)  - Imaging: CT Chest / Abdomen  1. Since February 5, 2025, new trace right pleural effusion and new right lower and middle lobe consolidated opacities with associated bronchiolectasis, possibly reflecting atelectasis and/or pneumonia in the appropriate clinical setting. Follow-up to complete resolution is   suggested.  2. Slightly increased intrahepatic and extrahepatic biliary ductal dilatation with CBD stent in place; interval resolution of previously noted pneumobilia.  2. No significant change in multiple subcentimeter and mildly enlarged intrathoracic and abdominopelvic lymph nodes.    She is s/p Doxycycline and Zosyn in the ED and she will be admitted to medicine (16 Apr 2025 21:04)      PAST MEDICAL & SURGICAL HISTORY:  Arterial insufficiency of lower extremity  left leg stent placed      Leg swelling      Hypothyroid      HTN (hypertension)      High cholesterol      Peripheral arterial disease      H/O Graves' disease      History of cholecystectomy      H/O: hysterectomy      S/P angiogram of extremity      S/P ERCP          MEDICATIONS  (STANDING):  chlorhexidine 2% Cloths 1 Application(s) Topical <User Schedule>  doxycycline IVPB 100 milliGRAM(s) IV Intermittent every 12 hours  furosemide    Tablet 40 milliGRAM(s) Oral <User Schedule>  hydrALAZINE 25 milliGRAM(s) Oral three times a day  hydrocortisone 1% Ointment 1 Application(s) Topical every 12 hours  influenza  Vaccine (HIGH DOSE) 0.5 milliLiter(s) IntraMuscular once  levothyroxine 150 MICROGram(s) Oral daily  losartan 100 milliGRAM(s) Oral daily  NIFEdipine XL 60 milliGRAM(s) Oral daily  pantoprazole    Tablet 40 milliGRAM(s) Oral before breakfast  piperacillin/tazobactam IVPB.. 3.375 Gram(s) IV Intermittent every 8 hours    MEDICATIONS  (PRN):  acetaminophen     Tablet .. 650 milliGRAM(s) Oral every 6 hours PRN Temp greater or equal to 38C (100.4F), Mild Pain (1 - 3)  aluminum hydroxide/magnesium hydroxide/simethicone Suspension 30 milliLiter(s) Oral every 4 hours PRN Dyspepsia  benzonatate 100 milliGRAM(s) Oral every 8 hours PRN Cough  hydrOXYzine hydrochloride 25 milliGRAM(s) Oral every 6 hours PRN Anxiety  melatonin 3 milliGRAM(s) Oral at bedtime PRN Insomnia  morphine  - Injectable 4 milliGRAM(s) IV Push every 6 hours PRN Severe Pain (7 - 10)  ondansetron Injectable 4 milliGRAM(s) IV Push every 8 hours PRN Nausea and/or Vomiting      Allergies    codeine (Stomach Upset; Vomiting; Nausea)    Intolerances          FAMILY HISTORY:  Family history of breast cancer (Sibling)    FH: lung cancer  FATHER        Physical Exam:   Vital Signs Last 24 Hrs  T(C): 36.8 (18 Apr 2025 05:14), Max: 37.1 (17 Apr 2025 19:10)  T(F): 98.2 (18 Apr 2025 05:14), Max: 98.7 (17 Apr 2025 19:10)  HR: 69 (18 Apr 2025 05:14) (69 - 73)  BP: 151/69 (18 Apr 2025 05:14) (150/60 - 159/61)  BP(mean): 96 (17 Apr 2025 13:51) (96 - 96)  RR: 18 (18 Apr 2025 05:14) (18 - 18)  SpO2: 97% (18 Apr 2025 05:14) (95% - 97%)    Parameters below as of 18 Apr 2025 05:14  Patient On (Oxygen Delivery Method): nasal cannula  O2 Flow (L/min): 3        Labs:                         9.5    10.15 )-----------( 261      ( 18 Apr 2025 07:05 )             30.9     04-18    135  |  100  |  17  ----------------------------<  109[H]  3.7   |  22  |  1.0    Ca    8.7      18 Apr 2025 07:05  Mg     2.0     04-18    TPro  6.3  /  Alb  3.5  /  TBili  4.0[H]  /  DBili  x   /  AST  78[H]  /  ALT  196[H]  /  AlkPhos  285[H]  04-18    PT/INR - ( 16 Apr 2025 13:18 )   PT: 11.50 sec;   INR: 0.98 ratio         PTT - ( 16 Apr 2025 13:18 )  PTT:29.4 sec    Pertinent labs:                      9.5    10.15 )-----------( 261      ( 18 Apr 2025 07:05 )             30.9       04-18    135  |  100  |  17  ----------------------------<  109[H]  3.7   |  22  |  1.0    Ca    8.7      18 Apr 2025 07:05  Mg     2.0     04-18    TPro  6.3  /  Alb  3.5  /  TBili  4.0[H]  /  DBili  x   /  AST  78[H]  /  ALT  196[H]  /  AlkPhos  285[H]  04-18      PT/INR - ( 16 Apr 2025 13:18 )   PT: 11.50 sec;   INR: 0.98 ratio         PTT - ( 16 Apr 2025 13:18 )  PTT:29.4 sec    Radiology & Additional Studies:     Radiology imaging reviewed.

## 2025-04-18 NOTE — PROGRESS NOTE ADULT - ASSESSMENT
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 CTAP revealing pneumonia with CBD dilation. [facility] consulted for concern for cholangitis.       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  on plavix  currently on 4L O2  Bcx: gram -ve rods  4/18    RECS:  - Plan for ERCP once optimized from pulm standpoint, keep NPO tentatively tomorrow   - IV abx,   - Pulm consult for risk stratification  - Monitor LFTs  - We will follow

## 2025-04-18 NOTE — CONSULT NOTE ADULT - ASSESSMENT
80 y/o Female with PMHx of HTN, HLD, hypothyroidism,  PAD on Plavix, (HFpEF) (EF 65-70% in August 2023), and cholecystectomy with multiple ERCP (past choledocholithiasis) presented 4/16 with fevers & muscle aches, admitted for pnemonia. Recent ERCP 2/25: Successful ERCP with removal of stent, removal of stones and sludge and placement of plastic CBD stent. Patient with elevated LFTs with CBD dilation [facility] wants to do another ERCP but pulm states she's not optimized d/t acute pneumo, on 4-5L O2, high risk for surgery. IR consulted by [facility] for percutaneous transhepatic biliary drainage for obstructive cholangiopathy.    The procedure requested has to be performed under anesthesia. Pulmonology has deemed patient moderate-high risk for surgery due to acute condition. When patient's condition improves, can reconsider for PTBD. Once stable, plavix must be held for five days before procedure.    Please call Interventional Radiology with questions or concerns:   - M-F [phone]: [phone]   - All other hours: [phone]    INCOMPLETE  INCOMPLETE  INCOMPLETE   INCOMPLETE - awaiting official consult order 80 y/o Female with PMHx of HTN, HLD, hypothyroidism,  PAD on Plavix, (HFpEF) (EF 65-70% in August 2023), and cholecystectomy with multiple ERCP (past choledocholithiasis) presented 4/16 with fevers & muscle aches, admitted for pnemonia. Recent ERCP 2/25: Successful ERCP with removal of stent, removal of stones and sludge and placement of plastic CBD stent. Patient with elevated LFTs with CBD dilation [facility] wants to do another ERCP but pulm states she's not optimized d/t acute pneumo, on 4-5L O2, high risk for surgery. IR consulted by [facility] for percutaneous transhepatic biliary drainage for obstructive cholangiopathy.    The procedure requested has to be performed under anesthesia. Pulmonology has deemed patient moderate-high risk for surgery due to acute condition. When patient's condition improves, can reconsider for PTBD. Once stable, plavix must be held for five days before procedure (last dose 4/17 5AM).    Please call Interventional Radiology with questions or concerns:   - M-F [phone]: [phone]   - All other hours: [phone]

## 2025-04-18 NOTE — PROGRESS NOTE ADULT - SUBJECTIVE AND OBJECTIVE BOX
MICHAELBROWN  79y  Female  ***My note supersedes ALL resident notes that I sign.  My corrections for their notes are in my note.***    I can be reached directly via Teams or my office number is [phone] or [phone].    INTERVAL EVENTS: Here for f/u of cholangitis. Pt feels a little worse today in terms of abd pain, fatigue and decr appetite. Cough, which is wet sounding, is still present. She is not bringing up phlegm. Her O2 status remains at 3L. She is mildly SOB (not worse). She did have coffee yesterday and tolerated it.    T(F): 98.2 (04-18-25 @ 05:14), Max: 98.7 (04-17-25 @ 19:10)  HR: 69 (04-18-25 @ 05:14) (69 - 73)  BP: 151/69 (04-18-25 @ 05:14) (150/60 - 151/69)  RR: 18 (04-18-25 @ 05:14) (18 - 18)  SpO2: 97% (04-18-25 @ 05:14) (95% - 97%)    Gen: mild resp distress - not bad; looks tired and fatigued;   HEENT: PERRL, EOMI, mouth clr, nose clr  Neck: no nodes, no JVD, thyroid nl; post neck at hairline - scaly, erythematous/pink pruritic patch c/w eczematous flare  lungs: + crackles on rt base; no wheeze; + mild cough; good air mvmt w/ respirations  hrt: s1 s2 rrr no murmur  abd: soft, ND, + tender all over, but santi RUQ/epigastric areas; no HS megaly  ext: 1+ edema, no c/c; legs are mild pruritic; no rash; very slight chr stasis changes in lower legs  neuro: aa ox3, cn intact, can move all 4 ext    LABS:                      9.5     (    78.2   10.15 )-----------( ---------      261      ( 18 Apr 2025 07:05 )             30.9    (    18.2     WBC Count: 10.15 K/uL (04-18-25 @ 07:05)  WBC Count: 12.14 K/uL (04-17-25 @ 06:27)  WBC Count: 9.83 K/uL (04-16-25 @ 13:18)    Hemoglobin: 9.5 g/dL (04-18 @ 07:05)  Hemoglobin: 9.6 g/dL (04-17 @ 06:27)  Hemoglobin: 9.6 g/dL (04-16 @ 13:18)    135   (   100   (   109      04-18-25 @ 07:05  ----------------------               3.7   (   22   (   17                             -----                        1.0  Ca  8.7   Mg  2.0    P   --     LFT  6.3  (  4.0  (  78       04-18-25 @ 07:05  -------------------------  3.5  (  285  (  196    Alb 3.5  T ramone 4.0     AST 78    04-18-25 @ 07:05 - improving  Alb 3.8  T ramone 4.5         04-17-25 @ 06:27  Alb 4.2  T ramone 2.5         04-16-25 @ 13:18    Urinalysis Basic - ( 18 Apr 2025 07:05 )    Color: x / Appearance: x / SG: x / pH: x  Gluc: 109 mg/dL / Ketone: x  / Bili: x / Urobili: x   Blood: x / Protein: x / Nitrite: x   Leuk Esterase: x / RBC: x / WBC x   Sq Epi: x / Non Sq Epi: x / Bacteria: x    Culture - Blood (collected 04-16-25 @ 13:20)  Source: Blood Blood-Peripheral  Gram Stain (04-17-25 @ 09:25):    Growth in aerobic bottle: Gram Negative Rods  Preliminary Report (04-17-25 @ 22:57):    Growth in aerobic bottle: Escherichia coli    Direct identification is available within approximately 3-5    hours either by Blood Panel Multiplexed PCR or Direct    MALDI-TOF. Details: [email or web address]  Organism: Blood Culture PCR (04-17-25 @ 10:54)  Organism: Blood Culture PCR (04-17-25 @ 10:54)      -  Escherichia coli: Detec      Method Type: PCR    Culture - Blood (collected 04-16-25 @ 13:20)  Source: Blood Blood-Peripheral  Preliminary Report (04-17-25 @ 22:02):    No growth at 24 hours    RADIOLOGY & ADDITIONAL TESTS:    MEDICATIONS:  doxycycline monohydrate Capsule 100 milliGRAM(s) Oral every 12 hours  piperacillin/tazobactam IVPB.. 3.375 Gram(s) IV Intermittent every 8 hours    acetaminophen     Tablet .. 650 milliGRAM(s) Oral every 6 hours PRN  aluminum hydroxide/magnesium hydroxide/simethicone Suspension 30 milliLiter(s) Oral every 4 hours PRN  benzonatate 100 milliGRAM(s) Oral every 8 hours PRN  chlorhexidine 2% Cloths 1 Application(s) Topical <User Schedule>  furosemide    Tablet 40 milliGRAM(s) Oral <User Schedule>  hydrALAZINE 25 milliGRAM(s) Oral three times a day  hydrocortisone 1% Ointment 1 Application(s) Topical every 12 hours  hydrOXYzine hydrochloride 25 milliGRAM(s) Oral every 6 hours PRN  influenza  Vaccine (HIGH DOSE) 0.5 milliLiter(s) IntraMuscular once  levothyroxine 150 MICROGram(s) Oral daily  losartan 100 milliGRAM(s) Oral daily  melatonin 3 milliGRAM(s) Oral at bedtime PRN  morphine  - Injectable 4 milliGRAM(s) IV Push every 6 hours PRN  NIFEdipine XL 60 milliGRAM(s) Oral daily  ondansetron Injectable 4 milliGRAM(s) IV Push every 8 hours PRN  pantoprazole    Tablet 40 milliGRAM(s) Oral before breakfast

## 2025-04-18 NOTE — CONSULT NOTE ADULT - ASSESSMENT
ASSESSMENT  78 y/o Female with PMHx of HTN, HLD, hypothyroidism,  PAD on Plavix, (HFpEF) (EF 65-70% in August 2023), and cholecystectomy with multiple endoscopic retrograde cholangiopancreatography (ERCP) past choledocholithiasis / CBD stent,  presents for abdominal pain      IMPRESSION  #Ecoli bacteremia suspected cholangitis     4/16 BCX 1/4 bottles +     Afebrile; Admission WBC 9     MRSA PCR Result.: Negative (04-17-25 @ 02:31)    7/16 UCX   50,000 - 99,000 CFU/mL Escherichia coli S    ERCP 2/25: Successful ERCP with removal of stent, removal of stones and sludge and placement of plastic CBD stent. 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  CT Chest / Abdomen  1. Since February 5, 2025, new trace right pleural effusion and new right lower and middle lobe consolidated opacities with associated bronchiolectasis, possibly reflecting atelectasis and/or pneumonia in the appropriate clinical setting. Follow-up to complete resolution is   suggested.  2. Slightly increased intrahepatic and extrahepatic biliary ductal dilatation with CBD stent in place; interval resolution of previously noted pneumobilia.  2. No significant change in multiple subcentimeter and mildly enlarged intrathoracic and abdominopelvic lymph nodes.  #Sepsis ruled out on admission    #Transaminitis   #Obesity BMI (kg/m2): 31.1  #Immunodeficiency secondary to Senescence  which could result in poor clinical outcomes  #Abx allergy: NA  Creatinine: 1.0 (04-18-25 @ 07:05)    Height (cm): 165.1 (04-17-25 @ 13:59)  Weight (kg): 84.7 (04-17-25 @ 13:59)    RECOMMENDATIONS  This is an incomplete consult note. All final recommendations to follow after interview and examination of the patient. Please follow recommendations noted below.    If any questions, please send a message or call on [employer] Teams  Please continue to update ID with any pertinent new laboratory, radiographic findings, or change in clinical status ASSESSMENT  78 y/o Female with PMHx of HTN, HLD, hypothyroidism,  PAD on Plavix, (HFpEF) (EF 65-70% in August 2023), and cholecystectomy with multiple endoscopic retrograde cholangiopancreatography (ERCP) past choledocholithiasis / CBD stent,  presents for abdominal pain      IMPRESSION  #Ecoli bacteremia suspected cholangitis     4/16 BCX 1/4 bottles +     Afebrile; Admission WBC 9     MRSA PCR Result.: Negative (04-17-25 @ 02:31)    7/16 UCX   50,000 - 99,000 CFU/mL Escherichia coli S    ERCP 2/25: Successful ERCP with removal of stent, removal of stones and sludge and placement of plastic CBD stent. 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  CT Chest / Abdomen  1. Since February 5, 2025, new trace right pleural effusion and new right lower and middle lobe consolidated opacities with associated bronchiolectasis, possibly reflecting atelectasis and/or pneumonia in the appropriate clinical setting. Follow-up to complete resolution is   suggested.  2. Slightly increased intrahepatic and extrahepatic biliary ductal dilatation with CBD stent in place; interval resolution of previously noted pneumobilia.  2. No significant change in multiple subcentimeter and mildly enlarged intrathoracic and abdominopelvic lymph nodes.  #Sepsis ruled out on admission    #Possible PNA, has cough   #Transaminitis   #Obesity BMI (kg/m2): 31.1  #Immunodeficiency secondary to Senescence  which could result in poor clinical outcomes  #Abx allergy: NA  Creatinine: 1.0 (04-18-25 @ 07:05)    Height (cm): 165.1 (04-17-25 @ 13:59)  Weight (kg): 84.7 (04-17-25 @ 13:59)    RECOMMENDATIONS  - Change to PO doxycycline 100 milliGRAM(s) BID  - piperacillin/tazobactam IVPB.. 3.375 Gram(s) IV Intermittent every 8 hours  - f/u ecoli sensitivities   - Appreciate GI consult- - Plan for ERCP once optimized from pulm standpoint,  - BNP  - Appreciate IR consult , f/u recs    If any questions, please send a message or call on [employer] Teams  Please continue to update ID with any pertinent new laboratory, radiographic findings, or change in clinical status

## 2025-04-18 NOTE — PROGRESS NOTE ADULT - SUBJECTIVE AND OBJECTIVE BOX
Gastroenterology progress note:     Patient is a 79y old  Female who presents with a chief complaint of Abdominal pain (18 Apr 2025 13:52)       Admitted on: 04-16-25    We are following the patient for: possible cholangitis       Interval History: patient still on 4L O2, congested      PAST MEDICAL & SURGICAL HISTORY:  Arterial insufficiency of lower extremity  left leg stent placed      Leg swelling      Hypothyroid      HTN (hypertension)      High cholesterol      Peripheral arterial disease      H/O Graves' disease      History of cholecystectomy      H/O: hysterectomy      S/P angiogram of extremity      S/P ERCP          MEDICATIONS  (STANDING):  chlorhexidine 2% Cloths 1 Application(s) Topical <User Schedule>  doxycycline IVPB 100 milliGRAM(s) IV Intermittent every 12 hours  furosemide    Tablet 40 milliGRAM(s) Oral <User Schedule>  hydrALAZINE 25 milliGRAM(s) Oral three times a day  hydrocortisone 1% Ointment 1 Application(s) Topical every 12 hours  influenza  Vaccine (HIGH DOSE) 0.5 milliLiter(s) IntraMuscular once  levothyroxine 150 MICROGram(s) Oral daily  losartan 100 milliGRAM(s) Oral daily  NIFEdipine XL 60 milliGRAM(s) Oral daily  pantoprazole    Tablet 40 milliGRAM(s) Oral before breakfast  piperacillin/tazobactam IVPB.. 3.375 Gram(s) IV Intermittent every 8 hours    MEDICATIONS  (PRN):  acetaminophen     Tablet .. 650 milliGRAM(s) Oral every 6 hours PRN Temp greater or equal to 38C (100.4F), Mild Pain (1 - 3)  aluminum hydroxide/magnesium hydroxide/simethicone Suspension 30 milliLiter(s) Oral every 4 hours PRN Dyspepsia  benzonatate 100 milliGRAM(s) Oral every 8 hours PRN Cough  hydrOXYzine hydrochloride 25 milliGRAM(s) Oral every 6 hours PRN Anxiety  melatonin 3 milliGRAM(s) Oral at bedtime PRN Insomnia  morphine  - Injectable 4 milliGRAM(s) IV Push every 6 hours PRN Severe Pain (7 - 10)  ondansetron Injectable 4 milliGRAM(s) IV Push every 8 hours PRN Nausea and/or Vomiting      Allergies  codeine (Stomach Upset; Vomiting; Nausea)      Review of Systems:   Cardiovascular:  No Chest Pain, No Palpitations  Respiratory:  No Cough, No Dyspnea  Gastrointestinal:  As described in HPI  Skin:  No Skin Lesions, No Jaundice  Neuro:  No Syncope, No Dizziness    Physical Examination:  T(C): 36.8 (04-18-25 @ 13:55), Max: 37.1 (04-17-25 @ 19:10)  HR: 78 (04-18-25 @ 13:55) (69 - 78)  BP: 147/66 (04-18-25 @ 13:55) (147/66 - 151/69)  RR: 16 (04-18-25 @ 16:46) (16 - 18)  SpO2: 95% (04-18-25 @ 16:46) (94% - 97%)        GENERAL: AAOx3, no acute distress.  HEAD:  Atraumatic, Normocephalic  EYES: conjunctiva and sclera clear  NECK: Supple, no JVD or thyromegaly  CHEST/LUNG: Dec BS  HEART: Regular rate and rhythm; normal S1, S2, No murmurs.  ABDOMEN: Soft, nontender, nondistended; Bowel sounds present  NEUROLOGY: No asterixis or tremor.   SKIN: Intact, no jaundice     Data:                        9.5    10.15 )-----------( 261      ( 18 Apr 2025 07:05 )             30.9     Hgb trend:  9.5  04-18-25 @ 07:05  9.6  04-17-25 @ 06:27  9.6  04-16-25 @ 13:18      04-18    135  |  100  |  17  ----------------------------<  109[H]  3.7   |  22  |  1.0    Ca    8.7      18 Apr 2025 07:05  Mg     2.0     04-18    TPro  6.3  /  Alb  3.5  /  TBili  4.0[H]  /  DBili  x   /  AST  78[H]  /  ALT  196[H]  /  AlkPhos  285[H]  04-18    Liver panel trend:  TBili 4.0   /   AST 78   /      /   AlkP 285   /   Tptn 6.3   /   Alb 3.5    /   DBili --      04-18  TBili 4.5   /      /      /   AlkP 297   /   Tptn 6.0   /   Alb 3.8    /   DBili --      04-17  TBili 2.5   /      /      /   AlkP 349   /   Tptn 6.4   /   Alb 4.2    /   DBili --      04-16          Culture - Blood (collected 16 Apr 2025 13:20)  Source: Blood Blood-Peripheral  Preliminary Report (17 Apr 2025 22:02):    No growth at 24 hours    Culture - Blood (collected 16 Apr 2025 13:20)  Source: Blood Blood-Peripheral  Gram Stain (17 Apr 2025 09:25):    Growth in aerobic bottle: Gram Negative Rods  Final Report (18 Apr 2025 16:06):    Growth in aerobic bottle: Escherichia coli    Direct identification is available within approximately 3-5    hours either by Blood Panel Multiplexed PCR or Direct    MALDI-TOF. Details: [email or web address]  Organism: Blood Culture PCR  Escherichia coli (18 Apr 2025 16:06)  Organism: Escherichia coli (18 Apr 2025 16:06)  Organism: Blood Culture PCR (18 Apr 2025 16:06)    Urinalysis with Rflx Culture (collected 16 Apr 2025 13:18)     Gastroenterology progress note:     Patient is a 79y old  Female who presents with a chief complaint of Abdominal pain (18 Apr 2025 13:52)       Admitted on: 04-16-25    We are following the patient for: possible cholangitis       Interval History: patient still on 4L O2, congested, coughing and SOB       PAST MEDICAL & SURGICAL HISTORY:  Arterial insufficiency of lower extremity  left leg stent placed      Leg swelling      Hypothyroid      HTN (hypertension)      High cholesterol      Peripheral arterial disease      H/O Graves' disease      History of cholecystectomy      H/O: hysterectomy      S/P angiogram of extremity      S/P ERCP          MEDICATIONS  (STANDING):  chlorhexidine 2% Cloths 1 Application(s) Topical <User Schedule>  doxycycline IVPB 100 milliGRAM(s) IV Intermittent every 12 hours  furosemide    Tablet 40 milliGRAM(s) Oral <User Schedule>  hydrALAZINE 25 milliGRAM(s) Oral three times a day  hydrocortisone 1% Ointment 1 Application(s) Topical every 12 hours  influenza  Vaccine (HIGH DOSE) 0.5 milliLiter(s) IntraMuscular once  levothyroxine 150 MICROGram(s) Oral daily  losartan 100 milliGRAM(s) Oral daily  NIFEdipine XL 60 milliGRAM(s) Oral daily  pantoprazole    Tablet 40 milliGRAM(s) Oral before breakfast  piperacillin/tazobactam IVPB.. 3.375 Gram(s) IV Intermittent every 8 hours    MEDICATIONS  (PRN):  acetaminophen     Tablet .. 650 milliGRAM(s) Oral every 6 hours PRN Temp greater or equal to 38C (100.4F), Mild Pain (1 - 3)  aluminum hydroxide/magnesium hydroxide/simethicone Suspension 30 milliLiter(s) Oral every 4 hours PRN Dyspepsia  benzonatate 100 milliGRAM(s) Oral every 8 hours PRN Cough  hydrOXYzine hydrochloride 25 milliGRAM(s) Oral every 6 hours PRN Anxiety  melatonin 3 milliGRAM(s) Oral at bedtime PRN Insomnia  morphine  - Injectable 4 milliGRAM(s) IV Push every 6 hours PRN Severe Pain (7 - 10)  ondansetron Injectable 4 milliGRAM(s) IV Push every 8 hours PRN Nausea and/or Vomiting      Allergies  codeine (Stomach Upset; Vomiting; Nausea)      Review of Systems:   Cardiovascular:  No Chest Pain, No Palpitations  Respiratory:   +Cough, + Dyspnea  Gastrointestinal:  As described in HPI  Skin:  No Skin Lesions, + Jaundice  Neuro:  No Syncope, No Dizziness    Physical Examination:  T(C): 36.8 (04-18-25 @ 13:55), Max: 37.1 (04-17-25 @ 19:10)  HR: 78 (04-18-25 @ 13:55) (69 - 78)  BP: 147/66 (04-18-25 @ 13:55) (147/66 - 151/69)  RR: 16 (04-18-25 @ 16:46) (16 - 18)  SpO2: 95% (04-18-25 @ 16:46) (94% - 97%)        GENERAL: AAOx3, no acute distress.  HEAD:  Atraumatic, Normocephalic  EYES: conjunctiva and sclera clear  NECK: Supple, no JVD or thyromegaly  CHEST/LUNG: Dec BS, Ronchi   HEART: Regular rate and rhythm; normal S1, S2, No murmurs.  ABDOMEN: Soft, nontender, nondistended; Bowel sounds present  NEUROLOGY: No asterixis or tremor.   SKIN: Intact, no jaundice     Data:                        9.5    10.15 )-----------( 261      ( 18 Apr 2025 07:05 )             30.9     Hgb trend:  9.5  04-18-25 @ 07:05  9.6  04-17-25 @ 06:27  9.6  04-16-25 @ 13:18      04-18    135  |  100  |  17  ----------------------------<  109[H]  3.7   |  22  |  1.0    Ca    8.7      18 Apr 2025 07:05  Mg     2.0     04-18    TPro  6.3  /  Alb  3.5  /  TBili  4.0[H]  /  DBili  x   /  AST  78[H]  /  ALT  196[H]  /  AlkPhos  285[H]  04-18    Liver panel trend:  TBili 4.0   /   AST 78   /      /   AlkP 285   /   Tptn 6.3   /   Alb 3.5    /   DBili --      04-18  TBili 4.5   /      /      /   AlkP 297   /   Tptn 6.0   /   Alb 3.8    /   DBili --      04-17  TBili 2.5   /      /      /   AlkP 349   /   Tptn 6.4   /   Alb 4.2    /   DBili --      04-16          Culture - Blood (collected 16 Apr 2025 13:20)  Source: Blood Blood-Peripheral  Preliminary Report (17 Apr 2025 22:02):    No growth at 24 hours    Culture - Blood (collected 16 Apr 2025 13:20)  Source: Blood Blood-Peripheral  Gram Stain (17 Apr 2025 09:25):    Growth in aerobic bottle: Gram Negative Rods  Final Report (18 Apr 2025 16:06):    Growth in aerobic bottle: Escherichia coli    Direct identification is available within approximately 3-5    hours either by Blood Panel Multiplexed PCR or Direct    MALDI-TOF. Details: [email or web address]  Organism: Blood Culture PCR  Escherichia coli (18 Apr 2025 16:06)  Organism: Escherichia coli (18 Apr 2025 16:06)  Organism: Blood Culture PCR (18 Apr 2025 16:06)    Urinalysis with Rflx Culture (collected 16 Apr 2025 13:18)

## 2025-04-18 NOTE — CONSULT NOTE ADULT - TIME BILLING
I have personally seen and examined this patient.  I have reviewed all pertinent clinical information and reviewed all relevant imaging and diagnostic studies personally.   I counseled the patient about diagnostic testing and treatment plan.   I discussed my recommendations with the primary team Patrica

## 2025-04-18 NOTE — PROGRESS NOTE ADULT - ATTENDING COMMENTS
Patient has concern of cholangitis, patient also with gram-negative bacteremia, pneumonia with active respiratory symptoms requiring oxygen.  Patient is deemed high risk for endoscopic intervention pulmonary wise.  Ideally patient would benefit from ERCP for stent exchange.  Discussed ERCP with the patient, discussed the risk and benefits as worsening cholangitis can be life-threatening, and at the same time she is high risk for pulmonary complications given acute pneumonia.  Patient opted for conservative treatment for now with antibiotics and close follow-up understanding the risks and benefits.  This is improving, LFTs are improving.  Discussed with IR if alternative route for biliary decompression can be attempted without requiring intubation, unfortunately patient is on Plavix and doing PTC will be associated with high risk of bleeding.  Attempt ERCP with biliary decompression and CBD stent exchange once patient is medically optimized, or otherwise on emergency basis if patient decompensates and is agreeable to have the procedure done. Patient with concern of cholangitis,  gram-negative bacteremia, pneumonia with active respiratory symptoms requiring oxygen.  Patient is deemed high risk for endoscopic intervention pulmonary wise.  Ideally patient would benefit from ERCP for stent exchange.  Discussed ERCP with the patient, discussed the risk and benefits as worsening cholangitis can be life-threatening, and at the same time she is high risk for pulmonary complications given acute pneumonia.  Patient opted for conservative treatment for now with antibiotics and close follow-up understanding the risks and benefits.  This is improving, LFTs are improving.  Discussed with IR if alternative route for biliary decompression can be attempted without requiring intubation, unfortunately patient is on Plavix and doing PTC will be associated with high risk of bleeding.  Attempt ERCP with biliary decompression and CBD stent exchange once patient is medically optimized, or otherwise on emergency basis if patient decompensates and is agreeable to have the procedure done. Patient with concern of cholangitis,  gram-negative bacteremia, pneumonia with active respiratory symptoms requiring oxygen.  Patient is deemed high risk for endoscopic intervention pulmonary wise.  Ideally patient would benefit from ERCP for stent exchange.  Discussed ERCP with the patient, discussed the risk and benefits as worsening cholangitis can be life-threatening, and at the same time she is high risk for pulmonary complications given acute pneumonia.  Patient opted for conservative treatment for now with antibiotics and close follow-up understanding the risks and benefits.  WBC is improving, LFTs are improving.  Discussed with IR if alternative route for biliary decompression can be attempted without requiring intubation, unfortunately patient is on Plavix and doing PTC will be associated with high risk of bleeding.  Will attempt ERCP with biliary decompression and CBD stent exchange once patient is medically optimized, or otherwise on emergency basis if patient decompensates and is agreeable to have the procedure done.

## 2025-04-18 NOTE — CONSULT NOTE ADULT - SUBJECTIVE AND OBJECTIVE BOX
MICHAELBROWN  79y, Female  Allergy: codeine (Stomach Upset; Vomiting; Nausea)      CHIEF COMPLAINT:   Abdominal pain (17 Apr 2025 16:46)      LOS  2d    HPI  HPI:  78 y/o Female with PMHx of HTN, HLD, hypothyroidism,  PAD on Plavix, (HFpEF) (EF 65-70% in August 2023), and cholecystectomy with multiple endoscopic retrograde cholangiopancreatography (ERCP) past choledocholithiasis / CBD stent,  presents for abdominal pain    She was last admitted in February for abdominal  pain similar to prior pains, and fever/chills, dark urine, subjective fevers, abdominal pain which have currently resolved after receiving antibiotics.  Denies overt signs of GI bleeding.  She was admitted for suspected cholangitis and discharged on Moxifloxacin and Flagyl. Of note, patient has a previously placed CBD stent.  She also has a periampullary adenoma.    On presentation, she reports having had a flu-like illness 2 weeks ago and she received antibiotics from the acute care outpatient  She currently presents for 1 day of abd pain, nausea, generalized, fever, chills, Poor PO intake over the past couple of days and complaints of neck pain  She denies any vomiting, chest pain, shortness of breath, abdominal pain, or urinary complaints    In the ED:  - Vitals: T 37.1 HR 95 /74 SpO2 17 RR 98%  - Labs: WBC 9.83 Hgb 9.6  Na 141 K 4.4 Cr 0.8, Elevated LFTS, UA (-)  - Imaging: CT Chest / Abdomen  1. Since February 5, 2025, new trace right pleural effusion and new right lower and middle lobe consolidated opacities with associated bronchiolectasis, possibly reflecting atelectasis and/or pneumonia in the appropriate clinical setting. Follow-up to complete resolution is   suggested.  2. Slightly increased intrahepatic and extrahepatic biliary ductal dilatation with CBD stent in place; interval resolution of previously noted pneumobilia.  2. No significant change in multiple subcentimeter and mildly enlarged intrathoracic and abdominopelvic lymph nodes.    She is s/p Doxycycline and Zosyn in the ED and she will be admitted to medicine (16 Apr 2025 21:04)      INFECTIOUS DISEASE HISTORY:  ID consulted for PNA and cholangitis      Currently ordered for:  doxycycline IVPB 100 milliGRAM(s) IV Intermittent every 12 hours  piperacillin/tazobactam IVPB.. 3.375 Gram(s) IV Intermittent every 8 hours      PMH  PAST MEDICAL & SURGICAL HISTORY:  Arterial insufficiency of lower extremity  left leg stent placed      Leg swelling      Hypothyroid      HTN (hypertension)      High cholesterol      Peripheral arterial disease      H/O Graves' disease      History of cholecystectomy      H/O: hysterectomy      S/P angiogram of extremity      S/P ERCP          FAMILY HISTORY  Family history of breast cancer (Sibling)    FH: lung cancer        SOCIAL HISTORY  Social History:  Lifelong 1ppd smoker, quit 2 months ago   Denies alcohol (17 Jan 2023 17:55)        ROS  ***    VITALS:  T(F): 98.2, Max: 98.7 (04-17-25 @ 19:10)  HR: 69  BP: 151/69  RR: 18Vital Signs Last 24 Hrs  T(C): 36.8 (18 Apr 2025 05:14), Max: 37.1 (17 Apr 2025 19:10)  T(F): 98.2 (18 Apr 2025 05:14), Max: 98.7 (17 Apr 2025 19:10)  HR: 69 (18 Apr 2025 05:14) (69 - 73)  BP: 151/69 (18 Apr 2025 05:14) (150/60 - 159/61)  BP(mean): 96 (17 Apr 2025 13:51) (96 - 96)  RR: 18 (18 Apr 2025 05:14) (18 - 18)  SpO2: 97% (18 Apr 2025 05:14) (95% - 97%)    Parameters below as of 18 Apr 2025 05:14  Patient On (Oxygen Delivery Method): nasal cannula  O2 Flow (L/min): 3      PHYSICAL EXAM:  ***    TESTS & MEASUREMENTS:                        9.5    10.15 )-----------( 261      ( 18 Apr 2025 07:05 )             30.9     04-18    135  |  100  |  17  ----------------------------<  109[H]  3.7   |  22  |  1.0    Ca    8.7      18 Apr 2025 07:05  Mg     2.0     04-18    TPro  6.3  /  Alb  3.5  /  TBili  4.0[H]  /  DBili  x   /  AST  78[H]  /  ALT  196[H]  /  AlkPhos  285[H]  04-18      LIVER FUNCTIONS - ( 18 Apr 2025 07:05 )  Alb: 3.5 g/dL / Pro: 6.3 g/dL / ALK PHOS: 285 U/L / ALT: 196 U/L / AST: 78 U/L / GGT: x           Urinalysis Basic - ( 18 Apr 2025 07:05 )    Color: x / Appearance: x / SG: x / pH: x  Gluc: 109 mg/dL / Ketone: x  / Bili: x / Urobili: x   Blood: x / Protein: x / Nitrite: x   Leuk Esterase: x / RBC: x / WBC x   Sq Epi: x / Non Sq Epi: x / Bacteria: x        Culture - Blood (collected 04-16-25 @ 13:20)  Source: Blood Blood-Peripheral  Preliminary Report (04-17-25 @ 22:02):    No growth at 24 hours    Culture - Blood (collected 04-16-25 @ 13:20)  Source: Blood Blood-Peripheral  Gram Stain (04-17-25 @ 09:25):    Growth in aerobic bottle: Gram Negative Rods  Preliminary Report (04-17-25 @ 22:57):    Growth in aerobic bottle: Escherichia coli    Direct identification is available within approximately 3-5    hours either by Blood Panel Multiplexed PCR or Direct    MALDI-TOF. Details: [email or web address]  Organism: Blood Culture PCR (04-17-25 @ 10:54)  Organism: Blood Culture PCR (04-17-25 @ 10:54)      -  Escherichia coli: Detec      Method Type: PCR    Urinalysis with Rflx Culture (collected 04-16-25 @ 13:18)    Culture - Blood (collected 02-06-25 @ 04:30)  Source: .Blood BLOOD  Final Report (02-11-25 @ 15:00):    No growth at 5 days    Culture - Blood (collected 02-06-25 @ 04:30)  Source: .Blood BLOOD  Final Report (02-11-25 @ 15:01):    No growth at 5 days    Culture - Blood (collected 02-05-25 @ 11:00)  Source: .Blood BLOOD  Final Report (02-10-25 @ 22:00):    No growth at 5 days    Urinalysis with Rflx Culture (collected 02-05-25 @ 02:40)    Urinalysis with Rflx Culture (collected 11-18-24 @ 19:44)    Culture - Blood (collected 11-18-24 @ 16:38)  Source: .Blood BLOOD  Final Report (11-23-24 @ 23:06):    No growth at 5 days    Culture - Blood (collected 11-18-24 @ 16:38)  Source: .Blood BLOOD  Final Report (11-23-24 @ 23:06):    No growth at 5 days    Culture - Blood (collected 10-12-24 @ 10:39)  Source: .Blood BLOOD  Final Report (10-17-24 @ 17:01):    No growth at 5 days    Culture - Blood (collected 10-12-24 @ 10:39)  Source: .Blood BLOOD  Final Report (10-17-24 @ 17:01):    No growth at 5 days    Culture - Blood (collected 07-17-24 @ 07:08)  Source: .Blood None  Final Report (07-22-24 @ 17:00):    No growth at 5 days    Culture - Blood (collected 07-16-24 @ 20:00)  Source: .Blood Blood-Peripheral  Final Report (07-22-24 @ 10:07):    No growth at 5 days    Urinalysis with Rflx Culture (collected 07-16-24 @ 09:40)    Culture - Urine (collected 07-16-24 @ 09:40)  Source: Clean Catch None  Final Report (07-19-24 @ 21:49):    50,000 - 99,000 CFU/mL Escherichia coli  Organism: Escherichia coli (07-19-24 @ 21:49)  Organism: Escherichia coli (07-19-24 @ 21:49)      -  Levofloxacin: R >4      -  Tobramycin: S <=2      -  Nitrofurantoin: S <=32 Should not be used to treat pyelonephritis      -  Aztreonam: S <=4      -  Gentamicin: S <=2      -  Cefazolin: S <=2 For uncomplicated UTI with K. pneumoniae, E. coli, or P. mirablis: KARISHMA <=16 is sensitive and KARISHMA >=32 is resistant. This also predicts results for oral agents cefaclor, cefdinir, cefpodoxime, cefprozil, cefuroxime axetil, cephalexin and locarbef for uncomplicated UTI. Note that some isolates may be susceptible to these agents while testing resistant to cefazolin.      -  Cefepime: S <=2      -  Piperacillin/Tazobactam: S <=8      -  Ciprofloxacin: R >2      -  Imipenem: S <=1      -  Ceftriaxone: S <=1      -  Ampicillin: S <=8 These ampicillin results predict results for amoxicillin      Method Type: KARISHMA      -  Meropenem: S <=1      -  Ampicillin/Sulbactam: S <=4/2      -  Cefoxitin: S <=8      -  Cefuroxime: S <=4      -  Amoxicillin/Clavulanic Acid: S <=8/4      -  Trimethoprim/Sulfamethoxazole: S <=0.5/9.5      -  Ertapenem: S <=0.5        Lactate, Blood: 1.1 mmol/L (04-17-25 @ 06:27)  Blood Gas Venous - Lactate: 1.3 mmol/L (04-16-25 @ 13:49)  Lactate, Blood: 1.1 mmol/L (04-16-25 @ 13:18)      INFECTIOUS DISEASES TESTING  MRSA PCR Result.: Negative (04-17-25 @ 02:31)  Procalcitonin: 20.60 ng/mL (02-06-25 @ 12:05)  Rapid RVP Result: NotDetec (10-14-24 @ 01:56)  MRSA PCR Result.: Negative (10-14-24 @ 01:53)  Legionella Antigen, Urine: Negative (10-14-24 @ 01:52)  Streptococcus pneumoniae Ag, Ur Result: Negative (10-14-24 @ 01:52)  Procalcitonin: 0.19 ng/mL (10-13-24 @ 07:26)      INFLAMMATORY MARKERS      RADIOLOGY & ADDITIONAL TESTS:  I have personally reviewed the last Chest xray  CXR  Xray Chest 1 View- PORTABLE-Urgent:   ACC: [id number] EXAM:  XR CHEST PORTABLE URGENT 1V   ORDERED BY: OTTO MCMANUS     PROCEDURE DATE:  04/16/2025          INTERPRETATION:  CLINICAL HISTORY / REASON FOR EXAM: Cough.    CORRELATION: CT chest from April 16, 2025.    TECHNIQUE/POSITIONING: Satisfactory. Single image, AP chest radiograph.    FINDINGS:    SUPPORT DEVICES: None.    CARDIAC/MEDIASTINUM/HILUM: Unremarkable cardiac silhouette.    LUNG PARENCHYMA/PLEURA: Small right basilar opacity/effusion.    SKELETON/SOFT TISSUES: Unremarkable.      IMPRESSION:    Right basilar opacity/effusion.    Please see separate CT chest report for thoracic findings.    --- End of Report ---            SILVANO GILLILAND MD; Attending Radiologist  This document has been electronically signed. Apr 16 2025  6:44PM (04-16-25 @ 12:41)      CT  CT Chest w/ IV Cont:   ACC: [id number] EXAM:  CT CHEST IC   ORDERED BY: OTTO MCMANUS     ACC: [id number] EXAM:  CT ABDOMEN AND PELVIS IC   ORDERED BY: OTTO MCMANUS     PROCEDURE DATE:  04/16/2025          INTERPRETATION:  CLINICAL INFORMATION: Abdominal pain, history of   choledocholithiasis, cough.    COMPARISON: CT abdomen pelvis 2/5/2025, 1/18/2023. CT chest 1/16/2025,   and 12/20/2024.    CONTRAST/COMPLICATIONS:  IV Contrast: Omnipaque 350, 100 cc administered   0 cc discarded  Oral Contrast: NONE    PROCEDURE:  CT of the Chest, Abdomen and Pelvis was performed.  Sagittal and coronal reformats were performed.    FINDINGS:  CHEST:  LUNGS AND LARGE AIRWAYS: Biapical pleural-parenchymal thickening. Since   February 5, 2025, new right lower and right middle lobe consolidated   opacities with associated bronchiolectasis, possibly reflecting   atelectasis and/or pneumonia in the appropriate clinical setting. Few   overall unchanged bilateral pulmonary nodules are noted. For example:    Right upper lobe 5 mm nodule,series 4 image 119.  Left upper lobe solid 4 mm nodule, series 4 image 81.  Left upper lobe 4 mm solid nodule, series 4 image 91.    PLEURA: New trace right pleural effusion.  VESSELS: Aortic calcifications. Coronary artery calcifications.  HEART: Heart size is normal. No pericardial effusion.  MEDIASTINUM AND SHAYLA: No significant change in multiple subcentimeter and   mildly enlarged mediastinal lymph nodes.  CHEST WALL AND LOWER NECK: Bilateral axillary lymph nodes similar to   prior.    ABDOMEN AND PELVIS:  LIVER: Within normal limits.  BILE DUCTS: Redemonstrated is a CBD stent, with slightly increased   intrahepatic and extrahepatic biliary ductal dilatation. Interval   resolution of previously noted pneumobilia.  GALLBLADDER: Status postcholecystectomy.  SPLEEN: Within normal limits.  PANCREAS: Within normal limits.  ADRENALS: Within normal limits.  KIDNEYS/URETERS: No hydronephrosis. Symmetric renal enhancement. Multiple   bilateral renal cysts and other hypodensities, which are too small to   further characterize, are noted.    BLADDER: Decompressed, limiting further evaluation.  REPRODUCTIVE ORGANS: Status post hysterectomy.    BOWEL: Sigmoid colonic diverticulosis. No evidence of bowel obstruction.  PERITONEUM/RETROPERITONEUM: No ascites or pneumoperitoneum.  VESSELS: Left femoral vascular stent. Atherosclerotic changes.  LYMPH NODES: No significant change in multiple subcentimeter and mildly   enlarged gastrohepatic, pancreaticoduodenal, bilateral iliac chain and   bilateral inguinal lymph nodes.  ABDOMINAL WALL: Within normal limits.  BONES: Degenerative changes of the spine.    IMPRESSION:    1. Since February 5, 2025, new trace right pleural effusion and new right   lower and middle lobe consolidated opacities with associated   bronchiolectasis, possibly reflecting atelectasis and/or pneumonia in the   appropriate clinical setting. Follow-up to complete resolution is   suggested.  2. Slightly increased intrahepatic and extrahepatic biliary ductal   dilatationwith CBD stent in place; interval resolution of previously   noted pneumobilia.  2. No significant change in multiple subcentimeter and mildly enlarged   intrathoracic and abdominopelvic lymph nodes.      --- End of Report ---          MERVAT ENGEL MD; Resident Radiologist  This document has been electronically signed.  LINDSEY WAKEFIELD MD; Attending Radiologist  This document has been electronically signed. Apr 16 2025  3:51PM (04-16-25 @ 13:41)      CARDIOLOGY TESTING       MEDICATIONS  chlorhexidine 2% Cloths 1 Topical <User Schedule>  doxycycline IVPB 100 IV Intermittent every 12 hours  furosemide    Tablet 40 Oral <User Schedule>  hydrALAZINE 25 Oral three times a day  hydrocortisone 1% Ointment 1 Topical every 12 hours  influenza  Vaccine (HIGH DOSE) 0.5 IntraMuscular once  levothyroxine 150 Oral daily  losartan 100 Oral daily  NIFEdipine XL 60 Oral daily  pantoprazole    Tablet 40 Oral before breakfast  piperacillin/tazobactam IVPB.. 3.375 IV Intermittent every 8 hours      ANTIBIOTICS:  doxycycline IVPB 100 milliGRAM(s) IV Intermittent every 12 hours  piperacillin/tazobactam IVPB.. 3.375 Gram(s) IV Intermittent every 8 hours      ALLERGIES:  codeine (Stomach Upset; Vomiting; Nausea)           MICHAELBROWN  79y, Female  Allergy: codeine (Stomach Upset; Vomiting; Nausea)      CHIEF COMPLAINT:   Abdominal pain (17 Apr 2025 16:46)      LOS  2d    HPI  HPI:  80 y/o Female with PMHx of HTN, HLD, hypothyroidism,  PAD on Plavix, (HFpEF) (EF 65-70% in August 2023), and cholecystectomy with multiple endoscopic retrograde cholangiopancreatography (ERCP) past choledocholithiasis / CBD stent,  presents for abdominal pain    She was last admitted in February for abdominal  pain similar to prior pains, and fever/chills, dark urine, subjective fevers, abdominal pain which have currently resolved after receiving antibiotics.  Denies overt signs of GI bleeding.  She was admitted for suspected cholangitis and discharged on Moxifloxacin and Flagyl. Of note, patient has a previously placed CBD stent.  She also has a periampullary adenoma.    On presentation, she reports having had a flu-like illness 2 weeks ago and she received antibiotics from the acute care outpatient  She currently presents for 1 day of abd pain, nausea, generalized, fever, chills, Poor PO intake over the past couple of days and complaints of neck pain  She denies any vomiting, chest pain, shortness of breath, abdominal pain, or urinary complaints    In the ED:  - Vitals: T 37.1 HR 95 /74 SpO2 17 RR 98%  - Labs: WBC 9.83 Hgb 9.6  Na 141 K 4.4 Cr 0.8, Elevated LFTS, UA (-)  - Imaging: CT Chest / Abdomen  1. Since February 5, 2025, new trace right pleural effusion and new right lower and middle lobe consolidated opacities with associated bronchiolectasis, possibly reflecting atelectasis and/or pneumonia in the appropriate clinical setting. Follow-up to complete resolution is   suggested.  2. Slightly increased intrahepatic and extrahepatic biliary ductal dilatation with CBD stent in place; interval resolution of previously noted pneumobilia.  2. No significant change in multiple subcentimeter and mildly enlarged intrathoracic and abdominopelvic lymph nodes.    She is s/p Doxycycline and Zosyn in the ED and she will be admitted to medicine (16 Apr 2025 21:04)      INFECTIOUS DISEASE HISTORY:  ID consulted for PNA and cholangitis  Pt reports cough, productive  Also reports RUQ pain  BCX ecoli       4/16 BCX 1/4 bottles +     Afebrile; Admission WBC 9     MRSA PCR Result.: Negative (04-17-25 @ 02:31)    7/16 UCX   50,000 - 99,000 CFU/mL Escherichia coli S    ERCP 2/25: Successful ERCP with removal of stent, removal of stones and sludge and placement of plastic CBD stent. 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    Currently ordered for:  doxycycline IVPB 100 milliGRAM(s) IV Intermittent every 12 hours  piperacillin/tazobactam IVPB.. 3.375 Gram(s) IV Intermittent every 8 hours      PMH  PAST MEDICAL & SURGICAL HISTORY:  Arterial insufficiency of lower extremity  left leg stent placed      Leg swelling      Hypothyroid      HTN (hypertension)      High cholesterol      Peripheral arterial disease      H/O Graves' disease      History of cholecystectomy      H/O: hysterectomy      S/P angiogram of extremity      S/P ERCP          FAMILY HISTORY  Family history of breast cancer (Sibling)    FH: lung cancer        SOCIAL HISTORY  Social History:  Lifelong 1ppd smoker, quit 2 months ago   Denies alcohol (17 Jan 2023 17:55)        ROS  General: Denies rigors, nightsweats  HEENT: Denies headache, rhinorrhea, sore throat, eye pain  CV: Denies CP, palpitations  PULM: as noted above   GI: as noted above   : Denies discharge, hematuria  MSK: Denies arthralgias, myalgias  SKIN: Denies rash, lesions  NEURO: Denies paresthesias, weakness  PSYCH: Denies depression, anxiety     VITALS:  T(F): 98.2, Max: 98.7 (04-17-25 @ 19:10)  HR: 69  BP: 151/69  RR: 18Vital Signs Last 24 Hrs  T(C): 36.8 (18 Apr 2025 05:14), Max: 37.1 (17 Apr 2025 19:10)  T(F): 98.2 (18 Apr 2025 05:14), Max: 98.7 (17 Apr 2025 19:10)  HR: 69 (18 Apr 2025 05:14) (69 - 73)  BP: 151/69 (18 Apr 2025 05:14) (150/60 - 159/61)  BP(mean): 96 (17 Apr 2025 13:51) (96 - 96)  RR: 18 (18 Apr 2025 05:14) (18 - 18)  SpO2: 97% (18 Apr 2025 05:14) (95% - 97%)    Parameters below as of 18 Apr 2025 05:14  Patient On (Oxygen Delivery Method): nasal cannula  O2 Flow (L/min): 3      PHYSICAL EXAM:  Gen: NAD, resting in bed NC  HEENT: Normocephalic, atraumatic  Neck: supple, no lymphadenopathy  CV: Regular rate & regular rhythm  Lungs: decreased BS at bases, no fremitus  Abdomen: Soft, BS present, RUQ tenderness to palpation   Ext: Warm, well perfused trace LE edema  Neuro: non focal, awake  Skin: no rash, no erythema  Lines: no phlebitis     TESTS & MEASUREMENTS:                        9.5    10.15 )-----------( 261      ( 18 Apr 2025 07:05 )             30.9     04-18    135  |  100  |  17  ----------------------------<  109[H]  3.7   |  22  |  1.0    Ca    8.7      18 Apr 2025 07:05  Mg     2.0     04-18    TPro  6.3  /  Alb  3.5  /  TBili  4.0[H]  /  DBili  x   /  AST  78[H]  /  ALT  196[H]  /  AlkPhos  285[H]  04-18      LIVER FUNCTIONS - ( 18 Apr 2025 07:05 )  Alb: 3.5 g/dL / Pro: 6.3 g/dL / ALK PHOS: 285 U/L / ALT: 196 U/L / AST: 78 U/L / GGT: x           Urinalysis Basic - ( 18 Apr 2025 07:05 )    Color: x / Appearance: x / SG: x / pH: x  Gluc: 109 mg/dL / Ketone: x  / Bili: x / Urobili: x   Blood: x / Protein: x / Nitrite: x   Leuk Esterase: x / RBC: x / WBC x   Sq Epi: x / Non Sq Epi: x / Bacteria: x        Culture - Blood (collected 04-16-25 @ 13:20)  Source: Blood Blood-Peripheral  Preliminary Report (04-17-25 @ 22:02):    No growth at 24 hours    Culture - Blood (collected 04-16-25 @ 13:20)  Source: Blood Blood-Peripheral  Gram Stain (04-17-25 @ 09:25):    Growth in aerobic bottle: Gram Negative Rods  Preliminary Report (04-17-25 @ 22:57):    Growth in aerobic bottle: Escherichia coli    Direct identification is available within approximately 3-5    hours either by Blood Panel Multiplexed PCR or Direct    MALDI-TOF. Details: [email or web address]  Organism: Blood Culture PCR (04-17-25 @ 10:54)  Organism: Blood Culture PCR (04-17-25 @ 10:54)      -  Escherichia coli: Detec      Method Type: PCR    Urinalysis with Rflx Culture (collected 04-16-25 @ 13:18)    Culture - Blood (collected 02-06-25 @ 04:30)  Source: .Blood BLOOD  Final Report (02-11-25 @ 15:00):    No growth at 5 days    Culture - Blood (collected 02-06-25 @ 04:30)  Source: .Blood BLOOD  Final Report (02-11-25 @ 15:01):    No growth at 5 days    Culture - Blood (collected 02-05-25 @ 11:00)  Source: .Blood BLOOD  Final Report (02-10-25 @ 22:00):    No growth at 5 days    Urinalysis with Rflx Culture (collected 02-05-25 @ 02:40)    Urinalysis with Rflx Culture (collected 11-18-24 @ 19:44)    Culture - Blood (collected 11-18-24 @ 16:38)  Source: .Blood BLOOD  Final Report (11-23-24 @ 23:06):    No growth at 5 days    Culture - Blood (collected 11-18-24 @ 16:38)  Source: .Blood BLOOD  Final Report (11-23-24 @ 23:06):    No growth at 5 days    Culture - Blood (collected 10-12-24 @ 10:39)  Source: .Blood BLOOD  Final Report (10-17-24 @ 17:01):    No growth at 5 days    Culture - Blood (collected 10-12-24 @ 10:39)  Source: .Blood BLOOD  Final Report (10-17-24 @ 17:01):    No growth at 5 days    Culture - Blood (collected 07-17-24 @ 07:08)  Source: .Blood None  Final Report (07-22-24 @ 17:00):    No growth at 5 days    Culture - Blood (collected 07-16-24 @ 20:00)  Source: .Blood Blood-Peripheral  Final Report (07-22-24 @ 10:07):    No growth at 5 days    Urinalysis with Rflx Culture (collected 07-16-24 @ 09:40)    Culture - Urine (collected 07-16-24 @ 09:40)  Source: Clean Catch None  Final Report (07-19-24 @ 21:49):    50,000 - 99,000 CFU/mL Escherichia coli  Organism: Escherichia coli (07-19-24 @ 21:49)  Organism: Escherichia coli (07-19-24 @ 21:49)      -  Levofloxacin: R >4      -  Tobramycin: S <=2      -  Nitrofurantoin: S <=32 Should not be used to treat pyelonephritis      -  Aztreonam: S <=4      -  Gentamicin: S <=2      -  Cefazolin: S <=2 For uncomplicated UTI with K. pneumoniae, E. coli, or P. mirablis: KARISHMA <=16 is sensitive and KARISHMA >=32 is resistant. This also predicts results for oral agents cefaclor, cefdinir, cefpodoxime, cefprozil, cefuroxime axetil, cephalexin and locarbef for uncomplicated UTI. Note that some isolates may be susceptible to these agents while testing resistant to cefazolin.      -  Cefepime: S <=2      -  Piperacillin/Tazobactam: S <=8      -  Ciprofloxacin: R >2      -  Imipenem: S <=1      -  Ceftriaxone: S <=1      -  Ampicillin: S <=8 These ampicillin results predict results for amoxicillin      Method Type: KARISHMA      -  Meropenem: S <=1      -  Ampicillin/Sulbactam: S <=4/2      -  Cefoxitin: S <=8      -  Cefuroxime: S <=4      -  Amoxicillin/Clavulanic Acid: S <=8/4      -  Trimethoprim/Sulfamethoxazole: S <=0.5/9.5      -  Ertapenem: S <=0.5        Lactate, Blood: 1.1 mmol/L (04-17-25 @ 06:27)  Blood Gas Venous - Lactate: 1.3 mmol/L (04-16-25 @ 13:49)  Lactate, Blood: 1.1 mmol/L (04-16-25 @ 13:18)      INFECTIOUS DISEASES TESTING  MRSA PCR Result.: Negative (04-17-25 @ 02:31)  Procalcitonin: 20.60 ng/mL (02-06-25 @ 12:05)  Rapid RVP Result: NotDetec (10-14-24 @ 01:56)  MRSA PCR Result.: Negative (10-14-24 @ 01:53)  Legionella Antigen, Urine: Negative (10-14-24 @ 01:52)  Streptococcus pneumoniae Ag, Ur Result: Negative (10-14-24 @ 01:52)  Procalcitonin: 0.19 ng/mL (10-13-24 @ 07:26)      INFLAMMATORY MARKERS      RADIOLOGY & ADDITIONAL TESTS:  I have personally reviewed the last Chest xray  CXR  Xray Chest 1 View- PORTABLE-Urgent:   ACC: [id number] EXAM:  XR CHEST PORTABLE URGENT 1V   ORDERED BY: OTTO MCMANUS     PROCEDURE DATE:  04/16/2025          INTERPRETATION:  CLINICAL HISTORY / REASON FOR EXAM: Cough.    CORRELATION: CT chest from April 16, 2025.    TECHNIQUE/POSITIONING: Satisfactory. Single image, AP chest radiograph.    FINDINGS:    SUPPORT DEVICES: None.    CARDIAC/MEDIASTINUM/HILUM: Unremarkable cardiac silhouette.    LUNG PARENCHYMA/PLEURA: Small right basilar opacity/effusion.    SKELETON/SOFT TISSUES: Unremarkable.      IMPRESSION:    Right basilar opacity/effusion.    Please see separate CT chest report for thoracic findings.    --- End of Report ---            SILVANO GILLILAND MD; Attending Radiologist  This document has been electronically signed. Apr 16 2025  6:44PM (04-16-25 @ 12:41)      CT  CT Chest w/ IV Cont:   ACC: [id number] EXAM:  CT CHEST IC   ORDERED BY: OTTO MCMANUS     ACC: [id number] EXAM:  CT ABDOMEN AND PELVIS IC   ORDERED BY: OTTO [facility]     PROCEDURE DATE:  04/16/2025          INTERPRETATION:  CLINICAL INFORMATION: Abdominal pain, history of   choledocholithiasis, cough.    COMPARISON: CT abdomen pelvis 2/5/2025, 1/18/2023. CT chest 1/16/2025,   and 12/20/2024.    CONTRAST/COMPLICATIONS:  IV Contrast: Omnipaque 350, 100 cc administered   0 cc discarded  Oral Contrast: NONE    PROCEDURE:  CT of the Chest, Abdomen and Pelvis was performed.  Sagittal and coronal reformats were performed.    FINDINGS:  CHEST:  LUNGS AND LARGE AIRWAYS: Biapical pleural-parenchymal thickening. Since   February 5, 2025, new right lower and right middle lobe consolidated   opacities with associated bronchiolectasis, possibly reflecting   atelectasis and/or pneumonia in the appropriate clinical setting. Few   overall unchanged bilateral pulmonary nodules are noted. For example:    Right upper lobe 5 mm nodule,series 4 image 119.  Left upper lobe solid 4 mm nodule, series 4 image 81.  Left upper lobe 4 mm solid nodule, series 4 image 91.    PLEURA: New trace right pleural effusion.  VESSELS: Aortic calcifications. Coronary artery calcifications.  HEART: Heart size is normal. No pericardial effusion.  MEDIASTINUM AND SHAYLA: No significant change in multiple subcentimeter and   mildly enlarged mediastinal lymph nodes.  CHEST WALL AND LOWER NECK: Bilateral axillary lymph nodes similar to   prior.    ABDOMEN AND PELVIS:  LIVER: Within normal limits.  BILE DUCTS: Redemonstrated is a CBD stent, with slightly increased   intrahepatic and extrahepatic biliary ductal dilatation. Interval   resolution of previously noted pneumobilia.  GALLBLADDER: Status postcholecystectomy.  SPLEEN: Within normal limits.  PANCREAS: Within normal limits.  ADRENALS: Within normal limits.  KIDNEYS/URETERS: No hydronephrosis. Symmetric renal enhancement. Multiple   bilateral renal cysts and other hypodensities, which are too small to   further characterize, are noted.    BLADDER: Decompressed, limiting further evaluation.  REPRODUCTIVE ORGANS: Status post hysterectomy.    BOWEL: Sigmoid colonic diverticulosis. No evidence of bowel obstruction.  PERITONEUM/RETROPERITONEUM: No ascites or pneumoperitoneum.  VESSELS: Left femoral vascular stent. Atherosclerotic changes.  LYMPH NODES: No significant change in multiple subcentimeter and mildly   enlarged gastrohepatic, pancreaticoduodenal, bilateral iliac chain and   bilateral inguinal lymph nodes.  ABDOMINAL WALL: Within normal limits.  BONES: Degenerative changes of the spine.    IMPRESSION:    1. Since February 5, 2025, new trace right pleural effusion and new right   lower and middle lobe consolidated opacities with associated   bronchiolectasis, possibly reflecting atelectasis and/or pneumonia in the   appropriate clinical setting. Follow-up to complete resolution is   suggested.  2. Slightly increased intrahepatic and extrahepatic biliary ductal   dilatationwith CBD stent in place; interval resolution of previously   noted pneumobilia.  2. No significant change in multiple subcentimeter and mildly enlarged   intrathoracic and abdominopelvic lymph nodes.      --- End of Report ---          MERVAT ENGEL MD; Resident Radiologist  This document has been electronically signed.  LINDSEY WAKEFIELD MD; Attending Radiologist  This document has been electronically signed. Apr 16 2025  3:51PM (04-16-25 @ 13:41)      CARDIOLOGY TESTING       MEDICATIONS  chlorhexidine 2% Cloths 1 Topical <User Schedule>  doxycycline IVPB 100 IV Intermittent every 12 hours  furosemide    Tablet 40 Oral <User Schedule>  hydrALAZINE 25 Oral three times a day  hydrocortisone 1% Ointment 1 Topical every 12 hours  influenza  Vaccine (HIGH DOSE) 0.5 IntraMuscular once  levothyroxine 150 Oral daily  losartan 100 Oral daily  NIFEdipine XL 60 Oral daily  pantoprazole    Tablet 40 Oral before breakfast  piperacillin/tazobactam IVPB.. 3.375 IV Intermittent every 8 hours      ANTIBIOTICS:  doxycycline IVPB 100 milliGRAM(s) IV Intermittent every 12 hours  piperacillin/tazobactam IVPB.. 3.375 Gram(s) IV Intermittent every 8 hours      ALLERGIES:  codeine (Stomach Upset; Vomiting; Nausea)

## 2025-04-19 DIAGNOSIS — J18.9 PNEUMONIA, UNSPECIFIED ORGANISM: ICD-10-CM

## 2025-04-19 DIAGNOSIS — K83.09 OTHER CHOLANGITIS: ICD-10-CM

## 2025-04-19 DIAGNOSIS — I10 ESSENTIAL (PRIMARY) HYPERTENSION: ICD-10-CM

## 2025-04-19 LAB
ALBUMIN SERPL ELPH-MCNC: 3.2 G/DL — LOW (ref 3.5–5.2)
ALP SERPL-CCNC: 250 U/L — HIGH (ref 30–115)
ALT FLD-CCNC: 133 U/L — HIGH (ref 0–41)
ANION GAP SERPL CALC-SCNC: 12 MMOL/L — SIGNIFICANT CHANGE UP (ref 7–14)
AST SERPL-CCNC: 44 U/L — HIGH (ref 0–41)
BASOPHILS # BLD AUTO: 0.02 K/UL — SIGNIFICANT CHANGE UP (ref 0–0.2)
BASOPHILS NFR BLD AUTO: 0.3 % — SIGNIFICANT CHANGE UP (ref 0–1)
BILIRUB SERPL-MCNC: 3.9 MG/DL — HIGH (ref 0.2–1.2)
BUN SERPL-MCNC: 17 MG/DL — SIGNIFICANT CHANGE UP (ref 10–20)
CALCIUM SERPL-MCNC: 8.7 MG/DL — SIGNIFICANT CHANGE UP (ref 8.4–10.5)
CHLORIDE SERPL-SCNC: 99 MMOL/L — SIGNIFICANT CHANGE UP (ref 98–110)
CO2 SERPL-SCNC: 22 MMOL/L — SIGNIFICANT CHANGE UP (ref 17–32)
CREAT SERPL-MCNC: 0.9 MG/DL — SIGNIFICANT CHANGE UP (ref 0.7–1.5)
EGFR: 65 ML/MIN/1.73M2 — SIGNIFICANT CHANGE UP
EGFR: 65 ML/MIN/1.73M2 — SIGNIFICANT CHANGE UP
EOSINOPHIL # BLD AUTO: 0.19 K/UL — SIGNIFICANT CHANGE UP (ref 0–0.7)
EOSINOPHIL NFR BLD AUTO: 2.7 % — SIGNIFICANT CHANGE UP (ref 0–8)
GLUCOSE SERPL-MCNC: 113 MG/DL — HIGH (ref 70–99)
HCT VFR BLD CALC: 27.7 % — LOW (ref 37–47)
HGB BLD-MCNC: 8.5 G/DL — LOW (ref 12–16)
IMM GRANULOCYTES NFR BLD AUTO: 0.9 % — HIGH (ref 0.1–0.3)
LYMPHOCYTES # BLD AUTO: 0.8 K/UL — LOW (ref 1.2–3.4)
LYMPHOCYTES # BLD AUTO: 11.3 % — LOW (ref 20.5–51.1)
MAGNESIUM SERPL-MCNC: 2.2 MG/DL — SIGNIFICANT CHANGE UP (ref 1.8–2.4)
MCHC RBC-ENTMCNC: 23.9 PG — LOW (ref 27–31)
MCHC RBC-ENTMCNC: 30.7 G/DL — LOW (ref 32–37)
MCV RBC AUTO: 78 FL — LOW (ref 81–99)
MONOCYTES # BLD AUTO: 0.61 K/UL — HIGH (ref 0.1–0.6)
MONOCYTES NFR BLD AUTO: 8.7 % — SIGNIFICANT CHANGE UP (ref 1.7–9.3)
NEUTROPHILS # BLD AUTO: 5.37 K/UL — SIGNIFICANT CHANGE UP (ref 1.4–6.5)
NEUTROPHILS NFR BLD AUTO: 76.1 % — HIGH (ref 42.2–75.2)
NRBC BLD AUTO-RTO: 0 /100 WBCS — SIGNIFICANT CHANGE UP (ref 0–0)
PLATELET # BLD AUTO: 231 K/UL — SIGNIFICANT CHANGE UP (ref 130–400)
PMV BLD: 10.8 FL — HIGH (ref 7.4–10.4)
POTASSIUM SERPL-MCNC: 3.7 MMOL/L — SIGNIFICANT CHANGE UP (ref 3.5–5)
POTASSIUM SERPL-SCNC: 3.7 MMOL/L — SIGNIFICANT CHANGE UP (ref 3.5–5)
PROT SERPL-MCNC: 5.4 G/DL — LOW (ref 6–8)
RBC # BLD: 3.55 M/UL — LOW (ref 4.2–5.4)
RBC # FLD: 17.9 % — HIGH (ref 11.5–14.5)
SODIUM SERPL-SCNC: 133 MMOL/L — LOW (ref 135–146)
WBC # BLD: 7.05 K/UL — SIGNIFICANT CHANGE UP (ref 4.8–10.8)
WBC # FLD AUTO: 7.05 K/UL — SIGNIFICANT CHANGE UP (ref 4.8–10.8)

## 2025-04-19 PROCEDURE — 99232 SBSQ HOSP IP/OBS MODERATE 35: CPT

## 2025-04-19 RX ORDER — HYDROMORPHONE/SOD CHLOR,ISO/PF 2 MG/10 ML
0.5 SYRINGE (ML) INJECTION EVERY 4 HOURS
Refills: 0 | Status: DISCONTINUED | OUTPATIENT
Start: 2025-04-19 | End: 2025-04-22

## 2025-04-19 RX ADMIN — HYDROCORTISONE 1 APPLICATION(S): 10 CREAM TOPICAL at 05:22

## 2025-04-19 RX ADMIN — Medication 0.5 MILLIGRAM(S): at 18:36

## 2025-04-19 RX ADMIN — Medication 3 MILLIGRAM(S): at 21:39

## 2025-04-19 RX ADMIN — Medication 0.5 MILLIGRAM(S): at 17:36

## 2025-04-19 RX ADMIN — Medication 1 APPLICATION(S): at 05:22

## 2025-04-19 RX ADMIN — Medication 150 MICROGRAM(S): at 05:20

## 2025-04-19 RX ADMIN — Medication 4 MILLIGRAM(S): at 07:00

## 2025-04-19 RX ADMIN — Medication 25 GRAM(S): at 21:40

## 2025-04-19 RX ADMIN — Medication 25 MILLIGRAM(S): at 21:40

## 2025-04-19 RX ADMIN — Medication 4 MILLIGRAM(S): at 06:46

## 2025-04-19 RX ADMIN — Medication 25 MILLIGRAM(S): at 13:13

## 2025-04-19 RX ADMIN — LOSARTAN POTASSIUM 100 MILLIGRAM(S): 100 TABLET, FILM COATED ORAL at 06:24

## 2025-04-19 RX ADMIN — Medication 100 MILLIGRAM(S): at 05:21

## 2025-04-19 RX ADMIN — Medication 25 GRAM(S): at 06:48

## 2025-04-19 RX ADMIN — Medication 25 GRAM(S): at 13:14

## 2025-04-19 RX ADMIN — Medication 4 MILLIGRAM(S): at 14:54

## 2025-04-19 RX ADMIN — Medication 100 MILLIGRAM(S): at 17:37

## 2025-04-19 RX ADMIN — Medication 0.5 MILLIGRAM(S): at 22:09

## 2025-04-19 RX ADMIN — Medication 25 MILLIGRAM(S): at 06:23

## 2025-04-19 RX ADMIN — Medication 0.5 MILLIGRAM(S): at 21:39

## 2025-04-19 RX ADMIN — Medication 4 MILLIGRAM(S): at 13:54

## 2025-04-19 RX ADMIN — Medication 60 MILLIGRAM(S): at 06:24

## 2025-04-19 RX ADMIN — FUROSEMIDE 40 MILLIGRAM(S): 10 INJECTION INTRAMUSCULAR; INTRAVENOUS at 06:26

## 2025-04-19 RX ADMIN — Medication 4 MILLIGRAM(S): at 13:14

## 2025-04-19 RX ADMIN — HYDROCORTISONE 1 APPLICATION(S): 10 CREAM TOPICAL at 17:53

## 2025-04-19 RX ADMIN — Medication 40 MILLIGRAM(S): at 06:24

## 2025-04-19 NOTE — PROGRESS NOTE ADULT - SUBJECTIVE AND OBJECTIVE BOX
Patient is a 79 year old female who presents for PNA    No overnight events, cough is improving, on 3L NC, having bowel movements, voiding well without dunn, ambulates on her own    VITALS  T(F): 97.8 (04-19-25 @ 05:03), Max: 99.3 (04-18-25 @ 20:58)  HR: 65 (04-19-25 @ 05:03) (65 - 78)  BP: 159/64 (04-19-25 @ 05:03) (132/56 - 159/64)  RR: 18 (04-19-25 @ 05:03) (16 - 18)  SpO2: 95% (04-19-25 @ 05:03) (94% - 95%)      Physical Exam:  General: NAD, well appearing  HEENT: EOMI, conjunctiva and sclera clear, moist mucus membranes  Cardio: +S1/S2, no murmurs, +pedal pulses  Pulm: CTA b/l, no wheezes or rales  Abd: no TTP, nondistended, no organomegaly  Neuro: AAOx4, neurovascularly intact  Skin: No rashes or lesions  MSK: no edema    LABS             8.5    7.05  )-----------( 231      ( 04-19-25 @ 06:59 )             27.7     133  |  99  |  17  -------------------------<  113   04-19-25 @ 06:59  3.7  |  22  |  0.9    Ca      8.7     04-19-25 @ 06:59  Mg     2.2     04-19-25 @ 06:59    TPro  5.4  /  Alb  3.2  /  TBili  3.9  /  DBili  x   /  AST  44  /  ALT  133  /  AlkPhos  250  /  GGT  x     04-19-25 @ 06:59        Urinalysis Basic - ( 19 Apr 2025 06:59 )    Color: x / Appearance: x / SG: x / pH: x  Gluc: 113 mg/dL / Ketone: x  / Bili: x / Urobili: x   Blood: x / Protein: x / Nitrite: x   Leuk Esterase: x / RBC: x / WBC x   Sq Epi: x / Non Sq Epi: x / Bacteria: x          Culture - Blood (collected 16 Apr 2025 13:20)  Source: Blood Blood-Peripheral  Gram Stain (17 Apr 2025 09:25):    Growth in aerobic bottle: Gram Negative Rods  Final Report (18 Apr 2025 16:06):    Growth in aerobic bottle: Escherichia coli    Direct identification is available within approximately 3-5    hours either by Blood Panel Multiplexed PCR or Direct    MALDI-TOF. Details: [email or web address]  Organism: Blood Culture PCR  Escherichia coli (18 Apr 2025 16:06)  Organism: Escherichia coli (18 Apr 2025 16:06)  Organism: Blood Culture PCR (18 Apr 2025 16:06)    Culture - Blood (collected 16 Apr 2025 13:20)  Source: Blood Blood-Peripheral  Preliminary Report (18 Apr 2025 22:01):    No growth at 48 Hours    Urinalysis with Rflx Culture (collected 16 Apr 2025 13:18)      IMAGING    MEDICATIONS  STANDING MEDICATIONS  chlorhexidine 2% Cloths 1 Application(s) Topical <User Schedule>  doxycycline IVPB 100 milliGRAM(s) IV Intermittent every 12 hours  furosemide    Tablet 40 milliGRAM(s) Oral <User Schedule>  hydrALAZINE 25 milliGRAM(s) Oral three times a day  hydrocortisone 1% Ointment 1 Application(s) Topical every 12 hours  influenza  Vaccine (HIGH DOSE) 0.5 milliLiter(s) IntraMuscular once  levothyroxine 150 MICROGram(s) Oral daily  losartan 100 milliGRAM(s) Oral daily  NIFEdipine XL 60 milliGRAM(s) Oral daily  pantoprazole    Tablet 40 milliGRAM(s) Oral before breakfast  piperacillin/tazobactam IVPB.. 3.375 Gram(s) IV Intermittent every 8 hours    PRN MEDICATIONS  acetaminophen     Tablet .. 650 milliGRAM(s) Oral every 6 hours PRN  aluminum hydroxide/magnesium hydroxide/simethicone Suspension 30 milliLiter(s) Oral every 4 hours PRN  benzonatate 100 milliGRAM(s) Oral every 8 hours PRN  hydrOXYzine hydrochloride 25 milliGRAM(s) Oral every 6 hours PRN  melatonin 3 milliGRAM(s) Oral at bedtime PRN  morphine  - Injectable 4 milliGRAM(s) IV Push every 6 hours PRN  ondansetron Injectable 4 milliGRAM(s) IV Push every 8 hours PRN   Patient is a 79 year old female who presents for PNA    No overnight events, cough is improving, on 3L NC, having bowel movements, voiding well without dunn, ambulates on her own    VITALS  T(F): 97.8 (04-19-25 @ 05:03), Max: 99.3 (04-18-25 @ 20:58)  HR: 65 (04-19-25 @ 05:03) (65 - 78)  BP: 159/64 (04-19-25 @ 05:03) (132/56 - 159/64)  RR: 18 (04-19-25 @ 05:03) (16 - 18)  SpO2: 95% (04-19-25 @ 05:03) (94% - 95%)      Physical Exam:  General: NAD, well appearing  HEENT: EOMI, conjunctiva and sclera clear, moist mucus membranes  Cardio: +S1/S2, no murmurs, +pedal pulses  Pulm: CTA b/l, no wheezes or rales  Abd: no TTP, nondistended, no organomegaly  Neuro: AAOx4, neurovascularly intact  Skin: No rashes or lesions  MSK: no edema    LABS  CBC WNL, BMP WNL, UA WNL               8.5    7.05  )-----------( 231      ( 04-19-25 @ 06:59 )             27.7     133  |  99  |  17  -------------------------<  113   04-19-25 @ 06:59  3.7  |  22  |  0.9    Ca      8.7     04-19-25 @ 06:59  Mg     2.2     04-19-25 @ 06:59    TPro  5.4  /  Alb  3.2  /  TBili  3.9  /  DBili  x   /  AST  44  /  ALT  133  /  AlkPhos  250  /  GGT  x     04-19-25 @ 06:59        Urinalysis Basic - ( 19 Apr 2025 06:59 )    Color: x / Appearance: x / SG: x / pH: x  Gluc: 113 mg/dL / Ketone: x  / Bili: x / Urobili: x   Blood: x / Protein: x / Nitrite: x   Leuk Esterase: x / RBC: x / WBC x   Sq Epi: x / Non Sq Epi: x / Bacteria: x          Culture - Blood (collected 16 Apr 2025 13:20)  Source: Blood Blood-Peripheral  Gram Stain (17 Apr 2025 09:25):    Growth in aerobic bottle: Gram Negative Rods  Final Report (18 Apr 2025 16:06):    Growth in aerobic bottle: Escherichia coli    Direct identification is available within approximately 3-5    hours either by Blood Panel Multiplexed PCR or Direct    MALDI-TOF. Details: [email or web address]  Organism: Blood Culture PCR  Escherichia coli (18 Apr 2025 16:06)  Organism: Escherichia coli (18 Apr 2025 16:06)  Organism: Blood Culture PCR (18 Apr 2025 16:06)    Culture - Blood (collected 16 Apr 2025 13:20)  Source: Blood Blood-Peripheral  Preliminary Report (18 Apr 2025 22:01):    No growth at 48 Hours    Urinalysis with Rflx Culture (collected 16 Apr 2025 13:18)      IMAGING    MEDICATIONS  STANDING MEDICATIONS  chlorhexidine 2% Cloths 1 Application(s) Topical <User Schedule>  doxycycline IVPB 100 milliGRAM(s) IV Intermittent every 12 hours  furosemide    Tablet 40 milliGRAM(s) Oral <User Schedule>  hydrALAZINE 25 milliGRAM(s) Oral three times a day  hydrocortisone 1% Ointment 1 Application(s) Topical every 12 hours  influenza  Vaccine (HIGH DOSE) 0.5 milliLiter(s) IntraMuscular once  levothyroxine 150 MICROGram(s) Oral daily  losartan 100 milliGRAM(s) Oral daily  NIFEdipine XL 60 milliGRAM(s) Oral daily  pantoprazole    Tablet 40 milliGRAM(s) Oral before breakfast  piperacillin/tazobactam IVPB.. 3.375 Gram(s) IV Intermittent every 8 hours    PRN MEDICATIONS  acetaminophen     Tablet .. 650 milliGRAM(s) Oral every 6 hours PRN  aluminum hydroxide/magnesium hydroxide/simethicone Suspension 30 milliLiter(s) Oral every 4 hours PRN  benzonatate 100 milliGRAM(s) Oral every 8 hours PRN  hydrOXYzine hydrochloride 25 milliGRAM(s) Oral every 6 hours PRN  melatonin 3 milliGRAM(s) Oral at bedtime PRN  morphine  - Injectable 4 milliGRAM(s) IV Push every 6 hours PRN  ondansetron Injectable 4 milliGRAM(s) IV Push every 8 hours PRN

## 2025-04-19 NOTE — PROGRESS NOTE ADULT - ATTENDING COMMENTS
79F with PMH of HTN, HLD, hypothyroidism, PAD on plavix, HFpEF, cholecystectomy, multiple ERCP in past, here with abdominal pain, found to have cholangitis, patient will need ERCP, being treated currently for PNA, c/w IV abx. Tentatively scheduled for Monday for ERCP if patient amenable. Agree with above unless noted above.    ______  Andrew Butt MD  [facility] Medicine  [facility] 79F with PMH of HTN, HLD, hypothyroidism, PAD on plavix, HFpEF, cholecystectomy, multiple ERCP in past, here with abdominal pain, found to have cholangitis, patient will need ERCP, being treated currently for PNA, c/w IV abx. Tentatively scheduled for Monday for ERCP if patient amenable (she is currently agreeable). Agree with above unless noted above. Will change morphine to 0.5mg IV dilaudid, patient states morphine not as helpful for her pain.     ______  Andrew Butt MD  Hospital Medicine  [facility]

## 2025-04-19 NOTE — PROGRESS NOTE ADULT - ASSESSMENT
80 y/o Female with PMHx of HTN, HLD, hypothyroidism,  PAD on Plavix, (HFpEF) (EF 65-70% in August 2023), and cholecystectomy with multiple endoscopic retrograde cholangiopancreatography (ERCP) past choledocholithiasis / CBD stent,  presents for abdominal pain.    #Possible PNA  - on 3L NC, improving, wean as tolerated  - on doxycycline IV 100mg q12  - RVP negative  - MRSA negative  - cough is improving    #E coli bacteremia  #suspected Cholangitis   - on zosyn and doxy  - patient refusing GI intervention for ERCP as she has difficulty with intubations  - per IR: patient is moderate risk due to acute PNA and will not be taken for procedure  - continue with zosyn  - follow up culture sensitivities  - per patient and family: if patient rapidly deteriorates over the weekend, then they are amenable to ERCP  - per GI: tentatively on the schedule for Monday 4/21  - full liquid diet  - morphine IV 4mg q6h  - history of CBD stent, last changed on 2/5/2025 (can last approximately 3 months before needing another exchange)    #anxiety, new  - started on atarax 25mg q6h PRN    #H/o PAD   #HFpEF - 60% EF  -c/w home meds    #Hypothyroidism   -c/w Levo-thyroxine     #DVT ppx: lovenox   #GI ppx: protonix  #Diet: clear liquid  #Activity: as tolerated  #Code Status: full  #Dispo: 3B 78 y/o Female with PMHx of HTN, HLD, hypothyroidism,  PAD on Plavix, (HFpEF) (EF 65-70% in August 2023), and cholecystectomy with multiple endoscopic retrograde cholangiopancreatography (ERCP) past choledocholithiasis / CBD stent,  presents for abdominal pain.    #Possible PNA  - on 3L NC, improving, wean as tolerated  - on doxycycline IV 100mg q12  - RVP negative  - MRSA negative  - cough is improving    #E coli bacteremia  #suspected Cholangitis   - on zosyn and doxy  - patient refusing GI intervention for ERCP as she has difficulty with intubations  - per IR: patient is moderate risk due to acute PNA and will not be taken for procedure  - continue with zosyn  - follow up culture sensitivities  - per patient and family: if patient rapidly deteriorates over the weekend, then they are amenable to ERCP  - per GI: tentatively on the schedule for Monday 4/21  - full liquid diet  - morphine IV 4mg q6h, monitor SCr  - history of CBD stent, last changed on 2/5/2025 (can last approximately 3 months before needing another exchange)    #anxiety, new  - started on atarax 25mg q6h PRN    #H/o PAD   #HFpEF - 60% EF  -c/w home meds    #Hypothyroidism   -c/w Levo-thyroxine     #DVT ppx: lovenox   #GI ppx: protonix  #Diet: clear liquid  #Activity: as tolerated  #Code Status: full  #Dispo: 3B

## 2025-04-20 LAB
ALBUMIN SERPL ELPH-MCNC: 3.2 G/DL — LOW (ref 3.5–5.2)
ALP SERPL-CCNC: 248 U/L — HIGH (ref 30–115)
ALT FLD-CCNC: 103 U/L — HIGH (ref 0–41)
ANION GAP SERPL CALC-SCNC: 13 MMOL/L — SIGNIFICANT CHANGE UP (ref 7–14)
AST SERPL-CCNC: 38 U/L — SIGNIFICANT CHANGE UP (ref 0–41)
BASOPHILS # BLD AUTO: 0.02 K/UL — SIGNIFICANT CHANGE UP (ref 0–0.2)
BASOPHILS NFR BLD AUTO: 0.3 % — SIGNIFICANT CHANGE UP (ref 0–1)
BILIRUB SERPL-MCNC: 4.4 MG/DL — HIGH (ref 0.2–1.2)
BUN SERPL-MCNC: 22 MG/DL — HIGH (ref 10–20)
CALCIUM SERPL-MCNC: 8.6 MG/DL — SIGNIFICANT CHANGE UP (ref 8.4–10.5)
CHLORIDE SERPL-SCNC: 99 MMOL/L — SIGNIFICANT CHANGE UP (ref 98–110)
CO2 SERPL-SCNC: 25 MMOL/L — SIGNIFICANT CHANGE UP (ref 17–32)
CREAT SERPL-MCNC: 0.9 MG/DL — SIGNIFICANT CHANGE UP (ref 0.7–1.5)
EGFR: 65 ML/MIN/1.73M2 — SIGNIFICANT CHANGE UP
EGFR: 65 ML/MIN/1.73M2 — SIGNIFICANT CHANGE UP
EOSINOPHIL # BLD AUTO: 0.11 K/UL — SIGNIFICANT CHANGE UP (ref 0–0.7)
EOSINOPHIL NFR BLD AUTO: 1.4 % — SIGNIFICANT CHANGE UP (ref 0–8)
GLUCOSE SERPL-MCNC: 111 MG/DL — HIGH (ref 70–99)
HCT VFR BLD CALC: 28.8 % — LOW (ref 37–47)
HGB BLD-MCNC: 8.9 G/DL — LOW (ref 12–16)
IMM GRANULOCYTES NFR BLD AUTO: 0.8 % — HIGH (ref 0.1–0.3)
LYMPHOCYTES # BLD AUTO: 0.78 K/UL — LOW (ref 1.2–3.4)
LYMPHOCYTES # BLD AUTO: 10 % — LOW (ref 20.5–51.1)
MAGNESIUM SERPL-MCNC: 2 MG/DL — SIGNIFICANT CHANGE UP (ref 1.8–2.4)
MCHC RBC-ENTMCNC: 24 PG — LOW (ref 27–31)
MCHC RBC-ENTMCNC: 30.9 G/DL — LOW (ref 32–37)
MCV RBC AUTO: 77.6 FL — LOW (ref 81–99)
MONOCYTES # BLD AUTO: 0.79 K/UL — HIGH (ref 0.1–0.6)
MONOCYTES NFR BLD AUTO: 10.1 % — HIGH (ref 1.7–9.3)
NEUTROPHILS # BLD AUTO: 6.05 K/UL — SIGNIFICANT CHANGE UP (ref 1.4–6.5)
NEUTROPHILS NFR BLD AUTO: 77.4 % — HIGH (ref 42.2–75.2)
NRBC BLD AUTO-RTO: 0 /100 WBCS — SIGNIFICANT CHANGE UP (ref 0–0)
PLATELET # BLD AUTO: 253 K/UL — SIGNIFICANT CHANGE UP (ref 130–400)
PMV BLD: 10.4 FL — SIGNIFICANT CHANGE UP (ref 7.4–10.4)
POTASSIUM SERPL-MCNC: 4.4 MMOL/L — SIGNIFICANT CHANGE UP (ref 3.5–5)
POTASSIUM SERPL-SCNC: 4.4 MMOL/L — SIGNIFICANT CHANGE UP (ref 3.5–5)
PROT SERPL-MCNC: 5.7 G/DL — LOW (ref 6–8)
RBC # BLD: 3.71 M/UL — LOW (ref 4.2–5.4)
RBC # FLD: 18 % — HIGH (ref 11.5–14.5)
SODIUM SERPL-SCNC: 137 MMOL/L — SIGNIFICANT CHANGE UP (ref 135–146)
WBC # BLD: 7.81 K/UL — SIGNIFICANT CHANGE UP (ref 4.8–10.8)
WBC # FLD AUTO: 7.81 K/UL — SIGNIFICANT CHANGE UP (ref 4.8–10.8)

## 2025-04-20 PROCEDURE — 99232 SBSQ HOSP IP/OBS MODERATE 35: CPT | Mod: GC

## 2025-04-20 PROCEDURE — 99232 SBSQ HOSP IP/OBS MODERATE 35: CPT

## 2025-04-20 RX ADMIN — Medication 0.5 MILLIGRAM(S): at 02:25

## 2025-04-20 RX ADMIN — Medication 0.5 MILLIGRAM(S): at 01:56

## 2025-04-20 RX ADMIN — Medication 1 APPLICATION(S): at 06:13

## 2025-04-20 RX ADMIN — Medication 0.5 MILLIGRAM(S): at 06:30

## 2025-04-20 RX ADMIN — HYDROCORTISONE 1 APPLICATION(S): 10 CREAM TOPICAL at 17:30

## 2025-04-20 RX ADMIN — Medication 0.5 MILLIGRAM(S): at 11:01

## 2025-04-20 RX ADMIN — FUROSEMIDE 40 MILLIGRAM(S): 10 INJECTION INTRAMUSCULAR; INTRAVENOUS at 06:14

## 2025-04-20 RX ADMIN — Medication 0.5 MILLIGRAM(S): at 07:00

## 2025-04-20 RX ADMIN — Medication 25 MILLIGRAM(S): at 13:17

## 2025-04-20 RX ADMIN — Medication 25 GRAM(S): at 22:21

## 2025-04-20 RX ADMIN — Medication 0.5 MILLIGRAM(S): at 17:30

## 2025-04-20 RX ADMIN — Medication 650 MILLIGRAM(S): at 20:54

## 2025-04-20 RX ADMIN — Medication 100 MILLIGRAM(S): at 06:12

## 2025-04-20 RX ADMIN — HYDROCORTISONE 1 APPLICATION(S): 10 CREAM TOPICAL at 06:15

## 2025-04-20 RX ADMIN — Medication 0.5 MILLIGRAM(S): at 12:01

## 2025-04-20 RX ADMIN — Medication 25 MILLIGRAM(S): at 06:12

## 2025-04-20 RX ADMIN — Medication 0.5 MILLIGRAM(S): at 22:15

## 2025-04-20 RX ADMIN — Medication 0.5 MILLIGRAM(S): at 21:43

## 2025-04-20 RX ADMIN — Medication 150 MICROGRAM(S): at 06:12

## 2025-04-20 RX ADMIN — Medication 25 MILLIGRAM(S): at 22:21

## 2025-04-20 RX ADMIN — Medication 25 GRAM(S): at 06:12

## 2025-04-20 RX ADMIN — Medication 25 GRAM(S): at 13:21

## 2025-04-20 RX ADMIN — Medication 40 MILLIGRAM(S): at 06:13

## 2025-04-20 RX ADMIN — Medication 650 MILLIGRAM(S): at 22:02

## 2025-04-20 RX ADMIN — Medication 60 MILLIGRAM(S): at 06:13

## 2025-04-20 RX ADMIN — LOSARTAN POTASSIUM 100 MILLIGRAM(S): 100 TABLET, FILM COATED ORAL at 06:13

## 2025-04-20 RX ADMIN — Medication 100 MILLIGRAM(S): at 17:54

## 2025-04-20 NOTE — PROGRESS NOTE ADULT - SUBJECTIVE AND OBJECTIVE BOX
Gastroenterology progress note:     Patient is a 79y old  Female who presents with a chief complaint of Abdominal pain (19 Apr 2025 12:09)       Admitted on: 04-16-25    We are following the patient for: possible cholangitis        Interval History:    No acute events overnight.         PAST MEDICAL & SURGICAL HISTORY:  Arterial insufficiency of lower extremity  left leg stent placed  Leg swelling  Hypothyroid  HTN (hypertension)  High cholesterol  Peripheral arterial disease  H/O Graves' disease  History of cholecystectomy  H/O: hysterectomy  S/P angiogram of extremity  S/P ERCP      MEDICATIONS  (STANDING):  chlorhexidine 2% Cloths 1 Application(s) Topical <User Schedule>  doxycycline IVPB 100 milliGRAM(s) IV Intermittent every 12 hours  furosemide    Tablet 40 milliGRAM(s) Oral <User Schedule>  hydrALAZINE 25 milliGRAM(s) Oral three times a day  hydrocortisone 1% Ointment 1 Application(s) Topical every 12 hours  influenza  Vaccine (HIGH DOSE) 0.5 milliLiter(s) IntraMuscular once  levothyroxine 150 MICROGram(s) Oral daily  losartan 100 milliGRAM(s) Oral daily  NIFEdipine XL 60 milliGRAM(s) Oral daily  pantoprazole    Tablet 40 milliGRAM(s) Oral before breakfast  piperacillin/tazobactam IVPB.. 3.375 Gram(s) IV Intermittent every 8 hours    MEDICATIONS  (PRN):  acetaminophen     Tablet .. 650 milliGRAM(s) Oral every 6 hours PRN Temp greater or equal to 38C (100.4F), Mild Pain (1 - 3)  aluminum hydroxide/magnesium hydroxide/simethicone Suspension 30 milliLiter(s) Oral every 4 hours PRN Dyspepsia  benzonatate 100 milliGRAM(s) Oral every 8 hours PRN Cough  HYDROmorphone  Injectable 0.5 milliGRAM(s) IV Push every 4 hours PRN moderate-severe pain  hydrOXYzine hydrochloride 25 milliGRAM(s) Oral every 6 hours PRN Anxiety  melatonin 3 milliGRAM(s) Oral at bedtime PRN Insomnia  ondansetron Injectable 4 milliGRAM(s) IV Push every 8 hours PRN Nausea and/or Vomiting      Allergies  codeine (Stomach Upset; Vomiting; Nausea)      Review of Systems:   Cardiovascular:  No Chest Pain, No Palpitations  Respiratory:  No Cough, No Dyspnea  Gastrointestinal:  As described in HPI  Skin: Jaundice  Neuro:  No Syncope, No Dizziness    Physical Examination:  T(C): 36.6 (04-20-25 @ 13:10), Max: 37 (04-19-25 @ 19:55)  HR: 66 (04-20-25 @ 13:10) (60 - 71)  BP: 152/66 (04-20-25 @ 13:10) (146/78 - 156/64)  RR: 18 (04-20-25 @ 13:10) (17 - 18)  SpO2: 96% (04-19-25 @ 21:37) (95% - 96%)      04-19-25 @ 07:01  -  04-20-25 @ 07:00  --------------------------------------------------------  IN: 0 mL / OUT: 1400 mL / NET: -1400 mL        GENERAL: AAOx3, no acute distress.  HEAD:  Atraumatic, Normocephalic  EYES: icteric   NECK: Supple, no JVD or thyromegaly  CHEST/LUNG: Clear to auscultation bilaterally; No wheeze, rhonchi, or rales  HEART: Regular rate and rhythm; normal S1, S2, No murmurs.  ABDOMEN: Soft, nontender, nondistended; Bowel sounds present  NEUROLOGY: No asterixis or tremor.   SKIN: jaundice     Data:                        8.9    7.81  )-----------( 253      ( 20 Apr 2025 06:48 )             28.8     Hgb trend:  8.9  04-20-25 @ 06:48  8.5  04-19-25 @ 06:59  9.5  04-18-25 @ 07:05        04-20    137  |  99  |  22[H]  ----------------------------<  111[H]  4.4   |  25  |  0.9    Ca    8.6      20 Apr 2025 06:48  Mg     2.0     04-20    TPro  5.7[L]  /  Alb  3.2[L]  /  TBili  4.4[H]  /  DBili  x   /  AST  38  /  ALT  103[H]  /  AlkPhos  248[H]  04-20    Liver panel trend:  TBili 4.4   /   AST 38   /      /   AlkP 248   /   Tptn 5.7   /   Alb 3.2    /   DBili --      04-20  TBili 3.9   /   AST 44   /      /   AlkP 250   /   Tptn 5.4   /   Alb 3.2    /   DBili --      04-19  TBili 4.0   /   AST 78   /      /   AlkP 285   /   Tptn 6.3   /   Alb 3.5    /   DBili --      04-18  TBili 4.5   /      /      /   AlkP 297   /   Tptn 6.0   /   Alb 3.8    /   DBili --      04-17  TBili 2.5   /      /      /   AlkP 349   /   Tptn 6.4   /   Alb 4.2    /   DBili --      04-16

## 2025-04-20 NOTE — PROGRESS NOTE ADULT - ASSESSMENT
78 y/o Female with PMHx of HTN, HLD, hypothyroidism,  PAD on Plavix, (HFpEF) (EF 65-70% in August 2023), and cholecystectomy with multiple endoscopic retrograde cholangiopancreatography (ERCP) past choledocholithiasis / CBD stent,  presents for abdominal pain.    Pneumonia likely GNR  - on 3L NC, improving, wean as tolerated  - on doxycycline IV 100mg q12 and Zosyn  - RVP negative  - MRSA negative  - cough is improving  - wean oxygen target 92-96    #E coli bacteremia  #suspected Cholangitis   - on zosyn and doxy  - patient refusing GI intervention for ERCP as she has difficulty with intubations  - IR eval noted.  - pulm and GI recs noted-- (see Pulm and GI note)  high risk for pulm complication.  plan ERCP in am- pending clinical course. NPO past midnight  Plavix held 4/18, last dose 4/17 5 am  - continue with zosyn  - follow up culture sensitivities  - full liquid diet  - morphine IV 4mg q6h, monitor SCr  - history of CBD stent, last changed on 2/5/2025 (can last approximately 3 months before needing another exchange)    #anxiety, new  - started on atarax 25mg q6h PRN    #H/o PAD   #HFpEF - 60% EF  -c/w home meds    #Hypothyroidism   -c/w Levo-thyroxine     #DVT ppx: lovenox   #GI ppx: protonix  #Diet: clear liquid  #Activity: as tolerated  #Code Status: full  #Dispo: 3B

## 2025-04-20 NOTE — PROGRESS NOTE ADULT - SUBJECTIVE AND OBJECTIVE BOX
Patient is a 79y old  Female who presents with a chief complaint of Abdominal pain (20 Apr 2025 13:22)      Overnight events: ON 3 liter NC. off pressor. feels better. no overnight events          ROS: as in HPI; All other systems reviewed are negative        PHYSICAL EXAM  Vital Signs Last 24 Hrs  T(C): 36.6 (20 Apr 2025 13:10), Max: 37 (19 Apr 2025 19:55)  T(F): 97.9 (20 Apr 2025 13:10), Max: 98.6 (19 Apr 2025 19:55)  HR: 66 (20 Apr 2025 13:10) (60 - 71)  BP: 152/66 (20 Apr 2025 13:10) (146/78 - 156/64)  BP(mean): 94 (19 Apr 2025 21:37) (84 - 94)  RR: 18 (20 Apr 2025 13:10) (17 - 18)  SpO2: 96% (19 Apr 2025 21:37) (95% - 96%)    Parameters below as of 19 Apr 2025 21:37  Patient On (Oxygen Delivery Method): nasal cannula  O2 Flow (L/min): 3        CONSTITUTIONAL:   NAD    ENT:   Airway patent,     EYES:   Clear bilaterally,   pupils equal,   round and reactive to light.    CARDIAC:   Normal rate,   regular rhythm.    no edema    RESPIRATORY:   No wheezing   Normal chest expansion  Not tachypneic,    GASTROINTESTINAL:  Abdomen soft, non-tender,   No guarding,   Positive BS    MUSCULOSKELETAL:   Range of motion is not limited,    NEUROLOGICAL:   Alert and oriented   No motor deficits.    SKIN:   jaundice   No evidence of rash.                I&O's Detail    19 Apr 2025 07:01  -  20 Apr 2025 07:00  --------------------------------------------------------  IN:  Total IN: 0 mL    OUT:    Voided (mL): 1400 mL  Total OUT: 1400 mL    Total NET: -1400 mL            LABS:                        8.9    7.81  )-----------( 253      ( 20 Apr 2025 06:48 )             28.8     20 Apr 2025 06:48    137    |  99     |  22     ----------------------------<  111    4.4     |  25     |  0.9      Ca    8.6        20 Apr 2025 06:48  Mg     2.0       20 Apr 2025 06:48    TPro  5.7    /  Alb  3.2    /  TBili  4.4    /  DBili  x      /  AST  38     /  ALT  103    /  AlkPhos  248    20 Apr 2025 06:48  Amylase x     lipase x              CAPILLARY BLOOD GLUCOSE          Urinalysis Basic - ( 20 Apr 2025 06:48 )    Color: x / Appearance: x / SG: x / pH: x  Gluc: 111 mg/dL / Ketone: x  / Bili: x / Urobili: x   Blood: x / Protein: x / Nitrite: x   Leuk Esterase: x / RBC: x / WBC x   Sq Epi: x / Non Sq Epi: x / Bacteria: x      Culture        MEDICATIONS  (STANDING):  chlorhexidine 2% Cloths 1 Application(s) Topical <User Schedule>  doxycycline IVPB 100 milliGRAM(s) IV Intermittent every 12 hours  furosemide    Tablet 40 milliGRAM(s) Oral <User Schedule>  hydrALAZINE 25 milliGRAM(s) Oral three times a day  hydrocortisone 1% Ointment 1 Application(s) Topical every 12 hours  influenza  Vaccine (HIGH DOSE) 0.5 milliLiter(s) IntraMuscular once  levothyroxine 150 MICROGram(s) Oral daily  losartan 100 milliGRAM(s) Oral daily  NIFEdipine XL 60 milliGRAM(s) Oral daily  pantoprazole    Tablet 40 milliGRAM(s) Oral before breakfast  piperacillin/tazobactam IVPB.. 3.375 Gram(s) IV Intermittent every 8 hours    MEDICATIONS  (PRN):  acetaminophen     Tablet .. 650 milliGRAM(s) Oral every 6 hours PRN Temp greater or equal to 38C (100.4F), Mild Pain (1 - 3)  aluminum hydroxide/magnesium hydroxide/simethicone Suspension 30 milliLiter(s) Oral every 4 hours PRN Dyspepsia  benzonatate 100 milliGRAM(s) Oral every 8 hours PRN Cough  HYDROmorphone  Injectable 0.5 milliGRAM(s) IV Push every 4 hours PRN moderate-severe pain  hydrOXYzine hydrochloride 25 milliGRAM(s) Oral every 6 hours PRN Anxiety  melatonin 3 milliGRAM(s) Oral at bedtime PRN Insomnia  ondansetron Injectable 4 milliGRAM(s) IV Push every 8 hours PRN Nausea and/or Vomiting

## 2025-04-20 NOTE — PROGRESS NOTE ADULT - ASSESSMENT
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 CTAP revealing pneumonia with CBD dilation. [facility] consulted for concern for cholangitis.       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  #Pneumonia   Not septic, WBC trended down to normal   Liver panel trend:  TBili 4.4   /   AST 38   /      /   AlkP 248   /   Tptn 5.7   /   Alb 3.2    /   DBili --      04-20  TBili 3.9   /   AST 44   /      /   AlkP 250   /   Tptn 5.4   /   Alb 3.2    /   DBili --      04-19  TBili 4.0   /   AST 78   /      /   AlkP 285   /   Tptn 6.3   /   Alb 3.5    /   DBili --      04-18  TBili 4.5   /      /      /   AlkP 297   /   Tptn 6.0   /   Alb 3.8    /   DBili --      04-17  TBili 2.5   /      /      /   AlkP 349   /   Tptn 6.4   /   Alb 4.2    /   DBili --      04-16  recent ERCP 2/25: Successful ERCP with removal of stent, removal of stones and sludge and placement of plastic CBD stent. 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  on plavix last dose 4/17   currently on 3L O2  Bcx: Ecoli +ve       Plan   NPO after midnight for possible ERCP tomorrow pending clinical course   Patient is deemed high risk for endoscopic intervention pulmonary wise.  Ideally patient would benefit from ERCP for stent exchange.  Discussed ERCP with the patient, discussed the risk and benefits as worsening cholangitis can be life-threatening, and at the same time she is high risk for pulmonary complications given acute pneumonia.  Patient opted for conservative treatment for now with antibiotics and close follow-up understanding the risks and benefits.  WBC is improving, LFTs are improving.  Discussed with IR if alternative route for biliary decompression can be attempted without requiring intubation, unfortunately patient is on Plavix and doing PTC will be associated with high risk of bleeding.  Will attempt ERCP with biliary decompression and CBD stent exchange once patient is medically optimized, or otherwise on emergency basis if patient decompensates and is agreeable to have the procedure done.

## 2025-04-21 ENCOUNTER — TRANSCRIPTION ENCOUNTER (OUTPATIENT)
Age: 80
End: 2025-04-21

## 2025-04-21 LAB
CULTURE RESULTS: SIGNIFICANT CHANGE UP
SPECIMEN SOURCE: SIGNIFICANT CHANGE UP

## 2025-04-21 PROCEDURE — 99232 SBSQ HOSP IP/OBS MODERATE 35: CPT

## 2025-04-21 PROCEDURE — 43264 ERCP REMOVE DUCT CALCULI: CPT | Mod: XU

## 2025-04-21 PROCEDURE — 74328 X-RAY BILE DUCT ENDOSCOPY: CPT | Mod: 26

## 2025-04-21 PROCEDURE — 43276 ERCP STENT EXCHANGE W/DILATE: CPT

## 2025-04-21 RX ORDER — CLOPIDOGREL BISULFATE 75 MG/1
75 TABLET, FILM COATED ORAL DAILY
Refills: 0 | Status: DISCONTINUED | OUTPATIENT
Start: 2025-04-21 | End: 2025-04-27

## 2025-04-21 RX ORDER — NIFEDIPINE 30 MG
90 TABLET, EXTENDED RELEASE 24 HR ORAL AT BEDTIME
Refills: 0 | Status: DISCONTINUED | OUTPATIENT
Start: 2025-04-22 | End: 2025-04-27

## 2025-04-21 RX ORDER — SODIUM CHLORIDE 9 G/1000ML
1000 INJECTION, SOLUTION INTRAVENOUS
Refills: 0 | Status: DISCONTINUED | OUTPATIENT
Start: 2025-04-21 | End: 2025-04-22

## 2025-04-21 RX ADMIN — Medication 0.5 MILLIGRAM(S): at 13:06

## 2025-04-21 RX ADMIN — Medication 0.5 MILLIGRAM(S): at 19:00

## 2025-04-21 RX ADMIN — HYDROCORTISONE 1 APPLICATION(S): 10 CREAM TOPICAL at 18:04

## 2025-04-21 RX ADMIN — Medication 40 MILLIGRAM(S): at 05:38

## 2025-04-21 RX ADMIN — Medication 0.5 MILLIGRAM(S): at 01:49

## 2025-04-21 RX ADMIN — Medication 25 GRAM(S): at 13:43

## 2025-04-21 RX ADMIN — Medication 1 APPLICATION(S): at 05:40

## 2025-04-21 RX ADMIN — CLOPIDOGREL BISULFATE 75 MILLIGRAM(S): 75 TABLET, FILM COATED ORAL at 18:03

## 2025-04-21 RX ADMIN — Medication 3 MILLIGRAM(S): at 01:00

## 2025-04-21 RX ADMIN — Medication 25 MILLIGRAM(S): at 13:42

## 2025-04-21 RX ADMIN — Medication 100 MILLIGRAM(S): at 18:02

## 2025-04-21 RX ADMIN — Medication 0.5 MILLIGRAM(S): at 06:22

## 2025-04-21 RX ADMIN — Medication 0.5 MILLIGRAM(S): at 02:20

## 2025-04-21 RX ADMIN — Medication 0.5 MILLIGRAM(S): at 18:17

## 2025-04-21 RX ADMIN — Medication 25 GRAM(S): at 05:39

## 2025-04-21 RX ADMIN — Medication 0.5 MILLIGRAM(S): at 06:00

## 2025-04-21 RX ADMIN — Medication 100 MILLIGRAM(S): at 05:40

## 2025-04-21 RX ADMIN — Medication 0.5 MILLIGRAM(S): at 22:26

## 2025-04-21 RX ADMIN — Medication 150 MICROGRAM(S): at 05:38

## 2025-04-21 RX ADMIN — Medication 25 GRAM(S): at 21:39

## 2025-04-21 RX ADMIN — LOSARTAN POTASSIUM 100 MILLIGRAM(S): 100 TABLET, FILM COATED ORAL at 05:37

## 2025-04-21 RX ADMIN — Medication 25 MILLIGRAM(S): at 05:38

## 2025-04-21 RX ADMIN — HYDROCORTISONE 1 APPLICATION(S): 10 CREAM TOPICAL at 05:40

## 2025-04-21 RX ADMIN — Medication 60 MILLIGRAM(S): at 05:38

## 2025-04-21 NOTE — PROGRESS NOTE ADULT - SUBJECTIVE AND OBJECTIVE BOX
MICHAELBROWN  79y  Female  ***My note supersedes ALL resident notes that I sign.  My corrections for their notes are in my note.***    I can be reached directly via Teams or my office number is [phone] or [phone].    INTERVAL EVENTS: Here for f/u of ERCP. Pt did procedure rather well. Did not req intubation. Pt looks much better now. Has a little RUQ. Just wants water for now. Cough is much better and much less "wet-sounding."    T(F): 97.6 (04-21-25 @ 12:18), Max: 99.3 (04-20-25 @ 19:55)  HR: 69 (04-21-25 @ 12:33) (62 - 74)  BP: 154/73 (04-21-25 @ 12:33) (136/62 - 172/58)  RR: 18 (04-21-25 @ 12:33) (15 - 18)  SpO2: 95% (04-21-25 @ 12:33) (93% - 97%)    Gen: NAD; + NC O2  HEENT: PERRL, EOMI, mouth clr, nose clr  Neck: no nodes, no JVD, thyroid nl  lungs: clr  hrt: s1 s2 rrr no murmur  abd: soft, ND, mild RUQ tender, rest much better; no HS megaly  ext: no edema, no c/c  neuro: aa ox3, cn intact, can move all 4 ext    LABS:                      8.9     (    77.6   7.81  )-----------( ---------      253      ( 20 Apr 2025 06:48 )             28.8    (    18.0     Urinalysis Basic - ( 20 Apr 2025 06:48 )    Color: x / Appearance: x / SG: x / pH: x  Gluc: 111 mg/dL / Ketone: x  / Bili: x / Urobili: x   Blood: x / Protein: x / Nitrite: x   Leuk Esterase: x / RBC: x / WBC x   Sq Epi: x / Non Sq Epi: x / Bacteria: x    Culture - Blood (collected 04-16-25 @ 13:20)  Source: Blood Blood-Peripheral  Preliminary Report (04-20-25 @ 22:00):    No growth at 4 days    Culture - Blood (collected 04-16-25 @ 13:20)  Source: Blood Blood-Peripheral  Gram Stain (04-17-25 @ 09:25):    Growth in aerobic bottle: Gram Negative Rods  Final Report (04-18-25 @ 16:06):    Growth in aerobic bottle: Escherichia coli    Direct identification is available within approximately 3-5    hours either by Blood Panel Multiplexed PCR or Direct    MALDI-TOF. Details: [email or web address]  Organism: Blood Culture PCR  Escherichia coli (04-18-25 @ 16:06)  Organism: Escherichia coli (04-18-25 @ 16:06)      -  Levofloxacin: R >4      -  Tobramycin: S <=2      -  Aztreonam: S <=4      -  Gentamicin: S <=2      -  Cefazolin: S <=2      -  Cefepime: S <=2      -  Piperacillin/Tazobactam: S <=8      -  Ciprofloxacin: R >2      -  Imipenem: S <=1      -  Ceftriaxone: S <=1      -  Ampicillin: S <=8 These ampicillin results predict results for amoxicillin      Method Type: KARISHMA      -  Meropenem: S <=1      -  Ampicillin/Sulbactam: S <=4/2      -  Cefoxitin: S <=8      -  Trimethoprim/Sulfamethoxazole: S <=0.5/9.5      -  Ertapenem: S <=0.5  Organism: Blood Culture PCR (04-18-25 @ 16:06)      -  Escherichia coli: Detec      Method Type: PCR    RADIOLOGY & ADDITIONAL TESTS:    MEDICATIONS:  doxycycline IVPB 100 milliGRAM(s) IV Intermittent every 12 hours  piperacillin/tazobactam IVPB.. 3.375 Gram(s) IV Intermittent every 8 hours    acetaminophen     Tablet .. 650 milliGRAM(s) Oral every 6 hours PRN  aluminum hydroxide/magnesium hydroxide/simethicone Suspension 30 milliLiter(s) Oral every 4 hours PRN  benzonatate 100 milliGRAM(s) Oral every 8 hours PRN  chlorhexidine 2% Cloths 1 Application(s) Topical <User Schedule>  dextrose 5% + sodium chloride 0.45%. 1000 milliLiter(s) IV Continuous <Continuous>  furosemide    Tablet 40 milliGRAM(s) Oral <User Schedule>  hydrALAZINE 25 milliGRAM(s) Oral three times a day  hydrocortisone 1% Ointment 1 Application(s) Topical every 12 hours  HYDROmorphone  Injectable 0.5 milliGRAM(s) IV Push every 4 hours PRN  hydrOXYzine hydrochloride 25 milliGRAM(s) Oral every 6 hours PRN  influenza  Vaccine (HIGH DOSE) 0.5 milliLiter(s) IntraMuscular once  levothyroxine 150 MICROGram(s) Oral daily  losartan 100 milliGRAM(s) Oral daily  melatonin 3 milliGRAM(s) Oral at bedtime PRN  NIFEdipine XL 60 milliGRAM(s) Oral daily  ondansetron Injectable 4 milliGRAM(s) IV Push every 8 hours PRN  pantoprazole    Tablet 40 milliGRAM(s) Oral before breakfast

## 2025-04-21 NOTE — PROGRESS NOTE ADULT - SUBJECTIVE AND OBJECTIVE BOX
SUBJECTIVE/OVERNIGHT EVENTS  Today is hospital day 5d. This morning patient was seen and examined at bedside, resting comfortably in bed. No acute or major events overnight.    HOSPITAL COURSE  Day 1:   Day 2:   Day 3:     CODE STATUS:    FAMILY COMMUNICATION  Contact date:  Name of person contacted:  Relationship to patient:  Communication details:    MEDICATIONS  STANDING MEDICATIONS  chlorhexidine 2% Cloths 1 Application(s) Topical <User Schedule>  dextrose 5% + sodium chloride 0.45%. 1000 milliLiter(s) IV Continuous <Continuous>  doxycycline IVPB 100 milliGRAM(s) IV Intermittent every 12 hours  furosemide    Tablet 40 milliGRAM(s) Oral <User Schedule>  hydrALAZINE 25 milliGRAM(s) Oral three times a day  hydrocortisone 1% Ointment 1 Application(s) Topical every 12 hours  influenza  Vaccine (HIGH DOSE) 0.5 milliLiter(s) IntraMuscular once  levothyroxine 150 MICROGram(s) Oral daily  losartan 100 milliGRAM(s) Oral daily  NIFEdipine XL 60 milliGRAM(s) Oral daily  pantoprazole    Tablet 40 milliGRAM(s) Oral before breakfast  piperacillin/tazobactam IVPB.. 3.375 Gram(s) IV Intermittent every 8 hours    PRN MEDICATIONS  acetaminophen     Tablet .. 650 milliGRAM(s) Oral every 6 hours PRN  aluminum hydroxide/magnesium hydroxide/simethicone Suspension 30 milliLiter(s) Oral every 4 hours PRN  benzonatate 100 milliGRAM(s) Oral every 8 hours PRN  HYDROmorphone  Injectable 0.5 milliGRAM(s) IV Push every 4 hours PRN  hydrOXYzine hydrochloride 25 milliGRAM(s) Oral every 6 hours PRN  melatonin 3 milliGRAM(s) Oral at bedtime PRN  ondansetron Injectable 4 milliGRAM(s) IV Push every 8 hours PRN    VITALS  T(F): 97.6 (04-21-25 @ 12:18), Max: 99.3 (04-20-25 @ 19:55)  HR: 69 (04-21-25 @ 12:33) (62 - 74)  BP: 154/73 (04-21-25 @ 12:33) (136/62 - 172/58)  RR: 18 (04-21-25 @ 12:33) (15 - 18)  SpO2: 95% (04-21-25 @ 12:33) (93% - 97%)    PHYSICAL EXAM  GENERAL: ill appearing, lying in bed comfortably  HEART: Regular rate and rhythm, no murmurs, rubs, or gallops  LUNGS: Unlabored respirations.  Clear to auscultation bilaterally, no crackles, wheezing, or rhonchi  ABDOMEN generalized tenderness  EXTREMITIES: No clubbing, cyanosis, or edema  NERVOUS SYSTEM:  A&Ox3  SKIN: No rashes or lesions    LABS             8.9    7.81  )-----------( 253      ( 04-20-25 @ 06:48 )             28.8     137  |  99  |  22  -------------------------<  111   04-20-25 @ 06:48  4.4  |  25  |  0.9    Ca      8.6     04-20-25 @ 06:48  Mg     2.0     04-20-25 @ 06:48    TPro  5.7  /  Alb  3.2  /  TBili  4.4  /  DBili  x   /  AST  38  /  ALT  103  /  AlkPhos  248  /  GGT  x     04-20-25 @ 06:48        Urinalysis Basic - ( 20 Apr 2025 06:48 )    Color: x / Appearance: x / SG: x / pH: x  Gluc: 111 mg/dL / Ketone: x  / Bili: x / Urobili: x   Blood: x / Protein: x / Nitrite: x   Leuk Esterase: x / RBC: x / WBC x   Sq Epi: x / Non Sq Epi: x / Bacteria: x          IMAGING

## 2025-04-21 NOTE — PROGRESS NOTE ADULT - ASSESSMENT
78 y/o woman with PMHx of HTN, HLD, hypothyroidism, PAD on Plavix, HFpEF (EF 65-70% in August 2023), and cholecystectomy with multiple ERCP in the past for choledocholithiasis / CBD stent, s/p BRYSON; presents for abdominal pain    # pt is immunocompromised 2/2 age    # This is a STAR pt - GOC done 4/17    # BMI 31.1 = obesity  wt loss advised after she recovers from acute illness    # outpt MDs  PMD: was Dr Aaron; needs referral [facility] d/c  Cardio/Vasc: Dr Stanley  GI: Dr Torres    # Cough and Shortness of breath w/ acute hypox resp failure 2/2 RLL/RML PNA w/ gram neg bacteremia; NO SIRS/NO SEPSIS  Patient has been reporting flu like symptoms 2 weeks ago, resolved, then came back again yesterday  CT scan showing: new trace right pleural effusion and new right lower and middle lobe consolidated opacities with associated bronchiolectasis, possibly reflecting atelectasis and/or pneumonia in the appropriate clinical setting.   2 points on CURB 65  s/p hospitalization requiring antibiotics within the past 3 months  RVP neg; MRSA neg  Blood culture 1 of 2: + E Coli  ID consult: noted  Pulm consult: noted  abx: Zosyn 3.375gm iv q8 + Doxycycline 100mg iv q12 (will do doxy PO when evin PO better - prob tomorrow)  incent rosey  tessalon prn cough  NC O2 to keep sat > 92% (on 3L, 94% sat)  echo not needed  WBC a little better  cbc q24    # Abdominal pain 2/2 acute cholangitis w/ gram neg bacteremia; Hx CCY and BRYSON; Hx of multiple ERCP for choledocholithiasis (last CBD stent placed 2/5/25)  Last ERCP on 02/05/2025: Successful ERCP with removal of stent, removal of stones and sludge and placement of plastic CBD stent. 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  T abdomen showing: Slightly increased intrahepatic and extrahepatic biliary ductal dilatation with CBD stent in place; interval resolution of previously noted pneumobilia.  T bili: 4.4; other LFTs downtrending   diet: clr liquids (DASH restrictions)   IVFs: D5 1/2NS 50/hr  Adv GI eval: s/p ERCP w/ stent exchange 4/21 - pus was seen  Bld Cx: + E Coli  abx: above  LFT q24    # HTN  Difficult to control HTN outpatient, patient follows with Dr. Stanley  c/w Hydralazine 25 mg q8  c/w Losartan 100 q24  incr Nifedipine xl 90mg po qHS (start clary)  c/w Lasix 40 mg every other day    # PAD on Plavix; has stents in both legs (lt fem stent seen on CT A/P); HFpEF - chronic, not in acute decompensation  resume Plavix 75 q24   not on statin - would not start one now w/ LFTs anyway  lipid panel as outpt    # Hypothyroidism  c/w Levothyroxine 150 ug daily    # lymphadenopathy  has unchanged, diffuse, sub-cm LN intrathoracic, axillary and intraabd of unclear etiology  likely reactive  no further inpt w/u    # Anxiety  atarax 25mg po q6 prn - helped  melatonin prn sleep    # chr venous stasis; eczema post neck  HCT 1% oint top q12 to lower legs and post neck    # DVT prophylaxis: Lovenox 40 mg SC daily    # GI prophylaxis: Pantoprazole 40 mg daily    # Activity: can amb to [place]; walks at home w/ no asst device; no PT for now needed; OOBTC clary    # daughter Moira ([phone])    Dispo: iv abx; f/u Pulm/GI/ID; clr liquids; resume plavix; HCT oint; incent rosey; daily labs  eventually, pt will need abx at home (poss po vs IV) - f/u CM - might be ready for d/c in 72hrs?    Prog remains a bit guarded, but improving.

## 2025-04-21 NOTE — PROGRESS NOTE ADULT - ASSESSMENT
78 y/o Female with PMHx of HTN, HLD, hypothyroidism,  PAD on Plavix, (HFpEF) (EF 65-70% in August 2023), and cholecystectomy with multiple endoscopic retrograde cholangiopancreatography (ERCP) past choledocholithiasis / CBD stent,  presents for abdominal pain    She was last admitted in February for abdominal  pain similar to prior pains, and fever/chills, dark urine, subjective fevers, abdominal pain which have currently resolved after receiving antibiotics.  Denies overt signs of GI bleeding.  She was admitted for suspected cholangitis and discharged on Moxifloxacin and Flagyl. Of note, patient has a previously placed CBD stent.  She also has a periampullary adenoma.    On presentation, she reports having had a flu-like illness 2 weeks ago and she received antibiotics from the acute care outpatient  She currently presents for 1 day of abd pain, nausea, generalized, fever, chills, Poor PO intake over the past couple of days and complaints of neck pain  She denies any vomiting, chest pain, shortness of breath, abdominal pain, or urinary complaints    In the ED:  - Vitals: T 37.1 HR 95 /74 SpO2 17 RR 98%  - Labs: WBC 9.83 Hgb 9.6  Na 141 K 4.4 Cr 0.8, Elevated LFTS, UA (-)  - Imaging: CT Chest / Abdomen  1. Since February 5, 2025, new trace right pleural effusion and new right lower and middle lobe consolidated opacities with associated bronchiolectasis, possibly reflecting atelectasis and/or pneumonia in the appropriate clinical setting. Follow-up to complete resolution is   suggested.  2. Slightly increased intrahepatic and extrahepatic biliary ductal dilatation with CBD stent in place; interval resolution of previously noted pneumobilia.  2. No significant change in multiple subcentimeter and mildly enlarged intrathoracic and abdominopelvic lymph nodes.    Admitted for sepsis 2/2 suspected ascending cholangitis    #Generalized abd pain 2/2 ascending cholangitis  #Transaminitis (peaked 300s->100) 2/2 cholangitis - improved  #Pansensitive E coli bacteremia  #Likely PNA - RML/RLL opacities on CT  #Acute on Chronic Microcytic anemia (Hb 11->8)      Plan:  - ERCP done today -> stent exchanged, pus drainage noted  - IVF until PO intake, can start clears 4PM if doing well  - c/w doxy and zosyn IV  - wean O2 NC as tolerated  - will need to resume plavix (held from 4/18)  - hold off on IV venofer until infection clears    #DVT prophylaxis:   #GI prophylaxis: PPI  #Diet: clears 4pm  #Activity: IAT  #Code status: Full Code  #Disposition:

## 2025-04-22 LAB
ALBUMIN SERPL ELPH-MCNC: 3.1 G/DL — LOW (ref 3.5–5.2)
ALP SERPL-CCNC: 243 U/L — HIGH (ref 30–115)
ALT FLD-CCNC: 63 U/L — HIGH (ref 0–41)
ANION GAP SERPL CALC-SCNC: 11 MMOL/L — SIGNIFICANT CHANGE UP (ref 7–14)
AST SERPL-CCNC: 28 U/L — SIGNIFICANT CHANGE UP (ref 0–41)
BASOPHILS # BLD AUTO: 0.05 K/UL — SIGNIFICANT CHANGE UP (ref 0–0.2)
BASOPHILS NFR BLD AUTO: 0.5 % — SIGNIFICANT CHANGE UP (ref 0–1)
BILIRUB SERPL-MCNC: 2.5 MG/DL — HIGH (ref 0.2–1.2)
BUN SERPL-MCNC: 29 MG/DL — HIGH (ref 10–20)
CALCIUM SERPL-MCNC: 8.6 MG/DL — SIGNIFICANT CHANGE UP (ref 8.4–10.5)
CHLORIDE SERPL-SCNC: 96 MMOL/L — LOW (ref 98–110)
CO2 SERPL-SCNC: 28 MMOL/L — SIGNIFICANT CHANGE UP (ref 17–32)
CREAT SERPL-MCNC: 0.8 MG/DL — SIGNIFICANT CHANGE UP (ref 0.7–1.5)
EGFR: 75 ML/MIN/1.73M2 — SIGNIFICANT CHANGE UP
EGFR: 75 ML/MIN/1.73M2 — SIGNIFICANT CHANGE UP
EOSINOPHIL # BLD AUTO: 0.32 K/UL — SIGNIFICANT CHANGE UP (ref 0–0.7)
EOSINOPHIL NFR BLD AUTO: 3.3 % — SIGNIFICANT CHANGE UP (ref 0–8)
GLUCOSE SERPL-MCNC: 93 MG/DL — SIGNIFICANT CHANGE UP (ref 70–99)
HCT VFR BLD CALC: 28.6 % — LOW (ref 37–47)
HGB BLD-MCNC: 8.9 G/DL — LOW (ref 12–16)
IMM GRANULOCYTES NFR BLD AUTO: 2.2 % — HIGH (ref 0.1–0.3)
LYMPHOCYTES # BLD AUTO: 1.16 K/UL — LOW (ref 1.2–3.4)
LYMPHOCYTES # BLD AUTO: 11.8 % — LOW (ref 20.5–51.1)
MAGNESIUM SERPL-MCNC: 2.1 MG/DL — SIGNIFICANT CHANGE UP (ref 1.8–2.4)
MCHC RBC-ENTMCNC: 24.2 PG — LOW (ref 27–31)
MCHC RBC-ENTMCNC: 31.1 G/DL — LOW (ref 32–37)
MCV RBC AUTO: 77.7 FL — LOW (ref 81–99)
MONOCYTES # BLD AUTO: 0.84 K/UL — HIGH (ref 0.1–0.6)
MONOCYTES NFR BLD AUTO: 8.6 % — SIGNIFICANT CHANGE UP (ref 1.7–9.3)
NEUTROPHILS # BLD AUTO: 7.2 K/UL — HIGH (ref 1.4–6.5)
NEUTROPHILS NFR BLD AUTO: 73.6 % — SIGNIFICANT CHANGE UP (ref 42.2–75.2)
NRBC BLD AUTO-RTO: 0 /100 WBCS — SIGNIFICANT CHANGE UP (ref 0–0)
PLATELET # BLD AUTO: 303 K/UL — SIGNIFICANT CHANGE UP (ref 130–400)
PMV BLD: 10.3 FL — SIGNIFICANT CHANGE UP (ref 7.4–10.4)
POTASSIUM SERPL-MCNC: 3.9 MMOL/L — SIGNIFICANT CHANGE UP (ref 3.5–5)
POTASSIUM SERPL-SCNC: 3.9 MMOL/L — SIGNIFICANT CHANGE UP (ref 3.5–5)
PROT SERPL-MCNC: 5.6 G/DL — LOW (ref 6–8)
RBC # BLD: 3.68 M/UL — LOW (ref 4.2–5.4)
RBC # FLD: 18 % — HIGH (ref 11.5–14.5)
SODIUM SERPL-SCNC: 135 MMOL/L — SIGNIFICANT CHANGE UP (ref 135–146)
WBC # BLD: 9.79 K/UL — SIGNIFICANT CHANGE UP (ref 4.8–10.8)
WBC # FLD AUTO: 9.79 K/UL — SIGNIFICANT CHANGE UP (ref 4.8–10.8)

## 2025-04-22 PROCEDURE — 99233 SBSQ HOSP IP/OBS HIGH 50: CPT

## 2025-04-22 PROCEDURE — 99232 SBSQ HOSP IP/OBS MODERATE 35: CPT

## 2025-04-22 RX ORDER — HYDROMORPHONE/SOD CHLOR,ISO/PF 2 MG/10 ML
0.2 SYRINGE (ML) INJECTION ONCE
Refills: 0 | Status: DISCONTINUED | OUTPATIENT
Start: 2025-04-22 | End: 2025-04-22

## 2025-04-22 RX ORDER — METRONIDAZOLE 250 MG
500 TABLET ORAL
Refills: 0 | Status: DISCONTINUED | OUTPATIENT
Start: 2025-04-22 | End: 2025-04-24

## 2025-04-22 RX ORDER — ENOXAPARIN SODIUM 100 MG/ML
40 INJECTION SUBCUTANEOUS EVERY 24 HOURS
Refills: 0 | Status: DISCONTINUED | OUTPATIENT
Start: 2025-04-22 | End: 2025-04-27

## 2025-04-22 RX ORDER — CEFTRIAXONE 500 MG/1
2000 INJECTION, POWDER, FOR SOLUTION INTRAMUSCULAR; INTRAVENOUS EVERY 24 HOURS
Refills: 0 | Status: DISCONTINUED | OUTPATIENT
Start: 2025-04-23 | End: 2025-04-23

## 2025-04-22 RX ORDER — CEFTRIAXONE 500 MG/1
INJECTION, POWDER, FOR SOLUTION INTRAMUSCULAR; INTRAVENOUS
Refills: 0 | Status: DISCONTINUED | OUTPATIENT
Start: 2025-04-22 | End: 2025-04-23

## 2025-04-22 RX ORDER — FERROUS SULFATE 137(45) MG
325 TABLET, EXTENDED RELEASE ORAL DAILY
Refills: 0 | Status: DISCONTINUED | OUTPATIENT
Start: 2025-04-22 | End: 2025-04-27

## 2025-04-22 RX ORDER — OXYCODONE HYDROCHLORIDE 30 MG/1
5 TABLET ORAL EVERY 6 HOURS
Refills: 0 | Status: DISCONTINUED | OUTPATIENT
Start: 2025-04-22 | End: 2025-04-27

## 2025-04-22 RX ORDER — DOXYCYCLINE HYCLATE 100 MG
100 TABLET ORAL EVERY 12 HOURS
Refills: 0 | Status: DISCONTINUED | OUTPATIENT
Start: 2025-04-22 | End: 2025-04-22

## 2025-04-22 RX ORDER — CEFTRIAXONE 500 MG/1
2000 INJECTION, POWDER, FOR SOLUTION INTRAMUSCULAR; INTRAVENOUS ONCE
Refills: 0 | Status: COMPLETED | OUTPATIENT
Start: 2025-04-22 | End: 2025-04-22

## 2025-04-22 RX ADMIN — OXYCODONE HYDROCHLORIDE 5 MILLIGRAM(S): 30 TABLET ORAL at 17:12

## 2025-04-22 RX ADMIN — Medication 25 GRAM(S): at 13:25

## 2025-04-22 RX ADMIN — Medication 90 MILLIGRAM(S): at 21:48

## 2025-04-22 RX ADMIN — Medication 0.5 MILLIGRAM(S): at 08:52

## 2025-04-22 RX ADMIN — Medication 0.2 MILLIGRAM(S): at 23:52

## 2025-04-22 RX ADMIN — Medication 0.5 MILLIGRAM(S): at 01:03

## 2025-04-22 RX ADMIN — Medication 25 GRAM(S): at 06:06

## 2025-04-22 RX ADMIN — Medication 25 MILLIGRAM(S): at 13:26

## 2025-04-22 RX ADMIN — Medication 0.5 MILLIGRAM(S): at 02:29

## 2025-04-22 RX ADMIN — Medication 100 MILLIGRAM(S): at 06:07

## 2025-04-22 RX ADMIN — Medication 3 MILLIGRAM(S): at 02:08

## 2025-04-22 RX ADMIN — Medication 25 MILLIGRAM(S): at 06:06

## 2025-04-22 RX ADMIN — CLOPIDOGREL BISULFATE 75 MILLIGRAM(S): 75 TABLET, FILM COATED ORAL at 11:56

## 2025-04-22 RX ADMIN — Medication 4 MILLIGRAM(S): at 23:13

## 2025-04-22 RX ADMIN — CEFTRIAXONE 100 MILLIGRAM(S): 500 INJECTION, POWDER, FOR SOLUTION INTRAMUSCULAR; INTRAVENOUS at 17:07

## 2025-04-22 RX ADMIN — Medication 150 MICROGRAM(S): at 06:07

## 2025-04-22 RX ADMIN — HYDROCORTISONE 1 APPLICATION(S): 10 CREAM TOPICAL at 06:08

## 2025-04-22 RX ADMIN — Medication 40 MILLIGRAM(S): at 06:06

## 2025-04-22 RX ADMIN — Medication 25 MILLIGRAM(S): at 21:47

## 2025-04-22 RX ADMIN — ENOXAPARIN SODIUM 40 MILLIGRAM(S): 100 INJECTION SUBCUTANEOUS at 11:56

## 2025-04-22 RX ADMIN — Medication 0.5 MILLIGRAM(S): at 05:20

## 2025-04-22 RX ADMIN — Medication 0.2 MILLIGRAM(S): at 23:13

## 2025-04-22 RX ADMIN — Medication 1 APPLICATION(S): at 06:07

## 2025-04-22 RX ADMIN — Medication 325 MILLIGRAM(S): at 21:50

## 2025-04-22 RX ADMIN — HYDROCORTISONE 1 APPLICATION(S): 10 CREAM TOPICAL at 17:08

## 2025-04-22 RX ADMIN — Medication 500 MILLIGRAM(S): at 16:16

## 2025-04-22 RX ADMIN — LOSARTAN POTASSIUM 100 MILLIGRAM(S): 100 TABLET, FILM COATED ORAL at 06:06

## 2025-04-22 NOTE — PROGRESS NOTE ADULT - ASSESSMENT
78 y/o woman with PMHx of HTN, HLD, hypothyroidism, PAD on Plavix, HFpEF (EF 65-70% in August 2023), and cholecystectomy with multiple ERCP in the past for choledocholithiasis / CBD stent, s/p BRYSON; presents for abdominal pain    # pt is immunocompromised 2/2 age    # This is a STAR pt - GOC done 4/17    # BMI 31.1 = obesity  wt loss advised after she recovers from acute illness    # outpt MDs  PMD: was Dr Aaron; [facility]  Cardio/Vasc: Dr Stanley  GI: Dr Torres    # Cough and Shortness of breath w/ acute hypox resp failure 2/2 RLL/RML PNA w/ gram neg bacteremia; NO SIRS/NO SEPSIS  Patient has been reporting flu like symptoms 2 weeks ago, resolved, then came back again yesterday  CT scan showing: new trace right pleural effusion and new right lower and middle lobe consolidated opacities with associated bronchiolectasis, possibly reflecting atelectasis and/or pneumonia in the appropriate clinical setting.   2 points on CURB 65  s/p hospitalization requiring antibiotics within the past 3 months  RVP neg; MRSA neg  Blood culture 1 of 2: + E Coli  ID consult: noted  Pulm consult: noted  abx: d/c Zosyn & Doxycycline; use rocephin 2gm iv q24 + Flagyl 500mg po q12 (end 4/30)  Potential final ID recs: - Plan for antibiotics x 10 days post-ERCP E/D 4/30. Option 1) PO bactrim 2 DS BID (given kg) + PO flagyl 500mg BID vs Option 2) Midline x IV Ceftriaxone 2g q24h + PO flagyl 500mg BID. Pt to discuss options w/ dtr.  incent rosey  tessalon prn cough  NC O2 - can try to taper off O2 (has no home O2)  echo not needed  WBC nl    # Abdominal pain 2/2 acute cholangitis w/ gram neg bacteremia; Hx CCY and BRYSON; Hx of multiple ERCP for choledocholithiasis (last CBD stent placed 2/5/25)  Last ERCP on 02/05/2025: Successful ERCP with removal of stent, removal of stones and sludge and placement of plastic CBD stent. 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  T abdomen showing: Slightly increased intrahepatic and extrahepatic biliary ductal dilatation with CBD stent in place; interval resolution of previously noted pneumobilia.  T bili: 4.4; other LFTs downtrending   diet: full liquids (DASH restrictions)   IVFs: off  Adv GI eval: s/p ERCP w/ stent exchange 4/21 - pus was seen  Bld Cx: + E Coli  abx: above  LFT q24    # HTN  Difficult to control HTN outpatient, patient follows with Dr. Stanley  c/w Hydralazine 25 mg q8  c/w Losartan 100 q24  incr Nifedipine xl 90mg po qHS (start clary)  c/w Lasix 40 mg every other day    # PAD on Plavix; has stents in both legs (lt fem stent seen on CT A/P); HFpEF - chronic, not in acute decompensation  resume Plavix 75 q24   not on statin - would not start one now w/ LFTs anyway  lipid panel as outpt    # Hypothyroidism  c/w Levothyroxine 150 ug daily    # lymphadenopathy  has unchanged, diffuse, sub-cm LN intrathoracic, axillary and intraabd of unclear etiology  likely reactive  no further inpt w/u    # Anxiety  atarax 25mg po q6 prn - helped  melatonin prn sleep    # chr venous stasis; eczema post neck  HCT 1% oint top q12 to lower legs and post neck    # DVT prophylaxis: Lovenox 40 mg SC daily    # GI prophylaxis: Pantoprazole 40 mg daily    # Activity: can amb to [place]; walks at home w/ no asst device; no PT for now needed; OOBTC     # daughter Moira ([phone])    Dispo: abx; f/u Pulm/GI/ID; full liquids; HCT oint; incent rosey; taper off O2  eventually, pt will need abx at home (poss po vs IV) - f/u CM - might be ready for d/c in 48hrs?    Prog remains a bit guarded, but improving. 80 y/o woman with PMHx of HTN, HLD, hypothyroidism, PAD on Plavix, HFpEF (EF 65-70% in August 2023), and cholecystectomy with multiple ERCP in the past for choledocholithiasis / CBD stent, s/p BRYSON; presents for abdominal pain    # pt is immunocompromised 2/2 age    # This is a STAR pt - GOC done 4/17    # BMI 31.1 = obesity  wt loss advised after she recovers from acute illness    # outpt MDs  PMD: was Dr Aaron; [facility] d/c  Cardio/Vasc: Dr Stanley  GI: Dr Torres    # Cough and Shortness of breath w/ acute hypox resp failure 2/2 RLL/RML PNA w/ gram neg bacteremia; NO SIRS/NO SEPSIS  Patient has been reporting flu like symptoms 2 weeks ago, resolved, then came back again yesterday  CT scan showing: new trace right pleural effusion and new right lower and middle lobe consolidated opacities with associated bronchiolectasis, possibly reflecting atelectasis and/or pneumonia in the appropriate clinical setting.   2 points on CURB 65  s/p hospitalization requiring antibiotics within the past 3 months  RVP neg; MRSA neg  Blood culture 1 of 2: + E Coli  ID consult: noted  Pulm consult: noted  abx: d/c Zosyn & Doxycycline; use rocephin 2gm iv q24 + Flagyl 500mg po q12 (end 4/30)  Potential final ID recs: - Plan for antibiotics x 10 days post-ERCP E/D 4/30. Option 1) PO bactrim 2 DS BID (given kg) + PO flagyl 500mg BID vs Option 2) Midline x IV Ceftriaxone 2g q24h + PO flagyl 500mg BID. Pt to discuss options w/ dtr.  incent rosey  tessalon prn cough  NC O2 - can try to taper off O2 (has no home O2)  echo not needed  WBC nl    # Abdominal pain 2/2 acute cholangitis w/ gram neg bacteremia; Hx CCY and BRYSON; Hx of multiple ERCP for choledocholithiasis (last CBD stent placed 2/5/25)  Last ERCP on 02/05/2025: Successful ERCP with removal of stent, removal of stones and sludge and placement of plastic CBD stent. 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  T abdomen showing: Slightly increased intrahepatic and extrahepatic biliary ductal dilatation with CBD stent in place; interval resolution of previously noted pneumobilia.  T bili: 2.5; other LFTs downtrending   diet: full liquids (DASH restrictions)   pain: d/c dilaudid; use oxy IR 5mg po q4 prn pain  Adv GI eval: s/p ERCP w/ stent exchange 4/21 - pus was seen  Bld Cx: + E Coli  abx: above  LFT q24    # HTN  Difficult to control HTN outpatient, patient follows with Dr. Stanley  c/w Hydralazine 25 mg q8  c/w Losartan 100 q24  incr Nifedipine xl 90mg po qHS (start clary)  c/w Lasix 40 mg every other day    # PAD on Plavix; has stents in both legs (lt fem stent seen on CT A/P); HFpEF - chronic, not in acute decompensation  resume Plavix 75 q24   not on statin - would not start one now w/ LFTs anyway  lipid panel as outpt    # Hypothyroidism  c/w Levothyroxine 150 ug daily    # lymphadenopathy  has unchanged, diffuse, sub-cm LN intrathoracic, axillary and intraabd of unclear etiology  likely reactive  no further inpt w/u    # Anxiety  atarax 25mg po q6 prn - helped  melatonin prn sleep    # chr venous stasis; eczema post neck  HCT 1% oint top q12 to lower legs and post neck    # DVT prophylaxis: Lovenox 40 mg SC daily    # GI prophylaxis: Pantoprazole 40 mg daily    # Activity: can amb to [place]; walks at home w/ no asst device; no PT for now needed; OOBTC     # daughter Moira ([phone])    Dispo: abx; f/u Pulm/GI/ID; full liquids; HCT oint; incent rosey; taper off O2; tx pain  eventually, pt will need abx at home (poss po vs IV) - f/u CM - might be ready for d/c in 48hrs?    Prog remains a bit guarded, but improving.

## 2025-04-22 NOTE — PROGRESS NOTE ADULT - ASSESSMENT
ASSESSMENT  80 y/o Female with PMHx of HTN, HLD, hypothyroidism,  PAD on Plavix, (HFpEF) (EF 65-70% in August 2023), and cholecystectomy with multiple endoscopic retrograde cholangiopancreatography (ERCP) past choledocholithiasis / CBD stent,  presents for abdominal pain      IMPRESSION  #Ecoli bacteremia suspected cholangitis     4/21  ERCP with placement of a covered metallic stent draining a significant amount of pus from the bile duct    4/16 BCX 1/4 bottles + ecoli R fluoro     Afebrile; Admission WBC 9     MRSA PCR Result.: Negative (04-17-25 @ 02:31)    7/16 UCX   50,000 - 99,000 CFU/mL Escherichia coli S    ERCP 2/25: Successful ERCP with removal of stent, removal of stones and sludge and placement of plastic CBD stent. 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  CT Chest / Abdomen  1. Since February 5, 2025, new trace right pleural effusion and new right lower and middle lobe consolidated opacities with associated bronchiolectasis, possibly reflecting atelectasis and/or pneumonia in the appropriate clinical setting. Follow-up to complete resolution is   suggested.  2. Slightly increased intrahepatic and extrahepatic biliary ductal dilatation with CBD stent in place; interval resolution of previously noted pneumobilia.  2. No significant change in multiple subcentimeter and mildly enlarged intrathoracic and abdominopelvic lymph nodes.  #Sepsis ruled out on admission    #Possible PNA, has cough   #Transaminitis   #Obesity BMI (kg/m2): 31.1  #Immunodeficiency secondary to Senescence  which could result in poor clinical outcomes  #Abx allergy: NA  Creatinine: 0.8 mg/dL (04-22-25 @ 06:03) 62 mL/min  Creatinine clearance modified for overweight patient, using adjusted body weight of 68 kg (149 lbs).    Height (cm): 165.1 (04-17-25 @ 13:59)  Weight (kg): 84.7 (04-17-25 @ 13:59)    RECOMMENDATIONS  - D/C doxy   - Narrow to Ceftriaxone 2g q24h IV + PO flagyl 500mg BID from piperacillin/tazobactam IVPB.. 3.375 Gram(s) IV Intermittent every 8 hours  - Plan for antibiotics x 10 days post-ERCP E/D 4/30. Discussed treatment options PO bactrim 2 DS BID (given kg) + PO flagyl 500mg BID- discussed side effects including hyperkalemia, nephrotoxicity. Would monitor K/Cr closely vs. Midline x IV Ceftriaxone 2g q24h + PO flagyl 500mg BID  Pt wants to discuss with her daughter   - Appreciate GI consult-       If any questions, please send a message or call on [facility] Teams  Please continue to update ID with any pertinent new laboratory, radiographic findings, or change in clinical status

## 2025-04-22 NOTE — PROGRESS NOTE ADULT - SUBJECTIVE AND OBJECTIVE BOX
MICHAEL, BROWN SNOW  79y, Female  Allergy: codeine (Stomach Upset; Vomiting; Nausea)      LOS  6d    CHIEF COMPLAINT: Abdominal pain (22 Apr 2025 08:34)      INTERVAL EVENTS/HPI  - No acute events overnight  ERCP done April 21 with placement of a covered metallic stent draining a significant amount of pus from the bile duct  - T(F): , Max: 97.8 (04-21-25 @ 20:15)  - Denies any worsening symptoms  - Tolerating medication  - WBC Count: 9.79 (04-22-25 @ 06:03)  WBC Count: 7.81 (04-20-25 @ 06:48)     - Creatinine: 0.8 (04-22-25 @ 06:03)       ROS  General: Denies rigors, nightsweats  HEENT: Denies headache, rhinorrhea, sore throat, eye pain  CV: Denies CP, palpitations  PULM: Denies wheezing, hemoptysis  GI: Denies hematemesis, hematochezia, melena  : Denies discharge, hematuria  MSK: Denies arthralgias, myalgias  SKIN: Denies rash, lesions  NEURO: Denies paresthesias, weakness  PSYCH: Denies depression, anxiety    VITALS:  T(F): 97.4, Max: 97.8 (04-21-25 @ 20:15)  HR: 67  BP: 163/61  RR: 17Vital Signs Last 24 Hrs  T(C): 36.3 (22 Apr 2025 13:21), Max: 36.6 (21 Apr 2025 20:15)  T(F): 97.4 (22 Apr 2025 13:21), Max: 97.8 (21 Apr 2025 20:15)  HR: 67 (22 Apr 2025 13:21) (61 - 81)  BP: 163/61 (22 Apr 2025 13:21) (134/71 - 163/61)  BP(mean): 87 (21 Apr 2025 20:15) (87 - 87)  RR: 17 (22 Apr 2025 13:21) (17 - 18)  SpO2: 97% (22 Apr 2025 13:21) (95% - 99%)    Parameters below as of 22 Apr 2025 13:21  Patient On (Oxygen Delivery Method): nasal cannula  O2 Flow (L/min): 2      PHYSICAL EXAM:  Gen: NAD, resting in bed  HEENT: Normocephalic, atraumatic  Neck: supple, no lymphadenopathy  CV: Regular rate & regular rhythm  Lungs: decreased BS at bases, no fremitus  Abdomen: Soft, BS present  Ext: Warm, well perfused  Neuro: non focal, awake  Skin: no rash, no erythema  Lines: no phlebitis    FH: Non-contributory  Social Hx: Non-contributory    TESTS & MEASUREMENTS:                        8.9    9.79  )-----------( 303      ( 22 Apr 2025 06:03 )             28.6     04-22    135  |  96[L]  |  29[H]  ----------------------------<  93  3.9   |  28  |  0.8    Ca    8.6      22 Apr 2025 06:03  Mg     2.1     04-22    TPro  5.6[L]  /  Alb  3.1[L]  /  TBili  2.5[H]  /  DBili  x   /  AST  28  /  ALT  63[H]  /  AlkPhos  243[H]  04-22      LIVER FUNCTIONS - ( 22 Apr 2025 06:03 )  Alb: 3.1 g/dL / Pro: 5.6 g/dL / ALK PHOS: 243 U/L / ALT: 63 U/L / AST: 28 U/L / GGT: x           Urinalysis Basic - ( 22 Apr 2025 06:03 )    Color: x / Appearance: x / SG: x / pH: x  Gluc: 93 mg/dL / Ketone: x  / Bili: x / Urobili: x   Blood: x / Protein: x / Nitrite: x   Leuk Esterase: x / RBC: x / WBC x   Sq Epi: x / Non Sq Epi: x / Bacteria: x        Culture - Blood (collected 04-16-25 @ 13:20)  Source: Blood Blood-Peripheral  Final Report (04-21-25 @ 22:00):    No growth at 5 days    Culture - Blood (collected 04-16-25 @ 13:20)  Source: Blood Blood-Peripheral  Gram Stain (04-17-25 @ 09:25):    Growth in aerobic bottle: Gram Negative Rods  Final Report (04-18-25 @ 16:06):    Growth in aerobic bottle: Escherichia coli    Direct identification is available within approximately 3-5    hours either by Blood Panel Multiplexed PCR or Direct    MALDI-TOF. Details: [email or web address]  Organism: Blood Culture PCR  Escherichia coli (04-18-25 @ 16:06)  Organism: Escherichia coli (04-18-25 @ 16:06)      -  Levofloxacin: R >4      -  Tobramycin: S <=2      -  Aztreonam: S <=4      -  Gentamicin: S <=2      -  Cefazolin: S <=2      -  Cefepime: S <=2      -  Piperacillin/Tazobactam: S <=8      -  Ciprofloxacin: R >2      -  Imipenem: S <=1      -  Ceftriaxone: S <=1      -  Ampicillin: S <=8 These ampicillin results predict results for amoxicillin      Method Type: KARISHMA      -  Meropenem: S <=1      -  Ampicillin/Sulbactam: S <=4/2      -  Cefoxitin: S <=8      -  Trimethoprim/Sulfamethoxazole: S <=0.5/9.5      -  Ertapenem: S <=0.5  Organism: Blood Culture PCR (04-18-25 @ 16:06)      -  Escherichia coli: Detec      Method Type: PCR    Urinalysis with Rflx Culture (collected 04-16-25 @ 13:18)            INFECTIOUS DISEASES TESTING  MRSA PCR Result.: Negative (04-17-25 @ 02:31)  Procalcitonin: 20.60 (02-06-25 @ 12:05)  Rapid RVP Result: NotDetec (10-14-24 @ 01:56)  MRSA PCR Result.: Negative (10-14-24 @ 01:53)  Legionella Antigen, Urine: Negative (10-14-24 @ 01:52)  Streptococcus pneumoniae Ag, Ur Result: Negative (10-14-24 @ 01:52)  Procalcitonin: 0.19 (10-13-24 @ 07:26)      INFLAMMATORY MARKERS  Sedimentation Rate, Erythrocyte: 62 mm/Hr (02-06-25 @ 04:30)  C-Reactive Protein: 181.0 mg/L (02-06-25 @ 04:30)  Sedimentation Rate, Erythrocyte: 54 mm/Hr (02-05-25 @ 11:00)  C-Reactive Protein: 170.4 mg/L (02-05-25 @ 11:00)      RADIOLOGY & ADDITIONAL TESTS:  I have personally reviewed the last available Chest xray  CXR      CT      CARDIOLOGY TESTING      MEDICATIONS  chlorhexidine 2% Cloths 1 Topical <User Schedule>  clopidogrel Tablet 75 Oral daily  doxycycline monohydrate Capsule 100 Oral every 12 hours  enoxaparin Injectable 40 SubCutaneous every 24 hours  ferrous    sulfate 325 Oral daily  furosemide    Tablet 40 Oral <User Schedule>  hydrALAZINE 25 Oral three times a day  hydrocortisone 1% Ointment 1 Topical every 12 hours  influenza  Vaccine (HIGH DOSE) 0.5 IntraMuscular once  levothyroxine 150 Oral daily  losartan 100 Oral daily  NIFEdipine XL 90 Oral at bedtime  pantoprazole    Tablet 40 Oral before breakfast  piperacillin/tazobactam IVPB.. 3.375 IV Intermittent every 8 hours      WEIGHT  Weight (kg): 84.7 (04-21-25 @ 10:56)  Creatinine: 0.8 mg/dL (04-22-25 @ 06:03)      ANTIBIOTICS:  doxycycline monohydrate Capsule 100 milliGRAM(s) Oral every 12 hours  piperacillin/tazobactam IVPB.. 3.375 Gram(s) IV Intermittent every 8 hours      All available historical records have been reviewed

## 2025-04-22 NOTE — PROGRESS NOTE ADULT - SUBJECTIVE AND OBJECTIVE BOX
SUBJECTIVE/OVERNIGHT EVENTS  Today is hospital day 6d. This morning patient was seen and examined at bedside, resting comfortably in bed. No acute or major events overnight.    HOSPITAL COURSE  Day 1:   Day 2:   Day 3:     CODE STATUS:    FAMILY COMMUNICATION  Contact date:  Name of person contacted:  Relationship to patient:  Communication details:    MEDICATIONS  STANDING MEDICATIONS  cefTRIAXone   IVPB      cefTRIAXone   IVPB 2000 milliGRAM(s) IV Intermittent once  chlorhexidine 2% Cloths 1 Application(s) Topical <User Schedule>  clopidogrel Tablet 75 milliGRAM(s) Oral daily  enoxaparin Injectable 40 milliGRAM(s) SubCutaneous every 24 hours  ferrous    sulfate 325 milliGRAM(s) Oral daily  furosemide    Tablet 40 milliGRAM(s) Oral <User Schedule>  hydrALAZINE 25 milliGRAM(s) Oral three times a day  hydrocortisone 1% Ointment 1 Application(s) Topical every 12 hours  influenza  Vaccine (HIGH DOSE) 0.5 milliLiter(s) IntraMuscular once  levothyroxine 150 MICROGram(s) Oral daily  losartan 100 milliGRAM(s) Oral daily  metroNIDAZOLE    Tablet 500 milliGRAM(s) Oral two times a day  NIFEdipine XL 90 milliGRAM(s) Oral at bedtime  pantoprazole    Tablet 40 milliGRAM(s) Oral before breakfast    PRN MEDICATIONS  acetaminophen     Tablet .. 650 milliGRAM(s) Oral every 6 hours PRN  aluminum hydroxide/magnesium hydroxide/simethicone Suspension 30 milliLiter(s) Oral every 4 hours PRN  benzonatate 100 milliGRAM(s) Oral every 8 hours PRN  hydrOXYzine hydrochloride 25 milliGRAM(s) Oral every 6 hours PRN  melatonin 3 milliGRAM(s) Oral at bedtime PRN  ondansetron Injectable 4 milliGRAM(s) IV Push every 8 hours PRN  oxyCODONE    IR 5 milliGRAM(s) Oral every 6 hours PRN    VITALS  T(F): 97.4 (04-22-25 @ 13:21), Max: 97.8 (04-21-25 @ 20:15)  HR: 67 (04-22-25 @ 13:21) (61 - 81)  BP: 163/61 (04-22-25 @ 13:21) (134/71 - 163/61)  RR: 17 (04-22-25 @ 13:21) (17 - 18)  SpO2: 97% (04-22-25 @ 13:21) (95% - 99%)    PHYSICAL EXAM  GENERAL: NAD, lying in bed comfortably  HEART: Regular rate and rhythm, no murmurs, rubs, or gallops  LUNGS: Unlabored respirations.  Clear to auscultation bilaterally, no crackles, wheezing, or rhonchi  ABDOMEN: soft mildly tender abdomen  EXTREMITIES: minimal edema  NERVOUS SYSTEM:  A&Ox3   SKIN: No rashes or lesions    LABS             8.9    9.79  )-----------( 303      ( 04-22-25 @ 06:03 )             28.6     135  |  96  |  29  -------------------------<  93   04-22-25 @ 06:03  3.9  |  28  |  0.8    Ca      8.6     04-22-25 @ 06:03  Mg     2.1     04-22-25 @ 06:03    TPro  5.6  /  Alb  3.1  /  TBili  2.5  /  DBili  x   /  AST  28  /  ALT  63  /  AlkPhos  243  /  GGT  x     04-22-25 @ 06:03        Urinalysis Basic - ( 22 Apr 2025 06:03 )    Color: x / Appearance: x / SG: x / pH: x  Gluc: 93 mg/dL / Ketone: x  / Bili: x / Urobili: x   Blood: x / Protein: x / Nitrite: x   Leuk Esterase: x / RBC: x / WBC x   Sq Epi: x / Non Sq Epi: x / Bacteria: x          IMAGING

## 2025-04-22 NOTE — PROGRESS NOTE ADULT - NS ATTEND AMEND GEN_ALL_CORE FT
Agree with the above.  Patient seen for cholangitis due to biliary stent malfunction, doing well after ERCP with stent exchange.  Continue treatment for cholangitis with Zosyn for now.  Consider repeating cultures and ID follow-up.  Trend LFTs daily. c/w plavix.    She will need outpatient follow-up with primary GI Dr. Torres for further outpatient management of her duodenal adenoma, and for future ERCP with stent removal in 1-2 months. Rest of recommendations per the note above.     The note was created using a dictation tool and may contain errors or misinterpretations of spoken words. Please verify all information for accuracy.    Time-based billing (NON-critical care).   50 minutes spent on total encounter which excludes teaching time and/or separately reported services; more than 50% of the visit was spent counseling and / or coordinating care by the attending physician.  The necessity of the time spent during the encounter on this date of service was due to: Coordination of care.

## 2025-04-22 NOTE — PROGRESS NOTE ADULT - SUBJECTIVE AND OBJECTIVE BOX
MICHAELBROWN  79y  Female  ***My note supersedes ALL resident notes that I sign.  My corrections for their notes are in my note.***    I can be reached directly via Teams or my office number is [phone] or [phone].    INTERVAL EVENTS: Here for f/u of sepsis. Pt doing rather well. OOBTC today. Appetite better - wants to try full liquids. O2 is improving. Has abd pain, but expected and improving.    T(F): 97.4 (04-22-25 @ 13:21), Max: 97.8 (04-21-25 @ 20:15)  HR: 67 (04-22-25 @ 13:21) (61 - 81)  BP: 163/61 (04-22-25 @ 13:21) (134/71 - 163/61)  RR: 17 (04-22-25 @ 13:21) (17 - 18)  SpO2: 97% (04-22-25 @ 13:21) (95% - 99%)    Gen: NAD; + NC O2  HEENT: PERRL, EOMI, mouth clr, nose clr  Neck: no nodes, no JVD, thyroid nl  lungs: clr  hrt: s1 s2 rrr no murmur  abd: soft, ND, mild RUQ tender, rest much better; no HS megaly  ext: no edema, no c/c  neuro: aa ox3, cn intact, can move all 4 ext    LABS:                      8.9     (    77.7   9.79  )-----------( ---------      303      ( 22 Apr 2025 06:03 )             28.6    (    18.0     135   (   96   (   93      04-22-25 @ 06:03  ----------------------               3.9   (   28   (   29                             -----                        0.8  Ca  8.6   Mg  2.1    P   --     LFT  5.6  (  2.5  (  28       04-22-25 @ 06:03  -------------------------  3.1  (  243  (  63    Alb 3.1  T ramone 2.5     AST 28  ALT 63  04-22-25 @ 06:03 - better  Alb 3.2  T ramone 4.4     AST 38    04-20-25 @ 06:48  Alb 3.2  T ramone 3.9     AST 44    04-19-25 @ 06:59  Alb 3.5  T ramone 4.0     AST 78    04-18-25 @ 07:05    Urinalysis Basic - ( 22 Apr 2025 06:03 )    Color: x / Appearance: x / SG: x / pH: x  Gluc: 93 mg/dL / Ketone: x  / Bili: x / Urobili: x   Blood: x / Protein: x / Nitrite: x   Leuk Esterase: x / RBC: x / WBC x   Sq Epi: x / Non Sq Epi: x / Bacteria: x    Culture - Blood (collected 04-16-25 @ 13:20)  Source: Blood Blood-Peripheral  Final Report (04-21-25 @ 22:00):    No growth at 5 days    Culture - Blood (collected 04-16-25 @ 13:20)  Source: Blood Blood-Peripheral  Gram Stain (04-17-25 @ 09:25):    Growth in aerobic bottle: Gram Negative Rods  Final Report (04-18-25 @ 16:06):    Growth in aerobic bottle: Escherichia coli    Direct identification is available within approximately 3-5    hours either by Blood Panel Multiplexed PCR or Direct    MALDI-TOF. Details: [email or web address]  Organism: Blood Culture PCR  Escherichia coli (04-18-25 @ 16:06)  Organism: Escherichia coli (04-18-25 @ 16:06)      -  Levofloxacin: R >4      -  Tobramycin: S <=2      -  Aztreonam: S <=4      -  Gentamicin: S <=2      -  Cefazolin: S <=2      -  Cefepime: S <=2      -  Piperacillin/Tazobactam: S <=8      -  Ciprofloxacin: R >2      -  Imipenem: S <=1      -  Ceftriaxone: S <=1      -  Ampicillin: S <=8 These ampicillin results predict results for amoxicillin      Method Type: KARISHMA      -  Meropenem: S <=1      -  Ampicillin/Sulbactam: S <=4/2      -  Cefoxitin: S <=8      -  Trimethoprim/Sulfamethoxazole: S <=0.5/9.5      -  Ertapenem: S <=0.5  Organism: Blood Culture PCR (04-18-25 @ 16:06)      -  Escherichia coli: Detec      Method Type: PCR    RADIOLOGY & ADDITIONAL TESTS:    MEDICATIONS:    acetaminophen     Tablet .. 650 milliGRAM(s) Oral every 6 hours PRN  aluminum hydroxide/magnesium hydroxide/simethicone Suspension 30 milliLiter(s) Oral every 4 hours PRN  benzonatate 100 milliGRAM(s) Oral every 8 hours PRN  chlorhexidine 2% Cloths 1 Application(s) Topical <User Schedule>  clopidogrel Tablet 75 milliGRAM(s) Oral daily  enoxaparin Injectable 40 milliGRAM(s) SubCutaneous every 24 hours  ferrous    sulfate 325 milliGRAM(s) Oral daily  furosemide    Tablet 40 milliGRAM(s) Oral <User Schedule>  hydrALAZINE 25 milliGRAM(s) Oral three times a day  hydrocortisone 1% Ointment 1 Application(s) Topical every 12 hours  HYDROmorphone  Injectable 0.5 milliGRAM(s) IV Push every 4 hours PRN  hydrOXYzine hydrochloride 25 milliGRAM(s) Oral every 6 hours PRN  influenza  Vaccine (HIGH DOSE) 0.5 milliLiter(s) IntraMuscular once  levothyroxine 150 MICROGram(s) Oral daily  losartan 100 milliGRAM(s) Oral daily  melatonin 3 milliGRAM(s) Oral at bedtime PRN  NIFEdipine XL 90 milliGRAM(s) Oral at bedtime  ondansetron Injectable 4 milliGRAM(s) IV Push every 8 hours PRN  oxyCODONE    IR 5 milliGRAM(s) Oral every 6 hours PRN  pantoprazole    Tablet 40 milliGRAM(s) Oral before breakfast

## 2025-04-22 NOTE — PROGRESS NOTE ADULT - ASSESSMENT
Patient is a pleasant 79-year-old female with a past medical history of hypertension, hypothyroidism, hyperlipidemia, peripheral arterial disease on Plavix, and prior cholecystectomy, and known to advanced gastroenterology for multiple ERCP in the past as has a large tubular adenoma in the duodenum at the level of the papilla.  Patient had ERCP done April 21 with placement of a covered metallic stent draining a significant amount of pus from the bile duct.  Currently on antibiotics.  Patient without any issues overnight.  Patient without abdominal pain, nausea, vomiting, or melena. Awaiting AM labs but exam reassuring.    Tubular Adenoma at area of Papilla/ Cholangitis  - Clinically stable  - ERCP done 4/21 with placement of covered Metallic stent   - No post procedure concerns currently  - AM Labs   - Currently on Zosyn had Gram Negative Bacteremia- ABX course per primary team   - She is aware she will need follow up with Dr Torres as will require additional ERCP and needs to discuss plan for Ampullary Adenoma   - Will follow

## 2025-04-22 NOTE — PROGRESS NOTE ADULT - ASSESSMENT
80 y/o Female with PMHx of HTN, HLD, hypothyroidism,  PAD on Plavix, (HFpEF) (EF 65-70% in August 2023), and cholecystectomy with multiple endoscopic retrograde cholangiopancreatography (ERCP) past choledocholithiasis / CBD stent,  presents for abdominal pain    She was last admitted in February for abdominal  pain similar to prior pains, and fever/chills, dark urine, subjective fevers, abdominal pain which have currently resolved after receiving antibiotics.  Denies overt signs of GI bleeding.  She was admitted for suspected cholangitis and discharged on Moxifloxacin and Flagyl. Of note, patient has a previously placed CBD stent.  She also has a periampullary adenoma.    On presentation, she reports having had a flu-like illness 2 weeks ago and she received antibiotics from the acute care outpatient  She currently presents for 1 day of abd pain, nausea, generalized, fever, chills, Poor PO intake over the past couple of days and complaints of neck pain  She denies any vomiting, chest pain, shortness of breath, abdominal pain, or urinary complaints    In the ED:  - Vitals: T 37.1 HR 95 /74 SpO2 17 RR 98%  - Labs: WBC 9.83 Hgb 9.6  Na 141 K 4.4 Cr 0.8, Elevated LFTS, UA (-)  - Imaging: CT Chest / Abdomen  1. Since February 5, 2025, new trace right pleural effusion and new right lower and middle lobe consolidated opacities with associated bronchiolectasis, possibly reflecting atelectasis and/or pneumonia in the appropriate clinical setting. Follow-up to complete resolution is   suggested.  2. Slightly increased intrahepatic and extrahepatic biliary ductal dilatation with CBD stent in place; interval resolution of previously noted pneumobilia.  2. No significant change in multiple subcentimeter and mildly enlarged intrathoracic and abdominopelvic lymph nodes.    Admitted for sepsis 2/2 suspected ascending cholangitis    #Generalized abd pain 2/2 ascending cholangitis  #Transaminitis (peaked 300s->100) 2/2 cholangitis - improved  #Pansensitive E coli bacteremia  #Likely PNA - RML/RLL opacities on CT  #Acute on Chronic Microcytic anemia (Hb 11->8)  #Duodenal Adenoma noted on ERCP    Plan:  - ERCP done 4/21 -> stent exchanged, pus drainage noted  - advance diet as tolerated  - Narrow Abx per ID  - f/u with pt and ID regarding Abx management (PO or IV - pt deciding). If IV will need midline  - PO Iron tablets started today  - GI: OP f/u with primary GI Dr. Torres for further outpatient management of her duodenal adenoma, and for future ERCP with stent removal in 1-2 months.  - post-op pain: switched IV dilaudid PRN to PO Oxy PRN    #HTN  #PAD  #Hypothyroidism  - c/w home meds, on several BP medications at home  - starting nifedipine 90 tmrw    #DVT prophylaxis: lovenox  #GI prophylaxis: PPI  #Diet: AAT  #Activity: IAT  #Code status: Full Code  #Disposition:    Pending:  - Abx plan of care  - monitor BP  - Pain control  - PT eval  - CM: from home, no needs prior to admission

## 2025-04-22 NOTE — PROGRESS NOTE ADULT - SUBJECTIVE AND OBJECTIVE BOX
Patient is a pleasant 79-year-old female with a past medical history of hypertension, hypothyroidism, hyperlipidemia, peripheral arterial disease on Plavix, and prior cholecystectomy, and known to advanced gastroenterology for multiple ERCP in the past as has a large tubular adenoma in the duodenum at the level of the papilla.  Patient had ERCP done April 21 with placement of a covered metallic stent draining a significant amount of pus from the bile duct.  Currently on antibiotics.  Patient without any issues overnight.  Patient without abdominal pain, nausea, vomiting, or melena.      PAST MEDICAL & SURGICAL HISTORY:  Arterial insufficiency of lower extremity  Leg swelling  Hypothyroid  HTN (hypertension)  High cholesterol  Peripheral arterial disease  H/O Graves' disease  History of cholecystectomy  H/O: hysterectomy  S/P angiogram of extremity      MEDICATIONS  (STANDING):  chlorhexidine 2% Cloths 1 Application(s) Topical <User Schedule>  clopidogrel Tablet 75 milliGRAM(s) Oral daily  doxycycline IVPB 100 milliGRAM(s) IV Intermittent every 12 hours  furosemide    Tablet 40 milliGRAM(s) Oral <User Schedule>  hydrALAZINE 25 milliGRAM(s) Oral three times a day  hydrocortisone 1% Ointment 1 Application(s) Topical every 12 hours  influenza  Vaccine (HIGH DOSE) 0.5 milliLiter(s) IntraMuscular once  levothyroxine 150 MICROGram(s) Oral daily  losartan 100 milliGRAM(s) Oral daily  NIFEdipine XL 90 milliGRAM(s) Oral at bedtime  pantoprazole    Tablet 40 milliGRAM(s) Oral before breakfast  piperacillin/tazobactam IVPB.. 3.375 Gram(s) IV Intermittent every 8 hours    MEDICATIONS  (PRN):  acetaminophen     Tablet .. 650 milliGRAM(s) Oral every 6 hours PRN Temp greater or equal to 38C (100.4F), Mild Pain (1 - 3)  aluminum hydroxide/magnesium hydroxide/simethicone Suspension 30 milliLiter(s) Oral every 4 hours PRN Dyspepsia  benzonatate 100 milliGRAM(s) Oral every 8 hours PRN Cough  HYDROmorphone  Injectable 0.5 milliGRAM(s) IV Push every 4 hours PRN moderate-severe pain  hydrOXYzine hydrochloride 25 milliGRAM(s) Oral every 6 hours PRN Anxiety  melatonin 3 milliGRAM(s) Oral at bedtime PRN Insomnia  ondansetron Injectable 4 milliGRAM(s) IV Push every 8 hours PRN Nausea and/or Vomiting      Allergies  codeine (Stomach Upset; Vomiting; Nausea)      Review of Systems  General:  Denies Fatigue, Denies Fever, Denies Weakness ,Denies Weight Loss   HEENT: Denies Trouble Swallowing ,Denies  Sore Throat , Denies Change in hearing/vision/speech ,Denies Dizziness    Cardio: Denies  Chest Pain , Palpitations    Respiratory: Denies worsening of SOB, Denies Cough  Abdomen: See detailed HPI  Neuro: Denies Headache Denies Dizziness, Denies Paresthesias  MSK: Denies pain in Bones/Joints/Muscles   Psych: Patient denies depression, denies suicidal or homicidal ideations  Integ: Patient Denies rash, or new skin lesions     Vital Signs Last 24 Hrs  T(C): 36.6 (22 Apr 2025 04:41), Max: 36.9 (21 Apr 2025 09:09)  T(F): 97.8 (22 Apr 2025 04:41), Max: 98.4 (21 Apr 2025 09:09)  HR: 61 (22 Apr 2025 04:41) (61 - 81)  BP: 134/71 (22 Apr 2025 04:41) (134/71 - 172/58)  BP(mean): 87 (21 Apr 2025 20:15) (87 - 94)  RR: 18 (22 Apr 2025 04:41) (15 - 18)  SpO2: 95% (22 Apr 2025 04:41) (93% - 99%)    Parameters below as of 21 Apr 2025 20:15  Patient On (Oxygen Delivery Method): nasal cannula    PHYSICAL EXAM:  GENERAL: NAD, well-appearing  CHEST/LUNG: Clear to auscultation bilaterally  HEART: Regular rate and rhythm  ABDOMEN: Soft, Nontender, Nondistended;   EXTREMITIES:  No clubbing, cyanosis, or edema  SKIN: Jaundice

## 2025-04-23 ENCOUNTER — TRANSCRIPTION ENCOUNTER (OUTPATIENT)
Age: 80
End: 2025-04-23

## 2025-04-23 LAB
ALBUMIN SERPL ELPH-MCNC: 3.2 G/DL — LOW (ref 3.5–5.2)
ALP SERPL-CCNC: 234 U/L — HIGH (ref 30–115)
ALT FLD-CCNC: 58 U/L — HIGH (ref 0–41)
ANION GAP SERPL CALC-SCNC: 13 MMOL/L — SIGNIFICANT CHANGE UP (ref 7–14)
AST SERPL-CCNC: 31 U/L — SIGNIFICANT CHANGE UP (ref 0–41)
BASOPHILS # BLD AUTO: 0.07 K/UL — SIGNIFICANT CHANGE UP (ref 0–0.2)
BASOPHILS NFR BLD AUTO: 0.6 % — SIGNIFICANT CHANGE UP (ref 0–1)
BILIRUB SERPL-MCNC: 1.8 MG/DL — HIGH (ref 0.2–1.2)
BUN SERPL-MCNC: 26 MG/DL — HIGH (ref 10–20)
CALCIUM SERPL-MCNC: 8.7 MG/DL — SIGNIFICANT CHANGE UP (ref 8.4–10.5)
CHLORIDE SERPL-SCNC: 96 MMOL/L — LOW (ref 98–110)
CO2 SERPL-SCNC: 26 MMOL/L — SIGNIFICANT CHANGE UP (ref 17–32)
CREAT SERPL-MCNC: 0.7 MG/DL — SIGNIFICANT CHANGE UP (ref 0.7–1.5)
EGFR: 88 ML/MIN/1.73M2 — SIGNIFICANT CHANGE UP
EGFR: 88 ML/MIN/1.73M2 — SIGNIFICANT CHANGE UP
EOSINOPHIL # BLD AUTO: 0.49 K/UL — SIGNIFICANT CHANGE UP (ref 0–0.7)
EOSINOPHIL NFR BLD AUTO: 4.5 % — SIGNIFICANT CHANGE UP (ref 0–8)
GLUCOSE SERPL-MCNC: 98 MG/DL — SIGNIFICANT CHANGE UP (ref 70–99)
HCG SERPL QL: NEGATIVE — SIGNIFICANT CHANGE UP
HCT VFR BLD CALC: 30.4 % — LOW (ref 37–47)
HGB BLD-MCNC: 9.6 G/DL — LOW (ref 12–16)
IMM GRANULOCYTES NFR BLD AUTO: 1.5 % — HIGH (ref 0.1–0.3)
LYMPHOCYTES # BLD AUTO: 1.67 K/UL — SIGNIFICANT CHANGE UP (ref 1.2–3.4)
LYMPHOCYTES # BLD AUTO: 15.2 % — LOW (ref 20.5–51.1)
MAGNESIUM SERPL-MCNC: 2.2 MG/DL — SIGNIFICANT CHANGE UP (ref 1.8–2.4)
MCHC RBC-ENTMCNC: 24.4 PG — LOW (ref 27–31)
MCHC RBC-ENTMCNC: 31.6 G/DL — LOW (ref 32–37)
MCV RBC AUTO: 77.2 FL — LOW (ref 81–99)
MONOCYTES # BLD AUTO: 0.76 K/UL — HIGH (ref 0.1–0.6)
MONOCYTES NFR BLD AUTO: 6.9 % — SIGNIFICANT CHANGE UP (ref 1.7–9.3)
NEUTROPHILS # BLD AUTO: 7.85 K/UL — HIGH (ref 1.4–6.5)
NEUTROPHILS NFR BLD AUTO: 71.3 % — SIGNIFICANT CHANGE UP (ref 42.2–75.2)
NRBC BLD AUTO-RTO: 0 /100 WBCS — SIGNIFICANT CHANGE UP (ref 0–0)
PLATELET # BLD AUTO: 350 K/UL — SIGNIFICANT CHANGE UP (ref 130–400)
PMV BLD: 10.1 FL — SIGNIFICANT CHANGE UP (ref 7.4–10.4)
POTASSIUM SERPL-MCNC: 3.8 MMOL/L — SIGNIFICANT CHANGE UP (ref 3.5–5)
POTASSIUM SERPL-SCNC: 3.8 MMOL/L — SIGNIFICANT CHANGE UP (ref 3.5–5)
PROT SERPL-MCNC: 5.8 G/DL — LOW (ref 6–8)
RBC # BLD: 3.94 M/UL — LOW (ref 4.2–5.4)
RBC # FLD: 18.2 % — HIGH (ref 11.5–14.5)
SODIUM SERPL-SCNC: 135 MMOL/L — SIGNIFICANT CHANGE UP (ref 135–146)
WBC # BLD: 11.01 K/UL — HIGH (ref 4.8–10.8)
WBC # FLD AUTO: 11.01 K/UL — HIGH (ref 4.8–10.8)

## 2025-04-23 PROCEDURE — 99239 HOSP IP/OBS DSCHRG MGMT >30: CPT

## 2025-04-23 RX ORDER — SULFAMETHOXAZOLE/TRIMETHOPRIM 800-160 MG
2 TABLET ORAL
Refills: 0 | Status: DISCONTINUED | OUTPATIENT
Start: 2025-04-23 | End: 2025-04-24

## 2025-04-23 RX ORDER — NIFEDIPINE 30 MG
1 TABLET, EXTENDED RELEASE 24 HR ORAL
Refills: 0 | DISCHARGE

## 2025-04-23 RX ORDER — FERROUS SULFATE 137(45) MG
1 TABLET, EXTENDED RELEASE ORAL
Qty: 30 | Refills: 0
Start: 2025-04-23 | End: 2025-05-22

## 2025-04-23 RX ORDER — METRONIDAZOLE 250 MG
1 TABLET ORAL
Qty: 16 | Refills: 0
Start: 2025-04-23 | End: 2025-04-30

## 2025-04-23 RX ORDER — SULFAMETHOXAZOLE/TRIMETHOPRIM 800-160 MG
2 TABLET ORAL
Qty: 32 | Refills: 0
Start: 2025-04-23 | End: 2025-04-30

## 2025-04-23 RX ORDER — NIFEDIPINE 30 MG
1 TABLET, EXTENDED RELEASE 24 HR ORAL
Qty: 30 | Refills: 0
Start: 2025-04-23 | End: 2025-05-22

## 2025-04-23 RX ORDER — HYDROMORPHONE/SOD CHLOR,ISO/PF 2 MG/10 ML
0.2 SYRINGE (ML) INJECTION ONCE
Refills: 0 | Status: DISCONTINUED | OUTPATIENT
Start: 2025-04-23 | End: 2025-04-23

## 2025-04-23 RX ADMIN — Medication 2 TABLET(S): at 13:02

## 2025-04-23 RX ADMIN — Medication 40 MILLIGRAM(S): at 05:30

## 2025-04-23 RX ADMIN — Medication 4 MILLIGRAM(S): at 18:36

## 2025-04-23 RX ADMIN — OXYCODONE HYDROCHLORIDE 5 MILLIGRAM(S): 30 TABLET ORAL at 23:04

## 2025-04-23 RX ADMIN — Medication 1 APPLICATION(S): at 05:32

## 2025-04-23 RX ADMIN — Medication 90 MILLIGRAM(S): at 21:43

## 2025-04-23 RX ADMIN — LOSARTAN POTASSIUM 100 MILLIGRAM(S): 100 TABLET, FILM COATED ORAL at 05:32

## 2025-04-23 RX ADMIN — HYDROXYZINE HYDROCHLORIDE 25 MILLIGRAM(S): 25 TABLET, FILM COATED ORAL at 21:42

## 2025-04-23 RX ADMIN — Medication 150 MICROGRAM(S): at 05:31

## 2025-04-23 RX ADMIN — Medication 25 MILLIGRAM(S): at 05:31

## 2025-04-23 RX ADMIN — Medication 325 MILLIGRAM(S): at 13:03

## 2025-04-23 RX ADMIN — Medication 500 MILLIGRAM(S): at 05:31

## 2025-04-23 RX ADMIN — HYDROCORTISONE 1 APPLICATION(S): 10 CREAM TOPICAL at 05:32

## 2025-04-23 RX ADMIN — Medication 500 MILLIGRAM(S): at 18:10

## 2025-04-23 RX ADMIN — Medication 30 MILLILITER(S): at 22:51

## 2025-04-23 RX ADMIN — Medication 50 MILLIGRAM(S): at 21:42

## 2025-04-23 RX ADMIN — Medication 2 TABLET(S): at 18:11

## 2025-04-23 RX ADMIN — CLOPIDOGREL BISULFATE 75 MILLIGRAM(S): 75 TABLET, FILM COATED ORAL at 13:03

## 2025-04-23 RX ADMIN — Medication 50 MILLIGRAM(S): at 14:35

## 2025-04-23 RX ADMIN — HYDROCORTISONE 1 APPLICATION(S): 10 CREAM TOPICAL at 18:11

## 2025-04-23 RX ADMIN — ENOXAPARIN SODIUM 40 MILLIGRAM(S): 100 INJECTION SUBCUTANEOUS at 13:02

## 2025-04-23 RX ADMIN — Medication 3 MILLIGRAM(S): at 22:52

## 2025-04-23 RX ADMIN — OXYCODONE HYDROCHLORIDE 5 MILLIGRAM(S): 30 TABLET ORAL at 21:43

## 2025-04-23 NOTE — DISCHARGE NOTE PROVIDER - NSDCMRMEDTOKEN_GEN_ALL_CORE_FT
benzonatate 200 mg oral capsule: 1 cap(s) orally 2 times a day as needed for  cough  ferrous sulfate 325 mg (65 mg elemental iron) oral tablet: 1 tab(s) orally once a day  furosemide 40 mg oral tablet: 1 tab(s) orally every other day  hydrALAZINE 50 mg oral tablet: 1 tab(s) orally 3 times a day  levothyroxine 150 mcg (0.15 mg) oral tablet: 1 tab(s) orally once a day  losartan 100 mg oral tablet: 1 tab(s) orally once a day  metroNIDAZOLE 500 mg oral tablet: 1 tab(s) orally 2 times a day End Date: 4/30/2025.  NIFEdipine 90 mg oral tablet, extended release: 1 tab(s) orally once a day (at bedtime)  pantoprazole 40 mg oral delayed release tablet: 1 tab(s) orally once a day  Plavix 75 mg oral tablet: 1 tab(s) orally once a day  sulfamethoxazole-trimethoprim 800 mg-160 mg oral tablet: 2 tab(s) orally 2 times a day End Date: 4/30   ferrous sulfate 325 mg (65 mg elemental iron) oral tablet: 1 tab(s) orally once a day  furosemide 40 mg oral tablet: 1 tab(s) orally every other day  hydrALAZINE 50 mg oral tablet: 1 tab(s) orally 3 times a day  levothyroxine 150 mcg (0.15 mg) oral tablet: 1 tab(s) orally once a day  losartan 100 mg oral tablet: 1 tab(s) orally once a day  NIFEdipine 90 mg oral tablet, extended release: 1 tab(s) orally once a day (at bedtime)  pantoprazole 40 mg oral delayed release tablet: 1 tab(s) orally once a day  Plavix 75 mg oral tablet: 1 tab(s) orally once a day  rOPINIRole 0.25 mg oral tablet: 1 tab(s) orally once a day (at bedtime)   amoxicillin-clavulanate 875 mg-125 mg oral tablet: 1 tab(s) orally every 12 hours  ferrous sulfate 325 mg (65 mg elemental iron) oral tablet: 1 tab(s) orally once a day  furosemide 40 mg oral tablet: 1 tab(s) orally every other day  hydrALAZINE 50 mg oral tablet: 1 tab(s) orally 3 times a day  hydrocortisone 1% topical ointment: Apply topically to affected area 2 times a day  hydrOXYzine hydrochloride 25 mg oral tablet: 1 tab(s) orally every 8 hours as needed for  anxiety  levoFLOXacin 750 mg oral tablet: 1 tab(s) orally every 24 hours  levothyroxine 150 mcg (0.15 mg) oral tablet: 1 tab(s) orally once a day  losartan 100 mg oral tablet: 1 tab(s) orally once a day  NIFEdipine 90 mg oral tablet, extended release: 1 tab(s) orally once a day (at bedtime)  pantoprazole 40 mg oral delayed release tablet: 1 tab(s) orally once a day  Plavix 75 mg oral tablet: 1 tab(s) orally once a day  rOPINIRole 0.25 mg oral tablet: 1 tab(s) orally once a day (at bedtime)

## 2025-04-23 NOTE — DISCHARGE NOTE NURSING/CASE MANAGEMENT/SOCIAL WORK - PATIENT PORTAL LINK FT
You can access the FollowMyHealth Patient Portal offered by [facility] by registering at the following website: [email or web address]. By joining [facility]’s FollowMyHealth portal, you will also be able to view your health information using other applications (apps) compatible with our system.

## 2025-04-23 NOTE — PHYSICAL THERAPY INITIAL EVALUATION ADULT - PERTINENT HX OF CURRENT PROBLEM, REHAB EVAL
78 y/o Female with PMHx of HTN, HLD, hypothyroidism,  PAD on Plavix, (HFpEF) (EF 65-70% in August 2023), and cholecystectomy with multiple endoscopic retrograde cholangiopancreatography (ERCP) past choledocholithiasis / CBD stent,  presents for abdominal pain

## 2025-04-23 NOTE — PHYSICAL THERAPY INITIAL EVALUATION ADULT - PRECAUTIONS/LIMITATIONS, REHAB EVAL
Emphasized to the patient that it is a lethal combination to combine oxycodone and alprazolam we will refill the alprazolam but she needs to not take oxycodone under any circumstances
No answer, no v-mail
Patient aware
Patient is currently taking oxycodone she fill the prescription the 13th cannot take both oxycodone and alprazolam
Patient said they were filled but she never took any, she is just taking the Motrin   Asking for you to please fill medication
fall precautions

## 2025-04-23 NOTE — DISCHARGE NOTE PROVIDER - HOSPITAL COURSE
78 y/o woman with PMHx of HTN, HLD, hypothyroidism, PAD on Plavix, HFpEF (EF 65-70% in August 2023), and cholecystectomy with multiple ERCP in the past for choledocholithiasis / CBD stent, s/p BRYSON; presents for abdominal pain    # pt is immunocompromised 2/2 age    # This is a STAR pt - GOC done 4/17    # BMI 31.1 = obesity  wt loss advised after she recovers from acute illness    # outpt MDs  PMD: was Dr Aaron; [facility]  Cardio/Vasc: Dr Stanley  GI: Dr Torres    # Cough and Shortness of breath w/ acute hypox resp failure 2/2 RLL/RML PNA w/ gram neg bacteremia; NO SIRS/NO SEPSIS  Patient has been reporting flu like symptoms 2 weeks ago, resolved, then came back again yesterday  CT scan showing: new trace right pleural effusion and new right lower and middle lobe consolidated opacities with associated bronchiolectasis, possibly reflecting atelectasis and/or pneumonia in the appropriate clinical setting.   2 points on CURB 65  s/p hospitalization requiring antibiotics within the past 3 months  RVP neg; MRSA neg  Blood culture 1 of 2: + E Coli  ID consult: noted  Pulm consult: noted  abx: d/c Zosyn & Doxycycline; use rocephin 2gm iv q24 + Flagyl 500mg po q12 (end 4/30)  Potential final ID recs: - Plan for antibiotics x 10 days post-ERCP E/D 4/30. Option 1) PO bactrim 2 DS BID (given kg) + PO flagyl 500mg BID vs Option 2) Midline x IV Ceftriaxone 2g q24h + PO flagyl 500mg BID. Pt to discuss options w/ dtr.  incent rosey  tessalon prn cough  NC O2 - can try to taper off O2 (has no home O2)  echo not needed  WBC nl    # Abdominal pain 2/2 acute cholangitis w/ gram neg bacteremia; Hx CCY and BRYSON; Hx of multiple ERCP for choledocholithiasis (last CBD stent placed 2/5/25)  Last ERCP on 02/05/2025: Successful ERCP with removal of stent, removal of stones and sludge and placement of plastic CBD stent. 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  T abdomen showing: Slightly increased intrahepatic and extrahepatic biliary ductal dilatation with CBD stent in place; interval resolution of previously noted pneumobilia.  T bili: 2.5; other LFTs downtrending   diet: full liquids (DASH restrictions)   pain: d/c dilaudid; use oxy IR 5mg po q4 prn pain  Adv GI eval: s/p ERCP w/ stent exchange 4/21 - pus was seen  Bld Cx: + E Coli  abx: above  LFT q24    # HTN  Difficult to control HTN outpatient, patient follows with Dr. Stanley  c/w Hydralazine 25 mg q8  c/w Losartan 100 q24  incr Nifedipine xl 90mg po qHS (start clary)  c/w Lasix 40 mg every other day    # PAD on Plavix; has stents in both legs (lt fem stent seen on CT A/P); HFpEF - chronic, not in acute decompensation  resume Plavix 75 q24   not on statin - would not start one now w/ LFTs anyway  lipid panel as outpt    # Hypothyroidism  c/w Levothyroxine 150 ug daily    # lymphadenopathy  has unchanged, diffuse, sub-cm LN intrathoracic, axillary and intraabd of unclear etiology  likely reactive  no further inpt w/u    # Anxiety  atarax 25mg po q6 prn - helped  melatonin prn sleep    # chr venous stasis; eczema post neck  HCT 1% oint top q12 to lower legs and post neck          During the hospital admission, the patient was septic on admission and required broad spectrum IV antibiotics and IVF resuscitation. Upon discharge, the patient and their family was equipped with instructions for ongoing management of her cholangitis ensuring continuity of care post- hospitalization. The patient's discharge plan was discussed in detail with attending physician: Dr. Tarango and the patient is in agreement with the discharge plan.    78 y/o woman with PMHx of HTN, HLD, hypothyroidism, PAD on Plavix, HFpEF (EF 65-70% in August 2023), and cholecystectomy with multiple ERCP in the past for choledocholithiasis / CBD stent, s/p BRYSON; presents for abdominal pain.     # pt is immunocompromised 2/2 age    # This is a STAR pt - GOC done 4/17    # BMI 31.1 = obesity  wt loss advised after she recovers from acute illness    # outpt MDs  PMD: was Dr Aaron; [facility]  Cardio/Vasc: Dr Stanley  GI: Dr Torres    # Cough and Shortness of breath w/ acute hypox resp failure 2/2 RLL/RML PNA w/ gram neg bacteremia; NO SIRS/NO SEPSIS  Patient has been reporting flu like symptoms 2 weeks ago, resolved, then came back again yesterday  CT scan showing: new trace right pleural effusion and new right lower and middle lobe consolidated opacities with associated bronchiolectasis, possibly reflecting atelectasis and/or pneumonia in the appropriate clinical setting.   2 points on CURB 65  s/p hospitalization requiring antibiotics within the past 3 months  RVP neg; MRSA neg  Blood culture 1 of 2: + E Coli  ID consult: noted  Pulm consult: noted  abx: d/c Zosyn & Doxycycline; use rocephin 2gm iv q24 + Flagyl 500mg po q12 (end 4/30)  Potential final ID recs: - Plan for antibiotics x 10 days post-ERCP E/D 4/30. Option 1) PO bactrim 2 DS BID (given kg) + PO flagyl 500mg BID vs Option 2) Midline x IV Ceftriaxone 2g q24h + PO flagyl 500mg BID. Pt to discuss options w/ dtr.  incent rosey  tessalon prn cough  NC O2 - can try to taper off O2 (has no home O2)  echo not needed  WBC nl    # Abdominal pain 2/2 acute cholangitis w/ gram neg bacteremia; Hx CCY and BRYSON; Hx of multiple ERCP for choledocholithiasis (last CBD stent placed 2/5/25)  Last ERCP on 02/05/2025: Successful ERCP with removal of stent, removal of stones and sludge and placement of plastic CBD stent. 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  T abdomen showing: Slightly increased intrahepatic and extrahepatic biliary ductal dilatation with CBD stent in place; interval resolution of previously noted pneumobilia.  T bili: 2.5; other LFTs downtrending   diet: full liquids (DASH restrictions)   pain: d/c dilaudid; use oxy IR 5mg po q4 prn pain  Adv GI eval: s/p ERCP w/ stent exchange 4/21 - pus was seen  Bld Cx: + E Coli  abx: above  LFT q24    # HTN  Difficult to control HTN outpatient, patient follows with Dr. Stanley  c/w Hydralazine 25 mg q8  c/w Losartan 100 q24  incr Nifedipine xl 90mg po qHS (start clary)  c/w Lasix 40 mg every other day    # PAD on Plavix; has stents in both legs (lt fem stent seen on CT A/P); HFpEF - chronic, not in acute decompensation  resume Plavix 75 q24   not on statin - would not start one now w/ LFTs anyway  lipid panel as outpt    # Hypothyroidism  c/w Levothyroxine 150 ug daily    # lymphadenopathy  has unchanged, diffuse, sub-cm LN intrathoracic, axillary and intraabd of unclear etiology  likely reactive  no further inpt w/u    # Anxiety  atarax 25mg po q6 prn - helped  melatonin prn sleep    # chr venous stasis; eczema post neck  HCT 1% oint top q12 to lower legs and post neck          During the hospital admission, the patient was septic on admission and required broad spectrum IV antibiotics and IVF resuscitation. Upon discharge, the patient and their family was equipped with instructions for ongoing management of her cholangitis ensuring continuity of care post- hospitalization. The patient's discharge plan was discussed in detail with attending physician: Dr. Tarango and the patient is in agreement with the discharge plan.    78 y/o woman with PMHx of HTN, HLD, hypothyroidism, PAD on Plavix, HFpEF (EF 65-70% in August 2023), and cholecystectomy with multiple ERCP in the past for choledocholithiasis / CBD stent, s/p BRYSON; presents for abdominal pain.      # pt is immunocompromised 2/2 age    # This is a STAR pt - GOC done 4/17    # BMI 31.1 = obesity  wt loss advised after she recovers from acute illness    # outpt MDs  PMD: was Dr Aaron; [facility]  Cardio/Vasc: Dr Stanley  GI: Dr Torres    # Cough and Shortness of breath w/ acute hypox resp failure 2/2 RLL/RML PNA w/ gram neg bacteremia; NO SIRS/NO SEPSIS  Patient has been reporting flu like symptoms 2 weeks ago, resolved, then came back again yesterday  CT scan showing: new trace right pleural effusion and new right lower and middle lobe consolidated opacities with associated bronchiolectasis, possibly reflecting atelectasis and/or pneumonia in the appropriate clinical setting.   2 points on CURB 65  s/p hospitalization requiring antibiotics within the past 3 months  RVP neg; MRSA neg  Blood culture 1 of 2: + E Coli  ID consult: noted  Pulm consult: noted  abx: d/c Zosyn & Doxycycline; use rocephin 2gm iv q24 + Flagyl 500mg po q12 (end 4/30)  Potential final ID recs: - Plan for antibiotics x 10 days post-ERCP E/D 4/30. Option 1) PO bactrim 2 DS BID (given kg) + PO flagyl 500mg BID vs Option 2) Midline x IV Ceftriaxone 2g q24h + PO flagyl 500mg BID. Pt to discuss options w/ dtr.  incent rosey  tessalon prn cough  NC O2 - can try to taper off O2 (has no home O2)  echo not needed  WBC nl    # Abdominal pain 2/2 acute cholangitis w/ gram neg bacteremia; Hx CCY and BRYSON; Hx of multiple ERCP for choledocholithiasis (last CBD stent placed 2/5/25)  Last ERCP on 02/05/2025: Successful ERCP with removal of stent, removal of stones and sludge and placement of plastic CBD stent. 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  T abdomen showing: Slightly increased intrahepatic and extrahepatic biliary ductal dilatation with CBD stent in place; interval resolution of previously noted pneumobilia.  T bili: 2.5; other LFTs downtrending   diet: full liquids (DASH restrictions)   pain: d/c dilaudid; use oxy IR 5mg po q4 prn pain  Adv GI eval: s/p ERCP w/ stent exchange 4/21 - pus was seen  Bld Cx: + E Coli  abx: above  LFT q24    # HTN  Difficult to control HTN outpatient, patient follows with Dr. Stanley  c/w Hydralazine 25 mg q8  c/w Losartan 100 q24  incr Nifedipine xl 90mg po qHS (start clary)  c/w Lasix 40 mg every other day    # PAD on Plavix; has stents in both legs (lt fem stent seen on CT A/P); HFpEF - chronic, not in acute decompensation  resume Plavix 75 q24   not on statin - would not start one now w/ LFTs anyway  lipid panel as outpt    # Hypothyroidism  c/w Levothyroxine 150 ug daily    # lymphadenopathy  has unchanged, diffuse, sub-cm LN intrathoracic, axillary and intraabd of unclear etiology  likely reactive  no further inpt w/u    # Anxiety  atarax 25mg po q6 prn - helped  melatonin prn sleep    # chr venous stasis; eczema post neck  HCT 1% oint top q12 to lower legs and post neck          During the hospital admission, the patient was septic on admission and required broad spectrum IV antibiotics and IVF resuscitation. Upon discharge, the patient and their family was equipped with instructions for ongoing management of her cholangitis ensuring continuity of care post- hospitalization. The patient's discharge plan was discussed in detail with attending physician: Dr. Tarango and the patient is in agreement with the discharge plan.    78 y/o woman with PMHx of HTN, HLD, hypothyroidism, PAD on Plavix, HFpEF (EF 65-70% in August 2023), and cholecystectomy with multiple ERCP in the past for choledocholithiasis / CBD stent, s/p BRYSON; presents for abdominal pain.      # pt is immunocompromised 2/2 age    # This is a STAR pt - GOC done 4/17    # BMI 31.1 = obesity  wt loss advised after she recovers from acute illness    # outpt MDs  PMD: was Dr Aaron; [facility]/c  Cardio/Vasc: Dr Stanley  GI: Dr Torres    # Cough and Shortness of breath w/ acute hypox resp failure 2/2 RLL/RML PNA w/ gram neg bacteremia; NO SIRS/NO SEPSIS  Patient has been reporting flu like symptoms 2 weeks ago, resolved, then came back again yesterday  CT scan showing: new trace right pleural effusion and new right lower and middle lobe consolidated opacities with associated bronchiolectasis, possibly reflecting atelectasis and/or pneumonia in the appropriate clinical setting.   2 points on CURB 65  s/p hospitalization requiring antibiotics within the past 3 months  RVP neg; MRSA neg  Blood culture 1 of 2: + E Coli  ID consult: noted  Pulm consult: noted  abx: below  incent rosey  tessalon prn cough  off O2 - did not need home O2  echo not needed    # Abdominal pain 2/2 acute cholangitis w/ gram neg bacteremia (E Coli); Hx CCY and BRYSON; Hx of multiple ERCP for choledocholithiasis (last CBD stent placed 2/5/25)  Last ERCP on 02/05/2025: Successful ERCP with removal of stent, removal of stones and sludge and placement of plastic CBD stent. 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  CT abdomen showing: Slightly increased intrahepatic and extrahepatic biliary ductal dilatation with CBD stent in place; interval resolution of previously noted pneumobilia.  LFTs downtrending   diet: full liquids (DASH restrictions)   pain: use oxy IR 5mg po q4 prn pain - not needed at home  Adv GI eval: s/p ERCP w/ stent exchange 4/21 - pus was seen  Bld Cx: + E Coli  rpt BCx: neg  abx: augmentin 875mg po q12 + levoflox 750mg po q24 until 5/5  LFTs downtrending    # HTN, PAD  Difficult to control HTN outpatient, patient follows with Dr. Stanley  c/w Hydralazine 50 mg q8  c/w Losartan 100 q24  c/w Nifedipine xl 90mg po qHS   c/w Lasix 40 mg every other day  c/w Plavix 75 q24   not on statin - would not start one now w/ LFTs anyway  lipid panel as outpt    # Hypothyroidism  c/w Levothyroxine 150 ug daily    # lymphadenopathy  has unchanged, diffuse, sub-cm LN intrathoracic, axillary and intraabd of unclear etiology  likely reactive  no further inpt w/u  outpt f/u PMD or Onc    # Anxiety  atarax 25mg po q6 prn - helped, not needed at home  melatonin prn sleep    # chr venous stasis; eczema post neck  HCT 1% oint top q12 to lower legs and post neck - cont    During the hospital admission, the patient was septic on admission and required broad spectrum IV antibiotics and IVF resuscitation. Upon discharge, the patient and their family was equipped with instructions for ongoing management of her cholangitis ensuring continuity of care post- hospitalization.

## 2025-04-23 NOTE — PROGRESS NOTE ADULT - ASSESSMENT
ASSESSMENT  80 y/o Female with PMHx of HTN, HLD, hypothyroidism,  PAD on Plavix, (HFpEF) (EF 65-70% in August 2023), and cholecystectomy with multiple endoscopic retrograde cholangiopancreatography (ERCP) past choledocholithiasis / CBD stent,  presents for abdominal pain      IMPRESSION  #Ecoli bacteremia suspected cholangitis     4/21  ERCP with placement of a covered metallic stent draining a significant amount of pus from the bile duct    4/16 BCX 1/4 bottles + ecoli R fluoro     Afebrile; Admission WBC 9     MRSA PCR Result.: Negative (04-17-25 @ 02:31)    7/16 UCX   50,000 - 99,000 CFU/mL Escherichia coli S    ERCP 2/25: Successful ERCP with removal of stent, removal of stones and sludge and placement of plastic CBD stent. 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  CT Chest / Abdomen  1. Since February 5, 2025, new trace right pleural effusion and new right lower and middle lobe consolidated opacities with associated bronchiolectasis, possibly reflecting atelectasis and/or pneumonia in the appropriate clinical setting. Follow-up to complete resolution is   suggested.  2. Slightly increased intrahepatic and extrahepatic biliary ductal dilatation with CBD stent in place; interval resolution of previously noted pneumobilia.  2. No significant change in multiple subcentimeter and mildly enlarged intrathoracic and abdominopelvic lymph nodes.  #Sepsis ruled out on admission    #Possible PNA, has cough   #Transaminitis   #Obesity BMI (kg/m2): 31.1  #Immunodeficiency secondary to Senescence  which could result in poor clinical outcomes  #Abx allergy: NA  Creatinine: 0.8 mg/dL (04-22-25 @ 06:03) 62 mL/min  Creatinine clearance modified for overweight patient, using adjusted body weight of 68 kg (149 lbs).    Height (cm): 165.1 (04-17-25 @ 13:59)  Weight (kg): 84.7 (04-17-25 @ 13:59)    RECOMMENDATIONS  - Pt declined IV therapy at home. Discussed risks/benefits in detail.   - Plan for antibiotics x 10 days post-ERCP E/D 4/30. PO bactrim 2 DS BID (given kg) + PO flagyl 500mg BID- discussed side effects including hyperkalemia, nephrotoxicity. Would monitor K/Cr closely as outpatient   - Appreciate GI consult-       If any questions, please send a message or call on [employer] Teams  Please continue to update ID with any pertinent new laboratory, radiographic findings, or change in clinical status

## 2025-04-23 NOTE — DISCHARGE NOTE NURSING/CASE MANAGEMENT/SOCIAL WORK - FINANCIAL ASSISTANCE
[facility] provides services at a reduced cost to those who are determined to be eligible through [facility]’s financial assistance program. Information regarding [facility]’s financial assistance program can be found by going to [email or web address] or by calling [phone].

## 2025-04-23 NOTE — DISCHARGE NOTE PROVIDER - NSDCFUSCHEDAPPT_GEN_ALL_CORE_FT
Franklin Torres  [facility]  GASTR[facility] Doc Off [id number]  Scheduled Appointment: 05/01/2025     Franklin Torres  [facility]  GASTRO Doc Off [address]  Scheduled Appointment: 05/01/2025    Darrell Hayden  [facility] PreAdmits  Scheduled Appointment: 05/02/2025    Darrell Hayden  [facility]  [facility]  Scheduled Appointment: 05/02/2025

## 2025-04-23 NOTE — DISCHARGE NOTE PROVIDER - NSDCCPCAREPLAN_GEN_ALL_CORE_FT
PRINCIPAL DISCHARGE DIAGNOSIS  Diagnosis: Cholangitis  Assessment and Plan of Treatment: You are being discharged after hospitalization for respiratory distress and abdominal pain. You were admitted for Pneumonia and acute cholangitis. You used incentive spirometry, received cough treatment, and were managed on nasal cannula for oxygen and successfully tapered off of it. Additionally, you were treated for cholangitis after a recent ERCP with stent placement, with liver tests trending toward normal. One blood culture grew E. coli, prompting a change in antibiotics from Zosyn and doxycycline to ceftriaxone IV and metronidazole, to be continued for a 10-day course until April 30. Your chronic conditions including hypertension, PAD, hypothyroidism, lymphadenopathy, anxiety, and venous stasis were managed with appropriate medications. Follow up with your care team and seek help if worse.  DC instructions:  1. Please take the prescribed antibiotcs as scheduled even if you are feeling better  2. Visit your PCP and have a lab test to monitor your creatinine and potassium. This blood test is a BMP: basic metabolic panel.   3. Montior for any rash or drug reactions when taking these antibiotics.   4. Follow up with your gastroenterologist regarding your admission for acute cholangitis.      SECONDARY DISCHARGE DIAGNOSES  Diagnosis: Pneumonia  Assessment and Plan of Treatment:     Diagnosis: Total bilirubin, elevated  Assessment and Plan of Treatment:     Diagnosis: Elevated LFTs  Assessment and Plan of Treatment:

## 2025-04-23 NOTE — PROGRESS NOTE ADULT - ASSESSMENT
80 y/o woman with PMHx of HTN, HLD, hypothyroidism, PAD on Plavix, HFpEF (EF 65-70% in August 2023), and cholecystectomy with multiple ERCP in the past for choledocholithiasis / CBD stent, s/p BRYSON; presents for abdominal pain    # pt is immunocompromised 2/2 age    # This is a STAR pt - GOC done 4/17    # BMI 31.1 = obesity  wt loss advised after she recovers from acute illness    # outpt MDs  PMD: was Dr Aaron; needs referral to [facility] on d/c  Cardio/Vasc: Dr Stanley  GI: Dr Torres    # Cough and Shortness of breath w/ acute hypox resp failure 2/2 RLL/RML PNA w/ gram neg bacteremia; NO SIRS/NO SEPSIS  Patient has been reporting flu like symptoms 2 weeks ago, resolved, then came back again yesterday  CT scan showing: new trace right pleural effusion and new right lower and middle lobe consolidated opacities with associated bronchiolectasis, possibly reflecting atelectasis and/or pneumonia in the appropriate clinical setting.   2 points on CURB 65  s/p hospitalization requiring antibiotics within the past 3 months  RVP neg; MRSA neg  Blood culture 1 of 2: + E Coli  ID consult: noted  Pulm consult: noted  abx: d/c Zosyn & Doxycycline; rocephin 2gm iv q24 + Flagyl 500mg po q12 (hospital regimen)  final ID recs: - Plan for antibiotics x 10 days post-ERCP end 4/30: PO bactrim 2 DS BID + PO flagyl 500mg BID (home regimen)  incent rosey  tessalon prn cough  off O2 - great  echo not needed  WBC close to nl    # Abdominal pain 2/2 acute cholangitis w/ gram neg bacteremia; Hx CCY and BRYSON; Hx of multiple ERCP for choledocholithiasis (last CBD stent placed 2/5/25)  Last ERCP on 02/05/2025: Successful ERCP with removal of stent, removal of stones and sludge and placement of plastic CBD stent. 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  T abdomen showing: Slightly increased intrahepatic and extrahepatic biliary ductal dilatation with CBD stent in place; interval resolution of previously noted pneumobilia.  T bili: 2.5; other LFTs downtrending   diet: DASH restrictions  pain: d/c dilaudid; use oxy IR 5mg po q4 prn pain - d/c opiates, not needed at home  Adv GI eval: s/p ERCP w/ stent exchange 4/21 - pus was seen  Bld Cx: + E Coli  abx: above    # HTN  Difficult to control HTN outpatient, patient follows with Dr. Stanley  incr Hydralazine 50 mg q8  c/w Losartan 100 q24  incr Nifedipine xl 90mg po qHS (start clary)  c/w Lasix 40 mg every other day    # PAD on Plavix; has stents in both legs (lt fem stent seen on CT A/P); HFpEF - chronic, not in acute decompensation  c/w Plavix 75 q24   not on statin - would not start one now w/ LFTs anyway  lipid panel as outpt    # Hypothyroidism  c/w Levothyroxine 150 ug daily    # lymphadenopathy  has unchanged, diffuse, sub-cm LN intrathoracic, axillary and intraabd of unclear etiology  likely reactive  no further inpt w/u  cont w/ outpt surveillance w/ PMD or Hem/Onc    # Anxiety  atarax 25mg po q6 prn - helped  melatonin prn sleep    # chr venous stasis; eczema post neck  HCT 1% oint top q12 to lower legs and post neck - can cont upon d/c    # DVT prophylaxis: Lovenox 40 mg SC daily    # GI prophylaxis: Pantoprazole 40 mg daily    # Activity: can amb to BR; walks at home w/ no asst device; no PT for now needed; OOBTC     # daughter Moira ([phone])    Dispo: abx; f/u Pulm/GI/ID; adv diet; HCT oint;   today, pt will go home on oral abx - no needs; has cane at home    Prog improving well.

## 2025-04-23 NOTE — PHYSICAL THERAPY INITIAL EVALUATION ADULT - GENERAL OBSERVATIONS, REHAB EVAL
PT IE [id number]. Chart reviewed. pt encountered semi-reclined in bed. In NAD. + IV lock, + O2 via n/c 2lpm, + primafit. pt is alert, oriented x 4 and Is able to follow commands.

## 2025-04-23 NOTE — PROGRESS NOTE ADULT - SUBJECTIVE AND OBJECTIVE BOX
MICHAELBROWN  79y  Female  ***My note supersedes ALL resident notes that I sign.  My corrections for their notes are in my note.***    I can be reached directly via Teams or my office number is [phone] or [phone].    INTERVAL EVENTS: Here for f/u of PNA and cholang. Pt doing great and ready to go home. Eats well. Walked to BR. Off O2.    T(F): 97.6 (04-23-25 @ 11:25), Max: 98 (04-22-25 @ 20:20)  HR: 66 (04-23-25 @ 11:25) (66 - 67)  BP: 169/65 (04-23-25 @ 11:25) (166/70 - 181/71)  RR: 18 (04-23-25 @ 11:25) (18 - 18)  SpO2: 95% (04-23-25 @ 11:25) (95% - 99%)    Gen: NAD;   HEENT: PERRL, EOMI, mouth clr, nose clr  Neck: no nodes, no JVD, thyroid nl  lungs: less crackles rt base; no wheeze  hrt: s1 s2 rrr no murmur  abd: soft, ND, no pain; no HS megaly  ext: no edema, no c/c  neuro: aa ox3, cn intact, can move all 4 ext    LABS:                      9.6     (    77.2   11.01 )-----------( ---------      350      ( 23 Apr 2025 05:41 )             30.4    (    18.2     WBC Count: 11.01 K/uL (04-23-25 @ 05:41) - OK  WBC Count: 9.79 K/uL (04-22-25 @ 06:03)  WBC Count: 7.81 K/uL (04-20-25 @ 06:48)  WBC Count: 7.05 K/uL (04-19-25 @ 06:59)    Hemoglobin: 9.6 g/dL (04-23 @ 05:41)  Hemoglobin: 8.9 g/dL (04-22 @ 06:03)  Hemoglobin: 8.9 g/dL (04-20 @ 06:48)  Hemoglobin: 8.5 g/dL (04-19 @ 06:59)    135   (   96   (   98      04-23-25 @ 05:41  ----------------------               3.8   (   26   (   26                             -----                        0.7  Ca  8.7   Mg  2.2    P   --     LFT  5.8  (  1.8  (  31       04-23-25 @ 05:41  -------------------------  3.2  (  234  (  58    Alb 3.2  T ramone 1.8     AST 31  ALT 58  04-23-25 @ 05:41 - better  Alb 3.1  T ramone 2.5     AST 28  ALT 63  04-22-25 @ 06:03  Alb 3.2  T ramone 4.4     AST 38    04-20-25 @ 06:48  Alb 3.2  T ramone 3.9     AST 44    04-19-25 @ 06:59    Urinalysis Basic - ( 23 Apr 2025 05:41 )    Color: x / Appearance: x / SG: x / pH: x  Gluc: 98 mg/dL / Ketone: x  / Bili: x / Urobili: x   Blood: x / Protein: x / Nitrite: x   Leuk Esterase: x / RBC: x / WBC x   Sq Epi: x / Non Sq Epi: x / Bacteria: x    RADIOLOGY & ADDITIONAL TESTS:    MEDICATIONS:  metroNIDAZOLE    Tablet 500 milliGRAM(s) Oral two times a day  trimethoprim  160 mG/sulfamethoxazole 800 mG 2 Tablet(s) Oral two times a day    acetaminophen     Tablet .. 650 milliGRAM(s) Oral every 6 hours PRN  aluminum hydroxide/magnesium hydroxide/simethicone Suspension 30 milliLiter(s) Oral every 4 hours PRN  benzonatate 100 milliGRAM(s) Oral every 8 hours PRN  chlorhexidine 2% Cloths 1 Application(s) Topical <User Schedule>  clopidogrel Tablet 75 milliGRAM(s) Oral daily  enoxaparin Injectable 40 milliGRAM(s) SubCutaneous every 24 hours  ferrous    sulfate 325 milliGRAM(s) Oral daily  furosemide    Tablet 40 milliGRAM(s) Oral <User Schedule>  hydrALAZINE 50 milliGRAM(s) Oral three times a day  hydrocortisone 1% Ointment 1 Application(s) Topical every 12 hours  hydrOXYzine hydrochloride 25 milliGRAM(s) Oral every 6 hours PRN  influenza  Vaccine (HIGH DOSE) 0.5 milliLiter(s) IntraMuscular once  levothyroxine 150 MICROGram(s) Oral daily  losartan 100 milliGRAM(s) Oral daily  melatonin 3 milliGRAM(s) Oral at bedtime PRN  NIFEdipine XL 90 milliGRAM(s) Oral at bedtime  ondansetron Injectable 4 milliGRAM(s) IV Push every 8 hours PRN  oxyCODONE    IR 5 milliGRAM(s) Oral every 6 hours PRN  pantoprazole    Tablet 40 milliGRAM(s) Oral before breakfast

## 2025-04-23 NOTE — PROGRESS NOTE ADULT - SUBJECTIVE AND OBJECTIVE BOX
MICHAEL, BROWN SNOW  79y, Female  Allergy: codeine (Stomach Upset; Vomiting; Nausea)      LOS  7d    CHIEF COMPLAINT: Abdominal pain (23 Apr 2025 11:59)      INTERVAL EVENTS/HPI  - No acute events overnight  - T(F): , Max: 98 (04-22-25 @ 20:20)  - Denies any worsening symptoms  - Tolerating medication  - WBC Count: 11.01 (04-23-25 @ 05:41)  WBC Count: 9.79 (04-22-25 @ 06:03)     - Creatinine: 0.7 (04-23-25 @ 05:41)  Creatinine: 0.8 (04-22-25 @ 06:03)       ROS  General: Denies rigors, nightsweats  HEENT: Denies headache, rhinorrhea, sore throat, eye pain  CV: Denies CP, palpitations  PULM: Denies wheezing, hemoptysis  GI: Denies hematemesis, hematochezia, melena  : Denies discharge, hematuria  MSK: Denies arthralgias, myalgias  SKIN: Denies rash, lesions  NEURO: Denies paresthesias, weakness  PSYCH: Denies depression, anxiety    VITALS:  T(F): 97.6, Max: 98 (04-22-25 @ 20:20)  HR: 66  BP: 169/65  RR: 18Vital Signs Last 24 Hrs  T(C): 36.4 (23 Apr 2025 11:25), Max: 36.7 (22 Apr 2025 20:20)  T(F): 97.6 (23 Apr 2025 11:25), Max: 98 (22 Apr 2025 20:20)  HR: 66 (23 Apr 2025 11:25) (66 - 67)  BP: 169/65 (23 Apr 2025 11:25) (166/70 - 181/71)  BP(mean): 100 (23 Apr 2025 11:25) (100 - 100)  RR: 18 (23 Apr 2025 11:25) (18 - 18)  SpO2: 95% (23 Apr 2025 11:25) (95% - 99%)    Parameters below as of 23 Apr 2025 11:25  Patient On (Oxygen Delivery Method): room air        PHYSICAL EXAM:  Gen: NAD, resting in bed  HEENT: Normocephalic, atraumatic  Neck: supple, no lymphadenopathy  CV: Regular rate & regular rhythm  Lungs: decreased BS at bases, no fremitus  Abdomen: Soft, BS present  Ext: Warm, well perfused  Neuro: non focal, awake  Skin: no rash, no erythema  Lines: no phlebitis    FH: Non-contributory  Social Hx: Non-contributory    TESTS & MEASUREMENTS:                        9.6    11.01 )-----------( 350      ( 23 Apr 2025 05:41 )             30.4     04-23    135  |  96[L]  |  26[H]  ----------------------------<  98  3.8   |  26  |  0.7    Ca    8.7      23 Apr 2025 05:41  Mg     2.2     04-23    TPro  5.8[L]  /  Alb  3.2[L]  /  TBili  1.8[H]  /  DBili  x   /  AST  31  /  ALT  58[H]  /  AlkPhos  234[H]  04-23      LIVER FUNCTIONS - ( 23 Apr 2025 05:41 )  Alb: 3.2 g/dL / Pro: 5.8 g/dL / ALK PHOS: 234 U/L / ALT: 58 U/L / AST: 31 U/L / GGT: x           Urinalysis Basic - ( 23 Apr 2025 05:41 )    Color: x / Appearance: x / SG: x / pH: x  Gluc: 98 mg/dL / Ketone: x  / Bili: x / Urobili: x   Blood: x / Protein: x / Nitrite: x   Leuk Esterase: x / RBC: x / WBC x   Sq Epi: x / Non Sq Epi: x / Bacteria: x        Culture - Blood (collected 04-16-25 @ 13:20)  Source: Blood Blood-Peripheral  Gram Stain (04-17-25 @ 09:25):    Growth in aerobic bottle: Gram Negative Rods  Final Report (04-18-25 @ 16:06):    Growth in aerobic bottle: Escherichia coli    Direct identification is available within approximately 3-5    hours either by Blood Panel Multiplexed PCR or Direct    MALDI-TOF. Details: [email or web address]  Organism: Blood Culture PCR  Escherichia coli (04-18-25 @ 16:06)  Organism: Escherichia coli (04-18-25 @ 16:06)      Method Type: KARISHMA      -  Ampicillin: S <=8 These ampicillin results predict results for amoxicillin      -  Ampicillin/Sulbactam: S <=4/2      -  Aztreonam: S <=4      -  Cefazolin: S <=2      -  Cefepime: S <=2      -  Cefoxitin: S <=8      -  Ceftriaxone: S <=1      -  Ciprofloxacin: R >2      -  Ertapenem: S <=0.5      -  Gentamicin: S <=2      -  Imipenem: S <=1      -  Levofloxacin: R >4      -  Meropenem: S <=1      -  Piperacillin/Tazobactam: S <=8      -  Tobramycin: S <=2      -  Trimethoprim/Sulfamethoxazole: S <=0.5/9.5  Organism: Blood Culture PCR (04-18-25 @ 16:06)      Method Type: PCR      -  Escherichia coli: Detec    Culture - Blood (collected 04-16-25 @ 13:20)  Source: Blood Blood-Peripheral  Final Report (04-21-25 @ 22:00):    No growth at 5 days    Urinalysis with Rflx Culture (collected 04-16-25 @ 13:18)            INFECTIOUS DISEASES TESTING  MRSA PCR Result.: Negative (04-17-25 @ 02:31)  Procalcitonin: 20.60 (02-06-25 @ 12:05)  Rapid RVP Result: NotDetec (10-14-24 @ 01:56)  MRSA PCR Result.: Negative (10-14-24 @ 01:53)  Legionella Antigen, Urine: Negative (10-14-24 @ 01:52)  Streptococcus pneumoniae Ag, Ur Result: Negative (10-14-24 @ 01:52)  Procalcitonin: 0.19 (10-13-24 @ 07:26)      INFLAMMATORY MARKERS  Sedimentation Rate, Erythrocyte: 62 mm/Hr (02-06-25 @ 04:30)  C-Reactive Protein: 181.0 mg/L (02-06-25 @ 04:30)  Sedimentation Rate, Erythrocyte: 54 mm/Hr (02-05-25 @ 11:00)  C-Reactive Protein: 170.4 mg/L (02-05-25 @ 11:00)      RADIOLOGY & ADDITIONAL TESTS:  I have personally reviewed the last available Chest xray  CXR      CT      CARDIOLOGY TESTING      MEDICATIONS  chlorhexidine 2% Cloths 1 Topical <User Schedule>  clopidogrel Tablet 75 Oral daily  enoxaparin Injectable 40 SubCutaneous every 24 hours  ferrous    sulfate 325 Oral daily  furosemide    Tablet 40 Oral <User Schedule>  hydrALAZINE 50 Oral three times a day  hydrocortisone 1% Ointment 1 Topical every 12 hours  influenza  Vaccine (HIGH DOSE) 0.5 IntraMuscular once  levothyroxine 150 Oral daily  losartan 100 Oral daily  metroNIDAZOLE    Tablet 500 Oral two times a day  NIFEdipine XL 90 Oral at bedtime  pantoprazole    Tablet 40 Oral before breakfast  trimethoprim  160 mG/sulfamethoxazole 800 mG 2 Oral two times a day      WEIGHT  Weight (kg): 84.7 (04-21-25 @ 10:56)  Creatinine: 0.7 mg/dL (04-23-25 @ 05:41)      ANTIBIOTICS:  metroNIDAZOLE    Tablet 500 milliGRAM(s) Oral two times a day  trimethoprim  160 mG/sulfamethoxazole 800 mG 2 Tablet(s) Oral two times a day      All available historical records have been reviewed

## 2025-04-24 LAB
ALBUMIN SERPL ELPH-MCNC: 3.6 G/DL — SIGNIFICANT CHANGE UP (ref 3.5–5.2)
ALP SERPL-CCNC: 262 U/L — HIGH (ref 30–115)
ALT FLD-CCNC: 62 U/L — HIGH (ref 0–41)
ANION GAP SERPL CALC-SCNC: 18 MMOL/L — HIGH (ref 7–14)
AST SERPL-CCNC: 39 U/L — SIGNIFICANT CHANGE UP (ref 0–41)
BASOPHILS # BLD AUTO: 0.08 K/UL — SIGNIFICANT CHANGE UP (ref 0–0.2)
BASOPHILS NFR BLD AUTO: 0.5 % — SIGNIFICANT CHANGE UP (ref 0–1)
BILIRUB SERPL-MCNC: 1.6 MG/DL — HIGH (ref 0.2–1.2)
BUN SERPL-MCNC: 21 MG/DL — HIGH (ref 10–20)
CALCIUM SERPL-MCNC: 9.1 MG/DL — SIGNIFICANT CHANGE UP (ref 8.4–10.5)
CHLORIDE SERPL-SCNC: 96 MMOL/L — LOW (ref 98–110)
CO2 SERPL-SCNC: 20 MMOL/L — SIGNIFICANT CHANGE UP (ref 17–32)
CREAT SERPL-MCNC: 0.8 MG/DL — SIGNIFICANT CHANGE UP (ref 0.7–1.5)
EGFR: 75 ML/MIN/1.73M2 — SIGNIFICANT CHANGE UP
EGFR: 75 ML/MIN/1.73M2 — SIGNIFICANT CHANGE UP
EOSINOPHIL # BLD AUTO: 0.1 K/UL — SIGNIFICANT CHANGE UP (ref 0–0.7)
EOSINOPHIL NFR BLD AUTO: 0.6 % — SIGNIFICANT CHANGE UP (ref 0–8)
GLUCOSE SERPL-MCNC: 142 MG/DL — HIGH (ref 70–99)
HCT VFR BLD CALC: 34.9 % — LOW (ref 37–47)
HGB BLD-MCNC: 11.1 G/DL — LOW (ref 12–16)
IMM GRANULOCYTES NFR BLD AUTO: 1.3 % — HIGH (ref 0.1–0.3)
LIDOCAIN IGE QN: 74 U/L — HIGH (ref 7–60)
LYMPHOCYTES # BLD AUTO: 1.35 K/UL — SIGNIFICANT CHANGE UP (ref 1.2–3.4)
LYMPHOCYTES # BLD AUTO: 8.1 % — LOW (ref 20.5–51.1)
MAGNESIUM SERPL-MCNC: 2.8 MG/DL — HIGH (ref 1.8–2.4)
MCHC RBC-ENTMCNC: 24.1 PG — LOW (ref 27–31)
MCHC RBC-ENTMCNC: 31.8 G/DL — LOW (ref 32–37)
MCV RBC AUTO: 75.9 FL — LOW (ref 81–99)
MONOCYTES # BLD AUTO: 0.7 K/UL — HIGH (ref 0.1–0.6)
MONOCYTES NFR BLD AUTO: 4.2 % — SIGNIFICANT CHANGE UP (ref 1.7–9.3)
NEUTROPHILS # BLD AUTO: 14.19 K/UL — HIGH (ref 1.4–6.5)
NEUTROPHILS NFR BLD AUTO: 85.3 % — HIGH (ref 42.2–75.2)
NRBC BLD AUTO-RTO: 0 /100 WBCS — SIGNIFICANT CHANGE UP (ref 0–0)
PLATELET # BLD AUTO: 493 K/UL — HIGH (ref 130–400)
PMV BLD: 9.8 FL — SIGNIFICANT CHANGE UP (ref 7.4–10.4)
POTASSIUM SERPL-MCNC: 4.3 MMOL/L — SIGNIFICANT CHANGE UP (ref 3.5–5)
POTASSIUM SERPL-SCNC: 4.3 MMOL/L — SIGNIFICANT CHANGE UP (ref 3.5–5)
PROT SERPL-MCNC: 6.6 G/DL — SIGNIFICANT CHANGE UP (ref 6–8)
RBC # BLD: 4.6 M/UL — SIGNIFICANT CHANGE UP (ref 4.2–5.4)
RBC # FLD: 18.9 % — HIGH (ref 11.5–14.5)
SODIUM SERPL-SCNC: 134 MMOL/L — LOW (ref 135–146)
WBC # BLD: 16.64 K/UL — HIGH (ref 4.8–10.8)
WBC # FLD AUTO: 16.64 K/UL — HIGH (ref 4.8–10.8)

## 2025-04-24 PROCEDURE — 99233 SBSQ HOSP IP/OBS HIGH 50: CPT

## 2025-04-24 PROCEDURE — 74177 CT ABD & PELVIS W/CONTRAST: CPT | Mod: 26

## 2025-04-24 RX ORDER — CEFTRIAXONE 500 MG/1
2000 INJECTION, POWDER, FOR SOLUTION INTRAMUSCULAR; INTRAVENOUS ONCE
Refills: 0 | Status: COMPLETED | OUTPATIENT
Start: 2025-04-24 | End: 2025-04-24

## 2025-04-24 RX ORDER — AMPICILLIN SODIUM AND SULBACTAM SODIUM 1; .5 G/1; G/1
3 INJECTION, POWDER, FOR SOLUTION INTRAMUSCULAR; INTRAVENOUS EVERY 6 HOURS
Refills: 0 | Status: DISCONTINUED | OUTPATIENT
Start: 2025-04-25 | End: 2025-04-27

## 2025-04-24 RX ORDER — AMPICILLIN SODIUM AND SULBACTAM SODIUM 1; .5 G/1; G/1
INJECTION, POWDER, FOR SOLUTION INTRAMUSCULAR; INTRAVENOUS
Refills: 0 | Status: DISCONTINUED | OUTPATIENT
Start: 2025-04-24 | End: 2025-04-27

## 2025-04-24 RX ORDER — METRONIDAZOLE 250 MG
500 TABLET ORAL EVERY 12 HOURS
Refills: 0 | Status: DISCONTINUED | OUTPATIENT
Start: 2025-04-24 | End: 2025-04-24

## 2025-04-24 RX ORDER — CEFTRIAXONE 500 MG/1
INJECTION, POWDER, FOR SOLUTION INTRAMUSCULAR; INTRAVENOUS
Refills: 0 | Status: DISCONTINUED | OUTPATIENT
Start: 2025-04-24 | End: 2025-04-24

## 2025-04-24 RX ORDER — IOHEXOL 350 MG/ML
30 INJECTION, SOLUTION INTRAVENOUS ONCE
Refills: 0 | Status: COMPLETED | OUTPATIENT
Start: 2025-04-24 | End: 2025-04-24

## 2025-04-24 RX ORDER — AMPICILLIN SODIUM AND SULBACTAM SODIUM 1; .5 G/1; G/1
3 INJECTION, POWDER, FOR SOLUTION INTRAMUSCULAR; INTRAVENOUS ONCE
Refills: 0 | Status: COMPLETED | OUTPATIENT
Start: 2025-04-24 | End: 2025-04-24

## 2025-04-24 RX ORDER — ROPINIROLE 5 MG/1
0.25 TABLET, FILM COATED ORAL AT BEDTIME
Refills: 0 | Status: DISCONTINUED | OUTPATIENT
Start: 2025-04-24 | End: 2025-04-27

## 2025-04-24 RX ADMIN — Medication 150 MICROGRAM(S): at 05:28

## 2025-04-24 RX ADMIN — Medication 50 MILLIGRAM(S): at 15:18

## 2025-04-24 RX ADMIN — Medication 50 MILLIGRAM(S): at 05:27

## 2025-04-24 RX ADMIN — FUROSEMIDE 40 MILLIGRAM(S): 10 INJECTION INTRAMUSCULAR; INTRAVENOUS at 05:28

## 2025-04-24 RX ADMIN — Medication 1 APPLICATION(S): at 05:30

## 2025-04-24 RX ADMIN — CEFTRIAXONE 100 MILLIGRAM(S): 500 INJECTION, POWDER, FOR SOLUTION INTRAMUSCULAR; INTRAVENOUS at 10:33

## 2025-04-24 RX ADMIN — AMPICILLIN SODIUM AND SULBACTAM SODIUM 200 GRAM(S): 1; .5 INJECTION, POWDER, FOR SOLUTION INTRAMUSCULAR; INTRAVENOUS at 23:28

## 2025-04-24 RX ADMIN — Medication 50 MILLIGRAM(S): at 21:50

## 2025-04-24 RX ADMIN — Medication 4 MILLIGRAM(S): at 18:46

## 2025-04-24 RX ADMIN — LOSARTAN POTASSIUM 100 MILLIGRAM(S): 100 TABLET, FILM COATED ORAL at 05:29

## 2025-04-24 RX ADMIN — Medication 100 MILLIGRAM(S): at 10:37

## 2025-04-24 RX ADMIN — Medication 4 MILLIGRAM(S): at 10:36

## 2025-04-24 RX ADMIN — HYDROCORTISONE 1 APPLICATION(S): 10 CREAM TOPICAL at 05:29

## 2025-04-24 RX ADMIN — Medication 90 MILLIGRAM(S): at 21:50

## 2025-04-24 RX ADMIN — Medication 4 MILLIGRAM(S): at 02:40

## 2025-04-24 RX ADMIN — ENOXAPARIN SODIUM 40 MILLIGRAM(S): 100 INJECTION SUBCUTANEOUS at 11:49

## 2025-04-24 RX ADMIN — CLOPIDOGREL BISULFATE 75 MILLIGRAM(S): 75 TABLET, FILM COATED ORAL at 11:48

## 2025-04-24 RX ADMIN — OXYCODONE HYDROCHLORIDE 5 MILLIGRAM(S): 30 TABLET ORAL at 04:30

## 2025-04-24 RX ADMIN — Medication 325 MILLIGRAM(S): at 11:49

## 2025-04-24 RX ADMIN — Medication 40 MILLIGRAM(S): at 05:29

## 2025-04-24 RX ADMIN — Medication 3 MILLIGRAM(S): at 22:42

## 2025-04-24 RX ADMIN — IOHEXOL 30 MILLILITER(S): 350 INJECTION, SOLUTION INTRAVENOUS at 10:32

## 2025-04-24 RX ADMIN — ROPINIROLE 0.25 MILLIGRAM(S): 5 TABLET, FILM COATED ORAL at 21:50

## 2025-04-24 RX ADMIN — AMPICILLIN SODIUM AND SULBACTAM SODIUM 200 GRAM(S): 1; .5 INJECTION, POWDER, FOR SOLUTION INTRAMUSCULAR; INTRAVENOUS at 15:31

## 2025-04-24 RX ADMIN — OXYCODONE HYDROCHLORIDE 5 MILLIGRAM(S): 30 TABLET ORAL at 18:46

## 2025-04-24 RX ADMIN — OXYCODONE HYDROCHLORIDE 5 MILLIGRAM(S): 30 TABLET ORAL at 03:51

## 2025-04-24 NOTE — PROGRESS NOTE ADULT - SUBJECTIVE AND OBJECTIVE BOX
MICHAELBROWN  79y  Female  ***My note supersedes ALL resident notes that I sign.  My corrections for their notes are in my note.***    I can be reached directly via Teams or my office number is [phone] or [phone].    INTERVAL EVENTS: Here for f/u of abd pain. Pt did not feel well late in the day yesterday, so she stayed overnight. She did not have a good night. She has decr rosy again. PNA seems fine - RA sat 91% and not SOB. Very mild cough. She says flagyl is making her nauseous.    T(F): 97.5 (04-24-25 @ 13:48), Max: 97.6 (04-23-25 @ 19:10)  HR: 66 (04-24-25 @ 15:15) (66 - 92)  BP: 148/65 (04-24-25 @ 15:15) (133/72 - 158/53)  RR: 18 (04-24-25 @ 13:48) (18 - 18)  SpO2: 95% (04-24-25 @ 15:15) (94% - 95%)    Gen: NAD;   HEENT: PERRL, EOMI, mouth clr, nose clr  Neck: no nodes, no JVD, thyroid nl  lungs: less crackles rt base; no wheeze  hrt: s1 s2 rrr no murmur  abd: soft, ND, no pain; no HS megaly  ext: no edema, no c/c  neuro: aa ox3, cn intact, can move all 4 ext    LABS:                      11.1    (    75.9   16.64 )-----------( ---------      493      ( 24 Apr 2025 05:54 )             34.9    (    18.9     WBC Count: 16.64 K/uL (04-24-25 @ 05:54) - ? some dehydration (hgb up too)  WBC Count: 11.01 K/uL (04-23-25 @ 05:41)  WBC Count: 9.79 K/uL (04-22-25 @ 06:03)  WBC Count: 7.81 K/uL (04-20-25 @ 06:48)    Hemoglobin: 11.1 g/dL (04-24 @ 05:54)  Hemoglobin: 9.6 g/dL (04-23 @ 05:41)  Hemoglobin: 8.9 g/dL (04-22 @ 06:03)  Hemoglobin: 8.9 g/dL (04-20 @ 06:48)    134   (   96   (   142      04-24-25 @ 05:54  ----------------------               4.3   (   20   (   21                             -----                        0.8  Ca  9.1   Mg  2.8    P   --     LFT  6.6  (  1.6  (  39       04-24-25 @ 05:54  -------------------------  3.6  (  262  (  62    Alb 3.6  T ramone 1.6     AST 39  ALT 62  04-24-25 @ 05:54  Alb 3.2  T ramone 1.8     AST 31  ALT 58  04-23-25 @ 05:41  Alb 3.1  T ramone 2.5     AST 28  ALT 63  04-22-25 @ 06:03  Alb 3.2  T ramone 4.4     AST 38    04-20-25 @ 06:48    Urinalysis Basic - ( 24 Apr 2025 05:54 )    Color: x / Appearance: x / SG: x / pH: x  Gluc: 142 mg/dL / Ketone: x  / Bili: x / Urobili: x   Blood: x / Protein: x / Nitrite: x   Leuk Esterase: x / RBC: x / WBC x   Sq Epi: x / Non Sq Epi: x / Bacteria: x    RADIOLOGY & ADDITIONAL TESTS:  < from: CT Abdomen and Pelvis w/ Oral Cont and w/ IV Cont (04.24.25 @ 13:18) >  Stable right lower lobe consolidation with right pleural effusion    No evidence of bowel obstruction or colitis.    CBD stent in place with increased amounts of pneumobilia.    < end of copied text >    MEDICATIONS:  ampicillin/sulbactam  IVPB        acetaminophen     Tablet .. 650 milliGRAM(s) Oral every 6 hours PRN  aluminum hydroxide/magnesium hydroxide/simethicone Suspension 30 milliLiter(s) Oral every 4 hours PRN  benzonatate 100 milliGRAM(s) Oral every 8 hours PRN  chlorhexidine 2% Cloths 1 Application(s) Topical <User Schedule>  clopidogrel Tablet 75 milliGRAM(s) Oral daily  enoxaparin Injectable 40 milliGRAM(s) SubCutaneous every 24 hours  ferrous    sulfate 325 milliGRAM(s) Oral daily  furosemide    Tablet 40 milliGRAM(s) Oral <User Schedule>  hydrALAZINE 50 milliGRAM(s) Oral three times a day  hydrocortisone 1% Ointment 1 Application(s) Topical every 12 hours  hydrOXYzine hydrochloride 25 milliGRAM(s) Oral every 6 hours PRN  influenza  Vaccine (HIGH DOSE) 0.5 milliLiter(s) IntraMuscular once  levothyroxine 150 MICROGram(s) Oral daily  losartan 100 milliGRAM(s) Oral daily  melatonin 3 milliGRAM(s) Oral at bedtime PRN  NIFEdipine XL 90 milliGRAM(s) Oral at bedtime  ondansetron Injectable 4 milliGRAM(s) IV Push every 8 hours PRN  oxyCODONE    IR 5 milliGRAM(s) Oral every 6 hours PRN  pantoprazole    Tablet 40 milliGRAM(s) Oral before breakfast  rOPINIRole 0.25 milliGRAM(s) Oral at bedtime     MICHAELBROWN  79y  Female  ***My note supersedes ALL resident notes that I sign.  My corrections for their notes are in my note.***    I can be reached directly via Teams or my office number is [phone] or [phone].    INTERVAL EVENTS: Here for f/u of abd pain. Pt did not feel well late in the day yesterday, so she stayed overnight. She did not have a good night. She has decr rosy again. PNA seems fine - RA sat 91% and not SOB. Very mild cough. She says flagyl is making her nauseous. She c/o restless legs at night.    T(F): 97.5 (04-24-25 @ 13:48), Max: 97.6 (04-23-25 @ 19:10)  HR: 66 (04-24-25 @ 15:15) (66 - 92)  BP: 148/65 (04-24-25 @ 15:15) (133/72 - 158/53)  RR: 18 (04-24-25 @ 13:48) (18 - 18)  SpO2: 95% (04-24-25 @ 15:15) (94% - 95%)    Gen: NAD;   HEENT: PERRL, EOMI, mouth clr, nose clr  Neck: no nodes, no JVD, thyroid nl  lungs: less crackles rt base; no wheeze  hrt: s1 s2 rrr no murmur  abd: soft, ND, no pain; no HS megaly  ext: no edema, no c/c  neuro: aa ox3, cn intact, can move all 4 ext    LABS:                      11.1    (    75.9   16.64 )-----------( ---------      493      ( 24 Apr 2025 05:54 )             34.9    (    18.9     WBC Count: 16.64 K/uL (04-24-25 @ 05:54) - ? some dehydration (hgb up too)  WBC Count: 11.01 K/uL (04-23-25 @ 05:41)  WBC Count: 9.79 K/uL (04-22-25 @ 06:03)  WBC Count: 7.81 K/uL (04-20-25 @ 06:48)    Hemoglobin: 11.1 g/dL (04-24 @ 05:54)  Hemoglobin: 9.6 g/dL (04-23 @ 05:41)  Hemoglobin: 8.9 g/dL (04-22 @ 06:03)  Hemoglobin: 8.9 g/dL (04-20 @ 06:48)    134   (   96   (   142      04-24-25 @ 05:54  ----------------------               4.3   (   20   (   21                             -----                        0.8  Ca  9.1   Mg  2.8    P   --     LFT  6.6  (  1.6  (  39       04-24-25 @ 05:54  -------------------------  3.6  (  262  (  62    Alb 3.6  T ramone 1.6     AST 39  ALT 62  04-24-25 @ 05:54  Alb 3.2  T ramone 1.8     AST 31  ALT 58  04-23-25 @ 05:41  Alb 3.1  T ramone 2.5     AST 28  ALT 63  04-22-25 @ 06:03  Alb 3.2  T ramone 4.4     AST 38    04-20-25 @ 06:48    Urinalysis Basic - ( 24 Apr 2025 05:54 )    Color: x / Appearance: x / SG: x / pH: x  Gluc: 142 mg/dL / Ketone: x  / Bili: x / Urobili: x   Blood: x / Protein: x / Nitrite: x   Leuk Esterase: x / RBC: x / WBC x   Sq Epi: x / Non Sq Epi: x / Bacteria: x    RADIOLOGY & ADDITIONAL TESTS:  < from: CT Abdomen and Pelvis w/ Oral Cont and w/ IV Cont (04.24.25 @ 13:18) >  Stable right lower lobe consolidation with right pleural effusion    No evidence of bowel obstruction or colitis.    CBD stent in place with increased amounts of pneumobilia.    < end of copied text >    MEDICATIONS:  ampicillin/sulbactam  IVPB        acetaminophen     Tablet .. 650 milliGRAM(s) Oral every 6 hours PRN  aluminum hydroxide/magnesium hydroxide/simethicone Suspension 30 milliLiter(s) Oral every 4 hours PRN  benzonatate 100 milliGRAM(s) Oral every 8 hours PRN  chlorhexidine 2% Cloths 1 Application(s) Topical <User Schedule>  clopidogrel Tablet 75 milliGRAM(s) Oral daily  enoxaparin Injectable 40 milliGRAM(s) SubCutaneous every 24 hours  ferrous    sulfate 325 milliGRAM(s) Oral daily  furosemide    Tablet 40 milliGRAM(s) Oral <User Schedule>  hydrALAZINE 50 milliGRAM(s) Oral three times a day  hydrocortisone 1% Ointment 1 Application(s) Topical every 12 hours  hydrOXYzine hydrochloride 25 milliGRAM(s) Oral every 6 hours PRN  influenza  Vaccine (HIGH DOSE) 0.5 milliLiter(s) IntraMuscular once  levothyroxine 150 MICROGram(s) Oral daily  losartan 100 milliGRAM(s) Oral daily  melatonin 3 milliGRAM(s) Oral at bedtime PRN  NIFEdipine XL 90 milliGRAM(s) Oral at bedtime  ondansetron Injectable 4 milliGRAM(s) IV Push every 8 hours PRN  oxyCODONE    IR 5 milliGRAM(s) Oral every 6 hours PRN  pantoprazole    Tablet 40 milliGRAM(s) Oral before breakfast  rOPINIRole 0.25 milliGRAM(s) Oral at bedtime

## 2025-04-24 NOTE — PROGRESS NOTE ADULT - SUBJECTIVE AND OBJECTIVE BOX
Patient is a pleasant 79-year-old female with a past medical history of hypertension, hypothyroidism, hyperlipidemia, peripheral arterial disease on Plavix, and prior cholecystectomy, and known to advanced gastroenterology for multiple ERCP in the past as has a large tubular adenoma in the duodenum at the level of the papilla.  Patient had ERCP done April 21 with placement of a covered metallic stent draining a significant amount of pus from the bile duct.  Patient developed some RUQ pain but has since resolved . She went for CT scan.       PAST MEDICAL & SURGICAL HISTORY:  Arterial insufficiency of lower extremity  Leg swelling  Hypothyroid  HTN (hypertension)  High cholesterol  Peripheral arterial disease  H/O Graves' disease  History of cholecystectomy  H/O: hysterectomy  S/P angiogram of extremity      MEDICATIONS  (STANDING):  chlorhexidine 2% Cloths 1 Application(s) Topical <User Schedule>  clopidogrel Tablet 75 milliGRAM(s) Oral daily  doxycycline IVPB 100 milliGRAM(s) IV Intermittent every 12 hours  furosemide    Tablet 40 milliGRAM(s) Oral <User Schedule>  hydrALAZINE 25 milliGRAM(s) Oral three times a day  hydrocortisone 1% Ointment 1 Application(s) Topical every 12 hours  influenza  Vaccine (HIGH DOSE) 0.5 milliLiter(s) IntraMuscular once  levothyroxine 150 MICROGram(s) Oral daily  losartan 100 milliGRAM(s) Oral daily  NIFEdipine XL 90 milliGRAM(s) Oral at bedtime  pantoprazole    Tablet 40 milliGRAM(s) Oral before breakfast  piperacillin/tazobactam IVPB.. 3.375 Gram(s) IV Intermittent every 8 hours    MEDICATIONS  (PRN):  acetaminophen     Tablet .. 650 milliGRAM(s) Oral every 6 hours PRN Temp greater or equal to 38C (100.4F), Mild Pain (1 - 3)  aluminum hydroxide/magnesium hydroxide/simethicone Suspension 30 milliLiter(s) Oral every 4 hours PRN Dyspepsia  benzonatate 100 milliGRAM(s) Oral every 8 hours PRN Cough  HYDROmorphone  Injectable 0.5 milliGRAM(s) IV Push every 4 hours PRN moderate-severe pain  hydrOXYzine hydrochloride 25 milliGRAM(s) Oral every 6 hours PRN Anxiety  melatonin 3 milliGRAM(s) Oral at bedtime PRN Insomnia  ondansetron Injectable 4 milliGRAM(s) IV Push every 8 hours PRN Nausea and/or Vomiting      Allergies  codeine (Stomach Upset; Vomiting; Nausea)      Review of Systems  General:  Denies Fatigue, Denies Fever, Denies Weakness ,Denies Weight Loss   HEENT: Denies Trouble Swallowing ,Denies  Sore Throat , Denies Change in hearing/vision/speech ,Denies Dizziness    Cardio: Denies  Chest Pain , Palpitations    Respiratory: Denies worsening of SOB, Denies Cough  Abdomen: See detailed HPI  Neuro: Denies Headache Denies Dizziness, Denies Paresthesias  MSK: Denies pain in Bones/Joints/Muscles   Psych: Patient denies depression, denies suicidal or homicidal ideations  Integ: Patient Denies rash, or new skin lesions     Vital Signs Last 24 Hrs  T(C): 36.4 (24 Apr 2025 13:48), Max: 36.4 (23 Apr 2025 19:10)  T(F): 97.5 (24 Apr 2025 13:48), Max: 97.6 (23 Apr 2025 19:10)  HR: 66 (24 Apr 2025 15:15) (66 - 92)  BP: 148/65 (24 Apr 2025 15:15) (133/72 - 158/53)  BP(mean): 93 (24 Apr 2025 15:15) (88 - 97)  RR: 18 (24 Apr 2025 13:48) (18 - 18)  SpO2: 95% (24 Apr 2025 15:15) (94% - 95%)    Parameters below as of 24 Apr 2025 13:48  Patient On (Oxygen Delivery Method): nasal cannula      PHYSICAL EXAM:  GENERAL: NAD, well-appearing  CHEST/LUNG: Clear to auscultation bilaterally  HEART: Regular rate and rhythm  ABDOMEN: Soft, Nontender, Nondistended;   EXTREMITIES:  No clubbing, cyanosis, or edema  SKIN: Jaundice     Labs:  CT Abdomen and Pelvis w/ Oral Cont and w/ IV Cont (04.24.25 @ 13:18) >  IMPRESSION:    Since April 16, 2024    Stable right lower lobe consolidation with right pleural effusion    No evidence of bowel obstruction or colitis.    CBD stent in place with increased amounts of pneumobilia.

## 2025-04-24 NOTE — DIETITIAN INITIAL EVALUATION ADULT - OTHER INFO
78 y/o woman with PMHx of HTN, HLD, hypothyroidism, PAD on Plavix, HFpEF ,and cholecystectomy, with multiple ERCP in the past for choledocholithiasis / CBD stent, s/p BRYSON; presents for abdominal pain, admitted for PNA.

## 2025-04-24 NOTE — DIETITIAN INITIAL EVALUATION ADULT - EDUCATION DIETARY MODIFICATIONS
Encouraged po intake & hydration. Described benefits of oral supplement/(1) partially meets; needs review/practice/verbalization

## 2025-04-24 NOTE — DIETITIAN INITIAL EVALUATION ADULT - ADD RECOMMEND
high risk     Interventions: Coordination of care, meals, and nutritional supplements  Monitoring/Evaluation: Weights, labs, PO intake, nutrition-focused physical findings, tolerance of nutritional supplements.  1. Continue current diet  2. Add nutritional supplement - Ensure Plus HP 2x/day (350 kcal, 20 g protien per serving)  3. Encourage PO intake & assist PRN

## 2025-04-24 NOTE — DIETITIAN INITIAL EVALUATION ADULT - ORAL INTAKE PTA/DIET HISTORY
Pt reports moderate appetite since June 2024, consuming 100% of 2 meals/day + snacks. Pt reports most of her teeth were extracted in June, due to other medical conditions she has not gotten dentures - difficulty chewing has affected po intake. NKFA, denies therapeutic diet. Usual body wt 84.5kg, current wt 84.7kg, 0.2% wt loss. Pt does not meet wt criteria for malnutrition.

## 2025-04-24 NOTE — PROGRESS NOTE ADULT - ASSESSMENT
Patient is a pleasant 79-year-old female with a past medical history of hypertension, hypothyroidism, hyperlipidemia, peripheral arterial disease on Plavix, and prior cholecystectomy, and known to advanced gastroenterology for multiple ERCP in the past as has a large tubular adenoma in the duodenum at the level of the papilla.  Patient had ERCP done April 21 with placement of a covered metallic stent draining a significant amount of pus from the bile duct.  Patient developed some RUQ pain but has since resolved . She went for CT scan. CT scan reassuring and Pneumobilia to be expected . LFT demonstrate patency of stent along with CT.     Tubular Adenoma at area of Papilla/ Cholangitis  - Clinically stable exam reassuring  - ERCP done 4/21 with placement of covered Metallic stent   - CT done today reassuring from a Hepato biliary stand point  - LFT reassuring and continue to downtrend   - Patient to follow up with Dr Torres after discharge

## 2025-04-24 NOTE — PROGRESS NOTE ADULT - ASSESSMENT
ASSESSMENT  80 y/o Female with PMHx of HTN, HLD, hypothyroidism,  PAD on Plavix, (HFpEF) (EF 65-70% in August 2023), and cholecystectomy with multiple endoscopic retrograde cholangiopancreatography (ERCP) past choledocholithiasis / CBD stent,  presents for abdominal pain      IMPRESSION  #Post ERCP abd pain  Rule out bacterial translocation  Rule out pancreatitis, leak etc  #Ecoli bacteremia suspected cholangitis     4/21  ERCP with placement of a covered metallic stent draining a significant amount of pus from the bile duct    4/16 BCX 1/4 bottles + ecoli R fluoro     Afebrile; Admission WBC 9     MRSA PCR Result.: Negative (04-17-25 @ 02:31)    7/16 UCX   50,000 - 99,000 CFU/mL Escherichia coli S    ERCP 2/25: Successful ERCP with removal of stent, removal of stones and sludge and placement of plastic CBD stent. 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  CT Chest / Abdomen  1. Since February 5, 2025, new trace right pleural effusion and new right lower and middle lobe consolidated opacities with associated bronchiolectasis, possibly reflecting atelectasis and/or pneumonia in the appropriate clinical setting. Follow-up to complete resolution is   suggested.  2. Slightly increased intrahepatic and extrahepatic biliary ductal dilatation with CBD stent in place; interval resolution of previously noted pneumobilia.  2. No significant change in multiple subcentimeter and mildly enlarged intrathoracic and abdominopelvic lymph nodes.  #Sepsis ruled out on admission    #Possible PNA, has cough   #Transaminitis   #Obesity BMI (kg/m2): 31.1  #Immunodeficiency secondary to Senescence  which could result in poor clinical outcomes  #Abx allergy: NA  Creatinine: 0.8 mg/dL (04-22-25 @ 06:03) 62 mL/min  Creatinine clearance modified for overweight patient, using adjusted body weight of 68 kg (149 lbs).    Height (cm): 165.1 (04-17-25 @ 13:59)  Weight (kg): 84.7 (04-17-25 @ 13:59)    RECOMMENDATIONS  - BCX  - Lipase  - F/u CTAP   - Ceftriaxone 2g q24h IV   - metroNIDAZOLE  IVPB 500 milliGRAM(s) IV Intermittent every 12 hours  - If worsening Alk Ph, can change to Unasyn 3g q6h IV   - Pt declined IV therapy at home. Discussed risks/benefits in detail.   - Plan for antibiotics x 14 days post-ERCP E/D 5/4. Initially patient declined IV therapy now in agreement. Already has midline. IV Ceftriaxone 2g q24h  + PO flagyl 500mg BID   - Appreciate GI consult- f/u     I will be away until 4/28/25  On 4/25, Please text/call Dr. Li or Dr. Ga on Microsoft Teams with any questions.  4/26-4/27 Dr Li will be covering   Please continue to update ID with any pertinent new clinical, laboratory or radiographic findings

## 2025-04-24 NOTE — DIETITIAN INITIAL EVALUATION ADULT - PERTINENT MEDS FT
MEDICATIONS  (STANDING):  ampicillin/sulbactam  IVPB      chlorhexidine 2% Cloths 1 Application(s) Topical <User Schedule>  clopidogrel Tablet 75 milliGRAM(s) Oral daily  enoxaparin Injectable 40 milliGRAM(s) SubCutaneous every 24 hours  ferrous    sulfate 325 milliGRAM(s) Oral daily  furosemide    Tablet 40 milliGRAM(s) Oral <User Schedule>  hydrALAZINE 50 milliGRAM(s) Oral three times a day  hydrocortisone 1% Ointment 1 Application(s) Topical every 12 hours  influenza  Vaccine (HIGH DOSE) 0.5 milliLiter(s) IntraMuscular once  levothyroxine 150 MICROGram(s) Oral daily  losartan 100 milliGRAM(s) Oral daily  NIFEdipine XL 90 milliGRAM(s) Oral at bedtime  pantoprazole    Tablet 40 milliGRAM(s) Oral before breakfast  rOPINIRole 0.25 milliGRAM(s) Oral at bedtime    MEDICATIONS  (PRN):  acetaminophen     Tablet .. 650 milliGRAM(s) Oral every 6 hours PRN Temp greater or equal to 38C (100.4F), Mild Pain (1 - 3)  aluminum hydroxide/magnesium hydroxide/simethicone Suspension 30 milliLiter(s) Oral every 4 hours PRN Dyspepsia  benzonatate 100 milliGRAM(s) Oral every 8 hours PRN Cough  hydrOXYzine hydrochloride 25 milliGRAM(s) Oral every 6 hours PRN Anxiety  melatonin 3 milliGRAM(s) Oral at bedtime PRN Insomnia  ondansetron Injectable 4 milliGRAM(s) IV Push every 8 hours PRN Nausea and/or Vomiting  oxyCODONE    IR 5 milliGRAM(s) Oral every 6 hours PRN Severe Pain (7 - 10)

## 2025-04-24 NOTE — PROGRESS NOTE ADULT - ASSESSMENT
78 y/o woman with PMHx of HTN, HLD, hypothyroidism, PAD on Plavix, HFpEF (EF 65-70% in August 2023), and cholecystectomy with multiple ERCP in the past for choledocholithiasis / CBD stent, s/p BRYSON; presents for abdominal pain    # pt is immunocompromised 2/2 age    # This is a STAR pt - GOC done 4/17    # BMI 31.1 = obesity  wt loss advised after she recovers from acute illness    # outpt MDs  PMD: was Dr Aaron; needs referral to [facility] on d/c  Cardio/Vasc: Dr Stanley  GI: Dr Torres    # Cough and Shortness of breath w/ acute hypox resp failure 2/2 RLL/RML PNA w/ gram neg bacteremia; NO SIRS/NO SEPSIS  Patient has been reporting flu like symptoms 2 weeks ago, resolved, then came back again yesterday  CT scan showing: new trace right pleural effusion and new right lower and middle lobe consolidated opacities with associated bronchiolectasis, possibly reflecting atelectasis and/or pneumonia in the appropriate clinical setting.   2 points on CURB 65  s/p hospitalization requiring antibiotics within the past 3 months  RVP neg; MRSA neg  Blood culture 1 of 2: + E Coli  ID consult: noted  Pulm consult: noted  abx: d/c rocephin + Flagyl; Unasyn 3gm iv q6  d/c recs (f/u w/ ID): - Plan for antibiotics x 14 days post-ERCP end 5/4: not evin roceph/flag; could do Unasyn IV vs Augmentin PO?  incent rosey  tessalon prn cough  off O2 - mostly  echo not needed    # Abdominal pain 2/2 acute cholangitis w/ gram neg bacteremia; Hx CCY and BRYSON; Hx of multiple ERCP for choledocholithiasis (last CBD stent placed 2/5/25)  Last ERCP on 02/05/2025: Successful ERCP with removal of stent, removal of stones and sludge and placement of plastic CBD stent. 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  initial CT abdomen showing: Slightly increased intrahepatic and extrahepatic biliary ductal dilatation with CBD stent in place; interval resolution of previously noted pneumobilia.  diet: DASH   pain: oxy IR 5mg po q4 prn pain  Adv GI eval: s/p ERCP w/ stent exchange 4/21 - pus was seen  Bld Cx: + E Coli  rpt BCx x1 4/24: f/u  rpt CT A/P 4/24: stable; incr pneumobilia from recent stent exchange  lipase 74; panc OK on CT; pain NOT c/w pancreatitis = no pancreatitis; OK to feed  LFTs: minor incr AP  abx: changing abx as above  CMP q24  notify GI of above    # HTN  Difficult to control HTN outpatient, patient follows with Dr. Stanley  c/w Hydralazine 50 mg q8  c/w Losartan 100 q24  c/w Nifedipine xl 90mg po qHS (start clary)  c/w Lasix 40 mg every other day    # PAD on Plavix; has stents in both legs (lt fem stent seen on CT A/P); HFpEF - chronic, not in acute decompensation  c/w Plavix 75 q24   not on statin - would not start one now w/ LFTs anyway  lipid panel as outpt    # Hypothyroidism  c/w Levothyroxine 150 ug daily    # lymphadenopathy  has unchanged, diffuse, sub-cm LN intrathoracic, axillary and intraabd of unclear etiology  likely reactive  no further inpt w/u  cont w/ outpt surveillance w/ PMD or Hem/Onc    # Anxiety  atarax 25mg po q6 prn - helped  melatonin prn sleep    # chr venous stasis; eczema post neck  HCT 1% oint top q12 to lower legs and post neck - can cont upon d/c    # DVT prophylaxis: Lovenox 40 mg SC daily    # GI prophylaxis: Pantoprazole 40 mg daily    # Activity: can amb to [place]; walks at home w/ no asst device; no PT for now needed; OOBTC     # daughter Moira ([phone]); updated pt's niece (RN) at bedside    Dispo: change abx; f/u rpt BCx; f/u Pulm/GI/ID; adv diet; HCT oint; daily labs; tx pain; incent rosey  eventually, pt will go home on abx (iv vs po) - f/u CM - acute again; poss d/c in 48 hrs?; has cane at home    Prog guarded again. 78 y/o woman with PMHx of HTN, HLD, hypothyroidism, PAD on Plavix, HFpEF (EF 65-70% in August 2023), and cholecystectomy with multiple ERCP in the past for choledocholithiasis / CBD stent, s/p BRYSON; presents for abdominal pain    # pt is immunocompromised 2/2 age    # This is a STAR pt - GOC done 4/17    # BMI 31.1 = obesity  wt loss advised after she recovers from acute illness    # outpt MDs  PMD: was Dr Aaron; needs referral [facility] d/c  Cardio/Vasc: Dr Stanley  GI: Dr Torres    # Cough and Shortness of breath w/ acute hypox resp failure 2/2 RLL/RML PNA w/ gram neg bacteremia; NO SIRS/NO SEPSIS  Patient has been reporting flu like symptoms 2 weeks ago, resolved, then came back again yesterday  CT scan showing: new trace right pleural effusion and new right lower and middle lobe consolidated opacities with associated bronchiolectasis, possibly reflecting atelectasis and/or pneumonia in the appropriate clinical setting.   2 points on CURB 65  s/p hospitalization requiring antibiotics within the past 3 months  RVP neg; MRSA neg  Blood culture 1 of 2: + E Coli  ID consult: noted  Pulm consult: noted  abx: d/c rocephin + Flagyl; Unasyn 3gm iv q6  d/c recs (f/u w/ ID): - Plan for antibiotics x 14 days post-ERCP end 5/4: not evin roceph/flag; could do Unasyn IV vs Augmentin PO?  incent rosey  tessalon prn cough  off O2 - mostly  echo not needed    # Abdominal pain 2/2 acute cholangitis w/ gram neg bacteremia; Hx CCY and BRYSON; Hx of multiple ERCP for choledocholithiasis (last CBD stent placed 2/5/25)  Last ERCP on 02/05/2025: Successful ERCP with removal of stent, removal of stones and sludge and placement of plastic CBD stent. 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  initial CT abdomen showing: Slightly increased intrahepatic and extrahepatic biliary ductal dilatation with CBD stent in place; interval resolution of previously noted pneumobilia.  diet: DASH   pain: oxy IR 5mg po q4 prn pain  Adv GI eval: s/p ERCP w/ stent exchange 4/21 - pus was seen  Bld Cx: + E Coli  rpt BCx x1 4/24: f/u  rpt CT A/P 4/24: stable; incr pneumobilia from recent stent exchange  lipase 74; panc OK on CT; pain NOT c/w pancreatitis = no pancreatitis; OK to feed  LFTs: minor incr AP  abx: changing abx as above  CMP q24  notify GI of above    # HTN  Difficult to control HTN outpatient, patient follows with Dr. Stanley  c/w Hydralazine 50 mg q8  c/w Losartan 100 q24  c/w Nifedipine xl 90mg po qHS (start clary)  c/w Lasix 40 mg every other day    # Restless Leg Synd  Requip 0.25mg po q12    # PAD on Plavix; has stents in both legs (lt fem stent seen on CT A/P); HFpEF - chronic, not in acute decompensation  c/w Plavix 75 q24   not on statin - would not start one now w/ LFTs anyway  lipid panel as outpt    # Hypothyroidism  c/w Levothyroxine 150 ug daily    # lymphadenopathy  has unchanged, diffuse, sub-cm LN intrathoracic, axillary and intraabd of unclear etiology  likely reactive  no further inpt w/u  cont w/ outpt surveillance w/ PMD or Hem/Onc    # Anxiety  atarax 25mg po q6 prn - helped  melatonin prn sleep    # chr venous stasis; eczema post neck  HCT 1% oint top q12 to lower legs and post neck - can cont upon d/c    # DVT prophylaxis: Lovenox 40 mg SC daily    # GI prophylaxis: Pantoprazole 40 mg daily    # Activity: can amb to [place]; walks at home w/ no asst device; no PT for now needed; OOBTC     # daughter Moira ([phone]); updated pt's niece (RN) at bedside    Dispo: change abx; f/u rpt BCx; f/u Pulm/GI/ID; adv diet; HCT oint; daily labs; tx pain; incent rosey; start Requip  eventually, pt will go home on abx (iv vs po) - f/u CM - acute again; poss d/c in 48 hrs?; has cane at home    Prog guarded again.

## 2025-04-24 NOTE — PROGRESS NOTE ADULT - SUBJECTIVE AND OBJECTIVE BOX
SUBJECTIVE/OVERNIGHT EVENTS  Today is hospital day 8d. This morning the patient was seen and examined at bedside, resting comfortably in bed. Patient was due for discharge yesterday evening however developed loss of appetite and upper abdominal pain, associated with nausea, similar to a "gall bladder attack". She reports her legs also strangely "moved around at bedtime" which feels like her restless leg syndrome. Otherwise, last BM was yesterday.     MEDICATIONS  STANDING MEDICATIONS  cefTRIAXone   IVPB      chlorhexidine 2% Cloths 1 Application(s) Topical <User Schedule>  clopidogrel Tablet 75 milliGRAM(s) Oral daily  enoxaparin Injectable 40 milliGRAM(s) SubCutaneous every 24 hours  ferrous    sulfate 325 milliGRAM(s) Oral daily  furosemide    Tablet 40 milliGRAM(s) Oral <User Schedule>  hydrALAZINE 50 milliGRAM(s) Oral three times a day  hydrocortisone 1% Ointment 1 Application(s) Topical every 12 hours  influenza  Vaccine (HIGH DOSE) 0.5 milliLiter(s) IntraMuscular once  levothyroxine 150 MICROGram(s) Oral daily  losartan 100 milliGRAM(s) Oral daily  metroNIDAZOLE  IVPB 500 milliGRAM(s) IV Intermittent every 12 hours  NIFEdipine XL 90 milliGRAM(s) Oral at bedtime  pantoprazole    Tablet 40 milliGRAM(s) Oral before breakfast  rOPINIRole 0.25 milliGRAM(s) Oral at bedtime    PRN MEDICATIONS  acetaminophen     Tablet .. 650 milliGRAM(s) Oral every 6 hours PRN  aluminum hydroxide/magnesium hydroxide/simethicone Suspension 30 milliLiter(s) Oral every 4 hours PRN  benzonatate 100 milliGRAM(s) Oral every 8 hours PRN  hydrOXYzine hydrochloride 25 milliGRAM(s) Oral every 6 hours PRN  melatonin 3 milliGRAM(s) Oral at bedtime PRN  ondansetron Injectable 4 milliGRAM(s) IV Push every 8 hours PRN  oxyCODONE    IR 5 milliGRAM(s) Oral every 6 hours PRN    VITALS  T(F): 97.6 (04-24-25 @ 09:08), Max: 97.6 (04-23-25 @ 19:10)  HR: 74 (04-24-25 @ 09:08) (74 - 92)  BP: 155/68 (04-24-25 @ 09:08) (133/72 - 156/72)  RR: 18 (04-24-25 @ 09:08) (18 - 18)  SpO2: 94% (04-24-25 @ 09:08) (94% - 94%)    PHYSICAL EXAM  GENERAL  NAD, lying in bed comfortably      HEAD  Atraumatic without hematoma  or  laceration. PERRL. EOMI.    NECK  Supple without  neck stiffness  or nuchal rigidity  no JVD.     HEART  Regular rate and rhthym, no murmurs rubs or gallops    LUNGS  Clear to auscultation bilaterally, no wheezing rales or rhonci    ABDOMEN  soft + minimal RUQ tenderness with laparoscopic scar. Nondistended, +BS    EXTREMITIES  no edema    NERVOUS SYSTEM  A&Ox3     CN II-XII:     ( X ) Intact     (  ) focal deficits  (Specify:     )     SKIN  No rashes or lesions. ecchymosis scattered diffusely over the upper extremities, b/l     LABS             11.1   16.64 )-----------( 493      ( 04-24-25 @ 05:54 )             34.9     134  |  96  |  21  -------------------------<  142   04-24-25 @ 05:54  4.3  |  20  |  0.8    Ca      9.1     04-24-25 @ 05:54  Mg     2.8     04-24-25 @ 05:54    TPro  6.6  /  Alb  3.6  /  TBili  1.6  /  DBili  x   /  AST  39  /  ALT  62  /  AlkPhos  262  /  GGT  x     04-24-25 @ 05:54        Urinalysis Basic - ( 24 Apr 2025 05:54 )    Color: x / Appearance: x / SG: x / pH: x  Gluc: 142 mg/dL / Ketone: x  / Bili: x / Urobili: x   Blood: x / Protein: x / Nitrite: x   Leuk Esterase: x / RBC: x / WBC x   Sq Epi: x / Non Sq Epi: x / Bacteria: x          IMAGING SUBJECTIVE/OVERNIGHT EVENTS  Today is hospital day 8d. This morning the patient was seen and examined at bedside, resting comfortably in bed. Patient was due for discharge yesterday evening however developed loss of appetite and upper abdominal pain, associated with nausea, similar to a "gall bladder attack". She reports her legs also strangely "moved around at bedtime" which feels like her restless leg syndrome. Otherwise, last BM was yesterday.     MEDICATIONS  STANDING MEDICATIONS  cefTRIAXone   IVPB      chlorhexidine 2% Cloths 1 Application(s) Topical <User Schedule>  clopidogrel Tablet 75 milliGRAM(s) Oral daily  enoxaparin Injectable 40 milliGRAM(s) SubCutaneous every 24 hours  ferrous    sulfate 325 milliGRAM(s) Oral daily  furosemide    Tablet 40 milliGRAM(s) Oral <User Schedule>  hydrALAZINE 50 milliGRAM(s) Oral three times a day  hydrocortisone 1% Ointment 1 Application(s) Topical every 12 hours  influenza  Vaccine (HIGH DOSE) 0.5 milliLiter(s) IntraMuscular once  levothyroxine 150 MICROGram(s) Oral daily  losartan 100 milliGRAM(s) Oral daily  metroNIDAZOLE  IVPB 500 milliGRAM(s) IV Intermittent every 12 hours  NIFEdipine XL 90 milliGRAM(s) Oral at bedtime  pantoprazole    Tablet 40 milliGRAM(s) Oral before breakfast  rOPINIRole 0.25 milliGRAM(s) Oral at bedtime    PRN MEDICATIONS  acetaminophen     Tablet .. 650 milliGRAM(s) Oral every 6 hours PRN  aluminum hydroxide/magnesium hydroxide/simethicone Suspension 30 milliLiter(s) Oral every 4 hours PRN  benzonatate 100 milliGRAM(s) Oral every 8 hours PRN  hydrOXYzine hydrochloride 25 milliGRAM(s) Oral every 6 hours PRN  melatonin 3 milliGRAM(s) Oral at bedtime PRN  ondansetron Injectable 4 milliGRAM(s) IV Push every 8 hours PRN  oxyCODONE    IR 5 milliGRAM(s) Oral every 6 hours PRN    VITALS  T(F): 97.6 (04-24-25 @ 09:08), Max: 97.6 (04-23-25 @ 19:10)  HR: 74 (04-24-25 @ 09:08) (74 - 92)  BP: 155/68 (04-24-25 @ 09:08) (133/72 - 156/72)  RR: 18 (04-24-25 @ 09:08) (18 - 18)  SpO2: 94% (04-24-25 @ 09:08) (94% - 94%)    PHYSICAL EXAM  GENERAL  NAD, lying in bed comfortably  + NC in place, 1L.     HEAD  Atraumatic without hematoma  or  laceration. PERRL. EOMI.    NECK  Supple without  neck stiffness  or nuchal rigidity  no JVD.     HEART  Regular rate and rhythm no murmurs rubs or gallops    LUNGS  Clear to auscultation bilaterally, no wheezing rales or rhonci    ABDOMEN  soft + minimal RUQ tenderness with laparoscopic scar. Nondistended, +BS    EXTREMITIES  no edema    NERVOUS SYSTEM  A&Ox3. CN II-XII:     ( X ) Intact     (  ) focal deficits  (Specify:     )     SKIN  No rashes or lesions. +ecchymosis scattered diffusely over the upper extremities, b/l     LABS             11.1   16.64 )-----------( 493      ( 04-24-25 @ 05:54 )             34.9     134  |  96  |  21  -------------------------<  142   04-24-25 @ 05:54  4.3  |  20  |  0.8    Ca      9.1     04-24-25 @ 05:54  Mg     2.8     04-24-25 @ 05:54    TPro  6.6  /  Alb  3.6  /  TBili  1.6  /  DBili  x   /  AST  39  /  ALT  62  /  AlkPhos  262  /  GGT  x     04-24-25 @ 05:54    Urinalysis Basic - ( 24 Apr 2025 05:54 )    Color: x / Appearance: x / SG: x / pH: x  Gluc: 142 mg/dL / Ketone: x  / Bili: x / Urobili: x   Blood: x / Protein: x / Nitrite: x   Leuk Esterase: x / RBC: x / WBC x   Sq Epi: x / Non Sq Epi: x / Bacteria: x

## 2025-04-24 NOTE — PROGRESS NOTE ADULT - ASSESSMENT
78 y/o woman with PMHx of HTN, HLD, hypothyroidism, PAD on Plavix, HFpEF (EF 65-70% in August 2023), and cholecystectomy with multiple ERCP in the past for choledocholithiasis / CBD stent, s/p BRYSON; presents for abdominal pain.      # pt is immunocompromised 2/2 age    # This is a STAR pt - GOC done 4/17    # BMI 31.1 = obesity  wt loss advised after she recovers from acute illness    # outpt MDs  PMD: was Dr Aaron; needs referral [facility] on d/c  Cardio/Vasc: Dr Stanley  GI: Dr Torres    # Cough and Shortness of breath w/ acute hypox resp failure 2/2 RLL/RML PNA w/ gram neg bacteremia; NO SIRS/NO SEPSIS  Patient has been reporting flu like symptoms 2 weeks ago, resolved, then came back again yesterday  CT scan showing: new trace right pleural effusion and new right lower and middle lobe consolidated opacities with associated bronchiolectasis, possibly reflecting atelectasis and/or pneumonia in the appropriate clinical setting.   2 points on CURB 65  s/p hospitalization requiring antibiotics within the past 3 months  RVP neg; MRSA neg  Blood culture 1 of 2: + E Coli  ID consult: noted  Pulm consult: noted  abx: d/c Zosyn & Doxycycline; use rocephin 2gm iv q24 + Flagyl 500mg po q12 (end 4/30)  Potential final ID recs: - Plan for antibiotics x 10 days post-ERCP E/D 4/30. Option 1) PO bactrim 2 DS BID (given kg) + PO flagyl 500mg BID vs Option 2) Midline x IV Ceftriaxone 2g q24h + PO flagyl 500mg BID. Pt to discuss options w/ dtr.  incent rosey  tessalon prn cough  NC O2 - can try to taper off O2 (has no home O2)-> currently on 1L NC saturating 94%. CXR?  echo not needed    # Abdominal pain 2/2 acute cholangitis w/ gram neg bacteremia; Hx CCY and BRYSON; Hx of multiple ERCP for choledocholithiasis (last CBD stent placed 2/5/25)  Last ERCP on 02/05/2025: Successful ERCP with removal of stent, removal of stones and sludge and placement of plastic CBD stent. 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  T abdomen showing: Slightly increased intrahepatic and extrahepatic biliary ductal dilatation with CBD stent in place; interval resolution of previously noted pneumobilia.  T bili: 2.5; other LFTs downtrending   diet: full liquids (DASH restrictions)   pain: d/c dilaudid; use oxy IR 5mg po q4 prn pain  Adv GI eval: s/p ERCP w/ stent exchange 4/21 - pus was seen  Bld Cx: + E Coli  abx: above  LFT q24  Was on PO abx on dc-> now with elevation in WBC will monitor on IV abx: c/w IV ceftrixone and IV flagyl for now  Pending CTAP with oral contrast and IV con  Lipase: 74     # HTN  Difficult to control HTN outpatient, patient follows with Dr. Stanley  c/w Hydralazine 25 mg q8  c/w Losartan 100 q24  c/w Nifedipine xl 90mg po qHS  c/w Lasix 40 mg every other day  BP stable today    # PAD on Plavix; has stents in both legs (lt fem stent seen on CT A/P); HFpEF - chronic, not in acute decompensation  resume Plavix 75 q24   not on statin - would not start one now w/ LFTs anyway  lipid panel as outpt    # Hypothyroidism  c/w Levothyroxine 150 ug daily    # lymphadenopathy  has unchanged, diffuse, sub-cm LN intrathoracic, axillary and intraabd of unclear etiology  likely reactive  no further inpt w/u    # Anxiety  atarax 25mg po q6 prn - helped  melatonin prn sleep    # chr venous stasis; eczema post neck  HCT 1% oint top q12 to lower legs and post neck    #Restless Leg syndrome  - pt symptomatic overnight  - start on Ropinerole    Pending: C/w IV abx, CTAP, lipase level, Recall GI, Blood cx, monitor WBC and fever curve 78 y/o woman with PMHx of HTN, HLD, hypothyroidism, PAD on Plavix, HFpEF (EF 65-70% in August 2023), and cholecystectomy with multiple ERCP in the past for choledocholithiasis / CBD stent, s/p BRYSON; presents for abdominal pain.    # pt is immunocompromised 2/2 age  # This is a STAR pt - GOC done 4/17  # BMI 31.1 = obesity  wt loss advised after she recovers from acute illness  # outpt MDs  PMD: was Dr Aaron; needs [facility]  Cardio/Vasc: Dr Stanley  GI: Dr Torres    # Cough and Shortness of breath w/ acute hypox resp failure 2/2 RLL/RML PNA w/ gram neg bacteremia; NO SIRS/NO SEPSIS  Patient has been reporting flu like symptoms 2 weeks ago, resolved, then came back again yesterday  CT scan showing: new trace right pleural effusion and new right lower and middle lobe consolidated opacities with associated bronchiolectasis, possibly reflecting atelectasis and/or pneumonia in the appropriate clinical setting.   2 points on CURB 65  s/p hospitalization requiring antibiotics within the past 3 months  RVP neg; MRSA neg  Blood culture 1 of 2: + E Coli  ID consult: noted  Pulm consult: noted  abx: d/c Zosyn & Doxycycline; use rocephin 2gm iv q24 + Flagyl 500mg po q12 (end 4/30)  Potential final ID recs: - Plan for antibiotics x 10 days post-ERCP E/D 4/30. Option 1) PO bactrim 2 DS BID (given kg) + PO flagyl 500mg BID vs Option 2) Midline x IV Ceftriaxone 2g q24h + PO flagyl 500mg BID. Pt to discuss options w/ dtr.  incent rosey  tessalon prn cough  NC O2 - can try to taper off O2 (has no home O2)-> currently on 1L NC saturating 94%, today patient is saturating ~95% on 2L NC.   echo not needed    # Abdominal pain 2/2 acute cholangitis w/ gram neg bacteremia; Hx CCY and BRYSON; Hx of multiple ERCP for choledocholithiasis (last CBD stent placed 2/5/25)  Last ERCP on 02/05/2025: Successful ERCP with removal of stent, removal of stones and sludge and placement of plastic CBD stent. 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  T abdomen showing: Slightly increased intrahepatic and extrahepatic biliary ductal dilatation with CBD stent in place; interval resolution of previously noted pneumobilia.  T bili: 2.5; other LFTs downtrending   diet: full liquids (DASH restrictions)   pain: d/c dilaudid; use oxy IR 5mg po q4 prn pain  Adv GI eval: s/p ERCP w/ stent exchange 4/21 - pus was seen  Bld Cx: + E Coli  abx: above  LFT q24  Was on PO abx (bactrim and flagyl), ready for dc-> now with elevation in WBC will monitor on IV abx: resumed IV ceftrixone and IV flagyl for now  4/23- WBC 16K (up from 9K) with abdominal pain, concerning for perinotis vs ERCP induced pancreatitis vs other etio  - 4/24: CTAP with oral contrast and IV con-> read negative for any acute pathology except for pneumobilia consistent with recent ERCP/stent replacement.   - Lipase: 74   - ABX plan per 4/24: dc IV ceftriaxone and IV Flagyl, start +Unasyn 3g IV Q6     # HTN  Difficult to control HTN outpatient, patient follows with Dr. Stanley  c/w Hydralazine 25 mg q8  c/w Losartan 100 q24  c/w Nifedipine xl 90mg po qHS  c/w Lasix 40 mg every other day  BP stable today    # PAD on Plavix; has stents in both legs (lt fem stent seen on CT A/P); HFpEF - chronic, not in acute decompensation  resume Plavix 75 q24   not on statin - would not start one now w/ LFTs anyway  lipid panel as outpt    # Hypothyroidism  c/w Levothyroxine 150 ug daily    # lymphadenopathy  has unchanged, diffuse, sub-cm LN intrathoracic, axillary and intraabd of unclear etiology  likely reactive  no further inpt w/u    # Anxiety  atarax 25mg po q6 prn - helped  melatonin prn sleep    # chr venous stasis; eczema post neck  HCT 1% oint top q12 to lower legs and post neck    #Restless Leg syndrome  - pt symptomatic overnight  - start on Ropinirole 0.25 QHS    Pending: C/w IV abx, CTAP, lipase level, Recall GI, Blood cx, monitor WBC and fever curve

## 2025-04-24 NOTE — PROGRESS NOTE ADULT - SUBJECTIVE AND OBJECTIVE BOX
MICHAEL, BROWN SNOW  79y, Female  Allergy: codeine (Stomach Upset; Vomiting; Nausea)      LOS  8d    CHIEF COMPLAINT: Abdominal pain (23 Apr 2025 16:43)      INTERVAL EVENTS/HPI  - Reports worsening abd pain, mostly epigastric, nausea   Pt denies HA, rhinorrhea, sore throat, cough, SOB,  diarrhea, dysuria, rash, arthralgias. Does report urinating frequently   Also feels like her restless legs are acting up  - T(F): , Max: 97.6 (04-23-25 @ 19:10)  - Denies any worsening symptoms  - Tolerating medication  - WBC Count: 16.64 (04-24-25 @ 05:54) uptrending   WBC Count: 11.01 (04-23-25 @ 05:41)    - Creatinine: 0.8 (04-24-25 @ 05:54)  Creatinine: 0.7 (04-23-25 @ 05:41)       ROS  General: Denies rigors, nightsweats  HEENT: Denies headache, rhinorrhea, sore throat, eye pain  CV: Denies CP, palpitations  PULM: Denies wheezing, hemoptysis  GI: as noted above   : Denies discharge, hematuria  MSK: Denies arthralgias, myalgias  SKIN: Denies rash, lesions  NEURO: Denies paresthesias, weakness  PSYCH: Denies depression, anxiety    VITALS:  T(F): 97.6, Max: 97.6 (04-23-25 @ 19:10)  HR: 74  BP: 155/68  RR: 18Vital Signs Last 24 Hrs  T(C): 36.4 (24 Apr 2025 09:08), Max: 36.4 (23 Apr 2025 19:10)  T(F): 97.6 (24 Apr 2025 09:08), Max: 97.6 (23 Apr 2025 19:10)  HR: 74 (24 Apr 2025 09:08) (74 - 92)  BP: 155/68 (24 Apr 2025 09:08) (133/72 - 156/72)  BP(mean): 97 (24 Apr 2025 09:08) (97 - 97)  RR: 18 (24 Apr 2025 09:08) (18 - 18)  SpO2: 94% (24 Apr 2025 09:08) (94% - 94%)    Parameters below as of 24 Apr 2025 09:08  Patient On (Oxygen Delivery Method): nasal cannula  O2 Flow (L/min): 1      PHYSICAL EXAM:  Gen: NAD, resting in bed  HEENT: Normocephalic, atraumatic  Neck: supple, no lymphadenopathy  CV: Regular rate & regular rhythm  Lungs: decreased BS at bases, no fremitus  Abdomen: epigastric tenderness to palpation no rebound/guarding   Ext: Warm, well perfused  Neuro: non focal, awake  Skin: no rash, no erythema  Lines: no phlebitis    FH: Non-contributory  Social Hx: Non-contributory    TESTS & MEASUREMENTS:                        11.1   16.64 )-----------( 493      ( 24 Apr 2025 05:54 )             34.9     04-24    134[L]  |  96[L]  |  21[H]  ----------------------------<  142[H]  4.3   |  20  |  0.8    Ca    9.1      24 Apr 2025 05:54  Mg     2.8     04-24    TPro  6.6  /  Alb  3.6  /  TBili  1.6[H]  /  DBili  x   /  AST  39  /  ALT  62[H]  /  AlkPhos  262[H]  04-24      LIVER FUNCTIONS - ( 24 Apr 2025 05:54 )  Alb: 3.6 g/dL / Pro: 6.6 g/dL / ALK PHOS: 262 U/L / ALT: 62 U/L / AST: 39 U/L / GGT: x           Urinalysis Basic - ( 24 Apr 2025 05:54 )    Color: x / Appearance: x / SG: x / pH: x  Gluc: 142 mg/dL / Ketone: x  / Bili: x / Urobili: x   Blood: x / Protein: x / Nitrite: x   Leuk Esterase: x / RBC: x / WBC x   Sq Epi: x / Non Sq Epi: x / Bacteria: x        Culture - Blood (collected 04-16-25 @ 13:20)  Source: Blood Blood-Peripheral  Gram Stain (04-17-25 @ 09:25):    Growth in aerobic bottle: Gram Negative Rods  Final Report (04-18-25 @ 16:06):    Growth in aerobic bottle: Escherichia coli    Direct identification is available within approximately 3-5    hours either by Blood Panel Multiplexed PCR or Direct    MALDI-TOF. Details: [email or web address]  Organism: Blood Culture PCR  Escherichia coli (04-18-25 @ 16:06)  Organism: Escherichia coli (04-18-25 @ 16:06)      Method Type: KARISHMA      -  Ampicillin: S <=8 These ampicillin results predict results for amoxicillin      -  Ampicillin/Sulbactam: S <=4/2      -  Aztreonam: S <=4      -  Cefazolin: S <=2      -  Cefepime: S <=2      -  Cefoxitin: S <=8      -  Ceftriaxone: S <=1      -  Ciprofloxacin: R >2      -  Ertapenem: S <=0.5      -  Gentamicin: S <=2      -  Imipenem: S <=1      -  Levofloxacin: R >4      -  Meropenem: S <=1      -  Piperacillin/Tazobactam: S <=8      -  Tobramycin: S <=2      -  Trimethoprim/Sulfamethoxazole: S <=0.5/9.5  Organism: Blood Culture PCR (04-18-25 @ 16:06)      Method Type: PCR      -  Escherichia coli: Detec    Culture - Blood (collected 04-16-25 @ 13:20)  Source: Blood Blood-Peripheral  Final Report (04-21-25 @ 22:00):    No growth at 5 days    Urinalysis with Rflx Culture (collected 04-16-25 @ 13:18)            INFECTIOUS DISEASES TESTING  MRSA PCR Result.: Negative (04-17-25 @ 02:31)  Procalcitonin: 20.60 (02-06-25 @ 12:05)  Rapid RVP Result: NotDetec (10-14-24 @ 01:56)  MRSA PCR Result.: Negative (10-14-24 @ 01:53)  Legionella Antigen, Urine: Negative (10-14-24 @ 01:52)  Streptococcus pneumoniae Ag, Ur Result: Negative (10-14-24 @ 01:52)  Procalcitonin: 0.19 (10-13-24 @ 07:26)      INFLAMMATORY MARKERS  Sedimentation Rate, Erythrocyte: 62 mm/Hr (02-06-25 @ 04:30)  C-Reactive Protein: 181.0 mg/L (02-06-25 @ 04:30)  Sedimentation Rate, Erythrocyte: 54 mm/Hr (02-05-25 @ 11:00)  C-Reactive Protein: 170.4 mg/L (02-05-25 @ 11:00)      RADIOLOGY & ADDITIONAL TESTS:  I have personally reviewed the last available Chest xray  CXR      CT      CARDIOLOGY TESTING      MEDICATIONS  cefTRIAXone   IVPB     chlorhexidine 2% Cloths 1 Topical <User Schedule>  clopidogrel Tablet 75 Oral daily  enoxaparin Injectable 40 SubCutaneous every 24 hours  ferrous    sulfate 325 Oral daily  furosemide    Tablet 40 Oral <User Schedule>  hydrALAZINE 50 Oral three times a day  hydrocortisone 1% Ointment 1 Topical every 12 hours  influenza  Vaccine (HIGH DOSE) 0.5 IntraMuscular once  levothyroxine 150 Oral daily  losartan 100 Oral daily  metroNIDAZOLE  IVPB 500 IV Intermittent every 12 hours  NIFEdipine XL 90 Oral at bedtime  pantoprazole    Tablet 40 Oral before breakfast      WEIGHT  Weight (kg): 84.7 (04-21-25 @ 10:56)  Creatinine: 0.8 mg/dL (04-24-25 @ 05:54)      ANTIBIOTICS:  cefTRIAXone   IVPB      metroNIDAZOLE  IVPB 500 milliGRAM(s) IV Intermittent every 12 hours      All available historical records have been reviewed

## 2025-04-24 NOTE — DIETITIAN INITIAL EVALUATION ADULT - PERTINENT LABORATORY DATA
04-24    134[L]  |  96[L]  |  21[H]  ----------------------------<  142[H]  4.3   |  20  |  0.8    Ca    9.1      24 Apr 2025 05:54  Mg     2.8     04-24    TPro  6.6  /  Alb  3.6  /  TBili  1.6[H]  /  DBili  x   /  AST  39  /  ALT  62[H]  /  AlkPhos  262[H]  04-24  A1C with Estimated Average Glucose Result: 6.3 % (07-17-24 @ 07:08)

## 2025-04-25 LAB
ALBUMIN SERPL ELPH-MCNC: 3.6 G/DL — SIGNIFICANT CHANGE UP (ref 3.5–5.2)
ALP SERPL-CCNC: 248 U/L — HIGH (ref 30–115)
ALT FLD-CCNC: 72 U/L — HIGH (ref 0–41)
ANION GAP SERPL CALC-SCNC: 15 MMOL/L — HIGH (ref 7–14)
AST SERPL-CCNC: 55 U/L — HIGH (ref 0–41)
BASOPHILS # BLD AUTO: 0.12 K/UL — SIGNIFICANT CHANGE UP (ref 0–0.2)
BASOPHILS NFR BLD AUTO: 1 % — SIGNIFICANT CHANGE UP (ref 0–1)
BILIRUB SERPL-MCNC: 1.5 MG/DL — HIGH (ref 0.2–1.2)
BUN SERPL-MCNC: 17 MG/DL — SIGNIFICANT CHANGE UP (ref 10–20)
CALCIUM SERPL-MCNC: 9.1 MG/DL — SIGNIFICANT CHANGE UP (ref 8.4–10.5)
CHLORIDE SERPL-SCNC: 96 MMOL/L — LOW (ref 98–110)
CO2 SERPL-SCNC: 24 MMOL/L — SIGNIFICANT CHANGE UP (ref 17–32)
CREAT SERPL-MCNC: 1 MG/DL — SIGNIFICANT CHANGE UP (ref 0.7–1.5)
EGFR: 58 ML/MIN/1.73M2 — LOW
EGFR: 58 ML/MIN/1.73M2 — LOW
EOSINOPHIL # BLD AUTO: 0.23 K/UL — SIGNIFICANT CHANGE UP (ref 0–0.7)
EOSINOPHIL NFR BLD AUTO: 1.8 % — SIGNIFICANT CHANGE UP (ref 0–8)
GLUCOSE SERPL-MCNC: 114 MG/DL — HIGH (ref 70–99)
HCT VFR BLD CALC: 34.6 % — LOW (ref 37–47)
HGB BLD-MCNC: 11.1 G/DL — LOW (ref 12–16)
IMM GRANULOCYTES NFR BLD AUTO: 2.2 % — HIGH (ref 0.1–0.3)
LYMPHOCYTES # BLD AUTO: 17 % — LOW (ref 20.5–51.1)
LYMPHOCYTES # BLD AUTO: 2.13 K/UL — SIGNIFICANT CHANGE UP (ref 1.2–3.4)
MAGNESIUM SERPL-MCNC: 2.5 MG/DL — HIGH (ref 1.8–2.4)
MCHC RBC-ENTMCNC: 24.3 PG — LOW (ref 27–31)
MCHC RBC-ENTMCNC: 32.1 G/DL — SIGNIFICANT CHANGE UP (ref 32–37)
MCV RBC AUTO: 75.9 FL — LOW (ref 81–99)
MONOCYTES # BLD AUTO: 0.7 K/UL — HIGH (ref 0.1–0.6)
MONOCYTES NFR BLD AUTO: 5.6 % — SIGNIFICANT CHANGE UP (ref 1.7–9.3)
NEUTROPHILS # BLD AUTO: 9.08 K/UL — HIGH (ref 1.4–6.5)
NEUTROPHILS NFR BLD AUTO: 72.4 % — SIGNIFICANT CHANGE UP (ref 42.2–75.2)
NRBC BLD AUTO-RTO: 0 /100 WBCS — SIGNIFICANT CHANGE UP (ref 0–0)
PLATELET # BLD AUTO: 473 K/UL — HIGH (ref 130–400)
PMV BLD: 9.9 FL — SIGNIFICANT CHANGE UP (ref 7.4–10.4)
POTASSIUM SERPL-MCNC: 4 MMOL/L — SIGNIFICANT CHANGE UP (ref 3.5–5)
POTASSIUM SERPL-SCNC: 4 MMOL/L — SIGNIFICANT CHANGE UP (ref 3.5–5)
PROT SERPL-MCNC: 6.2 G/DL — SIGNIFICANT CHANGE UP (ref 6–8)
RBC # BLD: 4.56 M/UL — SIGNIFICANT CHANGE UP (ref 4.2–5.4)
RBC # FLD: 19.5 % — HIGH (ref 11.5–14.5)
SODIUM SERPL-SCNC: 135 MMOL/L — SIGNIFICANT CHANGE UP (ref 135–146)
WBC # BLD: 12.54 K/UL — HIGH (ref 4.8–10.8)
WBC # FLD AUTO: 12.54 K/UL — HIGH (ref 4.8–10.8)

## 2025-04-25 PROCEDURE — 99233 SBSQ HOSP IP/OBS HIGH 50: CPT

## 2025-04-25 PROCEDURE — 99232 SBSQ HOSP IP/OBS MODERATE 35: CPT

## 2025-04-25 RX ORDER — SENNA 187 MG
1 TABLET ORAL AT BEDTIME
Refills: 0 | Status: DISCONTINUED | OUTPATIENT
Start: 2025-04-25 | End: 2025-04-27

## 2025-04-25 RX ORDER — POLYETHYLENE GLYCOL 3350 17 G/17G
17 POWDER, FOR SOLUTION ORAL DAILY
Refills: 0 | Status: DISCONTINUED | OUTPATIENT
Start: 2025-04-25 | End: 2025-04-27

## 2025-04-25 RX ADMIN — OXYCODONE HYDROCHLORIDE 5 MILLIGRAM(S): 30 TABLET ORAL at 04:15

## 2025-04-25 RX ADMIN — ENOXAPARIN SODIUM 40 MILLIGRAM(S): 100 INJECTION SUBCUTANEOUS at 11:43

## 2025-04-25 RX ADMIN — OXYCODONE HYDROCHLORIDE 5 MILLIGRAM(S): 30 TABLET ORAL at 12:42

## 2025-04-25 RX ADMIN — HYDROCORTISONE 1 APPLICATION(S): 10 CREAM TOPICAL at 17:24

## 2025-04-25 RX ADMIN — HYDROCORTISONE 1 APPLICATION(S): 10 CREAM TOPICAL at 05:55

## 2025-04-25 RX ADMIN — Medication 4 MILLIGRAM(S): at 12:41

## 2025-04-25 RX ADMIN — Medication 150 MICROGRAM(S): at 05:59

## 2025-04-25 RX ADMIN — Medication 50 MILLIGRAM(S): at 13:31

## 2025-04-25 RX ADMIN — Medication 1 TABLET(S): at 22:09

## 2025-04-25 RX ADMIN — OXYCODONE HYDROCHLORIDE 5 MILLIGRAM(S): 30 TABLET ORAL at 13:10

## 2025-04-25 RX ADMIN — AMPICILLIN SODIUM AND SULBACTAM SODIUM 200 GRAM(S): 1; .5 INJECTION, POWDER, FOR SOLUTION INTRAMUSCULAR; INTRAVENOUS at 05:54

## 2025-04-25 RX ADMIN — Medication 40 MILLIGRAM(S): at 05:54

## 2025-04-25 RX ADMIN — AMPICILLIN SODIUM AND SULBACTAM SODIUM 200 GRAM(S): 1; .5 INJECTION, POWDER, FOR SOLUTION INTRAMUSCULAR; INTRAVENOUS at 17:28

## 2025-04-25 RX ADMIN — Medication 50 MILLIGRAM(S): at 05:54

## 2025-04-25 RX ADMIN — LOSARTAN POTASSIUM 100 MILLIGRAM(S): 100 TABLET, FILM COATED ORAL at 05:54

## 2025-04-25 RX ADMIN — AMPICILLIN SODIUM AND SULBACTAM SODIUM 200 GRAM(S): 1; .5 INJECTION, POWDER, FOR SOLUTION INTRAMUSCULAR; INTRAVENOUS at 11:42

## 2025-04-25 RX ADMIN — Medication 90 MILLIGRAM(S): at 21:47

## 2025-04-25 RX ADMIN — Medication 4 MILLIGRAM(S): at 04:16

## 2025-04-25 RX ADMIN — Medication 50 MILLIGRAM(S): at 21:47

## 2025-04-25 RX ADMIN — Medication 5 MILLIGRAM(S): at 21:51

## 2025-04-25 RX ADMIN — CLOPIDOGREL BISULFATE 75 MILLIGRAM(S): 75 TABLET, FILM COATED ORAL at 11:43

## 2025-04-25 RX ADMIN — Medication 325 MILLIGRAM(S): at 11:43

## 2025-04-25 RX ADMIN — ROPINIROLE 0.25 MILLIGRAM(S): 5 TABLET, FILM COATED ORAL at 21:47

## 2025-04-25 RX ADMIN — Medication 1 APPLICATION(S): at 05:55

## 2025-04-25 NOTE — PROGRESS NOTE ADULT - SUBJECTIVE AND OBJECTIVE BOX
MICHAELBROWN  79y  Female  ***My note supersedes ALL resident notes that I sign.  My corrections for their notes are in my note.***    I can be reached directly via Teams or my office number is [phone] or [phone].    INTERVAL EVENTS: Here for f/u of abd pain. Pt is much better today. Pain is gone again. She ate well. Off O2. Just needs meds to sleep.    T(F): 97.6 (04-25-25 @ 13:34), Max: 98.2 (04-24-25 @ 20:13)  HR: 103 (04-25-25 @ 13:34) (68 - 103)  BP: 137/61 (04-25-25 @ 13:34) (137/61 - 157/62)  RR: 18 (04-25-25 @ 13:34) (18 - 18)  SpO2: 93% (04-25-25 @ 13:34) (93% - 95%)    Gen: NAD;   HEENT: PERRL, EOMI, mouth clr, nose clr  Neck: no nodes, no JVD, thyroid nl  lungs: less crackles rt base; no wheeze  hrt: s1 s2 rrr no murmur  abd: soft, ND, no pain; no HS megaly  ext: no edema, no c/c  neuro: aa ox3, cn intact, can move all 4 ext    LABS:                      11.1    (    75.9   12.54 )-----------( ---------      473      ( 25 Apr 2025 06:02 )             34.6    (    19.5     WBC Count: 12.54 K/uL (04-25-25 @ 06:02) - better  WBC Count: 16.64 K/uL (04-24-25 @ 05:54)  WBC Count: 11.01 K/uL (04-23-25 @ 05:41)  WBC Count: 9.79 K/uL (04-22-25 @ 06:03)    135   (   96   (   114      04-25-25 @ 06:02  ----------------------               4.0   (   24   (   17                             -----                        1.0  Ca  9.1   Mg  2.5    P   --     LFT  6.2  (  1.5  (  55       04-25-25 @ 06:02  -------------------------  3.6  (  248  (  72    Alb 3.6  T ramone 1.5     AST 55  ALT 72  04-25-25 @ 06:02 - fairly stable  Alb 3.6  T ramone 1.6     AST 39  ALT 62  04-24-25 @ 05:54  Alb 3.2  T ramone 1.8     AST 31  ALT 58  04-23-25 @ 05:41  Alb 3.1  T ramone 2.5     AST 28  ALT 63  04-22-25 @ 06:03    Urinalysis Basic - ( 25 Apr 2025 06:02 )    Color: x / Appearance: x / SG: x / pH: x  Gluc: 114 mg/dL / Ketone: x  / Bili: x / Urobili: x   Blood: x / Protein: x / Nitrite: x   Leuk Esterase: x / RBC: x / WBC x   Sq Epi: x / Non Sq Epi: x / Bacteria: x    RADIOLOGY & ADDITIONAL TESTS:    MEDICATIONS:  ampicillin/sulbactam  IVPB      ampicillin/sulbactam  IVPB 3 Gram(s) IV Intermittent every 6 hours    acetaminophen     Tablet .. 650 milliGRAM(s) Oral every 6 hours PRN  aluminum hydroxide/magnesium hydroxide/simethicone Suspension 30 milliLiter(s) Oral every 4 hours PRN  benzonatate 100 milliGRAM(s) Oral every 8 hours PRN  chlorhexidine 2% Cloths 1 Application(s) Topical <User Schedule>  clopidogrel Tablet 75 milliGRAM(s) Oral daily  enoxaparin Injectable 40 milliGRAM(s) SubCutaneous every 24 hours  ferrous    sulfate 325 milliGRAM(s) Oral daily  furosemide    Tablet 40 milliGRAM(s) Oral <User Schedule>  hydrALAZINE 50 milliGRAM(s) Oral three times a day  hydrocortisone 1% Ointment 1 Application(s) Topical every 12 hours  hydrOXYzine hydrochloride 25 milliGRAM(s) Oral every 6 hours PRN  influenza  Vaccine (HIGH DOSE) 0.5 milliLiter(s) IntraMuscular once  levothyroxine 150 MICROGram(s) Oral daily  losartan 100 milliGRAM(s) Oral daily  melatonin 3 milliGRAM(s) Oral at bedtime PRN  NIFEdipine XL 90 milliGRAM(s) Oral at bedtime  ondansetron Injectable 4 milliGRAM(s) IV Push every 8 hours PRN  oxyCODONE    IR 5 milliGRAM(s) Oral every 6 hours PRN  pantoprazole    Tablet 40 milliGRAM(s) Oral before breakfast  rOPINIRole 0.25 milliGRAM(s) Oral at bedtime  zolpidem 5 milliGRAM(s) Oral at bedtime

## 2025-04-25 NOTE — PROGRESS NOTE ADULT - SUBJECTIVE AND OBJECTIVE BOX
SUBJECTIVE/OVERNIGHT EVENTS  Today is hospital day 9d. This morning patient was seen and examined at bedside, resting comfortably in bed. No acute or major events overnight.    HOSPITAL COURSE  Day 1:   Day 2:   Day 3:     CODE STATUS:    FAMILY COMMUNICATION  Contact date:  Name of person contacted:  Relationship to patient:  Communication details:    MEDICATIONS  STANDING MEDICATIONS  ampicillin/sulbactam  IVPB      ampicillin/sulbactam  IVPB 3 Gram(s) IV Intermittent every 6 hours  chlorhexidine 2% Cloths 1 Application(s) Topical <User Schedule>  clopidogrel Tablet 75 milliGRAM(s) Oral daily  enoxaparin Injectable 40 milliGRAM(s) SubCutaneous every 24 hours  ferrous    sulfate 325 milliGRAM(s) Oral daily  furosemide    Tablet 40 milliGRAM(s) Oral <User Schedule>  hydrALAZINE 50 milliGRAM(s) Oral three times a day  hydrocortisone 1% Ointment 1 Application(s) Topical every 12 hours  influenza  Vaccine (HIGH DOSE) 0.5 milliLiter(s) IntraMuscular once  levothyroxine 150 MICROGram(s) Oral daily  losartan 100 milliGRAM(s) Oral daily  NIFEdipine XL 90 milliGRAM(s) Oral at bedtime  pantoprazole    Tablet 40 milliGRAM(s) Oral before breakfast  rOPINIRole 0.25 milliGRAM(s) Oral at bedtime    PRN MEDICATIONS  acetaminophen     Tablet .. 650 milliGRAM(s) Oral every 6 hours PRN  aluminum hydroxide/magnesium hydroxide/simethicone Suspension 30 milliLiter(s) Oral every 4 hours PRN  benzonatate 100 milliGRAM(s) Oral every 8 hours PRN  hydrOXYzine hydrochloride 25 milliGRAM(s) Oral every 6 hours PRN  melatonin 3 milliGRAM(s) Oral at bedtime PRN  ondansetron Injectable 4 milliGRAM(s) IV Push every 8 hours PRN  oxyCODONE    IR 5 milliGRAM(s) Oral every 6 hours PRN    VITALS  T(F): 97.6 (04-25-25 @ 04:56), Max: 98.2 (04-24-25 @ 20:13)  HR: 68 (04-25-25 @ 04:56) (66 - 72)  BP: 149/63 (04-25-25 @ 04:56) (148/65 - 158/53)  RR: 18 (04-25-25 @ 06:13) (18 - 18)  SpO2: 95% (04-25-25 @ 06:13) (93% - 95%)    PHYSICAL EXAM  GENERAL: NAD, lying in bed comfortably  HEART: Regular rate and rhythm, no murmurs, rubs, or gallops  LUNGS: Unlabored respirations.  Mild L sided crackles  ABDOMEN: Soft, nontender, nondistended, +BS  EXTREMITIES: No clubbing, cyanosis, or edema  NERVOUS SYSTEM:  A&Ox3  SKIN: No rashes or lesions    LABS             11.1   12.54 )-----------( 473      ( 04-25-25 @ 06:02 )             34.6     135  |  96  |  17  -------------------------<  114   04-25-25 @ 06:02  4.0  |  24  |  1.0    Ca      9.1     04-25-25 @ 06:02  Mg     2.5     04-25-25 @ 06:02    TPro  6.2  /  Alb  3.6  /  TBili  1.5  /  DBili  x   /  AST  55  /  ALT  72  /  AlkPhos  248  /  GGT  x     04-25-25 @ 06:02        Urinalysis Basic - ( 25 Apr 2025 06:02 )    Color: x / Appearance: x / SG: x / pH: x  Gluc: 114 mg/dL / Ketone: x  / Bili: x / Urobili: x   Blood: x / Protein: x / Nitrite: x   Leuk Esterase: x / RBC: x / WBC x   Sq Epi: x / Non Sq Epi: x / Bacteria: x          IMAGING

## 2025-04-25 NOTE — PROGRESS NOTE ADULT - ASSESSMENT
80 y/o woman with PMHx of HTN, HLD, hypothyroidism, PAD on Plavix, HFpEF (EF 65-70% in August 2023), and cholecystectomy with multiple ERCP in the past for choledocholithiasis / CBD stent, s/p BRYSON; presents for abdominal pain    # pt is immunocompromised 2/2 age    # This is a STAR pt - GOC done 4/17; need [facility] appt on d/c    # BMI 31.1 = obesity  wt loss advised after she recovers from acute illness    # outpt MDs  PMD: was Dr Aaron; needs referral to [facility] clinic on d/c  Cardio/Vasc: Dr Stanley  GI: Dr Torres    # Cough and Shortness of breath w/ acute hypox resp failure 2/2 RLL/RML PNA w/ gram neg bacteremia; NO SIRS/NO SEPSIS  Patient has been reporting flu like symptoms 2 weeks ago, resolved, then came back again yesterday  CT scan showing: new trace right pleural effusion and new right lower and middle lobe consolidated opacities with associated bronchiolectasis, possibly reflecting atelectasis and/or pneumonia in the appropriate clinical setting.   2 points on CURB 65  s/p hospitalization requiring antibiotics within the past 3 months  RVP neg; MRSA neg  Blood culture 1 of 2: + E Coli  BCx 4/24: f/u  ID consult: noted  Pulm consult: noted  abx: d/c rocephin + Flagyl (did not tolerate); c/w Unasyn 3gm iv q6; upon d/c: antibiotics x 14 days post-ERCP end 5/5: Augmentin 875mg po q12 + Levoflox 750mg po q24  incent rosey  tessalon prn cough  off O2   echo not needed    # Abdominal pain 2/2 acute cholangitis w/ gram neg bacteremia; Hx CCY and BRYSON; Hx of multiple ERCP for choledocholithiasis (last CBD stent placed 2/5/25)  Last ERCP on 02/05/2025: Successful ERCP with removal of stent, removal of stones and sludge and placement of plastic CBD stent. 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  initial CT abdomen showing: Slightly increased intrahepatic and extrahepatic biliary ductal dilatation with CBD stent in place; interval resolution of previously noted pneumobilia.  diet: DASH   pain: oxy IR 5mg po q4 prn pain - not needed at home  Adv GI eval: s/p ERCP w/ stent exchange 4/21 - pus was seen  Bld Cx: + E Coli  rpt BCx x1 4/24: f/u  rpt CT A/P 4/24: stable; incr pneumobilia from recent stent exchange;  lipase 74; panc OK on CT; pain NOT c/w pancreatitis = no pancreatitis; OK to feed  LFTs: fairly stable; minor abnls  abx: changing abx as above  CMP q24    # HTN  Difficult to control HTN outpatient, patient follows with Dr. Stanley  c/w Hydralazine 50 mg q8  c/w Losartan 100 q24  c/w Nifedipine xl 90mg po qHS  c/w Lasix 40 mg every other day    # Restless Leg Synd; insomnia  Requip 0.25mg po qHS - helping; can go home w/ this  ambien 5mg po qhs - will NOT need on d/c  melatonin 3mg po qhs prn    # PAD on Plavix; has stents in both legs (lt fem stent seen on CT A/P); HFpEF - chronic, not in acute decompensation  c/w Plavix 75 q24   not on statin - would not start one now w/ LFTs anyway  lipid panel as outpt    # Hypothyroidism  c/w Levothyroxine 150 mcg daily    # lymphadenopathy  has unchanged, diffuse, sub-cm LN intrathoracic, axillary and intraabd of unclear etiology  likely reactive  no further inpt w/u  cont w/ outpt surveillance w/ PMD or Hem/Onc    # Anxiety  atarax 25mg po q6 prn - helped  melatonin prn sleep    # chr venous stasis; eczema post neck  HCT 1% oint top q12 to lower legs and post neck - can cont upon d/c    # DVT prophylaxis: Lovenox 40 mg SC daily    # GI prophylaxis: Pantoprazole 40 mg daily    # Activity: can amb to BR; walks at home w/ no asst device; no PT for now needed; OOBTC     # daughter Moira ([phone]); updated pt's niece (RN) at bedside    Dispo: abx; f/u rpt BCx 4/24; f/u Pulm/GI/ID prn; c/w solid diet; HCT oint; daily labs; tx pain; incent rosey; start Requip  eventually, pt will go home on abx (po) - f/u CM - anticipate d/c in 24 hrs; has cane at home    Prog guarded, but improving again.

## 2025-04-25 NOTE — PROGRESS NOTE ADULT - ASSESSMENT
ASSESSMENT  78 y/o Female with PMHx of HTN, HLD, hypothyroidism,  PAD on Plavix, (HFpEF) (EF 65-70% in August 2023), and cholecystectomy with multiple endoscopic retrograde cholangiopancreatography (ERCP) past choledocholithiasis / CBD stent,  presents for abdominal pain      IMPRESSION  #Post ERCP abd pain : resolved. No ongoing peritonitis  #Ecoli bacteremia : cholangitis     4/21  ERCP with placement of a covered metallic stent draining a significant amount of pus from the bile duct    4/16 BCX 1/4 bottles + ecoli R fluoro     Afebrile; Admission WBC 9     MRSA PCR Result.: Negative (04-17-25 @ 02:31)    7/16 UCX   50,000 - 99,000 CFU/mL Escherichia coli S    ERCP 2/25: Successful ERCP with removal of stent, removal of stones and sludge and placement of plastic CBD stent. 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  CT Chest / Abdomen  1. Since February 5, 2025, new trace right pleural effusion and new right lower and middle lobe consolidated opacities with associated bronchiolectasis, possibly reflecting atelectasis and/or pneumonia in the appropriate clinical setting. Follow-up to complete resolution is   suggested.  2. Slightly increased intrahepatic and extrahepatic biliary ductal dilatation with CBD stent in place; interval resolution of previously noted pneumobilia.  2. No significant change in multiple subcentimeter and mildly enlarged intrathoracic and abdominopelvic lymph nodes.  #Sepsis ruled out on admission    #PNA RLL based on CT and will recommend Abx ( Levo to cover PNA although E coli R to it )   #Transaminitis   #Obesity BMI (kg/m2): 31.1  #Immunodeficiency secondary to Senescence  which could result in poor clinical outcomes  #Abx allergy: NA  Creatinine: 0.8 mg/dL (04-22-25 @ 06:03) 62 mL/min  Creatinine clearance modified for overweight patient, using adjusted body weight of 68 kg (149 lbs).    Height (cm): 165.1 (04-17-25 @ 13:59)  Weight (kg): 84.7 (04-17-25 @ 13:59)    RECOMMENDATIONS  - Ceftriaxone 2g q24h IV > change to po Augmentin 875 mg q12h and po Levoquin 750 mg q24h till 5/5  -d/c other Abx    Discussion of management/test results( independently interpretated by me ) /antibiotic regimen  with external/primary medical team. Dr Silvestre

## 2025-04-25 NOTE — CHART NOTE - NSCHARTNOTEFT_GEN_A_CORE
Pt requires rolling walker because pt has mobility limitation due to deconditioning that significantly impairs the pt's ability to participate in one or more ADLs

## 2025-04-25 NOTE — PROGRESS NOTE ADULT - ASSESSMENT
Patient is a pleasant 79-year-old female with a past medical history of hypertension, hypothyroidism, hyperlipidemia, peripheral arterial disease on Plavix, and prior cholecystectomy, and known to advanced gastroenterology for multiple ERCP in the past as has a large tubular adenoma in the duodenum at the level of the papilla.  Patient had ERCP done April 21 with placement of a covered metallic stent draining a significant amount of pus from the bile duct.  Patient developed some RUQ pain but has since resolved . She went for CT scan. CT scan reassuring and Pneumobilia to be expected . LFT demonstrate patency of stent along with CT.     Tubular Adenoma at area of Papilla/ Cholangitis  - Clinically stable exam reassuring  - ERCP done 4/21 with placement of covered Metallic stent   - CT done today reassuring from a Hepato biliary stand point  - LFT reassuring and continue to downtrend   - Did well overnight and tolerated diet this morning   - Patient to follow up with Dr Torres after discharge

## 2025-04-25 NOTE — PROGRESS NOTE ADULT - ASSESSMENT
78 y/o Female with PMHx of HTN, HLD, hypothyroidism,  PAD on Plavix, (HFpEF) (EF 65-70% in August 2023), and cholecystectomy with multiple endoscopic retrograde cholangiopancreatography (ERCP) past choledocholithiasis / CBD stent,  presents for abdominal pain    Admitted for suspected ascending cholangitis.    #Generalized abd pain 2/2 ascending cholangitis  #Transaminitis (peaked 300s->100) 2/2 cholangitis - improved  #Pansensitive E coli bacteremia  - no SIRS on admission  #Likely PNA - RML/RLL opacities on CT  #Acute on Chronic Microcytic anemia (Hb 11->8)  #Duodenal Adenoma noted on ERCP    Plan:  - ERCP done 4/21 -> stent exchanged, pus drainage noted  - tolerating PO diet  - Abx ceftriaxone + flagyl -> switched to unasyn yesterday d/t rising WBC/AlkPhos/pt not tolerating PO flagyl  - ID: on discharge change to po Augmentin 875 mg q12h and po Levoquin 750 mg q24h till 5/5  - post-op pain well controlled with PO Oxy IR PRN  - c/w PO Iron tablets  - GI: OP f/u with primary GI Dr. Torres for further outpatient management of her duodenal adenoma, and for future ERCP with stent removal in 1-2 months.      #HTN  #PAD  #Hypothyroidism  - c/w home meds, on several BP medications at home  - c/w nifedipine 90    #DVT prophylaxis: lovenox  #GI prophylaxis: PPI  #Diet: AAT  #Activity: IAT  #Code status: Full Code  #Disposition:    Pending:  - Monitor WBC  - Dispo likely saturday/sunday: D/C plan remains to return home with no needs at this time

## 2025-04-25 NOTE — PROGRESS NOTE ADULT - SUBJECTIVE AND OBJECTIVE BOX
MICHAELBROWN  79y, Female    All available historical data reviewed    OVERNIGHT EVENTS:    none  no fevers  RA  no abd pain  ROS:  General: Denies rigors, nightsweats  HEENT: Denies headache, rhinorrhea, sore throat, eye pain  CV: Denies CP, palpitations  PULM: Denies wheezing, hemoptysis  GI: Denies hematemesis, hematochezia, melena  : Denies discharge, hematuria  MSK: Denies arthralgias, myalgias  SKIN: Denies rash, lesions  NEURO: Denies paresthesias, weakness  PSYCH: Denies depression, anxiety    VITALS:  T(F): 97.6, Max: 98.2 (04-24-25 @ 20:13)  HR: 68  BP: 149/63  RR: 18Vital Signs Last 24 Hrs  T(C): 36.4 (25 Apr 2025 04:56), Max: 36.8 (24 Apr 2025 20:13)  T(F): 97.6 (25 Apr 2025 04:56), Max: 98.2 (24 Apr 2025 20:13)  HR: 68 (25 Apr 2025 04:56) (66 - 72)  BP: 149/63 (25 Apr 2025 04:56) (148/65 - 158/53)  BP(mean): 93 (24 Apr 2025 15:15) (88 - 93)  RR: 18 (25 Apr 2025 06:13) (18 - 18)  SpO2: 95% (25 Apr 2025 06:13) (93% - 95%)    Parameters below as of 24 Apr 2025 13:48  Patient On (Oxygen Delivery Method): nasal cannula        TESTS & MEASUREMENTS:                        11.1   12.54 )-----------( 473      ( 25 Apr 2025 06:02 )             34.6     04-25    135  |  96[L]  |  17  ----------------------------<  114[H]  4.0   |  24  |  1.0    Ca    9.1      25 Apr 2025 06:02  Mg     2.5     04-25    TPro  6.2  /  Alb  3.6  /  TBili  1.5[H]  /  DBili  x   /  AST  55[H]  /  ALT  72[H]  /  AlkPhos  248[H]  04-25    LIVER FUNCTIONS - ( 25 Apr 2025 06:02 )  Alb: 3.6 g/dL / Pro: 6.2 g/dL / ALK PHOS: 248 U/L / ALT: 72 U/L / AST: 55 U/L / GGT: x             Urinalysis Basic - ( 25 Apr 2025 06:02 )    Color: x / Appearance: x / SG: x / pH: x  Gluc: 114 mg/dL / Ketone: x  / Bili: x / Urobili: x   Blood: x / Protein: x / Nitrite: x   Leuk Esterase: x / RBC: x / WBC x   Sq Epi: x / Non Sq Epi: x / Bacteria: x          Social History:  Tobacco Use: No  Alcohol Use: No  Drug Use: No    RADIOLOGY & ADDITIONAL TESTS:  Personal review of radiological diagnostics performed  Echo and EKG results noted when applicable.     MEDICATIONS:  acetaminophen     Tablet .. 650 milliGRAM(s) Oral every 6 hours PRN  aluminum hydroxide/magnesium hydroxide/simethicone Suspension 30 milliLiter(s) Oral every 4 hours PRN  ampicillin/sulbactam  IVPB      ampicillin/sulbactam  IVPB 3 Gram(s) IV Intermittent every 6 hours  benzonatate 100 milliGRAM(s) Oral every 8 hours PRN  chlorhexidine 2% Cloths 1 Application(s) Topical <User Schedule>  clopidogrel Tablet 75 milliGRAM(s) Oral daily  enoxaparin Injectable 40 milliGRAM(s) SubCutaneous every 24 hours  ferrous    sulfate 325 milliGRAM(s) Oral daily  furosemide    Tablet 40 milliGRAM(s) Oral <User Schedule>  hydrALAZINE 50 milliGRAM(s) Oral three times a day  hydrocortisone 1% Ointment 1 Application(s) Topical every 12 hours  hydrOXYzine hydrochloride 25 milliGRAM(s) Oral every 6 hours PRN  influenza  Vaccine (HIGH DOSE) 0.5 milliLiter(s) IntraMuscular once  levothyroxine 150 MICROGram(s) Oral daily  losartan 100 milliGRAM(s) Oral daily  melatonin 3 milliGRAM(s) Oral at bedtime PRN  NIFEdipine XL 90 milliGRAM(s) Oral at bedtime  ondansetron Injectable 4 milliGRAM(s) IV Push every 8 hours PRN  oxyCODONE    IR 5 milliGRAM(s) Oral every 6 hours PRN  pantoprazole    Tablet 40 milliGRAM(s) Oral before breakfast  rOPINIRole 0.25 milliGRAM(s) Oral at bedtime      ANTIBIOTICS:  ampicillin/sulbactam  IVPB      ampicillin/sulbactam  IVPB 3 Gram(s) IV Intermittent every 6 hours

## 2025-04-25 NOTE — PROGRESS NOTE ADULT - SUBJECTIVE AND OBJECTIVE BOX
Patient is a pleasant 79-year-old female with a past medical history of hypertension, hypothyroidism, hyperlipidemia, peripheral arterial disease on Plavix, and prior cholecystectomy, and known to advanced gastroenterology for multiple ERCP in the past as has a large tubular adenoma in the duodenum at the level of the papilla.  Patient had ERCP done April 21 with placement of a covered metallic stent draining a significant amount of pus from the bile duct.  Patient developed some RUQ pain but has since resolved . She had an uneventful evening, did well with breakfast and pain has not returned.       PAST MEDICAL & SURGICAL HISTORY:  Arterial insufficiency of lower extremity  Leg swelling  Hypothyroid  HTN (hypertension)  High cholesterol  Peripheral arterial disease  H/O Graves' disease  History of cholecystectomy  H/O: hysterectomy  S/P angiogram of extremity      MEDICATIONS  (STANDING):  chlorhexidine 2% Cloths 1 Application(s) Topical <User Schedule>  clopidogrel Tablet 75 milliGRAM(s) Oral daily  doxycycline IVPB 100 milliGRAM(s) IV Intermittent every 12 hours  furosemide    Tablet 40 milliGRAM(s) Oral <User Schedule>  hydrALAZINE 25 milliGRAM(s) Oral three times a day  hydrocortisone 1% Ointment 1 Application(s) Topical every 12 hours  influenza  Vaccine (HIGH DOSE) 0.5 milliLiter(s) IntraMuscular once  levothyroxine 150 MICROGram(s) Oral daily  losartan 100 milliGRAM(s) Oral daily  NIFEdipine XL 90 milliGRAM(s) Oral at bedtime  pantoprazole    Tablet 40 milliGRAM(s) Oral before breakfast  piperacillin/tazobactam IVPB.. 3.375 Gram(s) IV Intermittent every 8 hours    MEDICATIONS  (PRN):  acetaminophen     Tablet .. 650 milliGRAM(s) Oral every 6 hours PRN Temp greater or equal to 38C (100.4F), Mild Pain (1 - 3)  aluminum hydroxide/magnesium hydroxide/simethicone Suspension 30 milliLiter(s) Oral every 4 hours PRN Dyspepsia  benzonatate 100 milliGRAM(s) Oral every 8 hours PRN Cough  HYDROmorphone  Injectable 0.5 milliGRAM(s) IV Push every 4 hours PRN moderate-severe pain  hydrOXYzine hydrochloride 25 milliGRAM(s) Oral every 6 hours PRN Anxiety  melatonin 3 milliGRAM(s) Oral at bedtime PRN Insomnia  ondansetron Injectable 4 milliGRAM(s) IV Push every 8 hours PRN Nausea and/or Vomiting      Allergies  codeine (Stomach Upset; Vomiting; Nausea)      Review of Systems  General:  Denies Fatigue, Denies Fever, Denies Weakness ,Denies Weight Loss   HEENT: Denies Trouble Swallowing ,Denies  Sore Throat , Denies Change in hearing/vision/speech ,Denies Dizziness    Cardio: Denies  Chest Pain , Palpitations    Respiratory: Denies worsening of SOB, Denies Cough  Abdomen: See detailed HPI  Neuro: Denies Headache Denies Dizziness, Denies Paresthesias  MSK: Denies pain in Bones/Joints/Muscles   Psych: Patient denies depression, denies suicidal or homicidal ideations  Integ: Patient Denies rash, or new skin lesions     Vital Signs Last 24 Hrs  T(C): 36.4 (25 Apr 2025 04:56), Max: 36.8 (24 Apr 2025 20:13)  T(F): 97.6 (25 Apr 2025 04:56), Max: 98.2 (24 Apr 2025 20:13)  HR: 68 (25 Apr 2025 04:56) (66 - 72)  BP: 149/63 (25 Apr 2025 04:56) (148/65 - 158/53)  BP(mean): 93 (24 Apr 2025 15:15) (88 - 93)  RR: 18 (25 Apr 2025 06:13) (18 - 18)  SpO2: 95% (25 Apr 2025 06:13) (93% - 95%)    Parameters below as of 24 Apr 2025 13:48  Patient On (Oxygen Delivery Method): nasal cannula        PHYSICAL EXAM:  GENERAL: NAD, well-appearing  CHEST/LUNG: Clear to auscultation bilaterally  HEART: Regular rate and rhythm  ABDOMEN: Soft, Nontender, Nondistended;   EXTREMITIES:  No clubbing, cyanosis, or edema  SKIN: Jaundice     Labs:                        11.1   12.54 )-----------( 473      ( 25 Apr 2025 06:02 )             34.6     04-25    135  |  96[L]  |  17  ----------------------------<  114[H]  4.0   |  24  |  1.0    Ca    9.1      25 Apr 2025 06:02  Mg     2.5     04-25    TPro  6.2  /  Alb  3.6  /  TBili  1.5[H]  /  DBili  x   /  AST  55[H]  /  ALT  72[H]  /  AlkPhos  248[H]  04-25

## 2025-04-26 LAB
BASOPHILS # BLD AUTO: 0.11 K/UL — SIGNIFICANT CHANGE UP (ref 0–0.2)
BASOPHILS NFR BLD AUTO: 0.8 % — SIGNIFICANT CHANGE UP (ref 0–1)
EOSINOPHIL # BLD AUTO: 0.53 K/UL — SIGNIFICANT CHANGE UP (ref 0–0.7)
EOSINOPHIL NFR BLD AUTO: 4.1 % — SIGNIFICANT CHANGE UP (ref 0–8)
HCT VFR BLD CALC: 33.5 % — LOW (ref 37–47)
HGB BLD-MCNC: 10.5 G/DL — LOW (ref 12–16)
IMM GRANULOCYTES NFR BLD AUTO: 2.5 % — HIGH (ref 0.1–0.3)
LYMPHOCYTES # BLD AUTO: 18.7 % — LOW (ref 20.5–51.1)
LYMPHOCYTES # BLD AUTO: 2.43 K/UL — SIGNIFICANT CHANGE UP (ref 1.2–3.4)
MCHC RBC-ENTMCNC: 24.2 PG — LOW (ref 27–31)
MCHC RBC-ENTMCNC: 31.3 G/DL — LOW (ref 32–37)
MCV RBC AUTO: 77.4 FL — LOW (ref 81–99)
MONOCYTES # BLD AUTO: 0.71 K/UL — HIGH (ref 0.1–0.6)
MONOCYTES NFR BLD AUTO: 5.5 % — SIGNIFICANT CHANGE UP (ref 1.7–9.3)
NEUTROPHILS # BLD AUTO: 8.91 K/UL — HIGH (ref 1.4–6.5)
NEUTROPHILS NFR BLD AUTO: 68.4 % — SIGNIFICANT CHANGE UP (ref 42.2–75.2)
NRBC BLD AUTO-RTO: 0 /100 WBCS — SIGNIFICANT CHANGE UP (ref 0–0)
PLATELET # BLD AUTO: 480 K/UL — HIGH (ref 130–400)
PMV BLD: 10 FL — SIGNIFICANT CHANGE UP (ref 7.4–10.4)
RBC # BLD: 4.33 M/UL — SIGNIFICANT CHANGE UP (ref 4.2–5.4)
RBC # FLD: 19.4 % — HIGH (ref 11.5–14.5)
WBC # BLD: 13.01 K/UL — HIGH (ref 4.8–10.8)
WBC # FLD AUTO: 13.01 K/UL — HIGH (ref 4.8–10.8)

## 2025-04-26 PROCEDURE — 99232 SBSQ HOSP IP/OBS MODERATE 35: CPT

## 2025-04-26 RX ADMIN — Medication 40 MILLIGRAM(S): at 05:47

## 2025-04-26 RX ADMIN — AMPICILLIN SODIUM AND SULBACTAM SODIUM 200 GRAM(S): 1; .5 INJECTION, POWDER, FOR SOLUTION INTRAMUSCULAR; INTRAVENOUS at 17:25

## 2025-04-26 RX ADMIN — Medication 5 MILLIGRAM(S): at 21:42

## 2025-04-26 RX ADMIN — Medication 325 MILLIGRAM(S): at 11:09

## 2025-04-26 RX ADMIN — CLOPIDOGREL BISULFATE 75 MILLIGRAM(S): 75 TABLET, FILM COATED ORAL at 11:09

## 2025-04-26 RX ADMIN — AMPICILLIN SODIUM AND SULBACTAM SODIUM 200 GRAM(S): 1; .5 INJECTION, POWDER, FOR SOLUTION INTRAMUSCULAR; INTRAVENOUS at 00:21

## 2025-04-26 RX ADMIN — LOSARTAN POTASSIUM 100 MILLIGRAM(S): 100 TABLET, FILM COATED ORAL at 05:48

## 2025-04-26 RX ADMIN — AMPICILLIN SODIUM AND SULBACTAM SODIUM 200 GRAM(S): 1; .5 INJECTION, POWDER, FOR SOLUTION INTRAMUSCULAR; INTRAVENOUS at 05:53

## 2025-04-26 RX ADMIN — Medication 50 MILLIGRAM(S): at 21:41

## 2025-04-26 RX ADMIN — Medication 150 MICROGRAM(S): at 05:47

## 2025-04-26 RX ADMIN — Medication 1 APPLICATION(S): at 05:54

## 2025-04-26 RX ADMIN — HYDROXYZINE HYDROCHLORIDE 25 MILLIGRAM(S): 25 TABLET, FILM COATED ORAL at 13:27

## 2025-04-26 RX ADMIN — ENOXAPARIN SODIUM 40 MILLIGRAM(S): 100 INJECTION SUBCUTANEOUS at 11:09

## 2025-04-26 RX ADMIN — ROPINIROLE 0.25 MILLIGRAM(S): 5 TABLET, FILM COATED ORAL at 21:41

## 2025-04-26 RX ADMIN — HYDROCORTISONE 1 APPLICATION(S): 10 CREAM TOPICAL at 05:49

## 2025-04-26 RX ADMIN — Medication 50 MILLIGRAM(S): at 13:27

## 2025-04-26 RX ADMIN — HYDROCORTISONE 1 APPLICATION(S): 10 CREAM TOPICAL at 17:26

## 2025-04-26 RX ADMIN — Medication 90 MILLIGRAM(S): at 21:42

## 2025-04-26 RX ADMIN — Medication 1 TABLET(S): at 21:41

## 2025-04-26 RX ADMIN — FUROSEMIDE 40 MILLIGRAM(S): 10 INJECTION INTRAMUSCULAR; INTRAVENOUS at 05:48

## 2025-04-26 RX ADMIN — HYDROXYZINE HYDROCHLORIDE 25 MILLIGRAM(S): 25 TABLET, FILM COATED ORAL at 07:11

## 2025-04-26 RX ADMIN — Medication 50 MILLIGRAM(S): at 05:48

## 2025-04-26 RX ADMIN — AMPICILLIN SODIUM AND SULBACTAM SODIUM 200 GRAM(S): 1; .5 INJECTION, POWDER, FOR SOLUTION INTRAMUSCULAR; INTRAVENOUS at 11:09

## 2025-04-26 RX ADMIN — AMPICILLIN SODIUM AND SULBACTAM SODIUM 200 GRAM(S): 1; .5 INJECTION, POWDER, FOR SOLUTION INTRAMUSCULAR; INTRAVENOUS at 23:33

## 2025-04-26 NOTE — PROGRESS NOTE ADULT - SUBJECTIVE AND OBJECTIVE BOX
MICHAELBROWN  79y  Female  ***My note supersedes ALL resident notes that I sign.  My corrections for their notes are in my note.***    I can be reached directly via Teams or my office number is [phone] or [phone].    INTERVAL EVENTS: Here for f/u of abd pain. Pt is getting better, but wants to stay 1 more day to make sure she is on the right track. She got up OOB and walked a little. Pain is better. Remains off O2.    T(F): 98 (04-26-25 @ 04:17), Max: 98 (04-26-25 @ 04:17)  HR: 88 (04-26-25 @ 04:17) (69 - 88)  BP: 160/60 (04-26-25 @ 04:17) (147/72 - 160/60)  RR: 18 (04-26-25 @ 04:17) (18 - 18)  SpO2: 94% (04-26-25 @ 04:17) (94% - 94%)    Gen: NAD; little tired  HEENT: PERRL, EOMI, mouth clr, nose clr  Neck: no nodes, no JVD, thyroid nl  lungs: less crackles rt base; no wheeze  hrt: s1 s2 rrr no murmur  abd: soft, ND, no pain; no HS megaly  ext: no edema, no c/c  neuro: aa ox3, cn intact, can move all 4 ext    LABS:                      10.5    (    77.4   13.01 )-----------( ---------      480      ( 26 Apr 2025 06:52 )             33.5    (    19.4     WBC Count: 13.01 K/uL (04-26-25 @ 06:52) - still elevated  WBC Count: 12.54 K/uL (04-25-25 @ 06:02)  WBC Count: 16.64 K/uL (04-24-25 @ 05:54)  WBC Count: 11.01 K/uL (04-23-25 @ 05:41)  WBC Count: 9.79 K/uL (04-22-25 @ 06:03)    Hemoglobin: 10.5 g/dL (04-26 @ 06:52)  Hemoglobin: 11.1 g/dL (04-25 @ 06:02)  Hemoglobin: 11.1 g/dL (04-24 @ 05:54)  Hemoglobin: 9.6 g/dL (04-23 @ 05:41)  Hemoglobin: 8.9 g/dL (04-22 @ 06:03)    Urinalysis Basic - ( 25 Apr 2025 06:02 )    Color: x / Appearance: x / SG: x / pH: x  Gluc: 114 mg/dL / Ketone: x  / Bili: x / Urobili: x   Blood: x / Protein: x / Nitrite: x   Leuk Esterase: x / RBC: x / WBC x   Sq Epi: x / Non Sq Epi: x / Bacteria: x    Culture - Blood (collected 04-24-25 @ 11:31)  Source: Blood None  Preliminary Report (04-25-25 @ 22:02):    No growth at 24 hours    RADIOLOGY & ADDITIONAL TESTS:    MEDICATIONS:  ampicillin/sulbactam  IVPB      ampicillin/sulbactam  IVPB 3 Gram(s) IV Intermittent every 6 hours    acetaminophen     Tablet .. 650 milliGRAM(s) Oral every 6 hours PRN  aluminum hydroxide/magnesium hydroxide/simethicone Suspension 30 milliLiter(s) Oral every 4 hours PRN  benzonatate 100 milliGRAM(s) Oral every 8 hours PRN  chlorhexidine 2% Cloths 1 Application(s) Topical <User Schedule>  clopidogrel Tablet 75 milliGRAM(s) Oral daily  enoxaparin Injectable 40 milliGRAM(s) SubCutaneous every 24 hours  ferrous    sulfate 325 milliGRAM(s) Oral daily  furosemide    Tablet 40 milliGRAM(s) Oral <User Schedule>  hydrALAZINE 50 milliGRAM(s) Oral three times a day  hydrocortisone 1% Ointment 1 Application(s) Topical every 12 hours  hydrOXYzine hydrochloride 25 milliGRAM(s) Oral every 6 hours PRN  influenza  Vaccine (HIGH DOSE) 0.5 milliLiter(s) IntraMuscular once  levothyroxine 150 MICROGram(s) Oral daily  losartan 100 milliGRAM(s) Oral daily  melatonin 3 milliGRAM(s) Oral at bedtime PRN  NIFEdipine XL 90 milliGRAM(s) Oral at bedtime  ondansetron Injectable 4 milliGRAM(s) IV Push every 8 hours PRN  oxyCODONE    IR 5 milliGRAM(s) Oral every 6 hours PRN  pantoprazole    Tablet 40 milliGRAM(s) Oral before breakfast  polyethylene glycol 3350 17 Gram(s) Oral daily  rOPINIRole 0.25 milliGRAM(s) Oral at bedtime  senna 1 Tablet(s) Oral at bedtime  zolpidem 5 milliGRAM(s) Oral at bedtime

## 2025-04-26 NOTE — PROGRESS NOTE ADULT - ASSESSMENT
80 y/o woman with PMHx of HTN, HLD, hypothyroidism, PAD on Plavix, HFpEF (EF 65-70% in August 2023), and cholecystectomy with multiple ERCP in the past for choledocholithiasis / CBD stent, s/p BRYSON; presents for abdominal pain    # pt is immunocompromised 2/2 age    # This is a STAR pt - GOC done 4/17; need [facility] appt on d/c    # BMI 31.1 = obesity  wt loss advised after she recovers from acute illness    # outpt MDs  PMD: was Dr Aaron; needs referral to [facility] clinic on d/c  Cardio/Vasc: Dr Stanley  GI: Dr Torres    # Cough and Shortness of breath w/ acute hypox resp failure 2/2 RLL/RML PNA w/ gram neg bacteremia; NO SIRS/NO SEPSIS  Patient has been reporting flu like symptoms 2 weeks ago, resolved, then came back again yesterday  CT scan showing: new trace right pleural effusion and new right lower and middle lobe consolidated opacities with associated bronchiolectasis, possibly reflecting atelectasis and/or pneumonia in the appropriate clinical setting.   2 points on CURB 65  s/p hospitalization requiring antibiotics within the past 3 months  RVP neg; MRSA neg  Blood culture 1 of 2: + E Coli  BCx 4/24: f/u  ID consult: noted  Pulm consult: noted  abx: below  incent rosey  tessalon prn cough  off O2   echo not needed    # Abdominal pain 2/2 acute cholangitis w/ gram neg bacteremia; Hx CCY and BRYSON; Hx of multiple ERCP for choledocholithiasis (last CBD stent placed 2/5/25)  Last ERCP on 02/05/2025: Successful ERCP with removal of stent, removal of stones and sludge and placement of plastic CBD stent. 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  initial CT abdomen showing: Slightly increased intrahepatic and extrahepatic biliary ductal dilatation with CBD stent in place; interval resolution of previously noted pneumobilia.  diet: DASH   pain: oxy IR 5mg po q4 prn pain - not needed at home  Adv GI eval: s/p ERCP w/ stent exchange 4/21 - pus was seen  Bld Cx: + E Coli  rpt BCx x1 4/24: neg  rpt CT A/P 4/24: stable; incr pneumobilia from recent stent exchange;  lipase 74; panc OK on CT; pain NOT c/w pancreatitis = no pancreatitis; OK to feed  LFTs: fairly stable; minor abnls  abx: d/c rocephin + Flagyl (did not tolerate); c/w Unasyn 3gm iv q6; upon d/c: antibiotics x 14 days post-ERCP end 5/5: Augmentin 875mg po q12 + Levoflox 750mg po q24  CMP q24    # HTN  Difficult to control HTN outpatient, patient follows with Dr. Stanley  c/w Hydralazine 50 mg q8  c/w Losartan 100 q24  c/w Nifedipine xl 90mg po qHS  c/w Lasix 40 mg every other day    # Restless Leg Synd; insomnia  Requip 0.25mg po qHS - helping; can go home w/ this  ambien 5mg po qhs - will NOT need on d/c  melatonin 3mg po qhs prn    # PAD on Plavix; has stents in both legs (lt fem stent seen on CT A/P); HFpEF - chronic, not in acute decompensation  c/w Plavix 75 q24   not on statin - would not start one now w/ LFTs anyway  lipid panel as outpt    # Hypothyroidism  c/w Levothyroxine 150 mcg daily    # lymphadenopathy  has unchanged, diffuse, sub-cm LN intrathoracic, axillary and intraabd of unclear etiology  likely reactive  no further inpt w/u  cont w/ outpt surveillance w/ PMD or Hem/Onc    # Anxiety  atarax 25mg po q6 prn - helped  melatonin prn sleep    # chr venous stasis; eczema post neck  HCT 1% oint top q12 to lower legs and post neck - can cont upon d/c    # DVT prophylaxis: Lovenox 40 mg SC daily    # GI prophylaxis: Pantoprazole 40 mg daily    # Activity: can amb to [facility]; walks at home w/ no asst device; no PT for now needed; OOBTC     # daughter Moira ([phone]); updated pt's niece (RN) at bedside    Dispo: abx; f/u Pulm/GI/ID prn; c/w solid diet; HCT oint; daily labs; tx pain; incent rosey;  [facility] appt on d/c for IM clinic  eventually, pt will go home on abx (po) - f/u CM - anticipate d/c in 24 hrs; has cane at home    Prog guarded, but improving again.

## 2025-04-27 VITALS
RESPIRATION RATE: 18 BRPM | DIASTOLIC BLOOD PRESSURE: 65 MMHG | HEART RATE: 80 BPM | OXYGEN SATURATION: 98 % | SYSTOLIC BLOOD PRESSURE: 100 MMHG | TEMPERATURE: 98 F

## 2025-04-27 LAB
ALBUMIN SERPL ELPH-MCNC: 3.7 G/DL — SIGNIFICANT CHANGE UP (ref 3.5–5.2)
ALP SERPL-CCNC: 198 U/L — HIGH (ref 30–115)
ALT FLD-CCNC: 104 U/L — HIGH (ref 0–41)
ANION GAP SERPL CALC-SCNC: 14 MMOL/L — SIGNIFICANT CHANGE UP (ref 7–14)
AST SERPL-CCNC: 78 U/L — HIGH (ref 0–41)
BASOPHILS # BLD AUTO: 0.12 K/UL — SIGNIFICANT CHANGE UP (ref 0–0.2)
BASOPHILS NFR BLD AUTO: 0.9 % — SIGNIFICANT CHANGE UP (ref 0–1)
BILIRUB SERPL-MCNC: 1.1 MG/DL — SIGNIFICANT CHANGE UP (ref 0.2–1.2)
BUN SERPL-MCNC: 16 MG/DL — SIGNIFICANT CHANGE UP (ref 10–20)
CALCIUM SERPL-MCNC: 8.7 MG/DL — SIGNIFICANT CHANGE UP (ref 8.4–10.5)
CHLORIDE SERPL-SCNC: 98 MMOL/L — SIGNIFICANT CHANGE UP (ref 98–110)
CO2 SERPL-SCNC: 24 MMOL/L — SIGNIFICANT CHANGE UP (ref 17–32)
CREAT SERPL-MCNC: 0.8 MG/DL — SIGNIFICANT CHANGE UP (ref 0.7–1.5)
EGFR: 75 ML/MIN/1.73M2 — SIGNIFICANT CHANGE UP
EGFR: 75 ML/MIN/1.73M2 — SIGNIFICANT CHANGE UP
EOSINOPHIL # BLD AUTO: 0.45 K/UL — SIGNIFICANT CHANGE UP (ref 0–0.7)
EOSINOPHIL NFR BLD AUTO: 3.4 % — SIGNIFICANT CHANGE UP (ref 0–8)
GLUCOSE SERPL-MCNC: 134 MG/DL — HIGH (ref 70–99)
HCT VFR BLD CALC: 35.5 % — LOW (ref 37–47)
HGB BLD-MCNC: 11.1 G/DL — LOW (ref 12–16)
IMM GRANULOCYTES NFR BLD AUTO: 2.1 % — HIGH (ref 0.1–0.3)
LYMPHOCYTES # BLD AUTO: 2.78 K/UL — SIGNIFICANT CHANGE UP (ref 1.2–3.4)
LYMPHOCYTES # BLD AUTO: 21.2 % — SIGNIFICANT CHANGE UP (ref 20.5–51.1)
MCHC RBC-ENTMCNC: 24.3 PG — LOW (ref 27–31)
MCHC RBC-ENTMCNC: 31.3 G/DL — LOW (ref 32–37)
MCV RBC AUTO: 77.7 FL — LOW (ref 81–99)
MONOCYTES # BLD AUTO: 0.75 K/UL — HIGH (ref 0.1–0.6)
MONOCYTES NFR BLD AUTO: 5.7 % — SIGNIFICANT CHANGE UP (ref 1.7–9.3)
NEUTROPHILS # BLD AUTO: 8.75 K/UL — HIGH (ref 1.4–6.5)
NEUTROPHILS NFR BLD AUTO: 66.7 % — SIGNIFICANT CHANGE UP (ref 42.2–75.2)
NRBC BLD AUTO-RTO: 0 /100 WBCS — SIGNIFICANT CHANGE UP (ref 0–0)
PLATELET # BLD AUTO: 486 K/UL — HIGH (ref 130–400)
PMV BLD: 10.1 FL — SIGNIFICANT CHANGE UP (ref 7.4–10.4)
POTASSIUM SERPL-MCNC: 4.6 MMOL/L — SIGNIFICANT CHANGE UP (ref 3.5–5)
POTASSIUM SERPL-SCNC: 4.6 MMOL/L — SIGNIFICANT CHANGE UP (ref 3.5–5)
PROT SERPL-MCNC: 6.2 G/DL — SIGNIFICANT CHANGE UP (ref 6–8)
RBC # BLD: 4.57 M/UL — SIGNIFICANT CHANGE UP (ref 4.2–5.4)
RBC # FLD: 20.3 % — HIGH (ref 11.5–14.5)
SODIUM SERPL-SCNC: 136 MMOL/L — SIGNIFICANT CHANGE UP (ref 135–146)
WBC # BLD: 13.12 K/UL — HIGH (ref 4.8–10.8)
WBC # FLD AUTO: 13.12 K/UL — HIGH (ref 4.8–10.8)

## 2025-04-27 PROCEDURE — 99232 SBSQ HOSP IP/OBS MODERATE 35: CPT

## 2025-04-27 RX ORDER — BENZONATATE 100 MG
1 CAPSULE ORAL
Refills: 0 | DISCHARGE

## 2025-04-27 RX ORDER — LEVOFLOXACIN 25 MG/ML
1 SOLUTION ORAL
Qty: 8 | Refills: 0
Start: 2025-04-27 | End: 2025-05-04

## 2025-04-27 RX ORDER — AMOXICILLIN AND CLAVULANATE POTASSIUM 500; 125 MG/1; MG/1
1 TABLET, FILM COATED ORAL
Qty: 16 | Refills: 0
Start: 2025-04-27 | End: 2025-05-04

## 2025-04-27 RX ORDER — LEVOFLOXACIN 25 MG/ML
1 SOLUTION ORAL
Qty: 0 | Refills: 0 | DISCHARGE
Start: 2025-04-27

## 2025-04-27 RX ORDER — AMOXICILLIN AND CLAVULANATE POTASSIUM 500; 125 MG/1; MG/1
1 TABLET, FILM COATED ORAL EVERY 12 HOURS
Refills: 0 | Status: DISCONTINUED | OUTPATIENT
Start: 2025-04-27 | End: 2025-04-27

## 2025-04-27 RX ORDER — ROPINIROLE 5 MG/1
1 TABLET, FILM COATED ORAL
Qty: 1 | Refills: 0
Start: 2025-04-27

## 2025-04-27 RX ORDER — HYDROCORTISONE 10 MG/G
1 CREAM TOPICAL
Qty: 1 | Refills: 0
Start: 2025-04-27

## 2025-04-27 RX ORDER — AMOXICILLIN AND CLAVULANATE POTASSIUM 500; 125 MG/1; MG/1
1 TABLET, FILM COATED ORAL
Qty: 0 | Refills: 0 | DISCHARGE
Start: 2025-04-27

## 2025-04-27 RX ORDER — HYDROXYZINE HYDROCHLORIDE 25 MG/1
1 TABLET, FILM COATED ORAL
Qty: 21 | Refills: 0
Start: 2025-04-27 | End: 2025-05-03

## 2025-04-27 RX ORDER — ROPINIROLE 5 MG/1
1 TABLET, FILM COATED ORAL
Qty: 0 | Refills: 0 | DISCHARGE
Start: 2025-04-27

## 2025-04-27 RX ADMIN — LOSARTAN POTASSIUM 100 MILLIGRAM(S): 100 TABLET, FILM COATED ORAL at 06:08

## 2025-04-27 RX ADMIN — HYDROCORTISONE 1 APPLICATION(S): 10 CREAM TOPICAL at 06:09

## 2025-04-27 RX ADMIN — AMPICILLIN SODIUM AND SULBACTAM SODIUM 200 GRAM(S): 1; .5 INJECTION, POWDER, FOR SOLUTION INTRAMUSCULAR; INTRAVENOUS at 06:09

## 2025-04-27 RX ADMIN — ENOXAPARIN SODIUM 40 MILLIGRAM(S): 100 INJECTION SUBCUTANEOUS at 11:19

## 2025-04-27 RX ADMIN — Medication 325 MILLIGRAM(S): at 11:20

## 2025-04-27 RX ADMIN — Medication 1 APPLICATION(S): at 06:10

## 2025-04-27 RX ADMIN — Medication 50 MILLIGRAM(S): at 14:04

## 2025-04-27 RX ADMIN — Medication 50 MILLIGRAM(S): at 06:07

## 2025-04-27 RX ADMIN — AMPICILLIN SODIUM AND SULBACTAM SODIUM 200 GRAM(S): 1; .5 INJECTION, POWDER, FOR SOLUTION INTRAMUSCULAR; INTRAVENOUS at 11:19

## 2025-04-27 RX ADMIN — CLOPIDOGREL BISULFATE 75 MILLIGRAM(S): 75 TABLET, FILM COATED ORAL at 11:21

## 2025-04-27 RX ADMIN — Medication 150 MICROGRAM(S): at 06:08

## 2025-04-27 RX ADMIN — FUROSEMIDE 40 MILLIGRAM(S): 10 INJECTION INTRAMUSCULAR; INTRAVENOUS at 06:08

## 2025-04-27 RX ADMIN — Medication 40 MILLIGRAM(S): at 06:08

## 2025-04-27 RX ADMIN — HYDROXYZINE HYDROCHLORIDE 25 MILLIGRAM(S): 25 TABLET, FILM COATED ORAL at 11:26

## 2025-04-27 RX ADMIN — AMOXICILLIN AND CLAVULANATE POTASSIUM 1 TABLET(S): 500; 125 TABLET, FILM COATED ORAL at 14:04

## 2025-04-27 NOTE — PROGRESS NOTE ADULT - REASON FOR ADMISSION
Abdominal pain

## 2025-04-27 NOTE — PROGRESS NOTE ADULT - SUBJECTIVE AND OBJECTIVE BOX
MICHAELBROWN  79y  Female  ***My note supersedes ALL resident notes that I sign.  My corrections for their notes are in my note.***    I can be reached directly via Teams or my office number is [phone] or [phone].    INTERVAL EVENTS: Here for f/u of abd pain. Pt much better and ready for d/c.    T(F): 98 (04-27-25 @ 05:00), Max: 98 (04-27-25 @ 05:00)  HR: 73 (04-27-25 @ 05:00) (73 - 107)  BP: 124/63 (04-27-25 @ 05:00) (113/63 - 128/73)  RR: 18 (04-27-25 @ 05:00) (18 - 18)  SpO2: 97% (04-27-25 @ 05:00) (95% - 97%)    Gen: NAD; little tired  HEENT: PERRL, EOMI, mouth clr, nose clr  Neck: no nodes, no JVD, thyroid nl  lungs: less crackles rt base; no wheeze  hrt: s1 s2 rrr no murmur  abd: soft, ND, no pain; no HS megaly  ext: no edema, no c/c  neuro: aa ox3, cn intact, can move all 4 ext    LABS:                      11.1    (    77.7   13.12 )-----------( ---------      486      ( 27 Apr 2025 06:52 )             35.5    (    20.3     136   (   98   (   134      04-27-25 @ 06:52  ----------------------               4.6   (   24   (   16                             -----                        0.8  Ca  8.7   Mg  --    P   --     LFT  6.2  (  1.1  (  78       04-27-25 @ 06:52  -------------------------  3.7  (  198  (  104    Alb 3.7  T ramone 1.1     AST 78    04-27-25 @ 06:52  Alb 3.6  T ramone 1.5     AST 55  ALT 72  04-25-25 @ 06:02  Alb 3.6  T ramone 1.6     AST 39  ALT 62  04-24-25 @ 05:54  Alb 3.2  T ramone 1.8     AST 31  ALT 58  04-23-25 @ 05:41    Urinalysis Basic - ( 27 Apr 2025 06:52 )    Color: x / Appearance: x / SG: x / pH: x  Gluc: 134 mg/dL / Ketone: x  / Bili: x / Urobili: x   Blood: x / Protein: x / Nitrite: x   Leuk Esterase: x / RBC: x / WBC x   Sq Epi: x / Non Sq Epi: x / Bacteria: x    Culture - Blood (collected 04-24-25 @ 11:31)  Source: Blood None  Preliminary Report (04-26-25 @ 22:01):    No growth at 48 Hours    RADIOLOGY & ADDITIONAL TESTS:    MEDICATIONS:  amoxicillin  875 milliGRAM(s)/clavulanate 1 Tablet(s) Oral every 12 hours  levoFLOXacin  Tablet 750 milliGRAM(s) Oral every 24 hours    acetaminophen     Tablet .. 650 milliGRAM(s) Oral every 6 hours PRN  chlorhexidine 2% Cloths 1 Application(s) Topical <User Schedule>  clopidogrel Tablet 75 milliGRAM(s) Oral daily  enoxaparin Injectable 40 milliGRAM(s) SubCutaneous every 24 hours  ferrous    sulfate 325 milliGRAM(s) Oral daily  furosemide    Tablet 40 milliGRAM(s) Oral <User Schedule>  hydrALAZINE 50 milliGRAM(s) Oral three times a day  hydrocortisone 1% Ointment 1 Application(s) Topical every 12 hours  hydrOXYzine hydrochloride 25 milliGRAM(s) Oral every 6 hours PRN  influenza  Vaccine (HIGH DOSE) 0.5 milliLiter(s) IntraMuscular once  levothyroxine 150 MICROGram(s) Oral daily  losartan 100 milliGRAM(s) Oral daily  melatonin 3 milliGRAM(s) Oral at bedtime PRN  NIFEdipine XL 90 milliGRAM(s) Oral at bedtime  ondansetron Injectable 4 milliGRAM(s) IV Push every 8 hours PRN  pantoprazole    Tablet 40 milliGRAM(s) Oral before breakfast  rOPINIRole 0.25 milliGRAM(s) Oral at bedtime  zolpidem 5 milliGRAM(s) Oral at bedtime

## 2025-04-27 NOTE — PROGRESS NOTE ADULT - PROVIDER SPECIALTY LIST ADULT
Gastroenterology
Internal Medicine
Gastroenterology
Infectious Disease
Internal Medicine
Gastroenterology
Infectious Disease
Internal Medicine
Internal Medicine
Gastroenterology
Hospitalist
Infectious Disease
Internal Medicine
Infectious Disease
Internal Medicine

## 2025-04-27 NOTE — PROGRESS NOTE ADULT - TIME BILLING
update niece    pt worse again    complex care coord    complex chart review    complex image review    complex care plan    cond a threat to life
I have personally seen and examined this patient.  I have reviewed all pertinent clinical information and reviewed all relevant imaging and diagnostic studies personally.   I counseled the patient about diagnostic testing and treatment plan.   I discussed my recommendations with the primary team Matty
d/c plan
I have personally seen and examined this patient.  I have reviewed all pertinent clinical information and reviewed all relevant imaging and diagnostic studies personally.   I counseled the patient about diagnostic testing and treatment plan.   I discussed my recommendations with the primary team Konrad
d/c plan
Reviewed all pertinent clinical information and reviewed all relevant imaging and diagnostic studies. Counseled the patient /HCP about diagnostic testing and treatment plan. All questions were answered.  Discussed recommendations with the primary team and coordinated care.
I have personally seen and examined this patient.  I have reviewed all pertinent clinical information and reviewed all relevant imaging and diagnostic studies personally.   I counseled the patient about diagnostic testing and treatment plan.   I discussed my recommendations with the primary team
chart review, physical exam, formulating plan and care coordination
long update w/ pt's dtrs    complex care coord: spoke w/ GI    complex care plan    complex chart review    cond a threat to life    guarded cond w/ ongoing issues
update dtr    1st day w/ pt    complex care coord    complex chart review    complex care plan    guarded prog and cond a potential threat to life
I have personally seen and examined this patient. I have reviewed all pertinent clinical information and reviewed all relevant imaging ( and noted the impression from the Radiologist ) and diagnostic studies personally. I counseled the patient about the diagnostic testing and treatment plan. I discussed my recommendations with the primary team. Time spent teaching was excluded. c

## 2025-04-27 NOTE — PROGRESS NOTE ADULT - ASSESSMENT
80 y/o woman with PMHx of HTN, HLD, hypothyroidism, PAD on Plavix, HFpEF (EF 65-70% in August 2023), and cholecystectomy with multiple ERCP in the past for choledocholithiasis / CBD stent, s/p BRYSON; presents for abdominal pain    # pt is immunocompromised 2/2 age    # This is a STAR pt - GOC done 4/17; need [facility] appt on d/c    # BMI 31.1 = obesity  wt loss advised after she recovers from acute illness    # outpt MDs  PMD: was Dr Aaron; needs referral to [facility] clinic on d/c  Cardio/Vasc: Dr Stanley  GI: Dr Torres    # Cough and Shortness of breath w/ acute hypox resp failure 2/2 RLL/RML PNA w/ gram neg bacteremia; NO SIRS/NO SEPSIS  Patient has been reporting flu like symptoms 2 weeks ago, resolved, then came back again yesterday  CT scan showing: new trace right pleural effusion and new right lower and middle lobe consolidated opacities with associated bronchiolectasis, possibly reflecting atelectasis and/or pneumonia in the appropriate clinical setting.   2 points on CURB 65  s/p hospitalization requiring antibiotics within the past 3 months  RVP neg; MRSA neg  Blood culture 1 of 2: + E Coli  BCx 4/24: f/u  ID consult: noted  Pulm consult: noted  abx: below  incent rosey  d/c tessalon  off O2   echo not needed    # Abdominal pain 2/2 acute cholangitis w/ gram neg bacteremia; Hx CCY and BRYSON; Hx of multiple ERCP for choledocholithiasis (last CBD stent placed 2/5/25)  Last ERCP on 02/05/2025: Successful ERCP with removal of stent, removal of stones and sludge and placement of plastic CBD stent. 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  initial CT abdomen showing: Slightly increased intrahepatic and extrahepatic biliary ductal dilatation with CBD stent in place; interval resolution of previously noted pneumobilia.  diet: DASH   pain: oxy IR 5mg po q4 prn pain - not needed at home  Adv GI eval: s/p ERCP w/ stent exchange 4/21 - pus was seen  Bld Cx: + E Coli  rpt BCx x1 4/24: neg  rpt CT A/P 4/24: stable; incr pneumobilia from recent stent exchange;  lipase 74; panc OK on CT; pain NOT c/w pancreatitis = no pancreatitis; OK to feed  LFTs: fairly stable; minor abnls  abx: d/c rocephin + Flagyl (did not tolerate); d/c Unasyn -> upon d/c: antibiotics x 14 days post-ERCP end 5/5: Augmentin 875mg po q12 + Levoflox 750mg po q24  incr AST/ALT suspected to be from rocephin or unasyn - stopping both and switching to orals, pt feels well with no pain and evin diet - so she can go home    # HTN  Difficult to control HTN outpatient, patient follows with Dr. Stanley  c/w Hydralazine 50 mg q8  c/w Losartan 100 q24  c/w Nifedipine xl 90mg po qHS  c/w Lasix 40 mg every other day    # Restless Leg Synd; insomnia  Requip 0.25mg po qHS - helping; can go home w/ this  ambien 5mg po qhs - will NOT need on d/c  melatonin 3mg po qhs prn - not needed on d/c    # PAD on Plavix; has stents in both legs (lt fem stent seen on CT A/P); HFpEF - chronic, not in acute decompensation  c/w Plavix 75 q24   not on statin - would not start one now w/ LFTs anyway  lipid panel as outpt    # Hypothyroidism  c/w Levothyroxine 150 mcg daily    # lymphadenopathy  has unchanged, diffuse, sub-cm LN intrathoracic, axillary and intraabd of unclear etiology  likely reactive  no further inpt w/u  cont w/ outpt surveillance w/ PMD or Hem/Onc    # Anxiety  atarax 25mg po q6 prn - helped  melatonin prn sleep    # chr venous stasis; eczema post neck  HCT 1% oint top q12 to lower legs and post neck - can cont upon d/c    # DVT prophylaxis: Lovenox 40 mg SC daily    # GI prophylaxis: Pantoprazole 40 mg daily    # Activity: can amb to [facility]; walks at home w/ no asst device; no PT for now needed; OOBTC     # daughter Moira ([phone]); updated pt's niece (RN) at bedside    Dispo: po abx; f/u Pulm/GI/ID prn; c/w solid diet; HCT oint; incent rosey;  [facility] appt on d/c for IM clinic  today, pt will go home on abx (po) w/ HC - f/u CM - has cane at home    Prog improving again.

## 2025-04-29 LAB
CULTURE RESULTS: SIGNIFICANT CHANGE UP
SPECIMEN SOURCE: SIGNIFICANT CHANGE UP

## 2025-05-01 ENCOUNTER — NON-APPOINTMENT (OUTPATIENT)
Age: 80
End: 2025-05-01

## 2025-05-01 ENCOUNTER — APPOINTMENT (OUTPATIENT)
Dept: GASTROENTEROLOGY | Facility: CLINIC | Age: 80
End: 2025-05-01
Payer: MEDICARE

## 2025-05-01 VITALS — WEIGHT: 187 LBS | BODY MASS INDEX: 31.16 KG/M2 | HEIGHT: 65 IN

## 2025-05-01 DIAGNOSIS — Z86.69 PERSONAL HISTORY OF OTHER DISEASES OF THE NERVOUS SYSTEM AND SENSE ORGANS: ICD-10-CM

## 2025-05-01 DIAGNOSIS — Z87.891 PERSONAL HISTORY OF NICOTINE DEPENDENCE: ICD-10-CM

## 2025-05-01 DIAGNOSIS — K83.09 OTHER CHOLANGITIS: ICD-10-CM

## 2025-05-01 DIAGNOSIS — R53.83 OTHER FATIGUE: ICD-10-CM

## 2025-05-01 DIAGNOSIS — Z78.9 OTHER SPECIFIED HEALTH STATUS: ICD-10-CM

## 2025-05-01 PROCEDURE — 99213 OFFICE O/P EST LOW 20 MIN: CPT

## 2025-05-01 RX ORDER — BENZONATATE 200 MG/1
200 CAPSULE ORAL
Refills: 0 | Status: ACTIVE | COMMUNITY

## 2025-05-01 RX ORDER — HYDROCORTISONE 1 G/100G
1 OINTMENT TOPICAL
Refills: 0 | Status: ACTIVE | COMMUNITY

## 2025-05-01 RX ORDER — ROPINIROLE 0.25 MG/1
0.25 TABLET, FILM COATED ORAL
Refills: 0 | Status: ACTIVE | COMMUNITY

## 2025-05-01 RX ORDER — HYDRALAZINE HYDROCHLORIDE 50 MG/1
50 TABLET ORAL
Refills: 0 | Status: ACTIVE | COMMUNITY

## 2025-05-02 ENCOUNTER — OUTPATIENT (OUTPATIENT)
Dept: OUTPATIENT SERVICES | Facility: HOSPITAL | Age: 80
LOS: 1 days | End: 2025-05-02
Payer: COMMERCIAL

## 2025-05-02 ENCOUNTER — APPOINTMENT (OUTPATIENT)
Dept: INTERNAL MEDICINE | Facility: CLINIC | Age: 80
End: 2025-05-02

## 2025-05-02 VITALS
SYSTOLIC BLOOD PRESSURE: 149 MMHG | DIASTOLIC BLOOD PRESSURE: 60 MMHG | WEIGHT: 182 LBS | BODY MASS INDEX: 30.32 KG/M2 | TEMPERATURE: 96.4 F | HEART RATE: 82 BPM | HEIGHT: 65 IN | OXYGEN SATURATION: 99 %

## 2025-05-02 DIAGNOSIS — Z98.890 OTHER SPECIFIED POSTPROCEDURAL STATES: Chronic | ICD-10-CM

## 2025-05-02 DIAGNOSIS — Z00.00 ENCOUNTER FOR GENERAL ADULT MEDICAL EXAMINATION W/OUT ABNORMAL FINDINGS: ICD-10-CM

## 2025-05-02 DIAGNOSIS — D13.5 BENIGN NEOPLASM OF EXTRAHEPATIC BILE DUCTS: ICD-10-CM

## 2025-05-02 DIAGNOSIS — Z90.710 ACQUIRED ABSENCE OF BOTH CERVIX AND UTERUS: Chronic | ICD-10-CM

## 2025-05-02 DIAGNOSIS — D47.2 MONOCLONAL GAMMOPATHY: ICD-10-CM

## 2025-05-02 DIAGNOSIS — J18.9 PNEUMONIA, UNSPECIFIED ORGANISM: ICD-10-CM

## 2025-05-02 DIAGNOSIS — H26.9 UNSPECIFIED CATARACT: ICD-10-CM

## 2025-05-02 DIAGNOSIS — I48.91 UNSPECIFIED ATRIAL FIBRILLATION: ICD-10-CM

## 2025-05-02 DIAGNOSIS — I10 ESSENTIAL (PRIMARY) HYPERTENSION: ICD-10-CM

## 2025-05-02 DIAGNOSIS — I50.9 HEART FAILURE, UNSPECIFIED: ICD-10-CM

## 2025-05-02 DIAGNOSIS — Z90.49 ACQUIRED ABSENCE OF OTHER SPECIFIED PARTS OF DIGESTIVE TRACT: Chronic | ICD-10-CM

## 2025-05-02 DIAGNOSIS — E03.9 HYPOTHYROIDISM, UNSPECIFIED: ICD-10-CM

## 2025-05-02 DIAGNOSIS — Z00.00 ENCOUNTER FOR GENERAL ADULT MEDICAL EXAMINATION WITHOUT ABNORMAL FINDINGS: ICD-10-CM

## 2025-05-02 PROCEDURE — 99204 OFFICE O/P NEW MOD 45 MIN: CPT

## 2025-05-09 DIAGNOSIS — T85.520A DISPLACEMENT OF BILE DUCT PROSTHESIS, INITIAL ENCOUNTER: ICD-10-CM

## 2025-05-09 DIAGNOSIS — J96.01 ACUTE RESPIRATORY FAILURE WITH HYPOXIA: ICD-10-CM
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
## 2025-05-09 DIAGNOSIS — Z88.5 ALLERGY STATUS TO NARCOTIC AGENT: ICD-10-CM

## 2025-05-09 DIAGNOSIS — Z11.52 ENCOUNTER FOR SCREENING FOR COVID-19: ICD-10-CM

## 2025-05-09 DIAGNOSIS — E78.00 PURE HYPERCHOLESTEROLEMIA, UNSPECIFIED: ICD-10-CM

## 2025-05-09 DIAGNOSIS — I25.10 ATHEROSCLEROTIC HEART DISEASE OF NATIVE CORONARY ARTERY WITHOUT ANGINA PECTORIS: ICD-10-CM

## 2025-05-09 DIAGNOSIS — K80.30 CALCULUS OF BILE DUCT WITH CHOLANGITIS, UNSPECIFIED, WITHOUT OBSTRUCTION: ICD-10-CM

## 2025-05-09 DIAGNOSIS — D13.2 BENIGN NEOPLASM OF DUODENUM: ICD-10-CM

## 2025-05-09 DIAGNOSIS — D84.89 OTHER IMMUNODEFICIENCIES: ICD-10-CM

## 2025-05-09 DIAGNOSIS — Z79.02 LONG TERM (CURRENT) USE OF ANTITHROMBOTICS/ANTIPLATELETS: ICD-10-CM

## 2025-05-09 DIAGNOSIS — Z90.710 ACQUIRED ABSENCE OF BOTH CERVIX AND UTERUS: ICD-10-CM

## 2025-05-09 DIAGNOSIS — R78.81 BACTEREMIA: ICD-10-CM

## 2025-05-09 DIAGNOSIS — J15.69 PNEUMONIA DUE TO OTHER GRAM-NEGATIVE BACTERIA: ICD-10-CM

## 2025-05-09 DIAGNOSIS — G47.00 INSOMNIA, UNSPECIFIED: ICD-10-CM

## 2025-05-09 DIAGNOSIS — E66.9 OBESITY, UNSPECIFIED: ICD-10-CM

## 2025-05-09 DIAGNOSIS — I11.0 HYPERTENSIVE HEART DISEASE WITH HEART FAILURE: ICD-10-CM

## 2025-05-09 DIAGNOSIS — Z79.899 OTHER LONG TERM (CURRENT) DRUG THERAPY: ICD-10-CM

## 2025-05-09 DIAGNOSIS — I50.32 CHRONIC DIASTOLIC (CONGESTIVE) HEART FAILURE: ICD-10-CM

## 2025-05-09 DIAGNOSIS — I73.9 PERIPHERAL VASCULAR DISEASE, UNSPECIFIED: ICD-10-CM

## 2025-05-09 DIAGNOSIS — Z87.891 PERSONAL HISTORY OF NICOTINE DEPENDENCE: ICD-10-CM

## 2025-05-09 DIAGNOSIS — E03.9 HYPOTHYROIDISM, UNSPECIFIED: ICD-10-CM

## 2025-05-09 DIAGNOSIS — Z79.890 HORMONE REPLACEMENT THERAPY: ICD-10-CM

## 2025-05-09 DIAGNOSIS — Z95.820 PERIPHERAL VASCULAR ANGIOPLASTY STATUS WITH IMPLANTS AND GRAFTS: ICD-10-CM

## 2025-05-09 DIAGNOSIS — F41.9 ANXIETY DISORDER, UNSPECIFIED: ICD-10-CM

## 2025-05-12 ENCOUNTER — OUTPATIENT (OUTPATIENT)
Dept: OUTPATIENT SERVICES | Facility: HOSPITAL | Age: 80
LOS: 1 days | End: 2025-05-12
Payer: MEDICARE

## 2025-05-12 ENCOUNTER — APPOINTMENT (OUTPATIENT)
Dept: OPHTHALMOLOGY | Facility: CLINIC | Age: 80
End: 2025-05-12
Payer: MEDICARE

## 2025-05-12 DIAGNOSIS — Z98.890 OTHER SPECIFIED POSTPROCEDURAL STATES: Chronic | ICD-10-CM

## 2025-05-12 DIAGNOSIS — Z90.710 ACQUIRED ABSENCE OF BOTH CERVIX AND UTERUS: Chronic | ICD-10-CM

## 2025-05-12 DIAGNOSIS — H53.8 OTHER VISUAL DISTURBANCES: ICD-10-CM

## 2025-05-12 DIAGNOSIS — Z90.49 ACQUIRED ABSENCE OF OTHER SPECIFIED PARTS OF DIGESTIVE TRACT: Chronic | ICD-10-CM

## 2025-05-12 PROCEDURE — 92004 COMPRE OPH EXAM NEW PT 1/>: CPT

## 2025-05-13 DIAGNOSIS — I10 ESSENTIAL (PRIMARY) HYPERTENSION: ICD-10-CM

## 2025-05-13 DIAGNOSIS — H26.9 UNSPECIFIED CATARACT: ICD-10-CM

## 2025-05-13 DIAGNOSIS — E03.9 HYPOTHYROIDISM, UNSPECIFIED: ICD-10-CM

## 2025-05-13 DIAGNOSIS — J18.9 PNEUMONIA, UNSPECIFIED ORGANISM: ICD-10-CM

## 2025-05-13 DIAGNOSIS — D13.5 BENIGN NEOPLASM OF EXTRAHEPATIC BILE DUCTS: ICD-10-CM

## 2025-05-13 DIAGNOSIS — I50.9 HEART FAILURE, UNSPECIFIED: ICD-10-CM

## 2025-05-13 DIAGNOSIS — D47.2 MONOCLONAL GAMMOPATHY: ICD-10-CM

## 2025-05-13 DIAGNOSIS — I48.91 UNSPECIFIED ATRIAL FIBRILLATION: ICD-10-CM

## 2025-05-15 DIAGNOSIS — H52.4 PRESBYOPIA: ICD-10-CM

## 2025-05-15 DIAGNOSIS — H25.813 COMBINED FORMS OF AGE-RELATED CATARACT, BILATERAL: ICD-10-CM

## 2025-05-15 DIAGNOSIS — H35.033 HYPERTENSIVE RETINOPATHY, BILATERAL: ICD-10-CM

## 2025-05-15 DIAGNOSIS — H04.123 DRY EYE SYNDROME OF BILATERAL LACRIMAL GLANDS: ICD-10-CM

## 2025-06-03 ENCOUNTER — INPATIENT (INPATIENT)
Facility: HOSPITAL | Age: 80
LOS: 1 days | Discharge: HOME | DRG: 379 | End: 2025-06-05
Attending: STUDENT IN AN ORGANIZED HEALTH CARE EDUCATION/TRAINING PROGRAM | Admitting: STUDENT IN AN ORGANIZED HEALTH CARE EDUCATION/TRAINING PROGRAM
Payer: MEDICARE

## 2025-06-03 VITALS
WEIGHT: 184.97 LBS | SYSTOLIC BLOOD PRESSURE: 194 MMHG | TEMPERATURE: 98 F | DIASTOLIC BLOOD PRESSURE: 74 MMHG | OXYGEN SATURATION: 95 % | HEART RATE: 83 BPM | RESPIRATION RATE: 18 BRPM | HEIGHT: 65 IN

## 2025-06-03 DIAGNOSIS — Z90.49 ACQUIRED ABSENCE OF OTHER SPECIFIED PARTS OF DIGESTIVE TRACT: Chronic | ICD-10-CM

## 2025-06-03 DIAGNOSIS — K92.2 GASTROINTESTINAL HEMORRHAGE, UNSPECIFIED: ICD-10-CM

## 2025-06-03 DIAGNOSIS — Z90.710 ACQUIRED ABSENCE OF BOTH CERVIX AND UTERUS: Chronic | ICD-10-CM

## 2025-06-03 DIAGNOSIS — Z98.890 OTHER SPECIFIED POSTPROCEDURAL STATES: Chronic | ICD-10-CM

## 2025-06-03 LAB
ALBUMIN SERPL ELPH-MCNC: 4.3 G/DL — SIGNIFICANT CHANGE UP (ref 3.5–5.2)
ALP SERPL-CCNC: 124 U/L — HIGH (ref 30–115)
ALT FLD-CCNC: 17 U/L — SIGNIFICANT CHANGE UP (ref 0–41)
ANION GAP SERPL CALC-SCNC: 12 MMOL/L — SIGNIFICANT CHANGE UP (ref 7–14)
ANION GAP SERPL CALC-SCNC: 16 MMOL/L — HIGH (ref 7–14)
APPEARANCE UR: CLEAR — SIGNIFICANT CHANGE UP
AST SERPL-CCNC: 17 U/L — SIGNIFICANT CHANGE UP (ref 0–41)
BACTERIA # UR AUTO: ABNORMAL /HPF
BASOPHILS # BLD AUTO: 0.04 K/UL — SIGNIFICANT CHANGE UP (ref 0–0.2)
BASOPHILS NFR BLD AUTO: 0.3 % — SIGNIFICANT CHANGE UP (ref 0–1)
BILIRUB SERPL-MCNC: 0.4 MG/DL — SIGNIFICANT CHANGE UP (ref 0.2–1.2)
BILIRUB UR-MCNC: NEGATIVE — SIGNIFICANT CHANGE UP
BLD GP AB SCN SERPL QL: SIGNIFICANT CHANGE UP
BUN SERPL-MCNC: 22 MG/DL — HIGH (ref 10–20)
BUN SERPL-MCNC: 34 MG/DL — HIGH (ref 10–20)
CALCIUM SERPL-MCNC: 8.4 MG/DL — SIGNIFICANT CHANGE UP (ref 8.4–10.5)
CALCIUM SERPL-MCNC: 9 MG/DL — SIGNIFICANT CHANGE UP (ref 8.4–10.5)
CAST: 1 /LPF — SIGNIFICANT CHANGE UP (ref 0–4)
CHLORIDE SERPL-SCNC: 104 MMOL/L — SIGNIFICANT CHANGE UP (ref 98–110)
CHLORIDE SERPL-SCNC: 109 MMOL/L — SIGNIFICANT CHANGE UP (ref 98–110)
CO2 SERPL-SCNC: 20 MMOL/L — SIGNIFICANT CHANGE UP (ref 17–32)
CO2 SERPL-SCNC: 21 MMOL/L — SIGNIFICANT CHANGE UP (ref 17–32)
COLOR SPEC: YELLOW — SIGNIFICANT CHANGE UP
CREAT SERPL-MCNC: 0.8 MG/DL — SIGNIFICANT CHANGE UP (ref 0.7–1.5)
CREAT SERPL-MCNC: 1 MG/DL — SIGNIFICANT CHANGE UP (ref 0.7–1.5)
DIFF PNL FLD: NEGATIVE — SIGNIFICANT CHANGE UP
EGFR: 58 ML/MIN/1.73M2 — LOW
EGFR: 58 ML/MIN/1.73M2 — LOW
EGFR: 75 ML/MIN/1.73M2 — SIGNIFICANT CHANGE UP
EGFR: 75 ML/MIN/1.73M2 — SIGNIFICANT CHANGE UP
EOSINOPHIL # BLD AUTO: 0.2 K/UL — SIGNIFICANT CHANGE UP (ref 0–0.7)
EOSINOPHIL NFR BLD AUTO: 1.4 % — SIGNIFICANT CHANGE UP (ref 0–8)
GLUCOSE SERPL-MCNC: 111 MG/DL — HIGH (ref 70–99)
GLUCOSE SERPL-MCNC: 157 MG/DL — HIGH (ref 70–99)
GLUCOSE UR QL: NEGATIVE MG/DL — SIGNIFICANT CHANGE UP
HCT VFR BLD CALC: 29.3 % — LOW (ref 37–47)
HCT VFR BLD CALC: 33.4 % — LOW (ref 37–47)
HGB BLD-MCNC: 10.5 G/DL — LOW (ref 12–16)
HGB BLD-MCNC: 9.3 G/DL — LOW (ref 12–16)
IMM GRANULOCYTES NFR BLD AUTO: 0.4 % — HIGH (ref 0.1–0.3)
KETONES UR QL: NEGATIVE MG/DL — SIGNIFICANT CHANGE UP
LEUKOCYTE ESTERASE UR-ACNC: ABNORMAL
LIDOCAIN IGE QN: 41 U/L — SIGNIFICANT CHANGE UP (ref 7–60)
LYMPHOCYTES # BLD AUTO: 1.02 K/UL — LOW (ref 1.2–3.4)
LYMPHOCYTES # BLD AUTO: 7.4 % — LOW (ref 20.5–51.1)
MCHC RBC-ENTMCNC: 26.2 PG — LOW (ref 27–31)
MCHC RBC-ENTMCNC: 26.4 PG — LOW (ref 27–31)
MCHC RBC-ENTMCNC: 31.4 G/DL — LOW (ref 32–37)
MCHC RBC-ENTMCNC: 31.7 G/DL — LOW (ref 32–37)
MCV RBC AUTO: 83.2 FL — SIGNIFICANT CHANGE UP (ref 81–99)
MCV RBC AUTO: 83.3 FL — SIGNIFICANT CHANGE UP (ref 81–99)
MONOCYTES # BLD AUTO: 0.75 K/UL — HIGH (ref 0.1–0.6)
MONOCYTES NFR BLD AUTO: 5.4 % — SIGNIFICANT CHANGE UP (ref 1.7–9.3)
NEUTROPHILS # BLD AUTO: 11.79 K/UL — HIGH (ref 1.4–6.5)
NEUTROPHILS NFR BLD AUTO: 85.1 % — HIGH (ref 42.2–75.2)
NITRITE UR-MCNC: NEGATIVE — SIGNIFICANT CHANGE UP
NRBC BLD AUTO-RTO: 0 /100 WBCS — SIGNIFICANT CHANGE UP (ref 0–0)
NRBC BLD AUTO-RTO: 0 /100 WBCS — SIGNIFICANT CHANGE UP (ref 0–0)
PH UR: 6 — SIGNIFICANT CHANGE UP (ref 5–8)
PLATELET # BLD AUTO: 256 K/UL — SIGNIFICANT CHANGE UP (ref 130–400)
PLATELET # BLD AUTO: 309 K/UL — SIGNIFICANT CHANGE UP (ref 130–400)
PMV BLD: 10.1 FL — SIGNIFICANT CHANGE UP (ref 7.4–10.4)
PMV BLD: 10.4 FL — SIGNIFICANT CHANGE UP (ref 7.4–10.4)
POTASSIUM SERPL-MCNC: 4.4 MMOL/L — SIGNIFICANT CHANGE UP (ref 3.5–5)
POTASSIUM SERPL-MCNC: 4.5 MMOL/L — SIGNIFICANT CHANGE UP (ref 3.5–5)
POTASSIUM SERPL-SCNC: 4.4 MMOL/L — SIGNIFICANT CHANGE UP (ref 3.5–5)
POTASSIUM SERPL-SCNC: 4.5 MMOL/L — SIGNIFICANT CHANGE UP (ref 3.5–5)
PROT SERPL-MCNC: 6.8 G/DL — SIGNIFICANT CHANGE UP (ref 6–8)
PROT UR-MCNC: NEGATIVE MG/DL — SIGNIFICANT CHANGE UP
RBC # BLD: 3.52 M/UL — LOW (ref 4.2–5.4)
RBC # BLD: 4.01 M/UL — LOW (ref 4.2–5.4)
RBC # FLD: 18.1 % — HIGH (ref 11.5–14.5)
RBC # FLD: 18.2 % — HIGH (ref 11.5–14.5)
RBC CASTS # UR COMP ASSIST: 5 /HPF — HIGH (ref 0–4)
SODIUM SERPL-SCNC: 140 MMOL/L — SIGNIFICANT CHANGE UP (ref 135–146)
SODIUM SERPL-SCNC: 142 MMOL/L — SIGNIFICANT CHANGE UP (ref 135–146)
SP GR SPEC: 1.01 — SIGNIFICANT CHANGE UP (ref 1–1.03)
SQUAMOUS # UR AUTO: 1 /HPF — SIGNIFICANT CHANGE UP (ref 0–5)
UROBILINOGEN FLD QL: 0.2 MG/DL — SIGNIFICANT CHANGE UP (ref 0.2–1)
WBC # BLD: 13.85 K/UL — HIGH (ref 4.8–10.8)
WBC # BLD: 6.93 K/UL — SIGNIFICANT CHANGE UP (ref 4.8–10.8)
WBC # FLD AUTO: 13.85 K/UL — HIGH (ref 4.8–10.8)
WBC # FLD AUTO: 6.93 K/UL — SIGNIFICANT CHANGE UP (ref 4.8–10.8)
WBC UR QL: 0 /HPF — SIGNIFICANT CHANGE UP (ref 0–5)

## 2025-06-03 PROCEDURE — 99223 1ST HOSP IP/OBS HIGH 75: CPT

## 2025-06-03 PROCEDURE — 85027 COMPLETE CBC AUTOMATED: CPT

## 2025-06-03 PROCEDURE — 99285 EMERGENCY DEPT VISIT HI MDM: CPT | Mod: FS

## 2025-06-03 PROCEDURE — 80053 COMPREHEN METABOLIC PANEL: CPT

## 2025-06-03 PROCEDURE — 85025 COMPLETE CBC W/AUTO DIFF WBC: CPT

## 2025-06-03 PROCEDURE — 36415 COLL VENOUS BLD VENIPUNCTURE: CPT

## 2025-06-03 PROCEDURE — 74177 CT ABD & PELVIS W/CONTRAST: CPT | Mod: 26

## 2025-06-03 PROCEDURE — 99223 1ST HOSP IP/OBS HIGH 75: CPT | Mod: FS

## 2025-06-03 PROCEDURE — 80048 BASIC METABOLIC PNL TOTAL CA: CPT

## 2025-06-03 PROCEDURE — 83735 ASSAY OF MAGNESIUM: CPT

## 2025-06-03 PROCEDURE — 88305 TISSUE EXAM BY PATHOLOGIST: CPT

## 2025-06-03 RX ORDER — NIFEDIPINE 30 MG
90 TABLET, EXTENDED RELEASE 24 HR ORAL AT BEDTIME
Refills: 0 | Status: DISCONTINUED | OUTPATIENT
Start: 2025-06-03 | End: 2025-06-05

## 2025-06-03 RX ORDER — NIFEDIPINE 30 MG
90 TABLET, EXTENDED RELEASE 24 HR ORAL AT BEDTIME
Refills: 0 | Status: DISCONTINUED | OUTPATIENT
Start: 2025-06-03 | End: 2025-06-03

## 2025-06-03 RX ORDER — ROPINIROLE 5 MG/1
1 TABLET, FILM COATED ORAL
Refills: 0 | DISCHARGE

## 2025-06-03 RX ORDER — LOSARTAN POTASSIUM 100 MG/1
100 TABLET, FILM COATED ORAL DAILY
Refills: 0 | Status: DISCONTINUED | OUTPATIENT
Start: 2025-06-03 | End: 2025-06-05

## 2025-06-03 RX ORDER — ONDANSETRON HCL/PF 4 MG/2 ML
4 VIAL (ML) INJECTION ONCE
Refills: 0 | Status: COMPLETED | OUTPATIENT
Start: 2025-06-03 | End: 2025-06-03

## 2025-06-03 RX ORDER — LOSARTAN POTASSIUM 100 MG/1
100 TABLET, FILM COATED ORAL DAILY
Refills: 0 | Status: DISCONTINUED | OUTPATIENT
Start: 2025-06-03 | End: 2025-06-03

## 2025-06-03 RX ORDER — HYDROXYZINE HYDROCHLORIDE 25 MG/1
25 TABLET, FILM COATED ORAL EVERY 8 HOURS
Refills: 0 | Status: DISCONTINUED | OUTPATIENT
Start: 2025-06-03 | End: 2025-06-05

## 2025-06-03 RX ORDER — LEVOTHYROXINE SODIUM 300 MCG
150 TABLET ORAL DAILY
Refills: 0 | Status: DISCONTINUED | OUTPATIENT
Start: 2025-06-03 | End: 2025-06-05

## 2025-06-03 RX ORDER — FERROUS SULFATE 137(45) MG
325 TABLET, EXTENDED RELEASE ORAL DAILY
Refills: 0 | Status: DISCONTINUED | OUTPATIENT
Start: 2025-06-03 | End: 2025-06-05

## 2025-06-03 RX ORDER — ROPINIROLE 5 MG/1
0.25 TABLET, FILM COATED ORAL AT BEDTIME
Refills: 0 | Status: DISCONTINUED | OUTPATIENT
Start: 2025-06-03 | End: 2025-06-05

## 2025-06-03 RX ADMIN — Medication 90 MILLIGRAM(S): at 22:04

## 2025-06-03 RX ADMIN — Medication 50 MILLIGRAM(S): at 22:04

## 2025-06-03 RX ADMIN — Medication 40 MILLIGRAM(S): at 14:51

## 2025-06-03 RX ADMIN — Medication 50 MILLIGRAM(S): at 16:50

## 2025-06-03 RX ADMIN — ROPINIROLE 0.25 MILLIGRAM(S): 5 TABLET, FILM COATED ORAL at 22:05

## 2025-06-03 RX ADMIN — Medication 325 MILLIGRAM(S): at 12:35

## 2025-06-03 RX ADMIN — Medication 40 MILLIGRAM(S): at 22:04

## 2025-06-03 RX ADMIN — Medication 10 MG/HR: at 05:47

## 2025-06-03 RX ADMIN — Medication 40 MILLIGRAM(S): at 05:47

## 2025-06-03 RX ADMIN — Medication 2 MILLIGRAM(S): at 05:53

## 2025-06-03 RX ADMIN — Medication 1000 MILLILITER(S): at 04:06

## 2025-06-03 RX ADMIN — Medication 4 MILLIGRAM(S): at 04:06

## 2025-06-03 NOTE — ED ADULT NURSE NOTE - NSFALLHARMRISKINTERV_ED_ALL_ED

## 2025-06-03 NOTE — H&P ADULT - NSHPPHYSICALEXAM_GEN_ALL_CORE
T(C): 36.5 (06-03-25 @ 08:44), Max: 36.5 (06-03-25 @ 08:44)  HR: 78 (06-03-25 @ 10:37) (77 - 83)  BP: 611/68 (06-03-25 @ 10:37) (123/58 - 611/68)  RR: 18 (06-03-25 @ 10:37) (18 - 18)  SpO2: 96% (06-03-25 @ 10:37) (95% - 96%)    CONSTITUTIONAL: Well groomed, no apparent distress  EYES: PERRLA and symmetric, EOMI, No conjunctival or scleral injection, non-icteric  ENMT: Oral mucosa with moist membranes. Normal dentition; no pharyngeal injection or exudates             NECK: Supple, symmetric and without tracheal deviation   RESP: No respiratory distress, no use of accessory muscles; CTA b/l, no WRR  CV: RRR, +S1S2, no MRG; no JVD; no peripheral edema  GI: Soft, NT, ND, no rebound, no guarding; no palpable masses; no hepatosplenomegaly; no hernia palpated  LYMPH: No cervical LAD or tenderness; no axillary LAD or tenderness; no inguinal LAD or tenderness  MSK: Normal gait; No digital clubbing or cyanosis; examination of the (head/neck/spine/ribs/pelvis, RUE, LUE, RLE, LLE) without misalignment,            Normal ROM without pain, no spinal tenderness, normal muscle strength/tone  SKIN: No rashes or ulcers noted; no subcutaneous nodules or induration palpable  NEURO: CN II-XII intact; normal reflexes in upper and lower extremities, sensation intact in upper and lower extremities b/l to light touch   PSYCH: Appropriate insight/judgment; A+O x 3, mood and affect appropriate, recent/remote memory intact

## 2025-06-03 NOTE — ED PROVIDER NOTE - CLINICAL SUMMARY MEDICAL DECISION MAKING FREE TEXT BOX
80 yo F, hx of HTN, HLD, hypothyroidism, PAD on plavix, HF with preserved EF  here for assessment of abdominal pain with dark, loose stools. Sx began acutely 2 hours ago, associated with nausea and retching but no vomiting.    Patient is having melana in ED, diffusely tender abdomen but is hemodynamically stable.    Initial Hgb 10.5 consistent with baseline, mild uremia consistent with GI bleeding, CT scan without acute abnormalities.    Patient received PPI bolus and drip, will need admission for GI eval, serial Hgb.    Currently hemodynamically stable without active GI hemorrhage to warrant ICU eval or emergent GI eval in ED.

## 2025-06-03 NOTE — H&P ADULT - ASSESSMENT
79 year old woman with a past medical history of HTN (Difficult to control),  PAD  s/p stents in both legs on Plavix, Hypothyroidism, lymphadenopathy, chronic venous stasis, Anxiety, H/O of acute cholangitis w/ gram neg bacteremia; H/O CCY and BRYSON; H/O of multiple ERCP for choledocholithiasis (last CBD stent for it placed 2/5/25), H/O tubular adenoma s/p stent in April 21, 2025 presented for abdominal pain and black stool over the past 2 days      #Melena  - RENATO positive for melena  -In the ED: T: 36.1   BP: 194/74   HR: 83   Spo2: 95% on room air  -CT abdomen/pelvis with IV and oral contrast: 1.  No definite CT evidence of acute pathology.2.  Nonspecific prominence of bilateral inguinal nodes, increased from prior exam.  -Labs: Hb: 10.5    -RENATO done on admission and showed melena. Patient put npo  - Patient on home pantoprazole 40 mg, and lasix 40 mg every other day (hold), and plavix 75 g (hold)  - Keep patient npo  - Start IV protonix 80 mg and c/w 40 mg BID  - Will undergo and EGD most likely today.  - Plan of care discussed with GI fellow    #H/O tubular adenoma s/p stent in April 21, 2025   #; H/O of multiple ERCP for choledocholithiasis (last CBD stent for it placed 2/5/25)  #; H/O CCY   #H/O of acute cholangitis w/ gram neg bacteremia  - Patient was supposed to have stent removed with GI in July 2025  - Stent might be removed today if the EGD goes ahead, still plan is not finalized     ## HTN  -Difficult to control HTN outpatient, patient follows with Dr. Stanley  -c/w Hydralazine 50 mg q8  -c/w Losartan 100 q24  -c/w Nifedipine xl 90mg po qHS  -hold Lasix 40 mg every other day    # PAD on Plavix; has stents in both leg  - Will hold Plavix 75 mg for now, restart when cleared by GI    # Restless Leg Synd; insomnia  - Requip 0.25mg po qHS    # Hypothyroidism  -c/w Levothyroxine 150 mcg daily    #Lymphadenopathy  - Consider Hem/onc consult given CT abdomen results    # Anxiety  - on home atarax 25mg po q8 prn     #Full code  #DVT prophylaxis: Holding due to possible UGB       79 year old woman with a past medical history of HTN (Difficult to control),  PAD  s/p stents in both legs on Plavix, Hypothyroidism, lymphadenopathy, chronic venous stasis, Anxiety, H/O of acute cholangitis w/ gram neg bacteremia; H/O CCY and BRYSON; H/O of multiple ERCP for choledocholithiasis (last CBD stent for it placed 2/5/25), H/O tubular adenoma s/p stent in April 21, 2025 presented for abdominal pain and black stool over the past 2 days      #Melena  - RENATO positive for melena  -In the ED: T: 36.1   BP: 194/74   HR: 83   Spo2: 95% on room air  -CT abdomen/pelvis with IV and oral contrast: 1.  No definite CT evidence of acute pathology.2.  Nonspecific prominence of bilateral inguinal nodes, increased from prior exam.  -Labs: Hb: 10.5    -RENATO done on admission and showed melena. Patient put npo  - Patient on home pantoprazole 40 mg, and lasix 40 mg every other day (hold), and plavix 75 g (hold)  - Keep patient npo  - Start IV protonix 80 mg and c/w 40 mg BID   - Will undergo and EGD most likely today.  - Plan of care discussed with GI fellow    #H/O tubular adenoma s/p stent in April 21, 2025   #; H/O of multiple ERCP for choledocholithiasis (last CBD stent for it placed 2/5/25)  #; H/O CCY   #H/O of acute cholangitis w/ gram neg bacteremia  - Patient was supposed to have stent removed with GI in July 2025  - Stent might be removed today if the EGD goes ahead, still plan is not finalized     ## HTN  -Difficult to control HTN outpatient, patient follows with Dr. Stanley  -c/w Hydralazine 50 mg q8  -c/w Losartan 100 q24  -c/w Nifedipine xl 90mg po qHS  -hold Lasix 40 mg every other day    # PAD on Plavix; has stents in both leg  - Will hold Plavix 75 mg for now, restart when cleared by GI    # Restless Leg Synd; insomnia  - Requip 0.25mg po qHS    # Hypothyroidism  -c/w Levothyroxine 150 mcg daily    #Lymphadenopathy  - Consider Hem/onc consult given CT abdomen results    # Anxiety  - on home atarax 25mg po q8 prn     #Full code  #DVT prophylaxis: Holding due to possible UGB       79 year old woman with a past medical history of HTN (Difficult to control),  PAD  s/p stents in both legs on Plavix, Hypothyroidism, lymphadenopathy, chronic venous stasis, Anxiety, H/O of acute cholangitis w/ gram neg bacteremia; H/O CCY and BRYSON; H/O of multiple ERCP for choledocholithiasis (last CBD stent for it placed 2/5/25), H/O tubular adenoma s/p stent in April 21, 2025 presented for abdominal pain and black stool over the past 2 days      #Melena  - RENATO positive for melena  -In the ED: T: 36.1   BP: 194/74   HR: 83   Spo2: 95% on room air  -CT abdomen/pelvis with IV and oral contrast: 1.  No definite CT evidence of acute pathology.2.  Nonspecific prominence of bilateral inguinal nodes, increased from prior exam.  -Labs: Hb: 10.5    -RENATO done on admission and showed melena. Patient put npo  - Patient on home pantoprazole 40 mg, and lasix 40 mg every other day (hold), and plavix 75 g (hold), ferrous sulfate 324 mg   - Keep patient npo  - Start IV protonix 80 mg and c/w 40 mg BID   - Will undergo and EGD most likely today.  - Plan of care discussed with GI fellow    #H/O tubular adenoma s/p stent in April 21, 2025   #; H/O of multiple ERCP for choledocholithiasis (last CBD stent for it placed 2/5/25)  #; H/O CCY   #H/O of acute cholangitis w/ gram neg bacteremia  - Patient was supposed to have stent removed with GI in July 2025  - Stent might be removed today if the EGD goes ahead, still plan is not finalized     ## HTN  -Difficult to control HTN outpatient, patient follows with Dr. Stanley  -c/w Hydralazine 50 mg q8  -c/w Losartan 100 q24  -c/w Nifedipine xl 90mg po qHS  -hold Lasix 40 mg every other day    # PAD on Plavix; has stents in both leg  - Will hold Plavix 75 mg for now, restart when cleared by GI    # Restless Leg Synd; insomnia  - Requip 0.25mg po qHS    # Hypothyroidism  -c/w Levothyroxine 150 mcg daily    #Lymphadenopathy  - Consider Hem/onc consult given CT abdomen results    # Anxiety  - on home atarax 25mg po q8 prn     #Full code  #DVT prophylaxis: Holding due to possible UGB       79 year old woman with a past medical history of HTN (Difficult to control),  PAD  s/p stents in both legs on Plavix, Hypothyroidism, lymphadenopathy, chronic venous stasis, Anxiety, H/O of acute cholangitis w/ gram neg bacteremia; H/O CCY and BRYSON; H/O of multiple ERCP for choledocholithiasis (last CBD stent for it placed 2/5/25), H/O tubular adenoma s/p stent in April 21, 2025 presented for abdominal pain and black stool over the past 2 days      #Melena  - RENATO positive for melena  -In the ED: T: 36.1   BP: 194/74   HR: 83   Spo2: 95% on room air  -CT abdomen/pelvis with IV and oral contrast: 1.  No definite CT evidence of acute pathology.2.  Nonspecific prominence of bilateral inguinal nodes, increased from prior exam.  -Labs: Hb: 10.5    -RENATO done on admission and showed melena. Patient put npo  - Patient on home pantoprazole 40 mg, and lasix 40 mg every other day (hold), and plavix 75 g (hold), ferrous sulfate 324 mg   - Keep patient npo  - Start IV protonix 80 mg and c/w 40 mg BID   - Will undergo and EGD most likely today.  - f/u cbc  and bmp at 4 pm  - Plan of care discussed with GI fellow    #H/O tubular adenoma s/p stent in April 21, 2025   #; H/O of multiple ERCP for choledocholithiasis (last CBD stent for it placed 2/5/25)  #; H/O CCY   #H/O of acute cholangitis w/ gram neg bacteremia  - Patient was supposed to have stent removed with GI in July 2025  - Stent might be removed today if the EGD goes ahead, still plan is not finalized     ## HTN  -Difficult to control HTN outpatient, patient follows with Dr. Stanley  -c/w Hydralazine 50 mg q8  -c/w Losartan 100 q24  -c/w Nifedipine xl 90mg po qHS  -hold Lasix 40 mg every other day    # PAD on Plavix; has stents in both leg  - Will hold Plavix 75 mg for now, restart when cleared by GI    # Restless Leg Synd; insomnia  - Requip 0.25mg po qHS    # Hypothyroidism  -c/w Levothyroxine 150 mcg daily    #Lymphadenopathy  - Consider Hem/onc consult given CT abdomen results    # Anxiety  - on home atarax 25mg po q8 prn     #Full code  #DVT prophylaxis: Holding due to possible UGB

## 2025-06-03 NOTE — ED PROVIDER NOTE - OBJECTIVE STATEMENT
79-year-old female with past medical history HTN, HLD, hypothyroidism, PAD on Plavix, HFpEF, cholecystectomy, multiple ERCPs presents with complaints of abdominal pain, diarrhea, dark stool.  Reports over the past 2 hours she has had diffuse abdominal pain associated with dark stool and diarrhea.  Also endorses nausea and dry heaves, but denies vomiting.  Denies fever, rigors, chest pain, shortness of breath, change in bladder habits, dysuria/hematuria.

## 2025-06-03 NOTE — H&P ADULT - HISTORY OF PRESENT ILLNESS
79 year old woman with a past medical history of HTN (Difficult to control),  PAD  s/p stents in both legs on Plavix, Hypothyroidism, lymphadenopathy, chronic venous stasis, Anxiety, H/O of acute cholangitis w/ gram neg bacteremia; H/O CCY and BRYSON; H/O of multiple ERCP for choledocholithiasis (last CBD stent for it placed 2/5/25), H/O tubular adenoma s/p stent in April 21, 2025 presented for abdominal pain and black stool over the past 2 days. Patient reported that 2 days again she developed several diffuse abdominal pain and noticed that her stool is more black than usual. She also felt nauseated. She denied vomiting, fever, chills, chest pain, sob, palpitations, changes in urinary habits, blood in urine.     In the ED: T: 36.1   BP: 194/74   HR: 83   Spo2: 95% on room air  CT abdomen/pelvis with IV and oral contrast: 1.  No definite CT evidence of acute pathology.2.  Nonspecific prominence of bilateral inguinal nodes, increased from prior exam.  Labs: Hb: 10.5      RENATO done on admission and showed melena. Patient put npo      79 year old woman with a past medical history of HTN (Difficult to control),  PAD  s/p stents in both legs on Plavix, Hypothyroidism, lymphadenopathy, chronic venous stasis, Anxiety, H/O of acute cholangitis w/ gram neg bacteremia; H/O CCY and BRYSON; H/O of multiple ERCP for choledocholithiasis (last CBD stent for it placed 2/5/25), H/O tubular adenoma s/p stent in April 21, 2025 presented for abdominal pain and black stool over the past 2 days. Patient reported that 2 days again she developed several diffuse abdominal pain and noticed that her stool is more black than usual. She also felt nauseated. She denied vomiting, fever, chills, chest pain, sob, palpitations, changes in urinary habits, blood in urine, syncope or dizziness     In the ED: T: 36.1   BP: 194/74   HR: 83   Spo2: 95% on room air  CT abdomen/pelvis with IV and oral contrast: 1.  No definite CT evidence of acute pathology.2.  Nonspecific prominence of bilateral inguinal nodes, increased from prior exam.  Labs: Hb: 10.5      RENATO done on admission and showed melena. Patient put npo

## 2025-06-03 NOTE — H&P ADULT - TIME BILLING
Direct clinical care, patient education and counseling,  coordination with GI consultants, nursing and case management/social work teams, review of tests and scheduled medications,  independent review of previous medical records and available test results, and resident supervision. Time spent on the encounter excludes teaching time and/or separately reported services.

## 2025-06-03 NOTE — PATIENT PROFILE ADULT - NSPROIMPLANTSMEDDEV_GEN_A_NUR
None

## 2025-06-03 NOTE — ED PROVIDER NOTE - PHYSICAL EXAMINATION
Vital Signs: I have reviewed the initial vital signs.  Constitutional: appears stated age, no acute distress  Eyes: Sclera clear, EOMI.  Cardiovascular: S1 and S2, regular rate, regular rhythm, well-perfused extremities, radial pulses equal and 2+, pedal pulses 2+ and equal  Respiratory: unlabored respiratory effort, clear to auscultation bilaterally no wheezing, rales, or rhonchi  Gastrointestinal:  abdomen soft, diffuse upper abdominal tenderness, + melena stool  Musculoskeletal: supple neck, no lower extremity edema  Integumentary: warm, dry, no rash  Neurologic: awake, alert, oriented x3, extremities’ motor and sensory functions grossly intact

## 2025-06-03 NOTE — CONSULT NOTE ADULT - ASSESSMENT
Plan   - unable to schedule EGD for today as pt had lunch today   - Will schedule for EGD with stent removal tomorrow 6/4  - Clear liquid diet today and NPO after midnight   - PPI IV BID  - 2x18 G IVs  - CBC BID and transfuse to keep Hgb  79-year-old female with a past medical history of hypertension, hypothyroidism, hyperlipidemia, peripheral arterial disease on Plavix, and prior cholecystectomy, and known to [facility] for multiple ERCP in the past as has a large tubular adenoma in the duodenum at the level of the papilla. GI consulted for evaluation of reported melena.        # Reported melena r/o UGIB   # Hx of cholangitis s/p ERCP with stent placement  # Duodenal adenoma   - HD stable    - Hgb stable at baseline  - RENATO done empty rectal vault   - on Plavix for PAD, last dose on 6/2  - CT AP with IVC reviewed as above         Plan   - unable to schedule EGD for today as pt had lunch today   - Will schedule for EGD with stent removal tomorrow 6/4  - Clear liquid diet today and NPO after midnight   - PPI IV BID  - 2x18 G IVs  - CBC BID and transfuse to keep Hgb >8   - Keep active T&S

## 2025-06-03 NOTE — CONSULT NOTE ADULT - SUBJECTIVE AND OBJECTIVE BOX
Gastroenterology Consultation:    Admitted on: 06-03-25      HPI:  79 year old woman with a past medical history of HTN (Difficult to control),  PAD  s/p stents in both legs on Plavix, Hypothyroidism, lymphadenopathy, chronic venous stasis, Anxiety, H/O of acute cholangitis w/ gram neg bacteremia; H/O CCY and BRYSON; H/O of multiple ERCP for choledocholithiasis (last CBD stent for it placed 2/5/25), H/O tubular adenoma s/p stent in April 21, 2025 presented for abdominal pain and black stool over the past 2 days. Patient reported that 2 days again she developed several diffuse abdominal pain and noticed that her stool is more black than usual. She also felt nauseated. She denied vomiting, fever, chills, chest pain, sob, palpitations, changes in urinary habits, blood in urine, syncope or dizziness     In the ED: T: 36.1   BP: 194/74   HR: 83   Spo2: 95% on room air  CT abdomen/pelvis with IV and oral contrast: 1.  No definite CT evidence of acute pathology.2.  Nonspecific prominence of bilateral inguinal nodes, increased from prior exam.  Labs: Hb: 10.5      RENATO done on admission and showed melena. Patient put npo      (03 Jun 2025 11:21)        Prior ERCP 4/21/25  ERCP: Prior biliary sphincterotomy. Successful ERCP with removal of internally  migrated CBD stent, removal of stones, pus and sludge, followed by placement of  an 8 x 60 mm FC-SEMS.    Large circumferential duodenal adenoma involving the ampulla extending over  several folds.            PAST MEDICAL & SURGICAL HISTORY:  Arterial insufficiency of lower extremity  left leg stent placed  Leg swelling  Hypothyroid  HTN (hypertension)  High cholesterol  Peripheral arterial disease  H/O Graves' disease  History of cholecystectomy  H/O: hysterectomy  S/P angiogram of extremity  S/P ERCP        FAMILY HISTORY:  Family history of breast cancer (Sibling)    FH: lung cancer  FATHER      Home Medications:  furosemide 40 mg oral tablet: 1 tab(s) orally every other day (19 Nov 2024 00:09)  losartan 100 mg oral tablet: 1 tab(s) orally once a day (16 Apr 2025 20:57)  pantoprazole 40 mg oral delayed release tablet: 1 tab(s) orally once a day (19 Nov 2024 00:09)  Requip 0.25 mg oral tablet: 1 tab(s) orally once a day (at bedtime) (03 Jun 2025 11:41)        MEDICATIONS  (STANDING):  ferrous    sulfate 325 milliGRAM(s) Oral daily  hydrALAZINE 50 milliGRAM(s) Oral three times a day  levothyroxine 150 MICROGram(s) Oral daily  losartan 100 milliGRAM(s) Oral daily  NIFEdipine XL 90 milliGRAM(s) Oral at bedtime  pantoprazole  Injectable 40 milliGRAM(s) IV Push once  pantoprazole  Injectable 40 milliGRAM(s) IV Push at bedtime  pantoprazole  Injectable      rOPINIRole 0.25 milliGRAM(s) Oral at bedtime    MEDICATIONS  (PRN):  hydrOXYzine hydrochloride 25 milliGRAM(s) Oral every 8 hours PRN for anxiety      Allergies  codeine (Stomach Upset; Vomiting; Nausea)      Review of Systems:   Constitutional:  No Fever, No Chills  ENT/Mouth:  No Hearing Changes,  No Difficulty Swallowing  Eyes:  No Eye Pain, No Vision Changes  Cardiovascular:  No Chest Pain, No Palpitations  Respiratory:  No Cough, No Dyspnea  Gastrointestinal:  As described in HPI  Musculoskeletal:  No Joint Swelling, No Back Pain  Skin:  No Skin Lesions, No Jaundice  Neuro:  No Syncope, No Dizziness  Heme/Lymph:  No Bruising, No Bleeding.          Physical Examination:  T(C): 36.7 (06-03-25 @ 12:22), Max: 36.7 (06-03-25 @ 12:22)  HR: 65 (06-03-25 @ 12:22) (65 - 83)  BP: 146/63 (06-03-25 @ 12:22) (123/58 - 194/74)  RR: 18 (06-03-25 @ 12:22) (18 - 18)  SpO2: 94% (06-03-25 @ 12:22) (94% - 96%)  Height (cm): 165.1 (06-03-25 @ 02:00)  Weight (kg): 83.9 (06-03-25 @ 02:00)        GENERAL: AAOx3, no acute distress.  HEAD:  Atraumatic, Normocephalic  EYES: conjunctiva and sclera clear  NECK: Supple, no JVD or thyromegaly  CHEST/LUNG: Clear to auscultation bilaterally; No wheeze, rhonchi, or rales  HEART: Regular rate and rhythm; normal S1, S2, No murmurs.  ABDOMEN: Soft, nontender, nondistended; Bowel sounds present  NEUROLOGY: No asterixis or tremor.   SKIN: Intact, no jaundice        Data:                        10.5   13.85 )-----------( 309      ( 03 Jun 2025 04:02 )             33.4     Hgb Trend:  10.5  06-03-25 @ 04:02      06-03    140  |  104  |  34[H]  ----------------------------<  157[H]  4.4   |  20  |  1.0    Ca    9.0      03 Jun 2025 04:02    TPro  6.8  /  Alb  4.3  /  TBili  0.4  /  DBili  x   /  AST  17  /  ALT  17  /  AlkPhos  124[H]  06-03    Liver panel trend:  TBili 0.4   /   AST 17   /   ALT 17   /   AlkP 124   /   Tptn 6.8   /   Alb 4.3    /   DBili --      06-03          Urinalysis with Rflx Culture (collected 03 Jun 2025 05:32)          Radiology:  CT Abdomen and Pelvis w/ IV Cont:   ACC: [id number] EXAM:  CT ABDOMEN AND PELVIS IC   ORDERED BY: JUANITO SCHILLING     PROCEDURE DATE:  06/03/2025          INTERPRETATION:  CLINICAL INFORMATION: Epigastric pain. Melena. Diarrhea.    COMPARISON: CT abdomen pelvis 4/24/2025    CONTRAST/COMPLICATIONS:  IV Contrast: Omnipaque 350  100 cc administered   0 cc discarded  Oral Contrast: NONE    PROCEDURE:  CT of the Abdomen and Pelvis was performed.  Sagittal and coronal reformats were performed.    FINDINGS:  LOWER CHEST: Interval resolution of right basilar consolidation and   effusion. Coronary calcifications. Aortic valve calcifications.    LIVER: Unremarkable  BILE DUCTS: Stable pneumobilia. Unchanged position of CBD stent extending   into the duodenum, with new distal opacification, likely reflecting   physiologic bile secretion. Stable soft tissue tissue prominence in the   area of the major papilla, consistent with history of tubular adenoma.  GALLBLADDER: Absent  SPLEEN: Within normal limits.  PANCREAS: Within normal limits.  ADRENALS: Within normal limits.  KIDNEYS/URETERS: Bilateral renal cysts and hypodensities too small to   characterize.    BLADDER: Within normal limits.  REPRODUCTIVE ORGANS: Hysterectomy.    BOWEL: No bowel obstruction. Colonic diverticulosis. Appendix is not   visualized. No evidence of inflammation in the pericecal region. Liquid   stool in the cecum.  PERITONEUM/RETROPERITONEUM: Within normal limits.  VESSELS: Atherosclerotic changes. Partially visualized left superficial   femoral arterystent.  LYMPH NODES: Nonspecific prominence of bilateral inguinal nodes,   increased from prior exam.  ABDOMINAL WALL: Within normal limits.  BONES: Degenerative changes.    IMPRESSION:  1.  No definite CT evidence of acute pathology.    2.  Nonspecific prominence of bilateral inguinal nodes, increased from   prior exam.          --- End of Report ---          ESTEBAN SIMPSON MD; Resident Radiologist  This document has been electronically signed.  OSBALDO GARCÍA MD; Attending Radiologist  This document has been electronically signed. Giovanni  3 2025  6:23AM (06-03-25 @ 04:42)

## 2025-06-03 NOTE — PATIENT PROFILE ADULT - FALL HARM RISK - RISK INTERVENTIONS

## 2025-06-03 NOTE — H&P ADULT - ATTENDING COMMENTS
primary care physician: Dr Hartmann   Patient seen and examined independently in ED (SH-2).  Patient alert and oriented and is able to engage in physical exam and interview   Describing improving abdominal pain. Last bloody BM earlier this morning according to patient      PAST MEDICAL & SURGICAL HISTORY:  Arterial insufficiency of lower extremity  left leg stent placed  Leg swelling  Hypothyroid  HTN (hypertension)  High cholesterol  Peripheral arterial disease  H/O Graves' disease  History of cholecystectomy  H/O: hysterectomy  S/P angiogram of extremity  S/P ERCP      Vitals:  T(F): 97.7 (06-03-25 @ 08:44)  HR: 78 (06-03-25 @ 10:37)  BP: 611/68 (06-03-25 @ 10:37)  RR: 18 (06-03-25 @ 10:37)  SpO2: 96% (06-03-25 @ 10:37)    TESTS & MEASUREMENTS:                        10.5   13.85 )-----------( 309      ( 03 Jun 2025 04:02 )             33.4       06-03    140  |  104  |  34[H]  ----------------------------<  157[H]  4.4   |  20  |  1.0    Ca    9.0      03 Jun 2025 04:02    TPro  6.8  /  Alb  4.3  /  TBili  0.4  /  DBili  x   /  AST  17  /  ALT  17  /  AlkPhos  124[H]  06-03    LIVER FUNCTIONS - ( 03 Jun 2025 04:02 )  Alb: 4.3 g/dL / Pro: 6.8 g/dL / ALK PHOS: 124 U/L / ALT: 17 U/L / AST: 17 U/L / GGT: x             In summary:  HEALTH ISSUES - PROBLEM Dx:  - Highly suspected acute upper GI bleed       differential diagnoses include PUD, AVM.       Patient describing ivett-epigastric pain with loose dark black BM      Patient on Plavix as outpatient   - HTN on therapy   - Rest of active issues and PMH as per detailed note above     PLAN   - NPO except sips of water   - Possible EGD today   - Proton Pump Inhibitor intravenous twice daily for now (as per conversation with GI team)   - Will need to hold BP meds (hydralazine and losartan) if SBP<110   - Rest of A/P as per detailed note above     Case discussed with resident assigned.

## 2025-06-03 NOTE — PATIENT PROFILE ADULT - HAVE YOU EXPERIENCED VIOLENCE OR A TRAUMATIC EVENT?
Vital Signs Last 24 Hrs  T(C): 36.8 (13 Sep 2022 09:00), Max: 37 (13 Sep 2022 05:32)  T(F): 98.2 (13 Sep 2022 09:00), Max: 98.6 (13 Sep 2022 05:32)  HR: 75 (13 Sep 2022 09:00) (75 - 80)  BP: 133/72 (13 Sep 2022 09:00) (133/72 - 176/86)  BP(mean): 112 (13 Sep 2022 05:32) (112 - 112)  RR: 18 (13 Sep 2022 09:00) (18 - 18)  SpO2: 99% (13 Sep 2022 09:00) (96% - 99%)    Parameters below as of 13 Sep 2022 09:00  Patient On (Oxygen Delivery Method): room air
no

## 2025-06-03 NOTE — ED ADULT TRIAGE NOTE - HEIGHT IN INCHES
INTERNAL MEDICINE PROGRESS/URGENT CARE NOTE    CHIEF COMPLAINT     Chief Complaint   Patient presents with    Foot Injury       HPI     Germain Parrish is a 33 y.o. male who presents for an urgent/follow up visit today.    Pulled right hamstring while running while playing baseball about 5 weeks ago. Has been wearing a compressive sleeve on thigh which has helped only some. No swelling or bruising to area.   Someone stepped on his foot 5 weeks ago while playing as well and has had pain to area since then. Pain  +swelling and pain to top of right foot and arch. No bruising. Able to bear weight.  Has been taking tylenol prn.  He also has pain in his anterior right shoulder. Reproducible with throwing. No numbness, tingling. Feels weak sometimes.     Past Medical History:  Past Medical History:   Diagnosis Date    Ankle sprain     Anxiety     Depression     Migraine         Past Surgical History:  Past Surgical History:   Procedure Laterality Date    CYST REMOVAL          Allergies:  Review of patient's allergies indicates:   Allergen Reactions    Lactose        Home Medications:    Current Outpatient Medications:     colchicine (COLCRYS) 0.6 mg tablet, Take 1 tablet (0.6 mg total) by mouth once daily. for 3 days (Patient not taking: Reported on 11/16/2023), Disp: 3 tablet, Rfl: 0    meloxicam (MOBIC) 15 MG tablet, Take 1 tablet (15 mg total) by mouth daily as needed for Pain., Disp: 30 tablet, Rfl: 0    methylPREDNISolone (MEDROL DOSEPACK) 4 mg tablet, use as directed, Disp: 21 each, Rfl: 0    sertraline (ZOLOFT) 100 MG tablet, Take 1 tablet (100 mg total) by mouth once daily., Disp: 90 tablet, Rfl: 3    tiZANidine (ZANAFLEX) 4 MG tablet, Take 1 tablet (4 mg total) by mouth nightly as needed (muscle pain)., Disp: 30 tablet, Rfl: 0     Review of Systems:  Review of Systems   Constitutional:  Negative for fever.   HENT:  Negative for congestion and sore throat.    Respiratory:  Negative for cough, shortness of breath  Patient was awakened and placed in a dry brief and clean lien was added by PCT. Patient did not awaken during this process. No distress noted. Patient current ETA is 1000 per Will with [facility].       Julio Singleton RN  05/26/23 8120 "and wheezing.    Cardiovascular:  Negative for chest pain.   Musculoskeletal:  Positive for arthralgias, joint swelling and myalgias. Negative for back pain and neck pain.   Neurological:  Negative for numbness.         PHYSICAL EXAM     Vitals:    11/16/23 0930   BP: 122/82   BP Location: Left arm   Patient Position: Sitting   BP Method: Large (Automatic)   Pulse: 84   SpO2: 98%   Weight: 87.2 kg (192 lb 3.9 oz)   Height: 5' 7" (1.702 m)      Body mass index is 30.11 kg/m².     Physical Exam  Vitals reviewed.   Constitutional:       Appearance: Normal appearance.   HENT:      Head: Normocephalic.      Mouth/Throat:      Mouth: Mucous membranes are moist.      Pharynx: Oropharynx is clear.   Eyes:      Conjunctiva/sclera: Conjunctivae normal.      Pupils: Pupils are equal, round, and reactive to light.   Cardiovascular:      Rate and Rhythm: Normal rate and regular rhythm.   Pulmonary:      Effort: Pulmonary effort is normal.      Breath sounds: Normal breath sounds.   Musculoskeletal:      Right shoulder: No swelling or bony tenderness. Normal range of motion. Normal strength. Normal pulse.      Right upper leg: Tenderness present. No swelling or edema.      Right knee: Normal.      Right ankle: No tenderness. Normal pulse.      Right Achilles Tendon: Normal.      Right foot: Normal capillary refill. Tenderness present. Normal pulse.        Legs:         Feet:       Comments: Strength 5/5 BLE    TTP to foot. No bruising.    Skin:     General: Skin is warm and dry.   Neurological:      General: No focal deficit present.      Mental Status: He is alert.   Psychiatric:         Mood and Affect: Mood normal.         Behavior: Behavior normal.         LABS     No results found for: "LABA1C", "HGBA1C"  CMP  Sodium   Date Value Ref Range Status   10/22/2022 136 136 - 145 mmol/L Final     Potassium   Date Value Ref Range Status   10/22/2022 3.6 3.5 - 5.1 mmol/L Final     Chloride   Date Value Ref Range Status   10/22/2022 " 103 95 - 110 mmol/L Final     CO2   Date Value Ref Range Status   10/22/2022 23 23 - 29 mmol/L Final     Glucose   Date Value Ref Range Status   10/22/2022 94 70 - 110 mg/dL Final     BUN   Date Value Ref Range Status   10/22/2022 6 6 - 20 mg/dL Final     Creatinine   Date Value Ref Range Status   10/22/2022 0.9 0.5 - 1.4 mg/dL Final     Calcium   Date Value Ref Range Status   10/22/2022 8.8 8.7 - 10.5 mg/dL Final     Total Protein   Date Value Ref Range Status   10/22/2022 6.7 6.0 - 8.4 g/dL Final     Albumin   Date Value Ref Range Status   10/22/2022 2.9 (L) 3.5 - 5.2 g/dL Final     Total Bilirubin   Date Value Ref Range Status   10/22/2022 0.4 0.1 - 1.0 mg/dL Final     Comment:     For infants and newborns, interpretation of results should be based  on gestational age, weight and in agreement with clinical  observations.    Premature Infant recommended reference ranges:  Up to 24 hours.............<8.0 mg/dL  Up to 48 hours............<12.0 mg/dL  3-5 days..................<15.0 mg/dL  6-29 days.................<15.0 mg/dL       Alkaline Phosphatase   Date Value Ref Range Status   10/22/2022 49 (L) 55 - 135 U/L Final     AST   Date Value Ref Range Status   10/22/2022 39 10 - 40 U/L Final     ALT   Date Value Ref Range Status   10/22/2022 30 10 - 44 U/L Final     Anion Gap   Date Value Ref Range Status   10/22/2022 10 8 - 16 mmol/L Final     eGFR if    Date Value Ref Range Status   12/22/2021 >60.0 >60 mL/min/1.73 m^2 Final     eGFR if non    Date Value Ref Range Status   12/22/2021 >60.0 >60 mL/min/1.73 m^2 Final     Comment:     Calculation used to obtain the estimated glomerular filtration  rate (eGFR) is the CKD-EPI equation.        Lab Results   Component Value Date    WBC 5.70 10/22/2022    HGB 11.2 (L) 10/22/2022    HCT 33.1 (L) 10/22/2022    MCV 82 10/22/2022     10/22/2022     Lab Results   Component Value Date    CHOL 176 06/13/2020     Lab Results   Component Value  "Date    HDL 28 (L) 06/13/2020     Lab Results   Component Value Date    LDLCALC 108.0 06/13/2020     Lab Results   Component Value Date    TRIG 200 (H) 06/13/2020     Lab Results   Component Value Date    CHOLHDL 15.9 (L) 06/13/2020     No results found for: "TSH", "R1QGZGZ", "L0THOWC", "THYROIDAB"    ASSESSMENT     1. Right hamstring injury, initial encounter    2. Strain of right foot, initial encounter    3. Right foot pain    4. Bursitis of right shoulder           PLAN  Image foot. Contact with results.   He is already doing RICE. A lot of these sound like muscle and tendon injuries. Still playing injured.   Meds sent. Encouraged him to take a break so that he can rest and heal. Given multiple issues, will also refer to sports medicine.   Nothing else otc for pain except tylenol.     F/u for any worsening or new associated symptoms.     1. Right hamstring injury, initial encounter  - methylPREDNISolone (MEDROL DOSEPACK) 4 mg tablet; use as directed  Dispense: 21 each; Refill: 0  - meloxicam (MOBIC) 15 MG tablet; Take 1 tablet (15 mg total) by mouth daily as needed for Pain.  Dispense: 30 tablet; Refill: 0  - tiZANidine (ZANAFLEX) 4 MG tablet; Take 1 tablet (4 mg total) by mouth nightly as needed (muscle pain).  Dispense: 30 tablet; Refill: 0  - Ambulatory referral/consult to Sports Medicine; Future    2. Strain of right foot, initial encounter  - methylPREDNISolone (MEDROL DOSEPACK) 4 mg tablet; use as directed  Dispense: 21 each; Refill: 0  - meloxicam (MOBIC) 15 MG tablet; Take 1 tablet (15 mg total) by mouth daily as needed for Pain.  Dispense: 30 tablet; Refill: 0  - tiZANidine (ZANAFLEX) 4 MG tablet; Take 1 tablet (4 mg total) by mouth nightly as needed (muscle pain).  Dispense: 30 tablet; Refill: 0  - Ambulatory referral/consult to Sports Medicine; Future    3. Right foot pain  - methylPREDNISolone (MEDROL DOSEPACK) 4 mg tablet; use as directed  Dispense: 21 each; Refill: 0  - meloxicam (MOBIC) 15 MG " tablet; Take 1 tablet (15 mg total) by mouth daily as needed for Pain.  Dispense: 30 tablet; Refill: 0  - tiZANidine (ZANAFLEX) 4 MG tablet; Take 1 tablet (4 mg total) by mouth nightly as needed (muscle pain).  Dispense: 30 tablet; Refill: 0    4. Bursitis of right shoulder  - methylPREDNISolone (MEDROL DOSEPACK) 4 mg tablet; use as directed  Dispense: 21 each; Refill: 0  - Ambulatory referral/consult to Sports Medicine; Future       Patient was counseled on when to seek emergent care. Patient's plan/treatment was discussed including medications and possible side effects. Verbalized understanding of all instructions.          ANAM Patel  Department of Internal Medicine - [facility]  11/16/2023      5

## 2025-06-03 NOTE — ED ADULT NURSE NOTE - PAIN: PRESENCE, MLM
SUBJECTIVE  Madhu Mariee is a 52-year-old male who was here for follow-up.  Patient is on Truvada for preexposure prophylaxis.  He reports no adverse use of the medication.  His partner is HIV positive he is on HAART  Patient does not have any new partner.  He is retired now, he retired in January 2020.  Patient also has a history of elevated liver enzymes, he does have a history of alcohol abuse though he has cut back now.  He is also on prophylactic Valtrex.  He had positive serology for HSV 1 and 2.  He never had any outbreak.  He denies any fever/chills.  Has no night sweats or any weight loss.  Appetite is okay.  Denies any headache or any visual symptoms.  Has no chest pain, cough or shortness of breath.  Denies any abdominal pain, nausea, vomiting or diarrhea.  Denies any urinary symptoms.  Has no urethral discharge.  Denies any joint pain, swelling, rash or pruritus.    Rest of the review system otherwise negative    Past Medical History:   Diagnosis Date   • Arthralgia of left knee 3/15/2018   • Chronic alcoholic hepatitis 9/20/2018   • Closed fracture of one rib of left side with routine healing 1/21/2019   • Elevated liver enzymes 10/1/2018   • Essential hypertension, benign 1/29/2008   • High risk bisexual behavior 3/19/2018   • Numbness and tingling in both hands 1/6/2020   • Obesity (BMI 30-39.9) 3/11/2015   • Poor posture 12/11/2018   • Pure hypercholesterolemia 10/22/2008   • Right ankle injury, initial encounter 1/21/2019   • Rotator cuff arthropathy of left shoulder 11/26/2018   • Rotator cuff tear arthropathy of left shoulder 12/11/2018   • Thrombocytopenia (CMS/HCC) 9/17/2018   • Tobacco use disorder 1/7/2019     Current Outpatient Medications   Medication Sig Dispense Refill   • cyclobenzaprine (FLEXERIL) 10 MG tablet TAKE 1 TABLET BY MOUTH THREE TIMES DAILY AS NEEDED FOR MUSCLE SPASMS 30 tablet 0   • amLODIPine (NORVASC) 5 MG tablet Take 1 tablet by mouth daily. 30 tablet 6   •  metoPROLOL succinate (TOPROL-XL) 50 MG 24 hr tablet Take 1 tablet by mouth daily. 30 tablet 6   • emtricitabine-tenofovir DISOPROXIL (TRUVADA) 200-300 MG per tablet TAKE 1 TABLET BY MOUTH DAILY 90 tablet 0   • valACYclovir (VALTREX) 500 MG tablet TAKE 1 TABLET BY MOUTH DAILY 90 tablet 1   • aspirin 81 MG tablet Take 81 mg by mouth.       No current facility-administered medications for this visit.     Past Surgical History:   Procedure Laterality Date   • Ear tube each     • Tonsillectomy        Family History   Problem Relation Age of Onset   • Kidney disease Father    • Cancer Maternal Grandmother      Social History     Socioeconomic History   • Marital status: Legally      Spouse name: Not on file   • Number of children: Not on file   • Years of education: Not on file   • Highest education level: Not on file   Occupational History   • Not on file   Tobacco Use   • Smoking status: Heavy Tobacco Smoker     Packs/day: 0.50     Types: Cigarettes   • Smokeless tobacco: Never Used   Substance and Sexual Activity   • Alcohol use: Yes     Alcohol/week: 35.0 standard drinks     Types: 35 Standard drinks or equivalent per week     Comment: social   • Drug use: No   • Sexual activity: Not on file   Other Topics Concern   • Not on file   Social History Narrative    Past Medical History:     Diagnosis Date     • Acute pyelonephritis 12/18/2013     • Alcoholic hepatitis     • Essential hypertension, benign 1/29/2008     • Fracture of ribs, two, closed 1/9/2015     • Gonorrhea 8/17/2010,2013     • Latent early syphilis 9/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     • Tobacco use disorder         Past Surgical History:     Procedure Laterality Date     • TONSILLECTOMY 2006         Social History         Social History     • Marital status:      Spouse name: N/A     • Number of children: 1     • Years of education: N/A         Occupational History     •          Social History Main Topics     • Smoking status: Current Every Day  Smoker     Packs/day: 0.50     Years: 15.00     Types: Cigarettes     • Smokeless tobacco: Never Used     • Alcohol use 2.4 oz/week     4 Standard drinks or equivalent per week     Comment: moderate - drinks daily, vodka usually     • Drug use: No     • Sexual activity: Yes     Partners: Male         Other Topics Concern     • .... Medical Equipment .... No     • Cpap No     • Oxygen No     • Cane No     • Walker No     • Wheel Chair No     • Seat Belt Yes      Social Determinants of Health     Financial Resource Strain:    • Social Determinants: Financial Resource Strain:    Food Insecurity:    • Social Determinants: Food Insecurity:    Transportation Needs:    • Lack of Transportation (Medical):    • Lack of Transportation (Non-Medical):    Physical Activity:    • Days of Exercise per Week:    • Minutes of Exercise per Session:    Stress:    • Social Determinants: Stress:    Social Connections:    • Social Determinants: Social Connections:    Intimate Partner Violence:    • Social Determinants: Intimate Partner Violence Past Fear:    • Social Determinants: Intimate Partner Violence Current Fear:      ALLERGIES:  No Known Allergies    ROS   As above otherwise negative    Physical Exam    Visit Vitals  /72   Pulse 82   Ht 5' 10\" (1.778 m)   Wt 108 kg (238 lb)   BMI 34.15 kg/m²     Skin : no rash   HEENT : anicteric sclera , no oral lesion.  Neck : supple, no lymphadenopathy noted.  Lungs : clear bilaterally, no wheeze or rhonchi noted.  CVS : regular rate and rhythm no murmur appreciated.  Abdomen  : soft, nontender, nondistended, active bowel sounds, no organomegaly appreciated.  Ext  : no cyanosis clubbing or edema noted.    Neurological  : patient is awake alert oriented ×3 there is no motor deficit.    Lymphatics :  there is no axillary inguinal or cervical lymphadenopathy noted.        Lab Services on 05/14/2021   Component Date Value Ref Range Status   • RPR WITH CONFIRMATION 05/13/2021 NON-REACTIVE   NON-REACTIVE Final         XR HAND MIN 3 VIEWS BILATERAL  Narrative: EXAM: XR HAND MIN 3 VIEWS BILATERAL    CLINICAL INDICATION: Bilateral hand pain, bilateral hand numbness and tingling sensations    COMPARISON: 09/05/2013 left hand radiographs    FINDINGS: There is a tiny chronic appearing ossicle at the volar base of the right middle finger distal phalanx that could represent sequelae of old trauma.  There is no acute fracture or dislocation and joint spaces are maintained.  No focal erosion is   identified.  There is mild left 1st carpometacarpal joint osteoarthritis with joint space narrowing and marginal spurring.  Impression: 1.  Tiny ossicle at the volar aspect of the right middle finger distal phalangeal base likely represents sequelae of old trauma.    2.  Mild left 1st carpometacarpal joint osteoarthritis.    3.  No other significant bone or joint abnormality is identified in either hand.    Electronically Signed by: KAYKAY LOZA MD   Signed on: 3/6/2020 11:58 AM       Assessment/ plan    #1 52-year-old male who is on preexposure prophylaxis with Truvada, his partner is HIV positive.  His partner is on HAART.  He reports no adverse or definitive patient.  Recommend labs in every 3 months.  Will check CBC, CMP, RPR, urine GC, HIV .  We will follow-up in 6 months.  Continue Truvada.  Safe sex practices discussed.    2.  Continue suppressive Valtrex for HSV infection, patient had positive serology for HSV 1 and 2, had new outbreak.  He wanted to continue with aggressive treatment.    3.  History of syphilis which was treated.         complains of pain/discomfort

## 2025-06-03 NOTE — H&P ADULT - NSHPLABSRESULTS_GEN_ALL_CORE
10.5   13.85 )-----------( 309      ( 2025 04:02 )             33.4       06-03    140  |  104  |  34[H]  ----------------------------<  157[H]  4.4   |  20  |  1.0    Ca    9.0      2025 04:02    TPro  6.8  /  Alb  4.3  /  TBili  0.4  /  DBili  x   /  AST  17  /  ALT  17  /  AlkPhos  124[H]  06-03              Urinalysis Basic - ( 2025 05:32 )    Color: Yellow / Appearance: Clear / S.008 / pH: x  Gluc: x / Ketone: x  / Bili: Negative / Urobili: 0.2 mg/dL   Blood: x / Protein: Negative mg/dL / Nitrite: Negative   Leuk Esterase: Trace / RBC: 5 /HPF / WBC 0 /HPF   Sq Epi: x / Non Sq Epi: 1 /HPF / Bacteria: Moderate /HPF            Lactate Trend            CAPILLARY BLOOD GLUCOSE                  RADIOLOGY & ADDITIONAL TESTS:    Imaging Personally Reviewed:  [ ] YES     EKG personally reviewed [ ] YES, interpretation:     Telemetry reviewed:

## 2025-06-04 ENCOUNTER — RESULT REVIEW (OUTPATIENT)
Age: 80
End: 2025-06-04

## 2025-06-04 LAB
ALBUMIN SERPL ELPH-MCNC: 3.6 G/DL — SIGNIFICANT CHANGE UP (ref 3.5–5.2)
ALP SERPL-CCNC: 99 U/L — SIGNIFICANT CHANGE UP (ref 30–115)
ALT FLD-CCNC: 14 U/L — SIGNIFICANT CHANGE UP (ref 0–41)
ANION GAP SERPL CALC-SCNC: 10 MMOL/L — SIGNIFICANT CHANGE UP (ref 7–14)
AST SERPL-CCNC: 14 U/L — SIGNIFICANT CHANGE UP (ref 0–41)
BASOPHILS # BLD AUTO: 0.04 K/UL — SIGNIFICANT CHANGE UP (ref 0–0.2)
BASOPHILS NFR BLD AUTO: 0.6 % — SIGNIFICANT CHANGE UP (ref 0–1)
BILIRUB SERPL-MCNC: 0.4 MG/DL — SIGNIFICANT CHANGE UP (ref 0.2–1.2)
BUN SERPL-MCNC: 17 MG/DL — SIGNIFICANT CHANGE UP (ref 10–20)
CALCIUM SERPL-MCNC: 8.5 MG/DL — SIGNIFICANT CHANGE UP (ref 8.4–10.5)
CHLORIDE SERPL-SCNC: 110 MMOL/L — SIGNIFICANT CHANGE UP (ref 98–110)
CO2 SERPL-SCNC: 23 MMOL/L — SIGNIFICANT CHANGE UP (ref 17–32)
CREAT SERPL-MCNC: 0.8 MG/DL — SIGNIFICANT CHANGE UP (ref 0.7–1.5)
EGFR: 75 ML/MIN/1.73M2 — SIGNIFICANT CHANGE UP
EGFR: 75 ML/MIN/1.73M2 — SIGNIFICANT CHANGE UP
EOSINOPHIL # BLD AUTO: 0.31 K/UL — SIGNIFICANT CHANGE UP (ref 0–0.7)
EOSINOPHIL NFR BLD AUTO: 5 % — SIGNIFICANT CHANGE UP (ref 0–8)
GLUCOSE SERPL-MCNC: 110 MG/DL — HIGH (ref 70–99)
HCT VFR BLD CALC: 30.9 % — LOW (ref 37–47)
HCT VFR BLD CALC: 31.3 % — LOW (ref 37–47)
HGB BLD-MCNC: 9.4 G/DL — LOW (ref 12–16)
HGB BLD-MCNC: 9.8 G/DL — LOW (ref 12–16)
IMM GRANULOCYTES NFR BLD AUTO: 0.6 % — HIGH (ref 0.1–0.3)
LYMPHOCYTES # BLD AUTO: 1.28 K/UL — SIGNIFICANT CHANGE UP (ref 1.2–3.4)
LYMPHOCYTES # BLD AUTO: 20.5 % — SIGNIFICANT CHANGE UP (ref 20.5–51.1)
MAGNESIUM SERPL-MCNC: 2.2 MG/DL — SIGNIFICANT CHANGE UP (ref 1.8–2.4)
MCHC RBC-ENTMCNC: 25.6 PG — LOW (ref 27–31)
MCHC RBC-ENTMCNC: 25.9 PG — LOW (ref 27–31)
MCHC RBC-ENTMCNC: 30.4 G/DL — LOW (ref 32–37)
MCHC RBC-ENTMCNC: 31.3 G/DL — LOW (ref 32–37)
MCV RBC AUTO: 82.6 FL — SIGNIFICANT CHANGE UP (ref 81–99)
MCV RBC AUTO: 84.2 FL — SIGNIFICANT CHANGE UP (ref 81–99)
MONOCYTES # BLD AUTO: 0.5 K/UL — SIGNIFICANT CHANGE UP (ref 0.1–0.6)
MONOCYTES NFR BLD AUTO: 8 % — SIGNIFICANT CHANGE UP (ref 1.7–9.3)
NEUTROPHILS # BLD AUTO: 4.08 K/UL — SIGNIFICANT CHANGE UP (ref 1.4–6.5)
NEUTROPHILS NFR BLD AUTO: 65.3 % — SIGNIFICANT CHANGE UP (ref 42.2–75.2)
NRBC BLD AUTO-RTO: 0 /100 WBCS — SIGNIFICANT CHANGE UP (ref 0–0)
NRBC BLD AUTO-RTO: 0 /100 WBCS — SIGNIFICANT CHANGE UP (ref 0–0)
PLATELET # BLD AUTO: 250 K/UL — SIGNIFICANT CHANGE UP (ref 130–400)
PLATELET # BLD AUTO: 260 K/UL — SIGNIFICANT CHANGE UP (ref 130–400)
PMV BLD: 10 FL — SIGNIFICANT CHANGE UP (ref 7.4–10.4)
PMV BLD: 10.1 FL — SIGNIFICANT CHANGE UP (ref 7.4–10.4)
POTASSIUM SERPL-MCNC: 4.2 MMOL/L — SIGNIFICANT CHANGE UP (ref 3.5–5)
POTASSIUM SERPL-SCNC: 4.2 MMOL/L — SIGNIFICANT CHANGE UP (ref 3.5–5)
PROT SERPL-MCNC: 5.8 G/DL — LOW (ref 6–8)
RBC # BLD: 3.67 M/UL — LOW (ref 4.2–5.4)
RBC # BLD: 3.79 M/UL — LOW (ref 4.2–5.4)
RBC # FLD: 17.4 % — HIGH (ref 11.5–14.5)
RBC # FLD: 17.8 % — HIGH (ref 11.5–14.5)
SODIUM SERPL-SCNC: 143 MMOL/L — SIGNIFICANT CHANGE UP (ref 135–146)
WBC # BLD: 6.25 K/UL — SIGNIFICANT CHANGE UP (ref 4.8–10.8)
WBC # BLD: 7.2 K/UL — SIGNIFICANT CHANGE UP (ref 4.8–10.8)
WBC # FLD AUTO: 6.25 K/UL — SIGNIFICANT CHANGE UP (ref 4.8–10.8)
WBC # FLD AUTO: 7.2 K/UL — SIGNIFICANT CHANGE UP (ref 4.8–10.8)

## 2025-06-04 PROCEDURE — 88305 TISSUE EXAM BY PATHOLOGIST: CPT | Mod: 26

## 2025-06-04 PROCEDURE — 99233 SBSQ HOSP IP/OBS HIGH 50: CPT

## 2025-06-04 PROCEDURE — 43239 EGD BIOPSY SINGLE/MULTIPLE: CPT

## 2025-06-04 RX ADMIN — Medication 40 MILLIGRAM(S): at 05:54

## 2025-06-04 RX ADMIN — Medication 150 MICROGRAM(S): at 05:55

## 2025-06-04 RX ADMIN — LOSARTAN POTASSIUM 100 MILLIGRAM(S): 100 TABLET, FILM COATED ORAL at 05:54

## 2025-06-04 RX ADMIN — Medication 50 MILLIGRAM(S): at 05:55

## 2025-06-04 RX ADMIN — Medication 325 MILLIGRAM(S): at 12:05

## 2025-06-04 RX ADMIN — Medication 50 MILLIGRAM(S): at 13:16

## 2025-06-04 RX ADMIN — Medication 90 MILLIGRAM(S): at 21:04

## 2025-06-04 RX ADMIN — Medication 50 MILLIGRAM(S): at 21:04

## 2025-06-04 NOTE — PROGRESS NOTE ADULT - ASSESSMENT
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  No definite CT evidence of acute pathology.2.  Nonspecific prominence of bilateral inguinal nodes, increased from prior exam.  -Labs: Hb: 10.5    - Patient on home pantoprazole 40 mg, and lasix 40 mg every other day (hold), and plavix 75 g (hold), ferrous sulfate 324 mg   - Keep patient npo  - Started IV protonix 40 mg BID   - Will undergo and EGD today 6/4.  - f/u cbc BID and transfuse as needed, keep active T&S  - Plan of care discussed with GI fellow  - Patient was supposed to have stent removed with GI in July 2025  - Stent might be removed today if the EGD goes ahead, still plan is not finalized     ## HTN  -Difficult to control HTN outpatient, patient follows with Dr. Stanley  -c/w Hydralazine 50 mg q8  -c/w Losartan 100 q24  -c/w Nifedipine xl 90mg po qHS  -hold Lasix 40 mg every other day    # PAD on Plavix; has stents in both leg  - Will hold Plavix 75 mg for now, restart when cleared by GI    # Restless Leg Synd; insomnia  - Requip 0.25mg po qHS    # Hypothyroidism  -c/w Levothyroxine 150 mcg daily    #Lymphadenopathy  - Consider Hem/onc consult given CT abdomen results    # Anxiety  - on home atarax 25mg po q8 prn     #Full code  #DVT prophylaxis: Holding due to possible UGB

## 2025-06-04 NOTE — CHART NOTE - NSCHARTNOTEFT_GEN_A_CORE
PACU ANESTHESIA ADMISSION NOTE      Procedure: EGD biopsy  Post op diagnosis:      ____  Intubated  TV:______       Rate: ______      FiO2: ______    __x__  Patent Airway    ____  Full return of protective reflexes    ____  Full recovery from anesthesia / back to baseline status    Vitals:  T(C): 36.7   HR: 64   BP: 146/70   RR: 18  SpO2: 97%     Mental Status:  __x__ Awake   _____ Alert   _____ Drowsy   _____ Sedated    Nausea/Vomiting:  ____ Yes, See Post - Op Orders      __x__ No    Pain Scale (0-10):  _____    Treatment: ____ None    ___x_ See Post - Op/PCA Orders    Post - Operative Fluids:   ____ Oral   ___x_ See Post - Op Orders    Plan: Discharge:   ____Home       ___x__Floor     _____Critical Care    _____  Other:_________________    Comments:  report given to RN DC to pacu
s/p EGD 6/4  Normal mucosa in the whole esophagus.  Ulcer in the Antrum. (Thermal Therapy).  Large circumferential duodenal adenoma involving the ampulla extending over several folds (Biopsy).  Hiatal Hernia.  A FcSEMS CBD stent from a prior endoscopy was seen in place not eroding into the surrounding tissue and with no underlying ulcer. Decision was made to leave the metallic stent in place.      Plan  - Await pathology results  - PPI IV BID for total 3 days followed by PPI PO BID for 8 weeks  - Repeat EGD in 8 weeks to document healing  - Avoid NSAIDs  - Full liquid diet today and advance to soft in am if Hgb stable   - Repeat ERCP in 4 months for stent exchange  - Follow up with Dr Torres as OP

## 2025-06-04 NOTE — PROGRESS NOTE ADULT - SUBJECTIVE AND OBJECTIVE BOX
SUBJECTIVE/OVERNIGHT EVENTS  Today is hospital day 1d. This morning patient was seen and examined at bedside, resting comfortably in bed. No acute or major events overnight. No melena as per patient.    MEDICATIONS  STANDING MEDICATIONS  ferrous    sulfate 325 milliGRAM(s) Oral daily  hydrALAZINE 50 milliGRAM(s) Oral three times a day  levothyroxine 150 MICROGram(s) Oral daily  losartan 100 milliGRAM(s) Oral daily  NIFEdipine XL 90 milliGRAM(s) Oral at bedtime  pantoprazole  Injectable 40 milliGRAM(s) IV Push two times a day  rOPINIRole 0.25 milliGRAM(s) Oral at bedtime    PRN MEDICATIONS  hydrOXYzine hydrochloride 25 milliGRAM(s) Oral every 8 hours PRN    VITALS  T(F): 97.6 (06-04-25 @ 05:45), Max: 98.2 (06-03-25 @ 21:01)  HR: 67 (06-04-25 @ 05:45) (65 - 71)  BP: 131/52 (06-04-25 @ 05:45) (125/65 - 164/67)  RR: 18 (06-04-25 @ 05:45) (18 - 18)  SpO2: 95% (06-03-25 @ 21:01) (94% - 95%)    PHYSICAL EXAM  CCO*3  GBAE, clear lungs  Regular S1S2, no murmurs  Soft abdomen, non tender  No LLE      LABS             9.4    6.25  )-----------( 250      ( 06-04-25 @ 07:06 )             30.9     143  |  110  |  17  -------------------------<  110   06-04-25 @ 07:06  4.2  |  23  |  0.8    Ca      8.5     06-04-25 @ 07:06  Mg     2.2     06-04-25 @ 07:06    TPro  5.8  /  Alb  3.6  /  TBili  0.4  /  DBili  x   /  AST  14  /  ALT  14  /  AlkPhos  99  /  GGT  x     06-04-25 @ 07:06        Urinalysis Basic - ( 04 Jun 2025 07:06 )    Color: x / Appearance: x / SG: x / pH: x  Gluc: 110 mg/dL / Ketone: x  / Bili: x / Urobili: x   Blood: x / Protein: x / Nitrite: x   Leuk Esterase: x / RBC: x / WBC x   Sq Epi: x / Non Sq Epi: x / Bacteria: x          Urinalysis with Rflx Culture (collected 03 Jun 2025 05:32)      IMAGING

## 2025-06-04 NOTE — PROGRESS NOTE ADULT - ATTENDING COMMENTS
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  No definite CT evidence of acute pathology.2.  Nonspecific prominence of bilateral inguinal nodes, increased from prior exam.  -Labs: Hb: 10.5    - Patient on home pantoprazole 40 mg, and lasix 40 mg every other day (hold), and plavix 75 g (hold), ferrous sulfate 324 mg   - Keep patient npo  - c/w IV protonix 40 mg BID   - Will undergo and EGD today 6/4.  - f/u cbc BID and transfuse as needed, keep active T&S  - Plan of care discussed with GI fellow  - Patient was supposed to have stent removed with GI in July 2025  - Stent might be removed today if the EGD goes ahead, still plan is not finalized     ## HTN  -Difficult to control HTN outpatient, patient follows with Dr. Stanley  -c/w Hydralazine 50 mg q8  -c/w Losartan 100 q24  -c/w Nifedipine xl 90mg po qHS  -hold Lasix 40 mg every other day    # PAD on Plavix; has stents in both leg  - Will hold Plavix 75 mg for now, restart when cleared by GI    # Restless Leg Synd; insomnia  - Requip 0.25mg po qHS    # Hypothyroidism  -c/w Levothyroxine 150 mcg daily    #Lymphadenopathy  - Consider Hem/onc consult given CT abdomen results    # Anxiety  - on home atarax 25mg po q8 prn     #Full code  #DVT prophylaxis: Holding due to possible UGB    pending: EGD, Hb

## 2025-06-04 NOTE — PRE-ANESTHESIA EVALUATION ADULT - BMI (KG/M2)
33 Tranexamic Acid Counseling:  Patient advised of the small risk of bleeding problems with tranexamic acid. They were also instructed to call if they developed any nausea, vomiting or diarrhea. All of the patient's questions and concerns were addressed.

## 2025-06-05 ENCOUNTER — TRANSCRIPTION ENCOUNTER (OUTPATIENT)
Age: 80
End: 2025-06-05

## 2025-06-05 VITALS
DIASTOLIC BLOOD PRESSURE: 76 MMHG | SYSTOLIC BLOOD PRESSURE: 141 MMHG | RESPIRATION RATE: 18 BRPM | TEMPERATURE: 98 F | HEART RATE: 71 BPM

## 2025-06-05 LAB
ANION GAP SERPL CALC-SCNC: 14 MMOL/L — SIGNIFICANT CHANGE UP (ref 7–14)
BUN SERPL-MCNC: 11 MG/DL — SIGNIFICANT CHANGE UP (ref 10–20)
CALCIUM SERPL-MCNC: 9 MG/DL — SIGNIFICANT CHANGE UP (ref 8.4–10.5)
CHLORIDE SERPL-SCNC: 105 MMOL/L — SIGNIFICANT CHANGE UP (ref 98–110)
CO2 SERPL-SCNC: 21 MMOL/L — SIGNIFICANT CHANGE UP (ref 17–32)
CREAT SERPL-MCNC: 0.8 MG/DL — SIGNIFICANT CHANGE UP (ref 0.7–1.5)
EGFR: 75 ML/MIN/1.73M2 — SIGNIFICANT CHANGE UP
EGFR: 75 ML/MIN/1.73M2 — SIGNIFICANT CHANGE UP
GLUCOSE SERPL-MCNC: 103 MG/DL — HIGH (ref 70–99)
HCT VFR BLD CALC: 34.1 % — LOW (ref 37–47)
HGB BLD-MCNC: 11 G/DL — LOW (ref 12–16)
MAGNESIUM SERPL-MCNC: 2.1 MG/DL — SIGNIFICANT CHANGE UP (ref 1.8–2.4)
MCHC RBC-ENTMCNC: 26.4 PG — LOW (ref 27–31)
MCHC RBC-ENTMCNC: 32.3 G/DL — SIGNIFICANT CHANGE UP (ref 32–37)
MCV RBC AUTO: 81.8 FL — SIGNIFICANT CHANGE UP (ref 81–99)
NRBC BLD AUTO-RTO: 0 /100 WBCS — SIGNIFICANT CHANGE UP (ref 0–0)
PLATELET # BLD AUTO: 296 K/UL — SIGNIFICANT CHANGE UP (ref 130–400)
PMV BLD: 10.3 FL — SIGNIFICANT CHANGE UP (ref 7.4–10.4)
POTASSIUM SERPL-MCNC: 4.2 MMOL/L — SIGNIFICANT CHANGE UP (ref 3.5–5)
POTASSIUM SERPL-SCNC: 4.2 MMOL/L — SIGNIFICANT CHANGE UP (ref 3.5–5)
RBC # BLD: 4.17 M/UL — LOW (ref 4.2–5.4)
RBC # FLD: 17.4 % — HIGH (ref 11.5–14.5)
SODIUM SERPL-SCNC: 140 MMOL/L — SIGNIFICANT CHANGE UP (ref 135–146)
WBC # BLD: 9.02 K/UL — SIGNIFICANT CHANGE UP (ref 4.8–10.8)
WBC # FLD AUTO: 9.02 K/UL — SIGNIFICANT CHANGE UP (ref 4.8–10.8)

## 2025-06-05 PROCEDURE — 99238 HOSP IP/OBS DSCHRG MGMT 30/<: CPT

## 2025-06-05 RX ORDER — LOSARTAN POTASSIUM 100 MG/1
1 TABLET, FILM COATED ORAL
Qty: 30 | Refills: 0
Start: 2025-06-05 | End: 2025-07-04

## 2025-06-05 RX ORDER — LEVOTHYROXINE SODIUM 300 MCG
1 TABLET ORAL
Qty: 30 | Refills: 1
Start: 2025-06-05 | End: 2025-08-03

## 2025-06-05 RX ORDER — NIFEDIPINE 30 MG
1 TABLET, EXTENDED RELEASE 24 HR ORAL
Qty: 30 | Refills: 0
Start: 2025-06-05 | End: 2025-07-04

## 2025-06-05 RX ADMIN — Medication 50 MILLIGRAM(S): at 05:33

## 2025-06-05 RX ADMIN — Medication 150 MICROGRAM(S): at 05:34

## 2025-06-05 RX ADMIN — LOSARTAN POTASSIUM 100 MILLIGRAM(S): 100 TABLET, FILM COATED ORAL at 05:34

## 2025-06-05 RX ADMIN — Medication 40 MILLIGRAM(S): at 05:34

## 2025-06-05 RX ADMIN — Medication 325 MILLIGRAM(S): at 13:58

## 2025-06-05 RX ADMIN — Medication 50 MILLIGRAM(S): at 15:30

## 2025-06-05 NOTE — DISCHARGE NOTE PROVIDER - NSDCFUSCHEDAPPT_GEN_ALL_CORE_FT
Clark Chin  [facility] PreAdmits  Scheduled Appointment: 06/10/2025    Clark Chin  [facility]  OPHTHALM  [address]  Scheduled Appointment: 06/10/2025    Franklin Torres  [facility]  GASTRO Doc Off [id number]  Scheduled Appointment: 08/06/2025    Darrell Hayden  [facility] PreAdmits  Scheduled Appointment: 08/08/2025    Darrell Hayden  [facility]  INTMED  [address]  Scheduled Appointment: 08/08/2025

## 2025-06-05 NOTE — DISCHARGE NOTE PROVIDER - NSDCCPCAREPLAN_GEN_ALL_CORE_FT
PRINCIPAL DISCHARGE DIAGNOSIS  Diagnosis: Antral ulcer  Assessment and Plan of Treatment: You presented with dark stools (melena) which is a sign of GI bleeding. You underwent an endoscopy which showed a stomach ulcer that was treated with thermal therapy to prevent bleeding. You will need to take a PPI twice a day, and follow up with Dr. Torres as outpatient. You will need to repeat the endoscopy in 8 weeks, and then repeat an ERCP in 4 months to echange your biliary stent. In case you see more bleeding in stools please present to ED.

## 2025-06-05 NOTE — DISCHARGE NOTE PROVIDER - NSDCPNSUBOBJ_GEN_ALL_CORE
Pt was seen and examined at the bedside.  Pt reports feeling better and tolerating diet.  plan for discharge on protonix BID and f/u GI outpatient.     Vital Signs Last 24 Hrs  T(C): 36.4 (05 Jun 2025 12:57), Max: 36.6 (04 Jun 2025 17:51)  T(F): 97.5 (05 Jun 2025 12:57), Max: 97.8 (04 Jun 2025 17:51)  HR: 71 (05 Jun 2025 12:57) (63 - 74)  BP: 141/76 (05 Jun 2025 12:57) (141/76 - 181/73)  BP(mean): 93 (04 Jun 2025 21:00) (93 - 93)  RR: 18 (05 Jun 2025 12:57) (17 - 18)  SpO2: 93% (05 Jun 2025 05:51) (93% - 97%)    Parameters below as of 05 Jun 2025 05:51  Patient On (Oxygen Delivery Method): room air    Physical Exam:   GENERAL: NAD  HEAD:  Atraumatic, Normocephalic  EYES: conjunctiva and sclera clear  NECK: Supple  CHEST/LUNG: Non labored respirations  HEART: regular rate and rhythm   ABDOMEN: Soft, Nondistended, NT  EXTREMITIES:  No clubbing, cyanosis, or edema  PSYCH: AAOx3  NEUROLOGY: non-focal  SKIN: No rashes or lesions

## 2025-06-05 NOTE — DISCHARGE NOTE PROVIDER - CARE PROVIDER_API CALL
Jasper Beal  Internal Medicine  [address] [place] [address]  Phone: [phone]  Fax: [phone]  Follow Up Time: 2 weeks

## 2025-06-05 NOTE — DISCHARGE NOTE NURSING/CASE MANAGEMENT/SOCIAL WORK - NSDCPEFALRISK_GEN_ALL_CORE
For information on Fall & Injury Prevention, visit: [email or web address] OR  [email or web address] OR  https://www.cdc.gov/steadi/patient.html

## 2025-06-05 NOTE — DISCHARGE NOTE PROVIDER - HOSPITAL COURSE
79 year old woman with a past medical history of HTN (Difficult to control),  PAD  s/p stents in both legs on Plavix, Hypothyroidism, lymphadenopathy, chronic venous stasis, Anxiety, H/O of acute cholangitis w/ gram neg bacteremia; H/O CCY and BRYSON; H/O of multiple ERCP for choledocholithiasis (last CBD stent for it placed 2/5/25), H/O tubular adenoma s/p stent in April 21, 2025 presented for abdominal pain and black stool over the past 2 days. Patient reported that 2 days again she developed several diffuse abdominal pain and noticed that her stool is more black than usual. She also felt nauseated. She denied vomiting, fever, chills, chest pain, sob, palpitations, changes in urinary habits, blood in urine, syncope or dizziness     In the ED: T: 36.1   BP: 194/74   HR: 83   Spo2: 95% on room air  CT abdomen/pelvis with IV and oral contrast: 1.  No definite CT evidence of acute pathology.2.  Nonspecific prominence of bilateral inguinal nodes, increased from prior exam.  Labs: Hb: 10.5      RENATO done on admission and showed melena. Patient put npo initially for EGD.    During stay was stable, did not require transfusions.  EGD done on 6/4: Normal mucosa in the whole esophagus. Ulcer in the Antrum. (Thermal Therapy).  Large circumferential duodenal adenoma involving the ampulla extending over several folds (Biopsy).    So as per GI:  Continue PPI BID  F/u OP for repeat EGD in 8 weeks, and ERCP in 4 months for CBD stent exchange.    DC plan discussed with Dr. Egn.

## 2025-06-05 NOTE — DISCHARGE NOTE PROVIDER - NSDCMRMEDTOKEN_GEN_ALL_CORE_FT
ferrous sulfate 325 mg (65 mg elemental iron) oral tablet: 1 tab(s) orally once a day  furosemide 40 mg oral tablet: 1 tab(s) orally every other day  hydrALAZINE 50 mg oral tablet: 1 tab(s) orally 3 times a day  hydrOXYzine hydrochloride 25 mg oral tablet: 1 tab(s) orally every 8 hours as needed for  anxiety  levothyroxine 150 mcg (0.15 mg) oral tablet: 1 tab(s) orally once a day  losartan 100 mg oral tablet: 1 tab(s) orally once a day  NIFEdipine 90 mg oral tablet, extended release: 1 tab(s) orally once a day (at bedtime)  pantoprazole 40 mg oral delayed release tablet: 1 tab(s) orally 2 times a day  Plavix 75 mg oral tablet: 1 tab(s) orally once a day  Requip 0.25 mg oral tablet: 1 tab(s) orally once a day (at bedtime)

## 2025-06-05 NOTE — DISCHARGE NOTE NURSING/CASE MANAGEMENT/SOCIAL WORK - FINANCIAL ASSISTANCE
[facility] provides services at a reduced cost to those who are determined to be eligible through [facility]’s financial assistance program. Information regarding [facility]’s financial assistance program can be found by going to [email or web address] or by calling [phone].

## 2025-06-05 NOTE — DISCHARGE NOTE NURSING/CASE MANAGEMENT/SOCIAL WORK - PATIENT PORTAL LINK FT
You can access the FollowMyHealth Patient Portal offered by [facility] by registering at the following website: [email or web address]. By joining [employer]’s FollowMyHealth portal, you will also be able to view your health information using other applications (apps) compatible with our system.

## 2025-06-09 LAB — SURGICAL PATHOLOGY STUDY: SIGNIFICANT CHANGE UP

## 2025-06-10 ENCOUNTER — OUTPATIENT (OUTPATIENT)
Dept: OUTPATIENT SERVICES | Facility: HOSPITAL | Age: 80
LOS: 1 days | End: 2025-06-10
Payer: MEDICARE

## 2025-06-10 ENCOUNTER — APPOINTMENT (OUTPATIENT)
Dept: OPHTHALMOLOGY | Facility: CLINIC | Age: 80
End: 2025-06-10
Payer: MEDICARE

## 2025-06-10 DIAGNOSIS — Z98.890 OTHER SPECIFIED POSTPROCEDURAL STATES: Chronic | ICD-10-CM

## 2025-06-10 DIAGNOSIS — H53.8 OTHER VISUAL DISTURBANCES: ICD-10-CM

## 2025-06-10 DIAGNOSIS — Z90.49 ACQUIRED ABSENCE OF OTHER SPECIFIED PARTS OF DIGESTIVE TRACT: Chronic | ICD-10-CM

## 2025-06-10 DIAGNOSIS — Z90.710 ACQUIRED ABSENCE OF BOTH CERVIX AND UTERUS: Chronic | ICD-10-CM

## 2025-06-10 PROCEDURE — 99214 OFFICE O/P EST MOD 30 MIN: CPT

## 2025-06-12 DIAGNOSIS — Z90.710 ACQUIRED ABSENCE OF BOTH CERVIX AND UTERUS: ICD-10-CM

## 2025-06-12 DIAGNOSIS — D13.2 BENIGN NEOPLASM OF DUODENUM: ICD-10-CM

## 2025-06-12 DIAGNOSIS — E78.00 PURE HYPERCHOLESTEROLEMIA, UNSPECIFIED: ICD-10-CM

## 2025-06-12 DIAGNOSIS — E05.00 THYROTOXICOSIS WITH DIFFUSE GOITER WITHOUT THYROTOXIC CRISIS OR STORM: ICD-10-CM

## 2025-06-12 DIAGNOSIS — Z88.5 ALLERGY STATUS TO NARCOTIC AGENT: ICD-10-CM

## 2025-06-12 DIAGNOSIS — K44.9 DIAPHRAGMATIC HERNIA WITHOUT OBSTRUCTION OR GANGRENE: ICD-10-CM

## 2025-06-12 DIAGNOSIS — G25.81 RESTLESS LEGS SYNDROME: ICD-10-CM

## 2025-06-12 DIAGNOSIS — Z79.899 OTHER LONG TERM (CURRENT) DRUG THERAPY: ICD-10-CM

## 2025-06-12 DIAGNOSIS — Z95.820 PERIPHERAL VASCULAR ANGIOPLASTY STATUS WITH IMPLANTS AND GRAFTS: ICD-10-CM

## 2025-06-12 DIAGNOSIS — I73.9 PERIPHERAL VASCULAR DISEASE, UNSPECIFIED: ICD-10-CM

## 2025-06-12 DIAGNOSIS — Z79.02 LONG TERM (CURRENT) USE OF ANTITHROMBOTICS/ANTIPLATELETS: ICD-10-CM

## 2025-06-12 DIAGNOSIS — E03.9 HYPOTHYROIDISM, UNSPECIFIED: ICD-10-CM

## 2025-06-12 DIAGNOSIS — I87.8 OTHER SPECIFIED DISORDERS OF VEINS: ICD-10-CM

## 2025-06-12 DIAGNOSIS — Z96.89 PRESENCE OF OTHER SPECIFIED FUNCTIONAL IMPLANTS: ICD-10-CM

## 2025-06-12 DIAGNOSIS — Z79.890 HORMONE REPLACEMENT THERAPY: ICD-10-CM

## 2025-06-12 DIAGNOSIS — R59.9 ENLARGED LYMPH NODES, UNSPECIFIED: ICD-10-CM

## 2025-06-12 DIAGNOSIS — G47.00 INSOMNIA, UNSPECIFIED: ICD-10-CM

## 2025-06-12 DIAGNOSIS — I50.30 UNSPECIFIED DIASTOLIC (CONGESTIVE) HEART FAILURE: ICD-10-CM

## 2025-06-12 DIAGNOSIS — K25.4 CHRONIC OR UNSPECIFIED GASTRIC ULCER WITH HEMORRHAGE: ICD-10-CM

## 2025-06-12 DIAGNOSIS — I11.0 HYPERTENSIVE HEART DISEASE WITH HEART FAILURE: ICD-10-CM

## 2025-06-16 ENCOUNTER — APPOINTMENT (OUTPATIENT)
Dept: INTERNAL MEDICINE | Facility: CLINIC | Age: 80
End: 2025-06-16

## 2025-06-18 DIAGNOSIS — H52.4 PRESBYOPIA: ICD-10-CM

## 2025-06-18 DIAGNOSIS — H35.033 HYPERTENSIVE RETINOPATHY, BILATERAL: ICD-10-CM

## 2025-06-18 DIAGNOSIS — H04.123 DRY EYE SYNDROME OF BILATERAL LACRIMAL GLANDS: ICD-10-CM

## 2025-06-18 DIAGNOSIS — H25.813 COMBINED FORMS OF AGE-RELATED CATARACT, BILATERAL: ICD-10-CM

## 2025-06-20 ENCOUNTER — APPOINTMENT (OUTPATIENT)
Dept: INTERNAL MEDICINE | Facility: CLINIC | Age: 80
End: 2025-06-20

## 2025-06-20 ENCOUNTER — OUTPATIENT (OUTPATIENT)
Dept: OUTPATIENT SERVICES | Facility: HOSPITAL | Age: 80
LOS: 1 days | End: 2025-06-20

## 2025-06-20 VITALS
SYSTOLIC BLOOD PRESSURE: 148 MMHG | HEART RATE: 68 BPM | DIASTOLIC BLOOD PRESSURE: 70 MMHG | BODY MASS INDEX: 30.16 KG/M2 | TEMPERATURE: 96.4 F | OXYGEN SATURATION: 95 % | HEIGHT: 65 IN | WEIGHT: 181 LBS

## 2025-06-20 DIAGNOSIS — Z00.00 ENCOUNTER FOR GENERAL ADULT MEDICAL EXAMINATION WITHOUT ABNORMAL FINDINGS: ICD-10-CM

## 2025-06-20 DIAGNOSIS — Z90.710 ACQUIRED ABSENCE OF BOTH CERVIX AND UTERUS: Chronic | ICD-10-CM

## 2025-06-20 DIAGNOSIS — Z90.49 ACQUIRED ABSENCE OF OTHER SPECIFIED PARTS OF DIGESTIVE TRACT: Chronic | ICD-10-CM

## 2025-06-20 DIAGNOSIS — Z98.890 OTHER SPECIFIED POSTPROCEDURAL STATES: Chronic | ICD-10-CM

## 2025-06-20 PROCEDURE — 99214 OFFICE O/P EST MOD 30 MIN: CPT

## 2025-06-30 RX ORDER — CHLORHEXIDINE GLUCONATE 4 %
325 (65 FE) LIQUID (ML) TOPICAL DAILY
Qty: 30 | Refills: 0 | Status: ACTIVE | COMMUNITY
Start: 2025-06-27 | End: 1900-01-01

## 2025-07-07 RX ORDER — LOSARTAN POTASSIUM 100 MG/1
100 TABLET, FILM COATED ORAL DAILY
Qty: 30 | Refills: 0 | Status: ACTIVE | COMMUNITY
Start: 2025-07-07 | End: 1900-01-01

## 2025-07-07 RX ORDER — NIFEDIPINE 90 MG/1
90 TABLET, EXTENDED RELEASE ORAL DAILY
Qty: 30 | Refills: 0 | Status: ACTIVE | COMMUNITY
Start: 2025-07-07 | End: 1900-01-01

## 2025-07-16 NOTE — PROGRESS NOTE ADULT - EYES
Pt refusing to go to [facility]. ED MD Alexandra notified and at bedside.    PERRL/EOMI/conjunctiva clear/normal

## 2025-08-08 ENCOUNTER — APPOINTMENT (OUTPATIENT)
Dept: INTERNAL MEDICINE | Facility: CLINIC | Age: 80
End: 2025-08-08

## 2025-08-20 DIAGNOSIS — E11.51 TYPE 2 DIABETES MELLITUS WITH DIABETIC PERIPHERAL ANGIOPATHY WITHOUT GANGRENE: ICD-10-CM

## 2025-08-20 DIAGNOSIS — E83.42 HYPOMAGNESEMIA: ICD-10-CM

## 2025-08-20 DIAGNOSIS — Z66 DO NOT RESUSCITATE: ICD-10-CM

## 2025-08-20 DIAGNOSIS — R65.21 SEVERE SEPSIS WITH SEPTIC SHOCK: ICD-10-CM

## 2025-08-20 DIAGNOSIS — E66.9 OBESITY, UNSPECIFIED: ICD-10-CM
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
## 2025-08-20 DIAGNOSIS — T85.520A DISPLACEMENT OF BILE DUCT PROSTHESIS, INITIAL ENCOUNTER: ICD-10-CM

## 2025-08-20 DIAGNOSIS — Z79.02 LONG TERM (CURRENT) USE OF ANTITHROMBOTICS/ANTIPLATELETS: ICD-10-CM

## 2025-08-20 DIAGNOSIS — J96.01 ACUTE RESPIRATORY FAILURE WITH HYPOXIA: ICD-10-CM

## 2025-08-20 DIAGNOSIS — K80.31 CALCULUS OF BILE DUCT WITH CHOLANGITIS, UNSPECIFIED, WITH OBSTRUCTION: ICD-10-CM

## 2025-08-20 DIAGNOSIS — A41.9 SEPSIS, UNSPECIFIED ORGANISM: ICD-10-CM

## 2025-08-20 DIAGNOSIS — Z51.5 ENCOUNTER FOR PALLIATIVE CARE: ICD-10-CM

## 2025-08-20 DIAGNOSIS — K52.1 TOXIC GASTROENTERITIS AND COLITIS: ICD-10-CM

## 2025-08-20 DIAGNOSIS — T36.95XA ADVERSE EFFECT OF UNSPECIFIED SYSTEMIC ANTIBIOTIC, INITIAL ENCOUNTER: ICD-10-CM

## 2025-08-20 DIAGNOSIS — A41.51 SEPSIS DUE TO ESCHERICHIA COLI [E. COLI]: ICD-10-CM

## 2025-08-20 DIAGNOSIS — R74.01 ELEVATION OF LEVELS OF LIVER TRANSAMINASE LEVELS: ICD-10-CM

## 2025-08-20 DIAGNOSIS — J98.11 ATELECTASIS: ICD-10-CM

## 2025-08-20 DIAGNOSIS — E78.5 HYPERLIPIDEMIA, UNSPECIFIED: ICD-10-CM

## 2025-08-20 DIAGNOSIS — I11.0 HYPERTENSIVE HEART DISEASE WITH HEART FAILURE: ICD-10-CM
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
## 2025-08-20 DIAGNOSIS — E03.9 HYPOTHYROIDISM, UNSPECIFIED: ICD-10-CM

## 2025-08-20 DIAGNOSIS — I73.9 PERIPHERAL VASCULAR DISEASE, UNSPECIFIED: ICD-10-CM

## 2025-08-20 DIAGNOSIS — I24.89 OTHER FORMS OF ACUTE ISCHEMIC HEART DISEASE: ICD-10-CM

## 2025-08-20 DIAGNOSIS — Z78.1 PHYSICAL RESTRAINT STATUS: ICD-10-CM

## 2025-08-20 DIAGNOSIS — Z88.5 ALLERGY STATUS TO NARCOTIC AGENT: ICD-10-CM

## 2025-08-20 DIAGNOSIS — N17.9 ACUTE KIDNEY FAILURE, UNSPECIFIED: ICD-10-CM

## 2025-08-20 DIAGNOSIS — I50.33 ACUTE ON CHRONIC DIASTOLIC (CONGESTIVE) HEART FAILURE: ICD-10-CM

## 2025-08-20 DIAGNOSIS — Z79.890 HORMONE REPLACEMENT THERAPY: ICD-10-CM

## 2025-09-17 ENCOUNTER — APPOINTMENT (OUTPATIENT)
Dept: GASTROENTEROLOGY | Facility: CLINIC | Age: 80
End: 2025-09-17